# Patient Record
Sex: FEMALE | Race: WHITE | NOT HISPANIC OR LATINO | Employment: OTHER | URBAN - METROPOLITAN AREA
[De-identification: names, ages, dates, MRNs, and addresses within clinical notes are randomized per-mention and may not be internally consistent; named-entity substitution may affect disease eponyms.]

---

## 2017-02-16 ENCOUNTER — HOSPITAL ENCOUNTER (OUTPATIENT)
Dept: SLEEP CENTER | Facility: CLINIC | Age: 77
Discharge: HOME | End: 2017-02-16
Payer: MEDICARE

## 2017-02-16 ENCOUNTER — TRANSCRIBE ORDERS (OUTPATIENT)
Dept: SLEEP CENTER | Facility: CLINIC | Age: 77
End: 2017-02-16

## 2017-02-16 DIAGNOSIS — G47.33 OBSTRUCTIVE SLEEP APNEA (ADULT) (PEDIATRIC): ICD-10-CM

## 2017-02-16 DIAGNOSIS — G47.33 OSA (OBSTRUCTIVE SLEEP APNEA): Primary | ICD-10-CM

## 2017-04-01 DIAGNOSIS — M85.80 OTHER SPECIFIED DISORDERS OF BONE DENSITY AND STRUCTURE, UNSPECIFIED SITE: ICD-10-CM

## 2017-04-01 DIAGNOSIS — Z12.31 ENCOUNTER FOR SCREENING MAMMOGRAM FOR MALIGNANT NEOPLASM OF BREAST: ICD-10-CM

## 2017-04-17 ENCOUNTER — LAB REQUISITION (OUTPATIENT)
Dept: LAB | Facility: HOSPITAL | Age: 77
End: 2017-04-17
Payer: MEDICARE

## 2017-04-17 ENCOUNTER — ALLSCRIPTS OFFICE VISIT (OUTPATIENT)
Dept: OTHER | Facility: OTHER | Age: 77
End: 2017-04-17

## 2017-04-17 DIAGNOSIS — Z01.419 ENCOUNTER FOR GYNECOLOGICAL EXAMINATION WITHOUT ABNORMAL FINDING: ICD-10-CM

## 2017-04-17 DIAGNOSIS — Z12.72 ENCOUNTER FOR SCREENING FOR MALIGNANT NEOPLASM OF VAGINA: ICD-10-CM

## 2017-04-17 PROCEDURE — G0145 SCR C/V CYTO,THINLAYER,RESCR: HCPCS | Performed by: OBSTETRICS & GYNECOLOGY

## 2017-04-20 LAB
LAB AP GYN PRIMARY INTERPRETATION: NORMAL
Lab: NORMAL

## 2017-04-24 ENCOUNTER — HOSPITAL ENCOUNTER (OUTPATIENT)
Dept: BONE DENSITY | Facility: MEDICAL CENTER | Age: 77
Discharge: HOME | End: 2017-04-24
Payer: MEDICARE

## 2017-04-24 DIAGNOSIS — M85.80 OSTEOPENIA, SENILE: ICD-10-CM

## 2017-04-24 DIAGNOSIS — M85.80 OTHER SPECIFIED DISORDERS OF BONE DENSITY AND STRUCTURE, UNSPECIFIED SITE: ICD-10-CM

## 2017-04-24 PROCEDURE — 77080 DXA BONE DENSITY AXIAL: CPT

## 2017-04-27 ENCOUNTER — ALLSCRIPTS OFFICE VISIT (OUTPATIENT)
Dept: OTHER | Facility: OTHER | Age: 77
End: 2017-04-27

## 2017-06-05 ENCOUNTER — HOSPITAL ENCOUNTER (OUTPATIENT)
Dept: RADIOLOGY | Age: 77
Discharge: HOME | End: 2017-06-05
Payer: MEDICARE

## 2017-06-05 DIAGNOSIS — Z12.31 ENCOUNTER FOR SCREENING MAMMOGRAM FOR MALIGNANT NEOPLASM OF BREAST: ICD-10-CM

## 2017-06-05 PROCEDURE — G0202 SCR MAMMO BI INCL CAD: HCPCS

## 2017-08-31 ENCOUNTER — HOSPITAL ENCOUNTER (OUTPATIENT)
Dept: SLEEP CENTER | Facility: CLINIC | Age: 77
Discharge: HOME | End: 2017-08-31
Payer: MEDICARE

## 2017-08-31 DIAGNOSIS — G47.33 OSA (OBSTRUCTIVE SLEEP APNEA): ICD-10-CM

## 2018-01-12 NOTE — PROGRESS NOTES
Discussion/Summary  Social Work-Discussion Summary  Luke: Patient is being seen for a distress screenning assessment  LSW reviewed pt's distress thermometer completed by pt on 7/11/2016 in med onc  Pt rated their distress as a 2/10 and denied psychosocial problems  Based on pt's score, a social work follow up would not be indicated  If pt expresses psychosocial needs, LSW is available to provide cancer care counseling        Signatures   Electronically signed by : CELSO Sams; Jul 13 2016  2:52PM EST                       (Author)

## 2018-01-13 VITALS
DIASTOLIC BLOOD PRESSURE: 64 MMHG | BODY MASS INDEX: 29.26 KG/M2 | WEIGHT: 165.13 LBS | HEIGHT: 63 IN | SYSTOLIC BLOOD PRESSURE: 100 MMHG

## 2018-01-17 NOTE — PROGRESS NOTES
Plan  Hemarthrosis of right knee    · Drink plenty of fluids ; Status:Complete;   Done: [id number]   Ordered;  For:Hemarthrosis of right knee; Ordered By:Belman, Truett Burkitt;   · Follow-up PRN Evaluation and Treatment  Follow-up  Status: Complete  Done:  [id number]   Ordered; For: Hemarthrosis of right knee; Ordered By: Trinity Kent Performed:  Due: 22TZT0603    Discussion/Summary  Discussion Summary:   In summary, this is a 59-year-old female history of hemarthrosis as outlined  Her von Willebrand's workup was negative  This is certainly plausible as she had not had bleeding with prior surgical intervention  3 years ago  Rare episodes of required von Willebrand's have been described thus not ruling this out entirely  Alternatively, One could consider the possibility of arterial source of bleeding  During reexploration and arteriogram this was not noted, however  This could be explained by Tamponade in the joint space, leading to bleeding cessation and absence of an identifiable active bleeding vessel  Considering the above, I would say that coagulopathy as explanation for her hemarthrosis is not particularly plausible  Additionally, Eliquis may have played a role, but given that this occurred more than a week off of Eliquis the likelihood is significantly diminished  Had she had a metabolic difficulty with Eliquis I would think this would have been apparent prior to surgical intervention  I reviewed the above considerations at length with the patient and her daughter  Consideration for anticoagulation with cardiology is anticipated  She will be evaluated tomorrow  Return to anticoagulation could be considered  We reviewed some of the pros and cons of Coumadin versus the novel anticoagulative  Additionally, we reviewed the availability of her reversible agent for Pradaxa but not for Eliquis or Xarelto (at the current time  )    I have not set up a follow-up appointment at this time but remain available if any questions or problems arise in the future  Understands and agrees with treatment plan: The treatment plan was reviewed with the patient/guardian  The patient/guardian understands and agrees with the treatment plan   Counseling Documentation With Imm: The patient was counseled regarding diagnostic results, instructions for management, patient and family education, impressions  total time of encounter was 40 minutes  Chief Complaint  Chief Complaint Free Text Note Form: Follow-up regarding coagulopathy  History of Present Illness  HPI: June 2016-patient had a right total knee replacement in early June  She had been on Eliquis for chronic atrial fibrillation  This was stopped  5 days prior to surgery and restarted the next day  She was transferred to rehabilitation and after about one week one Eliquis  She had no difficulty  She returned home and about a week later developed abrupt onset of swelling and pain in the right knee  She had an arthrocentesis for corinne blood  Eliquis was discontinued  A few days later she had further pain and swelling without provocation  She underwent surgical evacuation of a hematoma  Arteriogram showed no vascular injury  No bleeding source was identified  Review of Systems  Complete-Female:   Constitutional: No fever, no chills, feels well, no tiredness, no recent weight gain or weight loss  Eyes: No complaints of eye pain, no red eyes, no eyesight problems, no discharge, no dry eyes, no itching of eyes  ENT: no complaints of earache, no loss of hearing, no nose bleeds, no nasal discharge, no sore throat, no hoarseness  Cardiovascular: No complaints of slow heart rate, no fast heart rate, no chest pain, no palpitations, no leg claudication, no lower extremity edema  Respiratory: No complaints of shortness of breath, no wheezing, no cough, no SOB on exertion, no orthopnea, no PND     Gastrointestinal: No complaints of abdominal pain, no constipation, no nausea or vomiting, no diarrhea, no bloody stools  Genitourinary: No complaints of dysuria, no incontinence, no pelvic pain, no dysmenorrhea, no vaginal discharge or bleeding  Musculoskeletal: No complaints of arthralgias, no myalgias, no joint swelling or stiffness, no limb pain or swelling  Integumentary: No complaints of skin rash or lesions, no itching, no skin wounds, no breast pain or lump  Neurological: No complaints of headache, no confusion, no convulsions, no numbness, no dizziness or fainting, no tingling, no limb weakness, no difficulty walking  Psychiatric: Not suicidal, no sleep disturbance, no anxiety or depression, no change in personality, no emotional problems  Endocrine: No complaints of proptosis, no hot flashes, no muscle weakness, no deepening of the voice, no feelings of weakness  Hematologic/Lymphatic: No complaints of swollen glands, no swollen glands in the neck, does not bleed easily, does not bruise easily  Active Problems    1  Encounter for routine gynecological examination (V72 31) (Z01 419)   2  Encounter for screening for osteoporosis (V82 81) (Z13 820)   3  Encounter for screening mammogram for malignant neoplasm of breast (V76 12)   (Z12 31)   4  Osteopenia (733 90) (M85 80)   5  Pap smear, as part of routine gynecological examination (V76 2) (Z01 419)   6  Visit for screening mammogram (V76 12) (Z12 31)    Past Medical History    1  History of Arthritis (V13 4)   2  History of Birth History   3  History of Encounter for routine gynecological examination (V72 31) (Z01 419)   4  History of esophagitis (V12 79) (Z87 19)   5  History of gastritis (V12 79) (Z87 19)   6  History of hypertension (V12 59) (Z86 79)   7  History of migraine (V12 49) (Z86 69)   8  History of osteopenia (V13 59) (Z87 39)   9  History of Reported Pap Smear   10  History of Varicose Veins Of Lower Extremities (454 9)    Surgical History    1  History of Complete Colonoscopy For Polyp Removal   2   History of Eye Surgery   3  History of Knee Replacement   4  History of Oral Surgery   5  History of Tonsillectomy With Adenoidectomy   6  History of Vaginal Hysterectomy    Family History  Mother    1  Family history of diabetes mellitus (V18 0) (Z83 3)  Father    2  Family history of diabetes mellitus (V18 0) (Z83 3)  Brother    3  Family history of diabetes mellitus (V18 0) (Z83 3)    Social History    · Denied: History of Alcohol Use (History)   · Denied: History of Home Environment Domestic Violence   · Never A Smoker    Current Meds   1  Calcium TABS; Therapy: (Recorded:28Mar2014) to Recorded   2  CeleXA TABS; Therapy: (Recorded:04Apr2014) to Recorded   3  Citalopram Hydrobromide 10 MG Oral Tablet; TAKE 1 TABLET DAILY; Therapy: (Recorded:11Jul2016) to Recorded   4  Crestor 5 MG Oral Tablet; Therapy: (Recorded:11Jul2016) to Recorded   5  Crestor TABS; Therapy: (Recorded:15Apr2015) to Recorded   6  Dulcolax Stool Softener CAPS; Therapy: (Recorded:11Jul2016) to Recorded   7  Flecainide Acetate TABS; Therapy: (Recorded:15Apr2015) to Recorded   8  Imitrex TABS; Therapy: (Recorded:15Apr2015) to Recorded   9  Metoprolol Tartrate TABS; Therapy: (Recorded:15Apr2015) to Recorded   10  Milk of Magnesia SUSP; Therapy: (Recorded:11Jul2016) to Recorded   11  Multi-Vitamin TABS; Therapy: (Recorded:28Mar2014) to Recorded   12  OxyCODONE HCl - 5 MG Oral Capsule; TAKE 1 CAPSULE EVERY 4  TO 6 HOURS AS    NEEDED FOR BREAKTHROUGH PAIN;    Therapy: (Recorded:[id number]) to Recorded   13  Propranolol HCl ER 60 MG Oral Capsule Extended Release 24 Hour; Therapy: (Recorded:00Jvt2693) to Recorded   14  Tylenol 325 MG Oral Tablet; Therapy: (Recorded:11Jul2016) to Recorded   15  Vitamin C TABS; Therapy: (Recorded:28Mar2014) to Recorded   16  Vitamin D TABS; Therapy: (Recorded:28Mar2014) to Recorded    Allergies    1   No Known Drug Allergies    Vitals  Vital Signs [Data Includes: Current Encounter]    Recorded: 99VKA2373 11:48AM   Temperature 97 8 F   Heart Rate 57   Respiration 16   Systolic 965   Diastolic 70   Height 5 ft 2 5 in   Weight 149 lb 2 08 oz   BMI Calculated 26 84   BSA Calculated 1 7   O2 Saturation 99   Pain Scale 0     Physical Exam    Constitutional   General appearance: No acute distress, well appearing and well nourished  Eyes   Conjunctiva and lids: No swelling, erythema or discharge  Ears, Nose, Mouth, and Throat   External inspection of ears and nose: Normal     Oropharynx: Normal with no erythema, edema, exudate or lesions  Pulmonary   Auscultation of lungs: Clear to auscultation  Cardiovascular   Auscultation of heart: Normal rate and rhythm, normal S1 and S2, without murmurs  Examination of extremities for edema and/or varicosities: Normal     Abdomen   Abdomen: Non-tender, no masses  Liver and spleen: No hepatomegaly or splenomegaly  Lymphatic   Palpation of lymph nodes in neck: No lymphadenopathy  Musculoskeletal   Gait and station: Normal     Skin   Skin and subcutaneous tissue: Normal without rashes or lesions  Neurologic   Cranial nerves: Cranial nerves 2-12 intact      Psychiatric   Orientation to person, place, and time: Normal          Signatures   Electronically signed by : LAURIE Acosta ,DO; Jul 11 2016  5:47PM EST                       (Author)

## 2018-05-08 ENCOUNTER — ANNUAL EXAM (OUTPATIENT)
Dept: OBGYN CLINIC | Facility: CLINIC | Age: 78
End: 2018-05-08
Payer: MEDICARE

## 2018-05-08 VITALS
SYSTOLIC BLOOD PRESSURE: 138 MMHG | HEIGHT: 63 IN | BODY MASS INDEX: 29.41 KG/M2 | WEIGHT: 166 LBS | DIASTOLIC BLOOD PRESSURE: 72 MMHG

## 2018-05-08 DIAGNOSIS — Z13.820 SCREENING FOR OSTEOPOROSIS: ICD-10-CM

## 2018-05-08 DIAGNOSIS — Z12.31 VISIT FOR SCREENING MAMMOGRAM: ICD-10-CM

## 2018-05-08 DIAGNOSIS — Z01.419 ENCOUNTER FOR GYNECOLOGICAL EXAMINATION WITHOUT ABNORMAL FINDING: Primary | ICD-10-CM

## 2018-05-08 PROCEDURE — G0101 CA SCREEN;PELVIC/BREAST EXAM: HCPCS | Performed by: OBSTETRICS & GYNECOLOGY

## 2018-05-08 RX ORDER — ACEBUTOLOL HYDROCHLORIDE 200 MG/1
CAPSULE ORAL
COMMUNITY
End: 2018-12-23 | Stop reason: ALTCHOICE

## 2018-05-08 RX ORDER — TOCOPHERSOLAN (VITAMIN E TPGS) 400/15ML
LIQUID (ML) ORAL
COMMUNITY
End: 2022-07-05 | Stop reason: ALTCHOICE

## 2018-05-08 RX ORDER — ASCORBIC ACID 100 MG
3000 TABLET,CHEWABLE ORAL
COMMUNITY
End: 2018-05-08

## 2018-05-08 NOTE — PATIENT INSTRUCTIONS
Normal gynecological physical examination  Self-breast examination stressed  Mammogram ordered  Discussed regular exercise, healthy diet, importance of vitamin D and calcium supplements  Discussed importance of sun block use during periods of prolonged sun exposure  Patient will be seen in 1 year for routine gynecologic and medical examination  Patient will call office for any problems, concerns, or issues which may arise during the interim

## 2018-05-08 NOTE — PROGRESS NOTES
s/p hysterectomy  Doing well  Colon 2 yrs ago   Need another one soon  Dermatologist 2 months ago- all well

## 2018-05-08 NOTE — PROGRESS NOTES
Assessment/Plan:     Diagnoses and all orders for this visit:    Encounter for gynecological examination without abnormal finding  · Normal gynecological exam today s/p hysterectomy  · No Pap performed today  · Patient encouraged to continue healthy diet, exercise, vitamin-D and calcium supplementation  · Patient to follow up in 1 year for annual gynecological exam or sooner if any new symptoms arise  Screening for osteoporosis  -     DXA bone density spine hip and pelvis; Future    Visit for screening mammogram  -     Mammo screening bilateral w cad; Future  - encourage patient to continue self breast exam monthly and call if any new problems arise  - Patient with multiple skin tags and moles  Has seen Dermatology  And clear from any possible malignancy  Subjective:      Patient ID: Radha Mijares is a 68 y o  female  Chun Rboledo is a 71-year-old female who presents today for her routine gynecological exam   Patient a longer cancer  She had hysterectomy in the past   Patient denies any vaginal bleeding, discharge, itching, burning, dryness  Patient denies any changes in bowel or bladder  Patient is up-to-date on colonoscopy, having 1 done 2 years ago  Patient does perform self-breast exams occasionally and has not noticed any masses, skin changes, breast tenderness or nipple discharge  Patient reports doing well overall  The following portions of the patient's history were reviewed and updated as appropriate: allergies, current medications, past family history, past medical history, past social history, past surgical history and problem list     Review of Systems   All other systems reviewed and are negative  Objective:      /72 (BP Location: Left arm, Patient Position: Sitting, Cuff Size: Standard)   Ht 5' 2 5" (1 588 m)   Wt 75 3 kg (166 lb)   BMI 29 88 kg/m²          Physical Exam   Constitutional: She is oriented to person, place, and time   She appears well-developed and well-nourished  HENT:   Head: Normocephalic and atraumatic  Eyes: Conjunctivae and EOM are normal    Neck: Normal range of motion  Neck supple  Cardiovascular: Normal rate and regular rhythm  Pulmonary/Chest: Effort normal and breath sounds normal    Abdominal: Soft  Normal appearance and bowel sounds are normal    Genitourinary: Vagina normal and uterus normal  Rectal exam shows external hemorrhoid  Rectal exam shows anal tone normal and guaiac negative stool  No breast swelling, tenderness, discharge or bleeding  Pelvic exam was performed with patient supine  Cervix exhibits no motion tenderness  Right adnexum displays no mass, no tenderness and no fullness  Left adnexum displays no mass, no tenderness and no fullness  No vaginal discharge found  Genitourinary Comments: Patient had past medical history of total abdominal hysterectomy   Musculoskeletal: Normal range of motion  Lymphadenopathy:     She has no cervical adenopathy  She has no axillary adenopathy  Right: No inguinal and no supraclavicular adenopathy present  Left: No inguinal and no supraclavicular adenopathy present  Neurological: She is alert and oriented to person, place, and time  Skin: Skin is intact  No rash noted  Psychiatric: She has a normal mood and affect   Her speech is normal and behavior is normal  Judgment and thought content normal  Cognition and memory are normal

## 2018-06-07 ENCOUNTER — HOSPITAL ENCOUNTER (OUTPATIENT)
Dept: RADIOLOGY | Age: 78
Discharge: HOME | End: 2018-06-07
Payer: MEDICARE

## 2018-06-07 DIAGNOSIS — Z12.31 VISIT FOR SCREENING MAMMOGRAM: ICD-10-CM

## 2018-06-07 PROCEDURE — 77067 SCR MAMMO BI INCL CAD: CPT

## 2018-07-08 ENCOUNTER — APPOINTMENT (OUTPATIENT)
Dept: RADIOLOGY | Age: 78
End: 2018-07-08
Payer: MEDICARE

## 2018-07-08 ENCOUNTER — OFFICE VISIT (OUTPATIENT)
Dept: URGENT CARE | Age: 78
End: 2018-07-08
Payer: MEDICARE

## 2018-07-08 VITALS
OXYGEN SATURATION: 96 % | BODY MASS INDEX: 28.92 KG/M2 | HEART RATE: 63 BPM | WEIGHT: 163.2 LBS | SYSTOLIC BLOOD PRESSURE: 185 MMHG | RESPIRATION RATE: 18 BRPM | TEMPERATURE: 99.5 F | DIASTOLIC BLOOD PRESSURE: 79 MMHG | HEIGHT: 63 IN

## 2018-07-08 DIAGNOSIS — R05.9 COUGH: ICD-10-CM

## 2018-07-08 DIAGNOSIS — J06.9 VIRAL UPPER RESPIRATORY TRACT INFECTION: Primary | ICD-10-CM

## 2018-07-08 PROCEDURE — 71046 X-RAY EXAM CHEST 2 VIEWS: CPT

## 2018-07-08 PROCEDURE — G0463 HOSPITAL OUTPT CLINIC VISIT: HCPCS | Performed by: PHYSICIAN ASSISTANT

## 2018-07-08 PROCEDURE — 99213 OFFICE O/P EST LOW 20 MIN: CPT | Performed by: PHYSICIAN ASSISTANT

## 2018-07-08 RX ORDER — FLUTICASONE PROPIONATE 50 MCG
1 SPRAY, SUSPENSION (ML) NASAL DAILY
Qty: 16 G | Refills: 0 | Status: SHIPPED | OUTPATIENT
Start: 2018-07-08 | End: 2022-07-05 | Stop reason: ALTCHOICE

## 2018-07-08 RX ORDER — GUAIFENESIN 600 MG
1200 TABLET, EXTENDED RELEASE 12 HR ORAL EVERY 12 HOURS SCHEDULED
Qty: 8 TABLET | Refills: 0 | Status: ON HOLD | OUTPATIENT
Start: 2018-07-08 | End: 2018-12-26

## 2018-07-08 NOTE — PROGRESS NOTES
[facility]        NAME: Zhen Diaz is a 66 y o  female  : 1940    MRN: [id number]  DATE: 2018  TIME: 5:51 PM    Assessment and Plan   Viral upper respiratory tract infection [J06 9]  1  Viral upper respiratory tract infection  XR chest pa & lateral     X-ray provider read no acute findings  Patient appears clinically well  Will treat conservatively    Patient Instructions       Take medications as directed  Drink plenty of fluids  Follow up with family doctor this week  Go to ER immediately if new or worsening symptoms occur  Chief Complaint     Chief Complaint   Patient presents with    Cough     cough and congestion since Tuesday         History of Present Illness       Cough   This is a new problem  Episode onset: Five days ago  The problem has been gradually worsening  The problem occurs every few minutes  The cough is non-productive  Associated symptoms include nasal congestion and postnasal drip  Pertinent negatives include no chest pain, chills, ear pain, fever, headaches, myalgias, rash, rhinorrhea, sore throat, shortness of breath, sweats, weight loss or wheezing  Nothing aggravates the symptoms  She has tried nothing for the symptoms  The treatment provided no relief  Her past medical history is significant for bronchitis  There is no history of asthma, COPD or environmental allergies  Review of Systems   Review of Systems   Constitutional: Negative for chills, diaphoresis, fatigue, fever and weight loss  HENT: Positive for postnasal drip, sinus pain and sinus pressure  Negative for congestion, ear pain, rhinorrhea, sneezing, sore throat, trouble swallowing and voice change  Eyes: Negative  Respiratory: Positive for cough  Negative for chest tightness, shortness of breath and wheezing  Cardiovascular: Negative for chest pain and palpitations  Gastrointestinal: Negative for constipation, diarrhea, nausea and vomiting  Endocrine: Negative  Genitourinary: Negative for dysuria  Musculoskeletal: Negative for back pain, myalgias and neck pain  Skin: Negative for pallor and rash  Allergic/Immunologic: Negative  Negative for environmental allergies  Neurological: Negative for dizziness, syncope and headaches  Hematological: Negative  Psychiatric/Behavioral: Negative  Current Medications       Current Outpatient Prescriptions:     acebutolol (SECTRAL) 200 mg capsule, acebutolol 200 mg capsule, Disp: , Rfl:     ascorbic acid (VITAMIN C) 500 mg tablet, Take 500 mg by mouth daily  , Disp: , Rfl:     BABY ASPIRIN PO, Take 162 mg by mouth, Disp: , Rfl:     calcium carbonate (TUMS) 500 mg chewable tablet, Chew 1 tablet daily  , Disp: , Rfl:     Calcium-Magnesium-Vitamin D (CALCIUM 500) 500-250-200 MG-MG-UNIT TABS, Take by mouth, Disp: , Rfl:     Cholecalciferol (VITAMIN D3) 1000 UNIT/SPRAY LIQD, Take 5,000 Units by mouth, Disp: , Rfl:     citalopram (CeleXA) 10 mg tablet, Take 10 mg by mouth daily  Take 1/2 of 20mg  Tablet by mouth once daily, Disp: , Rfl:     flecainide (TAMBOCOR) 50 mg tablet, Take 50 mg by mouth 2 (two) times a day , Disp: , Rfl:     Multiple Vitamins-Minerals (MULTIVITAMIN ADULT PO), Daily Multi-Vitamin, Disp: , Rfl:     multivitamin (THERAGRAN) TABS, Take 1 tablet by mouth daily  , Disp: , Rfl:     propranolol (INDERAL LA) 60 mg 24 hr capsule, Take 60 mg by mouth daily  , Disp: , Rfl:     rosuvastatin (CRESTOR) 5 mg tablet, Take 2 5 mg by mouth daily  Take 1/2 of 5mg  Tablet by mouth every day, Disp: , Rfl:     sodium chloride (MARITZA 128) 5 % hypertonic ophthalmic solution, Administer 1 drop to both eyes daily at bedtime as needed  , Disp: , Rfl:     Current Allergies     Allergies as of 07/08/2018 - Reviewed 07/08/2018   Allergen Reaction Noted    Atorvastatin Other (See Comments) 05/10/2011    Statins Other (See Comments) 06/24/2016            The following portions of the patient's history were reviewed and updated as appropriate: allergies, current medications, past family history, past medical history, past social history, past surgical history and problem list      Past Medical History:   Diagnosis Date    Afib ([address] )     Arthritis     Hyperlipidemia     Hypertension     Osteopenia     Sleep apnea     Varicose veins of both lower extremities        Past Surgical History:   Procedure Laterality Date    CATARACT EXTRACTION      COLONOSCOPY      complete, for polyp removal    EYE SURGERY      HYSTERECTOMY      INCISION AND DRAINAGE OF WOUND Right 6/28/2016    Procedure: ARTHROSCOPY,EVACUATION OF HEMATOMA, OPEN EXPLORATION OF WOUND;  Surgeon: Nick Geller MD;  Location: [facility];  Service:     TONSILLECTOMY AND ADENOIDECTOMY      TOTAL KNEE ARTHROPLASTY Right     TOTAL KNEE ARTHROPLASTY Left     WISDOM TOOTH EXTRACTION         Family History   Problem Relation Age of Onset    Diabetes Mother     Diabetes Father     Diabetes Brother          Medications have been verified  Objective   BP (!) 185/79 (BP Location: Left arm, Patient Position: Sitting)   Pulse 63   Temp 99 5 °F (37 5 °C) (Tympanic)   Resp 18   Ht 5' 3" (1 6 m)   Wt 74 kg (163 lb 3 2 oz)   SpO2 96%   BMI 28 91 kg/m²        Physical Exam     Physical Exam   Constitutional: She appears well-developed and well-nourished  No distress  HENT:   Head: Normocephalic and atraumatic  Right Ear: External ear normal    Left Ear: External ear normal    TM intact and pearly bilaterally  Clear nasal discharge bilaterally  Swollen turbinates  Postnasal discharge and erythematous posterior pharynx  Eyes: Conjunctivae are normal  Right eye exhibits no discharge  Left eye exhibits no discharge  Neck: Normal range of motion  Neck supple  Cardiovascular: Normal rate, regular rhythm, normal heart sounds and intact distal pulses  Pulmonary/Chest: Effort normal and breath sounds normal  No respiratory distress  She has no wheezes  She has no rales  Lymphadenopathy:     She has no cervical adenopathy  Skin: Skin is warm  No rash noted  She is not diaphoretic  Nursing note and vitals reviewed

## 2018-12-22 ENCOUNTER — APPOINTMENT (EMERGENCY)
Dept: RADIOLOGY | Facility: HOSPITAL | Age: 78
DRG: 189 | End: 2018-12-22
Payer: MEDICARE

## 2018-12-22 ENCOUNTER — HOSPITAL ENCOUNTER (INPATIENT)
Facility: HOSPITAL | Age: 78
LOS: 3 days | Discharge: HOME | DRG: 189 | End: 2018-12-26
Attending: EMERGENCY MEDICINE | Admitting: HOSPITALIST
Payer: MEDICARE

## 2018-12-22 DIAGNOSIS — R09.02 HYPOXIA: ICD-10-CM

## 2018-12-22 DIAGNOSIS — R50.9 FEVER: Primary | ICD-10-CM

## 2018-12-22 DIAGNOSIS — J06.9 VIRAL UPPER RESPIRATORY TRACT INFECTION: ICD-10-CM

## 2018-12-22 DIAGNOSIS — R05.9 COUGH: ICD-10-CM

## 2018-12-22 DIAGNOSIS — J10.1 INFLUENZA A: ICD-10-CM

## 2018-12-22 LAB
ALBUMIN SERPL BCP-MCNC: 3.8 G/DL (ref 3.5–5)
ALP SERPL-CCNC: 57 U/L (ref 46–116)
ALT SERPL W P-5'-P-CCNC: 35 U/L (ref 12–78)
ANION GAP SERPL CALCULATED.3IONS-SCNC: 11 MMOL/L (ref 4–13)
AST SERPL W P-5'-P-CCNC: 27 U/L (ref 5–45)
BASOPHILS # BLD AUTO: 0.01 THOUSANDS/ΜL (ref 0–0.1)
BASOPHILS NFR BLD AUTO: 0 % (ref 0–1)
BILIRUB SERPL-MCNC: 0.38 MG/DL (ref 0.2–1)
BUN SERPL-MCNC: 20 MG/DL (ref 5–25)
CALCIUM SERPL-MCNC: 9.1 MG/DL (ref 8.3–10.1)
CHLORIDE SERPL-SCNC: 102 MMOL/L (ref 100–108)
CO2 SERPL-SCNC: 26 MMOL/L (ref 21–32)
CREAT SERPL-MCNC: 1.04 MG/DL (ref 0.6–1.3)
EOSINOPHIL # BLD AUTO: 0.02 THOUSAND/ΜL (ref 0–0.61)
EOSINOPHIL NFR BLD AUTO: 0 % (ref 0–6)
ERYTHROCYTE [DISTWIDTH] IN BLOOD BY AUTOMATED COUNT: 13.6 % (ref 11.6–15.1)
GFR SERPL CREATININE-BSD FRML MDRD: 52 ML/MIN/1.73SQ M
GLUCOSE SERPL-MCNC: 113 MG/DL (ref 65–140)
HCT VFR BLD AUTO: 45.7 % (ref 34.8–46.1)
HGB BLD-MCNC: 14.8 G/DL (ref 11.5–15.4)
IMM GRANULOCYTES # BLD AUTO: 0.02 THOUSAND/UL (ref 0–0.2)
IMM GRANULOCYTES NFR BLD AUTO: 0 % (ref 0–2)
LYMPHOCYTES # BLD AUTO: 1.17 THOUSANDS/ΜL (ref 0.6–4.47)
LYMPHOCYTES NFR BLD AUTO: 16 % (ref 14–44)
MAGNESIUM SERPL-MCNC: 2.3 MG/DL (ref 1.6–2.6)
MCH RBC QN AUTO: 30.1 PG (ref 26.8–34.3)
MCHC RBC AUTO-ENTMCNC: 32.4 G/DL (ref 31.4–37.4)
MCV RBC AUTO: 93 FL (ref 82–98)
MONOCYTES # BLD AUTO: 1.18 THOUSAND/ΜL (ref 0.17–1.22)
MONOCYTES NFR BLD AUTO: 17 % (ref 4–12)
NEUTROPHILS # BLD AUTO: 4.76 THOUSANDS/ΜL (ref 1.85–7.62)
NEUTS SEG NFR BLD AUTO: 67 % (ref 43–75)
NRBC BLD AUTO-RTO: 0 /100 WBCS
PLATELET # BLD AUTO: 161 THOUSANDS/UL (ref 149–390)
PMV BLD AUTO: 10.1 FL (ref 8.9–12.7)
POTASSIUM SERPL-SCNC: 3.9 MMOL/L (ref 3.5–5.3)
PROT SERPL-MCNC: 7.3 G/DL (ref 6.4–8.2)
RBC # BLD AUTO: 4.91 MILLION/UL (ref 3.81–5.12)
SODIUM SERPL-SCNC: 139 MMOL/L (ref 136–145)
WBC # BLD AUTO: 7.16 THOUSAND/UL (ref 4.31–10.16)

## 2018-12-22 PROCEDURE — 85025 COMPLETE CBC W/AUTO DIFF WBC: CPT | Performed by: EMERGENCY MEDICINE

## 2018-12-22 PROCEDURE — 71046 X-RAY EXAM CHEST 2 VIEWS: CPT

## 2018-12-22 PROCEDURE — 1123F ACP DISCUSS/DSCN MKR DOCD: CPT | Performed by: HOSPITALIST

## 2018-12-22 PROCEDURE — 36415 COLL VENOUS BLD VENIPUNCTURE: CPT | Performed by: EMERGENCY MEDICINE

## 2018-12-22 PROCEDURE — 99285 EMERGENCY DEPT VISIT HI MDM: CPT

## 2018-12-22 PROCEDURE — 96361 HYDRATE IV INFUSION ADD-ON: CPT

## 2018-12-22 PROCEDURE — 80053 COMPREHEN METABOLIC PANEL: CPT | Performed by: EMERGENCY MEDICINE

## 2018-12-22 PROCEDURE — 83735 ASSAY OF MAGNESIUM: CPT | Performed by: EMERGENCY MEDICINE

## 2018-12-22 RX ADMIN — SODIUM CHLORIDE 1000 ML: 0.9 INJECTION, SOLUTION INTRAVENOUS at 22:36

## 2018-12-22 NOTE — Clinical Note
Case was discussed with BILLY and the patient's admission status was agreed to be Admission Status: observation status to the service of Dr Joey Lewis

## 2018-12-23 ENCOUNTER — APPOINTMENT (EMERGENCY)
Dept: CT IMAGING | Facility: HOSPITAL | Age: 78
DRG: 189 | End: 2018-12-23
Payer: MEDICARE

## 2018-12-23 PROBLEM — Z86.79 HISTORY OF ATRIAL FIBRILLATION: Chronic | Status: ACTIVE | Noted: 2018-12-23

## 2018-12-23 PROBLEM — H83.09 ACUTE VIRAL LABYRINTHITIS: Status: ACTIVE | Noted: 2018-12-23

## 2018-12-23 PROBLEM — R50.9 FEBRILE ILLNESS: Status: ACTIVE | Noted: 2018-12-23

## 2018-12-23 PROBLEM — J96.01 ACUTE RESPIRATORY FAILURE WITH HYPOXIA (HCC): Status: ACTIVE | Noted: 2018-12-23

## 2018-12-23 LAB
ANION GAP SERPL CALCULATED.3IONS-SCNC: 10 MMOL/L (ref 4–13)
BACTERIA UR QL AUTO: ABNORMAL /HPF
BASOPHILS # BLD AUTO: 0 THOUSANDS/ΜL (ref 0–0.1)
BASOPHILS NFR BLD AUTO: 0 % (ref 0–1)
BILIRUB UR QL STRIP: NEGATIVE
BUN SERPL-MCNC: 17 MG/DL (ref 5–25)
CALCIUM SERPL-MCNC: 8.8 MG/DL (ref 8.3–10.1)
CHLORIDE SERPL-SCNC: 103 MMOL/L (ref 100–108)
CLARITY UR: CLEAR
CO2 SERPL-SCNC: 22 MMOL/L (ref 21–32)
COLOR UR: YELLOW
CREAT SERPL-MCNC: 0.83 MG/DL (ref 0.6–1.3)
EOSINOPHIL # BLD AUTO: 0.04 THOUSAND/ΜL (ref 0–0.61)
EOSINOPHIL NFR BLD AUTO: 1 % (ref 0–6)
ERYTHROCYTE [DISTWIDTH] IN BLOOD BY AUTOMATED COUNT: 13.6 % (ref 11.6–15.1)
FLUAV AG SPEC QL: DETECTED
FLUBV AG SPEC QL: ABNORMAL
GFR SERPL CREATININE-BSD FRML MDRD: 68 ML/MIN/1.73SQ M
GLUCOSE SERPL-MCNC: 115 MG/DL (ref 65–140)
GLUCOSE UR STRIP-MCNC: NEGATIVE MG/DL
HCT VFR BLD AUTO: 41.8 % (ref 34.8–46.1)
HGB BLD-MCNC: 13.5 G/DL (ref 11.5–15.4)
HGB UR QL STRIP.AUTO: ABNORMAL
IMM GRANULOCYTES # BLD AUTO: 0.02 THOUSAND/UL (ref 0–0.2)
IMM GRANULOCYTES NFR BLD AUTO: 0 % (ref 0–2)
KETONES UR STRIP-MCNC: NEGATIVE MG/DL
LEUKOCYTE ESTERASE UR QL STRIP: NEGATIVE
LYMPHOCYTES # BLD AUTO: 1.48 THOUSANDS/ΜL (ref 0.6–4.47)
LYMPHOCYTES NFR BLD AUTO: 26 % (ref 14–44)
MCH RBC QN AUTO: 30.1 PG (ref 26.8–34.3)
MCHC RBC AUTO-ENTMCNC: 32.3 G/DL (ref 31.4–37.4)
MCV RBC AUTO: 93 FL (ref 82–98)
MONOCYTES # BLD AUTO: 1.22 THOUSAND/ΜL (ref 0.17–1.22)
MONOCYTES NFR BLD AUTO: 21 % (ref 4–12)
NEUTROPHILS # BLD AUTO: 2.93 THOUSANDS/ΜL (ref 1.85–7.62)
NEUTS SEG NFR BLD AUTO: 52 % (ref 43–75)
NITRITE UR QL STRIP: NEGATIVE
NON-SQ EPI CELLS URNS QL MICRO: ABNORMAL /HPF
NRBC BLD AUTO-RTO: 0 /100 WBCS
PH UR STRIP.AUTO: 6 [PH] (ref 4.5–8)
PLATELET # BLD AUTO: 145 THOUSANDS/UL (ref 149–390)
PMV BLD AUTO: 9.7 FL (ref 8.9–12.7)
POTASSIUM SERPL-SCNC: 4.6 MMOL/L (ref 3.5–5.3)
PROCALCITONIN SERPL-MCNC: <0.05 NG/ML
PROT UR STRIP-MCNC: NEGATIVE MG/DL
RBC # BLD AUTO: 4.49 MILLION/UL (ref 3.81–5.12)
RBC #/AREA URNS AUTO: ABNORMAL /HPF
RSV B RNA SPEC QL NAA+PROBE: ABNORMAL
SODIUM SERPL-SCNC: 135 MMOL/L (ref 136–145)
SP GR UR STRIP.AUTO: 1.01 (ref 1–1.03)
UROBILINOGEN UR QL STRIP.AUTO: 0.2 E.U./DL
WBC # BLD AUTO: 5.69 THOUSAND/UL (ref 4.31–10.16)
WBC #/AREA URNS AUTO: ABNORMAL /HPF

## 2018-12-23 PROCEDURE — 71275 CT ANGIOGRAPHY CHEST: CPT

## 2018-12-23 PROCEDURE — 87040 BLOOD CULTURE FOR BACTERIA: CPT | Performed by: EMERGENCY MEDICINE

## 2018-12-23 PROCEDURE — 36415 COLL VENOUS BLD VENIPUNCTURE: CPT | Performed by: EMERGENCY MEDICINE

## 2018-12-23 PROCEDURE — 96365 THER/PROPH/DIAG IV INF INIT: CPT

## 2018-12-23 PROCEDURE — 84145 PROCALCITONIN (PCT): CPT | Performed by: EMERGENCY MEDICINE

## 2018-12-23 PROCEDURE — 80048 BASIC METABOLIC PNL TOTAL CA: CPT | Performed by: NURSE PRACTITIONER

## 2018-12-23 PROCEDURE — 85025 COMPLETE CBC W/AUTO DIFF WBC: CPT | Performed by: NURSE PRACTITIONER

## 2018-12-23 PROCEDURE — 87631 RESP VIRUS 3-5 TARGETS: CPT | Performed by: EMERGENCY MEDICINE

## 2018-12-23 PROCEDURE — 99220 PR INITIAL OBSERVATION CARE/DAY 70 MINUTES: CPT | Performed by: NURSE PRACTITIONER

## 2018-12-23 PROCEDURE — 81001 URINALYSIS AUTO W/SCOPE: CPT

## 2018-12-23 RX ORDER — BENZONATATE 100 MG/1
100 CAPSULE ORAL 3 TIMES DAILY
Status: DISCONTINUED | OUTPATIENT
Start: 2018-12-23 | End: 2018-12-26 | Stop reason: HOSPADM

## 2018-12-23 RX ORDER — MELATONIN
1000 DAILY
Status: DISCONTINUED | OUTPATIENT
Start: 2018-12-23 | End: 2018-12-26 | Stop reason: HOSPADM

## 2018-12-23 RX ORDER — PROPRANOLOL HCL 60 MG
60 CAPSULE, EXTENDED RELEASE 24HR ORAL DAILY
Status: DISCONTINUED | OUTPATIENT
Start: 2018-12-23 | End: 2018-12-26 | Stop reason: HOSPADM

## 2018-12-23 RX ORDER — ASPIRIN 81 MG/1
162 TABLET ORAL DAILY
Status: ON HOLD | COMMUNITY

## 2018-12-23 RX ORDER — SODIUM CHLORIDE 9 MG/ML
75 INJECTION, SOLUTION INTRAVENOUS CONTINUOUS
Status: DISCONTINUED | OUTPATIENT
Start: 2018-12-23 | End: 2018-12-26 | Stop reason: HOSPADM

## 2018-12-23 RX ORDER — ACETAMINOPHEN 325 MG/1
650 TABLET ORAL EVERY 6 HOURS PRN
Status: DISCONTINUED | OUTPATIENT
Start: 2018-12-23 | End: 2018-12-26 | Stop reason: HOSPADM

## 2018-12-23 RX ORDER — OSELTAMIVIR PHOSPHATE 75 MG/1
75 CAPSULE ORAL EVERY 12 HOURS SCHEDULED
Status: DISCONTINUED | OUTPATIENT
Start: 2018-12-23 | End: 2018-12-26 | Stop reason: HOSPADM

## 2018-12-23 RX ORDER — FLUTICASONE PROPIONATE 50 MCG
1 SPRAY, SUSPENSION (ML) NASAL DAILY
Status: DISCONTINUED | OUTPATIENT
Start: 2018-12-23 | End: 2018-12-26 | Stop reason: HOSPADM

## 2018-12-23 RX ORDER — ROSUVASTATIN CALCIUM 5 MG/1
2.5 TABLET, COATED ORAL
Status: DISCONTINUED | OUTPATIENT
Start: 2018-12-23 | End: 2018-12-25 | Stop reason: RX

## 2018-12-23 RX ORDER — CITALOPRAM 10 MG/1
5 TABLET ORAL DAILY
Status: DISCONTINUED | OUTPATIENT
Start: 2018-12-23 | End: 2018-12-26 | Stop reason: HOSPADM

## 2018-12-23 RX ORDER — ASCORBIC ACID 500 MG
500 TABLET ORAL DAILY
Status: DISCONTINUED | OUTPATIENT
Start: 2018-12-23 | End: 2018-12-26 | Stop reason: HOSPADM

## 2018-12-23 RX ORDER — FLECAINIDE ACETATE 100 MG/1
50 TABLET ORAL 2 TIMES DAILY
Status: DISCONTINUED | OUTPATIENT
Start: 2018-12-23 | End: 2018-12-26 | Stop reason: HOSPADM

## 2018-12-23 RX ORDER — ONDANSETRON 2 MG/ML
4 INJECTION INTRAMUSCULAR; INTRAVENOUS EVERY 6 HOURS PRN
Status: DISCONTINUED | OUTPATIENT
Start: 2018-12-23 | End: 2018-12-26 | Stop reason: HOSPADM

## 2018-12-23 RX ORDER — ASPIRIN 81 MG/1
162 TABLET ORAL DAILY
Status: DISCONTINUED | OUTPATIENT
Start: 2018-12-23 | End: 2018-12-26 | Stop reason: HOSPADM

## 2018-12-23 RX ADMIN — BENZONATATE 100 MG: 100 CAPSULE ORAL at 20:14

## 2018-12-23 RX ADMIN — SODIUM CHLORIDE 75 ML/HR: 0.9 INJECTION, SOLUTION INTRAVENOUS at 04:07

## 2018-12-23 RX ADMIN — OSELTAMIVIR PHOSPHATE 75 MG: 75 CAPSULE ORAL at 11:54

## 2018-12-23 RX ADMIN — FLECAINIDE ACETATE 50 MG: 100 TABLET ORAL at 09:37

## 2018-12-23 RX ADMIN — FLECAINIDE ACETATE 50 MG: 100 TABLET ORAL at 17:28

## 2018-12-23 RX ADMIN — OSELTAMIVIR PHOSPHATE 75 MG: 75 CAPSULE ORAL at 20:14

## 2018-12-23 RX ADMIN — OXYCODONE HYDROCHLORIDE AND ACETAMINOPHEN 500 MG: 500 TABLET ORAL at 08:53

## 2018-12-23 RX ADMIN — CITALOPRAM HYDROBROMIDE 5 MG: 10 TABLET ORAL at 09:36

## 2018-12-23 RX ADMIN — IOHEXOL 85 ML: 350 INJECTION, SOLUTION INTRAVENOUS at 00:55

## 2018-12-23 RX ADMIN — ENOXAPARIN SODIUM 40 MG: 40 INJECTION SUBCUTANEOUS at 08:54

## 2018-12-23 RX ADMIN — VITAMIN D, TAB 1000IU (100/BT) 1000 UNITS: 25 TAB at 08:53

## 2018-12-23 RX ADMIN — Medication 1 TABLET: at 08:52

## 2018-12-23 RX ADMIN — CEFEPIME HYDROCHLORIDE 2000 MG: 2 INJECTION, POWDER, FOR SOLUTION INTRAVENOUS at 01:20

## 2018-12-23 RX ADMIN — BENZONATATE 100 MG: 100 CAPSULE ORAL at 08:53

## 2018-12-23 RX ADMIN — SODIUM CHLORIDE 75 ML/HR: 0.9 INJECTION, SOLUTION INTRAVENOUS at 17:29

## 2018-12-23 RX ADMIN — PROPRANOLOL HYDROCHLORIDE 60 MG: 60 CAPSULE, EXTENDED RELEASE ORAL at 09:39

## 2018-12-23 RX ADMIN — ASPIRIN 162 MG: 81 TABLET, COATED ORAL at 08:53

## 2018-12-23 RX ADMIN — BENZONATATE 100 MG: 100 CAPSULE ORAL at 17:28

## 2018-12-23 NOTE — ASSESSMENT & PLAN NOTE
· As per note from ER tech: O2 sat dropped to 89% when ambulating  · CT chest negative for PE; hypoventilatory changes of lungs without consolidation  · Incentive spirometer  · Recheck ambulating O2 in the morning  · P r n   Nasal cannula to maintain sat >92%

## 2018-12-23 NOTE — ASSESSMENT & PLAN NOTE
· Flu RSV PCR in process  · Received Cefepime in ER, will hold pending result of procalcitonin  · Symptomatic tx with IV hydration and Tessalon Perles  · P r n   Tylenol

## 2018-12-23 NOTE — ED NOTES
Pt walked with ambulatory pulse ox  Oxygen saturation stayed at 89-91 while walking        Genaro Hurley  12/23/18 0000

## 2018-12-23 NOTE — ASSESSMENT & PLAN NOTE
· Reports dizziness and feeling off balance beginning this morning; currently reports resolution of symptoms  · Expect improvement with improvement of viral illness  · PT consult  · Safety precautions

## 2018-12-23 NOTE — ED PROVIDER NOTES
History  Chief Complaint   Patient presents with    Fever - 9 weeks to 74 years     Per family, patient had a fever of 100 2, patient had Tylenol around 7pm  Patient reports she was fatigued and slept most of the day  Family also reports patient seems confused and thought the hospital was the police station when walking into the department  Also c/o dizziness  Patient is a 59-year-old female that presents for generalized weakness that started today  Patient states that she started with a cough last night  Did not feel well this morning  Cameron Human down to take a nap and woke up at around 7:00 p m  When her daughter came home  Patient felt lightheaded when she got up  Daughter noted that she felt hot took her temperature and it was 102  Did give her Tylenol before coming in  This was at around 7:00 p m  Marino Fulton Patient is starting to feel better now after she took Tylenol, ate and drank some at home  History provided by:  Patient and relative   used: No    Fever - 9 weeks to 74 years   Max temp prior to arrival:  102  Severity:  Moderate  Onset quality:  Gradual  Timing:  Constant  Progression:  Improving  Associated symptoms: cough    Associated symptoms: no chest pain, no dysuria, no headaches, no nausea, no rash and no vomiting    Risk factors: no hx of cancer and no immunosuppression        Prior to Admission Medications   Prescriptions Last Dose Informant Patient Reported? Taking?    BABY ASPIRIN PO 12/21/2018 at Unknown time  Yes Yes   Sig: Take 162 mg by mouth   Calcium-Magnesium-Vitamin D (CALCIUM 500) 500-250-200 MG-MG-UNIT TABS 12/22/2018 at Unknown time  Yes Yes   Sig: Take by mouth   Cholecalciferol (VITAMIN D3) 1000 UNIT/SPRAY LIQD 12/22/2018 at Unknown time  Yes Yes   Sig: Take 5,000 Units by mouth   Multiple Vitamins-Minerals (MULTIVITAMIN ADULT PO) 12/22/2018 at Unknown time  Yes Yes   Sig: Daily Multi-Vitamin   acebutolol (SECTRAL) 200 mg capsule 12/22/2018 at Unknown time Yes Yes   Sig: acebutolol 200 mg capsule   ascorbic acid (VITAMIN C) 500 mg tablet Past Week at Unknown time  Yes Yes   Sig: Take 500 mg by mouth daily  calcium carbonate (TUMS) 500 mg chewable tablet Unknown at Unknown time  Yes No   Sig: Chew 1 tablet daily  citalopram (CeleXA) 10 mg tablet 2018 at Unknown time  Yes Yes   Sig: Take 10 mg by mouth daily  Take 1/2 of 20mg  Tablet by mouth once daily   flecainide (TAMBOCOR) 50 mg tablet 2018 at Unknown time  Yes Yes   Sig: Take 50 mg by mouth 2 (two) times a day  fluticasone (FLONASE) 50 mcg/act nasal spray Unknown at Unknown time  No No   Si spray into each nostril daily   guaiFENesin (MUCINEX) 600 mg 12 hr tablet Unknown at Unknown time  No No   Sig: Take 2 tablets (1,200 mg total) by mouth every 12 (twelve) hours   loratadine (CLARITIN REDITABS) 10 MG dissolvable tablet   No No   Sig: Take 1 tablet (10 mg total) by mouth daily for 14 days   multivitamin (THERAGRAN) TABS 2018 at Unknown time  Yes Yes   Sig: Take 1 tablet by mouth daily  propranolol (INDERAL LA) 60 mg 24 hr capsule 2018 at Unknown time  Yes Yes   Sig: Take 60 mg by mouth daily  rosuvastatin (CRESTOR) 5 mg tablet 2018 at Unknown time  Yes Yes   Sig: Take 2 5 mg by mouth daily  Take 1/2 of 5mg  Tablet by mouth every day   sodium chloride (MARITZA 128) 5 % hypertonic ophthalmic solution Unknown at Unknown time  Yes No   Sig: Administer 1 drop to both eyes daily at bedtime as needed        Facility-Administered Medications: None       Past Medical History:   Diagnosis Date    Afib ([facility] [address] [facility]     Arthritis     Hyperlipidemia     Hypertension     Osteopenia     Sleep apnea     Varicose veins of both lower extremities        Past Surgical History:   Procedure Laterality Date    CATARACT EXTRACTION      COLONOSCOPY      complete, for polyp removal    EYE SURGERY      HYSTERECTOMY      INCISION AND DRAINAGE OF WOUND Right 2016    Procedure: ARTHROSCOPY,EVACUATION OF HEMATOMA, OPEN EXPLORATION OF WOUND;  Surgeon: Marco Antonio Orellana MD;  Location: [facility];  Service:     TONSILLECTOMY AND ADENOIDECTOMY      TOTAL KNEE ARTHROPLASTY Right     TOTAL KNEE ARTHROPLASTY Left     WISDOM TOOTH EXTRACTION         Family History   Problem Relation Age of Onset    Diabetes Mother     Diabetes Father     Diabetes Brother      I have reviewed and agree with the history as documented  Social History   Substance Use Topics    Smoking status: Never Smoker    Smokeless tobacco: Never Used    Alcohol use No        Review of Systems   Constitutional: Positive for fatigue and fever  Respiratory: Positive for cough  Negative for chest tightness and shortness of breath  Cardiovascular: Negative for chest pain  Gastrointestinal: Negative for abdominal pain, nausea and vomiting  Genitourinary: Negative for dysuria  Musculoskeletal: Negative for back pain  Skin: Negative for pallor, rash and wound  Allergic/Immunologic: Negative for immunocompromised state  Neurological: Positive for light-headedness  Negative for headaches  All other systems reviewed and are negative  Physical Exam  Physical Exam   Constitutional: She is oriented to person, place, and time  She appears well-developed and well-nourished  HENT:   Head: Normocephalic and atraumatic  Mouth/Throat: Oropharynx is clear and moist  Mucous membranes are dry  Eyes: Pupils are equal, round, and reactive to light  Conjunctivae are normal    Neck: Normal range of motion  Neck supple  Cardiovascular: Normal rate, regular rhythm, normal heart sounds and intact distal pulses  Exam reveals no friction rub  No murmur heard  Pulmonary/Chest: Effort normal and breath sounds normal  No respiratory distress  She has no wheezes  She has no rales  Abdominal: Soft  She exhibits no distension  There is no tenderness  There is no rebound and no guarding     Musculoskeletal: Normal range of motion  She exhibits no edema, tenderness or deformity  Neurological: She is alert and oriented to person, place, and time  Skin: Skin is warm and dry  Psychiatric: She has a normal mood and affect  Nursing note and vitals reviewed        Vital Signs  ED Triage Vitals   Temperature Pulse Respirations Blood Pressure SpO2   12/22/18 2112 12/22/18 2111 12/22/18 2111 12/22/18 2111 12/22/18 2111   99 2 °F (37 3 °C) 70 16 136/65 91 %      Temp Source Heart Rate Source Patient Position - Orthostatic VS BP Location FiO2 (%)   12/22/18 2112 12/22/18 2111 12/22/18 2111 12/22/18 2111 --   Oral Monitor Sitting Right arm       Pain Score       12/22/18 2111       No Pain           Vitals:    12/22/18 2111 12/22/18 2304 12/22/18 2352   BP: 136/65 169/71 130/62   Pulse: 70 71 69   Patient Position - Orthostatic VS: Sitting Lying Lying       Visual Acuity  Visual Acuity      Most Recent Value   L Pupil Size (mm)  3   R Pupil Size (mm)  3          ED Medications  Medications   sodium chloride 0 9 % bolus 1,000 mL (0 mL Intravenous Stopped 12/23/18 0039)   cefepime (MAXIPIME) 2 g/50 mL dextrose IVPB (0 mg Intravenous Stopped 12/23/18 0159)   iohexol (OMNIPAQUE) 350 MG/ML injection (MULTI-DOSE) 85 mL (85 mL Intravenous Given 12/23/18 0055)       Diagnostic Studies  Results Reviewed     Procedure Component Value Units Date/Time    Procalcitonin [[id number]]  (Normal) Collected:  12/23/18 0039    Lab Status:  Final result Specimen:  Blood from Arm, Right Updated:  12/23/18 0226     Procalcitonin <0 05 ng/ml     Influenza A/B and RSV by PCR [[id number]]     Lab Status:  No result Specimen:  Nasopharyngeal from Nasopharyngeal Swab     Urine Microscopic [[id number]]  (Abnormal) Collected:  12/23/18 0022    Lab Status:  Final result Specimen:  Urine from Urine, Clean Catch Updated:  12/23/18 0137     RBC, UA 0-1 (A) /hpf      WBC, UA None Seen /hpf      Epithelial Cells Moderate (A) /hpf      Bacteria, UA None Seen /hpf     Blood culture #1 [[id number]] Collected:  12/23/18 0107    Lab Status: In process Specimen:  Blood from Arm, Left Updated:  12/23/18 0125    Blood culture #2 [[id number]] Collected:  12/23/18 0100    Lab Status: In process Specimen:  Blood from Arm, Left Updated:  12/23/18 0105    POCT urinalysis dipstick [[id number]]  (Abnormal) Resulted:  12/23/18 0040    Lab Status:  Final result Specimen:  Urine Updated:  12/23/18 0040    ED Urine Macroscopic [[id number]]  (Abnormal) Collected:  12/23/18 0022    Lab Status:  Final result Specimen:  Urine Updated:  12/23/18 0006     Color, UA Yellow     Clarity, UA Clear     pH, UA 6 0     Leukocytes, UA Negative     Nitrite, UA Negative     Protein, UA Negative mg/dl      Glucose, UA Negative mg/dl      Ketones, UA Negative mg/dl      Urobilinogen, UA 0 2 E U /dl      Bilirubin, UA Negative     Blood, UA Small (A)     Specific [place], UA 1 015    Narrative:       CLINITEK RESULT    Comprehensive metabolic panel [[id number]] Collected:  12/22/18 2235    Lab Status:  Final result Specimen:  Blood from Arm, Right Updated:  12/22/18 2304     Sodium 139 mmol/L      Potassium 3 9 mmol/L      Chloride 102 mmol/L      CO2 26 mmol/L      ANION GAP 11 mmol/L      BUN 20 mg/dL      Creatinine 1 04 mg/dL      Glucose 113 mg/dL      Calcium 9 1 mg/dL      AST 27 U/L      ALT 35 U/L      Alkaline Phosphatase 57 U/L      Total Protein 7 3 g/dL      Albumin 3 8 g/dL      Total Bilirubin 0 38 mg/dL      eGFR 52 ml/min/1 73sq m     Narrative:         National Kidney Disease Education Program recommendations are as follows:  GFR calculation is accurate only with a steady state creatinine  Chronic Kidney disease less than 60 ml/min/1 73 sq  meters  Kidney failure less than 15 ml/min/1 73 sq  meters      Magnesium [[id number]]  (Normal) Collected:  12/22/18 2235    Lab Status:  Final result Specimen:  Blood from Arm, Right Updated:  12/22/18 2304     Magnesium 2 3 mg/dL     CBC and differential [[id number]] (Abnormal) Collected:  12/22/18 2235    Lab Status:  Final result Specimen:  Blood from Arm, Right Updated:  12/22/18 2246     WBC 7 16 Thousand/uL      RBC 4 91 Million/uL      Hemoglobin 14 8 g/dL      Hematocrit 45 7 %      MCV 93 fL      MCH 30 1 pg      MCHC 32 4 g/dL      RDW 13 6 %      MPV 10 1 fL      Platelets 624 Thousands/uL      nRBC 0 /100 WBCs      Neutrophils Relative 67 %      Immat GRANS % 0 %      Lymphocytes Relative 16 %      Monocytes Relative 17 (H) %      Eosinophils Relative 0 %      Basophils Relative 0 %      Neutrophils Absolute 4 76 Thousands/µL      Immature Grans Absolute 0 02 Thousand/uL      Lymphocytes Absolute 1 17 Thousands/µL      Monocytes Absolute 1 18 Thousand/µL      Eosinophils Absolute 0 02 Thousand/µL      Basophils Absolute 0 01 Thousands/µL                  CTA ED chest PE study   Final Result by Zhao Ray MD (12/23 0113)      1  No evidence of pulmonary embolism  2   Hypoventilatory changes of lungs  No focal consolidation  3   Coronary artery calcifications  Left atrial enlargement  Workstation performed: [id number]         XR chest 2 views   ED Interpretation by Payton Ascencio DO (12/22 2302)   NAD  No infiltrates  Normal cardiac silhouette  Normal mediastinum  Procedures  Procedures       Phone Contacts  ED Phone Contact    ED Course                         Initial Sepsis Screening     [address] Name 12/23/18 6603                Is the patient's history suggestive of a new or worsening infection? (!)  Yes (Proceed)  -GS        Suspected source of infection pneumonia;urinary tract infection  -GS        Are two or more of the following signs & symptoms of infection both present and new to the patient?  No  -GS        Indicate SIRS criteria  --        If the answer is yes to both questions, suspicion of sepsis is present  --        If severe sepsis is present AND tissue hypoperfusion perists in the hour after fluid resuscitation or lactate > 4, the patient meets criteria for SEPTIC SHOCK  --        Are any of the following organ dysfunction criteria present within 6 hours of suspected infection and SIRS criteria that are NOT considered to be chronic conditions? No  -GS        Organ dysfunction  --        Date of presentation of severe sepsis  --        Time of presentation of severe sepsis  --        Tissue hypoperfusion persists in the hour after crystalloid fluid administration, evidenced, by either:  --        Was hypotension present within one hour of the conclusion of crystalloid fluid administration?  --        Date of presentation of septic shock  --        Time of presentation of septic shock  --          User Key  (r) = Recorded By, (t) = Taken By, (c) = Cosigned By    [address] Name Provider Type    GS Cruz Sit , DO Physician                  MDM  Number of Diagnoses or Management Options  Cough: new and requires workup  Fever: new and requires workup  Hypoxia: new and requires workup  Diagnosis management comments: Patient appears well on exam   Lungs are clear  Vital signs are normal except for an oxygen level of 91%  Top two differential diagnosis would be pneumonia versus UT I  Will start with labs, IV fluids, chest x-ray and urinalysis  Will reassess after this  12:01 AM  Workup thus far appears negative  Patient providing a urine sample now  Obtained 2 sets of oxygen saturations  She was 98% at rest   During ambulation she did drop down to 89%  Plan is to start IV antibiotics for potential pneumonia, admit to the hospital     12:17 AM  Will add a PE study for the possibility of underlying pulmonary embolism causing the symptoms  CT is negative for PE or pneumonia  Plan is still to admit for further observation and treatment         Amount and/or Complexity of Data Reviewed  Clinical lab tests: ordered and reviewed  Tests in the radiology section of CPT®: ordered and reviewed  Tests in the medicine section of CPT®: ordered and reviewed  Review and summarize past medical records: yes  Independent visualization of images, tracings, or specimens: yes    Patient Progress  Patient progress: stable    CritCare Time    Disposition  Final diagnoses:   Fever   Cough   Hypoxia     Time reflects when diagnosis was documented in both MDM as applicable and the Disposition within this note     Time User Action Codes Description Comment    12/23/2018  2:22 AM PaoeriBridgette johnson Add [R50 9] Fever     12/23/2018  2:22 AM Bridgette Aguilera Add [R05] Cough     12/23/2018  2:22 AM Bridgette Aguilera Add [R09 02] Hypoxia       ED Disposition     ED Disposition Condition Comment    Admit  Case was discussed with BILLY and the patient's admission status was agreed to be Admission Status: observation status to the service of Dr Tere Cerda  Follow-up Information    None         Patient's Medications   Discharge Prescriptions    No medications on file     No discharge procedures on file      ED Provider  Electronically Signed by           Nadir Luna DO  12/23/18 1893

## 2018-12-23 NOTE — ASSESSMENT & PLAN NOTE
· Normal sinus rhythm on my assessment  · Reports history of AFib, continue propranolol, flecainide and ASA 162mg

## 2018-12-23 NOTE — UTILIZATION REVIEW
Initial Clinical Review    Admission: Date/Time/Statement: MARTHA Yuan@"[employer]    Orders Placed This Encounter   Procedures    Place in Observation (expected length of stay for this patient is less than two midnights)     Standing Status:   Standing     Number of Occurrences:   1     Order Specific Question:   Admitting Physician     Answer:   Ramona Tenorio [[id number]]     Order Specific Question:   Level of Care     Answer:   Med Surg [16]         ED: Date/Time/Mode of Arrival:   ED Arrival Information     Expected Arrival Acuity Means of Arrival Escorted By Service Admission Type    - 12/22/2018 21:06 Urgent Walk-In Self General Medicine Urgent    Arrival Complaint    fever           Chief Complaint:   Chief Complaint   Patient presents with    Fever - 9 weeks to 74 years     Per family, patient had a fever of 100 2, patient had Tylenol around 7pm  Patient reports she was fatigued and slept most of the day  Family also reports patient seems confused and thought the hospital was the police station when walking into the department  Also c/o dizziness  History of Illness: 66 y o  female who presents with c/o cough, fatigue, weakness, dizziness and feeling off balance  Reports feeling ill yesterday night, states she awoke feeling well, she made breakfast then felt fatigued, was unable to eat, laid on the sofa and did not wake up until 7p tonight when her daughter came to visit her  Reports feeling lightheaded, dizzy and off balance, attributes this to not eating all day, her daughter brought her tea and chicken soup, dizziness and lightheadedness resolved  Reports mild body aches, states she thought this may be the flu although she received a flu vaccine this season  Cough unproductive      ED Vital Signs:   ED Triage Vitals   Temperature Pulse Respirations Blood Pressure SpO2   12/22/18 2112 12/22/18 2111 12/22/18 2111 12/22/18 2111 12/22/18 2111   99 2 °F (37 3 °C) 70 16 136/65 91 %      Temp Source Heart Rate Source Patient Position - Orthostatic VS BP Location FiO2 (%)   12/22/18 2112 12/22/18 2111 12/22/18 2111 12/22/18 2111 --   Oral Monitor Sitting Right arm       Pain Score       12/22/18 2111       No Pain        Wt Readings from Last 1 Encounters:   12/23/18 77 4 kg (170 lb 10 2 oz)       Vital Signs (abnormal):   12/23/18 0323  98 3 °F (36 8 °C)  67  18  154/69  97 %  --  --   12/23/18 0227  --  69  16  151/61  95 %  None (Room air)  Lying   12/22/18 2352  --  69  18  130/62  98 %  None (Room air)  Lying   12/22/18 2304  --  71  18  169/71  97 %  None (Room air)  Lying   12/22/18 2200  --  --  --  --  --  None (Room air)  --   12/22/18 2112  99 2 °F (37 3 °C)  --  --  --  --  --  --   12/22/18 2111  --  70  16  136/65  91 %  None (Room air)  Sitting         Abnormal Labs/Diagnostic Test Results:   CTA chest:  1  No evidence of pulmonary embolism  2   Hypoventilatory changes of lungs  No focal consolidation  3   Coronary artery calcifications  Left atrial enlargement          CXR:  NAD  No infiltrates  Normal cardiac silhouette  Normal mediastinum              ED Treatment:   Medication Administration from 12/22/2018 2106 to 12/23/2018 0322       Date/Time Order Dose Route Action Action by Comments     12/23/2018 0039 sodium chloride 0 9 % bolus 1,000 mL 0 mL Intravenous Stopped Basil Rivero RN      12/22/2018 2236 sodium chloride 0 9 % bolus 1,000 mL 1,000 mL Intravenous [address] Heather Garcia [facility]      12/23/2018 0159 cefepime (MAXIPIME) 2 g/50 mL dextrose IVPB 0 mg Intravenous Stopped Basil Rivero RN      12/23/2018 0120 cefepime (MAXIPIME) 2 g/50 mL dextrose IVPB 2,000 mg Intravenous [address] Heather Malik RN      12/23/2018 0055 iohexol (OMNIPAQUE) 350 MG/ML injection (MULTI-DOSE) 85 mL 85 mL Intravenous Given Inna Friday           Past Medical/Surgical History:    Active Ambulatory Problems     Diagnosis Date Noted    Pain, joint, knee, right 06/24/2016     Resolved Ambulatory Problems     Diagnosis Date Noted    Knee hemarthrosis, right 06/24/2016     Past Medical History:   Diagnosis Date    Afib ([address] )     Arthritis     Hyperlipidemia     Hypertension     Osteopenia     Sleep apnea     Varicose veins of both lower extremities        Admitting Diagnosis: Cough [R05]  Hypoxia [R09 02]  Fever [R50 9]    Age/Sex: 66 y o  female    Assessment/Plan: 67 yo female c/o cough,fatigue, weakness, dizziness and feeling off balance with acute resp failure with hypoxia  O2 sat 89% on ra when ambulating, CT chest shows hypoventilatory changes of lungs without consolidation, IS, o2 prn  Anticipated Length of Stay:  Patient will be admitted on an Observation basis with an anticipated length of stay of  < 2 midnights        Admission Orders:  OOB  PT  IS  O2  CARDIAC DIET  SEQ COMP DEVICE    Scheduled Meds:   Current Facility-Administered Medications:  acetaminophen 650 mg Oral Q6H PRN Claudette Heller, CRNP    ascorbic acid 500 mg Oral Daily Claudette Helmorena, CRNP    aspirin 162 mg Oral Daily Claudette Heller, CRNP    benzonatate 100 mg Oral TID Claudette Heller, CRNP    cholecalciferol 1,000 Units Oral Daily Claudette Heller, CRNP    citalopram 5 mg Oral Daily Claudette Heller, CRNP    enoxaparin 40 mg Subcutaneous Daily Claudette Helmorena, CRNP    flecainide 50 mg Oral BID Claudette Heller, CRNP    fluticasone 1 spray Nasal Daily Claudette Heller, CRNP    multivitamin-minerals 1 tablet Oral Daily Claudette Helmorena, CRNP    ondansetron 4 mg Intravenous Q6H PRN Claudette Helmorena, CRNP    propranolol 60 mg Oral Daily Claudette Heller, CRNP    rosuvastatin 2 5 mg Oral Q48H Claudette Heller, CRNP    sodium chloride 75 mL/hr Intravenous Continuous Claudette Heller, CRNP Last Rate: 75 mL/hr (12/23/18 0407)     Continuous Infusions:   sodium chloride 75 mL/hr Last Rate: 75 mL/hr (12/23/18 0407)     PRN Meds:   acetaminophen    ondansetron

## 2018-12-23 NOTE — H&P
H&P- Shyla Olivas 1940, 66 y o  female MRN: [id number]    Unit/Bed#: E5 -01 Encounter: [id number]    Primary Care Provider: Carley Delgadillo MD   Date and time admitted to hospital: 12/22/2018  9:37 PM      * Acute respiratory failure with hypoxia ([address] )   Assessment & Plan    · As per note from ER tech: O2 sat dropped to 89% when ambulating  · CT chest negative for PE; hypoventilatory changes of lungs without consolidation  · Incentive spirometer  · Recheck ambulating O2 in the morning  · P r n  Nasal cannula to maintain sat >92%     History of atrial fibrillation   Assessment & Plan    · Normal sinus rhythm on my assessment  · Reports history of AFib, continue propranolol, flecainide and ASA 162mg     Febrile illness   Assessment & Plan    · Flu RSV PCR in process  · Received Cefepime in ER, will hold pending result of procalcitonin  · Symptomatic tx with IV hydration and Tessalon Perles  · P r n  Tylenol     Acute viral labyrinthitis   Assessment & Plan    · Reports dizziness and feeling off balance beginning this morning; currently reports resolution of symptoms  · Expect improvement with improvement of viral illness  · PT consult  · Safety precautions       VTE Prophylaxis: Enoxaparin (Lovenox)  / sequential compression device   Code Status:   POLST: POLST is not applicable to this patient  Discussion with family:     Anticipated Length of Stay:  Patient will be admitted on an Observation basis with an anticipated length of stay of  < 2 midnights  Justification for Hospital Stay:     Total Time for Visit, including Counseling / Coordination of Care: 45 minutes  Greater than 50% of this total time spent on direct patient counseling and coordination of care  Chief Complaint:   Cough, fever, fatigue and weakness    History of Present Illness:    Shyla Olivas is a 66 y o  female who presents with c/o cough, fatigue, weakness, dizziness and feeling off balance    Reports feeling ill yesterday night, states she awoke feeling well, she made breakfast then felt fatigued, was unable to eat, laid on the sofa and did not wake up until 7p tonight when her daughter came to visit her  Reports feeling lightheaded, dizzy and off balance, attributes this to not eating all day, her daughter brought her tea and chicken soup, dizziness and lightheadedness resolved  Reports mild body aches, states she thought this may be the flu although she received a flu vaccine this season  Cough unproductive  Denies visual changes, earache, hearing loss, tinnitus, nasal congestion, chest pain, palpitations, shortness of breath, TORRES, abdominal pain, NVCD or dysuria  Review of Systems:    Review of Systems   Constitutional: Positive for fatigue and fever  Loss of appetite   HENT: Positive for voice change  Hoarseness   Respiratory: Positive for cough  Negative for shortness of breath  Cardiovascular: Negative  Musculoskeletal: Positive for myalgias  Mild body aches   Neurological: Positive for dizziness and light-headedness  Negative for weakness, numbness and headaches  Psychiatric/Behavioral: Negative          Past Medical and Surgical History:     Past Medical History:   Diagnosis Date    Afib ([address] )     Arthritis     Hyperlipidemia     Hypertension     Osteopenia     Sleep apnea     Varicose veins of both lower extremities        Past Surgical History:   Procedure Laterality Date    CATARACT EXTRACTION      COLONOSCOPY      complete, for polyp removal    EYE SURGERY      HYSTERECTOMY      INCISION AND DRAINAGE OF WOUND Right 6/28/2016    Procedure: ARTHROSCOPY,EVACUATION OF HEMATOMA, OPEN EXPLORATION OF WOUND;  Surgeon: Dmitri Sewell MD;  [facility];  Service:     TONSILLECTOMY AND ADENOIDECTOMY      TOTAL KNEE ARTHROPLASTY Right     TOTAL KNEE ARTHROPLASTY Left     WISDOM TOOTH EXTRACTION         Meds/Allergies:    Prior to Admission medications    Medication Sig Start Date End Date Taking? Authorizing Provider   ascorbic acid (VITAMIN C) 500 mg tablet Take 500 mg by mouth daily  Yes Historical Provider, MD   aspirin (ECOTRIN LOW STRENGTH) 81 mg EC tablet Take 162 mg by mouth daily   Yes Historical Provider, MD   Calcium-Magnesium-Vitamin D (CALCIUM 500) 500-250-200 MG-MG-UNIT TABS Take by mouth   Yes Historical Provider, MD   Cholecalciferol (VITAMIN D3) 1000 UNIT/SPRAY LIQD Take 5,000 Units by mouth   Yes Historical Provider, MD   citalopram (CeleXA) 10 mg tablet Take 5 mg by mouth daily     Yes Historical Provider, MD   flecainide (TAMBOCOR) 50 mg tablet Take 50 mg by mouth 2 (two) times a day  Yes Historical Provider, MD   multivitamin (THERAGRAN) TABS Take 1 tablet by mouth daily  Yes Historical Provider, MD   propranolol (INDERAL LA) 60 mg 24 hr capsule Take 60 mg by mouth daily  Yes Historical Provider, MD   rosuvastatin (CRESTOR) 5 mg tablet Take 2 5 mg by mouth every other day Take 1/2 of 5mg  Tablet by mouth every day     Yes Historical Provider, MD   acebutolol (SECTRAL) 200 mg capsule acebutolol 200 mg capsule  12/23/18 Yes Historical Provider, MD   BABY ASPIRIN PO Take 162 mg by mouth  12/23/18 Yes Historical Provider, MD   Multiple Vitamins-Minerals (MULTIVITAMIN ADULT PO) Daily Multi-Vitamin  12/23/18 Yes Historical Provider, MD   calcium carbonate (TUMS) 500 mg chewable tablet Chew 1 tablet daily  Historical Provider, MD   fluticasone [facility]) 50 mcg/act nasal spray 1 spray into each nostril daily 7/8/18   Harshil Martin PA-C   guaiFENesin (MUCINEX) 600 mg 12 hr tablet Take 2 tablets (1,200 mg total) by mouth every 12 (twelve) hours 7/8/18   Harshil Martin PA-C   loratadine (CLARITIN REDITABS) 10 MG dissolvable tablet Take 1 tablet (10 mg total) by mouth daily for 14 days 7/8/18 7/22/18  Harshil Martin PA-C   sodium chloride (MARITZA 128) 5 % hypertonic ophthalmic solution Administer 1 drop to both eyes daily at bedtime as needed  Historical Provider, MD     I have reviewed home medications with patient personally  Allergies: Allergies   Allergen Reactions    Atorvastatin Other (See Comments)     myalgia    Statins Other (See Comments)     Weakness in legs       Social History:     Marital Status: /Civil Union   Occupation: volunteer work  Patient Pre-hospital Living Situation: lives alone  Patient Pre-hospital Level of Mobility: ambulatory  Patient Pre-hospital Diet Restrictions:   Substance Use History:   History   Alcohol Use No     History   Smoking Status    Never Smoker   Smokeless Tobacco    Never Used     History   Drug Use No       Family History:    Family History   Problem Relation Age of Onset    Diabetes Mother     Diabetes Father     Diabetes Brother        Physical Exam:     Vitals:   Blood Pressure: 154/69 (12/23/18 0323)  Pulse: 67 (12/23/18 0323)  Temperature: 98 3 °F (36 8 °C) (12/23/18 0323)  Temp Source: Oral (12/22/18 2112)  Respirations: 18 (12/23/18 0323)  SpO2: 97 % (12/23/18 0323)    Physical Exam   Constitutional: She is oriented to person, place, and time  She appears well-developed and well-nourished  No distress  Ill appearing   HENT:   Head: Normocephalic and atraumatic  Left hearing aid   Eyes: Conjunctivae are normal  No scleral icterus  Neck: Neck supple  Cardiovascular: Normal rate, regular rhythm and intact distal pulses  Murmur heard  Pulmonary/Chest: Effort normal and breath sounds normal  No respiratory distress  She has no wheezes  She has no rales  She exhibits no tenderness  Abdominal: Soft  Bowel sounds are normal  She exhibits no distension and no mass  There is no tenderness  There is no rebound and no guarding  Musculoskeletal: She exhibits no edema  Neurological: She is alert and oriented to person, place, and time  Skin: Skin is warm and dry  No rash noted  She is not diaphoretic  No erythema  No pallor  Psychiatric: She has a normal mood and affect   Her behavior is normal  Judgment and thought content normal        Additional Data:     Lab Results: I have personally reviewed pertinent reports  Results from last 7 days  Lab Units 12/22/18  2235   WBC Thousand/uL 7 16   HEMOGLOBIN g/dL 14 8   HEMATOCRIT % 45 7   PLATELETS Thousands/uL 161   NEUTROS PCT % 67   LYMPHS PCT % 16   MONOS PCT % 17*   EOS PCT % 0       Results from last 7 days  Lab Units 12/22/18  2235   SODIUM mmol/L 139   POTASSIUM mmol/L 3 9   CHLORIDE mmol/L 102   CO2 mmol/L 26   BUN mg/dL 20   CREATININE mg/dL 1 04   ANION GAP mmol/L 11   CALCIUM mg/dL 9 1   ALBUMIN g/dL 3 8   TOTAL BILIRUBIN mg/dL 0 38   ALK PHOS U/L 57   ALT U/L 35   AST U/L 27   GLUCOSE RANDOM mg/dL 113                   Results from last 7 days  Lab Units 12/23/18  0039   PROCALCITONIN ng/ml <0 05       Imaging: I have personally reviewed pertinent reports  CTA ED chest PE study   Final Result by Vania Malone MD (12/23 0113)      1  No evidence of pulmonary embolism  2   Hypoventilatory changes of lungs  No focal consolidation  3   Coronary artery calcifications  Left atrial enlargement  Workstation performed: [id number]         XR chest 2 views   ED Interpretation by Katelin Villarreal DO (12/22 2302)   NAD  No infiltrates  Normal cardiac silhouette  Normal mediastinum  EKG, Pathology, and Other Studies Reviewed on Admission:   · CT CXR    Allscripts / Epic Records Reviewed: Yes     ** Please Note: This note has been constructed using a voice recognition system   **

## 2018-12-24 PROCEDURE — 99232 SBSQ HOSP IP/OBS MODERATE 35: CPT | Performed by: HOSPITALIST

## 2018-12-24 RX ADMIN — ASPIRIN 162 MG: 81 TABLET, COATED ORAL at 08:28

## 2018-12-24 RX ADMIN — FLUTICASONE PROPIONATE 1 SPRAY: 50 SPRAY, METERED NASAL at 08:28

## 2018-12-24 RX ADMIN — OSELTAMIVIR PHOSPHATE 75 MG: 75 CAPSULE ORAL at 20:20

## 2018-12-24 RX ADMIN — FLECAINIDE ACETATE 50 MG: 100 TABLET ORAL at 17:38

## 2018-12-24 RX ADMIN — CITALOPRAM HYDROBROMIDE 5 MG: 10 TABLET ORAL at 08:32

## 2018-12-24 RX ADMIN — OXYCODONE HYDROCHLORIDE AND ACETAMINOPHEN 500 MG: 500 TABLET ORAL at 08:29

## 2018-12-24 RX ADMIN — ENOXAPARIN SODIUM 40 MG: 40 INJECTION SUBCUTANEOUS at 08:28

## 2018-12-24 RX ADMIN — Medication 1 TABLET: at 08:28

## 2018-12-24 RX ADMIN — BENZONATATE 100 MG: 100 CAPSULE ORAL at 20:20

## 2018-12-24 RX ADMIN — BENZONATATE 100 MG: 100 CAPSULE ORAL at 16:24

## 2018-12-24 RX ADMIN — FLECAINIDE ACETATE 50 MG: 100 TABLET ORAL at 08:33

## 2018-12-24 RX ADMIN — OSELTAMIVIR PHOSPHATE 75 MG: 75 CAPSULE ORAL at 08:30

## 2018-12-24 RX ADMIN — BENZONATATE 100 MG: 100 CAPSULE ORAL at 08:32

## 2018-12-24 RX ADMIN — PROPRANOLOL HYDROCHLORIDE 60 MG: 60 CAPSULE, EXTENDED RELEASE ORAL at 08:31

## 2018-12-24 RX ADMIN — VITAMIN D, TAB 1000IU (100/BT) 1000 UNITS: 25 TAB at 08:29

## 2018-12-24 NOTE — UTILIZATION REVIEW
Continued Stay Review    Date:    IP ORDER  ENTERED  12/23  @  8073  Vital Signs: /68 (BP Location: Right arm)   Pulse (!) 125   Temp 98 1 °F (36 7 °C) (Temporal)   Resp 18   Ht 5' 3" (1 6 m)   Wt 77 4 kg (170 lb 10 2 oz)   SpO2 92%   BMI 30 23 kg/m²     Medications:   Scheduled Meds:   Current Facility-Administered Medications:  acetaminophen 650 mg Oral Q6H PRN Irene Peon, CRNP    ascorbic acid 500 mg Oral Daily Irene Peon, CRNP    aspirin 162 mg Oral Daily Irene Peon, CRNP    benzonatate 100 mg Oral TID Irene Peon, CRNP    cholecalciferol 1,000 Units Oral Daily Irene Peon, CRNP    citalopram 5 mg Oral Daily Irene Peon, CRNP    enoxaparin 40 mg Subcutaneous Daily Irene Peon, CRNP    flecainide 50 mg Oral BID Irene Peon, CRNP    fluticasone 1 spray Nasal Daily Irene Peon, CRNP    multivitamin-minerals 1 tablet Oral Daily Irene Peon, CRNP    ondansetron 4 mg Intravenous Q6H PRN Irene Peon, CRNP    oseltamivir 75 mg Oral Q12H [facility] Navin Barr, DO    propranolol 60 mg Oral Daily Irene Peon, CRNP    rosuvastatin 2 5 mg Oral Q48H Irene Peon, CRNP    sodium chloride 75 mL/hr Intravenous Continuous Irene Peon, CRNP Last Rate: Stopped (12/24/18 0214)     Continuous Infusions:   sodium chloride 75 mL/hr Last Rate: Stopped (12/24/18 0214)     PRN Meds:   acetaminophen    ondansetron    Abnormal Labs/Diagnostic Results:    NA  135    Age/Sex: 66 y o  female     Assessment/Plan:   Acute respiratory failure with hypoxia (HCC)   Assessment & Plan     · As per note from ER tech: O2 sat dropped to 89% when ambulating  · CT chest negative for PE; hypoventilatory changes of lungs without consolidation  · Incentive spirometer  · Recheck ambulating O2 in the morning  · P r n   Nasal cannula to maintain sat >92%      History of atrial fibrillation   Assessment & Plan     · Normal sinus rhythm on my assessment  · Reports history of AFib, continue propranolol, flecainide and ASA 162mg      Febrile illness   Assessment & Plan     · Flu RSV PCR in process  · Received Cefepime in ER, will hold pending result of procalcitonin  · Symptomatic tx with IV hydration and Tessalon Perles  · P r n   Tylenol     Acute viral labyrinthitis   Assessment & Plan     · Reports dizziness and feeling off balance beginning this morning; currently reports resolution of symptoms  · Expect improvement with improvement of viral illness  · PT consult  · Safety precautions           Discharge Plan:    home

## 2018-12-24 NOTE — PHYSICIAN ADVISOR
Current patient class: Observation  The patient is currently on Hospital Day: 2 at [address] [facility]      The patient was admitted to the hospital at N/A on N/A for the following diagnosis:  Cough [R05]  Hypoxia [R09 02]  Fever [R50 9]       There is documentation in the medical record of an expected length of stay of at least 2 midnights  The patient is therefore expected to satisfy the 2 midnight benchmark and given the 2 midnight presumption is appropriate for INPATIENT ADMISSION  Given this expectation of a satisfying stay, CMS instructs us that the patient is most often appropriate for inpatient admission under part A provided medical necessity is documented in the chart  After review of the relevant documentation, labs, vital signs and test results, the patient is appropriate for INPATIENT ADMISSION  Admission to the hospital as an inpatient is a complex decision making process which requires the practitioner to consider the patients presenting complaint, history and physical examination and all relevant testing  With this in mind, in this case, the patient was deemed appropriate for INPATIENT ADMISSION  After review of the documentation and testing available at the time of the admission I concur with this clinical determination of medical necessity  Rationale is as follows: The patient is a 66 yrs old Female who presented to the ED at 12/22/2018  9:37 PM with a chief complaint of Fever - 9 weeks to 74 years (Per family, patient had a fever of 100 2, patient had Tylenol around 7pm  Patient reports she was fatigued and slept most of the day  Family also reports patient seems confused and thought the hospital was the police station when walking into the department  Also c/o dizziness  )     Given the need for further hospitalization, and along with the documentation of medical necessity present in the chart, the patient is appropriate for inpatient admission    The patient is expected to satisfy the 2 midnight benchmark, and will require further acute medical care  The patient does have comorbid conditions which increases the risk for significant adverse outcome  Given this the patient is appropriate for inpatient admission        The patients vitals on arrival were ED Triage Vitals   Temperature Pulse Respirations Blood Pressure SpO2   12/22/18 2112 12/22/18 2111 12/22/18 2111 12/22/18 2111 12/22/18 2111   99 2 °F (37 3 °C) 70 16 136/65 91 %      Temp Source Heart Rate Source Patient Position - Orthostatic VS BP Location FiO2 (%)   12/22/18 2112 12/22/18 2111 12/22/18 2111 12/22/18 2111 --   Oral Monitor Sitting Right arm       Pain Score       12/22/18 2111       No Pain           Past Medical History:   Diagnosis Date    Afib ([address] )     Arthritis     Hyperlipidemia     Hypertension     Osteopenia     Sleep apnea     Varicose veins of both lower extremities      Past Surgical History:   Procedure Laterality Date    CATARACT EXTRACTION      COLONOSCOPY      complete, for polyp removal    EYE SURGERY      HYSTERECTOMY      INCISION AND DRAINAGE OF WOUND Right 6/28/2016    Procedure: ARTHROSCOPY,EVACUATION OF HEMATOMA, OPEN EXPLORATION OF WOUND;  Surgeon: Melissa Parker MD;  Location: [facility];  Service:     TONSILLECTOMY AND ADENOIDECTOMY      TOTAL KNEE ARTHROPLASTY Right     TOTAL KNEE ARTHROPLASTY Left     WISDOM TOOTH EXTRACTION             Consults have been placed to:   IP CONSULT TO CASE MANAGEMENT    Vitals:    12/23/18 0323 12/23/18 0326 12/23/18 0736 12/23/18 1500   BP: 154/69  116/58 102/53   BP Location:   Right arm    Pulse: 67  69 68   Resp: 18 18 18   Temp: 98 3 °F (36 8 °C)  98 6 °F (37 °C) (!) 97 3 °F (36 3 °C)   TempSrc:   Temporal    SpO2: 97%  92% 97%   Weight:  77 4 kg (170 lb 10 2 oz)     Height:  5' 3" (1 6 m)         Most recent labs:    Recent Labs      12/22/18   2235  12/23/18   0545  12/23/18   0621   WBC  7 16   --   5 69   HGB  14 8   -- 13 5   HCT  45 7   --   41 8   PLT  161   --   145*   K  3 9  4 6   --    CALCIUM  9 1  8 8   --    BUN  20  17   --    CREATININE  1 04  0 83   --    AST  27   --    --    ALT  35   --    --    ALKPHOS  57   --    --        Scheduled Meds:  Current Facility-Administered Medications:  acetaminophen 650 mg Oral Q6H PRN Nena Grade, CRNP    ascorbic acid 500 mg Oral Daily Nena Grade, CRNP    aspirin 162 mg Oral Daily Nena Grade, CRNP    benzonatate 100 mg Oral TID Nena Grade, CRNP    cholecalciferol 1,000 Units Oral Daily Nena Grade, CRNP    citalopram 5 mg Oral Daily Nena Grade, CRNP    enoxaparin 40 mg Subcutaneous Daily Nena Grade, CRNP    flecainide 50 mg Oral BID Nena Grade, CRNP    fluticasone 1 spray Nasal Daily Nena Grade, CRNP    multivitamin-minerals 1 tablet Oral Daily Nena Grade, CRNP    ondansetron 4 mg Intravenous Q6H PRN Nena Grade, CRNP    oseltamivir 75 mg Oral Q12H [address] Navin Prechtel, DO    propranolol 60 mg Oral Daily Nena Grade, CRNP    rosuvastatin 2 5 mg Oral Q48H Nena Grade, CRNP    sodium chloride 75 mL/hr Intravenous Continuous Nena Grade, CRNP Last Rate: 75 mL/hr (12/23/18 1729)     Continuous Infusions:  sodium chloride 75 mL/hr Last Rate: 75 mL/hr (12/23/18 1729)     PRN Meds:   acetaminophen    ondansetron    Surgical procedures (if appropriate):

## 2018-12-24 NOTE — SOCIAL WORK
Met with pt who provided information for initial assessment  Pt stated she lives alone in a 1-level home  Pt's  resides at [facility] for the last 10 years  Prior to admission, pt independent with ambulation and ADLs  No hx of VNA  Hx of STR at [facility] and [facility] few years ago after knee replacement sx  PCP- Dr Jeremias Mendez  [facility] in [facility]  Pt denies any barriers to going to medical appointments or obtaining meds  Pt still drives  Will continue to follow for discharge discharge planning needs

## 2018-12-24 NOTE — PLAN OF CARE

## 2018-12-24 NOTE — PROGRESS NOTES
Progress Note - Mounika Segovia 66 y o  female MRN: [id number]    Unit/Bed#: E5 -01 Encounter: [id number]    Principal Problem:    Acute respiratory failure with hypoxia ([address] )  Active Problems:    Acute viral labyrinthitis    Febrile illness    History of atrial fibrillation  Resolved Problems:    * No resolved hospital problems  *      Assessment/Plan:  Acute respiratory failure with hypoxia (HCC)-resolved   Assessment & Plan     · As per note from ER tech: O2 sat dropped to 89% when ambulating  · CT chest negative for PE; hypoventilatory changes of lungs without consolidation  · Incentive spirometer      History of atrial fibrillation   Assessment & Plan     · Normal sinus rhythm  · Reports history of AFib, continue propranolol, flecainide and ASA 162mg      Influenza a   Assessment & Plan     On Tamiflu   Acute viral labyrinthitis   Assessment & Plan     · Reports dizziness and feeling off balance beginning this morning; currently reports resolution of symptoms  · Expect improvement with improvement of viral illness  · PT consult  · Safety precautions     Anticipate discharge home tomorrow      Subjective:  Patient was feeling better this morning however now feeling very down and out  States that she is weak and nauseous  On day 2 Tamiflu  Physical Exam:   Vitals: Blood pressure 123/61, pulse 62, temperature (!) 97 3 °F (36 3 °C), temperature source Temporal, resp  rate 18, height 5' 3" (1 6 m), weight 77 4 kg (170 lb 10 2 oz), SpO2 94 %  ,Body mass index is 30 23 kg/m²  Gen:  Pleasant, non-tachypnic, non-dyspnic  Conversant  Heart: regular rate and rhythm, S1S2 present, no murmur, rub or gallop  Lungs: clear to ausculatation bilaterally  No wheezing, crackless, or rhonchi  No accessory muscle use or respiratory distress  Abd: soft, non-tender, non-distended  NABS, no guarding, rebound or peritoneal signs  Extremities: no clubbing, cyanosis or edema  2+pedal pulses bilaterally   Full range of motion  Neuro: awake, alert and oriented  Cranial nerves 2-12 intact  Strength and sensation grossly intact  Skin: warm and dry: no petechiae, purpura and rash      LABS:     Results from last 7 days  Lab Units 12/23/18  0621 12/22/18  2235   WBC Thousand/uL 5 69 7 16   HEMOGLOBIN g/dL 13 5 14 8   HEMATOCRIT % 41 8 45 7   PLATELETS Thousands/uL 145* 161       Results from last 7 days  Lab Units 12/23/18  0545 12/22/18  2235   POTASSIUM mmol/L 4 6 3 9   CHLORIDE mmol/L 103 102   CO2 mmol/L 22 26   BUN mg/dL 17 20   CREATININE mg/dL 0 83 1 04   CALCIUM mg/dL 8 8 9 1       Intake/Output Summary (Last 24 hours) at 12/24/18 1623  Last data filed at 12/24/18 0214   Gross per 24 hour   Intake          1283 75 ml   Output                0 ml   Net          1283 75 ml           Current Facility-Administered Medications:  acetaminophen 650 mg Oral Q6H PRN LUZ Bullock    ascorbic acid 500 mg Oral Daily LUZ Bullock    aspirin 162 mg Oral Daily Aldo Oquendo, UZMANP    benzonatate 100 mg Oral TID LUZ Bullock    cholecalciferol 1,000 Units Oral Daily Aldo Oquendo, LUZ    citalopram 5 mg Oral Daily LUZ Bullock    enoxaparin 40 mg Subcutaneous Daily Aldo Oquendo, LUZ    flecainide 50 mg Oral BID Aldo Oquendo, LUZ    fluticasone 1 spray Nasal Daily LUZ Bullock    multivitamin-minerals 1 tablet Oral Daily LUZ Blulock    ondansetron 4 mg Intravenous Q6H PRN LUZ Bullock    oseltamivir 75 mg Oral Q12H [facility] Navin Barr,     propranolol 60 mg Oral Daily LUZ Bullock    rosuvastatin 2 5 mg Oral Q48H LUZ Bullock    sodium chloride 75 mL/hr Intravenous Continuous LUZ Bullock Last Rate: Stopped (12/24/18 0214)

## 2018-12-24 NOTE — NURSING NOTE
Patient voiding frequently, having several episodes of urge incontinence, and reports distress at having to go to the bathroom "all night long" and not being able to sleep  Patient escorted to bathroom 9 times over past 3 hours  Urine clear  Patient declines further IV fluid at this time  IVF stopped at patient request, remains closely monitored at this time

## 2018-12-25 PROCEDURE — 99232 SBSQ HOSP IP/OBS MODERATE 35: CPT | Performed by: HOSPITALIST

## 2018-12-25 RX ADMIN — FLECAINIDE ACETATE 50 MG: 100 TABLET ORAL at 20:27

## 2018-12-25 RX ADMIN — BENZONATATE 100 MG: 100 CAPSULE ORAL at 20:27

## 2018-12-25 RX ADMIN — FLECAINIDE ACETATE 50 MG: 100 TABLET ORAL at 08:25

## 2018-12-25 RX ADMIN — Medication 1 TABLET: at 08:23

## 2018-12-25 RX ADMIN — CITALOPRAM HYDROBROMIDE 5 MG: 10 TABLET ORAL at 08:24

## 2018-12-25 RX ADMIN — FLUTICASONE PROPIONATE 1 SPRAY: 50 SPRAY, METERED NASAL at 08:23

## 2018-12-25 RX ADMIN — OSELTAMIVIR PHOSPHATE 75 MG: 75 CAPSULE ORAL at 08:24

## 2018-12-25 RX ADMIN — OXYCODONE HYDROCHLORIDE AND ACETAMINOPHEN 500 MG: 500 TABLET ORAL at 08:23

## 2018-12-25 RX ADMIN — BENZONATATE 100 MG: 100 CAPSULE ORAL at 08:23

## 2018-12-25 RX ADMIN — PROPRANOLOL HYDROCHLORIDE 60 MG: 60 CAPSULE, EXTENDED RELEASE ORAL at 08:24

## 2018-12-25 RX ADMIN — BENZONATATE 100 MG: 100 CAPSULE ORAL at 16:26

## 2018-12-25 RX ADMIN — VITAMIN D, TAB 1000IU (100/BT) 1000 UNITS: 25 TAB at 08:23

## 2018-12-25 RX ADMIN — ENOXAPARIN SODIUM 40 MG: 40 INJECTION SUBCUTANEOUS at 08:23

## 2018-12-25 RX ADMIN — ASPIRIN 162 MG: 81 TABLET, COATED ORAL at 08:23

## 2018-12-25 RX ADMIN — OSELTAMIVIR PHOSPHATE 75 MG: 75 CAPSULE ORAL at 20:30

## 2018-12-25 NOTE — PROGRESS NOTES
Progress Note - Alka Perez 66 y o  female MRN: [id number]    Unit/Bed#: E5 -01 Encounter: [id number]    Principal Problem:    Acute respiratory failure with hypoxia ([address] )  Active Problems:    Acute viral labyrinthitis    Febrile illness    History of atrial fibrillation      Assessment/Plan:    Acute respiratory failure with hypoxia (HCC)-resolved   Assessment & Plan     · As per note from ER tech: O2 sat dropped to 89% when ambulating  · CT chest negative for PE; hypoventilatory changes of lungs without consolidation  · Incentive spirometer      History of atrial fibrillation   Assessment & Plan     · Normal sinus rhythm  · Reports history of AFib, continue propranolol, flecainide and ASA 162mg      Influenza A   Assessment & Plan     On Tamiflu#3   Acute viral labyrinthitis   Assessment & Plan     · Reports dizziness and feeling off balance which is improving   · Expect improvement with improvement of viral illness  · PT consult  · Safety precautions      Anticipate discharge home tomorrow      Subjective:  Patient improving however still very weak tired and unsteady on her feet  On day 3 Tamiflu  Respiratory failure has resolved and she is currently saturating at 92% on room air  Physical Exam:   Vitals: Blood pressure 115/57, pulse 55, temperature (!) 97 °F (36 1 °C), temperature source Temporal, resp  rate 18, height 5' 3" (1 6 m), weight 77 4 kg (170 lb 10 2 oz), SpO2 94 %  ,Body mass index is 30 23 kg/m²  Gen:  Pleasant, non-tachypnic, non-dyspnic  Conversant  Heart: regular rate and rhythm, S1S2 present, no murmur, rub or gallop  Lungs: clear to ausculatation bilaterally  No wheezing, crackless, or rhonchi  No accessory muscle use or respiratory distress  Abd: soft, non-tender, non-distended  NABS, no guarding, rebound or peritoneal signs  Extremities: no clubbing, cyanosis or edema  2+pedal pulses bilaterally  Full range of motion  Neuro: awake, alert and oriented   Cranial nerves 2-12 intact  Strength and sensation grossly intact  Skin: warm and dry: no petechiae, purpura and rash      LABS:     Results from last 7 days  Lab Units 12/23/18  0621 12/22/18  2235   WBC Thousand/uL 5 69 7 16   HEMOGLOBIN g/dL 13 5 14 8   HEMATOCRIT % 41 8 45 7   PLATELETS Thousands/uL 145* 161       Results from last 7 days  Lab Units 12/23/18  0545 12/22/18  2235   POTASSIUM mmol/L 4 6 3 9   CHLORIDE mmol/L 103 102   CO2 mmol/L 22 26   BUN mg/dL 17 20   CREATININE mg/dL 0 83 1 04   CALCIUM mg/dL 8 8 9 1     No intake or output data in the 24 hours ending 12/25/18 1437        Current Facility-Administered Medications:  acetaminophen 650 mg Oral Q6H PRN Relda Qualia, CRNP    ascorbic acid 500 mg Oral Daily Relda Qualia, CRNP    aspirin 162 mg Oral Daily Relda Qualia, CRNP    benzonatate 100 mg Oral TID Relda Qualia, CRNP    cholecalciferol 1,000 Units Oral Daily Relda Qualia, CRNP    citalopram 5 mg Oral Daily Relda Qualia, CRNP    enoxaparin 40 mg Subcutaneous Daily Relda Qualia, CRNP    flecainide 50 mg Oral BID Relda Qualia, CRNP    fluticasone 1 spray Nasal Daily Relda Qualia, CRNP    multivitamin-minerals 1 tablet Oral Daily Relda Qualia, CRNP    ondansetron 4 mg Intravenous Q6H PRN Relda Qualia, CRNP    oseltamivir 75 mg Oral Q12H [address] Navin Prechtel, DO    propranolol 60 mg Oral Daily Relda Qualia, CRNP    rosuvastatin 2 5 mg Oral Q48H Relda Qualia, CRNP    sodium chloride 75 mL/hr Intravenous Continuous Relda Qualia, CRNP Last Rate: Stopped (12/24/18 0214)

## 2018-12-26 VITALS
WEIGHT: 170.64 LBS | TEMPERATURE: 98 F | BODY MASS INDEX: 30.23 KG/M2 | HEIGHT: 63 IN | HEART RATE: 52 BPM | SYSTOLIC BLOOD PRESSURE: 127 MMHG | OXYGEN SATURATION: 95 % | DIASTOLIC BLOOD PRESSURE: 63 MMHG | RESPIRATION RATE: 18 BRPM

## 2018-12-26 PROBLEM — H83.09 ACUTE VIRAL LABYRINTHITIS: Status: RESOLVED | Noted: 2018-12-23 | Resolved: 2018-12-26

## 2018-12-26 PROBLEM — J96.01 ACUTE RESPIRATORY FAILURE WITH HYPOXIA (HCC): Status: RESOLVED | Noted: 2018-12-23 | Resolved: 2018-12-26

## 2018-12-26 PROCEDURE — 97163 PT EVAL HIGH COMPLEX 45 MIN: CPT

## 2018-12-26 PROCEDURE — G8978 MOBILITY CURRENT STATUS: HCPCS

## 2018-12-26 PROCEDURE — G8980 MOBILITY D/C STATUS: HCPCS

## 2018-12-26 PROCEDURE — 99239 HOSP IP/OBS DSCHRG MGMT >30: CPT | Performed by: HOSPITALIST

## 2018-12-26 PROCEDURE — G8979 MOBILITY GOAL STATUS: HCPCS

## 2018-12-26 RX ORDER — ACETAMINOPHEN 325 MG/1
TABLET ORAL
Qty: 30 TABLET | Refills: 0 | Status: SHIPPED | OUTPATIENT
Start: 2018-12-26 | End: 2022-07-05 | Stop reason: ALTCHOICE

## 2018-12-26 RX ORDER — GUAIFENESIN 600 MG
600 TABLET, EXTENDED RELEASE 12 HR ORAL EVERY 12 HOURS SCHEDULED
Qty: 10 TABLET | Refills: 0 | Status: SHIPPED | OUTPATIENT
Start: 2018-12-26 | End: 2018-12-31

## 2018-12-26 RX ORDER — OSELTAMIVIR PHOSPHATE 75 MG/1
75 CAPSULE ORAL EVERY 12 HOURS SCHEDULED
Qty: 4 CAPSULE | Refills: 0 | Status: SHIPPED | OUTPATIENT
Start: 2018-12-26 | End: 2018-12-28

## 2018-12-26 RX ADMIN — ENOXAPARIN SODIUM 40 MG: 40 INJECTION SUBCUTANEOUS at 08:17

## 2018-12-26 RX ADMIN — OXYCODONE HYDROCHLORIDE AND ACETAMINOPHEN 500 MG: 500 TABLET ORAL at 08:17

## 2018-12-26 RX ADMIN — ASPIRIN 162 MG: 81 TABLET, COATED ORAL at 08:16

## 2018-12-26 RX ADMIN — FLUTICASONE PROPIONATE 1 SPRAY: 50 SPRAY, METERED NASAL at 08:17

## 2018-12-26 RX ADMIN — Medication 1 TABLET: at 08:17

## 2018-12-26 RX ADMIN — VITAMIN D, TAB 1000IU (100/BT) 1000 UNITS: 25 TAB at 08:17

## 2018-12-26 RX ADMIN — BENZONATATE 100 MG: 100 CAPSULE ORAL at 08:17

## 2018-12-26 RX ADMIN — FLECAINIDE ACETATE 50 MG: 100 TABLET ORAL at 08:17

## 2018-12-26 RX ADMIN — CITALOPRAM HYDROBROMIDE 5 MG: 10 TABLET ORAL at 08:17

## 2018-12-26 RX ADMIN — OSELTAMIVIR PHOSPHATE 75 MG: 75 CAPSULE ORAL at 08:17

## 2018-12-26 NOTE — PHYSICAL THERAPY NOTE
PT EVALUATION    Pt  Name: Umberto Chapa  Pt  Age: 66 y o  MRN: [id number]  LENGTH OF STAY: 3    Patient Active Problem List   Diagnosis    Pain, joint, knee, right    Febrile illness    History of atrial fibrillation       Admitting Diagnoses:   Cough [R05]  Hypoxia [R09 02]  Fever [R50 9]    Past Medical History:   Diagnosis Date    Afib ([address] )     Arthritis     Hyperlipidemia     Hypertension     Osteopenia     Sleep apnea     Varicose veins of both lower extremities        Past Surgical History:   Procedure Laterality Date    CATARACT EXTRACTION      COLONOSCOPY      complete, for polyp removal    EYE SURGERY      HYSTERECTOMY      INCISION AND DRAINAGE OF WOUND Right 6/28/2016    Procedure: ARTHROSCOPY,EVACUATION OF HEMATOMA, OPEN EXPLORATION OF WOUND;  Surgeon: Billy Barfield MD;  Location: [facility];  Service:     TONSILLECTOMY AND ADENOIDECTOMY      TOTAL KNEE ARTHROPLASTY Right     TOTAL KNEE ARTHROPLASTY Left     WISDOM TOOTH EXTRACTION         Imaging Studies:  XR chest 2 views   ED Interpretation by Deo Gupta DO (12/22 2302)   NAD  No infiltrates  Normal cardiac silhouette  Normal mediastinum  Final Result by Vee Andrade MD (12/23 1002)      No acute abnormality in the chest             Workstation performed: [id number]         CTA ED chest PE study   Final Result by Annie Live MD (12/23 0113)      1  No evidence of pulmonary embolism  2   Hypoventilatory changes of lungs  No focal consolidation  3   Coronary artery calcifications  Left atrial enlargement           Workstation performed: [id number]            12/26/18 1154   Note Type   Note type Eval only   Pain Assessment   Pain Score No Pain   Home Living   Type of [address] One level;Ramped entrance   [address] [place] chair   Home Equipment Walker;Cane   Prior Function   Level of [place] Independent with ADLs and functional mobility   Lives With Alone Receives Help From Family   ADL Assistance Independent   Falls in the last 6 months 1 to 4  (1x)   Comments (+) driving   Restrictions/Precautions   Weight Bearing Precautions Per Order No   Other Precautions Droplet precautions; Fall Risk   General   Family/Caregiver Present No   Cognition   Overall Cognitive Status WFL   Arousal/Participation Alert   Orientation Level Oriented X4   Following Commands Follows all commands and directions without difficulty   RUE Assessment   RUE Assessment WFL   RUE Strength   RUE Overall Strength Within Functional Limits - able to perform ADL tasks with strength   LUE Assessment   LUE Assessment WFL   LUE Strength   LUE Overall Strength Within Functional Limits - able to perform ADL tasks with strength   RLE Assessment   RLE Assessment WFL   Strength RLE   RLE Overall Strength 4+/5   LLE Assessment   LLE Assessment WFL   Strength LLE   LLE Overall Strength 4+/5   Coordination   Movements are Fluid and Coordinated 1   Sensation WFL   Bed Mobility   Supine to Sit Unable to assess   Additional Comments pt sitting at EOB pre & post session   Transfers   Sit to Stand 7  Independent   Stand to Sit 7  Independent   Ambulation/Elevation   Gait pattern Excessively slow;Decreased foot clearance   Gait Assistance (initially w/ S then progress to I)   Additional items Verbal cues   Assistive Device None   Distance 150'x1   Balance   Static Sitting Normal   Static Standing Normal   Ambulatory Good   Endurance Deficit   Endurance Deficit No   Activity Tolerance   Activity Tolerance Patient tolerated treatment well   Nurse Made Aware Lotus   Assessment   Prognosis Excellent   Problem List Impaired balance   Assessment Pt 66 y o  female admitted for Influenza A & acute viral labyrinthitis  PTA, pt reports being I w/o AD & (+) driving  Pt referred to PT for mobility assessment & D/C planning  Pt demonstrates no significant decline in function to warrant skilled PT at this time   Pt  I w/ overall functional mobility including amb w/o AD  Initially pt require S for amb due to mild unsteadiness in gait but progress to I as pt's amb balance improved w/ further amb  Gait slow w/ dec foot clearance but steady gait from mid to end of amb  Balance WFL  BUE & BLE strength & ROm WFL  No dizziness reported t/o session  Pt states being close to her functional baseline and states no concerns about going back home  Offered HHPT or OPPT but pt declined  Will D/C PT  Pt may return home when medically cleared  Pt instructed on home safety & mgt w/ good understanding  Please advise PT when needs change  Pt may ambulate in unit as tolerated to prevent decline in function  Nsg notified      Barriers to Discharge None   Goals   Patient Goals go home today   Treatment Day 0   Plan   Treatment/Interventions Spoke to nursing  (PT eval only)   PT Frequency Other (Comment)  (D/C PT)   Recommendation   Recommendation Home with family support   Equipment Recommended Other (Comment)  (none)   PT - OK to Discharge Yes  (when medically cleared)   Barthel Index   Feeding 10   Bathing 5   Grooming Score 5   Dressing Score 10   Bladder Score 10   Bowels Score 10   Toilet Use Score 10   Transfers (Bed/Chair) Score 15   Mobility (Level Surface) Score 15   Stairs Score 0  (not tested; not needed for D/C)   Barthel Index Score 90   Hx/personal factors: co-morbidities, home alone, advanced age, h/o of falls and fall risk  Examination: assessed body system, balance, endurance, amb, D/C disposition & fall risk  Clinical: unpredictable (low fall risk)  Complexity: high     Mary Walter, PT

## 2018-12-26 NOTE — DISCHARGE SUMMARY
Discharge Summary - Medical Gerardine Mu 66 y o  female MRN: [id number]    [facility]  Room / Bed: [place] [address] [address]/I8 -* Encounter: [id number]    BRIEF OVERVIEW      Admission Date: 12/22/2018       Discharge Date:  12/26/2018    Admitting Diagnosis:  Acute hypoxic respiratory failure    Primary Discharge Diagnosis  Principal Problem (Resolved): Influenza a    Acute respiratory failure with hypoxia (HCC)  Active Problems:    Febrile illness    History of atrial fibrillation  Resolved Problems:    Acute viral labyrinthitis      Service:  [facility]        Assessment and plan on date of discharge    Acute respiratory failure with hypoxia (HCC)-resolved   Assessment & Plan     · O2 sat was 89% when ambulating on ambulation which has now resolved  Currently saturating at 96% on room air and on ambulation  · CT chest negative for PE; hypoventilatory changes of lungs without consolidation  · Incentive spirometer      History of atrial fibrillation   Assessment & Plan     · Normal sinus rhythm  · Reports history of AFib, continue propranolol, flecainide and ASA 162mg      Influenza A   Assessment & Plan        Much improved  No fever  No respiratory complaints currently  On Tamiflu#4/5      Acute viral labyrinthitis   Assessment & Plan     · Resolved         Stable for discharge home     Subjective:  Patient feels much improved today  Stable for discharge home      Discharge Condition: Improved    Discharge Disposition: Home/Self Care    Discharge summary:     Mak Tucker is a 70-year-old female who was admitted to [facility] on 12/22/2018 with cough, fatigue, dizziness, respiratory distress and generalized myalgias  The patient was in acute hypoxic respiratory failure needing 3 L of oxygen to maintain a saturation of more than 90%  The patient stated that she had taken a flu vaccine this year    In the ED a flu PCR was positive for influenza A  She was admitted given her respiratory failure, dizziness and worsening myalgias and started on Tamiflu  She improved slowly with resolution of her fever, myalgias  Her gait which was unsteady initially has improved as well  She has completed 3 out of a 5 day course of Tamiflu  Her respiratory failure has resolved and she is currently saturating at 96% on room air  She will be discharged home with a script for Tamiflu to complete a 5 day course  She remains stable for discharge          Discharge Medications   Please see Medical Reconciliation Discharge Form    Discharge Follow Up Appointments:   PCP    Discharge  Statement   Total Time Spent today including physical exam, discussion with patient and family, and discharge arrangements/care 38 minutes

## 2018-12-26 NOTE — PROGRESS NOTES
Progress Note - Marine Membreno 66 y o  female MRN: [id number]    Unit/Bed#: E5 -01 Encounter: [id number]    Principal Problem:    Acute respiratory failure with hypoxia ([address] )  Active Problems:    Acute viral labyrinthitis    Febrile illness    History of atrial fibrillation  Resolved Problems:    * No resolved hospital problems  *      Assessment/Plan:  Acute respiratory failure with hypoxia (HCC)-resolved   Assessment & Plan     · O2 sat was 89% when ambulating on ambulation which has now resolved  Currently saturating at 96% on room air and on ambulation  · CT chest negative for PE; hypoventilatory changes of lungs without consolidation  · Incentive spirometer      History of atrial fibrillation   Assessment & Plan     · Normal sinus rhythm  · Reports history of AFib, continue propranolol, flecainide and ASA 162mg      Influenza A   Assessment & Plan       Much improved  No fever  No respiratory complaints currently  On Tamiflu#4/5     Acute viral labyrinthitis   Assessment & Plan     · Resolved        Stable for discharge home    Subjective:  Patient feels much improved today  Stable for discharge home  Physical Exam:   Vitals: Blood pressure 127/63, pulse (!) 52, temperature 98 °F (36 7 °C), temperature source Temporal, resp  rate 18, height 5' 3" (1 6 m), weight 77 4 kg (170 lb 10 2 oz), SpO2 95 %  ,Body mass index is 30 23 kg/m²  Gen:  Pleasant, non-tachypnic, non-dyspnic  Conversant  Heart: regular rate and rhythm, S1S2 present, no murmur, rub or gallop  Lungs: clear to ausculatation bilaterally  No wheezing, crackless, or rhonchi  No accessory muscle use or respiratory distress  Abd: soft, non-tender, non-distended  NABS, no guarding, rebound or peritoneal signs  Extremities: no clubbing, cyanosis or edema  2+pedal pulses bilaterally  Full range of motion  Neuro: awake, alert and oriented  Cranial nerves 2-12 intact  Strength and sensation grossly intact       Skin: warm and dry: no petechiae, purpura and rash      LABS:     Results from last 7 days  Lab Units 12/23/18  0621 12/22/18  2235   WBC Thousand/uL 5 69 7 16   HEMOGLOBIN g/dL 13 5 14 8   HEMATOCRIT % 41 8 45 7   PLATELETS Thousands/uL 145* 161       Results from last 7 days  Lab Units 12/23/18  0545 12/22/18  2235   POTASSIUM mmol/L 4 6 3 9   CHLORIDE mmol/L 103 102   CO2 mmol/L 22 26   BUN mg/dL 17 20   CREATININE mg/dL 0 83 1 04   CALCIUM mg/dL 8 8 9 1     No intake or output data in the 24 hours ending 12/26/18 0958        Current Facility-Administered Medications:  acetaminophen 650 mg Oral Q6H PRN Omega Crank, CRNP    ascorbic acid 500 mg Oral Daily Omega Crank, CRNP    aspirin 162 mg Oral Daily Omega Crank, CRNP    benzonatate 100 mg Oral TID Omega Crank, CRNP    cholecalciferol 1,000 Units Oral Daily Omega Crank, CRNP    citalopram 5 mg Oral Daily Omega Crank, CRNP    enoxaparin 40 mg Subcutaneous Daily Omega Crank, CRNP    flecainide 50 mg Oral BID Omega Crank, CRNP    fluticasone 1 spray Nasal Daily Omega Crank, CRNP    multivitamin-minerals 1 tablet Oral Daily Omega Crank, CRNP    ondansetron 4 mg Intravenous Q6H PRN Omega Crank, CRNP    oseltamivir 75 mg Oral Q12H [facility] Navin Barr, DO    propranolol 60 mg Oral Daily Omega Crank, CRNP    sodium chloride 75 mL/hr Intravenous Continuous Omega Crank, CRNP Last Rate: Stopped (12/24/18 0214)

## 2018-12-28 LAB
BACTERIA BLD CULT: NORMAL
BACTERIA BLD CULT: NORMAL

## 2019-05-16 ENCOUNTER — TRANSCRIBE ORDERS (OUTPATIENT)
Dept: ADMINISTRATIVE | Facility: HOSPITAL | Age: 79
End: 2019-05-16

## 2019-05-16 DIAGNOSIS — Z12.39 BREAST SCREENING, UNSPECIFIED: Primary | ICD-10-CM

## 2019-09-03 ENCOUNTER — HOSPITAL ENCOUNTER (OUTPATIENT)
Dept: RADIOLOGY | Age: 79
Discharge: HOME | End: 2019-09-03
Payer: MEDICARE

## 2019-09-03 VITALS — BODY MASS INDEX: 29.06 KG/M2 | WEIGHT: 164 LBS | HEIGHT: 63 IN

## 2019-09-03 DIAGNOSIS — Z12.39 BREAST SCREENING, UNSPECIFIED: ICD-10-CM

## 2019-09-03 PROCEDURE — 77067 SCR MAMMO BI INCL CAD: CPT

## 2020-01-15 ENCOUNTER — TELEPHONE (OUTPATIENT)
Dept: SLEEP CENTER | Facility: CLINIC | Age: 80
End: 2020-01-15

## 2020-04-23 ENCOUNTER — OFFICE VISIT (OUTPATIENT)
Dept: SLEEP CENTER | Facility: CLINIC | Age: 80
End: 2020-04-23
Payer: MEDICARE

## 2020-04-23 VITALS
HEART RATE: 64 BPM | SYSTOLIC BLOOD PRESSURE: 132 MMHG | DIASTOLIC BLOOD PRESSURE: 72 MMHG | BODY MASS INDEX: 30.02 KG/M2 | WEIGHT: 169.4 LBS | HEIGHT: 63 IN

## 2020-04-23 DIAGNOSIS — G47.33 OBSTRUCTIVE SLEEP APNEA SYNDROME: Primary | ICD-10-CM

## 2020-04-23 DIAGNOSIS — Z86.79 HISTORY OF ATRIAL FIBRILLATION: Chronic | ICD-10-CM

## 2020-04-23 PROCEDURE — 99204 OFFICE O/P NEW MOD 45 MIN: CPT | Performed by: PSYCHIATRY & NEUROLOGY

## 2020-05-01 ENCOUNTER — TELEPHONE (OUTPATIENT)
Dept: SLEEP CENTER | Facility: CLINIC | Age: 80
End: 2020-05-01

## 2020-07-16 ENCOUNTER — ANNUAL EXAM (OUTPATIENT)
Dept: OBGYN CLINIC | Facility: CLINIC | Age: 80
End: 2020-07-16
Payer: MEDICARE

## 2020-07-16 VITALS
SYSTOLIC BLOOD PRESSURE: 130 MMHG | BODY MASS INDEX: 29.76 KG/M2 | WEIGHT: 168 LBS | TEMPERATURE: 99 F | DIASTOLIC BLOOD PRESSURE: 70 MMHG

## 2020-07-16 DIAGNOSIS — Z12.39 BREAST CANCER SCREENING: ICD-10-CM

## 2020-07-16 DIAGNOSIS — Z01.419 ENCOUNTER FOR GYNECOLOGICAL EXAMINATION WITHOUT ABNORMAL FINDING: Primary | ICD-10-CM

## 2020-07-16 PROCEDURE — G0101 CA SCREEN;PELVIC/BREAST EXAM: HCPCS | Performed by: OBSTETRICS & GYNECOLOGY

## 2020-07-16 RX ORDER — DICYCLOMINE HYDROCHLORIDE 10 MG/1
CAPSULE ORAL
COMMUNITY
End: 2022-07-05 | Stop reason: ALTCHOICE

## 2020-07-16 RX ORDER — OXAZEPAM 10 MG
CAPSULE ORAL
COMMUNITY
End: 2022-07-05 | Stop reason: ALTCHOICE

## 2020-07-16 RX ORDER — CLARITHROMYCIN 500 MG/1
TABLET, COATED ORAL
COMMUNITY
End: 2021-07-26 | Stop reason: ALTCHOICE

## 2020-07-16 RX ORDER — NADOLOL 20 MG/1
TABLET ORAL
COMMUNITY
End: 2022-07-05 | Stop reason: ALTCHOICE

## 2020-07-16 RX ORDER — WARFARIN SODIUM 2 MG/1
TABLET ORAL
COMMUNITY
End: 2022-07-05 | Stop reason: ALTCHOICE

## 2020-07-16 RX ORDER — METOPROLOL SUCCINATE 50 MG/1
TABLET, EXTENDED RELEASE ORAL
COMMUNITY
End: 2022-07-05 | Stop reason: ALTCHOICE

## 2020-07-16 RX ORDER — VALACYCLOVIR HYDROCHLORIDE 1 G/1
TABLET, FILM COATED ORAL
COMMUNITY
End: 2022-07-05 | Stop reason: ALTCHOICE

## 2020-07-16 RX ORDER — CIPROFLOXACIN 250 MG/1
TABLET, FILM COATED ORAL
COMMUNITY
End: 2021-07-26 | Stop reason: ALTCHOICE

## 2020-07-16 RX ORDER — NITROGLYCERIN 0.6 MG/1
0.6 TABLET SUBLINGUAL
Status: ON HOLD | COMMUNITY

## 2020-07-16 RX ORDER — BENZONATATE 100 MG/1
CAPSULE ORAL
COMMUNITY
End: 2021-07-26 | Stop reason: ALTCHOICE

## 2020-07-16 RX ORDER — AMLODIPINE BESYLATE 10 MG/1
2.5 TABLET ORAL DAILY
COMMUNITY
End: 2022-07-05 | Stop reason: SDUPTHER

## 2020-07-16 RX ORDER — ACEBUTOLOL HYDROCHLORIDE 200 MG/1
200 CAPSULE ORAL 2 TIMES DAILY
Status: ON HOLD | COMMUNITY

## 2020-07-16 RX ORDER — CHLORHEXIDINE GLUCONATE 4 G/100ML
SOLUTION TOPICAL
COMMUNITY
End: 2021-07-26 | Stop reason: ALTCHOICE

## 2020-07-16 RX ORDER — CIPROFLOXACIN 500 MG/1
TABLET, FILM COATED ORAL
COMMUNITY
End: 2021-07-26 | Stop reason: ALTCHOICE

## 2020-07-16 RX ORDER — EZETIMIBE 10 MG/1
TABLET ORAL
COMMUNITY
End: 2022-07-05 | Stop reason: ALTCHOICE

## 2020-07-16 RX ORDER — ALBUTEROL SULFATE 90 UG/1
AEROSOL, METERED RESPIRATORY (INHALATION)
COMMUNITY
End: 2022-07-05 | Stop reason: ALTCHOICE

## 2020-07-16 RX ORDER — HYDROCODONE BITARTRATE AND ACETAMINOPHEN 5; 300 MG/1; MG/1
TABLET ORAL
COMMUNITY
End: 2022-07-05 | Stop reason: ALTCHOICE

## 2020-07-16 RX ORDER — CLOTRIMAZOLE AND BETAMETHASONE DIPROPIONATE 10; .64 MG/G; MG/G
CREAM TOPICAL
COMMUNITY
End: 2021-07-26 | Stop reason: ALTCHOICE

## 2020-07-16 RX ORDER — LORAZEPAM 1 MG/1
TABLET ORAL
COMMUNITY
End: 2022-07-05 | Stop reason: ALTCHOICE

## 2020-07-16 NOTE — PROGRESS NOTES
Assessment/Plan:    No problem-specific Assessment & Plan notes found for this encounter         Diagnoses and all orders for this visit:    Encounter for gynecological examination without abnormal finding    Breast cancer screening  -     Mammo screening bilateral w 3d & cad; Future    Other orders  -     Zoster Vaccine Live (Zostavax) 49240 UNT/0 65ML SUSR; Zostavax (PF) 19,400 unit/0 65 mL subcutaneous suspension  -     warfarin (COUMADIN) 2 mg tablet; warfarin 2 mg tablet   TAKE 2 TABLET (4MG) BY MOUTH AS DIRECTED AT 6PM  -     valACYclovir (VALTREX) 1,000 mg tablet; valacyclovir 1 gram tablet  -     nadolol (CORGARD) 20 mg tablet; nadolol 20 mg tablet  -     nitroglycerin (NITROSTAT) 0 6 mg SL tablet; Place 0 6 mg under the tongue  -     oxazepam (SERAX) 10 mg capsule; oxazepam 10 mg capsule  -     metoprolol succinate (TOPROL-XL) 50 mg 24 hr tablet; metoprolol succinate ER 50 mg tablet,extended release 24 hr  -     LORazepam (ATIVAN) 1 mg tablet; lorazepam 1 mg tablet  -     HYDROcodone-acetaminophen (Vicodin) 5-300 MG per tablet; Vicodin 5 mg-300 mg tablet  -     ciprofloxacin (CIPRO) 250 mg tablet; ciprofloxacin 250 mg tablet  -     ciprofloxacin (CIPRO) 500 mg tablet; ciprofloxacin 500 mg tablet  -     clarithromycin (BIAXIN) 500 mg tablet; clarithromycin 500 mg tablet  -     clotrimazole-betamethasone (LOTRISONE) 1-0 05 % cream; clotrimazole-betamethasone 1 %-0 05 % topical cream  -     dicyclomine (BENTYL) 10 mg capsule; dicyclomine 10 mg capsule   prn  -     ezetimibe (Zetia) 10 mg tablet; Zetia 10 mg tablet  -     albuterol (Ventolin HFA) 90 mcg/act inhaler; Ventolin HFA 90 mcg/actuation aerosol inhaler  -     benzonatate (TESSALON PERLES) 100 mg capsule; benzonatate 100 mg capsule  -     bromfenac sodium (Prolensa) 0 07 % SOLN; Prolensa 0 07 % eye drops  -     chlorhexidine (Hibiclens) 4 % external liquid; Hibiclens 4 % topical liquid   wash surgical site for 5 days prior to surgery  -     acebutolol (SECTRAL) 200 mg capsule; Take 200 mg by mouth 2 (two) times a day  -     amLODIPine (NORVASC) 10 mg tablet; Take 2 5 mg by mouth daily        Normal gynecological physical examination  Self-breast examination stressed  Mammogram ordered  Discussed regular exercise, healthy diet, importance of vitamin D and calcium supplements  Discussed importance of sun block use during periods of prolonged sun exposure  Patient will be seen in 1 year for routine gynecologic and medical examination  Patient will call office for any problems, concerns, or issues which may arise during the interim  Subjective:      Patient ID: Manuel Soares is a [de-identified] y o  female  Patient is an [de-identified] female who presents today for her annual gynecologic and medical examination  Patient reports no gynecologic complaints  Patient notes that she engaged in unprotected sexual intercourse two weeks ago with a longtime friend and is inquiring about any damage to her pelvic area  Patient denies pelvic or perineal pain, vaginal itching, discharge, or new growths or lesions  Patient also denies fevers, chills, unintentional weight loss or gain, fatigue, new rashes or lesions, chest pain, SOB, HA, changes in vision, breast masses or tenderness, abdominal or pelvic pain, N/V/D/C, dysuria, urgency, frequency, incontinence, hot flashes, night sweats, vaginal dryness or vaginal bleeding  Patient has hx of hysterectomy  Patient denies alcohol, tobacco, or illicit drug use  Patient is currently being followed medically for HTN, A  fib, and HLD  Impression is an unremarkable gynecologic examination  Patient to follow up in 1 year  The following portions of the patient's history were reviewed and updated as appropriate: allergies, current medications, past family history, past medical history, past social history, past surgical history and problem list     Review of Systems   Constitutional: Negative    Negative for appetite change, diaphoresis, fatigue, fever and unexpected weight change  HENT: Negative  Eyes: Negative  Respiratory: Negative  Cardiovascular: Negative  Gastrointestinal: Negative  Negative for abdominal pain, blood in stool, constipation, diarrhea, nausea and vomiting  Endocrine: Negative  Negative for cold intolerance and heat intolerance  Genitourinary: Negative  Negative for dysuria, frequency, hematuria, urgency, vaginal bleeding, vaginal discharge and vaginal pain  Musculoskeletal: Negative  Skin: Negative  Allergic/Immunologic: Negative  Neurological: Negative  Hematological: Negative  Negative for adenopathy  Psychiatric/Behavioral: Negative  Objective:      /70   Temp 99 °F (37 2 °C)   Wt 76 2 kg (168 lb)   BMI 29 76 kg/m²          Physical Exam   Constitutional: She is oriented to person, place, and time  She appears well-developed  No distress  HENT:   Head: Normocephalic and atraumatic  Eyes: Pupils are equal, round, and reactive to light  EOM are normal    Neck: Normal range of motion  Neck supple  Cardiovascular: Normal rate, regular rhythm and normal heart sounds  Exam reveals no gallop and no friction rub  No murmur heard  Pulmonary/Chest: Effort normal and breath sounds normal  Right breast exhibits no inverted nipple, no mass, no nipple discharge, no skin change and no tenderness  Left breast exhibits no inverted nipple, no mass, no nipple discharge, no skin change and no tenderness  Breasts are symmetrical    Abdominal: Soft  Normal appearance and bowel sounds are normal    Genitourinary: Rectum normal, vagina normal and uterus normal  Rectal exam shows guaiac negative stool  Pelvic exam was performed with patient supine  There is no rash or lesion on the right labia  There is no rash or lesion on the left labia  Uterus is not enlarged and not tender  Cervix exhibits no discharge and no friability   Right adnexum displays no mass, no tenderness and no fullness  Left adnexum displays no mass, no tenderness and no fullness  No erythema, tenderness or bleeding in the vagina  No vaginal discharge found  Genitourinary Comments: Pelvic exam revealed minimal atrophic vaginitis  Good pelvic support confirmed   Musculoskeletal: Normal range of motion  She exhibits no edema  Lymphadenopathy:     She has no cervical adenopathy  She has no axillary adenopathy  Right: No inguinal and no supraclavicular adenopathy present  Left: No inguinal and no supraclavicular adenopathy present  Neurological: She is alert and oriented to person, place, and time  Skin: Skin is warm, dry and intact  No rash noted  She is not diaphoretic  Psychiatric: She has a normal mood and affect   Her speech is normal and behavior is normal  Judgment and thought content normal  Cognition and memory are normal

## 2020-11-04 ENCOUNTER — TELEPHONE (OUTPATIENT)
Dept: OBGYN CLINIC | Facility: CLINIC | Age: 80
End: 2020-11-04

## 2021-04-29 ENCOUNTER — TELEPHONE (OUTPATIENT)
Dept: SLEEP CENTER | Facility: CLINIC | Age: 81
End: 2021-04-29

## 2021-04-29 NOTE — TELEPHONE ENCOUNTER
Vikki left message, stating patient is having issues with her nose piece, asking if there are options for other masks>    Attempted to contact patient, left message for her to reach out to [address]  and also call office to reschedule appointment with another provider as DR Mary Alice Chang has retired

## 2021-04-30 ENCOUNTER — TRANSCRIBE ORDERS (OUTPATIENT)
Dept: ADMINISTRATIVE | Facility: HOSPITAL | Age: 81
End: 2021-04-30

## 2021-04-30 DIAGNOSIS — G47.33 OBSTRUCTIVE SLEEP APNEA (ADULT) (PEDIATRIC): Primary | ICD-10-CM

## 2021-07-19 ENCOUNTER — ANNUAL EXAM (OUTPATIENT)
Dept: OBGYN CLINIC | Facility: CLINIC | Age: 81
End: 2021-07-19
Payer: MEDICARE

## 2021-07-19 VITALS — WEIGHT: 163 LBS | SYSTOLIC BLOOD PRESSURE: 142 MMHG | DIASTOLIC BLOOD PRESSURE: 68 MMHG | BODY MASS INDEX: 28.87 KG/M2

## 2021-07-19 DIAGNOSIS — Z12.31 ENCOUNTER FOR SCREENING MAMMOGRAM FOR MALIGNANT NEOPLASM OF BREAST: Primary | ICD-10-CM

## 2021-07-19 DIAGNOSIS — Z01.419 ENCOUNTER FOR GYNECOLOGICAL EXAMINATION WITHOUT ABNORMAL FINDING: ICD-10-CM

## 2021-07-19 DIAGNOSIS — R92.8 ABNORMAL FINDING ON MAMMOGRAPHY: ICD-10-CM

## 2021-07-19 PROCEDURE — G0101 CA SCREEN;PELVIC/BREAST EXAM: HCPCS | Performed by: OBSTETRICS & GYNECOLOGY

## 2021-07-19 NOTE — PROGRESS NOTES
Assessment/Plan:    No problem-specific Assessment & Plan notes found for this encounter  Diagnoses and all orders for this visit:    Encounter for screening mammogram for malignant neoplasm of breast  -     Mammo screening bilateral w 3d & cad; Future    Encounter for gynecological examination without abnormal finding          Normal gynecological physical examination  Self-breast examination stressed  Mammogram ordered  Discussed regular exercise, healthy diet, importance of vitamin D and calcium supplements  Discussed importance of sun block use during periods of prolonged sun exposure  Patient will be seen in 1 year for routine gynecologic and medical examination  Patient will call office for any problems, concerns, or issues which may arise during the interim  Subjective:      Patient ID: Sp Jones is a 80 y o  female  Patient is an 59-year-old female who presents today for her annual gynecologic medical examination     Patient denies any vaginal bleeding     She also denies any significant vasomotor symptoms as well     Patient denies any significant pelvic or abdominal pain     Patient reports normal appetite     She reports normal bowel and bladder habits     She denies any headaches, chest pain, shortness of breath fever shakes or chills     She denies any significant COVID-19 symptoms including cough or loss of taste or smell    Patient has received the COVID vaccine     Patient is up-to-date with screening colonoscopy    The importance of self-breast examination with stressed to the patient at today's visit and she is up-to-date with screening mammography as well  Appropriate arrangements for her annual screening mammogram replaced into the EMR system at today's visit              The following portions of the patient's history were reviewed and updated as appropriate: allergies, current medications, past family history, past medical history, past social history, past surgical history and problem list     Review of Systems   Constitutional: Negative  Negative for appetite change, diaphoresis, fatigue, fever and unexpected weight change  HENT: Negative  Eyes: Negative  Respiratory: Negative  Cardiovascular: Negative  Followed for blood pressure and cholesterol   Gastrointestinal: Negative  Negative for abdominal pain, blood in stool, constipation, diarrhea, nausea and vomiting  Endocrine: Negative  Negative for cold intolerance and heat intolerance  Genitourinary: Negative  Negative for dysuria, frequency, hematuria, urgency, vaginal bleeding, vaginal discharge and vaginal pain  Musculoskeletal: Negative  Skin: Negative  Allergic/Immunologic: Negative  Neurological: Negative  Hematological: Negative  Negative for adenopathy  Psychiatric/Behavioral: Negative  Objective:      /68   Wt 73 9 kg (163 lb)   BMI 28 87 kg/m²          Physical Exam  Constitutional:       General: She is not in acute distress  Appearance: Normal appearance  She is well-developed  She is not diaphoretic  HENT:      Head: Normocephalic and atraumatic  Eyes:      Pupils: Pupils are equal, round, and reactive to light  Cardiovascular:      Rate and Rhythm: Normal rate and regular rhythm  Heart sounds: Normal heart sounds  No murmur heard  No friction rub  No gallop  Pulmonary:      Effort: Pulmonary effort is normal       Breath sounds: Normal breath sounds  Chest:      Breasts: Breasts are symmetrical          Right: No inverted nipple, mass, nipple discharge, skin change or tenderness  Left: No inverted nipple, mass, nipple discharge, skin change or tenderness  Abdominal:      General: Bowel sounds are normal       Palpations: Abdomen is soft  Genitourinary:     General: Normal vulva  Exam position: Supine  Labia:         Right: No rash or lesion  Left: No rash or lesion         Urethra: No urethral swelling or urethral lesion  Vagina: Normal  No vaginal discharge, erythema, tenderness or bleeding  Uterus: Absent  Adnexa: Right adnexa normal and left adnexa normal         Right: No mass, tenderness or fullness  Left: No mass, tenderness or fullness  Rectum: Normal  Guaiac result negative  Comments: Patient is status post hysterectomy   Good pelvic support confirmed   Pelvic exam revealed minimal atrophic vaginitis  Musculoskeletal:         General: Normal range of motion  Cervical back: Normal range of motion and neck supple  Lymphadenopathy:      Cervical: No cervical adenopathy  Upper Body:      Right upper body: No supraclavicular adenopathy  Left upper body: No supraclavicular adenopathy  Skin:     General: Skin is warm and dry  Findings: No rash  Neurological:      Mental Status: She is alert and oriented to person, place, and time  Psychiatric:         Mood and Affect: Mood normal          Speech: Speech normal          Behavior: Behavior normal          Thought Content:  Thought content normal          Judgment: Judgment normal

## 2021-07-26 ENCOUNTER — OFFICE VISIT (OUTPATIENT)
Dept: SLEEP CENTER | Facility: CLINIC | Age: 81
End: 2021-07-26
Payer: MEDICARE

## 2021-07-26 VITALS
HEIGHT: 63 IN | HEART RATE: 66 BPM | BODY MASS INDEX: 28.88 KG/M2 | DIASTOLIC BLOOD PRESSURE: 72 MMHG | OXYGEN SATURATION: 97 % | WEIGHT: 163 LBS | SYSTOLIC BLOOD PRESSURE: 130 MMHG

## 2021-07-26 DIAGNOSIS — G47.33 OBSTRUCTIVE SLEEP APNEA (ADULT) (PEDIATRIC): Primary | ICD-10-CM

## 2021-07-26 PROBLEM — F41.1 GENERALIZED ANXIETY DISORDER: Status: ACTIVE | Noted: 2021-07-26

## 2021-07-26 PROCEDURE — 99215 OFFICE O/P EST HI 40 MIN: CPT | Performed by: NURSE PRACTITIONER

## 2021-07-26 NOTE — PATIENT INSTRUCTIONS
1   Continue use of BiPAP equipment nightly  2  Continue to clean your equipment, as discussed  3  Contact the Sleep [facility] with any questions or concerns prior to your next visit, as needed  4  Schedule visit for follow-up in 1 year    Continuous Positive Airway Pressure (CPAP) therapy was prescribed to you as a medical necessity and there are risks of discontinuing  use of the device, some of which may be long term  Symptoms you experienced before using CPAP may return such as snoring, apneas, excessive daytime sleepiness, hypertension, cardiac arrhythmias, risk of stroke, congestive heart-failure, exacerbation of COPD and potential respiratory failure  Ultimately, it is a personal decision for you to make if you continue use of an affected device or discontinue until a replacement is provided  Unfortunately, [employer] has not yet provided us with information about available devices  You can visit their website at [email or web address] to register your device and to learn more about how [employer] plans to replace your device  You may also want to consider the use of an in-line antibacterial filter, which can be purchased online  It is advised that you do not use any type of ozone  for your PAP equipment  You can call our office in November - around Thanksgiving -  to get a prescription for a replacement BiPAP machine, as you will be eligible December 8th  Nursing Support:  When: Monday through Friday 7A-5PM except holidays  Where: Our direct line is [phone]  If you are having a true emergency please call 911  In the event that the line is busy or it is after hours please leave a voice message and we will return your call  Please speak clearly, leaving your full name, birth date, best number to reach you and the reason for your call  Medication refills:  We will need the name of the medication, the dosage, the ordering provider, whether you get a 30 or 90 day refill, and the pharmacy name and address  Medications will be ordered by the provider only  Nurses cannot call in prescriptions  Please allow 7 days for medication refills  Physician requested updates: If your provider requested that you call with an update after starting medication, please be ready to provide us the medication and dosage, what time you take your medication, the time you attempt to fall asleep, time you fall asleep, when you wake up, and what time you get out of bed  Sleep Study Results: We will contact you with sleep study results and/or next steps after the physician has reviewed your testing

## 2021-07-26 NOTE — PROGRESS NOTES
Consultation - Deonna 89, 1940, MRN: [id number]    7/26/2021        Reason for Consult / Principal Problem:  Obstructive Sleep Apnea  Restless Legs Syndrome  Paroxysmal atrial fibrillation  Mild cognitive impairment       Thank you for the opportunity of participating in the evaluation and care of this patient in the Sleep Clinic at [facility]  Subjective:     HPI: Bob Freeman is a 80y o  year old female  She presents for a consultation regarding severe obstructive sleep apnea  She was a prior patient of Dr Christopher Stern and has a long history of obstructive sleep apnea  She has successfully used CPAP for many years  Recently, she has been having a problem using her mask, since she took it apart to clean it and has not been able to use it for at least a month  She lives alone, since her  passed away 2 years ago  Her daughter lives right next door  She reports that she typically sleeps through the night, sometimes waking up one time for urination  Her comorbid conditions include HTN, atrial fibrillation and mild cognitive impairment  Review of Systems      Genitourinary none   Cardiology palpitations/fluttering feeling in the chest and ankle/leg swelling   Gastrointestinal none   Neurology numbness/tingling of an extremity, forgetfulness, poor concentration or confusion, , difficulty with memory and balance problems   Constitutional fatigue   Integumentary itching   Psychiatry none   Musculoskeletal back pain   Pulmonary shortness of breath with activity   ENT throat clearing   Endocrine frequent urination   Hematological blood donor       Employment:  She is currently retired  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  She worked day shift hours when working      Sleep Schedule:       Bedtime:  11:00pm      Latency:  15 minutes, drinks a half glass of milk if she has trouble, which works well for her      Wakeup time:  7:30am    Awakenings:       Frequency:  One time per night on some nights, but usually sleeps straight through the night      Causes: For urination or dreams wake her      Duration:  returns to sleep without difficulty    Daytime Sleepiness / Inappropriate Sleep:       Most severe:  She feels sleepy if she is home       Naps :  Average of 1 time per week      Time:  afternoon      Duration:  30 minutes to an hour, sometimes up to 4 hours       Inappropriate drowsiness / sleep:  She may doze while watching TV, typically in the late evening    Snoring:  She snores    Apnea: No witnessed apnea    Change in Weight:  She has been unintentionally losing weight slowly over the past 2 years    Restless Leg Syndrome:  She has a history of clinical symptoms consistent with this diagnosis that resolved spontaneously 5-6 years ago  Other Complaints:  No reports of sleep walking, sleep talking, sleep paralysis or hallucinations surrounding sleep  She has woken up choking from acid reflux  She does not wake up with headaches  No reports of bruxism  Social History:      Caffeine:  Iced tea - 1-2 glasses per day, sometimes coffee       Tobacco:   reports that she has never smoked  She has never used smokeless tobacco      E-cig/Vaping:    E-Cigarette/Vaping    E-Cigarette Use Never User       E-Cigarette/Vaping Substances    Nicotine No     THC No     CBD No     Flavoring No     Other No     Unknown No          Alcohol:   reports current alcohol use  social      Drugs:   reports no history of drug use         The review of systems and following portions of the patient's history were reviewed and updated as appropriate: allergies, current medications, past family history, past medical history, past social history, past surgical history and problem list         Objective:       Vitals:    07/26/21 1300   BP: 130/72   Pulse: 66   SpO2: 97%   Weight: 73 9 kg (163 lb)   Height: 5' 3" (1 6 m)     Body mass index is 28 87 kg/m²  Arnold Sleepiness Scale: Total score: 12      Current Outpatient Medications:     acebutolol (SECTRAL) 200 mg capsule, Take 200 mg by mouth 2 (two) times a day, Disp: , Rfl:     acetaminophen (TYLENOL) 325 mg tablet, 650 mg q 6 hours p r n  Pain/fever, Disp: 30 tablet, Rfl: 0    albuterol (Ventolin HFA) 90 mcg/act inhaler, Ventolin HFA 90 mcg/actuation aerosol inhaler, Disp: , Rfl:     amLODIPine (NORVASC) 10 mg tablet, Take 2 5 mg by mouth daily, Disp: , Rfl:     ascorbic acid (VITAMIN C) 500 mg tablet, Take 500 mg by mouth daily  , Disp: , Rfl:     aspirin (ECOTRIN LOW STRENGTH) 81 mg EC tablet, Take 162 mg by mouth daily, Disp: , Rfl:     bromfenac sodium (Prolensa) 0 07 % SOLN, Prolensa 0 07 % eye drops, Disp: , Rfl:     calcium carbonate (TUMS) 500 mg chewable tablet, Chew 1 tablet daily  , Disp: , Rfl:     Calcium-Magnesium-Vitamin D (CALCIUM 500) 500-250-200 MG-MG-UNIT TABS, Take by mouth, Disp: , Rfl:     Cholecalciferol (VITAMIN D3) 1000 UNIT/SPRAY LIQD, Take 5,000 Units by mouth, Disp: , Rfl:     citalopram (CeleXA) 10 mg tablet, Take 5 mg by mouth daily  , Disp: , Rfl:     dicyclomine (BENTYL) 10 mg capsule, dicyclomine 10 mg capsule  prn, Disp: , Rfl:     ezetimibe (Zetia) 10 mg tablet, Zetia 10 mg tablet, Disp: , Rfl:     flecainide (TAMBOCOR) 50 mg tablet, Take 50 mg by mouth 2 (two) times a day , Disp: , Rfl:     fluticasone (FLONASE) 50 mcg/act nasal spray, 1 spray into each nostril daily, Disp: 16 g, Rfl: 0    HYDROcodone-acetaminophen (Vicodin) 5-300 MG per tablet, Vicodin 5 mg-300 mg tablet, Disp: , Rfl:     LORazepam (ATIVAN) 1 mg tablet, lorazepam 1 mg tablet, Disp: , Rfl:     metoprolol succinate (TOPROL-XL) 50 mg 24 hr tablet, metoprolol succinate ER 50 mg tablet,extended release 24 hr, Disp: , Rfl:     multivitamin (THERAGRAN) TABS, Take 1 tablet by mouth daily  , Disp: , Rfl:     nadolol (CORGARD) 20 mg tablet, nadolol 20 mg tablet, Disp: , Rfl:     nitroglycerin (NITROSTAT) 0 6 mg SL tablet, Place 0 6 mg under the tongue, Disp: , Rfl:     oxazepam (SERAX) 10 mg capsule, oxazepam 10 mg capsule, Disp: , Rfl:     propranolol (INDERAL LA) 60 mg 24 hr capsule, Take 60 mg by mouth daily  , Disp: , Rfl:     rosuvastatin (CRESTOR) 5 mg tablet, Take 2 5 mg by mouth every other day Take 1/2 of 5mg  Tablet by mouth every day  , Disp: , Rfl:     warfarin (COUMADIN) 2 mg tablet, warfarin 2 mg tablet  TAKE 2 TABLET (4MG) BY MOUTH AS DIRECTED AT 6PM, Disp: , Rfl:     sodium chloride (MARITZA 128) 5 % hypertonic ophthalmic solution, Administer 1 drop to both eyes daily at bedtime as needed  (Patient not taking: Reported on 7/26/2021), Disp: , Rfl:     valACYclovir (VALTREX) 1,000 mg tablet, valacyclovir 1 gram tablet (Patient not taking: Reported on 7/26/2021), Disp: , Rfl:     Zoster Vaccine Live (Zostavax) 69319 UNT/0 65ML SUSR, Zostavax (PF) 19,400 unit/0 65 mL subcutaneous suspension (Patient not taking: Reported on 7/26/2021), Disp: , Rfl:     Physical Exam  General Appearance:   Alert, cooperative, no distress, appears stated age  She has some difficulty with word-finding     Head:   Normocephalic, without obvious abnormality, atraumatic     Eyes:   PERRL, conjunctiva/corneas clear, EOM's intact          Nose:  Nares normal, septum midline, mucosa normal, no drainage or sinus tenderness           Throat:  Lips, teeth and gums normal; tongue normal size and  shape and midline in position; mucosa moist with low-lying soft palatal tissue and small oropharyngeal opening, uvula small, tonsils not visualized, Mallampati class 4       Neck:  Supple, symmetrical, trachea midline, no adenopathy;  Thyroid: No enlargement, tenderness or nodules; no carotid bruit or JVD     Lungs:      Clear to auscultation bilaterally, respirations unlabored     Heart:   Regular rate and rhythm, S1 and S2 normal, no murmur, rub or gallop       Extremities:  Extremities normal, atraumatic, no cyanosis or edema       Skin:  Skin color, texture, turgor normal, no rashes or lesions       Neurologic:  No focal deficits noted  Normal strength, sensation throughout     Sleep Study Results:  Patient reports having recent sleep studies done at [facility]  through her cardiologist's office  Will attempt to retrieve  Most recent study at [facility] was a diagnostic study in 2016, which confirmed mild VIRGILIO with AHI of 13 3, worsening to 21 3 in supine sleep and 42 9 in REM sleep with oxygen lilliana of 80%  She was started on BiPAP and currently uses IPAP 9cm/EPAP 5cm    Current compliance data:   She has been able to use the equipment 93 3% of all days recorded  Average usage was 4 or more hours 80% of all days recorded  The estimated AHI is 4 5 abnormal breathing events per hour  The patient feels she benefits from the use of the equipment and would like to continue PAP treatment  She brought her mask in and head gear was fixed and adjusted  She plans to restart use of equipment  Response to treatment has been good  Supplies have been ordered for the next year  We discussed the recent recall of the patient's PAP equipment  The risks and benefits for continued use vs  Discontinuation of PAP therapy were discussed  It is ultimately the patient's decision whether or not to continue PAP therapy at this time  The patient was advised to register their equipment on the [employer], which will give them further direction in the future replacement of the equipment  Since the ozone cleaning devices have been suspected as a causative agent in the recall, the patient has been advised to avoid using any ozone cleaning device and clean the equipment using mild soap and water  We also discussed the use of an in-line anti-bacterial filter  She will be due for replacement equipment in December   She was instructed to call in late November for a prescription for new equipment  She will then schedule a compliance follow up visit 31-91 days after set up  Instructions were outline in her AVS, due to the mild cognitive impairment  I have asked the patient to contact the Sleep [address] [place] [address] if any difficulties are encountered prior to that time  ASSESSMENT / PLAN     1  Obstructive sleep apnea (adult) (pediatric)  Ambulatory referral to Sleep Medicine    PAP DME Resupply/Reorder         Counseling / Coordination of Care  Total clinic time spent today 55 minutes  Greater than 50% of total time was spent with the patient and / or family counseling and / or coordination of care  A description of the counseling / coordination of care:     diagnostic results, instructions for management, risk factor reductions, prognosis, patient and family education, impressions, risks and benefits of treatment options and importance of compliance with treatment    See above compliance data and plan of treatment, as well as patient instructions  If she does not wish to receive new equipment, she will follow up in one year  The following instructions have been given to the patient today:    Patient Instructions   1  Continue use of BiPAP equipment nightly  2  Continue to clean your equipment, as discussed  3  Contact the Sleep [address] [place] [address] with any questions or concerns prior to your next visit, as needed  4  Schedule visit for follow-up in 1 year    Continuous Positive Airway Pressure (CPAP) therapy was prescribed to you as a medical necessity and there are risks of discontinuing  use of the device, some of which may be long term  Symptoms you experienced before using CPAP may return such as snoring, apneas, excessive daytime sleepiness, hypertension, cardiac arrhythmias, risk of stroke, congestive heart-failure, exacerbation of COPD and potential respiratory failure   Ultimately, it is a personal decision for you to make if you continue use of an affected device or discontinue until a replacement is provided  Unfortunately, [employer] has not yet provided us with information about available devices  You can visit their website at [email or web address] to register your device and to learn more about how [employer] plans to replace your device  You may also want to consider the use of an in-line antibacterial filter, which can be purchased online  It is advised that you do not use any type of ozone  for your PAP equipment  You can call our office in November - around Thanksgiving -  to get a prescription for a replacement BiPAP machine, as you will be eligible December 8th  Nursing Support:  When: Monday through Friday 7A-5PM except holidays  Where: Our direct line is [phone]  If you are having a true emergency please call 911  In the event that the line is busy or it is after hours please leave a voice message and we will return your call  Please speak clearly, leaving your full name, birth date, best number to reach you and the reason for your call  Medication refills: We will need the name of the medication, the dosage, the ordering provider, whether you get a 30 or 90 day refill, and the pharmacy name and address  Medications will be ordered by the provider only  Nurses cannot call in prescriptions  Please allow 7 days for medication refills  Physician requested updates: If your provider requested that you call with an update after starting medication, please be ready to provide us the medication and dosage, what time you take your medication, the time you attempt to fall asleep, time you fall asleep, when you wake up, and what time you get out of bed  Sleep Study Results: We will contact you with sleep study results and/or next steps after the physician has reviewed your testing          Vikas Lowry, [address] [place] [address]

## 2021-07-27 ENCOUNTER — TELEPHONE (OUTPATIENT)
Dept: SLEEP CENTER | Facility: CLINIC | Age: 81
End: 2021-07-27

## 2022-03-09 NOTE — PATIENT INSTRUCTIONS
Received referral for anemia unspecified.   Attempted to contact the pt to [facility].     Take medications as directed  Drink plenty of fluids  Follow up with family doctor this week  Go to ER immediately if new or worsening symptoms occur  Cold Symptoms   AMBULATORY CARE:   Cold symptoms  include sneezing, dry throat, a stuffy nose, headache, watery eyes, and a cough  Your cough may be dry, or you may cough up mucus  You may also have muscle aches, joint pain, and tiredness  Rarely, you may have a fever  Cold symptoms occur from inflammation in your upper respiratory system caused by a virus  Most colds go away without treatment  Seek care immediately if:   · You have increased tiredness and weakness  · You are unable to eat  · Your heart is beating much faster than usual for you  · You see white spots in the back of your throat and your neck is swollen and sore to the touch  · You see pinpoint or larger reddish-purple dots on your skin  Contact your healthcare provider if:   · You have a fever higher than 102°F (38 9°C)  · You have new or worsening shortness of breath  · You have thick nasal drainage for more than 2 days  · Your symptoms do not improve or get worse within 5 days  · You have questions or concerns about your condition or care  Treatment for cold symptoms  may include NSAIDS to decrease muscle aches and fever  Cold medicines may also be given to decrease coughing, nasal stuffiness, sneezing, and a runny nose  Manage your cold symptoms: The following may help relieve cold symptoms, such as a dry throat and congestion:  · Gargle with mouthwash or warm salt water as directed  · Suck on throat lozenges or hard candy  · Use a cold or warm vaporizer or humidifier to ease your breathing  · Rest for at least 2 days and then as needed to decrease tiredness and weakness  · Use petroleum based jelly around your nostrils to decrease irritation from blowing your nose  · Drink plenty of liquids   Liquids will help thin and loosen thick mucus so you can cough it up  Liquids will also keep you hydrated  Ask your healthcare provider which liquids are best for you and how much to drink each day  Prevent the spread of germs  by washing your hands often  You can spread your cold germs to others for at least 3 days after your symptoms start  Do not share items, such as eating utensils  Cover your nose and mouth when you cough or sneeze using the crook of your elbow instead of your hands  Throw used tissues in the garbage  Do not smoke:  Smoking may worsen your symptoms and increase the length of time you feel sick  Talk with your healthcare provider if you need help to stop smoking  Follow up with your healthcare provider as directed:  Write down your questions so you remember to ask them during your visits  © 2017 [employer]  Information is for End User's use only and may not be sold, redistributed or otherwise used for commercial purposes  All illustrations and images included in CareNotes® are the copyrighted property of A D A M , Inc  or Ashok Kennedy  The above information is an  only  It is not intended as medical advice for individual conditions or treatments  Talk to your doctor, nurse or pharmacist before following any medical regimen to see if it is safe and effective for you

## 2022-06-27 NOTE — PROGRESS NOTES
Cardiology Office Note  Melodie Shiver, MD Garth Simmonds, MD Charise Red, DO, [facility] MD Gini Diaz DO, Jennifer Eason DO, [facility] [place]  ----------------------------------------------------------------  [address] PrésLeeanne Looney [age] y o  female MRN: [id number]  Unit/Bed#:  Encounter: [id number]      History of Present Illness: It was a pleasure to see Zoe Garcia in the office today for initial CV evaluation  She has a past medical history of paroxysmal atrial fibrillation, hypertension, dyslipidemia, tachy-alison syndrome and pre diabetes  She established care with us in June 2022  Previously, she had been managed by [facility] for her atrial fibrillation  [facility] had been monitoring the patient on flecainide for rhythm control  She has longstanding history of atrial fibrillation which was managed by rhythm control on flecainide  In the past, she had been on both warfarin and Eliquis  She has been taken off of her anticoagulation for unclear reasoning by her [facility] cardiologist   In 2019, she was admitted for tachy-alison syndrome with a 4 2 second pause and was taken off of her propranolol  Holter monitor was performed in May 2021 demonstrating average heart rate of 59 beats per minute without high-degree heart block  She reported no history of loss of consciousness  She was then recommended for evaluation with [facility] with changing of insurance  Overall, she had been denying any chest pain, pressure, tightness or squeezing  Denies any exertional symptoms of shortness of breath  Denies lower extremity swelling orthopnea  Review of Systems:  Review of Systems   Constitutional: Negative for decreased appetite, fever, weight gain and weight loss  HENT: Negative for congestion and sore throat  Eyes: Negative for visual disturbance     Cardiovascular: Negative for chest pain, dyspnea on exertion, leg swelling, near-syncope and palpitations  Respiratory: Negative for cough and shortness of breath  Hematologic/Lymphatic: Negative for bleeding problem  Skin: Negative for rash  Musculoskeletal: Negative for myalgias and neck pain  Gastrointestinal: Negative for abdominal pain and nausea  Neurological: Negative for light-headedness and weakness  Psychiatric/Behavioral: Negative for depression  Past Medical History:   Diagnosis Date    Afib ([address] )     Arthritis     Hyperlipidemia     Hypertension     Osteopenia     Sleep apnea     Varicose veins of both lower extremities        Past Surgical History:   Procedure Laterality Date    CATARACT EXTRACTION      COLONOSCOPY      complete, for polyp removal    EYE SURGERY      HYSTERECTOMY      age 39   Nereida Fordyce INCISION AND DRAINAGE OF WOUND Right 6/28/2016    Procedure: ARTHROSCOPY,EVACUATION OF HEMATOMA, OPEN EXPLORATION OF WOUND;  Surgeon: Antoinette Salinas MD;  Location: [facility];  Service:     TONSILLECTOMY AND ADENOIDECTOMY      TOTAL KNEE ARTHROPLASTY Right     TOTAL KNEE ARTHROPLASTY Left     WISDOM TOOTH EXTRACTION         Social History     Socioeconomic History    Marital status: /Civil Union     Spouse name: None    Number of children: None    Years of education: None    Highest education level: None   Occupational History    None   Tobacco Use    Smoking status: Never Smoker    Smokeless tobacco: Never Used   Vaping Use    Vaping Use: Never used   Substance and Sexual Activity    Alcohol use:  Yes    Drug use: No    Sexual activity: Not Currently     Comment:    Other Topics Concern    None   Social History Narrative    No domestic violence      Social Determinants of Health     Financial Resource Strain: Not on file   Food Insecurity: Not on file   Transportation Needs: Not on file   Physical Activity: Not on file   Stress: Not on file   Social Connections: Not on file Intimate Partner Violence: Not on file   Housing Stability: Not on file       Family History   Problem Relation Age of Onset    Diabetes Mother     Diabetes Father     Diabetes Brother     No Known Problems Sister     No Known Problems Daughter     No Known Problems Maternal Grandmother     No Known Problems Maternal Grandfather     No Known Problems Paternal Grandmother     No Known Problems Paternal Grandfather     No Known Problems Daughter     Brain cancer Son     No Known Problems Maternal Aunt        Allergies   Allergen Reactions    Atorvastatin Other (See Comments)     myalgia    Statins Other (See Comments)     Weakness in legs         Current Outpatient Medications:     acebutolol (SECTRAL) 200 mg capsule, Take 200 mg by mouth 2 (two) times a day, Disp: , Rfl:     acetaminophen (TYLENOL) 325 mg tablet, 650 mg q 6 hours p r n  Pain/fever, Disp: 30 tablet, Rfl: 0    albuterol (PROVENTIL HFA,VENTOLIN HFA) 90 mcg/act inhaler, Ventolin HFA 90 mcg/actuation aerosol inhaler, Disp: , Rfl:     amLODIPine (NORVASC) 10 mg tablet, Take 2 5 mg by mouth daily, Disp: , Rfl:     ascorbic acid (VITAMIN C) 500 mg tablet, Take 500 mg by mouth daily  , Disp: , Rfl:     aspirin (ECOTRIN LOW STRENGTH) 81 mg EC tablet, Take 162 mg by mouth daily, Disp: , Rfl:     calcium carbonate (TUMS) 500 mg chewable tablet, Chew 1 tablet daily, Disp: , Rfl:     Cholecalciferol (VITAMIN D3) 1000 UNIT/SPRAY LIQD, Take 5,000 Units by mouth, Disp: , Rfl:     citalopram (CeleXA) 10 mg tablet, Take 5 mg by mouth daily  , Disp: , Rfl:     flecainide (TAMBOCOR) 50 mg tablet, Take 50 mg by mouth 2 (two) times a day , Disp: , Rfl:     fluticasone (FLONASE) 50 mcg/act nasal spray, 1 spray into each nostril daily, Disp: 16 g, Rfl: 0    LORazepam (ATIVAN) 1 mg tablet, lorazepam 1 mg tablet, Disp: , Rfl:     multivitamin (THERAGRAN) TABS, Take 1 tablet by mouth daily  , Disp: , Rfl:     nitroglycerin (NITROSTAT) 0 6 mg SL tablet, Place 0 6 mg under the tongue, Disp: , Rfl:     rosuvastatin (CRESTOR) 5 mg tablet, Take 2 5 mg by mouth every other day Take 1/2 of 5mg  Tablet by mouth every day, Disp: , Rfl:     bromfenac sodium 0 07 % SOLN, Prolensa 0 07 % eye drops (Patient not taking: Reported on 6/28/2022), Disp: , Rfl:     Calcium-Magnesium-Vitamin D 420-843-249 MG-MG-UNIT TABS, Take by mouth (Patient not taking: Reported on 6/28/2022), Disp: , Rfl:     dicyclomine (BENTYL) 10 mg capsule, dicyclomine 10 mg capsule  prn (Patient not taking: Reported on 6/28/2022), Disp: , Rfl:     ezetimibe (ZETIA) 10 mg tablet, Zetia 10 mg tablet (Patient not taking: Reported on 6/28/2022), Disp: , Rfl:     HYDROcodone-acetaminophen (XODOL) 5-300 MG per tablet, Vicodin 5 mg-300 mg tablet (Patient not taking: Reported on 6/28/2022), Disp: , Rfl:     metoprolol succinate (TOPROL-XL) 50 mg 24 hr tablet, metoprolol succinate ER 50 mg tablet,extended release 24 hr (Patient not taking: Reported on 6/28/2022), Disp: , Rfl:     nadolol (CORGARD) 20 mg tablet, nadolol 20 mg tablet (Patient not taking: Reported on 6/28/2022), Disp: , Rfl:     oxazepam (SERAX) 10 mg capsule, oxazepam 10 mg capsule (Patient not taking: Reported on 6/28/2022), Disp: , Rfl:     propranolol (INDERAL LA) 60 mg 24 hr capsule, Take 60 mg by mouth daily  (Patient not taking: Reported on 6/28/2022), Disp: , Rfl:     sodium chloride (MARITZA 128) 5 % hypertonic ophthalmic solution, Administer 1 drop to both eyes daily at bedtime as needed   (Patient not taking: No sig reported), Disp: , Rfl:     valACYclovir (VALTREX) 1,000 mg tablet, valacyclovir 1 gram tablet (Patient not taking: No sig reported), Disp: , Rfl:     warfarin (COUMADIN) 2 mg tablet, warfarin 2 mg tablet  TAKE 2 TABLET (4MG) BY MOUTH AS DIRECTED AT 6PM (Patient not taking: Reported on 6/28/2022), Disp: , Rfl:     Zoster Vaccine Live 19400 UNT/0 65ML SUSRVanessastavax (PF) 19,400 unit/0 65 mL subcutaneous suspension (Patient not taking: No sig reported), Disp: , Rfl:     Vitals:    06/28/22 1400   BP: 114/60   BP Location: Left arm   Patient Position: Sitting   Cuff Size: Large   Weight: 74 8 kg (164 lb 12 8 oz)     Body mass index is 29 19 kg/m²  PHYSICAL EXAMINATION:  Gen: Awake, Alert, NAD   Head/eyes: AT/NC, pupils equal and round, Anicteric  ENT: mmm  Neck: Supple, No elevated JVP, trachea midline  Resp: CTA bilaterally no w/r/r  CV: RRR +S1, S2, No m/r/g  Abd: Soft, NT/ND + BS  Ext: no LE edema bilaterally  Neuro: Follows commands, moves all extermities  Psych: Appropriate affect, normal mood, pleasant attitude, non-combative  Skin: warm; no rash, erythema or venous stasis changes on exposed skin    --------------------------------------------------------------------------------  TREADMILL STRESS  No results found for this or any previous visit      --------------------------------------------------------------------------------  NUCLEAR STRESS TEST: No results found for this or any previous visit  No results found for this or any previous visit       --------------------------------------------------------------------------------  CATH:  No results found for this or any previous visit     --------------------------------------------------------------------------------  ECHO:   No results found for this or any previous visit  No results found for this or any previous visit     --------------------------------------------------------------------------------  HOLTER  No results found for this or any previous visit      No results found for this or any previous visit     --------------------------------------------------------------------------------  CAROTIDS  No results found for this or any previous visit      --------------------------------------------------------------------------------  ECGs:  Results for orders placed or performed in visit on 06/28/22   POCT ECG    Impression    Sinus bradycardia 54 bpm, otherwise normal ECG        Lab Results   Component Value Date    WBC 5 69 12/23/2018    HGB 13 5 12/23/2018    HCT 41 8 12/23/2018    MCV 93 12/23/2018     (L) 12/23/2018      Lab Results   Component Value Date    SODIUM 135 (L) 12/23/2018    K 4 6 12/23/2018     12/23/2018    CO2 22 12/23/2018    BUN 17 12/23/2018    CREATININE 0 83 12/23/2018    GLUC 115 12/23/2018    CALCIUM 8 8 12/23/2018      Lab Results   Component Value Date    HGBA1C 6 4 (H) 12/08/2021      No results found for: CHOL  No results found for: HDL  No results found for: LDLCALC  No results found for: TRIG  No results found for: [place], [place]   Lab Results   Component Value Date    INR 1 05 06/29/2016    INR 1 03 06/27/2016    INR 1 03 06/26/2016    PROTIME 13 7 06/29/2016    PROTIME 13 5 06/27/2016    PROTIME 13 5 06/26/2016        1  Paroxysmal atrial fibrillation (HCC)  -     POCT ECG  -     Holter monitor; Future; Expected date: 06/28/2022    2  Tachy-alison syndrome (HCC)  -     Holter monitor; Future; Expected date: 06/28/2022    3  Essential hypertension    4  Dyslipidemia    5  Pre-diabetes    6  Encounter for monitoring flecainide therapy  -     NM myocardial perfusion spect (rx stress and/or rest); Future; Expected date: 06/28/2022        IMPRESSION:   Paroxysmal atrial fibrillation on flecainide   Tachy-alison syndrome  o Holter w/ SR avg HR 59 bpm, rare APCs, nonsustained PAT (x3) longest 13 beats at 112 bpm, May 2021   Hypertension   Dyslipidemia w/ LDL 67 mg/dL, December 2021   Pre diabetes   LVEF 60%, borderline LVH, mild RVH with normal function, mild LA dilatation, AV sclerosis, MV sclerosis, moderate MAC, mild MR, May 2021    PLAN:  It was a pleasure to see Lesa Fraction in the office today for initial CV evaluation  She is here today due to her longstanding history of atrial fibrillation with tachy-alison syndrome  She has had no symptoms concerning for angina and no signs or symptoms of heart failure    She examines to be euvolemic in the office today  Blood pressure is currently stable with mildly decreased heart rate  ECG is nonischemic  She can perform greater than 4 Mets on a daily basis without any exertional symptoms  Echocardiogram in May 2021 demonstrated normal left ventricular function without wall motion abnormality  Based on her clinical presentation, I have the following recommendations:    1  Recommend pharmacologic nuclear stress test to assess for any evidence of underlying myocardial ischemia  Would perform this as a pharmacologic test due to her history of knee replacement surgery, ambulatory dysfunction and atrial fibrillation to avoid sending her into rapid atrial fibrillation  2  Continue flecainide for now  3  Would obtain 48 hour Holter monitor to assess heart rates and observe for any evidence of significant tachy or Alison arrhythmias  4  We will attempt to reach out to her prior cardiologist to discuss anticoagulation  5  She has had echocardiogram within the past year and would not repeat at this time  6  Recommend heart healthy diet low in sodium and carbohydrate  7  Given history of tachy-alison syndrome and longstanding flecainide use, would like to have patient seen by electrophysiology after Holter monitor and stress test   8  Patient does not have full list of her medications  We will attempt to obtain the list through the patient and her daughter after she goes home, she will call back the office  9  We will follow up with her after testing to review the results  As always, please do not hesitate to call with any questions  Portions of the record may have been created with voice recognition software  Occasional wrong word or "sound a like" substitutions may have occurred due to the inherent limitations of voice recognition software  Read the chart carefully and recognize, using context, where substitutions have occurred        Signed: Pamela Brewer DO, Norma Slovak

## 2022-06-28 ENCOUNTER — OFFICE VISIT (OUTPATIENT)
Dept: CARDIOLOGY CLINIC | Facility: CLINIC | Age: 82
End: 2022-06-28
Payer: MEDICARE

## 2022-06-28 VITALS — DIASTOLIC BLOOD PRESSURE: 60 MMHG | WEIGHT: 164.8 LBS | SYSTOLIC BLOOD PRESSURE: 114 MMHG | BODY MASS INDEX: 29.19 KG/M2

## 2022-06-28 DIAGNOSIS — I10 ESSENTIAL HYPERTENSION: ICD-10-CM

## 2022-06-28 DIAGNOSIS — Z79.899 ENCOUNTER FOR MONITORING FLECAINIDE THERAPY: ICD-10-CM

## 2022-06-28 DIAGNOSIS — I48.0 PAROXYSMAL ATRIAL FIBRILLATION (HCC): Primary | ICD-10-CM

## 2022-06-28 DIAGNOSIS — I49.5 TACHY-BRADY SYNDROME (HCC): ICD-10-CM

## 2022-06-28 DIAGNOSIS — E78.5 DYSLIPIDEMIA: ICD-10-CM

## 2022-06-28 DIAGNOSIS — Z51.81 ENCOUNTER FOR MONITORING FLECAINIDE THERAPY: ICD-10-CM

## 2022-06-28 DIAGNOSIS — R73.03 PRE-DIABETES: ICD-10-CM

## 2022-06-28 PROCEDURE — 93000 ELECTROCARDIOGRAM COMPLETE: CPT | Performed by: INTERNAL MEDICINE

## 2022-06-28 PROCEDURE — 99204 OFFICE O/P NEW MOD 45 MIN: CPT | Performed by: INTERNAL MEDICINE

## 2022-06-30 ENCOUNTER — TELEPHONE (OUTPATIENT)
Dept: CARDIOLOGY CLINIC | Facility: CLINIC | Age: 82
End: 2022-06-30

## 2022-06-30 NOTE — TELEPHONE ENCOUNTER
Left message patient voice mail for patient to call office with current medication list     Received list from [facility] and meds don't match what patient gave us at her office appointment

## 2022-07-05 DIAGNOSIS — Z00.00 WELL ADULT EXAM: ICD-10-CM

## 2022-07-05 DIAGNOSIS — E78.5 DYSLIPIDEMIA: ICD-10-CM

## 2022-07-05 DIAGNOSIS — G31.84 MINIMAL COGNITIVE IMPAIRMENT: ICD-10-CM

## 2022-07-05 DIAGNOSIS — I10 ESSENTIAL HYPERTENSION: Primary | ICD-10-CM

## 2022-07-05 DIAGNOSIS — F32.A DEPRESSION, UNSPECIFIED DEPRESSION TYPE: ICD-10-CM

## 2022-07-05 NOTE — TELEPHONE ENCOUNTER
Phone call with [facility], Nurse practitioner Loli Asencio  Medication list we received via fax last week is patients updated list   These are the medications prescribed by [facility] and the only medications the patient is taking  Updated list in our system

## 2022-07-06 ENCOUNTER — HOSPITAL ENCOUNTER (OUTPATIENT)
Dept: NON INVASIVE DIAGNOSTICS | Facility: CLINIC | Age: 82
Discharge: HOME | End: 2022-07-06
Payer: MEDICARE

## 2022-07-06 VITALS
HEIGHT: 63 IN | BODY MASS INDEX: 29.06 KG/M2 | DIASTOLIC BLOOD PRESSURE: 80 MMHG | OXYGEN SATURATION: 96 % | WEIGHT: 164 LBS | HEART RATE: 55 BPM | SYSTOLIC BLOOD PRESSURE: 140 MMHG

## 2022-07-06 DIAGNOSIS — Z51.81 ENCOUNTER FOR MONITORING FLECAINIDE THERAPY: ICD-10-CM

## 2022-07-06 DIAGNOSIS — I49.5 TACHY-BRADY SYNDROME (HCC): ICD-10-CM

## 2022-07-06 DIAGNOSIS — I48.0 PAROXYSMAL ATRIAL FIBRILLATION (HCC): ICD-10-CM

## 2022-07-06 DIAGNOSIS — Z79.899 ENCOUNTER FOR MONITORING FLECAINIDE THERAPY: ICD-10-CM

## 2022-07-06 LAB
BASELINE ST DEPRESSION: 0 MM
MAX HR PERCENT: 56 %
MAX HR: 78 BPM
NUC STRESS EJECTION FRACTION: 77 %
RATE PRESSURE PRODUCT: NORMAL
SL CV REST NUCLEAR ISOTOPE DOSE: 10.66 MCI
SL CV STRESS NUCLEAR ISOTOPE DOSE: 30.8 MCI
SL CV STRESS RECOVERY BP: NORMAL MMHG
SL CV STRESS RECOVERY HR: 65 BPM
SL CV STRESS RECOVERY O2 SAT: 97 %
STRESS ANGINA INDEX: 0
STRESS BASELINE BP: NORMAL MMHG
STRESS BASELINE HR: 55 BPM
STRESS DUKE TREADMILL SCORE: 3
STRESS O2 SAT REST: 96 %
STRESS PEAK HR: 78 BPM
STRESS POST EXERCISE DUR MIN: 3 MIN
STRESS POST EXERCISE DUR SEC: 0 SEC
STRESS POST O2 SAT PEAK: 98 %
STRESS POST PEAK BP: 130 MMHG
STRESS ST DEPRESSION: 0 MM
STRESS/REST PERFUSION RATIO: 1.09

## 2022-07-06 PROCEDURE — 93018 CV STRESS TEST I&R ONLY: CPT | Performed by: INTERNAL MEDICINE

## 2022-07-06 PROCEDURE — 93226 XTRNL ECG REC<48 HR SCAN A/R: CPT

## 2022-07-06 PROCEDURE — A9502 TC99M TETROFOSMIN: HCPCS

## 2022-07-06 PROCEDURE — 78452 HT MUSCLE IMAGE SPECT MULT: CPT | Performed by: INTERNAL MEDICINE

## 2022-07-06 PROCEDURE — 78452 HT MUSCLE IMAGE SPECT MULT: CPT

## 2022-07-06 PROCEDURE — G1004 CDSM NDSC: HCPCS

## 2022-07-06 PROCEDURE — 93016 CV STRESS TEST SUPVJ ONLY: CPT | Performed by: INTERNAL MEDICINE

## 2022-07-06 PROCEDURE — 93017 CV STRESS TEST TRACING ONLY: CPT

## 2022-07-06 PROCEDURE — 93225 XTRNL ECG REC<48 HRS REC: CPT

## 2022-07-06 RX ORDER — AMLODIPINE BESYLATE 10 MG/1
10 TABLET ORAL DAILY
Qty: 30 TABLET | Refills: 0 | Status: ON HOLD
Start: 2022-07-06 | End: 2022-08-18 | Stop reason: SDUPTHER

## 2022-07-06 RX ORDER — AMOXICILLIN 500 MG/1
CAPSULE ORAL
Qty: 4 CAPSULE | Refills: 0
Start: 2022-07-06 | End: 2022-08-19

## 2022-07-06 RX ORDER — ERGOCALCIFEROL 1.25 MG/1
50000 CAPSULE ORAL
Qty: 30 CAPSULE | Refills: 0
Start: 2022-07-06 | End: 2022-08-19

## 2022-07-06 RX ORDER — SODIUM CHLORIDE 5 %
OINTMENT (GRAM) OPHTHALMIC (EYE)
Qty: 1 G | Refills: 0
Start: 2022-07-06 | End: 2022-08-19

## 2022-07-06 RX ORDER — VIT A/VIT C/VIT E/ZINC/COPPER 4296-226
CAPSULE ORAL
Qty: 30 CAPSULE | Refills: 0
Start: 2022-07-06 | End: 2022-08-19

## 2022-07-06 RX ORDER — SILICONE ADHESIVE 1.5" X 3"
1 SHEET (EA) TOPICAL AS NEEDED
Qty: 1 ML | Refills: 6
Start: 2022-07-06 | End: 2022-08-19

## 2022-07-06 RX ORDER — DOCUSATE SODIUM 100 MG/1
100 CAPSULE, LIQUID FILLED ORAL 2 TIMES DAILY
Qty: 30 CAPSULE | Refills: 0
Start: 2022-07-06 | End: 2022-08-19

## 2022-07-06 RX ADMIN — REGADENOSON 0.4 MG: 0.08 INJECTION, SOLUTION INTRAVENOUS at 12:20

## 2022-07-07 LAB
CHEST PAIN STATEMENT: NORMAL
MAX DIASTOLIC BP: 80 MMHG
MAX HEART RATE: 78 BPM
MAX PREDICTED HEART RATE: 138 BPM
MAX. SYSTOLIC BP: 140 MMHG
PROTOCOL NAME: NORMAL
REASON FOR TERMINATION: NORMAL
TARGET HR FORMULA: NORMAL
TEST INDICATION: NORMAL
TIME IN EXERCISE PHASE: NORMAL

## 2022-07-14 PROCEDURE — 93227 XTRNL ECG REC<48 HR R&I: CPT | Performed by: INTERNAL MEDICINE

## 2022-07-19 ENCOUNTER — TELEPHONE (OUTPATIENT)
Dept: DERMATOLOGY | Age: 82
End: 2022-07-19

## 2022-07-19 NOTE — TELEPHONE ENCOUNTER
Terri Jiang, [address] [facility], [address] calling to schedule pt for apt (VM)  Returned call, N/A, lm with contact info to cb to schedule

## 2022-07-28 ENCOUNTER — OFFICE VISIT (OUTPATIENT)
Dept: SLEEP CENTER | Facility: CLINIC | Age: 82
End: 2022-07-28
Payer: MEDICARE

## 2022-07-28 VITALS
HEART RATE: 59 BPM | HEIGHT: 63 IN | SYSTOLIC BLOOD PRESSURE: 150 MMHG | DIASTOLIC BLOOD PRESSURE: 67 MMHG | BODY MASS INDEX: 28.88 KG/M2 | WEIGHT: 163 LBS

## 2022-07-28 DIAGNOSIS — G47.33 OBSTRUCTIVE SLEEP APNEA (ADULT) (PEDIATRIC): Primary | ICD-10-CM

## 2022-07-28 PROCEDURE — 99214 OFFICE O/P EST MOD 30 MIN: CPT | Performed by: NURSE PRACTITIONER

## 2022-07-28 NOTE — PATIENT INSTRUCTIONS
1   Continue use of BiPAP equipment nightly, at your discretion  If you get your replacement from [facility], do not send your old one back to them  Bring it in to your appointment with me instead and we will look at everything together first   You may get a call from [place] to get set up with a whole new BiPAP machine  IF you get a call, schedule set up and start using that new machine  2   Continue to clean your equipment, as discussed  3  Contact the [address] with any questions or concerns prior to your next visit, as needed  4  Schedule visit for follow-up in 3-4 months       Nursing Support:  When: Monday through Friday 7A-5PM except holidays  Where: Our direct line is [phone]  If you are having a true emergency please call 911  In the event that the line is busy or it is after hours please leave a voice message and we will return your call  Please speak clearly, leaving your full name, birth date, best number to reach you and the reason for your call  Medication refills: We will need the name of the medication, the dosage, the ordering provider, whether you get a 30 or 90 day refill, and the pharmacy name and address  Medications will be ordered by the provider only  Nurses cannot call in prescriptions  Please allow 7 days for medication refills  Physician requested updates: If your provider requested that you call with an update after starting medication, please be ready to provide us the medication and dosage, what time you take your medication, the time you attempt to fall asleep, time you fall asleep, when you wake up, and what time you get out of bed  Sleep Study Results: We will contact you with sleep study results and/or next steps after the physician has reviewed your testing

## 2022-07-28 NOTE — PROGRESS NOTES
Progress Note - [facility] [address] 80 y o  female   QYS:[birth year], MRN: [id number]  7/28/2022      Follow Up Evaluation / Problem:     Mild to Moderate Obstructive Sleep Apnea      Thank you for the opportunity of participating in the evaluation and care of this patient in the Sleep Clinic at [facility]  HPI: Linette Gibson is a 80y o  year old female  The patient presents for follow up of mild to moderate obstructive sleep apnea  She completed a diagnostic study in 2016, which confirmed mild VIRGILIO with AHI of 13 3, worsening to 21 3 in supine sleep and 42 9 in REM sleep with oxygen lilliana of 80%  She completed a titration study in November 2016 and was titrated using BiPAP  She presents to review annual compliance and effectiveness of treatment      Review of Systems      Genitourinary post menopausal (no peroids)   Cardiology palpitations/fluttering feeling in the chest   Gastrointestinal none   Neurology forgetfulness, poor concentration or confusion, , difficulty with memory and balance problems   Constitutional none   Integumentary itching   Psychiatry none   Musculoskeletal back pain   Pulmonary snoring   ENT none   Endocrine none   Hematological none       Current Outpatient Medications:     acebutolol (SECTRAL) 200 mg capsule, Take 200 mg by mouth 2 (two) times a day, Disp: , Rfl:     amLODIPine (NORVASC) 10 mg tablet, Take 1 tablet (10 mg total) by mouth daily, Disp: 30 tablet, Rfl: 0    amoxicillin (AMOXIL) 500 mg capsule, 4 capsules by mouth for 1 dose to dental extractions, Disp: 4 capsule, Rfl: 0    aspirin (ECOTRIN LOW STRENGTH) 81 mg EC tablet, Take 162 mg by mouth daily, Disp: , Rfl:     calcium carbonate (TUMS) 500 mg chewable tablet, Chew 1 tablet daily, Disp: , Rfl:     citalopram (CeleXA) 10 mg tablet, Take 5 mg by mouth daily  , Disp: , Rfl:     docusate sodium (COLACE) 100 mg capsule, Take 1 capsule (100 mg total) by mouth 2 (two) times a day, Disp: 30 capsule, Rfl: 0    ergocalciferol (VITAMIN D2) 50,000 units, Take 1 capsule (50,000 Units total) by mouth every 30 (thirty) days, Disp: 30 capsule, Rfl: 0    flecainide (TAMBOCOR) 50 mg tablet, Take 50 mg by mouth 2 (two) times a day , Disp: , Rfl:     Multiple Vitamins-Minerals (PreserVision AREDS) CAPS, One capsule twice daily, Disp: 30 capsule, Rfl: 0    multivitamin (THERAGRAN) TABS, Take 1 tablet by mouth daily  , Disp: , Rfl:     nitroglycerin (NITROSTAT) 0 6 mg SL tablet, Place 0 6 mg under the tongue, Disp: , Rfl:     rosuvastatin (CRESTOR) 5 mg tablet, Take 2 5 mg by mouth every other day Take 1/2 of 5mg  Tablet by mouth every day, Disp: , Rfl:     sodium chloride (MARITZA 128) 5 % hypertonic ophthalmic ointment, Instill small amount to both eyes once daily at bedtime, Disp: 1 g, Rfl: 0    sodium chloride (MARITZA 128) 5 % hypertonic ophthalmic solution, Administer 1 drop to both eyes as needed (as prescribed), Disp: 1 mL, Rfl: 6    [place] Sleepiness Scale  Sitting and reading: Moderate chance of dozing  Watching TV: Slight chance of dozing  Sitting, inactive in a public place (e g  a theatre or a meeting): Slight chance of dozing  As a passenger in a car for an hour without a break: Slight chance of dozing  Lying down to rest in the afternoon when circumstances permit: Moderate chance of dozing  Sitting and talking to someone: Slight chance of dozing  Sitting quietly after a lunch without alcohol: Moderate chance of dozing  In a car, while stopped for a few minutes in traffic: Slight chance of dozing  Total score: 11              Vitals:    07/28/22 1109   BP: 150/67   BP Location: Left arm   Patient Position: Sitting   Cuff Size: Large   Pulse: 59   Weight: 73 9 kg (163 lb)   Height: 5' 3" (1 6 m)       Body mass index is 28 87 kg/m²         EPWORTH SLEEPINESS SCORE  Total score: 11      Past History Since Last Sleep Center Visit:   She has not been using the BiPAP equipment since the recall  She has noticed worsening of her memory since unable to use the BiPAP  She does not feel that she sleeps well since not begin able to use the BiPAP  She has never used an ozone  to clean her equipment  She denies any side effects consistent with the recall symptoms  The review of systems and following portions of the patient's history were reviewed and updated as appropriate: allergies, current medications, past family history, past medical history, past social history, past surgical history, and problem list         OBJECTIVE  Equipment set up date:  12 8 16  PAP Pressure: Nasal BiPAP set to deliver 9 cm of IPAP and 5 cm of EPAP  Type of mask used: nasal  DME Provider: [employer]    Physical Exam:     General Appearance:   Alert, cooperative, no distress, appears stated age, overweight     Head:   Normocephalic, without obvious abnormality, atraumatic     Eyes:   PERRL, conjunctiva/corneas clear          Nose:  Nares normal, septum midline, no drainage or sinus tenderness           Throat:  Lips, teeth and gums normal; tongue normal size and midline mucosa moist and mildly redundant bilaterally, uvula barely visualized, tonsils not visualized, Mallampati class 3-4       Neck:  Supple, symmetrical, trachea midline, no adenopathy; Thyroid: No enlargement, tenderness or nodules; no carotid bruit or JVD     Lungs:      Clear to auscultation bilaterally, respirations unlabored     Heart:   Regular rate and rhythm, S1 and S2 normal, no murmur, rub or gallop       Extremities:  Extremities normal, atraumatic, no cyanosis or edema       Skin:  Skin color, texture, turgor normal, no rashes or lesions       Neurologic:  No focal deficits noted       ASSESSMENT / PLAN    1  Obstructive sleep apnea (adult) (pediatric)  [facility]med BIPAP DME           Counseling / Coordination of Care  Total clinic time spent today 30 minutes   Greater than 50% of total time was spent with the patient and / or family counseling and / or coordination of care  A description of the counseling / coordination of care:     Impressions, Diagnostic results, Prognosis, Instructions for management, Risks and benefits of treatment, Patient and family education, Risk factor reductions and Importance of compliance with treatment    Today I reviewed the patient's compliance data  She has not been using the BiPAP equipment since November 2021  The patient feels they benefit from the use of PAP equipment and would like to continue PAP therapy  She is requesting to receive new BiPAP equipment, since hers is more than 11years old and does not transmit a compliance report  Response to treatment had been very good in the past       She is not sure if she will restart using her current BiPAP equipment, due to the recall  She is hoping to be able to receive new equipment and restart PAP therapy  She will schedule a follow up visit in 3-4 months to review compliance and effectiveness of treatment  If she does not receive new equipment and is not using her current equipment, she will call to reschedule the visit once she has been using PAP therapy for at least 2 months  I have asked the patient to contact the [address] if she encounters any difficulties prior to that time  The following instructions have been given to the patient today:    Patient Instructions   1  Continue use of BiPAP equipment nightly, at your discretion  If you get your replacement from [facility], do not send your old one back to them  Bring it in to your appointment with me instead and we will look at everything together first   You may get a call from [facility] to get set up with a whole new BiPAP machine  IF you get a call, schedule set up and start using that new machine  2   Continue to clean your equipment, as discussed  3    Contact the [address] with any questions or concerns prior to your next visit, as needed  4  Schedule visit for follow-up in 3-4 months       Nursing Support:  When: Monday through Friday 7A-5PM except holidays  Where: Our direct line is [phone]  If you are having a true emergency please call 911  In the event that the line is busy or it is after hours please leave a voice message and we will return your call  Please speak clearly, leaving your full name, birth date, best number to reach you and the reason for your call  Medication refills: We will need the name of the medication, the dosage, the ordering provider, whether you get a 30 or 90 day refill, and the pharmacy name and address  Medications will be ordered by the provider only  Nurses cannot call in prescriptions  Please allow 7 days for medication refills  Physician requested updates: If your provider requested that you call with an update after starting medication, please be ready to provide us the medication and dosage, what time you take your medication, the time you attempt to fall asleep, time you fall asleep, when you wake up, and what time you get out of bed  Sleep Study Results: We will contact you with sleep study results and/or next steps after the physician has reviewed your testing  Laura Soni, [address] [place] [address]      Portions of the record may have been created with voice recognition software  Occasional wrong word or "sound a like" substitutions may have occurred due to the inherent limitations of voice recognition software  Read the chart carefully and recognize, using context, where substitutions have occurred

## 2022-08-01 ENCOUNTER — TELEPHONE (OUTPATIENT)
Dept: SLEEP CENTER | Facility: CLINIC | Age: 82
End: 2022-08-01

## 2022-08-03 ENCOUNTER — HOSPITAL ENCOUNTER (INPATIENT)
Facility: HOSPITAL | Age: 82
LOS: 14 days | Discharge: HOME | DRG: 885 | End: 2022-08-19
Attending: EMERGENCY MEDICINE | Admitting: PSYCHIATRY & NEUROLOGY
Payer: MEDICARE

## 2022-08-03 DIAGNOSIS — F39 MOOD DISORDER (HCC): Primary | ICD-10-CM

## 2022-08-03 DIAGNOSIS — Z86.79 HISTORY OF ATRIAL FIBRILLATION: ICD-10-CM

## 2022-08-03 DIAGNOSIS — G47.33 OBSTRUCTIVE SLEEP APNEA SYNDROME: ICD-10-CM

## 2022-08-03 DIAGNOSIS — I10 ESSENTIAL HYPERTENSION: ICD-10-CM

## 2022-08-03 DIAGNOSIS — I48.0 PAROXYSMAL ATRIAL FIBRILLATION (HCC): ICD-10-CM

## 2022-08-03 DIAGNOSIS — F32.A DEPRESSION: ICD-10-CM

## 2022-08-03 LAB
ALBUMIN SERPL BCP-MCNC: 4.7 G/DL (ref 3.5–5)
ALP SERPL-CCNC: 66 U/L (ref 43–122)
ALT SERPL W P-5'-P-CCNC: 22 U/L
AMPHETAMINES SERPL QL SCN: NEGATIVE
ANION GAP SERPL CALCULATED.3IONS-SCNC: 12 MMOL/L (ref 5–14)
AST SERPL W P-5'-P-CCNC: 24 U/L (ref 14–36)
ATRIAL RATE: 58 BPM
BARBITURATES UR QL: NEGATIVE
BASOPHILS # BLD AUTO: 0.03 THOUSANDS/ΜL (ref 0–0.1)
BASOPHILS NFR BLD AUTO: 0 % (ref 0–1)
BENZODIAZ UR QL: NEGATIVE
BILIRUB SERPL-MCNC: 0.34 MG/DL (ref 0.2–1)
BUN SERPL-MCNC: 21 MG/DL (ref 5–25)
CALCIUM SERPL-MCNC: 9.4 MG/DL (ref 8.4–10.2)
CHLORIDE SERPL-SCNC: 104 MMOL/L (ref 96–108)
CO2 SERPL-SCNC: 25 MMOL/L (ref 21–32)
COCAINE UR QL: NEGATIVE
CREAT SERPL-MCNC: 0.78 MG/DL (ref 0.6–1.2)
EOSINOPHIL # BLD AUTO: 0.15 THOUSAND/ΜL (ref 0–0.61)
EOSINOPHIL NFR BLD AUTO: 2 % (ref 0–6)
ERYTHROCYTE [DISTWIDTH] IN BLOOD BY AUTOMATED COUNT: 13.1 % (ref 11.6–15.1)
ETHANOL EXG-MCNC: 0 MG/DL
FLUAV RNA RESP QL NAA+PROBE: NEGATIVE
FLUBV RNA RESP QL NAA+PROBE: NEGATIVE
GFR SERPL CREATININE-BSD FRML MDRD: 71 ML/MIN/1.73SQ M
GLUCOSE SERPL-MCNC: 92 MG/DL (ref 70–99)
HCT VFR BLD AUTO: 46.9 % (ref 34.8–46.1)
HGB BLD-MCNC: 15.1 G/DL (ref 11.5–15.4)
IMM GRANULOCYTES # BLD AUTO: 0.02 THOUSAND/UL (ref 0–0.2)
IMM GRANULOCYTES NFR BLD AUTO: 0 % (ref 0–2)
LYMPHOCYTES # BLD AUTO: 2.48 THOUSANDS/ΜL (ref 0.6–4.47)
LYMPHOCYTES NFR BLD AUTO: 37 % (ref 14–44)
MCH RBC QN AUTO: 28.9 PG (ref 26.8–34.3)
MCHC RBC AUTO-ENTMCNC: 32.2 G/DL (ref 31.4–37.4)
MCV RBC AUTO: 90 FL (ref 82–98)
METHADONE UR QL: NEGATIVE
MONOCYTES # BLD AUTO: 0.92 THOUSAND/ΜL (ref 0.17–1.22)
MONOCYTES NFR BLD AUTO: 14 % (ref 4–12)
NEUTROPHILS # BLD AUTO: 3.19 THOUSANDS/ΜL (ref 1.85–7.62)
NEUTS SEG NFR BLD AUTO: 47 % (ref 43–75)
NRBC BLD AUTO-RTO: 0 /100 WBCS
OPIATES UR QL SCN: NEGATIVE
OXYCODONE+OXYMORPHONE UR QL SCN: NEGATIVE
P AXIS: 8 DEGREES
PCP UR QL: NEGATIVE
PLATELET # BLD AUTO: 211 THOUSANDS/UL (ref 149–390)
PMV BLD AUTO: 9.9 FL (ref 8.9–12.7)
POTASSIUM SERPL-SCNC: 4 MMOL/L (ref 3.5–5.3)
PR INTERVAL: 182 MS
PROT SERPL-MCNC: 7.9 G/DL (ref 6.4–8.4)
QRS AXIS: 2 DEGREES
QRSD INTERVAL: 72 MS
QT INTERVAL: 458 MS
QTC INTERVAL: 449 MS
RBC # BLD AUTO: 5.22 MILLION/UL (ref 3.81–5.12)
RSV RNA RESP QL NAA+PROBE: NEGATIVE
SARS-COV-2 RNA RESP QL NAA+PROBE: NEGATIVE
SODIUM SERPL-SCNC: 141 MMOL/L (ref 135–147)
T WAVE AXIS: 14 DEGREES
THC UR QL: NEGATIVE
TSH SERPL DL<=0.05 MIU/L-ACNC: 1.96 UIU/ML (ref 0.45–4.5)
VENTRICULAR RATE: 58 BPM
WBC # BLD AUTO: 6.79 THOUSAND/UL (ref 4.31–10.16)

## 2022-08-03 PROCEDURE — 93010 ELECTROCARDIOGRAM REPORT: CPT | Performed by: INTERNAL MEDICINE

## 2022-08-03 PROCEDURE — 85025 COMPLETE CBC W/AUTO DIFF WBC: CPT | Performed by: EMERGENCY MEDICINE

## 2022-08-03 PROCEDURE — 80307 DRUG TEST PRSMV CHEM ANLYZR: CPT | Performed by: EMERGENCY MEDICINE

## 2022-08-03 PROCEDURE — 80053 COMPREHEN METABOLIC PANEL: CPT | Performed by: EMERGENCY MEDICINE

## 2022-08-03 PROCEDURE — 93005 ELECTROCARDIOGRAM TRACING: CPT

## 2022-08-03 PROCEDURE — 36415 COLL VENOUS BLD VENIPUNCTURE: CPT | Performed by: EMERGENCY MEDICINE

## 2022-08-03 PROCEDURE — 82075 ASSAY OF BREATH ETHANOL: CPT | Performed by: EMERGENCY MEDICINE

## 2022-08-03 PROCEDURE — 0241U HB NFCT DS VIR RESP RNA 4 TRGT: CPT | Performed by: EMERGENCY MEDICINE

## 2022-08-03 PROCEDURE — 99285 EMERGENCY DEPT VISIT HI MDM: CPT | Performed by: EMERGENCY MEDICINE

## 2022-08-03 PROCEDURE — 99285 EMERGENCY DEPT VISIT HI MDM: CPT

## 2022-08-03 PROCEDURE — 84443 ASSAY THYROID STIM HORMONE: CPT | Performed by: EMERGENCY MEDICINE

## 2022-08-03 RX ORDER — FLECAINIDE ACETATE 50 MG/1
50 TABLET ORAL EVERY 12 HOURS SCHEDULED
Status: DISCONTINUED | OUTPATIENT
Start: 2022-08-04 | End: 2022-08-19 | Stop reason: HOSPADM

## 2022-08-03 RX ORDER — CITALOPRAM 10 MG/1
5 TABLET ORAL DAILY
Status: DISCONTINUED | OUTPATIENT
Start: 2022-08-04 | End: 2022-08-05

## 2022-08-03 RX ORDER — IBUPROFEN 600 MG/1
600 TABLET ORAL EVERY 6 HOURS PRN
Status: DISCONTINUED | OUTPATIENT
Start: 2022-08-03 | End: 2022-08-05

## 2022-08-03 RX ORDER — AMLODIPINE BESYLATE 10 MG/1
10 TABLET ORAL DAILY
Status: DISCONTINUED | OUTPATIENT
Start: 2022-08-04 | End: 2022-08-19 | Stop reason: HOSPADM

## 2022-08-03 RX ORDER — ASPIRIN 81 MG/1
81 TABLET, CHEWABLE ORAL DAILY
Status: DISCONTINUED | OUTPATIENT
Start: 2022-08-04 | End: 2022-08-19 | Stop reason: HOSPADM

## 2022-08-03 RX ORDER — LORAZEPAM 1 MG/1
1 TABLET ORAL EVERY 8 HOURS PRN
Status: DISCONTINUED | OUTPATIENT
Start: 2022-08-03 | End: 2022-08-05

## 2022-08-03 RX ORDER — DOCUSATE SODIUM 100 MG/1
100 CAPSULE, LIQUID FILLED ORAL ONCE
Status: COMPLETED | OUTPATIENT
Start: 2022-08-03 | End: 2022-08-03

## 2022-08-03 RX ORDER — ACETAMINOPHEN 325 MG/1
975 TABLET ORAL EVERY 6 HOURS PRN
Status: DISCONTINUED | OUTPATIENT
Start: 2022-08-03 | End: 2022-08-05

## 2022-08-03 RX ORDER — CALCIUM CARBONATE 200(500)MG
500 TABLET,CHEWABLE ORAL DAILY PRN
Status: DISCONTINUED | OUTPATIENT
Start: 2022-08-03 | End: 2022-08-05

## 2022-08-03 RX ADMIN — DOCUSATE SODIUM 100 MG: 100 CAPSULE, LIQUID FILLED ORAL at 23:36

## 2022-08-03 NOTE — ED NOTES
This RN went to introduce herself to the patient to let her know I would be helping taking care of her today  Patient said it was okay to talk while her daughter was in the room  Patient was oriented x4 and was cooperative with this RN questions  RN asked patient if she was having SI/HI and patient denied at this time  Patient then stated to this RN that she does " ask god to take her home " Patient again stated she is not having SI at this time  Patient's daughter did say that her mom dumped all her medication out yesterday and was going to take them all at once          Morenita Mathew RN  08/03/22 2213

## 2022-08-03 NOTE — ED NOTES
The patient is an 81 y/o   female brought to the ED by one of her daughters who has been visiting from [place]  Daughter stated the patient has been exhibiting severe mood swings during which she behaves as if she is having a tantrum -stomping feet, throwing things, hanging up on people and yelling at them  She has become obsessed with L[employer] and with watching [employer] news  When something she is passionate about arises in conversation, the patient states she is unable to control herself and feels she must argue with the other person, as if they are wrong  Daughter stated this has greatly impacted relationships  Patient stated she can feel the walls going up with friends and family members but that she cannot control herself  She stated that yesterday, she was angry and also feeling guilty over her reaction  She poured her pills out onto the table and contemplated taking them  Her daughter was present and stopped her  The patient stated there were 4 times in her life when she contemplated ending her life, but was saved by her Judaism convictions  Daughter stated the patient has also been experiencing cognitive decline  Her doctor has screening her periodically and she does well on the screen; however, her daughter stated her mother has been having trouble with figuring out tasks that were previously routine, such as starting the grill  She is no longer able to do so  She is no longer driving as she was getting lost  Daughter reported the patient has displayed poor judgment at time as when she was still driving, she picked up a stranger because the person did not have on a coat in cold weather  She then took the person to an ED as the person was very confused  At the time, the patient was unconcerned about her own safety  "She wants to fix everything  And she never slows down  She's going all the time " Patient stated she is embarrassed by her behavior after it occurs   In addition to mood swings and intermittent feelings of ending her life, she feels like her children are putting up walls to avoid her anger, so she wonders if they no longer love her  Patient is willing to sign a voluntary treatment agreement

## 2022-08-04 PROCEDURE — 99203 OFFICE O/P NEW LOW 30 MIN: CPT | Performed by: STUDENT IN AN ORGANIZED HEALTH CARE EDUCATION/TRAINING PROGRAM

## 2022-08-04 RX ORDER — LANOLIN ALCOHOL/MO/W.PET/CERES
6 CREAM (GRAM) TOPICAL ONCE
Status: COMPLETED | OUTPATIENT
Start: 2022-08-04 | End: 2022-08-04

## 2022-08-04 RX ORDER — LORAZEPAM 1 MG/1
1 TABLET ORAL ONCE
Status: COMPLETED | OUTPATIENT
Start: 2022-08-04 | End: 2022-08-04

## 2022-08-04 RX ADMIN — ASPIRIN 81 MG CHEWABLE TABLET 81 MG: 81 TABLET CHEWABLE at 10:12

## 2022-08-04 RX ADMIN — FLECAINIDE ACETATE 50 MG: 50 TABLET ORAL at 21:31

## 2022-08-04 RX ADMIN — MELATONIN TAB 3 MG 6 MG: 3 TAB at 03:23

## 2022-08-04 RX ADMIN — CITALOPRAM HYDROBROMIDE 5 MG: 10 TABLET ORAL at 10:13

## 2022-08-04 RX ADMIN — FLECAINIDE ACETATE 50 MG: 50 TABLET ORAL at 10:12

## 2022-08-04 RX ADMIN — LORAZEPAM 1 MG: 1 TABLET ORAL at 03:23

## 2022-08-04 RX ADMIN — AMLODIPINE BESYLATE 10 MG: 10 TABLET ORAL at 10:12

## 2022-08-04 RX ADMIN — ANTACID TABLETS 500 MG: 500 TABLET, CHEWABLE ORAL at 10:12

## 2022-08-04 NOTE — ED NOTES
Patient is eating safe breakfast tray, Q 15 minutes checks remain  Patient reports she slept well and feels good at this time        Thomas Solis RN  08/04/22 013

## 2022-08-04 NOTE — ED NOTES
Patient presently denies suicidal ideation  She states that she has thoughts of suicide from time to time when I am really down  She feels safe here and is not hearing or seeing things  At this time she is oriented x4  Patient is pleasant and cooperative and appropriate at this time  Patient was able to return to sleep when care measures completed       Zackery Salinas, CARYN  08/04/22 5063

## 2022-08-04 NOTE — ED PROVIDER NOTES
History  Chief Complaint   Patient presents with    Psychiatric Evaluation     Dementia as per daughters  The patient is an 27-year-old female presenting with family for psychiatric evaluation  Patient admits to having a lifelong issue of which she describes as an explosive disorder she feels like she is peeing corner  There is a family history of bipolar disorder  Patient states that she recently had a daughter move home, there was a confrontation over a jar opening device which caused her to get into a verbal argument with her family  She states she then felt like her daughter's no longer lobe to her, that she did not want to be in this world without them a caring for her  She admits to pouring all of her regular medications out onto the table, planning on taking all them  She states he then decided not to but crawled into bed and prayed that God would take her out of this world  She is seeking to sign herself in for a voluntary psychiatric admission  Denies any current suicidal homicidal ideations denies any auditory or visual hallucinations denies any alcohol recreational drug use  Prior to Admission Medications   Prescriptions Last Dose Informant Patient Reported? Taking?    Multiple Vitamins-Minerals (PreserVision AREDS) CAPS   No No   Sig: One capsule twice daily   acebutolol (SECTRAL) 200 mg capsule   Yes No   Sig: Take 200 mg by mouth 2 (two) times a day   amLODIPine (NORVASC) 10 mg tablet   No No   Sig: Take 1 tablet (10 mg total) by mouth daily   amoxicillin (AMOXIL) 500 mg capsule   No No   Si capsules by mouth for 1 dose to dental extractions   aspirin (ECOTRIN LOW STRENGTH) 81 mg EC tablet   Yes No   Sig: Take 162 mg by mouth daily   calcium carbonate (TUMS) 500 mg chewable tablet   Yes No   Sig: Chew 1 tablet daily   citalopram (CeleXA) 10 mg tablet   Yes No   Sig: Take 5 mg by mouth daily     docusate sodium (COLACE) 100 mg capsule   No No   Sig: Take 1 capsule (100 mg total) by mouth 2 (two) times a day   ergocalciferol (VITAMIN D2) 50,000 units   No No   Sig: Take 1 capsule (50,000 Units total) by mouth every 30 (thirty) days   flecainide (TAMBOCOR) 50 mg tablet   Yes No   Sig: Take 50 mg by mouth 2 (two) times a day  multivitamin (THERAGRAN) TABS   Yes No   Sig: Take 1 tablet by mouth daily  nitroglycerin (NITROSTAT) 0 6 mg SL tablet   Yes No   Sig: Place 0 6 mg under the tongue   rosuvastatin (CRESTOR) 5 mg tablet   Yes No   Sig: Take 2 5 mg by mouth every other day Take 1/2 of 5mg   Tablet by mouth every day   sodium chloride (MARITZA 128) 5 % hypertonic ophthalmic solution   No No   Sig: Administer 1 drop to both eyes as needed (as prescribed)   sodium chloride (MARITZA 128) 5 % hypertonic ophthalmic ointment   No No   Sig: Instill small amount to both eyes once daily at bedtime      Facility-Administered Medications: None       Past Medical History:   Diagnosis Date    Afib ([facility] [address] )     Arthritis     Hyperlipidemia     Hypertension     Osteopenia     Sleep apnea     Varicose veins of both lower extremities        Past Surgical History:   Procedure Laterality Date    CATARACT EXTRACTION      COLONOSCOPY      complete, for polyp removal    EYE SURGERY      HYSTERECTOMY      age 39   Mayuri Hinojosa INCISION AND DRAINAGE OF WOUND Right 6/28/2016    Procedure: ARTHROSCOPY,EVACUATION OF HEMATOMA, OPEN EXPLORATION OF WOUND;  Surgeon: Sophia Au MD;  Location: [facility] OR;  Service:     TONSILLECTOMY AND ADENOIDECTOMY      TOTAL KNEE ARTHROPLASTY Right     TOTAL KNEE ARTHROPLASTY Left     WISDOM TOOTH EXTRACTION         Family History   Problem Relation Age of Onset    Diabetes Mother     Diabetes Father     Diabetes Brother     No Known Problems Sister     No Known Problems Daughter     No Known Problems Maternal Grandmother     No Known Problems Maternal Grandfather     No Known Problems Paternal Grandmother     No Known Problems Paternal Grandfather     No Known Problems Daughter     Brain cancer Son     No Known Problems Maternal Aunt      I have reviewed and agree with the history as documented  E-Cigarette/Vaping    E-Cigarette Use Never User      E-Cigarette/Vaping Substances    Nicotine No     THC No     CBD No     Flavoring No     Other No     Unknown No      Social History     Tobacco Use    Smoking status: Never Smoker    Smokeless tobacco: Never Used   Vaping Use    Vaping Use: Never used   Substance Use Topics    Alcohol use: Yes    Drug use: No       Review of Systems   Constitutional: Negative  Negative for chills and fever  HENT: Negative  Negative for rhinorrhea, sore throat, trouble swallowing and voice change  Eyes: Negative  Negative for pain and visual disturbance  Respiratory: Negative  Negative for cough, shortness of breath and wheezing  Cardiovascular: Negative  Negative for chest pain and palpitations  Gastrointestinal: Negative for abdominal pain, diarrhea, nausea and vomiting  Genitourinary: Negative  Negative for dysuria and frequency  Musculoskeletal: Negative  Negative for neck pain and neck stiffness  Skin: Negative  Negative for rash  Neurological: Negative  Negative for dizziness, speech difficulty, weakness, light-headedness and numbness  Psychiatric/Behavioral: Positive for suicidal ideas  The patient is nervous/anxious  Physical Exam  Physical Exam  Vitals and nursing note reviewed  Constitutional:       General: She is not in acute distress  Appearance: She is well-developed  HENT:      Head: Normocephalic and atraumatic  Eyes:      Conjunctiva/sclera: Conjunctivae normal       Pupils: Pupils are equal, round, and reactive to light  Neck:      Trachea: No tracheal deviation  Cardiovascular:      Rate and Rhythm: Normal rate and regular rhythm  Pulmonary:      Effort: Pulmonary effort is normal  No respiratory distress  Breath sounds: Normal breath sounds   No wheezing or rales    Abdominal:      General: Bowel sounds are normal  There is no distension  Palpations: Abdomen is soft  Tenderness: There is no abdominal tenderness  There is no guarding or rebound  Musculoskeletal:         General: No tenderness or deformity  Normal range of motion  Cervical back: Normal range of motion and neck supple  Skin:     General: Skin is warm and dry  Capillary Refill: Capillary refill takes less than 2 seconds  Findings: No rash  Neurological:      Mental Status: She is alert and oriented to person, place, and time  Psychiatric:         Behavior: Behavior normal          Vital Signs  ED Triage Vitals   Temperature Pulse Respirations Blood Pressure SpO2   08/03/22 1502 08/03/22 1502 08/03/22 1502 08/03/22 1502 08/03/22 1502   98 1 °F (36 7 °C) 58 18 126/67 92 %      Temp Source Heart Rate Source Patient Position - Orthostatic VS BP Location FiO2 (%)   08/03/22 1502 08/03/22 1502 08/03/22 1502 08/03/22 1502 --   Oral Monitor Sitting Left arm       Pain Score       08/03/22 1924       No Pain           Vitals:    08/03/22 1502 08/03/22 1924   BP: 126/67 139/81   Pulse: 58 56   Patient Position - Orthostatic VS: Sitting Lying         Visual Acuity      ED Medications  Medications - No data to display    Diagnostic Studies  Results Reviewed     Procedure Component Value Units Date/Time    FLU/RSV/COVID - if FLU/RSV clinically relevant [[id number]]  (Normal) Collected: 08/03/22 1623    Lab Status: Final result Specimen: Nares from Nose Updated: 08/03/22 1800     SARS-CoV-2 Negative     INFLUENZA A PCR Negative     INFLUENZA B PCR Negative     RSV PCR Negative    Narrative:      FOR PEDIATRIC PATIENTS - copy/paste COVID Guidelines URL to browser: [email or web address]    SARS-CoV-2 assay is a Nucleic Acid Amplification assay intended for the  qualitative detection of nucleic acid from SARS-CoV-2 in nasopharyngeal  swabs  Results are for the presumptive identification of SARS-CoV-2 RNA  Positive results are indicative of infection with SARS-CoV-2, the virus  causing COVID-19, but do not rule out bacterial infection or co-infection  with other viruses  Laboratories within the [place] and its  territories are required to report all positive results to the appropriate  public health authorities  Negative results do not preclude SARS-CoV-2  infection and should not be used as the sole basis for treatment or other  patient management decisions  Negative results must be combined with  clinical observations, patient history, and epidemiological information  This test has not been FDA cleared or approved  This test has been authorized by FDA under an Emergency Use Authorization  (EUA)  This test is only authorized for the duration of time the  declaration that circumstances exist justifying the authorization of the  emergency use of an in vitro diagnostic tests for detection of SARS-CoV-2  virus and/or diagnosis of COVID-19 infection under section 564(b)(1) of  the Act, 21 U  S C  136LTT-3(A)(5), unless the authorization is terminated  or revoked sooner  The test has been validated but independent review by FDA  and CLIA is pending  Test performed using [employer] Gene[employer]pert: This RT-PCR assay targets N2,  a region unique to SARS-CoV-2  A conserved region in the E-gene was chosen  for pan-Sarbecovirus detection which includes SARS-CoV-2  TSH [[id number]]  (Normal) Collected: 08/03/22 1640    Lab Status: Final result Specimen: Blood from Arm, Left Updated: 08/03/22 1743     TSH 3RD GENERATON 1 960 uIU/mL     Narrative:      Patients undergoing fluorescein dye angiography may retain small amounts of fluorescein in the body for 48-72 hours post procedure  Samples containing fluorescein can produce falsely depressed TSH values   If the patient had this procedure,a specimen should be resubmitted post fluorescein clearance  Rapid drug screen, urine [[id number]]  (Normal) Collected: 08/03/22 1640    Lab Status: Final result Specimen: Urine, Clean Catch Updated: 08/03/22 1723     Amph/Meth UR Negative     Barbiturate Ur Negative     Benzodiazepine Urine Negative     Cocaine Urine Negative     Methadone Urine Negative     Opiate Urine Negative     PCP Ur Negative     THC Urine Negative     Oxycodone Urine Negative    Narrative:      FOR MEDICAL PURPOSES ONLY  IF CONFIRMATION NEEDED PLEASE CONTACT THE LAB WITHIN 5 DAYS      Drug Screen Cutoff Levels:  AMPHETAMINE/METHAMPHETAMINES  1000 ng/mL  BARBITURATES     200 ng/mL  BENZODIAZEPINES     200 ng/mL  COCAINE      300 ng/mL  METHADONE      300 ng/mL  OPIATES      300 ng/mL  PHENCYCLIDINE     25 ng/mL  THC       50 ng/mL  OXYCODONE      100 ng/mL    POCT alcohol breath test [[id number]]  (Normal) Resulted: 08/03/22 1715    Lab Status: Final result Updated: 08/03/22 1715     EXTBreath Alcohol 0 000    Comprehensive metabolic panel [[id number]] Collected: 08/03/22 1640    Lab Status: Final result Specimen: Blood from Arm, Left Updated: 08/03/22 1712     Sodium 141 mmol/L      Potassium 4 0 mmol/L      Chloride 104 mmol/L      CO2 25 mmol/L      ANION GAP 12 mmol/L      BUN 21 mg/dL      Creatinine 0 78 mg/dL      Glucose 92 mg/dL      Calcium 9 4 mg/dL      AST 24 U/L      ALT 22 U/L      Alkaline Phosphatase 66 U/L      Total Protein 7 9 g/dL      Albumin 4 7 g/dL      Total Bilirubin 0 34 mg/dL      eGFR 71 ml/min/1 73sq m     Narrative:      [facility] guidelines for Chronic Kidney Disease (CKD):     Stage 1 with normal or high GFR (GFR > 90 mL/min/1 73 square meters)    Stage 2 Mild CKD (GFR = 60-89 mL/min/1 73 square meters)    Stage 3A Moderate CKD (GFR = 45-59 mL/min/1 73 square meters)    Stage 3B Moderate CKD (GFR = 30-44 mL/min/1 73 square meters)    Stage 4 Severe CKD (GFR = 15-29 mL/min/1 73 square meters)    Stage 5 End Stage CKD (GFR <15 mL/min/1 73 square meters)  Note: GFR calculation is accurate only with a steady state creatinine    CBC and differential [[id number]]  (Abnormal) Collected: 08/03/22 1640    Lab Status: Final result Specimen: Blood from Arm, Left Updated: 08/03/22 1701     WBC 6 79 Thousand/uL      RBC 5 22 Million/uL      Hemoglobin 15 1 g/dL      Hematocrit 46 9 %      MCV 90 fL      MCH 28 9 pg      MCHC 32 2 g/dL      RDW 13 1 %      MPV 9 9 fL      Platelets 142 Thousands/uL      nRBC 0 /100 WBCs      Neutrophils Relative 47 %      Immat GRANS % 0 %      Lymphocytes Relative 37 %      Monocytes Relative 14 %      Eosinophils Relative 2 %      Basophils Relative 0 %      Neutrophils Absolute 3 19 Thousands/µL      Immature Grans Absolute 0 02 Thousand/uL      Lymphocytes Absolute 2 48 Thousands/µL      Monocytes Absolute 0 92 Thousand/µL      Eosinophils Absolute 0 15 Thousand/µL      Basophils Absolute 0 03 Thousands/µL                  No orders to display              Procedures  Procedures         ED Course  ED Course as of 08/03/22 2231   Wed Aug 03, 2022   1654 Procedure Note: EKG  Date/Time: 08/03/22 4:54 PM   Performed by: Karl Finley  Authorized by: Karl Finley  ECG interpreted by me, the ED Provider: yes   The EKG demonstrates:  Rate 58  Rhythm sinus bradycardia  QTc 449  No ST elevations/depressions                                                 MDM  Number of Diagnoses or Management Options  Diagnosis management comments: Patient signed 411, hemodynamically stable, medically cleared for psychiatric evaluation  Disposition per psychiatry         Amount and/or Complexity of Data Reviewed  Clinical lab tests: ordered and reviewed  Tests in the medicine section of CPT®: ordered and reviewed  Independent visualization of images, tracings, or specimens: yes        Disposition  Final diagnoses:   None     ED Disposition     None      Follow-up Information    None         Patient's Medications   Discharge Prescriptions    No medications on file       No discharge procedures on file      PDMP Review     None          ED Provider  Electronically Signed by           Haley Esquivel DO  08/03/22 [id number]

## 2022-08-04 NOTE — ED NOTES
Patient out in the hallway - looking for someone  Patient states "I am wide awake now and am ready to go and take on the day" Patient was asking what time it was - when informed that it was around 330 in the morning, she stated "wow, I'm wide awake " Patient did state "this is a weird place - I'm in that room,-  there's no trash can and there is no way to call people  Explained to patient that here in the ED certain precautions are followed for our psychiatric patients  She understood  [employer] michael gonzales was found for patient and placed in room  Patient stated "I guess I'll try to fall back to sleep but I am so awake " Dr Praveena Miller was made aware and patient was offered medication, which she took to help her relax and get some rest  Patient denies suicidal ideations at this time       Jeb Monday, CARYN  08/04/22 [id number]

## 2022-08-04 NOTE — ED NOTES
Patient requested length of stay in the ED  Patient made aware that the stay could take a few days depending on when a bed is available  Patient verbalized understanding and asked for blankets    This tech provided warm blankets and turned off room lights upon request       Sylvester Murphy  08/03/22 2874

## 2022-08-04 NOTE — ED NOTES
This RN assumed care of this patient at this time, patient is sleeping comfortably on stretcher, no distress noted  Q15 minutes continues, patient remains in view of nurses station        Nba Samayoa RN  08/04/22 9927

## 2022-08-04 NOTE — ED NOTES
Assumed care of pt at this time  Pt resting with no signs of distress noted  Q15 checks in progress  Will continue to monitor        Elenore Hashimoto, RN  08/04/22 1808

## 2022-08-05 LAB — SARS-COV-2 RNA RESP QL NAA+PROBE: NEGATIVE

## 2022-08-05 PROCEDURE — 99232 SBSQ HOSP IP/OBS MODERATE 35: CPT | Performed by: STUDENT IN AN ORGANIZED HEALTH CARE EDUCATION/TRAINING PROGRAM

## 2022-08-05 PROCEDURE — U0003 INFECTIOUS AGENT DETECTION BY NUCLEIC ACID (DNA OR RNA); SEVERE ACUTE RESPIRATORY SYNDROME CORONAVIRUS 2 (SARS-COV-2) (CORONAVIRUS DISEASE [COVID-19]), AMPLIFIED PROBE TECHNIQUE, MAKING USE OF HIGH THROUGHPUT TECHNOLOGIES AS DESCRIBED BY CMS-2020-01-R: HCPCS | Performed by: EMERGENCY MEDICINE

## 2022-08-05 PROCEDURE — U0005 INFEC AGEN DETEC AMPLI PROBE: HCPCS | Performed by: EMERGENCY MEDICINE

## 2022-08-05 RX ORDER — OLANZAPINE 5 MG/1
5 TABLET ORAL
Status: DISCONTINUED | OUTPATIENT
Start: 2022-08-05 | End: 2022-08-19 | Stop reason: HOSPADM

## 2022-08-05 RX ORDER — LORAZEPAM 2 MG/ML
2 INJECTION INTRAMUSCULAR ONCE
Status: DISCONTINUED | OUTPATIENT
Start: 2022-08-05 | End: 2022-08-05

## 2022-08-05 RX ORDER — ACETAMINOPHEN 325 MG/1
650 TABLET ORAL EVERY 4 HOURS PRN
Status: DISCONTINUED | OUTPATIENT
Start: 2022-08-05 | End: 2022-08-19 | Stop reason: HOSPADM

## 2022-08-05 RX ORDER — LANOLIN ALCOHOL/MO/W.PET/CERES
3 CREAM (GRAM) TOPICAL
Status: DISCONTINUED | OUTPATIENT
Start: 2022-08-05 | End: 2022-08-14

## 2022-08-05 RX ORDER — OLANZAPINE 2.5 MG/1
2.5 TABLET ORAL
Status: DISCONTINUED | OUTPATIENT
Start: 2022-08-05 | End: 2022-08-19 | Stop reason: HOSPADM

## 2022-08-05 RX ORDER — ACETAMINOPHEN 325 MG/1
975 TABLET ORAL EVERY 6 HOURS PRN
Status: DISCONTINUED | OUTPATIENT
Start: 2022-08-05 | End: 2022-08-19 | Stop reason: HOSPADM

## 2022-08-05 RX ORDER — OLANZAPINE 10 MG/1
5 INJECTION, POWDER, LYOPHILIZED, FOR SOLUTION INTRAMUSCULAR
Status: DISCONTINUED | OUTPATIENT
Start: 2022-08-05 | End: 2022-08-19 | Stop reason: HOSPADM

## 2022-08-05 RX ORDER — LORAZEPAM 2 MG/ML
1 INJECTION INTRAMUSCULAR
Status: DISCONTINUED | OUTPATIENT
Start: 2022-08-05 | End: 2022-08-19 | Stop reason: HOSPADM

## 2022-08-05 RX ORDER — LORAZEPAM 1 MG/1
1 TABLET ORAL
Status: DISCONTINUED | OUTPATIENT
Start: 2022-08-05 | End: 2022-08-19 | Stop reason: HOSPADM

## 2022-08-05 RX ORDER — TRAZODONE HYDROCHLORIDE 50 MG/1
50 TABLET ORAL
Status: DISCONTINUED | OUTPATIENT
Start: 2022-08-05 | End: 2022-08-19 | Stop reason: HOSPADM

## 2022-08-05 RX ORDER — LORAZEPAM 0.5 MG/1
0.5 TABLET ORAL
Status: DISCONTINUED | OUTPATIENT
Start: 2022-08-05 | End: 2022-08-19 | Stop reason: HOSPADM

## 2022-08-05 RX ORDER — HALOPERIDOL 5 MG/ML
5 INJECTION INTRAMUSCULAR ONCE
Status: DISCONTINUED | OUTPATIENT
Start: 2022-08-05 | End: 2022-08-05

## 2022-08-05 RX ADMIN — MELATONIN TAB 3 MG 3 MG: 3 TAB at 21:06

## 2022-08-05 RX ADMIN — ASPIRIN 81 MG CHEWABLE TABLET 81 MG: 81 TABLET CHEWABLE at 09:02

## 2022-08-05 RX ADMIN — FLECAINIDE ACETATE 50 MG: 50 TABLET ORAL at 09:02

## 2022-08-05 RX ADMIN — CITALOPRAM HYDROBROMIDE 5 MG: 10 TABLET ORAL at 09:02

## 2022-08-05 RX ADMIN — FLECAINIDE ACETATE 50 MG: 50 TABLET ORAL at 21:06

## 2022-08-05 RX ADMIN — AMLODIPINE BESYLATE 10 MG: 10 TABLET ORAL at 09:02

## 2022-08-05 NOTE — ED NOTES
This RN assumed care of this patient, she is sleeping comfortably on stretcher, no distress noted  Q15 min checks remain        Shashi Alvarez RN  08/05/22 [id number]

## 2022-08-05 NOTE — PLAN OF CARE
Problem: Ineffective Coping  Goal: Cooperates with admission process  Description: Interventions:   - Complete admission process  Outcome: Not Progressing  Goal: Identifies ineffective coping skills  Outcome: Not Progressing  Goal: Identifies healthy coping skills  Outcome: Not Progressing  Goal: Demonstrates healthy coping skills  Outcome: Not Progressing  Goal: Patient/Family participate in treatment and DC plans  Description: Interventions:  - Provide therapeutic environment  Outcome: Not Progressing  Goal: Patient/Family verbalizes awareness of resources  Outcome: Not Progressing  Goal: Understands least restrictive measures  Description: Interventions:  - Utilize least restrictive behavior  Outcome: Not Progressing  Goal: Free from restraint events  Description: - Utilize least restrictive measures   - Provide behavioral interventions   - Redirect inappropriate behaviors   Outcome: Not Progressing     Problem: Risk for Self Injury/Neglect  Goal: Treatment Goal: Remain safe during length of stay, learn and adopt new coping skills, and be free of self-injurious ideation, impulses and acts at the time of discharge  Outcome: Not Progressing  Goal: Verbalize thoughts and feelings  Description: Interventions:  - Assess and re-assess patient's lethality and potential for self-injury  - Engage patient in 1:1 interactions, daily, for a minimum of 15 minutes  - Encourage patient to express feelings, fears, frustrations, hopes  - Establish rapport/trust with patient   Outcome: Not Progressing  Goal: Refrain from harming self  Description: Interventions:  - Monitor patient closely, per order  - Develop a trusting relationship  - Supervise medication ingestion, monitor effects and side effects   Outcome: Not Progressing  Goal: Recognize maladaptive responses and adopt new coping mechanisms  Outcome: Not Progressing  Goal: Complete daily ADLs, including personal hygiene independently, as able  Description: Interventions:  - Observe, teach, and assist patient with ADLS  - Monitor and promote a balance of rest/activity, with adequate nutrition and elimination  Outcome: Not Progressing     Problem: Depression  Goal: Treatment Goal: Demonstrate behavioral control of depressive symptoms, verbalize feelings of improved mood/affect, and adopt new coping skills prior to discharge  Outcome: Not Progressing  Goal: Verbalize thoughts and feelings  Description: Interventions:  - Assess and re-assess patient's level of risk   - Engage patient in 1:1 interactions, daily, for a minimum of 15 minutes   - Encourage patient to express feelings, fears, frustrations, hopes   Outcome: Not Progressing  Goal: Refrain from harming self  Description: Interventions:  - Monitor patient closely, per order   - Supervise medication ingestion, monitor effects and side effects   Outcome: Not Progressing  Goal: Refrain from isolation  Description: Interventions:  - Develop a trusting relationship   - Encourage socialization   Outcome: Not Progressing  Goal: Refrain from self-neglect  Outcome: Not Progressing  Goal: Complete daily ADLs, including personal hygiene independently, as able  Description: Interventions:  - Observe, teach, and assist patient with ADLS  -  Monitor and promote a balance of rest/activity, with adequate nutrition and elimination   Outcome: Not Progressing     Problem: Anxiety  Goal: Anxiety is at manageable level  Description: Interventions:  - Assess and monitor patient's anxiety level  - Monitor for signs and symptoms (heart palpitations, chest pain, shortness of breath, headaches, nausea, feeling jumpy, restlessness, irritable, apprehensive)  - Collaborate with interdisciplinary team and initiate plan and interventions as ordered    - [place] patient to unit/surroundings  - Explain treatment plan  - Encourage participation in care  - Encourage verbalization of concerns/fears  - Identify coping mechanisms  - Assist in developing anxiety-reducing skills  - Administer/offer alternative therapies  - Limit or eliminate stimulants  Outcome: Not Progressing
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
## 2022-08-05 NOTE — ED NOTES
Digna Chance, DO  Psychiatry, related that the patient revealed she had a rifle in the home and alluded to how she would kill herself with it  Call placed to Padma Ardon, patient's daughter, [phone]  She stated she will remove the rifle prior to the patient's discharge  She was also informed that the patient will sometime today be transferred to [facility] 6B  She was provided with the phone number for the nurses desk

## 2022-08-05 NOTE — NURSING NOTE
Pt is a 20, BMAT 4 from [facility] ED  She was brought in by her daughter after pt had been experiencing increased agitation and depression  Pt also had thoughts to OD on her medications  Pt has had two other suicidal ideations throughout her lifetime; once to "blow her brains out" in [facility] [place] and another time to OD on pills  Pt said she never followed through with these thoughts because she believed god would be disappointed in her  Pt has access to to rifle at home which is to be removed by her daughters  Pt has hx of Afib which she takes Tambocor for  Pt feels safe on unit and has no current thoughts of suicide  She says she just gets "triggered" at times and gets uncontrollably angry and she's happy to be here to get help  Last BM 8/4  Oriented to unit and is socializing with peers
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
## 2022-08-05 NOTE — ED NOTES
Precert:    [facility];  Requested precert but person that does precert is not there until Monday  Ginny Esteban - [phone]  [phone]

## 2022-08-05 NOTE — ED NOTES
Patient is accepted at [address] [place] [address] 6B  Patient is accepted by Dr Ngozi Herrera    Patient may go to the floor at after report          Nurse report is to be called to [phone]  prior to patient transfer

## 2022-08-06 PROBLEM — F03.90 MAJOR NEUROCOGNITIVE DISORDER (HCC): Status: ACTIVE | Noted: 2022-08-06

## 2022-08-06 PROBLEM — E78.5 HYPERLIPEMIA: Status: ACTIVE | Noted: 2022-08-06

## 2022-08-06 PROBLEM — M51.36 DEGENERATION OF LUMBAR INTERVERTEBRAL DISC: Status: ACTIVE | Noted: 2017-07-20

## 2022-08-06 PROBLEM — M81.0 OSTEOPOROSIS: Status: ACTIVE | Noted: 2017-05-10

## 2022-08-06 PROBLEM — Z96.653 HISTORY OF TOTAL BILATERAL KNEE REPLACEMENT: Status: ACTIVE | Noted: 2017-06-01

## 2022-08-06 PROBLEM — I10 HYPERTENSION: Status: ACTIVE | Noted: 2022-08-06

## 2022-08-06 PROBLEM — Z00.8 MEDICAL CLEARANCE FOR PSYCHIATRIC ADMISSION: Status: ACTIVE | Noted: 2022-08-06

## 2022-08-06 PROCEDURE — 99222 1ST HOSP IP/OBS MODERATE 55: CPT | Performed by: STUDENT IN AN ORGANIZED HEALTH CARE EDUCATION/TRAINING PROGRAM

## 2022-08-06 PROCEDURE — 99253 IP/OBS CNSLTJ NEW/EST LOW 45: CPT | Performed by: NURSE PRACTITIONER

## 2022-08-06 RX ORDER — ESCITALOPRAM OXALATE 5 MG/1
5 TABLET ORAL DAILY
Status: DISCONTINUED | OUTPATIENT
Start: 2022-08-06 | End: 2022-08-07

## 2022-08-06 RX ADMIN — FLECAINIDE ACETATE 50 MG: 50 TABLET ORAL at 08:46

## 2022-08-06 RX ADMIN — ESCITALOPRAM 5 MG: 5 TABLET, FILM COATED ORAL at 11:37

## 2022-08-06 RX ADMIN — AMLODIPINE BESYLATE 10 MG: 10 TABLET ORAL at 08:26

## 2022-08-06 RX ADMIN — FLECAINIDE ACETATE 50 MG: 50 TABLET ORAL at 21:04

## 2022-08-06 RX ADMIN — MELATONIN TAB 3 MG 3 MG: 3 TAB at 22:04

## 2022-08-06 RX ADMIN — ASPIRIN 81 MG CHEWABLE TABLET 81 MG: 81 TABLET CHEWABLE at 08:46
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
## 2022-08-06 NOTE — CONSULTS
[address] 1940, 80 y o  female MRN: [id number]  Unit/Bed#: [facility] 160-71 Encounter: [id number]  Primary Care Provider: Juliet Juarez MD   Date and time admitted to hospital: 8/3/2022  3:10 PM    Inpatient consult for Medical Clearance for [facility] patient  Consult performed by: LUZ Kenyon  Consult ordered by: Karma Nixon MD          * Moderate episode of recurrent major depressive disorder [facility])  Assessment & Plan  Presented to the ED with family for psychiatric evaluation  · Pt reports life long hx described as "explosive disorder  · Pt had a confrontation of a jar opening device which involved an argument with family   She feels like her family no longer love her  · She reported to Ed physician that she poured all her meds on the table with intent to take them  She did not then got into bed and prayed to God to take her  · Continue medication and therapy     Hypertension  Assessment & Plan  · Continue norvasc   · Monitor hR in the 50's    Hyperlipemia  Assessment & Plan  · Continue crestor as ordered     Medical clearance for psychiatric admission  Assessment & Plan  · Patient cleared for admission to psychiatric unit and treatment of underlying psychiatric illness  · Please call with any questions or concerns  · Urine drug screen on admission - 8/3/22 negative    · COVID negative   · TSH normal   · ETOH negative on admission   · 201 voluntary admission       History of atrial fibrillation  Assessment & Plan  Pt with hx   · On asa/ and flecainide  ·  Currently very well controlled HR in the 50's  · Ekg noting sinus bradycardia on 8/3/22      VTE Prophylaxis:   Low Risk (Score 0-2) - Encourage Ambulation      Recommendations for Discharge:  · Follow up with pcp in one week after discharge from the hospital     Counseling / Coordination of Care Time: 45 minutes Greater than 50% of total time spent on patient counseling and coordination of care     Collaboration of Care: Were Recommendations Directly Discussed with Primary Treatment Team? No see consult notation     History of Present Illness:  Akin King is a 80 y o  female who is originally admitted to the psychiatric service due to increased agitation and depression  We are consulted for medical clearance for admission  Pt is  brought in by one of her daughter visiting from [place]  Reports are that the pt has been exhibiting severe mood swings which are demonstrated by throwing things   Stomping her feet and yelling at people  It is reported that she is obsessed with aceves news and [employer]  Pt states she is unable to control her conversations when she feels passionate about a subject  On 8/2/22 pt poured her pills out on the table and contemplated taking them  She did not and went to bed praying that God would take her   Daughter does also report cognitive decline  She reportedly does well on screening tests  She is not driving now as she was getting lost/ picked up a stranger / she wants to fix everything/ she is going all the time  Pt feels like her family no longer love her  Admitted voluntarily  We are consulted for medical clearance  Review of Systems:  Review of Systems   Constitutional: Negative for activity change, appetite change, chills, diaphoresis, fatigue, fever and unexpected weight change  HENT: Negative for congestion  Eyes: Negative  Gastrointestinal: Negative for abdominal distention, abdominal pain, constipation, diarrhea, nausea and rectal pain  Genitourinary: Negative for difficulty urinating, dysuria and frequency  Neurological: Negative for tremors, seizures, light-headedness, numbness and headaches  Psychiatric/Behavioral: Positive for behavioral problems         Past Medical and Surgical History:   Past Medical History:   Diagnosis Date    Afib ([facility] [address] )     Arthritis     Hyperlipidemia     Hypertension     Osteopenia     Sleep apnea     Varicose veins of both lower extremities        Past Surgical History:   Procedure Laterality Date    CATARACT EXTRACTION      COLONOSCOPY      complete, for polyp removal    EYE SURGERY      HYSTERECTOMY      age 39   [facility] [place] INCISION AND DRAINAGE OF WOUND Right 2016    Procedure: ARTHROSCOPY,EVACUATION OF HEMATOMA, OPEN EXPLORATION OF WOUND;  Surgeon: Erlinda Bobby MD;  Location: [facility];  Service:     TONSILLECTOMY AND ADENOIDECTOMY      TOTAL KNEE ARTHROPLASTY Right     TOTAL KNEE ARTHROPLASTY Left     WISDOM TOOTH EXTRACTION         Meds/Allergies:  PTA meds:   Prior to Admission Medications   Prescriptions Last Dose Informant Patient Reported? Taking? Multiple Vitamins-Minerals (PreserVision AREDS) CAPS Unknown at Unknown time  No No   Sig: One capsule twice daily   acebutolol (SECTRAL) 200 mg capsule Past Week at Unknown time  Yes Yes   Sig: Take 200 mg by mouth 2 (two) times a day   amLODIPine (NORVASC) 10 mg tablet Past Week at Unknown time  No Yes   Sig: Take 1 tablet (10 mg total) by mouth daily   amoxicillin (AMOXIL) 500 mg capsule Unknown at Unknown time  No No   Si capsules by mouth for 1 dose to dental extractions   aspirin (ECOTRIN LOW STRENGTH) 81 mg EC tablet Unknown at Unknown time  Yes No   Sig: Take 162 mg by mouth daily   calcium carbonate (TUMS) 500 mg chewable tablet Unknown at Unknown time  Yes No   Sig: Chew 1 tablet daily   citalopram (CeleXA) 10 mg tablet Past Week at Unknown time  Yes Yes   Sig: Take 5 mg by mouth daily     docusate sodium (COLACE) 100 mg capsule Unknown at Unknown time  No No   Sig: Take 1 capsule (100 mg total) by mouth 2 (two) times a day   ergocalciferol (VITAMIN D2) 50,000 units Past Week at Unknown time  No Yes   Sig: Take 1 capsule (50,000 Units total) by mouth every 30 (thirty) days   flecainide (TAMBOCOR) 50 mg tablet Past Week at Unknown time  Yes Yes   Sig: Take 50 mg by mouth 2 (two) times a day     multivitamin (THERAGRAN) TABS Unknown at Unknown time  Yes No   Sig: Take 1 tablet by mouth daily  nitroglycerin (NITROSTAT) 0 6 mg SL tablet Unknown at Unknown time  Yes No   Sig: Place 0 6 mg under the tongue   rosuvastatin (CRESTOR) 5 mg tablet Past Week at Unknown time  Yes Yes   Sig: Take 2 5 mg by mouth every other day Take 1/2 of 5mg  Tablet by mouth every day   sodium chloride (MARITZA 128) 5 % hypertonic ophthalmic solution Unknown at Unknown time  No No   Sig: Administer 1 drop to both eyes as needed (as prescribed)   sodium chloride (MARITZA 128) 5 % hypertonic ophthalmic ointment Unknown at Unknown time  No No   Sig: Instill small amount to both eyes once daily at bedtime      Facility-Administered Medications: None       Allergies:    Allergies   Allergen Reactions    Atorvastatin Other (See Comments)     myalgia    Statins Other (See Comments)     Weakness in legs       Social History:  Marital Status: /Civil Union  Substance Use History:   Social History     Substance and Sexual Activity   Alcohol Use Yes     Social History     Tobacco Use   Smoking Status Never Smoker   Smokeless Tobacco Never Used     Social History     Substance and Sexual Activity   Drug Use No       Family History:  Family History   Problem Relation Age of Onset    Diabetes Mother     Diabetes Father     Diabetes Brother     No Known Problems Sister     No Known Problems Daughter     No Known Problems Maternal Grandmother     No Known Problems Maternal Grandfather     No Known Problems Paternal Grandmother     No Known Problems Paternal Grandfather     No Known Problems Daughter     Brain cancer Son     No Known Problems Maternal Aunt        Physical Exam:   Vitals:   Blood Pressure: 136/61 (08/06/22 1432)  Pulse: 62 (08/06/22 1432)  Temperature: 97 6 °F (36 4 °C) (08/06/22 1432)  Temp Source: Temporal (08/06/22 1432)  Respirations: 17 (08/06/22 1432)  Height: 5' 3" (160 cm) (08/05/22 1545)  Weight - Scale: 73 kg (160 lb 14 4 oz) (08/05/22 1545)  SpO2: 96 % (08/06/22 1432)    Physical Exam  Constitutional:       General: She is not in acute distress  Appearance: She is obese  She is not ill-appearing, toxic-appearing or diaphoretic  HENT:      Head: Normocephalic and atraumatic  Nose: No congestion  Mouth/Throat:      Pharynx: Oropharynx is clear  No oropharyngeal exudate or posterior oropharyngeal erythema  Cardiovascular:      Rate and Rhythm: Normal rate  Heart sounds:     No gallop  Pulmonary:      Effort: No respiratory distress  Breath sounds: No stridor  No wheezing, rhonchi or rales  Chest:      Chest wall: No tenderness  Abdominal:      General: There is no distension  Palpations: Abdomen is soft  There is no mass  Tenderness: There is no abdominal tenderness  There is no guarding or rebound  Hernia: No hernia is present  Skin:     Coloration: Skin is not jaundiced or pale  Findings: No bruising, erythema, lesion or rash  Neurological:      Mental Status: She is alert and oriented to person, place, and time  Psychiatric:         Behavior: Behavior normal           Additional Data:   Lab Results:    Results from last 7 days   Lab Units 08/03/22  1640   WBC Thousand/uL 6 79   HEMOGLOBIN g/dL 15 1   HEMATOCRIT % 46 9*   PLATELETS Thousands/uL 211   NEUTROS PCT % 47   LYMPHS PCT % 37   MONOS PCT % 14*   EOS PCT % 2     Results from last 7 days   Lab Units 08/03/22  1640   SODIUM mmol/L 141   POTASSIUM mmol/L 4 0   CHLORIDE mmol/L 104   CO2 mmol/L 25   BUN mg/dL 21   CREATININE mg/dL 0 78   ANION GAP mmol/L 12   CALCIUM mg/dL 9 4   ALBUMIN g/dL 4 7   TOTAL BILIRUBIN mg/dL 0 34   ALK PHOS U/L 66   ALT U/L 22   AST U/L 24   GLUCOSE RANDOM mg/dL 92             Lab Results   Component Value Date/Time    HGBA1C 6 4 (H) 12/08/2021 10:28 AM    HGBA1C 6 4 (H) 05/14/2021 09:42 AM    HGBA1C 6 1 (H) 02/23/2018 01:04 PM               Imaging: No pertinent imaging reviewed    No orders to display       EKG, Pathology, and Other Studies Reviewed on Admission:   · EKG: Sinus Bradycardia  HR 50's  ** Please Note: This note may have been constructed using a voice recognition system   **
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
## 2022-08-06 NOTE — ASSESSMENT & PLAN NOTE
Pt with hx   · On asa/ and flecainide  ·  Currently very well controlled HR in the 50's  · Ekg noting sinus bradycardia on 8/3/22

## 2022-08-06 NOTE — TREATMENT PLAN
TREATMENT PLAN REVIEW - [address] 80 y o  1940 female MRN: [id number]    [address] [address] [address] OAB[facility] Encounter: [id number]          Admit Date/Time:  8/3/2022  3:10 PM    Treatment Team: Attending Provider: Melissa Sol MD; Patient Care Assistant: Odell Ruth; Nurse Practitioner: LUZ Gardiner; Patient Care Assistant: Sharonda Flores; Licensed Practical Nurse: Davion England LPN; Nursing Student: Lanette Malone; Patient Care Assistant: Alida Parada    Diagnosis: Principal Problem:     Moderate episode of recurrent major depressive disorder (HCC)  Active Problems:    History of atrial fibrillation    Medical clearance for psychiatric admission    Hyperlipemia    Hypertension    Major neurocognitive disorder [facility])      Patient Strengths/Assets: cooperative, family ties, Hoahaoism affiliation, strong christie    Patient Barriers/Limitations: impaired cognition, limited support system    Short Term Goals: decrease in depressive symptoms, decrease in suicidal thoughts, improvement in ability to express basic needs, improvement in reality testing, improvement in reasoning ability, improvement in self care    Long Term Goals: improvement in depression, free of suicidal thoughts, free of homicidal thoughts    Progress Towards Goals: starting psychiatric medications as prescribed    Recommended Treatment: medication management, patient medication education, group therapy, milieu therapy, continued Behavioral Health psychiatric evaluation/assessment process    Treatment Frequency: daily medication monitoring, group and milieu therapy daily, monitoring through interdisciplinary rounds, monitoring through weekly patient care conferences    Expected Discharge Date:  14 days    Discharge Plan: referral for outpatient medication management with a psychiatrist, return to previous living arrangement    Treatment Plan Created/Updated By: Deondre Gates MD

## 2022-08-06 NOTE — PLAN OF CARE
Problem: Ineffective Coping  Goal: Cooperates with admission process  Description: Interventions:   - Complete admission process  Outcome: Progressing  Goal: Identifies ineffective coping skills  Outcome: Progressing  Goal: Identifies healthy coping skills  Outcome: Progressing  Goal: Demonstrates healthy coping skills  Outcome: Progressing  Goal: Patient/Family participate in treatment and DC plans  Description: Interventions:  - Provide therapeutic environment  Outcome: Progressing  Goal: Patient/Family verbalizes awareness of resources  Outcome: Progressing  Goal: Understands least restrictive measures  Description: Interventions:  - Utilize least restrictive behavior  Outcome: Progressing  Goal: Free from restraint events  Description: - Utilize least restrictive measures   - Provide behavioral interventions   - Redirect inappropriate behaviors   Outcome: Progressing     Problem: Risk for Self Injury/Neglect  Goal: Treatment Goal: Remain safe during length of stay, learn and adopt new coping skills, and be free of self-injurious ideation, impulses and acts at the time of discharge  Outcome: Progressing  Goal: Verbalize thoughts and feelings  Description: Interventions:  - Assess and re-assess patient's lethality and potential for self-injury  - Engage patient in 1:1 interactions, daily, for a minimum of 15 minutes  - Encourage patient to express feelings, fears, frustrations, hopes  - Establish rapport/trust with patient   Outcome: Progressing  Goal: Refrain from harming self  Description: Interventions:  - Monitor patient closely, per order  - Develop a trusting relationship  - Supervise medication ingestion, monitor effects and side effects   Outcome: Progressing  Goal: Recognize maladaptive responses and adopt new coping mechanisms  Outcome: Progressing  Goal: Complete daily ADLs, including personal hygiene independently, as able  Description: Interventions:  - Observe, teach, and assist patient with ADLS  - Monitor and promote a balance of rest/activity, with adequate nutrition and elimination  Outcome: Progressing     Problem: Depression  Goal: Treatment Goal: Demonstrate behavioral control of depressive symptoms, verbalize feelings of improved mood/affect, and adopt new coping skills prior to discharge  Outcome: Progressing  Goal: Verbalize thoughts and feelings  Description: Interventions:  - Assess and re-assess patient's level of risk   - Engage patient in 1:1 interactions, daily, for a minimum of 15 minutes   - Encourage patient to express feelings, fears, frustrations, hopes   Outcome: Progressing  Goal: Refrain from harming self  Description: Interventions:  - Monitor patient closely, per order   - Supervise medication ingestion, monitor effects and side effects   Outcome: Progressing  Goal: Refrain from isolation  Description: Interventions:  - Develop a trusting relationship   - Encourage socialization   Outcome: Progressing  Goal: Refrain from self-neglect  Outcome: Progressing  Goal: Complete daily ADLs, including personal hygiene independently, as able  Description: Interventions:  - Observe, teach, and assist patient with ADLS  -  Monitor and promote a balance of rest/activity, with adequate nutrition and elimination   Outcome: Progressing     Problem: Anxiety  Goal: Anxiety is at manageable level  Description: Interventions:  - Assess and monitor patient's anxiety level  - Monitor for signs and symptoms (heart palpitations, chest pain, shortness of breath, headaches, nausea, feeling jumpy, restlessness, irritable, apprehensive)  - Collaborate with interdisciplinary team and initiate plan and interventions as ordered    - [place] patient to unit/surroundings  - Explain treatment plan  - Encourage participation in care  - Encourage verbalization of concerns/fears  - Identify coping mechanisms  - Assist in developing anxiety-reducing skills  - Administer/offer alternative therapies  - Limit or eliminate stimulants  Outcome: Progressing

## 2022-08-06 NOTE — CMS CERTIFICATION NOTE
Certification: Based upon physical, mental and social evaluations, I certify that inpatient psychiatric services are medically necessary for this patient for a duration of 14 midnights for the treatment of Moderate episode of recurrent major depressive disorder ([facility] [address] )    Available alternative community resources do not meet the patient's mental health care needs  I further attest that an established written individualized plan of care has been implemented and is outlined in the patient's medical records

## 2022-08-06 NOTE — NURSING NOTE
Pt is calm and cooperative  Pt is med and meal compliant  Pt is visible in the milieu at times conversing with peers  Pt denies anxiety, SI, HI, AH and VH  Pt reports 2/10 depression  Pt is very pleasant  Pt looks depressed but has moments of being bright  Pt shows no signs of distress  Will continue to monitor pt frequently

## 2022-08-07 PROCEDURE — 99232 SBSQ HOSP IP/OBS MODERATE 35: CPT | Performed by: PSYCHIATRY & NEUROLOGY

## 2022-08-07 RX ADMIN — MELATONIN TAB 3 MG 3 MG: 3 TAB at 21:23

## 2022-08-07 RX ADMIN — AMLODIPINE BESYLATE 10 MG: 10 TABLET ORAL at 08:53

## 2022-08-07 RX ADMIN — ASPIRIN 81 MG CHEWABLE TABLET 81 MG: 81 TABLET CHEWABLE at 08:53

## 2022-08-07 RX ADMIN — FLECAINIDE ACETATE 50 MG: 50 TABLET ORAL at 08:53

## 2022-08-07 RX ADMIN — ESCITALOPRAM 5 MG: 5 TABLET, FILM COATED ORAL at 08:54

## 2022-08-07 RX ADMIN — FLECAINIDE ACETATE 50 MG: 50 TABLET ORAL at 21:23

## 2022-08-07 NOTE — PLAN OF CARE
Problem: Ineffective Coping  Goal: Identifies ineffective coping skills  Outcome: Progressing  Goal: Identifies healthy coping skills  Outcome: Progressing  Goal: Demonstrates healthy coping skills  Outcome: Progressing  Goal: Patient/Family participate in treatment and DC plans  Description: Interventions:  - Provide therapeutic environment  Outcome: Progressing

## 2022-08-07 NOTE — PROGRESS NOTES
Progress Note - Behavioral Health   Alka Perez 80 y o  female MRN: [id number]  Unit/Bed#: Keri Fofana 029-53 Encounter: [id number]    The patient was seen for continuing care and reviewed with treatment team   She reports that she slept well last night  I reviewed her history with her and her main concern at this time is her relationship with her children and she feels she is not being appreciated and also her concerns about her poor memory  After she was lost going to her grandson's home twice, the family prevented her from driving  She has also been misplacing things  She appeared to have a grandiose under tone and also pressure of speech during the interview    Current Mental Status Evaluation:  Appearance:  Adequate hygiene and grooming and Good eye contact   Behavior:  calm, cooperative and friendly   Mood:  Elevated   Affect: appropriate and broad   Speech: Normal volume and Pressured   Thought Process:  Circumstantial and Tangential   Thought Content:  Does not verbalize delusional material   Perceptual Disturbances: Denies hallucinations and does not appear to be responding to internal stimuli   Risk Potential: No suicidal or homicidal ideation   Orientation:        Progress Toward Goals: No significant events in the past 24 hours  Principal Problem: Moderate episode of recurrent major depressive disorder ([facility] [address] )  Active Problems:    History of atrial fibrillation    Medical clearance for psychiatric admission    Hyperlipemia    Hypertension    Major neurocognitive disorder [facility])    Discharge planning update: The patient will return to previous living arrangement    Recommended Treatment: Continue with pharmacotherapy, group therapy, milieu therapy and occupational therapy    The patient will be maintained on the following medications:  Current Facility-Administered Medications   Medication Dose Route Frequency Provider Last Rate    acetaminophen  650 mg Oral Q4H PRN MD Mayuri Sam acetaminophen  650 mg Oral Q4H PRN Melissa Sol MD      acetaminophen  975 mg Oral Q6H PRN Melissa Sol MD      amLODIPine  10 mg Oral Daily Melissa Sol MD      aspirin  81 mg Oral Daily Melissa Sol MD      escitalopram  5 mg Oral Daily José Garcia MD      flecainide  50 mg Oral Q12H [address] Melissa Sol MD      LORazepam  1 mg Intramuscular Q6H PRN Max 3/day Melissa Sol MD      LORazepam  0 5 mg Oral Q6H PRN Max 4/day Melissa Sol MD      LORazepam  1 mg Oral Q6H PRN Max 3/day Melissa Sol MD      melatonin  3 mg Oral HS Melissa Sol MD      nicotine polacrilex  4 mg Oral Q2H PRN Melissa Sol MD      OLANZapine  5 mg Intramuscular Q3H PRN Max 3/day Melissa Sol MD      OLANZapine  2 5 mg Oral Q4H PRN Max 6/day Melissa Sol MD      OLANZapine  5 mg Oral Q4H PRN Max 3/day Melissa Sol MD      OLANZapine  5 mg Oral Q3H PRN Max 3/day Melissa Sol MD      traZODone  50 mg Oral HS PRN Melissa Sol MD

## 2022-08-07 NOTE — NURSING NOTE
Pt  Is alert and oriented X4  Pleasant and cooperative  Social in milieu with peers  Denies feeling depressed today, no anxiety  Reports she is feeling much better overall as she found some friends here to talk to  Denies SI/HI  No AH/VH

## 2022-08-07 NOTE — NURSING NOTE
Pt is calm and cooperative brightens on approach  Pt is cooperative with care and med compliant  Pt had a call from her daughter and seemed very pleased to have had the opportunity to speak with her  Pt asked if she could make phone and was informed of Unit telephone rules  Pt is visible in milieu and interacts with staff and peers  Pt denies SI, HI and A/VH  Will continue to monitor

## 2022-08-08 PROCEDURE — 99232 SBSQ HOSP IP/OBS MODERATE 35: CPT | Performed by: PSYCHIATRY & NEUROLOGY

## 2022-08-08 RX ORDER — DIVALPROEX SODIUM 250 MG/1
250 TABLET, DELAYED RELEASE ORAL EVERY 12 HOURS SCHEDULED
Status: DISCONTINUED | OUTPATIENT
Start: 2022-08-08 | End: 2022-08-08

## 2022-08-08 RX ORDER — DIVALPROEX SODIUM 125 MG/1
125 CAPSULE, COATED PELLETS ORAL EVERY 12 HOURS SCHEDULED
Status: DISCONTINUED | OUTPATIENT
Start: 2022-08-08 | End: 2022-08-10

## 2022-08-08 RX ADMIN — FLECAINIDE ACETATE 50 MG: 50 TABLET ORAL at 21:27

## 2022-08-08 RX ADMIN — ASPIRIN 81 MG CHEWABLE TABLET 81 MG: 81 TABLET CHEWABLE at 08:33

## 2022-08-08 RX ADMIN — DIVALPROEX SODIUM 125 MG: 125 CAPSULE, COATED PELLETS ORAL at 21:27

## 2022-08-08 RX ADMIN — FLECAINIDE ACETATE 50 MG: 50 TABLET ORAL at 08:33

## 2022-08-08 RX ADMIN — DIVALPROEX SODIUM 125 MG: 125 CAPSULE, COATED PELLETS ORAL at 12:05

## 2022-08-08 RX ADMIN — AMLODIPINE BESYLATE 10 MG: 10 TABLET ORAL at 08:33

## 2022-08-08 RX ADMIN — MELATONIN TAB 3 MG 3 MG: 3 TAB at 21:27

## 2022-08-08 NOTE — PLAN OF CARE
Problem: Ineffective Coping  Goal: Demonstrates healthy coping skills  Outcome: Progressing  Goal: Free from restraint events  Description: - Utilize least restrictive measures   - Provide behavioral interventions   - Redirect inappropriate behaviors   Outcome: Progressing     Problem: Risk for Self Injury/Neglect  Goal: Refrain from harming self  Description: Interventions:  - Monitor patient closely, per order  - Develop a trusting relationship  - Supervise medication ingestion, monitor effects and side effects   Outcome: Progressing     Problem: Depression  Goal: Verbalize thoughts and feelings  Description: Interventions:  - Assess and re-assess patient's level of risk   - Engage patient in 1:1 interactions, daily, for a minimum of 15 minutes   - Encourage patient to express feelings, fears, frustrations, hopes   Outcome: Progressing  Goal: Refrain from harming self  Description: Interventions:  - Monitor patient closely, per order   - Supervise medication ingestion, monitor effects and side effects   Outcome: Progressing  Goal: Refrain from isolation  Description: Interventions:  - Develop a trusting relationship   - Encourage socialization   Outcome: Progressing  Goal: Refrain from self-neglect  Outcome: Progressing     Problem: Anxiety  Goal: Anxiety is at manageable level  Description: Interventions:  - Assess and monitor patient's anxiety level  - Monitor for signs and symptoms (heart palpitations, chest pain, shortness of breath, headaches, nausea, feeling jumpy, restlessness, irritable, apprehensive)  - Collaborate with interdisciplinary team and initiate plan and interventions as ordered    - [employer] patient to unit/surroundings  - Explain treatment plan  - Encourage participation in care  - Encourage verbalization of concerns/fears  - Identify coping mechanisms  - Assist in developing anxiety-reducing skills  - Administer/offer alternative therapies  - Limit or eliminate stimulants  Outcome: Progressing

## 2022-08-08 NOTE — PROGRESS NOTES
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

## 2022-08-08 NOTE — NURSING NOTE
Patient is alert and oriented X4  Behavior is pleasant and cooperative  Visible in the mileu  Social with peers  Denies SI/HI/AH/VH  No evidence of perceptual disturbances  Denies anxiety and depressive symptoms  Compliant with medications

## 2022-08-08 NOTE — DISCHARGE INSTR - OTHER ORDERS
At time of discharge you will go to [facility] @ [address] [facility]  [phone]        After discharge, if you find your coping skills are not as effective and you continue to feel distressed please call Lisa Duvall services are available 24 hours a day by calling [address] : [phone]    [facility] : [id number]     If you feel you are a danger to yourself or others please contact 911 or go to nearest Emergency room to seek immediate help  Sarath Sutlana or Mehnaz, our Aretha and Roro, will be calling you after your discharge, on the phone number that you provided  They will be available as an additional support, if needed  If you wish to speak with one of them, you may contact [facility] at [phone] or Clement Chan at [phone]

## 2022-08-08 NOTE — ED NOTES
Insurance Authorization for admission:   Phone call placed to [employer]   Phone number: [phone]  [id number]  Spoke to [facility]     ** days approved  Not given   Level of care:  Inpatient mental health  Authorization # [id number]

## 2022-08-08 NOTE — PROGRESS NOTES
08/08/22 0953   Team Meeting   Meeting Type Daily Rounds   Initial Conference Date 08/08/22   Next Conference Date 08/09/22   Team Members Present   Team Members Present Physician;Nurse;;Occupational Therapist;   Patient/Family Present   Patient Present No   Patient's Family Present No   MARIBEL Fischer Glorious [place], T   [place] - agitated, +SI to OD, , brought t o ER by daughter, Corneal erosion-family to bring eye drops, get lost driving-no longer permitted, cog  decline

## 2022-08-08 NOTE — PROGRESS NOTES
Progress Note - Behavioral Health   Kavya Sadler 80 y o  female MRN: [id number]  Unit/Bed#: Patsy Drew 918-44 Encounter: [id number]    The patient was seen for continuing care and reviewed with treatment team   Does not have any specific complaints  Talked about her political views and Zoroastrianism affiliation  She acknowledges that she becomes too passionate about issues and loses her temper and indicates that she does not want to be that way  Agreed to sign voluntary form  Slept through the night and her appetite is adequate    Current Mental Status Evaluation:  Appearance:  Adequate hygiene and grooming and Good eye contact   Behavior:  calm and cooperative   Mood:  Euthymic   Affect: appropriate and broad   Speech: Pressured   Thought Process:  Goal directed and coherent   Thought Content:  Does not verbalize delusional material   Perceptual Disturbances: Denies hallucinations and does not appear to be responding to internal stimuli   Risk Potential: No suicidal or homicidal ideation   Orientation:        Progress Toward Goals: No significant events in the past 24 hours  Principal Problem: Moderate episode of recurrent major depressive disorder ([address] )  Active Problems:    History of atrial fibrillation    Medical clearance for psychiatric admission    Hyperlipemia    Hypertension    Major neurocognitive disorder [facility])    Discharge planning update: The patient will return to previous living arrangement    Recommended Treatment: We will start small dose of Depakote for mood stabilization Continue with pharmacotherapy, group therapy, milieu therapy and occupational therapy    The patient will be maintained on the following medications:  Current Facility-Administered Medications   Medication Dose Route Frequency Provider Last Rate    acetaminophen  650 mg Oral Q4H PRN Dipti Byrd MD      acetaminophen  650 mg Oral Q4H PRN Dipti Byrd MD      acetaminophen  975 mg Oral Q6H PRN Dipti Byrd MD     [facility] amLODIPine  10 mg Oral Daily Obinna River MD      aspirin  81 mg Oral Daily Obinna River MD      divalproex sodium  125 mg Oral Q12H [facility], MD      flecainide  50 mg Oral Q12H [facility] Obinna River MD      LORazepam  1 mg Intramuscular Q6H PRN Max 3/day Obinna Rvier MD      LORazepam  0 5 mg Oral Q6H PRN Max 4/day Obinna River MD      LORazepam  1 mg Oral Q6H PRN Max 3/day Obinna River MD      melatonin  3 mg Oral HS Obinna River MD      nicotine polacrilex  4 mg Oral Q2H PRN Obinna River MD      OLANZapine  5 mg Intramuscular Q3H PRN Max 3/day Obinna River MD      OLANZapine  2 5 mg Oral Q4H PRN Max 6/day bOinna River MD      OLANZapine  5 mg Oral Q4H PRN Max 3/day Obinna River MD      OLANZapine  5 mg Oral Q3H PRN Max 3/day Obinna River MD      traZODone  50 mg Oral HS PRN Obinna River MD

## 2022-08-08 NOTE — PROGRESS NOTES
08/08/22 1347   Patient Intake   Living Arrangement House   Can patient return home?  Yes   Address to be Discharge to: [address] [place] [address] [place]   Patient's Telephone Number [phone] / [phone]   Type of Work Retired LPN   Work History Retired   School Grade/Year   ([address])   Admission Status   Status of Admission 201   Patient History   Currently in Treatment No   Substance Abuse No   Crisis Info   Release of Information Signed Yes  Alen [place] [phone],  [facility] - [phone])

## 2022-08-08 NOTE — PROGRESS NOTES
Pt attended al groups  Pt did mention she is involved with [facility] and pleased with their services  Pt enjoys being social   Pt noted she is struggling with cognition  08/08/22 1000   Activity/Group Checklist   Group Other (Comment)  ( open discussion)   Attendance Attended   Attendance Duration (min) 31-45   Interactions Interacted appropriately   Affect/Mood Appropriate   Goals Achieved Able to reflect/comment on own behavior;Able to engage in interactions; Able to listen to others; Able to self-disclose; Able to recieve feedback

## 2022-08-08 NOTE — PLAN OF CARE
Pt attends groups and visible      Problem: Alteration in Thoughts and Perception  Goal: Attend and participate in unit activities, including therapeutic, recreational, and educational groups  Description: Interventions:  -Encourage Visitation and family involvement in care  Outcome: Progressing

## 2022-08-08 NOTE — NURSING NOTE
Pt constricted but pleasant and med-compliant with good appetite and steady gait  Attended group  VSS  Denied SI  No agitation noted  Monitored for safety and support

## 2022-08-08 NOTE — CASE MANAGEMENT
Case Management Assessment    Patient name Dawit Talley  Location [facility] 650/-53 MRN [id number]  : 1940 Date 2022       Current Admission Date: 8/3/2022  Current Admission Diagnosis:Moderate episode of recurrent major depressive disorder [facility])   Patient Active Problem List    Diagnosis Date Noted    Medical clearance for psychiatric admission 2022    Hyperlipemia 2022    Hypertension 2022    Major neurocognitive disorder ([facility] [address] ) 2022    Generalized anxiety disorder 2021    Obstructive sleep apnea syndrome 2020    Febrile illness 2018    History of atrial fibrillation 2018    Degeneration of lumbar intervertebral disc 2017    History of total bilateral knee replacement 2017    Osteoporosis 05/10/2017    Minimal cognitive impairment 2016    Pain, joint, knee, right 2016    [facility] of [facility] 2016    [facility]oxysmal [facility] fibrillation ([email or web address]     Metabolic syndrome X     Anxiety state 2007    Migraine 2007    Peripheral venous insufficiency 2007      LOS (days): 3  Geometric Mean LOS (GMLOS) (days): 3 90  Days to GMLOS:0 9     OBJECTIVE:    Risk of Unplanned Readmission Score: 10 23         Current admission status: Inpatient Psych  Referral Reason: Psych    Preferred Pharmacy:   [address], [address] [facility] [address]  [address]  [address] [facility] [address]  Phone: [phone] Fax: [phone]    Primary Care Provider: Shabbir Blackwood MD    Primary Insurance: [employer] [address]:     ASSESSMENT:  Yoly Mendes Proxies    There are no active Health Care Proxies on file         Advance Directives  Advance Directives: Power of  for finance, Power of  for health care (Reports he daughter Gilbert Kay is POA)              Patient Information  Mental Status: Alert (Periods opf forgetfulness )              Patient Information Continued  Income Source: SSI/SSD (Retired LPN)  Current Status[de-identified] 201         Means of [employer] of Transport to [facility][de-identified] Family transport (Pt has a friend o provides some tranport, her daughter helps out and [facility] is also a provider  Pt no longer drives)      Pt was socializing in dayroom when approached to meet with CM privately  She was groomed and dressed appropriately and pleasantly agreed to meet privately  Pt was very talkative, but easily redirected as she became tangential with each answer she gave  Pt is  x 3 5 yrs  She has two living daughters  Eder Gillis lives very nearby to her own home  Lizbet Valero is looking into relocating from [place] back to this area  Pt's only son  at the age of 35 years old of a brain tumor  His name was Prydeinig Carmelo  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  She worked at [employer] and a [employer]  She is now retired  She lives alone in her own home  She no longer drives as she had episodes of getting lost while driving  Pt is very involved with her Mandaeism  She has many friends that she spends time with and they offer her transport to varied events  Pt is also involved with [employer] and attends a weekly social event there every Thursday  Pt enjoys writing and has Authored her own book titled, Do You Really Think I want To Annoy You  It is available for purchase online  Pt also enjoys writing poetry  Pt reports she is able to perform her own ADL's and get assist with her money management, otherwise she does her own cleaning, cooking, and laundry  Her daughter Everette manages her finances for her  Pt denies any substance abuse-but clarifies she does enjoy a glass of wine socially  She denies any legal issues  She reports her daughter Everette is her POA   She does possess a rifle that belonged to her  but believes her son-in-law is planning to remove it prior to her return home      IMM Rights signed  LISA: [facility] and Khadijah Baron

## 2022-08-08 NOTE — NURSING NOTE
Reji Herediap is pleasant, calm and cooperative  Pt is meal, med and group compliant  Pt voices no complaints or concerns

## 2022-08-08 NOTE — PROGRESS NOTES
08/08/22 1545   Team Meeting   Meeting Type Tx Team Meeting   Initial Conference Date 08/08/22   Next Conference Date 09/08/22   Team Members Present   Team Members Present Physician;Nurse;   Physician Team Member Dr Emeka Ibarra Team Member LAURIE Malagon Management Team Member MARIBEL   [place]   Patient/Family Present   Patient Present Yes   Patient's Family Present No

## 2022-08-09 PROCEDURE — 99232 SBSQ HOSP IP/OBS MODERATE 35: CPT | Performed by: PSYCHIATRY & NEUROLOGY

## 2022-08-09 RX ORDER — SODIUM CHLORIDE 5 %
OINTMENT (GRAM) OPHTHALMIC (EYE)
Status: DISCONTINUED | OUTPATIENT
Start: 2022-08-09 | End: 2022-08-19 | Stop reason: HOSPADM

## 2022-08-09 RX ADMIN — MELATONIN TAB 3 MG 3 MG: 3 TAB at 21:35

## 2022-08-09 RX ADMIN — DIVALPROEX SODIUM 125 MG: 125 CAPSULE, COATED PELLETS ORAL at 08:27

## 2022-08-09 RX ADMIN — DIVALPROEX SODIUM 125 MG: 125 CAPSULE, COATED PELLETS ORAL at 21:35

## 2022-08-09 RX ADMIN — SODIUM CHLORIDE: 50 OINTMENT OPHTHALMIC at 21:36

## 2022-08-09 RX ADMIN — FLECAINIDE ACETATE 50 MG: 50 TABLET ORAL at 08:27

## 2022-08-09 RX ADMIN — FLECAINIDE ACETATE 50 MG: 50 TABLET ORAL at 21:35

## 2022-08-09 RX ADMIN — ASPIRIN 81 MG CHEWABLE TABLET 81 MG: 81 TABLET CHEWABLE at 08:27

## 2022-08-09 RX ADMIN — AMLODIPINE BESYLATE 10 MG: 10 TABLET ORAL at 08:28

## 2022-08-09 NOTE — PROGRESS NOTES
08/09/22 1000   Activity/Group Checklist   Group   (Group Art Therapy/ Promoted spontaneous expression/ "Open Choice")   Attendance Attended   Attendance Duration (min) 46-60   Interactions Interacted appropriately   Affect/Mood Appropriate   Goals Achieved Able to listen to others; Able to engage in interactions  (Patient engaged with art materials; full participation)

## 2022-08-09 NOTE — PLAN OF CARE
Pt attends groups       Problem: Alteration in Thoughts and Perception  Goal: Attend and participate in unit activities, including therapeutic, recreational, and educational groups  Description: Interventions:  -Encourage Visitation and family involvement in care  Outcome: Progressing

## 2022-08-09 NOTE — PLAN OF CARE
Problem: Ineffective Coping  Goal: Cooperates with admission process  Description: Interventions:   - Complete admission process  Outcome: Progressing  Goal: Identifies ineffective coping skills  Outcome: Progressing  Goal: Identifies healthy coping skills  Outcome: Progressing  Goal: Demonstrates healthy coping skills  Outcome: Progressing  Goal: Patient/Family participate in treatment and DC plans  Description: Interventions:  - Provide therapeutic environment  Outcome: Progressing  Goal: Patient/Family verbalizes awareness of resources  Outcome: Progressing  Goal: Understands least restrictive measures  Description: Interventions:  - Utilize least restrictive behavior  Outcome: Progressing  Goal: Free from restraint events  Description: - Utilize least restrictive measures   - Provide behavioral interventions   - Redirect inappropriate behaviors   Outcome: Progressing     Problem: Risk for Self Injury/Neglect  Goal: Treatment Goal: Remain safe during length of stay, learn and adopt new coping skills, and be free of self-injurious ideation, impulses and acts at the time of discharge  Outcome: Progressing  Goal: Verbalize thoughts and feelings  Description: Interventions:  - Assess and re-assess patient's lethality and potential for self-injury  - Engage patient in 1:1 interactions, daily, for a minimum of 15 minutes  - Encourage patient to express feelings, fears, frustrations, hopes  - Establish rapport/trust with patient   Outcome: Progressing  Goal: Refrain from harming self  Description: Interventions:  - Monitor patient closely, per order  - Develop a trusting relationship  - Supervise medication ingestion, monitor effects and side effects   Outcome: Progressing  Goal: Recognize maladaptive responses and adopt new coping mechanisms  Outcome: Progressing  Goal: Complete daily ADLs, including personal hygiene independently, as able  Description: Interventions:  - Observe, teach, and assist patient with ADLS  - Monitor and promote a balance of rest/activity, with adequate nutrition and elimination  Outcome: Progressing     Problem: Depression  Goal: Treatment Goal: Demonstrate behavioral control of depressive symptoms, verbalize feelings of improved mood/affect, and adopt new coping skills prior to discharge  Outcome: Progressing  Goal: Verbalize thoughts and feelings  Description: Interventions:  - Assess and re-assess patient's level of risk   - Engage patient in 1:1 interactions, daily, for a minimum of 15 minutes   - Encourage patient to express feelings, fears, frustrations, hopes   Outcome: Progressing  Goal: Refrain from harming self  Description: Interventions:  - Monitor patient closely, per order   - Supervise medication ingestion, monitor effects and side effects   Outcome: Progressing  Goal: Refrain from isolation  Description: Interventions:  - Develop a trusting relationship   - Encourage socialization   Outcome: Progressing  Goal: Refrain from self-neglect  Outcome: Progressing  Goal: Complete daily ADLs, including personal hygiene independently, as able  Description: Interventions:  - Observe, teach, and assist patient with ADLS  -  Monitor and promote a balance of rest/activity, with adequate nutrition and elimination   Outcome: Progressing     Problem: Anxiety  Goal: Anxiety is at manageable level  Description: Interventions:  - Assess and monitor patient's anxiety level  - Monitor for signs and symptoms (heart palpitations, chest pain, shortness of breath, headaches, nausea, feeling jumpy, restlessness, irritable, apprehensive)  - Collaborate with interdisciplinary team and initiate plan and interventions as ordered    - [place] patient to unit/surroundings  - Explain treatment plan  - Encourage participation in care  - Encourage verbalization of concerns/fears  - Identify coping mechanisms  - Assist in developing anxiety-reducing skills  - Administer/offer alternative therapies  - Limit or eliminate stimulants  Outcome: Progressing

## 2022-08-09 NOTE — NURSING NOTE
Pt pleasant and jovial at times  Good appetite and steady gait  VSS  Attended group  Denied SI  No agitation noted  Monitored for safety and support

## 2022-08-09 NOTE — PROGRESS NOTES
08/09/22 0948   Team Meeting   Meeting Type Daily Rounds   Initial Conference Date 08/09/22   Next Conference Date 08/10/22   Team Members Present   Team Members Present Physician;Nurse;;Occupational Therapist;   Patient/Family Present   Patient Present No   Patient's Family Present No   Dr Nico Saldana  T  Renfer, Redmond Pillion, T Matthias Felty, C   [place] - calm, cooperative, visible, social, eats well, struggles with word finding, med compliant, slepty

## 2022-08-09 NOTE — CASE MANAGEMENT
Lisa Jones of [employer] called to alert us that [facility] has been contacted to come and assess this pt  She would like a call to be notified when they do come   [phone]

## 2022-08-09 NOTE — PROGRESS NOTES
Pt attended all groups  Pt was able to reminisce (about event that occurred in her 42's)  and spoke about her past relationship with her  and being "assertive" and holding onto anger  Pt able to stay for duration and focus on group topic       08/09/22 1330   Activity/Group Checklist   Group Other (Comment)  (conflict resolution)   Attendance Attended   Attendance Duration (min) 31-45   Interactions Interacted appropriately   Affect/Mood Appropriate   Goals Achieved Able to self-disclose; Able to listen to others; Able to engage in interactions; Able to recieve feedback; Able to manage/cope with feelings; Able to reflect/comment on own behavior;Discussed coping strategies

## 2022-08-09 NOTE — PROGRESS NOTES
Progress Note - Behavioral Health   Alka Perez 80 y o  female MRN: [id number]  Unit/Bed#: [facility] 474-42 Encounter: [id number]    The patient was seen for continuing care and reviewed with treatment team   She reports that she slept well through the night and has a normal appetite  She is aware of her short-term memory loss  She has been compliant with treatment plan    Current Mental Status Evaluation:  Appearance:  Adequate hygiene and grooming and Good eye contact   Behavior:  calm, cooperative and friendly   Mood:  Euthymic   Affect: appropriate   Speech: Pressured   Thought Process:  Goal directed and coherent   Thought Content:  Does not verbalize delusional material   Perceptual Disturbances: Denies hallucinations and does not appear to be responding to internal stimuli   Risk Potential: No suicidal or homicidal ideation   Orientation:        Progress Toward Goals: Tolerating medications  Principal Problem: Moderate episode of recurrent major depressive disorder ([address] )  Active Problems:    History of atrial fibrillation    Medical clearance for psychiatric admission    Hyperlipemia    Hypertension    Major neurocognitive disorder [facility])    Discharge planning update: The patient will return to previous living arrangement    Recommended Treatment: Continue with pharmacotherapy, group therapy, milieu therapy and occupational therapy    The patient will be maintained on the following medications:  Current Facility-Administered Medications   Medication Dose Route Frequency Provider Last Rate    acetaminophen  650 mg Oral Q4H PRN Timothy Wheeler MD      acetaminophen  650 mg Oral Q4H PRN Timothy Wheeler MD      acetaminophen  975 mg Oral Q6H PRN Timothy Wheeler MD      amLODIPine  10 mg Oral Daily Timothy Wheeler MD      aspirin  81 mg Oral Daily Timothy Wheeler MD      divalproex sodium  125 mg Oral Q12H [facility] MD Samina      flecainide  50 mg Oral Q12H [address] Timothy Wheeler MD      LORazepam  1 mg Intramuscular Q6H PRN Max 3/day Alis Wallace MD      LORazepam  0 5 mg Oral Q6H PRN Max 4/day Alis Wallace MD      LORazepam  1 mg Oral Q6H PRN Max 3/day Alis Wallace MD      melatonin  3 mg Oral HS Alis Wallace MD      nicotine polacrilex  4 mg Oral Q2H PRN Alis Wallace MD      OLANZapine  5 mg Intramuscular Q3H PRN Max 3/day Alis Wallace MD      OLANZapine  2 5 mg Oral Q4H PRN Max 6/day Alis Wallace MD      OLANZapine  5 mg Oral Q4H PRN Max 3/day Alis Wallace MD      OLANZapine  5 mg Oral Q3H PRN Max 3/day Alis Wallace MD      sodium chloride   Both Eyes Q3H PRN LUZ Contreras      traZODone  50 mg Oral HS PRN Alis Wallace MD

## 2022-08-10 PROCEDURE — 99232 SBSQ HOSP IP/OBS MODERATE 35: CPT | Performed by: PSYCHIATRY & NEUROLOGY

## 2022-08-10 RX ORDER — DONEPEZIL HYDROCHLORIDE 5 MG/1
5 TABLET, FILM COATED ORAL
Status: DISCONTINUED | OUTPATIENT
Start: 2022-08-10 | End: 2022-08-19 | Stop reason: HOSPADM

## 2022-08-10 RX ORDER — DIVALPROEX SODIUM 125 MG/1
250 CAPSULE, COATED PELLETS ORAL EVERY 12 HOURS SCHEDULED
Status: DISCONTINUED | OUTPATIENT
Start: 2022-08-10 | End: 2022-08-15

## 2022-08-10 RX ADMIN — DIVALPROEX SODIUM 125 MG: 125 CAPSULE, COATED PELLETS ORAL at 08:25

## 2022-08-10 RX ADMIN — MELATONIN TAB 3 MG 3 MG: 3 TAB at 21:35

## 2022-08-10 RX ADMIN — FLECAINIDE ACETATE 50 MG: 50 TABLET ORAL at 08:25

## 2022-08-10 RX ADMIN — DONEPEZIL HYDROCHLORIDE 5 MG: 5 TABLET, FILM COATED ORAL at 21:35

## 2022-08-10 RX ADMIN — ASPIRIN 81 MG CHEWABLE TABLET 81 MG: 81 TABLET CHEWABLE at 08:25

## 2022-08-10 RX ADMIN — AMLODIPINE BESYLATE 10 MG: 10 TABLET ORAL at 08:25

## 2022-08-10 RX ADMIN — FLECAINIDE ACETATE 50 MG: 50 TABLET ORAL at 21:35

## 2022-08-10 RX ADMIN — DIVALPROEX SODIUM 250 MG: 125 CAPSULE, COATED PELLETS ORAL at 21:35

## 2022-08-10 NOTE — NURSING NOTE
Patient visible on the unit throughout the shift sitting and socializing with peers in the dining room  Patient bright and pleasant on approach, currently denies depression, anxiety, SI/HI/AVH  Patient compliant with medications and meals, appetite good  PRN ophthalmic ointment given at 2136  No further signs of distress noted  VSS

## 2022-08-10 NOTE — PROGRESS NOTES
Progress Note - Behavioral Health   Autumn Kumar 80 y o  female MRN: [id number]  Unit/Bed#: [facility] 243-56 Encounter: [id number]    The patient was seen for continuing care and reviewed with treatment team   She does not have any specific complaints  Sleep and appetite have been adequate and she is visible on the unit, socializing with peers  SLUMS performed and she scored in the range of minimal cognitive impairment with a score of 24/30  Current Mental Status Evaluation:  Appearance:  Adequate hygiene and grooming and Good eye contact   Behavior:  calm and cooperative   Mood:  Euthymic   Affect: appropriate   Speech: Normal volume and Normal rate   Thought Process:  Goal directed and coherent   Thought Content:  Does not verbalize delusional material   Perceptual Disturbances: Denies hallucinations and does not appear to be responding to internal stimuli   Risk Potential: No suicidal or homicidal ideation   Orientation:        Progress Toward Goals: No significant events in the past 24 hours  Principal Problem:     Mood disorder with hypomanic features  Active Problems:    Minimal cognitive impairment    History of atrial fibrillation    Medical clearance for psychiatric admission    Hyperlipemia    Hypertension    Discharge planning update:Disposition to be determined    Recommended Treatment:  A trial of Aricept was discussed with the patient and she is agreeable  She appears to be aware of her limitations  Slowly increase Depakote Continue with pharmacotherapy, group therapy, milieu therapy and occupational therapy    The patient will be maintained on the following medications:  Current Facility-Administered Medications   Medication Dose Route Frequency Provider Last Rate    acetaminophen  650 mg Oral Q4H PRN Romeo Abdalla MD      acetaminophen  650 mg Oral Q4H PRN Romeo Abdalla MD      acetaminophen  975 mg Oral Q6H PRN Romeo Abdalla MD      amLODIPine  10 mg Oral Daily Romeo Abdalla MD  aspirin  81 mg Oral Daily Manuelito Bell MD      divalproex sodium  125 mg Oral Q12H [facility] MD      donepezil  5 mg Oral HS Manuelito Bell MD      flecainide  50 mg Oral Q12H [facility] Manuelito eBll MD      LORazepam  1 mg Intramuscular Q6H PRN Max 3/day Manuelito Bell MD      LORazepam  0 5 mg Oral Q6H PRN Max 4/day Manuelito Bell MD      LORazepam  1 mg Oral Q6H PRN Max 3/day Manuelito Bell MD      melatonin  3 mg Oral HS Manuelito Bell MD      nicotine polacrilex  4 mg Oral Q2H PRN Manuelito Bell MD      OLANZapine  5 mg Intramuscular Q3H PRN Max 3/day Manuelito Bell MD      OLANZapine  2 5 mg Oral Q4H PRN Max 6/day Manuelito Bell MD      OLANZapine  5 mg Oral Q4H PRN Max 3/day Manuelito Bell MD      OLANZapine  5 mg Oral Q3H PRN Max 3/day Manuelito Bell MD      sodium chloride   Both Eyes Q3H PRN LUZ Prak      traZODone  50 mg Oral HS PRN Manuelito Bell MD

## 2022-08-10 NOTE — PLAN OF CARE
Problem: Ineffective Coping  Goal: Cooperates with admission process  Description: Interventions:   - Complete admission process  Outcome: Progressing  Goal: Identifies ineffective coping skills  Outcome: Progressing  Goal: Identifies healthy coping skills  Outcome: Progressing  Goal: Demonstrates healthy coping skills  Outcome: Progressing  Goal: Patient/Family participate in treatment and DC plans  Description: Interventions:  - Provide therapeutic environment  Outcome: Progressing  Goal: Patient/Family verbalizes awareness of resources  Outcome: Progressing  Goal: Understands least restrictive measures  Description: Interventions:  - Utilize least restrictive behavior  Outcome: Progressing  Goal: Free from restraint events  Description: - Utilize least restrictive measures   - Provide behavioral interventions   - Redirect inappropriate behaviors   Outcome: Progressing     Problem: Risk for Self Injury/Neglect  Goal: Treatment Goal: Remain safe during length of stay, learn and adopt new coping skills, and be free of self-injurious ideation, impulses and acts at the time of discharge  Outcome: Progressing  Goal: Verbalize thoughts and feelings  Description: Interventions:  - Assess and re-assess patient's lethality and potential for self-injury  - Engage patient in 1:1 interactions, daily, for a minimum of 15 minutes  - Encourage patient to express feelings, fears, frustrations, hopes  - Establish rapport/trust with patient   Outcome: Progressing  Goal: Refrain from harming self  Description: Interventions:  - Monitor patient closely, per order  - Develop a trusting relationship  - Supervise medication ingestion, monitor effects and side effects   Outcome: Progressing  Goal: Recognize maladaptive responses and adopt new coping mechanisms  Outcome: Progressing  Goal: Complete daily ADLs, including personal hygiene independently, as able  Description: Interventions:  - Observe, teach, and assist patient with ADLS  - Monitor and promote a balance of rest/activity, with adequate nutrition and elimination  Outcome: Progressing     Problem: Depression  Goal: Treatment Goal: Demonstrate behavioral control of depressive symptoms, verbalize feelings of improved mood/affect, and adopt new coping skills prior to discharge  Outcome: Progressing  Goal: Verbalize thoughts and feelings  Description: Interventions:  - Assess and re-assess patient's level of risk   - Engage patient in 1:1 interactions, daily, for a minimum of 15 minutes   - Encourage patient to express feelings, fears, frustrations, hopes   Outcome: Progressing  Goal: Refrain from harming self  Description: Interventions:  - Monitor patient closely, per order   - Supervise medication ingestion, monitor effects and side effects   Outcome: Progressing  Goal: Refrain from isolation  Description: Interventions:  - Develop a trusting relationship   - Encourage socialization   Outcome: Progressing  Goal: Refrain from self-neglect  Outcome: Progressing  Goal: Complete daily ADLs, including personal hygiene independently, as able  Description: Interventions:  - Observe, teach, and assist patient with ADLS  -  Monitor and promote a balance of rest/activity, with adequate nutrition and elimination   Outcome: Progressing     Problem: Anxiety  Goal: Anxiety is at manageable level  Description: Interventions:  - Assess and monitor patient's anxiety level  - Monitor for signs and symptoms (heart palpitations, chest pain, shortness of breath, headaches, nausea, feeling jumpy, restlessness, irritable, apprehensive)  - Collaborate with interdisciplinary team and initiate plan and interventions as ordered    - [place] patient to unit/surroundings  - Explain treatment plan  - Encourage participation in care  - Encourage verbalization of concerns/fears  - Identify coping mechanisms  - Assist in developing anxiety-reducing skills  - Administer/offer alternative therapies  - Limit or eliminate stimulants  Outcome: Progressing

## 2022-08-10 NOTE — NURSING NOTE
Pt pleasant and med-compliant with good appetite and steady gait  Denied SI   VSS  Did not attend group  Monitored for safety and support

## 2022-08-11 PROCEDURE — 99232 SBSQ HOSP IP/OBS MODERATE 35: CPT | Performed by: PSYCHIATRY & NEUROLOGY

## 2022-08-11 RX ADMIN — SODIUM CHLORIDE 1 APPLICATION: 50 OINTMENT OPHTHALMIC at 02:12

## 2022-08-11 RX ADMIN — SODIUM CHLORIDE: 50 OINTMENT OPHTHALMIC at 22:40

## 2022-08-11 RX ADMIN — MELATONIN TAB 3 MG 3 MG: 3 TAB at 21:24

## 2022-08-11 RX ADMIN — ASPIRIN 81 MG CHEWABLE TABLET 81 MG: 81 TABLET CHEWABLE at 08:04

## 2022-08-11 RX ADMIN — FLECAINIDE ACETATE 50 MG: 50 TABLET ORAL at 21:24

## 2022-08-11 RX ADMIN — FLECAINIDE ACETATE 50 MG: 50 TABLET ORAL at 08:03

## 2022-08-11 RX ADMIN — AMLODIPINE BESYLATE 10 MG: 10 TABLET ORAL at 08:04

## 2022-08-11 RX ADMIN — DIVALPROEX SODIUM 250 MG: 125 CAPSULE, COATED PELLETS ORAL at 08:04

## 2022-08-11 RX ADMIN — DONEPEZIL HYDROCHLORIDE 5 MG: 5 TABLET, FILM COATED ORAL at 21:24

## 2022-08-11 RX ADMIN — DIVALPROEX SODIUM 250 MG: 125 CAPSULE, COATED PELLETS ORAL at 21:24

## 2022-08-11 NOTE — PROGRESS NOTES
Pt attended Relapse prevention group  Pt noted signs and symptoms upon admission were "temper (when someone disagrees with me) and flash temper"  Pt noted coping skill trying to get myself in a positive surrounding"  Crisis and warmline phone numbers provided  Pt signed and copy in chart  Pt attends groups and visible     08/11/22 1000   Activity/Group Checklist   Group Other (Comment)  ( Relapse prevention)   Attendance Attended   Attendance Duration (min) 31-45   Interactions Interacted appropriately   Affect/Mood Appropriate   Goals Achieved Able to engage in interactions; Able to listen to others; Able to manage/cope with feelings; Identified relapse prevention strategies; Discussed coping strategies
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
## 2022-08-11 NOTE — PLAN OF CARE
Pt attends groups and participates       Problem: Alteration in Thoughts and Perception  Goal: Attend and participate in unit activities, including therapeutic, recreational, and educational groups  Description: Interventions:  -Encourage Visitation and family involvement in care  Outcome: Progressing

## 2022-08-11 NOTE — NURSING NOTE
Pt pleasant and med-compliant with good appetite and steady gait  Participated well in group  VSS  Jovial and social with peers with no agitation noted  Denied SI  Pt given a small amount of prune juice for constipation, will monitor for effect  Monitored for safety and support

## 2022-08-11 NOTE — PLAN OF CARE
Problem: Ineffective Coping  Goal: Identifies healthy coping skills  Outcome: Progressing  Goal: Demonstrates healthy coping skills  Outcome: Progressing  Goal: Patient/Family verbalizes awareness of resources  Outcome: Progressing

## 2022-08-11 NOTE — NURSING NOTE
Patient visible on the unit throughout the shift sitting with peers in the dining room, social with staff on approach  Patient cooperative with assessment questions, initially reporting sadness related to hospital stay, however denies depression, anxiety, SI/HI/AVH  Patient brighter in the evening, reports better mood due to speaking to family on the phone  Compliant with medications and meals, appetite good  No further signs of distress noted  VSS

## 2022-08-11 NOTE — PROGRESS NOTES
Team Meeting   Meeting Type Daily Rounds   Initial Conference Date 08/09/22   Next Conference Date 08/10/22   Team Members Present   Team Members Present Physician;Nurse;;; Occupational Therapist;Other (Discipline and Name)   Patient/Family Present   Patient Present No   Patient's Family Present No   Dr 's  Butch Fraga,  MARIBEL Hobson   [place] -   doing better, awake x1 during night due to bad dream, SLUMS 24/30, eats, med compliant
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
## 2022-08-12 PROCEDURE — 99232 SBSQ HOSP IP/OBS MODERATE 35: CPT | Performed by: PSYCHIATRY & NEUROLOGY

## 2022-08-12 RX ORDER — POLYETHYLENE GLYCOL 3350 17 G/17G
17 POWDER, FOR SOLUTION ORAL DAILY PRN
Status: DISCONTINUED | OUTPATIENT
Start: 2022-08-12 | End: 2022-08-15

## 2022-08-12 RX ADMIN — FLECAINIDE ACETATE 50 MG: 50 TABLET ORAL at 08:52

## 2022-08-12 RX ADMIN — ASPIRIN 81 MG CHEWABLE TABLET 81 MG: 81 TABLET CHEWABLE at 08:51

## 2022-08-12 RX ADMIN — POLYETHYLENE GLYCOL 3350 17 G: 17 POWDER, FOR SOLUTION ORAL at 15:54

## 2022-08-12 RX ADMIN — SODIUM CHLORIDE: 50 OINTMENT OPHTHALMIC at 22:39

## 2022-08-12 RX ADMIN — DIVALPROEX SODIUM 250 MG: 125 CAPSULE, COATED PELLETS ORAL at 21:22

## 2022-08-12 RX ADMIN — DIVALPROEX SODIUM 250 MG: 125 CAPSULE, COATED PELLETS ORAL at 08:51

## 2022-08-12 RX ADMIN — AMLODIPINE BESYLATE 10 MG: 10 TABLET ORAL at 08:51

## 2022-08-12 RX ADMIN — DONEPEZIL HYDROCHLORIDE 5 MG: 5 TABLET, FILM COATED ORAL at 21:22

## 2022-08-12 RX ADMIN — MELATONIN TAB 3 MG 3 MG: 3 TAB at 21:22

## 2022-08-12 RX ADMIN — FLECAINIDE ACETATE 50 MG: 50 TABLET ORAL at 21:22

## 2022-08-12 NOTE — PROGRESS NOTES
Progress Note - Behavioral Health   Cyndie Santos 80 y o  female MRN: [id number]  Unit/Bed#: [facility] 738-75 Encounter: [id number]    The patient was seen for continuing care and reviewed with treatment team   She had a good night sleep  Appetite is good  She is visible on the unit and attends all the scheduled activities  Current Mental Status Evaluation:  Appearance:  Adequate hygiene and grooming and Good eye contact   Behavior:  calm and cooperative   Mood:  Euthymic   Affect: appropriate   Speech: Normal volume and Normal rate   Thought Process:  Goal directed and coherent   Thought Content:  Does not verbalize delusional material   Perceptual Disturbances: Denies hallucinations and does not appear to be responding to internal stimuli   Risk Potential: No suicidal or homicidal ideation   Orientation:        Progress Toward Goals: No significant events in the past 24 hours  Principal Problem:     Mood disorder with hypomanic features  Active Problems:    Minimal cognitive impairment    History of atrial fibrillation    Medical clearance for psychiatric admission    Hyperlipemia    Hypertension    Discharge planning update:Disposition to be determined    Recommended Treatment: Continue with pharmacotherapy, group therapy, milieu therapy and occupational therapy    The patient will be maintained on the following medications:  Current Facility-Administered Medications   Medication Dose Route Frequency Provider Last Rate    acetaminophen  650 mg Oral Q4H PRN Jesse Ham MD      acetaminophen  650 mg Oral Q4H PRN Jesse Ham MD      acetaminophen  975 mg Oral Q6H PRN Jesse Ham MD      amLODIPine  10 mg Oral Daily Jesse Ham MD      aspirin  81 mg Oral Daily Jesse Ham MD      divalproex sodium  250 mg Oral Q12H [address] Jesse Ham MD      donepezil  5 mg Oral HS Jesse Ham MD      flecainide  50 mg Oral Q12H [address] Jesse Ham MD      LORazepam  1 mg Intramuscular Q6H PRN Max 3/day Gm Domínguez MD      LORazepam  0 5 mg Oral Q6H PRN Max 4/day Gm Domínguez MD      LORazepam  1 mg Oral Q6H PRN Max 3/day Gm Domínguez MD      melatonin  3 mg Oral HS Gm Domínguez MD      nicotine polacrilex  4 mg Oral Q2H PRN Gm Domínguez MD      OLANZapine  5 mg Intramuscular Q3H PRN Max 3/day Gm Domínguez MD      OLANZapine  2 5 mg Oral Q4H PRN Max 6/day Gm Domínguez MD      OLANZapine  5 mg Oral Q4H PRN Max 3/day Gm Domínguez MD      OLANZapine  5 mg Oral Q3H PRN Max 3/day Gm Domínguez MD      sodium chloride   Both Eyes Q3H PRN LUZ Benjamin      traZODone  50 mg Oral HS PRN Gm Domínguez MD

## 2022-08-12 NOTE — NURSING NOTE
Patient visible on the unit sitting with peers in the dining room or walking the halls, social with staff and peers on approach  Patient bright and cooperative with assessment questions, currently denies depression, anxiety, SI/HI/AVH  Patient reports feeling briefly sad in the morning, reports this passed after writing some poetry  Reassured  Compliant with medications and meals, appetite good  PRN miralax given at 24-20-52-61  PRN eye ointment also given at 2239  No further signs of distress noted  VSS

## 2022-08-12 NOTE — PROGRESS NOTES
08/12/22 1000    Team Meeting   Meeting Type Daily Rounds   Initial Conference Date 08/12/22   Next Conference Date 08/13/22   Team Members Present   Team Members Present Physician;;Nurse;;Occupational Therapist;Other (Discipline and Name)   Patient/Family Present   Patient Present No   Patient's Family Present No      Dr 's  Yelitza & Jr Yeh,  , MARIBEL Parker Glorious Llano, T Savacool, C Axlerod -  attends grps, denies s/s, pleasant, slept, no nightmares, issues with STM, likes to be in control

## 2022-08-12 NOTE — CASE MANAGEMENT
Never received any call from [facility])after having received messages from St mercedes at [employer] that we would be getting a call from them to have pt assessed  CM called Nisa of [employer] and after she then contacted [facility]-they have never received any calls to contact us  CM will contact   [facility] today to investigate

## 2022-08-12 NOTE — NURSING NOTE
Pt pleasant and med-complaint with good appetite and steady gait  VSS  Attended group and social with peers  Denied SI  Monitored for safety and support

## 2022-08-12 NOTE — CASE MANAGEMENT
Since [facility] indicated this week that [facility] would be contacting us to arrange for assessment and they have not  I have called and left a VM for Deneen at [facility] and then spoke to Joe's daughter  Cruz Stanley is unsure of what [facility] is working on  She will call Lisa Jones and try to get some answers and direction

## 2022-08-12 NOTE — NURSING NOTE
Pt is calm an cooperative brightens on approach  Pt is visibile in milieu and interacts with peers and staff   Pt doing much better emptying neph drain bags and is progressing toward becoming more independent with ileostomy

## 2022-08-12 NOTE — PROGRESS NOTES
Pt attended ALL groups  Pt was clam and pleasant  Pt did mention that previously her relationship with daughter was strained but has improved communication  Pt did struggle with STM and recall  Pt shared her poetry and expressed gratitude  08/12/22 1330   Activity/Group Checklist   Group Other (Comment)  (positive reflection: poetry and music)   Attendance Attended   Attendance Duration (min) 31-45   Interactions Interacted appropriately   Affect/Mood Appropriate   Goals Achieved Able to listen to others; Able to engage in interactions; Discussed coping strategies; Able to self-disclose; Able to recieve feedback;Verbalized increased hopefulness; Able to manage/cope with feelings; Able to reflect/comment on own behavior

## 2022-08-12 NOTE — PLAN OF CARE
Problem: Ineffective Coping  Goal: Cooperates with admission process  Description: Interventions:   - Complete admission process  Outcome: Progressing  Goal: Identifies ineffective coping skills  Outcome: Progressing  Goal: Identifies healthy coping skills  Outcome: Progressing  Goal: Demonstrates healthy coping skills  Outcome: Progressing  Goal: Patient/Family participate in treatment and DC plans  Description: Interventions:  - Provide therapeutic environment  Outcome: Progressing  Goal: Patient/Family verbalizes awareness of resources  Outcome: Progressing  Goal: Understands least restrictive measures  Description: Interventions:  - Utilize least restrictive behavior  Outcome: Progressing  Goal: Free from restraint events  Description: - Utilize least restrictive measures   - Provide behavioral interventions   - Redirect inappropriate behaviors   Outcome: Progressing     Problem: Risk for Self Injury/Neglect  Goal: Treatment Goal: Remain safe during length of stay, learn and adopt new coping skills, and be free of self-injurious ideation, impulses and acts at the time of discharge  Outcome: Progressing  Goal: Verbalize thoughts and feelings  Description: Interventions:  - Assess and re-assess patient's lethality and potential for self-injury  - Engage patient in 1:1 interactions, daily, for a minimum of 15 minutes  - Encourage patient to express feelings, fears, frustrations, hopes  - Establish rapport/trust with patient   Outcome: Progressing  Goal: Refrain from harming self  Description: Interventions:  - Monitor patient closely, per order  - Develop a trusting relationship  - Supervise medication ingestion, monitor effects and side effects   Outcome: Progressing  Goal: Recognize maladaptive responses and adopt new coping mechanisms  Outcome: Progressing  Goal: Complete daily ADLs, including personal hygiene independently, as able  Description: Interventions:  - Observe, teach, and assist patient with ADLS  - Monitor and promote a balance of rest/activity, with adequate nutrition and elimination  Outcome: Progressing     Problem: Depression  Goal: Treatment Goal: Demonstrate behavioral control of depressive symptoms, verbalize feelings of improved mood/affect, and adopt new coping skills prior to discharge  Outcome: Progressing  Goal: Verbalize thoughts and feelings  Description: Interventions:  - Assess and re-assess patient's level of risk   - Engage patient in 1:1 interactions, daily, for a minimum of 15 minutes   - Encourage patient to express feelings, fears, frustrations, hopes   Outcome: Progressing  Goal: Refrain from harming self  Description: Interventions:  - Monitor patient closely, per order   - Supervise medication ingestion, monitor effects and side effects   Outcome: Progressing  Goal: Refrain from isolation  Description: Interventions:  - Develop a trusting relationship   - Encourage socialization   Outcome: Progressing  Goal: Refrain from self-neglect  Outcome: Progressing  Goal: Complete daily ADLs, including personal hygiene independently, as able  Description: Interventions:  - Observe, teach, and assist patient with ADLS  -  Monitor and promote a balance of rest/activity, with adequate nutrition and elimination   Outcome: Progressing     Problem: Anxiety  Goal: Anxiety is at manageable level  Description: Interventions:  - Assess and monitor patient's anxiety level  - Monitor for signs and symptoms (heart palpitations, chest pain, shortness of breath, headaches, nausea, feeling jumpy, restlessness, irritable, apprehensive)  - Collaborate with interdisciplinary team and initiate plan and interventions as ordered    - [place] patient to unit/surroundings  - Explain treatment plan  - Encourage participation in care  - Encourage verbalization of concerns/fears  - Identify coping mechanisms  - Assist in developing anxiety-reducing skills  - Administer/offer alternative therapies  - Limit or eliminate stimulants  Outcome: Progressing

## 2022-08-13 PROCEDURE — 99232 SBSQ HOSP IP/OBS MODERATE 35: CPT | Performed by: PHYSICIAN ASSISTANT

## 2022-08-13 RX ADMIN — DONEPEZIL HYDROCHLORIDE 5 MG: 5 TABLET, FILM COATED ORAL at 21:09

## 2022-08-13 RX ADMIN — MELATONIN TAB 3 MG 3 MG: 3 TAB at 21:09

## 2022-08-13 RX ADMIN — DIVALPROEX SODIUM 250 MG: 125 CAPSULE, COATED PELLETS ORAL at 10:24

## 2022-08-13 RX ADMIN — DIVALPROEX SODIUM 250 MG: 125 CAPSULE, COATED PELLETS ORAL at 21:09

## 2022-08-13 RX ADMIN — AMLODIPINE BESYLATE 10 MG: 10 TABLET ORAL at 10:23

## 2022-08-13 RX ADMIN — ASPIRIN 81 MG CHEWABLE TABLET 81 MG: 81 TABLET CHEWABLE at 10:23

## 2022-08-13 RX ADMIN — FLECAINIDE ACETATE 50 MG: 50 TABLET ORAL at 21:10

## 2022-08-13 RX ADMIN — FLECAINIDE ACETATE 50 MG: 50 TABLET ORAL at 10:23

## 2022-08-13 NOTE — PLAN OF CARE
Problem: Ineffective Coping  Goal: Cooperates with admission process  Description: Interventions:   - Complete admission process  Outcome: Progressing  Goal: Identifies ineffective coping skills  Outcome: Progressing  Goal: Identifies healthy coping skills  Outcome: Progressing  Goal: Demonstrates healthy coping skills  Outcome: Progressing  Goal: Patient/Family participate in treatment and DC plans  Description: Interventions:  - Provide therapeutic environment  Outcome: Progressing  Goal: Patient/Family verbalizes awareness of resources  Outcome: Progressing  Goal: Understands least restrictive measures  Description: Interventions:  - Utilize least restrictive behavior  Outcome: Progressing  Goal: Free from restraint events  Description: - Utilize least restrictive measures   - Provide behavioral interventions   - Redirect inappropriate behaviors   Outcome: Progressing     Problem: Risk for Self Injury/Neglect  Goal: Treatment Goal: Remain safe during length of stay, learn and adopt new coping skills, and be free of self-injurious ideation, impulses and acts at the time of discharge  Outcome: Progressing  Goal: Verbalize thoughts and feelings  Description: Interventions:  - Assess and re-assess patient's lethality and potential for self-injury  - Engage patient in 1:1 interactions, daily, for a minimum of 15 minutes  - Encourage patient to express feelings, fears, frustrations, hopes  - Establish rapport/trust with patient   Outcome: Progressing  Goal: Refrain from harming self  Description: Interventions:  - Monitor patient closely, per order  - Develop a trusting relationship  - Supervise medication ingestion, monitor effects and side effects   Outcome: Progressing  Goal: Recognize maladaptive responses and adopt new coping mechanisms  Outcome: Progressing  Goal: Complete daily ADLs, including personal hygiene independently, as able  Description: Interventions:  - Observe, teach, and assist patient with ADLS  - Monitor and promote a balance of rest/activity, with adequate nutrition and elimination  Outcome: Progressing     Problem: Depression  Goal: Treatment Goal: Demonstrate behavioral control of depressive symptoms, verbalize feelings of improved mood/affect, and adopt new coping skills prior to discharge  Outcome: Progressing  Goal: Verbalize thoughts and feelings  Description: Interventions:  - Assess and re-assess patient's level of risk   - Engage patient in 1:1 interactions, daily, for a minimum of 15 minutes   - Encourage patient to express feelings, fears, frustrations, hopes   Outcome: Progressing  Goal: Refrain from harming self  Description: Interventions:  - Monitor patient closely, per order   - Supervise medication ingestion, monitor effects and side effects   Outcome: Progressing  Goal: Refrain from isolation  Description: Interventions:  - Develop a trusting relationship   - Encourage socialization   Outcome: Progressing  Goal: Refrain from self-neglect  Outcome: Progressing  Goal: Complete daily ADLs, including personal hygiene independently, as able  Description: Interventions:  - Observe, teach, and assist patient with ADLS  -  Monitor and promote a balance of rest/activity, with adequate nutrition and elimination   Outcome: Progressing     Problem: Anxiety  Goal: Anxiety is at manageable level  Description: Interventions:  - Assess and monitor patient's anxiety level  - Monitor for signs and symptoms (heart palpitations, chest pain, shortness of breath, headaches, nausea, feeling jumpy, restlessness, irritable, apprehensive)  - Collaborate with interdisciplinary team and initiate plan and interventions as ordered    - [place] patient to unit/surroundings  - Explain treatment plan  - Encourage participation in care  - Encourage verbalization of concerns/fears  - Identify coping mechanisms  - Assist in developing anxiety-reducing skills  - Administer/offer alternative therapies  - Limit or eliminate stimulants  Outcome: Progressing

## 2022-08-13 NOTE — NURSING NOTE
Patient calm and compliant this shift  Reports 5/10 depression; denies anxiety/SI/HI/AH/VH at this time  Compliant with meals and medications  Denies any needs at this time  Social with staff and peers  Will continue to monitor

## 2022-08-13 NOTE — PROGRESS NOTES
Progress Note - Behavioral Health     Mounika Segovia 80 y o  female MRN: [id number]   Unit/Bed#: [facility] 650-01 Encounter: [id number]    Behavior over the last 24 hours: unchanged  Saji Beth is an 44-year-old female with history of mood disorder who presents for psychiatric follow-up  Staff reports no behavioral issues overnight  Currently awaiting placement  Reports feeling a little crabby upon approach and attributes this to poor sleep secondary to racing thoughts about issues with her daughter  Thoughts are circumstantial and with somewhat of a somatic preoccupation  She offers good insight into the events leading to her hospitalization stating, I was upset and I wanted to end things  I was going to take pills, but I could not bring myself to do it  So I got into bed and pulled the covers over my head thinking I would suffocate  What a foolish thing to do    Currently feeling more hopeful and optimistic about the future, adamantly denies any SI or HI  No AH or VH      Sleep: slept off and on, frequent awakenings  Appetite: normal  Medication side effects: Yes - constipation x6 days, was able to have a small BM this morning   ROS: all other systems are negative    Mental Status Evaluation:    Appearance:  age appropriate, adequate grooming, dressed in hospital attire   Behavior:  pleasant, cooperative, calm   Speech:  normal rate and volume   Mood:  improved, "a little crabby today"   Affect:  brighter, mood-congruent   Thought Process:  circumstantial   Associations: circumstantial associations   Thought Content:  no overt delusions, somatic preoccupation   Perceptual Disturbances: no auditory hallucinations, no visual hallucinations, does not appear responding to internal stimuli   Risk Potential: Suicidal ideation - None at present, status post suicidal gesture, remorseful now, contracts for safety on the unit, would talk to staff if not feeling safe on the unit  Homicidal ideation - None at present  Potential for aggression - No   Sensorium:  oriented to person, place and time/date   Memory:  recent and remote memory grossly intact   Consciousness:  alert and awake   Attention/Concentration: attention span and concentration are age appropriate   Insight:  fair   Judgment: fair   Gait/Station: normal gait/station   Motor Activity: no abnormal movements     Vital signs in last 24 hours:    Temp:  [96 6 °F (35 9 °C)] 96 6 °F (35 9 °C)  HR:  [66-74] 66  Resp:  [16] 16  BP: (146-179)/(63-75) 150/70    Laboratory results: I have personally reviewed all pertinent laboratory/tests results    Results from the past 24 hours: No results found for this or any previous visit (from the past 24 hour(s))  Most Recent Labs:   Lab Results   Component Value Date    WBC 6 79 08/03/2022    RBC 5 22 (H) 08/03/2022    HGB 15 1 08/03/2022    HCT 46 9 (H) 08/03/2022     08/03/2022    RDW 13 1 08/03/2022    NEUTROABS 3 19 08/03/2022    SODIUM 141 08/03/2022    K 4 0 08/03/2022     08/03/2022    CO2 25 08/03/2022    BUN 21 08/03/2022    CREATININE 0 78 08/03/2022    GLUC 92 08/03/2022    CALCIUM 9 4 08/03/2022    AST 24 08/03/2022    ALT 22 08/03/2022    ALKPHOS 66 08/03/2022    TP 7 9 08/03/2022    ALB 4 7 08/03/2022    TBILI 0 34 08/03/2022    PBA6HMQJSARH 1 960 08/03/2022    HGBA1C 6 4 (H) 12/08/2021     12/08/2021       Progress Toward Goals: progressing    Assessment/Plan   Principal Problem:     Mood disorder with hypomanic features  Active Problems:    History of atrial fibrillation    Minimal cognitive impairment    Medical clearance for psychiatric admission    Hyperlipemia    Hypertension      Recommended Treatment:     Planned medication and treatment changes:     All current active medications have been reviewed  Encourage group therapy, milieu therapy and occupational therapy  Behavioral Health checks every 7 minutes  Continue current medications:    Current Facility-Administered Medications Medication Dose Route Frequency Provider Last Rate    acetaminophen  650 mg Oral Q4H PRN Megan Mosley MD      acetaminophen  650 mg Oral Q4H PRN Megan Mosley MD      acetaminophen  975 mg Oral Q6H PRN Megan Mosley MD      amLODIPine  10 mg Oral Daily Megan Mosley MD      aspirin  81 mg Oral Daily Megan Mosley MD      divalproex sodium  250 mg Oral Q12H [address] Megan Mosley MD      donepezil  5 mg Oral HS Megan Mosley MD      flecainide  50 mg Oral Q12H [address] Megan Mosley MD      LORazepam  1 mg Intramuscular Q6H PRN Max 3/day Megan Mosley MD      LORazepam  0 5 mg Oral Q6H PRN Max 4/day Megan Mosley MD      LORazepam  1 mg Oral Q6H PRN Max 3/day Megan Mosley MD      melatonin  3 mg Oral HS Megan Mosley MD      nicotine polacrilex  4 mg Oral Q2H PRN Megan Mosley MD      OLANZapine  5 mg Intramuscular Q3H PRN Max 3/day Megan Mosley MD      OLANZapine  2 5 mg Oral Q4H PRN Max 6/day Megan Mosley MD      OLANZapine  5 mg Oral Q4H PRN Max 3/day Megan Mosley MD      OLANZapine  5 mg Oral Q3H PRN Max 3/day Megan Mosley MD      polyethylene glycol  17 g Oral Daily PRN Preeti Jara PA-C      sodium chloride   Both Eyes Q3H PRN LUZ Zeng      traZODone  50 mg Oral HS PRN Megan Mosley MD       Risks / Benefits of Treatment:    Risks, benefits, and possible side effects of medications explained to patient and patient verbalizes understanding and agreement for treatment  Counseling / Coordination of Care:    Patient's progress discussed with staff in treatment team meeting  Medications, treatment progress and treatment plan reviewed with patient      Shimon Willis PA-C 08/13/22

## 2022-08-14 PROCEDURE — 99232 SBSQ HOSP IP/OBS MODERATE 35: CPT | Performed by: PHYSICIAN ASSISTANT

## 2022-08-14 RX ORDER — LANOLIN ALCOHOL/MO/W.PET/CERES
6 CREAM (GRAM) TOPICAL
Status: DISCONTINUED | OUTPATIENT
Start: 2022-08-14 | End: 2022-08-19 | Stop reason: HOSPADM

## 2022-08-14 RX ADMIN — FLECAINIDE ACETATE 50 MG: 50 TABLET ORAL at 08:00

## 2022-08-14 RX ADMIN — DIVALPROEX SODIUM 250 MG: 125 CAPSULE, COATED PELLETS ORAL at 08:00

## 2022-08-14 RX ADMIN — FLECAINIDE ACETATE 50 MG: 50 TABLET ORAL at 21:14

## 2022-08-14 RX ADMIN — MELATONIN TAB 3 MG 6 MG: 3 TAB at 21:14

## 2022-08-14 RX ADMIN — AMLODIPINE BESYLATE 10 MG: 10 TABLET ORAL at 08:00

## 2022-08-14 RX ADMIN — DIVALPROEX SODIUM 250 MG: 125 CAPSULE, COATED PELLETS ORAL at 21:14

## 2022-08-14 RX ADMIN — ASPIRIN 81 MG CHEWABLE TABLET 81 MG: 81 TABLET CHEWABLE at 08:00

## 2022-08-14 RX ADMIN — DONEPEZIL HYDROCHLORIDE 5 MG: 5 TABLET, FILM COATED ORAL at 21:14

## 2022-08-14 NOTE — NURSING NOTE
Patient calm and cooperative throughout the day  Reports 10/10 depression and was tearful in the AM  Patient did brighten in the afternoon and reported writing poetry  Denies anxiety at this time

## 2022-08-14 NOTE — PLAN OF CARE
Problem: Ineffective Coping  Goal: Cooperates with admission process  Description: Interventions:   - Complete admission process  Outcome: Progressing  Goal: Identifies ineffective coping skills  Outcome: Progressing  Goal: Identifies healthy coping skills  Outcome: Progressing  Goal: Demonstrates healthy coping skills  Outcome: Progressing  Goal: Patient/Family participate in treatment and DC plans  Description: Interventions:  - Provide therapeutic environment  Outcome: Progressing  Goal: Patient/Family verbalizes awareness of resources  Outcome: Progressing  Goal: Understands least restrictive measures  Description: Interventions:  - Utilize least restrictive behavior  Outcome: Progressing  Goal: Free from restraint events  Description: - Utilize least restrictive measures   - Provide behavioral interventions   - Redirect inappropriate behaviors   Outcome: Progressing     Problem: Risk for Self Injury/Neglect  Goal: Treatment Goal: Remain safe during length of stay, learn and adopt new coping skills, and be free of self-injurious ideation, impulses and acts at the time of discharge  Outcome: Progressing  Goal: Verbalize thoughts and feelings  Description: Interventions:  - Assess and re-assess patient's lethality and potential for self-injury  - Engage patient in 1:1 interactions, daily, for a minimum of 15 minutes  - Encourage patient to express feelings, fears, frustrations, hopes  - Establish rapport/trust with patient   Outcome: Progressing  Goal: Refrain from harming self  Description: Interventions:  - Monitor patient closely, per order  - Develop a trusting relationship  - Supervise medication ingestion, monitor effects and side effects   Outcome: Progressing  Goal: Recognize maladaptive responses and adopt new coping mechanisms  Outcome: Progressing  Goal: Complete daily ADLs, including personal hygiene independently, as able  Description: Interventions:  - Observe, teach, and assist patient with ADLS  - Monitor and promote a balance of rest/activity, with adequate nutrition and elimination  Outcome: Progressing     Problem: Depression  Goal: Treatment Goal: Demonstrate behavioral control of depressive symptoms, verbalize feelings of improved mood/affect, and adopt new coping skills prior to discharge  Outcome: Progressing  Goal: Verbalize thoughts and feelings  Description: Interventions:  - Assess and re-assess patient's level of risk   - Engage patient in 1:1 interactions, daily, for a minimum of 15 minutes   - Encourage patient to express feelings, fears, frustrations, hopes   Outcome: Progressing  Goal: Refrain from harming self  Description: Interventions:  - Monitor patient closely, per order   - Supervise medication ingestion, monitor effects and side effects   Outcome: Progressing  Goal: Refrain from isolation  Description: Interventions:  - Develop a trusting relationship   - Encourage socialization   Outcome: Progressing  Goal: Refrain from self-neglect  Outcome: Progressing  Goal: Complete daily ADLs, including personal hygiene independently, as able  Description: Interventions:  - Observe, teach, and assist patient with ADLS  -  Monitor and promote a balance of rest/activity, with adequate nutrition and elimination   Outcome: Progressing     Problem: Anxiety  Goal: Anxiety is at manageable level  Description: Interventions:  - Assess and monitor patient's anxiety level  - Monitor for signs and symptoms (heart palpitations, chest pain, shortness of breath, headaches, nausea, feeling jumpy, restlessness, irritable, apprehensive)  - Collaborate with interdisciplinary team and initiate plan and interventions as ordered    - [place] patient to unit/surroundings  - Explain treatment plan  - Encourage participation in care  - Encourage verbalization of concerns/fears  - Identify coping mechanisms  - Assist in developing anxiety-reducing skills  - Administer/offer alternative therapies  - Limit or eliminate stimulants  Outcome: Progressing

## 2022-08-14 NOTE — PROGRESS NOTES
Progress Note - Behavioral Health     Reinier Lopez 80 y o  female MRN: [id number]   Unit/Bed#: [facility] 650-01 Encounter: [id number]    Behavior over the last 24 hours: unchanged  Amarilis Ware is an 26-year-old female with history of mood disorder with hypomanic features who presents for psychiatric follow-up  Staff reports no issues overnight  She is initially pleasant, calm and bright upon approach, however, becomes tearful and upset during our conversation  She reports feeling down, depressed and hopeless in the context of issues with her daughter  She states, it's not okay to take way when I love  But I want people to like me and I want people to be happy so I say yes to everything    Her speech does grow more tangential, however, she is easily redirectable  Endorses ruminations and racing thoughts, worse at bedtime interfering with sleep  States she would benefit from meeting with a therapist 1 on 1  No longer feeling suicidal and has remorse for her suicidal gesture prior to admission  No HI no psychotic symptoms either      Sleep: decreased, restless sleep  Appetite: normal  Medication side effects: No   ROS: all other systems are negative    Mental Status Evaluation:    Appearance:  age appropriate, adequate grooming, wearing hospital clothes   Behavior:  cooperative, calm   Speech:  hypertalkative   Mood:  depressed, dysphoric   Affect:  blunted, tearful   Thought Process:  tangential, racing of thoughts   Associations: tangential associations   Thought Content:  no overt delusions, negative thinking, negative thoughts, ruminating thoughts   Perceptual Disturbances: no auditory hallucinations, no visual hallucinations, does not appear responding to internal stimuli   Risk Potential: Suicidal ideation - None at present, status post suicidal gesture, remorseful now, contracts for safety on the unit, would talk to staff if not feeling safe on the unit  Homicidal ideation - None at present  Potential for aggression - No   Sensorium:  oriented to person, place and time/date   Memory:  recent and remote memory grossly intact   Consciousness:  alert and awake   Attention/Concentration: attention span and concentration are age appropriate   Insight:  fair   Judgment: fair   Gait/Station: normal gait/station   Motor Activity: no abnormal movements     Vital signs in last 24 hours:    Temp:  [97 °F (36 1 °C)-97 6 °F (36 4 °C)] 97 °F (36 1 °C)  HR:  [66-71] 71  Resp:  [16] 16  BP: (129-137)/(61-73) 137/73    Laboratory results: I have personally reviewed all pertinent laboratory/tests results    Results from the past 24 hours: No results found for this or any previous visit (from the past 24 hour(s))  Most Recent Labs:   Lab Results   Component Value Date    WBC 6 79 08/03/2022    RBC 5 22 (H) 08/03/2022    HGB 15 1 08/03/2022    HCT 46 9 (H) 08/03/2022     08/03/2022    RDW 13 1 08/03/2022    NEUTROABS 3 19 08/03/2022    SODIUM 141 08/03/2022    K 4 0 08/03/2022     08/03/2022    CO2 25 08/03/2022    BUN 21 08/03/2022    CREATININE 0 78 08/03/2022    GLUC 92 08/03/2022    CALCIUM 9 4 08/03/2022    AST 24 08/03/2022    ALT 22 08/03/2022    ALKPHOS 66 08/03/2022    TP 7 9 08/03/2022    ALB 4 7 08/03/2022    TBILI 0 34 08/03/2022    VJU0MQPPUEQI 1 960 08/03/2022    HGBA1C 6 4 (H) 12/08/2021     12/08/2021       Progress Toward Goals: progressing    Assessment/Plan   Principal Problem:     Mood disorder with hypomanic features  Active Problems:    History of atrial fibrillation    Minimal cognitive impairment    Medical clearance for psychiatric admission    Hyperlipemia    Hypertension      Recommended Treatment:     Planned medication and treatment changes:     All current active medications have been reviewed  Encourage group therapy, milieu therapy and occupational therapy  Behavioral Health checks every 7 minutes    Increase melatonin to 6mg qhs for sleep    Depakote was increased to 250mg BID on 8/11/22; will obtain VPA level, CBC and CMP  Current Facility-Administered Medications   Medication Dose Route Frequency Provider Last Rate    acetaminophen  650 mg Oral Q4H PRN Timothy Wheeler MD      acetaminophen  650 mg Oral Q4H PRN Timothy Weheler MD      acetaminophen  975 mg Oral Q6H PRN Timothy Wheeler MD      amLODIPine  10 mg Oral Daily Timothy Wheeler MD      aspirin  81 mg Oral Daily Timothy Wheeler MD      divalproex sodium  250 mg Oral Q12H [facility] Timothy Wheeler MD      donepezil  5 mg Oral HS Timothy Wheeler MD      flecainide  50 mg Oral Q12H [facility] Timothy Wheeler MD      LORazepam  1 mg Intramuscular Q6H PRN Max 3/day Timothy Wheeler MD      LORazepam  0 5 mg Oral Q6H PRN Max 4/day Timothy Wheeler MD      LORazepam  1 mg Oral Q6H PRN Max 3/day Timothy Wheeler MD      melatonin  3 mg Oral HS Timothy Wheeler MD      nicotine polacrilex  4 mg Oral Q2H PRN Timothy Wheeler MD      OLANZapine  5 mg Intramuscular Q3H PRN Max 3/day Timothy Wheeler MD      OLANZapine  2 5 mg Oral Q4H PRN Max 6/day Timothy Wheeler MD      OLANZapine  5 mg Oral Q4H PRN Max 3/day Timothy Wheeler MD      OLANZapine  5 mg Oral Q3H PRN Max 3/day Timothy Wheeler MD      polyethylene glycol  17 g Oral Daily PRN Jeanine Rowland PA-C      sodium chloride   Both Eyes Q3H PRN LUZ [place]      traZODone  50 mg Oral HS PRN Timothy Wheeler MD       Risks / Benefits of Treatment:    Risks, benefits, and possible side effects of medications explained to patient and patient verbalizes understanding and agreement for treatment  Counseling / Coordination of Care:    Patient's progress discussed with staff in treatment team meeting  Medications, treatment progress and treatment plan reviewed with patient      Sherif Parada PA-C 08/14/22

## 2022-08-15 LAB
ALBUMIN SERPL BCP-MCNC: 4.2 G/DL (ref 3.5–5)
ALP SERPL-CCNC: 66 U/L (ref 43–122)
ALT SERPL W P-5'-P-CCNC: 24 U/L
ANION GAP SERPL CALCULATED.3IONS-SCNC: 11 MMOL/L (ref 5–14)
AST SERPL W P-5'-P-CCNC: 25 U/L (ref 14–36)
BASOPHILS # BLD AUTO: 0.03 THOUSANDS/ΜL (ref 0–0.1)
BASOPHILS NFR BLD AUTO: 1 % (ref 0–1)
BILIRUB SERPL-MCNC: 0.44 MG/DL (ref 0.2–1)
BUN SERPL-MCNC: 25 MG/DL (ref 5–25)
CALCIUM SERPL-MCNC: 9.1 MG/DL (ref 8.4–10.2)
CHLORIDE SERPL-SCNC: 103 MMOL/L (ref 96–108)
CO2 SERPL-SCNC: 26 MMOL/L (ref 21–32)
CREAT SERPL-MCNC: 0.74 MG/DL (ref 0.6–1.2)
EOSINOPHIL # BLD AUTO: 0.13 THOUSAND/ΜL (ref 0–0.61)
EOSINOPHIL NFR BLD AUTO: 2 % (ref 0–6)
ERYTHROCYTE [DISTWIDTH] IN BLOOD BY AUTOMATED COUNT: 13.2 % (ref 11.6–15.1)
GFR SERPL CREATININE-BSD FRML MDRD: 75 ML/MIN/1.73SQ M
GLUCOSE P FAST SERPL-MCNC: 108 MG/DL (ref 70–99)
GLUCOSE SERPL-MCNC: 108 MG/DL (ref 70–99)
HCT VFR BLD AUTO: 47.2 % (ref 34.8–46.1)
HGB BLD-MCNC: 15.1 G/DL (ref 11.5–15.4)
IMM GRANULOCYTES # BLD AUTO: 0.01 THOUSAND/UL (ref 0–0.2)
IMM GRANULOCYTES NFR BLD AUTO: 0 % (ref 0–2)
LYMPHOCYTES # BLD AUTO: 2.52 THOUSANDS/ΜL (ref 0.6–4.47)
LYMPHOCYTES NFR BLD AUTO: 41 % (ref 14–44)
MCH RBC QN AUTO: 28.7 PG (ref 26.8–34.3)
MCHC RBC AUTO-ENTMCNC: 32 G/DL (ref 31.4–37.4)
MCV RBC AUTO: 90 FL (ref 82–98)
MONOCYTES # BLD AUTO: 0.85 THOUSAND/ΜL (ref 0.17–1.22)
MONOCYTES NFR BLD AUTO: 14 % (ref 4–12)
NEUTROPHILS # BLD AUTO: 2.63 THOUSANDS/ΜL (ref 1.85–7.62)
NEUTS SEG NFR BLD AUTO: 42 % (ref 43–75)
NRBC BLD AUTO-RTO: 0 /100 WBCS
PLATELET # BLD AUTO: 187 THOUSANDS/UL (ref 149–390)
PMV BLD AUTO: 9.7 FL (ref 8.9–12.7)
POTASSIUM SERPL-SCNC: 3.8 MMOL/L (ref 3.5–5.3)
PROT SERPL-MCNC: 7.2 G/DL (ref 6.4–8.4)
RBC # BLD AUTO: 5.26 MILLION/UL (ref 3.81–5.12)
SODIUM SERPL-SCNC: 140 MMOL/L (ref 135–147)
VALPROATE SERPL-MCNC: 58.2 UG/ML (ref 50–120)
WBC # BLD AUTO: 6.17 THOUSAND/UL (ref 4.31–10.16)

## 2022-08-15 PROCEDURE — 85025 COMPLETE CBC W/AUTO DIFF WBC: CPT | Performed by: PHYSICIAN ASSISTANT

## 2022-08-15 PROCEDURE — 99232 SBSQ HOSP IP/OBS MODERATE 35: CPT | Performed by: PSYCHIATRY & NEUROLOGY

## 2022-08-15 PROCEDURE — 80053 COMPREHEN METABOLIC PANEL: CPT | Performed by: PHYSICIAN ASSISTANT

## 2022-08-15 PROCEDURE — 80164 ASSAY DIPROPYLACETIC ACD TOT: CPT | Performed by: PHYSICIAN ASSISTANT

## 2022-08-15 RX ORDER — DIVALPROEX SODIUM 125 MG/1
250 CAPSULE, COATED PELLETS ORAL EVERY 8 HOURS SCHEDULED
Status: DISCONTINUED | OUTPATIENT
Start: 2022-08-15 | End: 2022-08-19 | Stop reason: HOSPADM

## 2022-08-15 RX ORDER — POLYETHYLENE GLYCOL 3350 17 G/17G
17 POWDER, FOR SOLUTION ORAL DAILY
Status: DISCONTINUED | OUTPATIENT
Start: 2022-08-16 | End: 2022-08-19 | Stop reason: HOSPADM

## 2022-08-15 RX ADMIN — DIVALPROEX SODIUM 250 MG: 125 CAPSULE, COATED PELLETS ORAL at 21:35

## 2022-08-15 RX ADMIN — MELATONIN TAB 3 MG 6 MG: 3 TAB at 21:35

## 2022-08-15 RX ADMIN — DONEPEZIL HYDROCHLORIDE 5 MG: 5 TABLET, FILM COATED ORAL at 21:35

## 2022-08-15 RX ADMIN — AMLODIPINE BESYLATE 10 MG: 10 TABLET ORAL at 08:19

## 2022-08-15 RX ADMIN — ASPIRIN 81 MG CHEWABLE TABLET 81 MG: 81 TABLET CHEWABLE at 08:19

## 2022-08-15 RX ADMIN — DIVALPROEX SODIUM 250 MG: 125 CAPSULE, COATED PELLETS ORAL at 08:18

## 2022-08-15 RX ADMIN — FLECAINIDE ACETATE 50 MG: 50 TABLET ORAL at 08:19

## 2022-08-15 RX ADMIN — FLECAINIDE ACETATE 50 MG: 50 TABLET ORAL at 21:35

## 2022-08-15 RX ADMIN — DIVALPROEX SODIUM 250 MG: 125 CAPSULE, COATED PELLETS ORAL at 14:26

## 2022-08-15 NOTE — PROGRESS NOTES
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

## 2022-08-15 NOTE — NURSING NOTE
Pt pleasant and med-compliant with good appetite and steady gait  Jovial and social with peers  Good appetite at breakfast, poor at lunch  VSS  Attended group  Denied SI  No agitation noted  Monitored for safety and support

## 2022-08-15 NOTE — NURSING NOTE
Pt reports feeling depressed and says she had felt better when she first came in  She is upset that a lot of the friends she made here have been discharged  Pt also stated that she "lives on pills" and doesn't think they're helping  She is looking forward to going back home  Denies anxiety  Denies any unmet needs

## 2022-08-15 NOTE — PLAN OF CARE
Problem: Ineffective Coping  Goal: Cooperates with admission process  Description: Interventions:   - Complete admission process  Outcome: Progressing  Goal: Identifies ineffective coping skills  Outcome: Progressing  Goal: Identifies healthy coping skills  Outcome: Progressing  Goal: Demonstrates healthy coping skills  Outcome: Progressing  Goal: Patient/Family participate in treatment and DC plans  Description: Interventions:  - Provide therapeutic environment  Outcome: Progressing  Goal: Patient/Family verbalizes awareness of resources  Outcome: Progressing  Goal: Understands least restrictive measures  Description: Interventions:  - Utilize least restrictive behavior  Outcome: Progressing  Goal: Free from restraint events  Description: - Utilize least restrictive measures   - Provide behavioral interventions   - Redirect inappropriate behaviors   Outcome: Progressing     Problem: Risk for Self Injury/Neglect  Goal: Treatment Goal: Remain safe during length of stay, learn and adopt new coping skills, and be free of self-injurious ideation, impulses and acts at the time of discharge  Outcome: Progressing  Goal: Verbalize thoughts and feelings  Description: Interventions:  - Assess and re-assess patient's lethality and potential for self-injury  - Engage patient in 1:1 interactions, daily, for a minimum of 15 minutes  - Encourage patient to express feelings, fears, frustrations, hopes  - Establish rapport/trust with patient   Outcome: Progressing  Goal: Refrain from harming self  Description: Interventions:  - Monitor patient closely, per order  - Develop a trusting relationship  - Supervise medication ingestion, monitor effects and side effects   Outcome: Progressing  Goal: Recognize maladaptive responses and adopt new coping mechanisms  Outcome: Progressing  Goal: Complete daily ADLs, including personal hygiene independently, as able  Description: Interventions:  - Observe, teach, and assist patient with ADLS  - Monitor and promote a balance of rest/activity, with adequate nutrition and elimination  Outcome: Progressing     Problem: Depression  Goal: Treatment Goal: Demonstrate behavioral control of depressive symptoms, verbalize feelings of improved mood/affect, and adopt new coping skills prior to discharge  Outcome: Progressing  Goal: Verbalize thoughts and feelings  Description: Interventions:  - Assess and re-assess patient's level of risk   - Engage patient in 1:1 interactions, daily, for a minimum of 15 minutes   - Encourage patient to express feelings, fears, frustrations, hopes   Outcome: Progressing  Goal: Refrain from harming self  Description: Interventions:  - Monitor patient closely, per order   - Supervise medication ingestion, monitor effects and side effects   Outcome: Progressing  Goal: Refrain from isolation  Description: Interventions:  - Develop a trusting relationship   - Encourage socialization   Outcome: Progressing  Goal: Refrain from self-neglect  Outcome: Progressing  Goal: Complete daily ADLs, including personal hygiene independently, as able  Description: Interventions:  - Observe, teach, and assist patient with ADLS  -  Monitor and promote a balance of rest/activity, with adequate nutrition and elimination   Outcome: Progressing     Problem: Anxiety  Goal: Anxiety is at manageable level  Description: Interventions:  - Assess and monitor patient's anxiety level  - Monitor for signs and symptoms (heart palpitations, chest pain, shortness of breath, headaches, nausea, feeling jumpy, restlessness, irritable, apprehensive)  - Collaborate with interdisciplinary team and initiate plan and interventions as ordered    - [place] patient to unit/surroundings  - Explain treatment plan  - Encourage participation in care  - Encourage verbalization of concerns/fears  - Identify coping mechanisms  - Assist in developing anxiety-reducing skills  - Administer/offer alternative therapies  - Limit or eliminate stimulants  Outcome: Progressing     Problem: Alteration in Thoughts and Perception  Goal: Attend and participate in unit activities, including therapeutic, recreational, and educational groups  Description: Interventions:  -Encourage Visitation and family involvement in care  Outcome: Progressing     Problem: DISCHARGE PLANNING  Goal: Discharge to home or other facility with appropriate resources  Description: INTERVENTIONS:  - Identify barriers to discharge w/patient and caregiver  - Arrange for needed discharge resources and transportation as appropriate  - Identify discharge learning needs (meds, wound care, etc )  - Arrange for interpretive services to assist at discharge as needed  - Refer to Case Management Department for coordinating discharge planning if the patient needs post-hospital services based on physician/advanced practitioner order or complex needs related to functional status, cognitive ability, or social support system  Outcome: Progressing

## 2022-08-15 NOTE — CASE MANAGEMENT
Received a call from [facility] and she reports that Lalitajose Wallace of [employer] will be calling  CM explained that [facility] [employer] [facility] knows nothing of this referral and [facility] was insistant that [facility] was just an abbreviation of [employer] [facility]  Also received a VM from [facility],  of [address] [place] [address] Drive of [place] - [phone] / [phone]  VANIA returned the call to [facility] and she is planning to come to visit pt on 8/16 and will call to give a specific time  She requested we fax her clinical, which has been done

## 2022-08-15 NOTE — PROGRESS NOTES
Progress Note - Behavioral Health   Rocco Salgado 80 y o  female MRN: [id number]  Unit/Bed#: [facility] 250-01 Encounter: [id number]    The patient was seen for continuing care and reviewed with treatment team   No significant events in the past 24 hours  She has a number of grievances about the setup of the patient's rooms and how impractical it is and etc   She also reports that she has been constipated for 3 weeks  No abdominal distention or pain  Oral intake has been adequate and she sleeps through the night  Current Mental Status Evaluation:  Appearance:  Adequate hygiene and grooming and Good eye contact   Behavior:  calm and cooperative   Mood:  Dysphoric   Affect: appropriate   Speech: Normal volume and Normal rate   Thought Process:  Goal directed and coherent   Thought Content:  Does not verbalize delusional material   Perceptual Disturbances: Denies hallucinations and does not appear to be responding to internal stimuli   Risk Potential: No suicidal or homicidal ideation   Orientation:        Progress Toward Goals: No significant events in the past 24 hours, No change in clinical status  Principal Problem:     Mood disorder with hypomanic features  Active Problems:    Minimal cognitive impairment    History of atrial fibrillation    Medical clearance for psychiatric admission    Hyperlipemia    Hypertension    Discharge planning update:Disposition to be determined    Recommended Treatment: Continue with pharmacotherapy, group therapy, milieu therapy and occupational therapy    The patient will be maintained on the following medications:  Current Facility-Administered Medications   Medication Dose Route Frequency Provider Last Rate    acetaminophen  650 mg Oral Q4H PRCYNTHIA Aiken MD      acetaminophen  650 mg Oral Q4H PRN Mustapha Aiken MD      acetaminophen  975 mg Oral Q6H PRN Mustapha Aiken MD      amLODIPine  10 mg Oral Daily Mustapha Aiken MD      aspirin  81 mg Oral Daily Lincoln Dimas MD Yelitza      divalproex sodium  250 mg Oral Q8H [facility] Samina, MD      donepezil  5 mg Oral HS Jeffrey Funez MD      flecainide  50 mg Oral Q12H [address] Jeffrey Funez MD      LORazepam  1 mg Intramuscular Q6H PRN Max 3/day Jeffrey Funez MD      LORazepam  0 5 mg Oral Q6H PRN Max 4/day Jeffrey Funez MD      LORazepam  1 mg Oral Q6H PRN Max 3/day Jeffrey Funez MD      melatonin  6 mg Oral HS Aditi Salcedo PA-C      nicotine polacrilex  4 mg Oral Q2H PRN Jeffrey Funez MD      OLANZapine  5 mg Intramuscular Q3H PRN Max 3/day Jeffrey Funez MD      OLANZapine  2 5 mg Oral Q4H PRN Max 6/day Jeffrey Funez MD      OLANZapine  5 mg Oral Q4H PRN Max 3/day Jeffrey Funez MD      OLANZapine  5 mg Oral Q3H PRN Max 3/day Jeffrey Funez MD      [START ON 8/16/2022] polyethylene glycol  17 g Oral Daily Jeffrey Funez MD      sodium chloride   Both Eyes Q3H PRN LUZ [place]      traZODone  50 mg Oral HS PRN Jeffrey Funez MD

## 2022-08-15 NOTE — PROGRESS NOTES
08/15/22 0755   Team Meeting   Meeting Type Daily Rounds   Initial Conference Date 08/15/22   Next Conference Date 08/16/22   Team Members Present   Team Members Present Physician;Nurse;;Occupational Therapist;   Patient/Family Present   Patient Present No   Patient's Family Present No   Dr Tana Hadley,  MARIBEL Palma  [place]  -  confused, pleasant, at 100/100/0, folds laundry, 10/10, 0/4, labs are pending

## 2022-08-16 PROCEDURE — 99232 SBSQ HOSP IP/OBS MODERATE 35: CPT | Performed by: PSYCHIATRY & NEUROLOGY

## 2022-08-16 RX ADMIN — MELATONIN TAB 3 MG 6 MG: 3 TAB at 21:07

## 2022-08-16 RX ADMIN — FLECAINIDE ACETATE 50 MG: 50 TABLET ORAL at 08:08

## 2022-08-16 RX ADMIN — AMLODIPINE BESYLATE 10 MG: 10 TABLET ORAL at 08:08

## 2022-08-16 RX ADMIN — DONEPEZIL HYDROCHLORIDE 5 MG: 5 TABLET, FILM COATED ORAL at 21:07

## 2022-08-16 RX ADMIN — DIVALPROEX SODIUM 250 MG: 125 CAPSULE, COATED PELLETS ORAL at 21:07

## 2022-08-16 RX ADMIN — FLECAINIDE ACETATE 50 MG: 50 TABLET ORAL at 21:07

## 2022-08-16 RX ADMIN — DIVALPROEX SODIUM 250 MG: 125 CAPSULE, COATED PELLETS ORAL at 14:40

## 2022-08-16 RX ADMIN — DIVALPROEX SODIUM 250 MG: 125 CAPSULE, COATED PELLETS ORAL at 06:13

## 2022-08-16 RX ADMIN — POLYETHYLENE GLYCOL 3350 17 G: 17 POWDER, FOR SOLUTION ORAL at 08:09

## 2022-08-16 RX ADMIN — ASPIRIN 81 MG CHEWABLE TABLET 81 MG: 81 TABLET CHEWABLE at 08:08

## 2022-08-16 RX ADMIN — SODIUM CHLORIDE: 50 OINTMENT OPHTHALMIC at 22:42

## 2022-08-16 NOTE — PROGRESS NOTES
Progress Note - Behavioral Health   Linette Gear 80 y o  female MRN: [id number]  Unit/Bed#: [facility] 854-50 Encounter: [id number]    The patient was seen for continuing care and reviewed with treatment team   No significant events in the past 24 hours  The patient is agreeable to placement  She has been compliant with medications  Has adequate oral intake and sleep pattern  Current Mental Status Evaluation:  Appearance:  Adequate hygiene and grooming and Good eye contact   Behavior:  calm and cooperative   Mood:  Anxious   Affect: appropriate   Speech: Normal volume and Normal rate   Thought Process:  Goal directed and coherent   Thought Content:  Does not verbalize delusional material   Perceptual Disturbances: Denies hallucinations and does not appear to be responding to internal stimuli   Risk Potential: No suicidal or homicidal ideation   Orientation:        Progress Toward Goals: Slowly improving  Principal Problem:     Mood disorder with hypomanic features  Active Problems:    Minimal cognitive impairment    History of atrial fibrillation    Medical clearance for psychiatric admission    Hyperlipemia    Hypertension    Discharge planning update: The patient needs placement    Recommended Treatment: Continue with pharmacotherapy, group therapy, milieu therapy and occupational therapy    The patient will be maintained on the following medications:  Current Facility-Administered Medications   Medication Dose Route Frequency Provider Last Rate    acetaminophen  650 mg Oral Q4H PRN Jeffrey Funez MD      acetaminophen  650 mg Oral Q4H PRN Jeffrey Funez MD      acetaminophen  975 mg Oral Q6H PRN Jeffrey Funez MD      amLODIPine  10 mg Oral Daily Jeffrey Funez MD      aspirin  81 mg Oral Daily Jeffrey Funez MD      divalproex sodium  250 mg Oral [facility] Jeffrey Funez MD      donepezil  5 mg Oral HS Jeffrey Funez MD      flecainide  50 mg Oral Q12H [address] Jeffrey Funez MD      LORazepam  1 mg Intramuscular Q6H PRN Max 3/day Georgeana Carrel, MD      LORazepam  0 5 mg Oral Q6H PRN Max 4/day Georgeana Carrel, MD      LORazepam  1 mg Oral Q6H PRN Max 3/day Georgeana Carrel, MD      melatonin  6 mg Oral HS Melody Collet, PA-C      nicotine polacrilex  4 mg Oral Q2H PRN Georgeana Carrel, MD      OLANZapine  5 mg Intramuscular Q3H PRN Max 3/day Georgeana Carrel, MD      OLANZapine  2 5 mg Oral Q4H PRN Max 6/day Georgeana Carrel, MD      OLANZapine  5 mg Oral Q4H PRN Max 3/day Georgeana Carrel, MD      OLANZapine  5 mg Oral Q3H PRN Max 3/day Georgeana Carrel, MD      polyethylene glycol  17 g Oral Daily Georgeana Carrel, MD      sodium chloride   Both Eyes Q3H PRN LUZ [place]      traZODone  50 mg Oral HS PRN Georgeana Carrel, MD

## 2022-08-16 NOTE — PROGRESS NOTES
Pt attended all groups and visible  Pt was able to stay for duration  Pt pleasantly confused and reminiscing when she was 16years old  08/16/22 1330   Activity/Group Checklist   Group Other (Comment)  (positive reflection: strengths exploration)   Attendance Attended   Attendance Duration (min) 16-30   Interactions Disorganized interaction   Affect/Mood Bright   Goals Achieved Able to listen to others; Able to engage in interactions; Discussed coping strategies

## 2022-08-16 NOTE — CASE MANAGEMENT
Uzair Houser called back and has been to [facility] and is very pleased  They will be coming today to assess this pt  We will work toward DC as soon as they have an opening

## 2022-08-16 NOTE — CASE MANAGEMENT
Spoke to [employer],  of [address] of [place] - [phone] / [phone]  She is planning to come to assess pt today and reports pt's daughter toured her facility on 8/15  CM then called to verify that Slade Goldberg is still planning to have pt go to [address] and left a VM requesting a CB    Spoke to [place] Y[place]uf and though she is not thrilled with [facility], she is considering same  She is also now planning to go to [facility] and will call back with her decision later today

## 2022-08-16 NOTE — PROGRESS NOTES
08/16/22 0814   Team Meeting   Meeting Type Daily Rounds   Initial Conference Date 08/16/22   Next Conference Date 08/17/22   Team Members Present   Team Members Present Physician;;Nurse;;Occupational Therapist   Patient/Family Present   Patient Present No   Patient's Family Present No   Dr Stoney Alamo,  MARIBEL Bill Brochure  Collins Asters -  depressed that her friends are being discharged, med compliant, eats well, slept

## 2022-08-16 NOTE — NURSING NOTE
Patient is present in the dayroom, out for meals  Patient is medication compliant and cooperative with care  Patient is pleasant on approach, social with peers and staff  Currently denying all s/s at this time  Reported a dream of cleaning up dog hair last night, laughing when talking about dream  Patient is able to make needs known  Daughter, Beth Ann, calling and asked for an update on behaviors and discharge planning  Informed that no update on discharge yet but can call later on in morning, given update on patient's behavior and status otherwise  Beth Ann stating "honestly, I don't care if my mom is sweet " and requested more information regarding discharge  Informed no information at this time as treatment team is in a meeting at this time  Encouraged to call back after meeting to see if there is an update  Verbalized understanding

## 2022-08-16 NOTE — PROGRESS NOTES
08/16/22 5444   Activity/Group Checklist   Group Other (Comment)  (Group Art Therapy/Psychodynamic, Open Choice with Discussion of Decision-Making/Reflection)   Attendance Attended   Attendance Duration (min) 46-60  (Patient arrived late)   Interactions Interacted appropriately   Affect/Mood Appropriate   Goals Achieved Able to listen to others; Able to engage in interactions; Able to recieve feedback; Able to give feedback to another  (Able to engage materials; full participation with discussion   Able to gain insight regarding her ability to deal with discomfort successfully )

## 2022-08-17 LAB
FLUAV RNA RESP QL NAA+PROBE: NEGATIVE
FLUBV RNA RESP QL NAA+PROBE: NEGATIVE
RSV RNA RESP QL NAA+PROBE: NEGATIVE
SARS-COV-2 RNA RESP QL NAA+PROBE: NEGATIVE

## 2022-08-17 PROCEDURE — 0241U HB NFCT DS VIR RESP RNA 4 TRGT: CPT | Performed by: PSYCHIATRY & NEUROLOGY

## 2022-08-17 PROCEDURE — 99232 SBSQ HOSP IP/OBS MODERATE 35: CPT | Performed by: PSYCHIATRY & NEUROLOGY

## 2022-08-17 RX ADMIN — AMLODIPINE BESYLATE 10 MG: 10 TABLET ORAL at 08:23

## 2022-08-17 RX ADMIN — DIVALPROEX SODIUM 250 MG: 125 CAPSULE, COATED PELLETS ORAL at 21:35

## 2022-08-17 RX ADMIN — DIVALPROEX SODIUM 250 MG: 125 CAPSULE, COATED PELLETS ORAL at 06:03

## 2022-08-17 RX ADMIN — SODIUM CHLORIDE: 50 OINTMENT OPHTHALMIC at 22:22

## 2022-08-17 RX ADMIN — MELATONIN TAB 3 MG 6 MG: 3 TAB at 21:36

## 2022-08-17 RX ADMIN — FLECAINIDE ACETATE 50 MG: 50 TABLET ORAL at 21:35

## 2022-08-17 RX ADMIN — FLECAINIDE ACETATE 50 MG: 50 TABLET ORAL at 08:24

## 2022-08-17 RX ADMIN — DIVALPROEX SODIUM 250 MG: 125 CAPSULE, COATED PELLETS ORAL at 14:26

## 2022-08-17 RX ADMIN — ASPIRIN 81 MG CHEWABLE TABLET 81 MG: 81 TABLET CHEWABLE at 08:24

## 2022-08-17 RX ADMIN — DONEPEZIL HYDROCHLORIDE 5 MG: 5 TABLET, FILM COATED ORAL at 21:35

## 2022-08-17 NOTE — PROGRESS NOTES
08/17/22 1100   Activity/Group Checklist   Group Exercise   Attendance Attended   Attendance Duration (min) 31-45   Interactions Interacted appropriately  (Pt  joked about how doing the exercises made her feel old yet felt they were needed )   Affect/Mood Appropriate   Goals Achieved Able to engage in interactions; Discussed coping strategies; Able to listen to others; Able to reflect/comment on own behavior

## 2022-08-17 NOTE — PLAN OF CARE
Problem: Ineffective Coping  Goal: Cooperates with admission process  Description: Interventions:   - Complete admission process  Outcome: Progressing  Goal: Identifies ineffective coping skills  Outcome: Progressing  Goal: Identifies healthy coping skills  Outcome: Progressing  Goal: Demonstrates healthy coping skills  Outcome: Progressing  Goal: Patient/Family participate in treatment and DC plans  Description: Interventions:  - Provide therapeutic environment  Outcome: Progressing  Goal: Patient/Family verbalizes awareness of resources  Outcome: Progressing  Goal: Understands least restrictive measures  Description: Interventions:  - Utilize least restrictive behavior  Outcome: Progressing  Goal: Free from restraint events  Description: - Utilize least restrictive measures   - Provide behavioral interventions   - Redirect inappropriate behaviors   Outcome: Progressing     Problem: Risk for Self Injury/Neglect  Goal: Treatment Goal: Remain safe during length of stay, learn and adopt new coping skills, and be free of self-injurious ideation, impulses and acts at the time of discharge  Outcome: Progressing  Goal: Verbalize thoughts and feelings  Description: Interventions:  - Assess and re-assess patient's lethality and potential for self-injury  - Engage patient in 1:1 interactions, daily, for a minimum of 15 minutes  - Encourage patient to express feelings, fears, frustrations, hopes  - Establish rapport/trust with patient   Outcome: Progressing  Goal: Refrain from harming self  Description: Interventions:  - Monitor patient closely, per order  - Develop a trusting relationship  - Supervise medication ingestion, monitor effects and side effects   Outcome: Progressing  Goal: Recognize maladaptive responses and adopt new coping mechanisms  Outcome: Progressing  Goal: Complete daily ADLs, including personal hygiene independently, as able  Description: Interventions:  - Observe, teach, and assist patient with ADLS  - Monitor and promote a balance of rest/activity, with adequate nutrition and elimination  Outcome: Progressing     Problem: Depression  Goal: Treatment Goal: Demonstrate behavioral control of depressive symptoms, verbalize feelings of improved mood/affect, and adopt new coping skills prior to discharge  Outcome: Progressing  Goal: Verbalize thoughts and feelings  Description: Interventions:  - Assess and re-assess patient's level of risk   - Engage patient in 1:1 interactions, daily, for a minimum of 15 minutes   - Encourage patient to express feelings, fears, frustrations, hopes   Outcome: Progressing  Goal: Refrain from harming self  Description: Interventions:  - Monitor patient closely, per order   - Supervise medication ingestion, monitor effects and side effects   Outcome: Progressing  Goal: Refrain from isolation  Description: Interventions:  - Develop a trusting relationship   - Encourage socialization   Outcome: Progressing  Goal: Refrain from self-neglect  Outcome: Progressing  Goal: Complete daily ADLs, including personal hygiene independently, as able  Description: Interventions:  - Observe, teach, and assist patient with ADLS  -  Monitor and promote a balance of rest/activity, with adequate nutrition and elimination   Outcome: Progressing     Problem: Anxiety  Goal: Anxiety is at manageable level  Description: Interventions:  - Assess and monitor patient's anxiety level  - Monitor for signs and symptoms (heart palpitations, chest pain, shortness of breath, headaches, nausea, feeling jumpy, restlessness, irritable, apprehensive)  - Collaborate with interdisciplinary team and initiate plan and interventions as ordered    - [place] patient to unit/surroundings  - Explain treatment plan  - Encourage participation in care  - Encourage verbalization of concerns/fears  - Identify coping mechanisms  - Assist in developing anxiety-reducing skills  - Administer/offer alternative therapies  - Limit or eliminate stimulants  Outcome: Progressing     Problem: Alteration in Thoughts and Perception  Goal: Attend and participate in unit activities, including therapeutic, recreational, and educational groups  Description: Interventions:  -Encourage Visitation and family involvement in care  Outcome: Progressing

## 2022-08-17 NOTE — CASE MANAGEMENT
Called Erin batista  And they no longer have a contract with [employer]  They suggested I call [facility] [phone]  Imani Rodriguez @ [facility] is agreeable to transport on 8/19 between 12-2 via [address] [facility]  They first must receive a call from [employer] authorizing same  CM then called and mindi dupree VM for Jim Tai at [employer] informing her of above and requested a confirmation CB that this has been done

## 2022-08-17 NOTE — CASE MANAGEMENT
Spoke to Ravin and he reports that [facility] needs to tell us where to send scripts, and they are also responsible for any aftercare appointments  I was directed to call Tara Augustin at [facility] to get the aftercare appointment and to set up aftercare  I left a message requesting a CB from the nurse, Jasper Fuentes at [facility] and await her return call

## 2022-08-17 NOTE — PROGRESS NOTES
Progress Note - Behavioral Health     Quintin Jay 80 y o  female MRN: [id number]   Unit/Bed#: [facility] 650-01 Encounter: [id number]    Behavior over the last 24 hours: improving  Joanne Thomas was seen in follow-up for continuation of care    Per staff, no significant events reported overnight  Has been pleasant and cooperative  Has been accepting medications, meals and slept throughout the night  On approach is sitting in community room finishing up lunch  States that she is feeling much improved in her mood without any significant mood swings overt depression or mood elevation  She is looking forward and is optimistic in terms of her discharge to [facility]  Reports some anxiety and ruminations about how she will be able to participate in her Jain activities and is hoping to, figure it out    However, objectively and subjectively appears to have improved  She adamantly denies any suicidal thoughts, plan or intent  No HI/AVH  Agrees to getting COVID test completed today for screening      Sleep: normal  Appetite: normal  Medication side effects: No   ROS: no complaints, all other systems are negative    Mental Status Evaluation:    Appearance:  Dressed in regular attire, fair self, care   Behavior:  Cooperative, calm   Speech:  talkative   Mood:  "better"   Affect:  Improved, no overt mood depression or elevation   Thought Process:  Mostly linear   Associations: Circumstantial and tangential at times   Thought Content:  ruminating thoughts   Perceptual Disturbances: none   Risk Potential: Suicidal ideation - None at present  Homicidal ideation - None at present  Potential for aggression - No   Sensorium:  oriented to person, place and time/date   Memory:  recent and remote memory grossly intact   Consciousness:  alert and awake   Attention/Concentration: attention span and concentration are age appropriate   Insight:  improved   Judgment: improved   Gait/Station: normal gait/station   Motor Activity: no abnormal movements     Vital signs in last 24 hours:    Temp:  [96 9 °F (36 1 °C)-97 8 °F (36 6 °C)] 97 8 °F (36 6 °C)  HR:  [62-79] 62  Resp:  [16] 16  BP: (136-177)/(64-74) 136/64    Laboratory results: I have personally reviewed all pertinent laboratory/tests results    Results from the past 24 hours: No results found for this or any previous visit (from the past 24 hour(s))  Most Recent Labs:   Lab Results   Component Value Date    WBC 6 17 08/15/2022    RBC 5 26 (H) 08/15/2022    HGB 15 1 08/15/2022    HCT 47 2 (H) 08/15/2022     08/15/2022    RDW 13 2 08/15/2022    NEUTROABS 2 63 08/15/2022    SODIUM 140 08/15/2022    K 3 8 08/15/2022     08/15/2022    CO2 26 08/15/2022    BUN 25 08/15/2022    CREATININE 0 74 08/15/2022    GLUC 108 (H) 08/15/2022    CALCIUM 9 1 08/15/2022    AST 25 08/15/2022    ALT 24 08/15/2022    ALKPHOS 66 08/15/2022    TP 7 2 08/15/2022    ALB 4 2 08/15/2022    TBILI 0 44 08/15/2022    VALPROICTOT 58 2 08/15/2022    [id number] 1 960 08/03/2022    HGBA1C 6 4 (H) 12/08/2021     12/08/2021       Progress Toward Goals: progressing    Assessment/Plan   Principal Problem:     Mood disorder with hypomanic features  Active Problems:    History of atrial fibrillation    Minimal cognitive impairment    Medical clearance for psychiatric admission    Hyperlipemia    Hypertension      Recommended Treatment:     Planned medication and treatment changes: All current active medications have been reviewed  Encourage group therapy, milieu therapy and occupational therapy  Behavioral Health checks every 7 minutes    Continue current medications and therapy  COVID test pending  Discharge disposition - patient has been accepted to [facility] and will be discharged once evidence of negative COVID test has been resulted  Anticipated discharge for tomorrow      Current Facility-Administered Medications   Medication Dose Route Frequency Provider Last Rate    acetaminophen  650 mg Oral Q4H PRN Brenna Armando MD      acetaminophen  650 mg Oral Q4H PRN Brenna Armando MD      acetaminophen  975 mg Oral Q6H PRN Brenna Armando MD      amLODIPine  10 mg Oral Daily Brenna Armando MD      aspirin  81 mg Oral Daily Brenna Armando MD      divalproex sodium  250 mg Oral [facility] Brenna Armando MD      donepezil  5 mg Oral HS Brenna Armando MD      flecainide  50 mg Oral Q12H [facility] Brenna Armando MD      LORazepam  1 mg Intramuscular Q6H PRN Max 3/day Brenna Armando MD      LORazepam  0 5 mg Oral Q6H PRN Max 4/day Brenna Armando MD      LORazepam  1 mg Oral Q6H PRN Max 3/day Brenna Armando MD      melatonin  6 mg Oral HS Eloy Michel PA-C      nicotine polacrilex  4 mg Oral Q2H PRN Brenna Armando MD      OLANZapine  5 mg Intramuscular Q3H PRN Max 3/day Brenna Armando MD      OLANZapine  2 5 mg Oral Q4H PRN Max 6/day Brenna Armando MD      OLANZapine  5 mg Oral Q4H PRN Max 3/day Brenna Armando MD      OLANZapine  5 mg Oral Q3H PRN Max 3/day Brenna Armando MD      polyethylene glycol  17 g Oral Daily Brenna Armando MD      sodium chloride   Both Eyes Q3H PRN Mikki Severs, CRNP      traZODone  50 mg Oral HS PRN Brenna Armando MD       Risks / Benefits of Treatment:    Risks, benefits, and possible side effects of medications explained to patient and patient verbalizes understanding and agreement for treatment  Counseling / Coordination of Care: Total floor / unit time spent today 20 minutes  Greater than 50% of total time was spent with the patient and / or family counseling and / or coordination of care  A description of counseling / coordination of care:  Patient's progress discussed with staff in treatment team meeting  Medications, treatment progress and treatment plan reviewed with patient      Kacey Bueno PA-C 08/17/22

## 2022-08-17 NOTE — NURSING NOTE
Pt visible In dayroom throughout the evening, pleasant, cooperative, calm on approach  Pt denied all psych s/s, states she's feeling "great", and is excited to discharge soon

## 2022-08-17 NOTE — CASE MANAGEMENT
Pt's sister Prakash Randall is asking that we arrange transportation to [facility] via [employer]  CM sent a request to [place] as they have a contract with [facility]  Spoke to CARYN LION at [facility] and Scripts are to be sent to [facility]  Pt will be seen by PCP,  Dr Weeks Rather, through [facility] until they are able to secure a psychiatrist to prescribe meds

## 2022-08-17 NOTE — PROGRESS NOTES
08/17/22 3854   Team Meeting   Meeting Type Daily Rounds   Initial Conference Date 08/17/22   Next Conference Date 08/18/22   Team Members Present   Team Members Present Physician;;Nurse;;Occupational Therapist   Patient/Family Present   Patient Present No   Patient's Family Present No   Ida Carmen T Carren Shaper [place] -  [facility] compliant, pleasant, slept, interviewed by [facility] and they will accept her if family chooses, slept

## 2022-08-17 NOTE — TREATMENT TEAM
08/17/22 1213   Service   Service SLIM   Provider Name [address]   Patient requesting ointment for a mole on face  Provider made aware and picture sent

## 2022-08-17 NOTE — CASE MANAGEMENT
Kinza Israel - [phone], was here on 8/16 at 3:00 to meet and assess pt for possible acceptance to [facility]  Pt was pleasant and cooperative and is being accepted to their [address] site on 8/19  Pt's daughter Heather Newman is aware and supportive of same  Ramon Klinefelter,  with [employer] is also aware and supportive of this move  CM has completed the DME per request of Georgie Gracia  We will fax it and the results of COVID testing to Georgie Gracia at [phone]  CM has a call out to Georgie Gracia to clarify where to send pt's scripts on day of DC  CM has also left message for Heather Newman asking if she is planning to do the transport of her mother or if we shall secure a  van transport  Await her return call

## 2022-08-18 PROBLEM — Z00.8 MEDICAL CLEARANCE FOR PSYCHIATRIC ADMISSION: Status: RESOLVED | Noted: 2022-08-06 | Resolved: 2022-08-18

## 2022-08-18 PROCEDURE — 99232 SBSQ HOSP IP/OBS MODERATE 35: CPT | Performed by: PSYCHIATRY & NEUROLOGY

## 2022-08-18 RX ADMIN — ASPIRIN 81 MG CHEWABLE TABLET 81 MG: 81 TABLET CHEWABLE at 08:33

## 2022-08-18 RX ADMIN — DIVALPROEX SODIUM 250 MG: 125 CAPSULE, COATED PELLETS ORAL at 21:23

## 2022-08-18 RX ADMIN — AMLODIPINE BESYLATE 10 MG: 10 TABLET ORAL at 08:33

## 2022-08-18 RX ADMIN — FLECAINIDE ACETATE 50 MG: 50 TABLET ORAL at 21:23

## 2022-08-18 RX ADMIN — MELATONIN TAB 3 MG 6 MG: 3 TAB at 21:23

## 2022-08-18 RX ADMIN — DIVALPROEX SODIUM 250 MG: 125 CAPSULE, COATED PELLETS ORAL at 14:28

## 2022-08-18 RX ADMIN — DIVALPROEX SODIUM 250 MG: 125 CAPSULE, COATED PELLETS ORAL at 06:05

## 2022-08-18 RX ADMIN — DONEPEZIL HYDROCHLORIDE 5 MG: 5 TABLET, FILM COATED ORAL at 21:23

## 2022-08-18 RX ADMIN — FLECAINIDE ACETATE 50 MG: 50 TABLET ORAL at 09:55

## 2022-08-18 NOTE — CASE MANAGEMENT
Spoke to Reginald Suazo, the  at [address]  She is agreeable to accept the negative COVID test results done within 48 hrs vs 24 hours  These results have been faxed to her  She has also been informed that [facility] will be picking up pt here between noon and 2:00PM on 8/19 to transport to that site

## 2022-08-18 NOTE — PROGRESS NOTES
08/18/22 1330   Activity/Group Checklist   Group Wellness  (Progressive muscle relaxation )   Attendance Attended   Attendance Duration (min) 31-45   Interactions Interacted appropriately  (Pt  able to engage in gudied relaxation and reported an understanding that her poetry is her relaxation )   Affect/Mood Appropriate;Normal range   Goals Achieved Able to engage in interactions; Discussed coping strategies

## 2022-08-18 NOTE — PROGRESS NOTES
Pt attended ALL groups  Pt bright and cooperative  08/18/22 1000   Activity/Group Checklist   Group Other (Comment)  ( relapse)   Attendance Attended   Attendance Duration (min) 31-45   Interactions Interacted appropriately   Affect/Mood Appropriate;Bright   Goals Achieved Identified relapse prevention strategies; Discussed coping strategies; Increased hopefulness; Able to self-disclose; Able to listen to others; Able to engage in interactions

## 2022-08-18 NOTE — PROGRESS NOTES
Progress Note - Behavioral Health     Tylor Osorio 80 y o  female MRN: [id number]   Unit/Bed#: [place] [facility] 768-72 Encounter: [id number]    Behavior over the last 24 hours:  Improved    Sandra Gilberts seen in follow-up for continuation of care  On presentation, was seen in presence of nursing staff  Appears bright on approach, recently showered with fair self-care  Remains goal oriented in optimistic about discharge planning after speaking with her   She is excited that she will be able to continue on with her previous day-to-day activities which she enjoys  Is able to identify various family members, friends who are a big contributor in terms of her ongoing support and stability  She is tolerating medications without any significant side effects or complications  Otherwise remains in agreement for discharge to [facility] tomorrow afternoon  She denies any overt depressive or manic symptoms  No SI/HI/AVH  Per staff, no significant events reported overnight  Has been medication, meal compliant slept throughout the night  Often seen out in the community interacting appropriately with others      Sleep: normal  Appetite: normal  Medication side effects: No   ROS: no complaints, all other systems are negative    Mental Status Evaluation:    Appearance:  Dressed in regular attire, fair self-care, recently showered, wearing glasses   Behavior:  Cooperative and pleasant   Speech:  Normal rate, volume and tone, forthcoming   Mood:  "Very good"   Affect:  Improved, no overt mood depression or elevation   Thought Process:  Goal oriented and   Associations: Intact   Thought Content:  No overt delusions   Perceptual Disturbances: none   Risk Potential: Suicidal ideation - None at present  Homicidal ideation - None at present  Potential for aggression - No   Sensorium:  oriented to person, place and time/date   Memory:  recent and remote memory grossly intact   Consciousness:  alert and awake Attention/Concentration: attention span and concentration are age appropriate   Insight:  improved   Judgment: improved   Gait/Station: normal gait/station   Motor Activity: no abnormal movements       Vital signs in last 24 hours:    Temp:  [96 9 °F (36 1 °C)-97 4 °F (36 3 °C)] 97 2 °F (36 2 °C)  HR:  [65-70] 70  Resp:  [16-18] 17  BP: (132-153)/(61-71) 137/61    Laboratory results: I have personally reviewed all pertinent laboratory/tests results    Results from the past 24 hours:   Recent Results (from the past 24 hour(s))   COVID/FLU/RSV    Collection Time: 08/17/22  1:12 PM    Specimen: Nose; Nares   Result Value Ref Range    SARS-CoV-2 Negative Negative    INFLUENZA A PCR Negative Negative    INFLUENZA B PCR Negative Negative    RSV PCR Negative Negative     Most Recent Labs:   Lab Results   Component Value Date    WBC 6 17 08/15/2022    RBC 5 26 (H) 08/15/2022    HGB 15 1 08/15/2022    HCT 47 2 (H) 08/15/2022     08/15/2022    RDW 13 2 08/15/2022    NEUTROABS 2 63 08/15/2022    SODIUM 140 08/15/2022    K 3 8 08/15/2022     08/15/2022    CO2 26 08/15/2022    BUN 25 08/15/2022    CREATININE 0 74 08/15/2022    GLUC 108 (H) 08/15/2022    CALCIUM 9 1 08/15/2022    AST 25 08/15/2022    ALT 24 08/15/2022    ALKPHOS 66 08/15/2022    TP 7 2 08/15/2022    ALB 4 2 08/15/2022    TBILI 0 44 08/15/2022    VALPROICTOT 58 2 08/15/2022    [id number] 1 960 08/03/2022    HGBA1C 6 4 (H) 12/08/2021     12/08/2021       Progress Toward Goals: improving    Assessment/Plan   Principal Problem:     Mood disorder with hypomanic features  Active Problems:    History of atrial fibrillation    Minimal cognitive impairment    Hyperlipemia    Hypertension      Recommended Treatment:     Planned medication and treatment changes:     All current active medications have been reviewed  Encourage group therapy, milieu therapy and occupational therapy  Behavioral Health checks every 7 minutes    Continue current medications and therapy  COVID test negative  Discharge disposition - patient has been accepted to [facility]  Anticipated discharge for tomorrow  Current Facility-Administered Medications   Medication Dose Route Frequency Provider Last Rate    acetaminophen  650 mg Oral Q4H PRN Andria Maya MD      acetaminophen  650 mg Oral Q4H PRN Andria Maya MD      acetaminophen  975 mg Oral Q6H PRN Andria Maya MD      amLODIPine  10 mg Oral Daily Andria Maay MD      aspirin  81 mg Oral Daily Andria Maya MD      divalproex sodium  250 mg Oral [facility] Andria Maya MD      donepezil  5 mg Oral HS Andria Maya MD      flecainide  50 mg Oral Q12H [facility] Andria Maya MD      LORazepam  1 mg Intramuscular Q6H PRN Max 3/day Andria Maya MD      LORazepam  0 5 mg Oral Q6H PRN Max 4/day Andria Maya MD      LORazepam  1 mg Oral Q6H PRN Max 3/day Andria Maya MD      melatonin  6 mg Oral HS Janina Granger PA-C      nicotine polacrilex  4 mg Oral Q2H PRN Andria Maya MD      OLANZapine  5 mg Intramuscular Q3H PRN Max 3/day Andria Maya MD      OLANZapine  2 5 mg Oral Q4H PRN Max 6/day Andria Maya MD      OLANZapine  5 mg Oral Q4H PRN Max 3/day Andria Maya MD      OLANZapine  5 mg Oral Q3H PRN Max 3/day Andria Maya MD      polyethylene glycol  17 g Oral Daily Andria Maya MD      sodium chloride   Both Eyes Q3H PRN LUZ Ruiz      traZODone  50 mg Oral HS PRN Andria Maya MD       Risks / Benefits of Treatment:    Risks, benefits, and possible side effects of medications explained to patient and patient verbalizes understanding and agreement for treatment  Counseling / Coordination of Care: Total floor / unit time spent today 20 minutes  Greater than 50% of total time was spent with the patient and / or family counseling and / or coordination of care   A description of counseling / coordination of care:  Patient's progress discussed with staff in treatment team meeting  Medications, treatment progress and treatment plan reviewed with patient      Yogesh Rangel PA-C 08/18/22

## 2022-08-18 NOTE — NURSING NOTE
Patient is present in the dayroom, out for meals  Patient is medication compliant and cooperative with care  Patient is pleasant on approach, social with peers and staff  Patient is currently denying all s/s at this time  States she 'feels ready' to go to [facility]  Patient bright  Patient is able to make needs known

## 2022-08-18 NOTE — TREATMENT TEAM
08/18/22 1600   Service   Service SLIM   Provider Name [address]   Patient asking about mole on face, discussed with provider

## 2022-08-18 NOTE — BH TRANSITION RECORD
Contact Information: If you have any questions, concerns, pended studies, tests and/or procedures, or emergencies regarding your inpatient behavioral health visit  Please contact [address] [place] [address] older adult behavioral health unit [phone] and ask to speak to a , nurse or physician  A contact is available 24 hours/ 7 days a week at this number  Summary of Procedures Performed During your Stay:  Below is a list of major procedures performed during your hospital stay and a summary of results:  - Cardiac Procedures/Studies: ECG  Pending Studies (From admission, onward)    None        Please follow up on the above pending studies with your PCP and/or referring provider

## 2022-08-18 NOTE — PROGRESS NOTES
Team Meeting   Meeting Type Daily Rounds   Initial Conference Date 08/18/22   Next Conference Date 08/19/22   Team Members Present   Team Members Present Physician;;Nurse;;Occupational Therapist   Patient/Family Present   Patient Present No   Patient's Family Present No   Mae Vega T Scherrie Louder Charmian Miu Devra Brod - ate 25/100/100, slept, med compliant, plan for discharge on 8/19
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
## 2022-08-18 NOTE — CASE MANAGEMENT
Pt's COVID testing has been done, prior to the requested time per [facility]   I have a call out to them asking if they will accept this test result or if we must do another

## 2022-08-18 NOTE — NURSING NOTE
Pt is visible in dayroom, social with peers  Pt is pleasant and cooperative with care  Denies anxiety/depression, SI,HI,AVH  Medication compliant

## 2022-08-18 NOTE — PLAN OF CARE
Problem: Ineffective Coping  Goal: Cooperates with admission process  Description: Interventions:   - Complete admission process  Outcome: Progressing  Goal: Identifies ineffective coping skills  Outcome: Progressing  Goal: Identifies healthy coping skills  Outcome: Progressing  Goal: Demonstrates healthy coping skills  Outcome: Progressing  Goal: Patient/Family participate in treatment and DC plans  Description: Interventions:  - Provide therapeutic environment  Outcome: Progressing  Goal: Patient/Family verbalizes awareness of resources  Outcome: Progressing  Goal: Understands least restrictive measures  Description: Interventions:  - Utilize least restrictive behavior  Outcome: Progressing  Goal: Free from restraint events  Description: - Utilize least restrictive measures   - Provide behavioral interventions   - Redirect inappropriate behaviors   Outcome: Progressing     Problem: Risk for Self Injury/Neglect  Goal: Treatment Goal: Remain safe during length of stay, learn and adopt new coping skills, and be free of self-injurious ideation, impulses and acts at the time of discharge  Outcome: Progressing  Goal: Verbalize thoughts and feelings  Description: Interventions:  - Assess and re-assess patient's lethality and potential for self-injury  - Engage patient in 1:1 interactions, daily, for a minimum of 15 minutes  - Encourage patient to express feelings, fears, frustrations, hopes  - Establish rapport/trust with patient   Outcome: Progressing  Goal: Refrain from harming self  Description: Interventions:  - Monitor patient closely, per order  - Develop a trusting relationship  - Supervise medication ingestion, monitor effects and side effects   Outcome: Progressing  Goal: Recognize maladaptive responses and adopt new coping mechanisms  Outcome: Progressing  Goal: Complete daily ADLs, including personal hygiene independently, as able  Description: Interventions:  - Observe, teach, and assist patient with ADLS  - Monitor and promote a balance of rest/activity, with adequate nutrition and elimination  Outcome: Progressing     Problem: Depression  Goal: Treatment Goal: Demonstrate behavioral control of depressive symptoms, verbalize feelings of improved mood/affect, and adopt new coping skills prior to discharge  Outcome: Progressing  Goal: Verbalize thoughts and feelings  Description: Interventions:  - Assess and re-assess patient's level of risk   - Engage patient in 1:1 interactions, daily, for a minimum of 15 minutes   - Encourage patient to express feelings, fears, frustrations, hopes   Outcome: Progressing  Goal: Refrain from harming self  Description: Interventions:  - Monitor patient closely, per order   - Supervise medication ingestion, monitor effects and side effects   Outcome: Progressing  Goal: Refrain from isolation  Description: Interventions:  - Develop a trusting relationship   - Encourage socialization   Outcome: Progressing  Goal: Refrain from self-neglect  Outcome: Progressing  Goal: Complete daily ADLs, including personal hygiene independently, as able  Description: Interventions:  - Observe, teach, and assist patient with ADLS  -  Monitor and promote a balance of rest/activity, with adequate nutrition and elimination   Outcome: Progressing     Problem: Anxiety  Goal: Anxiety is at manageable level  Description: Interventions:  - Assess and monitor patient's anxiety level  - Monitor for signs and symptoms (heart palpitations, chest pain, shortness of breath, headaches, nausea, feeling jumpy, restlessness, irritable, apprehensive)  - Collaborate with interdisciplinary team and initiate plan and interventions as ordered    - [place] patient to unit/surroundings  - Explain treatment plan  - Encourage participation in care  - Encourage verbalization of concerns/fears  - Identify coping mechanisms  - Assist in developing anxiety-reducing skills  - Administer/offer alternative therapies  - Limit or eliminate stimulants  Outcome: Progressing

## 2022-08-18 NOTE — PLAN OF CARE
Pt attends groups and actively participates       Problem: Alteration in Thoughts and Perception  Goal: Attend and participate in unit activities, including therapeutic, recreational, and educational groups  Description: Interventions:  -Encourage Visitation and family involvement in care  Outcome: Progressing

## 2022-08-19 VITALS
TEMPERATURE: 97.1 F | HEART RATE: 70 BPM | DIASTOLIC BLOOD PRESSURE: 64 MMHG | SYSTOLIC BLOOD PRESSURE: 141 MMHG | RESPIRATION RATE: 16 BRPM | HEIGHT: 63 IN | OXYGEN SATURATION: 98 % | WEIGHT: 160.9 LBS | BODY MASS INDEX: 28.51 KG/M2

## 2022-08-19 PROCEDURE — 99239 HOSP IP/OBS DSCHRG MGMT >30: CPT | Performed by: PSYCHIATRY & NEUROLOGY

## 2022-08-19 RX ORDER — DIVALPROEX SODIUM 125 MG/1
250 CAPSULE, COATED PELLETS ORAL EVERY 8 HOURS SCHEDULED
Qty: 180 CAPSULE | Refills: 1 | Status: SHIPPED | OUTPATIENT
Start: 2022-08-19 | End: 2022-10-18

## 2022-08-19 RX ORDER — FLECAINIDE ACETATE 50 MG/1
50 TABLET ORAL EVERY 12 HOURS SCHEDULED
Qty: 60 TABLET | Refills: 0 | Status: SHIPPED | OUTPATIENT
Start: 2022-08-19 | End: 2022-09-18

## 2022-08-19 RX ORDER — SODIUM CHLORIDE 5 %
OINTMENT (GRAM) OPHTHALMIC (EYE)
Qty: 3.5 G | Refills: 0 | Status: SHIPPED | OUTPATIENT
Start: 2022-08-19 | End: 2022-09-18

## 2022-08-19 RX ORDER — LANOLIN ALCOHOL/MO/W.PET/CERES
6 CREAM (GRAM) TOPICAL
Qty: 60 TABLET | Refills: 1 | Status: SHIPPED | OUTPATIENT
Start: 2022-08-19 | End: 2022-10-18

## 2022-08-19 RX ORDER — DONEPEZIL HYDROCHLORIDE 5 MG/1
5 TABLET, FILM COATED ORAL
Qty: 30 TABLET | Refills: 1 | Status: SHIPPED | OUTPATIENT
Start: 2022-08-19 | End: 2022-10-18

## 2022-08-19 RX ORDER — ASPIRIN 81 MG/1
81 TABLET, CHEWABLE ORAL DAILY
Qty: 30 TABLET | Refills: 0 | Status: SHIPPED | OUTPATIENT
Start: 2022-08-19 | End: 2022-09-18

## 2022-08-19 RX ORDER — AMLODIPINE BESYLATE 10 MG/1
10 TABLET ORAL DAILY
Qty: 30 TABLET | Refills: 0 | Status: SHIPPED | OUTPATIENT
Start: 2022-08-19 | End: 2022-09-18

## 2022-08-19 RX ADMIN — AMLODIPINE BESYLATE 10 MG: 10 TABLET ORAL at 08:04

## 2022-08-19 RX ADMIN — DIVALPROEX SODIUM 250 MG: 125 CAPSULE, COATED PELLETS ORAL at 05:58

## 2022-08-19 RX ADMIN — FLECAINIDE ACETATE 50 MG: 50 TABLET ORAL at 08:04

## 2022-08-19 RX ADMIN — ASPIRIN 81 MG CHEWABLE TABLET 81 MG: 81 TABLET CHEWABLE at 08:04

## 2022-08-19 RX ADMIN — SODIUM CHLORIDE 1 APPLICATION: 50 OINTMENT OPHTHALMIC at 00:49

## 2022-08-19 NOTE — NURSING NOTE
Attempted to call report to [facility] prior to discharge, no answer   Left message and call back number

## 2022-08-19 NOTE — NURSING NOTE
Patient is present in the dayroom, out for meals  Patient is medication compliant and cooperative with care  Patient is pleasant on approach, social with peers and staff  Patient is currently denying all s/s at this time  States she is ready for discharge  Able to make needs known

## 2022-08-19 NOTE — PLAN OF CARE
Problem: Ineffective Coping  Goal: Cooperates with admission process  Description: Interventions:   - Complete admission process  8/19/2022 1009 by Salbador Espinoza RN  Outcome: Adequate for Discharge  8/19/2022 1009 by Salbador Espinoza RN  Outcome: Adequate for Discharge  Goal: Identifies ineffective coping skills  8/19/2022 1009 by Salbador Espinoza RN  Outcome: Adequate for Discharge  8/19/2022 1009 by Salbador Espinoza RN  Outcome: Adequate for Discharge  Goal: Identifies healthy coping skills  8/19/2022 1009 by Salbador Espinoza RN  Outcome: Adequate for Discharge  8/19/2022 1009 by Salbador Espinoza RN  Outcome: Adequate for Discharge  Goal: Demonstrates healthy coping skills  8/19/2022 1009 by Salbador Espinoza RN  Outcome: Adequate for Discharge  8/19/2022 1009 by Salbador Espinoza RN  Outcome: Adequate for Discharge  Goal: Patient/Family participate in treatment and DC plans  Description: Interventions:  - Provide therapeutic environment  8/19/2022 1009 by Salbador Espinoza RN  Outcome: Adequate for Discharge  8/19/2022 1009 by Salbador Espinoza RN  Outcome: Adequate for Discharge  Goal: Patient/Family verbalizes awareness of resources  8/19/2022 1009 by Salbador Espinoza RN  Outcome: Adequate for Discharge  8/19/2022 1009 by Salbador Espinoza RN  Outcome: Adequate for Discharge  Goal: Understands least restrictive measures  Description: Interventions:  - Utilize least restrictive behavior  8/19/2022 1009 by Salbador Espinoza RN  Outcome: Adequate for Discharge  8/19/2022 1009 by Salbador Espinoza RN  Outcome: Adequate for Discharge  Goal: Free from restraint events  Description: - Utilize least restrictive measures   - Provide behavioral interventions   - Redirect inappropriate behaviors   8/19/2022 1009 by Salbador Espinoza RN  Outcome: Adequate for Discharge  8/19/2022 1009 by Salbador Espinoza RN  Outcome: Adequate for Discharge     Problem: Risk for Self Injury/Neglect  Goal: Treatment Goal: Remain safe during length of stay, learn and adopt new coping skills, and be free of self-injurious ideation, impulses and acts at the time of discharge  8/19/2022 1009 by Evelin Santoyo RN  Outcome: Adequate for Discharge  8/19/2022 1009 by Evelin Santoyo RN  Outcome: Adequate for Discharge  Goal: Verbalize thoughts and feelings  Description: Interventions:  - Assess and re-assess patient's lethality and potential for self-injury  - Engage patient in 1:1 interactions, daily, for a minimum of 15 minutes  - Encourage patient to express feelings, fears, frustrations, hopes  - Establish rapport/trust with patient   8/19/2022 1009 by Evelin Santoyo RN  Outcome: Adequate for Discharge  8/19/2022 1009 by Evelin Santoyo RN  Outcome: Adequate for Discharge  Goal: Refrain from harming self  Description: Interventions:  - Monitor patient closely, per order  - Develop a trusting relationship  - Supervise medication ingestion, monitor effects and side effects   8/19/2022 1009 by Evelin Santoyo RN  Outcome: Adequate for Discharge  8/19/2022 1009 by Evelin Santoyo RN  Outcome: Adequate for Discharge  Goal: Recognize maladaptive responses and adopt new coping mechanisms  8/19/2022 1009 by Evelin Santoyo RN  Outcome: Adequate for Discharge  8/19/2022 1009 by Evelin Santoyo RN  Outcome: Adequate for Discharge  Goal: Complete daily ADLs, including personal hygiene independently, as able  Description: Interventions:  - Observe, teach, and assist patient with ADLS  - Monitor and promote a balance of rest/activity, with adequate nutrition and elimination  8/19/2022 1009 by Evelin Santoyo RN  Outcome: Adequate for Discharge  8/19/2022 1009 by Evelin Santoyo RN  Outcome: Adequate for Discharge     Problem: Depression  Goal: Treatment Goal: Demonstrate behavioral control of depressive symptoms, verbalize feelings of improved mood/affect, and adopt new coping skills prior to discharge  8/19/2022 1009 by Abigail Rosales RN  Outcome: Adequate for Discharge  8/19/2022 1009 by Abigail Rosales RN  Outcome: Adequate for Discharge  Goal: Verbalize thoughts and feelings  Description: Interventions:  - Assess and re-assess patient's level of risk   - Engage patient in 1:1 interactions, daily, for a minimum of 15 minutes   - Encourage patient to express feelings, fears, frustrations, hopes   8/19/2022 1009 by Abigail Rosales RN  Outcome: Adequate for Discharge  8/19/2022 1009 by Abigail Rosales RN  Outcome: Adequate for Discharge  Goal: Refrain from harming self  Description: Interventions:  - Monitor patient closely, per order   - Supervise medication ingestion, monitor effects and side effects   8/19/2022 1009 by Abigail Rosales RN  Outcome: Adequate for Discharge  8/19/2022 1009 by Abigail Rosales RN  Outcome: Adequate for Discharge  Goal: Refrain from isolation  Description: Interventions:  - Develop a trusting relationship   - Encourage socialization   8/19/2022 1009 by Abigail Rosales RN  Outcome: Adequate for Discharge  8/19/2022 1009 by Abigail Rosales RN  Outcome: Adequate for Discharge  Goal: Refrain from self-neglect  Outcome: Adequate for Discharge  Goal: Complete daily ADLs, including personal hygiene independently, as able  Description: Interventions:  - Observe, teach, and assist patient with ADLS  -  Monitor and promote a balance of rest/activity, with adequate nutrition and elimination   Outcome: Adequate for Discharge     Problem: Anxiety  Goal: Anxiety is at manageable level  Description: Interventions:  - Assess and monitor patient's anxiety level  - Monitor for signs and symptoms (heart palpitations, chest pain, shortness of breath, headaches, nausea, feeling jumpy, restlessness, irritable, apprehensive)  - Collaborate with interdisciplinary team and initiate plan and interventions as ordered    - [place] patient to unit/surroundings  - Explain treatment plan  - Encourage participation in care  - Encourage verbalization of concerns/fears  - Identify coping mechanisms  - Assist in developing anxiety-reducing skills  - Administer/offer alternative therapies  - Limit or eliminate stimulants  Outcome: Adequate for Discharge     Problem: Alteration in Thoughts and Perception  Goal: Attend and participate in unit activities, including therapeutic, recreational, and educational groups  Description: Interventions:  -Encourage Visitation and family involvement in care  Outcome: Adequate for Discharge     Problem: DISCHARGE PLANNING  Goal: Discharge to home or other facility with appropriate resources  Description: INTERVENTIONS:  - Identify barriers to discharge w/patient and caregiver  - Arrange for needed discharge resources and transportation as appropriate  - Identify discharge learning needs (meds, wound care, etc )  - Arrange for interpretive services to assist at discharge as needed  - Refer to Case Management Department for coordinating discharge planning if the patient needs post-hospital services based on physician/advanced practitioner order or complex needs related to functional status, cognitive ability, or social support system  Outcome: Adequate for Discharge

## 2022-08-19 NOTE — NURSING NOTE
Patient walking off unit with MHT  Patient states she is excited and ready for discharge  Patient sent with discharge paperwork and belongings  No signs of distress at time of discharge

## 2022-08-19 NOTE — NURSING NOTE
Pt visible in dayroom socializing with peers watching television, pleasant and cooperative on approach  Pt approached this writer at nurses station c/o male peer continuously asking for her phone number  Pt counseled, encouraged to politely set limits with patient and/or stay away from peer if behavior continued  Pt denies all psych s/s

## 2022-08-19 NOTE — PROGRESS NOTES
08/19/22 0811   Team Meeting   Meeting Type Daily Rounds   Initial Conference Date 08/19/22   Next Conference Date 08/20/22   Team Members Present   Team Members Present Physician;Nurse;; ;Occupational Therapist   Patient/Family Present   Patient Present No   Patient's Family Present No   Dr Sharon Salcedo,  MARIBEL Alberto -  Discharge today between noon and 2, visible, social, cooperative, med compliant, slept

## 2022-08-19 NOTE — PROGRESS NOTES
08/19/22 1012   Discharge Planning   Living Arrangements Other (Comment)  (Will be discharging to live at [facility])   Adan Regi Self;Family members   Type of Current Residence Assisted living   [address] Domin[address]  No   Discharge Communications   Discharge planning discussed with: Jody Lewis and Jessenia Mckeon of [facility] and her daughter Andrew Mcclellan, as well as pt herself   IMM Given (Date): 08/17/22   IMM Given to: Patient   Family notified: Andrew Mcclellan - daughter   Contacts   Patient Contacts Isacc Bustillos   Relationship to Patient: Family   Contact Method Phone   Phone Number [phone]   Reason/Outcome Continuity of Care;Emergency Contact; Discharge Planning   [facility]   Would you like to participate in our [address] service program?   No - Declined  ([address], [address] 86)

## 2022-09-01 ENCOUNTER — CONSULT (OUTPATIENT)
Dept: CARDIOLOGY CLINIC | Facility: CLINIC | Age: 82
End: 2022-09-01
Payer: MEDICARE

## 2022-09-01 VITALS
WEIGHT: 160 LBS | BODY MASS INDEX: 28.34 KG/M2 | SYSTOLIC BLOOD PRESSURE: 120 MMHG | DIASTOLIC BLOOD PRESSURE: 60 MMHG | HEART RATE: 54 BPM

## 2022-09-01 DIAGNOSIS — I87.2 PERIPHERAL VENOUS INSUFFICIENCY: ICD-10-CM

## 2022-09-01 DIAGNOSIS — I10 PRIMARY HYPERTENSION: ICD-10-CM

## 2022-09-01 DIAGNOSIS — E78.2 MIXED HYPERLIPIDEMIA: ICD-10-CM

## 2022-09-01 DIAGNOSIS — G31.84 MINIMAL COGNITIVE IMPAIRMENT: ICD-10-CM

## 2022-09-01 DIAGNOSIS — I49.5 TACHY-BRADY SYNDROME (HCC): ICD-10-CM

## 2022-09-01 DIAGNOSIS — F39 MOOD DISORDER (HCC): ICD-10-CM

## 2022-09-01 DIAGNOSIS — G47.33 OBSTRUCTIVE SLEEP APNEA SYNDROME: ICD-10-CM

## 2022-09-01 DIAGNOSIS — I48.91 ATRIAL FIBRILLATION, UNSPECIFIED TYPE (HCC): ICD-10-CM

## 2022-09-01 DIAGNOSIS — I48.0 PAROXYSMAL ATRIAL FIBRILLATION (HCC): ICD-10-CM

## 2022-09-01 PROBLEM — Z86.79 HISTORY OF ATRIAL FIBRILLATION: Chronic | Status: RESOLVED | Noted: 2018-12-23 | Resolved: 2022-09-01

## 2022-09-01 PROBLEM — R50.9 FEBRILE ILLNESS: Status: RESOLVED | Noted: 2018-12-23 | Resolved: 2022-09-01

## 2022-09-01 PROCEDURE — 99204 OFFICE O/P NEW MOD 45 MIN: CPT | Performed by: INTERNAL MEDICINE

## 2022-09-01 PROCEDURE — 93000 ELECTROCARDIOGRAM COMPLETE: CPT | Performed by: INTERNAL MEDICINE

## 2022-09-01 NOTE — PROGRESS NOTES
Consultation - Electrophysiology-Cardiology (EP)   Autumn Au 80 y o  female MRN: [id number]  Unit/Bed#:  Encounter: [id number]      3  Tachy-alison syndrome ([facility] )     2  Atrial fibrillation, unspecified type ([facility] )  POCT ECG   3  Obstructive sleep apnea syndrome     4  Primary hypertension     5  Paroxysmal atrial fibrillation (HCC)     6  Peripheral venous insufficiency     7  Mood disorder with hypomanic features     8  Mixed hyperlipidemia     9   Minimal cognitive impairment           Consults  Physician Requesting Consult: Mehnaz Espino DO   Reason for Consult / Principal Problem:  Tachy-alison syndrome      Assessment/Plan    Tachy-alison syndrome  Paroxysmal atrial fibrillation  Long-term anticoagulation  Tachy-alison syndrome  Hypertension  Mixed hyperlipidemia  Pre diabetes  Overweight  VIRGILIO, was on CPAP, recalled and patient has not received a new one        Tachy-alison syndrome  Patient does complain of fatigue  Heart rate is in the 50s while in the office    Patient is on flecainide for PAF, but without a beta-blocker because of underlying bradycardia  Patient is on donepezil for dementia, which can worsen  Bradycardia    The patient is going to need long-term monitoring  Recommended look monitor        History of PAF  Rate control-none because of underlying bradycardia  Rhythm control-on flecainide 50 mg 2 times daily  Anticoagulation-none  I do not have any strip of documented AFib  Patient is on aspirin which is not really of use in the setting of AFib and may be discontinued  I would recommend discussion with her primary cardiologist about the same        Overweight  BMI- 28  This increases the risk of-CAD, CVA, vascular disease, diabetes, kidney dysfunction, hypertension, hyperlipidemia  Diet is responsible for 80% of weight gain   Advice nutritional counseling and healthy diet  Also advised to increase activity  He is going to follow up with his primary care        Obstructive sleep apnea Patient has a history of same  Previously was using CPAP  It has been recalled and she is without it and very symptomatic with fatigue        Hypertension   Blood pressure-120/60  Medication-amlodipine  My recommendation-continue with same      Dementia  Patient is on donepezil  This can cause bradycardia        Summary of my recommendation for the patient  Please arrange to start CPAP  Proceed with loop implantation- monitoring of bradycardia and deciding on device                  History of Present Illness   HPI: Gaston Wang is a 80y o  year old female has been referred to me by Dr Nani Acosta for the management of bradycardia, palpitation and tachy-alison syndrome    The patient has significant medical illnesses which include  Tachy-alison syndrome  Paroxysmal atrial fibrillation  Long-term anticoagulation  Tachy-alison syndrome  Hypertension  Mixed hyperlipidemia  Pre diabetes  Overweight  VIRGILIO, was on CPAP, recalled and patient has not received a new one    She has moved to a new place  She does feel very fatigued as her CPAP has not been replaced  She does not have the ability to exercise on a daily basis  See recalls that she cannot remember the name of her new friends and recognizes her forgetfulness      As far as cardiac symptoms are concerned  Angina - negative  Orthopnea -negative  Paroxysmal nocturnal dyspnea -negative  Leg swelling-negative  Palpitations -negative  Presyncope-negative  Syncope -negative  Orthostatic lightheadedness -negative  Exertional intolerance-negative    Snoring-present  morning fatigue-present  Daytime sleepiness-present      Social history  Tobacco use-negative  Alcohol use-negative  Energy drink use-negative  Recreational drug use-negative      Family history  Diabetes      Historical Information   Past Medical History:   Diagnosis Date    Afib ([facility] )     Arthritis     Hyperlipidemia     Hypertension     Osteopenia     Sleep apnea     Varicose veins of both lower extremities      Past Surgical History:   Procedure Laterality Date    CATARACT EXTRACTION      COLONOSCOPY      complete, for polyp removal    EYE SURGERY      HYSTERECTOMY      age 39    INCISION AND DRAINAGE OF WOUND Right 6/28/2016    Procedure: ARTHROSCOPY,EVACUATION OF HEMATOMA, OPEN EXPLORATION OF WOUND;  Surgeon: Lora Mitchell MD;  Location: [facility];  Service:     TONSILLECTOMY AND ADENOIDECTOMY      TOTAL KNEE ARTHROPLASTY Right     TOTAL KNEE ARTHROPLASTY Left     WISDOM TOOTH EXTRACTION       Social History     Substance and Sexual Activity   Alcohol Use Yes     Social History     Substance and Sexual Activity   Drug Use No     Social History     Tobacco Use   Smoking Status Never Smoker   Smokeless Tobacco Never Used     Social History     Socioeconomic History    Marital status: /Civil Union     Spouse name: Not on file    Number of children: Not on file    Years of education: Not on file    Highest education level: Not on file   Occupational History    Not on file   Tobacco Use    Smoking status: Never Smoker    Smokeless tobacco: Never Used   Vaping Use    Vaping Use: Never used   Substance and Sexual Activity    Alcohol use: Yes    Drug use: No    Sexual activity: Not Currently     Comment:    Other Topics Concern    Not on file   Social History Narrative    No domestic violence      Social Determinants of Health     Financial Resource Strain: Not on file   Food Insecurity: Not on file   Transportation Needs: Not on file   Physical Activity: Not on file   Stress: Not on file   Social Connections: Not on file   Intimate Partner Violence: Not on file   Housing Stability: Not on file         Family History:  Family History   Problem Relation Age of Onset    Diabetes Mother     Diabetes Father     Diabetes Brother     No Known Problems Sister     No Known Problems Daughter     No Known Problems Maternal Grandmother     No Known Problems Maternal Grandfather     No Known Problems Paternal Grandmother     No Known Problems Paternal Grandfather     No Known Problems Daughter     Brain cancer Son     No Known Problems Maternal Aunt          Meds/Allergies      No current facility-administered medications for this visit  (Not in a hospital admission)      Allergies   Allergen Reactions    Atorvastatin Other (See Comments)     myalgia    Statins Other (See Comments)     Weakness in legs           Objective   Vitals:   Visit Vitals  /60 (BP Location: Right arm, Patient Position: Sitting, Cuff Size: Adult)   Pulse (!) 54   Wt 72 6 kg (160 lb)   BMI 28 34 kg/m²   OB Status Hysterectomy   Smoking Status Never Smoker   BSA 1 76 m²      Vitals:    09/01/22 1111   Weight: 72 6 kg (160 lb)   [unfilled]    Invasive Devices  Report    None                   ROS  Review of Systems   All other systems reviewed and are negative  As described in my history of present illness        PHYSICAL EXAM  Physical Exam  Vitals reviewed  Constitutional:       General: She is not in acute distress  Appearance: Normal appearance  She is not ill-appearing  HENT:      Head: Normocephalic and atraumatic  Right Ear: External ear normal       Left Ear: External ear normal       Nose: Nose normal       Mouth/Throat:      Comments: Posterior pharynx is crowded  Eyes:      General: No scleral icterus  Extraocular Movements: Extraocular movements intact  Conjunctiva/sclera: Conjunctivae normal       Pupils: Pupils are equal, round, and reactive to light  Neck:      Comments: Thick neck  Cardiovascular:      Rate and Rhythm: Normal rate and regular rhythm  Pulses: Normal pulses  Heart sounds: Normal heart sounds  No murmur heard  Pulmonary:      Effort: Pulmonary effort is normal  No respiratory distress  Breath sounds: Normal breath sounds  No wheezing  Abdominal:      General: Bowel sounds are normal  There is no distension  Tenderness:  There is no abdominal tenderness  Comments: Central obesity present   Musculoskeletal:         General: No swelling, tenderness or deformity  Cervical back: Neck supple  No rigidity  Skin:     Coloration: Skin is not jaundiced  Findings: No bruising  Neurological:      Mental Status: She is alert  Mental status is at baseline  Motor: No weakness  Psychiatric:         Mood and Affect: Mood normal          Thought Content: Thought content normal                LAB RESULTS:      CBC:  Results from Last 12 Months   Lab Units 08/15/22  0506 08/03/22  1640   WBC Thousand/uL 6 17 6 79   HEMOGLOBIN g/dL 15 1 15 1   HEMATOCRIT % 47 2* 46 9*   MCV fL 90 90   PLATELETS Thousands/uL 187 211   MCH pg 28 7 28 9   MCHC g/dL 32 0 32 2   RDW % 13 2 13 1   MPV fL 9 7 9 9   NRBC AUTO /100 WBCs 0 0        CMP:  Results from Last 12 Months   Lab Units 08/15/22  0506 08/03/22  1640   POTASSIUM mmol/L 3 8 4 0   CHLORIDE mmol/L 103 104   CO2 mmol/L 26 25   BUN mg/dL 25 21   CREATININE mg/dL 0 74 0 78   CALCIUM mg/dL 9 1 9 4   AST U/L 25 24   ALT U/L 24 22   ALK PHOS U/L 66 66   EGFR ml/min/1 73sq m 75 71        Magnesium:   No results for input(s): MG in the last 8784 hours  A1C:  Results from Last 12 Months   Lab Units 12/08/21  1028   HEMOGLOBIN A1C % 6 4*        TSH:  No results for input(s): TSH in the last 8784 hours  TSH 3rd Gen:  Results from Last 12 Months   Lab Units 08/03/22  1640   TSH 3RD GENERATON uIU/mL 1 960                  Stress Test:   Results for orders placed during the hospital encounter of 07/06/22    NM myocardial perfusion spect (rx stress and/or rest)    Interpretation Summary    Stress ECG: No ST deviation is noted  There were no arrhythmias during recovery    The ECG was negative for ischemia  The stress ECG is negative for ischemia after vasodilation and low level exercise, without reproduction of symptoms    Perfusion: There are no perfusion defects      Stress Function: Left ventricular function post-stress is normal  Post-stress ejection fraction is 77 %    Stress Combined Conclusion: The ECG and SPECT imaging portions of the stress study are concordant with no evidence of stress induced myocardial ischemia  Left ventricular perfusion is normal     No results found for this or any previous visit  Cardiac catheterization :  No results found for this or any previous visit  HOLTER MONITOR: 25 HOUR/48 HOUR MONITORS  Results for orders placed during the hospital encounter of 07/06/22                    Interpretation Summary  Indication: paroxysmal atrial fibrillation  Monitoring period: 47 hr 59 min    Predominant rhythm: Sinus rhythm    Minimum HR: 50 bpm  Average HR: 58 bpm  Maximum HR: 109 bpm    Ventricular ectopies: 0 (0 0%)  Ventricular runs: 0    Supraventricular ectopies: 112 (0 1%)  Supraventricular runs:  1 (10 beats at ventricular rate of 126 bpm)    Longest RR: 1 9 sec    Arrhythmias: No sustained arrhythmias recorded    Symptom diary submitted: yes      Impression  Patient was predominantly in sinus rhythm with an average heart rate of 58 bpm   No significant supraventricular or ventricular ectopies were noted  No sustained arrhythmias  No atrial fibrillation was noted  No significant pauses  No symptoms reported during monitoring period  Signed by:  Cardiology fellow, Jimmy Sorensen DO  Cardiology attending, Kasi Weathers MD          AMB extended holter monitor  No results found for this or any previous visit  DEVICE CHECK:       No results found for this or any previous visit          Code Status: [unfilled]  Advance Directive and Living Will:      Power of :    POLST:      Counseling / Coordination of Care  Detailed discussion regards reinitiating BiPAP +  proceeding with loop implantation    Uriah Church MD

## 2022-09-01 NOTE — LETTER
September 1, 2022     Dylan Clock, [address]    Patient: Shyla Olivas   YOB: 1940   Date of Visit: 9/1/2022       Dear Dr Arnie Clements: Thank you for referring Sofie Wesley to me for evaluation  Below are my notes for this consultation  If you have questions, please do not hesitate to call me  I look forward to following your patient along with you  Sincerely,        Eric Sapp MD        CC: Jessy Alvarez MD  [facility]  KRISSY Hogan MA Ciro Hane, MD  9/1/2022 12:28 PM  Sign when Signing Visit   Consultation - Electrophysiology-Cardiology (EP)   Shyla Olivas 80 y o  female MRN: [id number]  Unit/Bed#:  Encounter: [id number]      1  Tachy-alison syndrome ([address] )     2  Atrial fibrillation, unspecified type ([address] )  POCT ECG   3  Obstructive sleep apnea syndrome     4  Primary hypertension     5  Paroxysmal atrial fibrillation (HCC)     6  Peripheral venous insufficiency     7  Mood disorder with hypomanic features     8  Mixed hyperlipidemia     9   Minimal cognitive impairment           Consults  Physician Requesting Consult: Liliana Ashr, DO   Reason for Consult / Principal Problem:  Tachy-alison syndrome      Assessment/Plan    Tachy-alison syndrome  Paroxysmal atrial fibrillation  Long-term anticoagulation  Tachy-alison syndrome  Hypertension  Mixed hyperlipidemia  Pre diabetes  Overweight  VIRGILIO, was on CPAP, recalled and patient has not received a new one        Tachy-alison syndrome  Patient does complain of fatigue  Heart rate is in the 50s while in the office    Patient is on flecainide for PAF, but without a beta-blocker because of underlying bradycardia  Patient is on donepezil for dementia, which can worsen  Bradycardia    The patient is going to need long-term monitoring  Recommended look monitor        History of PAF  Rate control-none because of underlying bradycardia  Rhythm control-on flecainide 50 mg 2 times daily  Anticoagulation-none  I do not have any strip of documented AFib  Patient is on aspirin which is not really of use in the setting of AFib and may be discontinued  I would recommend discussion with her primary cardiologist about the same        Overweight  BMI- 28  This increases the risk of-CAD, CVA, vascular disease, diabetes, kidney dysfunction, hypertension, hyperlipidemia  Diet is responsible for 80% of weight gain   Advice nutritional counseling and healthy diet  Also advised to increase activity  He is going to follow up with his primary care        Obstructive sleep apnea   Patient has a history of same  Previously was using CPAP  It has been recalled and she is without it and very symptomatic with fatigue        Hypertension   Blood pressure-120/60  Medication-amlodipine  My recommendation-continue with same      Dementia  Patient is on donepezil  This can cause bradycardia        Summary of my recommendation for the patient  Please arrange to start CPAP  Proceed with loop implantation- monitoring of bradycardia and deciding on device                  History of Present Illness   HPI: Dustin Arizmendi is a 80y o  year old female has been referred to me by Dr Arianna Simmons for the management of bradycardia, palpitation and tachy-alison syndrome    The patient has significant medical illnesses which include  Tachy-alison syndrome  Paroxysmal atrial fibrillation  Long-term anticoagulation  Tachy-alison syndrome  Hypertension  Mixed hyperlipidemia  Pre diabetes  Overweight  VIRGILIO, was on CPAP, recalled and patient has not received a new one    She has moved to a new place  She does feel very fatigued as her CPAP has not been replaced  She does not have the ability to exercise on a daily basis  See recalls that she cannot remember the name of her new friends and recognizes her forgetfulness      As far as cardiac symptoms are concerned  Angina - negative  Orthopnea -negative  Paroxysmal nocturnal dyspnea -negative  Leg swelling-negative  Palpitations -negative  Presyncope-negative  Syncope -negative  Orthostatic lightheadedness -negative  Exertional intolerance-negative    Snoring-present  morning fatigue-present  Daytime sleepiness-present      Social history  Tobacco use-negative  Alcohol use-negative  Energy drink use-negative  Recreational drug use-negative      Family history  Diabetes      Historical Information   Past Medical History:   Diagnosis Date    Afib ([address] )     Arthritis     Hyperlipidemia     Hypertension     Osteopenia     Sleep apnea     Varicose veins of both lower extremities      Past Surgical History:   Procedure Laterality Date    CATARACT EXTRACTION      COLONOSCOPY      complete, for polyp removal    EYE SURGERY      HYSTERECTOMY      age 39   [facility] INCISION AND DRAINAGE OF WOUND Right 6/28/2016    Procedure: ARTHROSCOPY,EVACUATION OF HEMATOMA, OPEN EXPLORATION OF WOUND;  Surgeon: Laura Trevino MD;  Location: [facility];  Service:     TONSILLECTOMY AND ADENOIDECTOMY      TOTAL KNEE ARTHROPLASTY Right     TOTAL KNEE ARTHROPLASTY Left     WISDOM TOOTH EXTRACTION       Social History     Substance and Sexual Activity   Alcohol Use Yes     Social History     Substance and Sexual Activity   Drug Use No     Social History     Tobacco Use   Smoking Status Never Smoker   Smokeless Tobacco Never Used     Social History     Socioeconomic History    Marital status: /Civil Union     Spouse name: Not on file    Number of children: Not on file    Years of education: Not on file    Highest education level: Not on file   Occupational History    Not on file   Tobacco Use    Smoking status: Never Smoker    Smokeless tobacco: Never Used   Vaping Use    Vaping Use: Never used   Substance and Sexual Activity    Alcohol use:  Yes    Drug use: No    Sexual activity: Not Currently     Comment:    Other Topics Concern    Not on file   Social History Narrative    No domestic violence Social Determinants of Health     Financial Resource Strain: Not on file   Food Insecurity: Not on file   Transportation Needs: Not on file   Physical Activity: Not on file   Stress: Not on file   Social Connections: Not on file   Intimate Partner Violence: Not on file   Housing Stability: Not on file       Family History:  Family History   Problem Relation Age of Onset    Diabetes Mother     Diabetes Father     Diabetes Brother     No Known Problems Sister     No Known Problems Daughter     No Known Problems Maternal Grandmother     No Known Problems Maternal Grandfather     No Known Problems Paternal Grandmother     No Known Problems Paternal Grandfather     No Known Problems Daughter     Brain cancer Son     No Known Problems Maternal Aunt          Meds/Allergies      No current facility-administered medications for this visit  (Not in a hospital admission)      Allergies   Allergen Reactions    Atorvastatin Other (See Comments)     myalgia    Statins Other (See Comments)     Weakness in legs           Objective   Vitals:   Visit Vitals  /60 (BP Location: Right arm, Patient Position: Sitting, Cuff Size: Adult)   Pulse (!) 54   Wt 72 6 kg (160 lb)   BMI 28 34 kg/m²   OB Status Hysterectomy   Smoking Status Never Smoker   BSA 1 76 m²      Vitals:    09/01/22 1111   Weight: 72 6 kg (160 lb)   [unfilled]    Invasive Devices  Report    None                   ROS  Review of Systems   All other systems reviewed and are negative  As described in my history of present illness        PHYSICAL EXAM  Physical Exam  Vitals reviewed  Constitutional:       General: She is not in acute distress  Appearance: Normal appearance  She is not ill-appearing  HENT:      Head: Normocephalic and atraumatic        Right Ear: External ear normal       Left Ear: External ear normal       Nose: Nose normal       Mouth/Throat:      Comments: Posterior pharynx is crowded  Eyes:      General: No scleral icterus  Extraocular Movements: Extraocular movements intact  Conjunctiva/sclera: Conjunctivae normal       Pupils: Pupils are equal, round, and reactive to light  Neck:      Comments: Thick neck  Cardiovascular:      Rate and Rhythm: Normal rate and regular rhythm  Pulses: Normal pulses  Heart sounds: Normal heart sounds  No murmur heard  Pulmonary:      Effort: Pulmonary effort is normal  No respiratory distress  Breath sounds: Normal breath sounds  No wheezing  Abdominal:      General: Bowel sounds are normal  There is no distension  Tenderness: There is no abdominal tenderness  Comments: Central obesity present   Musculoskeletal:         General: No swelling, tenderness or deformity  Cervical back: Neck supple  No rigidity  Skin:     Coloration: Skin is not jaundiced  Findings: No bruising  Neurological:      Mental Status: She is alert  Mental status is at baseline  Motor: No weakness  Psychiatric:         Mood and Affect: Mood normal          Thought Content: Thought content normal                LAB RESULTS:      CBC:  Results from Last 12 Months   Lab Units 08/15/22  0506 08/03/22  1640   WBC Thousand/uL 6 17 6 79   HEMOGLOBIN g/dL 15 1 15 1   HEMATOCRIT % 47 2* 46 9*   MCV fL 90 90   PLATELETS Thousands/uL 187 211   MCH pg 28 7 28 9   MCHC g/dL 32 0 32 2   RDW % 13 2 13 1   MPV fL 9 7 9 9   NRBC AUTO /100 WBCs 0 0        CMP:  Results from Last 12 Months   Lab Units 08/15/22  0506 08/03/22  1640   POTASSIUM mmol/L 3 8 4 0   CHLORIDE mmol/L 103 104   CO2 mmol/L 26 25   BUN mg/dL 25 21   CREATININE mg/dL 0 74 0 78   CALCIUM mg/dL 9 1 9 4   AST U/L 25 24   ALT U/L 24 22   ALK PHOS U/L 66 66   EGFR ml/min/1 73sq m 75 71        Magnesium:   No results for input(s): MG in the last 8784 hours  A1C:  Results from Last 12 Months   Lab Units 12/08/21  1028   HEMOGLOBIN A1C % 6 4*        TSH:  No results for input(s): TSH in the last 8784 hours      TSH 3rd Gen:  Results from Last 12 Months   Lab Units 08/03/22  1640   TSH 3RD GENERATON uIU/mL 1 960                  Stress Test:   Results for orders placed during the hospital encounter of 07/06/22    NM myocardial perfusion spect (rx stress and/or rest)    Interpretation Summary    Stress ECG: No ST deviation is noted  There were no arrhythmias during recovery    The ECG was negative for ischemia  The stress ECG is negative for ischemia after vasodilation and low level exercise, without reproduction of symptoms    Perfusion: There are no perfusion defects    Stress Function: Left ventricular function post-stress is normal  Post-stress ejection fraction is 77 %    Stress Combined Conclusion: The ECG and SPECT imaging portions of the stress study are concordant with no evidence of stress induced myocardial ischemia  Left ventricular perfusion is normal     No results found for this or any previous visit  Cardiac catheterization :  No results found for this or any previous visit  HOLTER MONITOR: 25 HOUR/48 HOUR MONITORS  Results for orders placed during the hospital encounter of 07/06/22                    Interpretation Summary  Indication: paroxysmal atrial fibrillation  Monitoring period: 47 hr 59 min    Predominant rhythm: Sinus rhythm    Minimum HR: 50 bpm  Average HR: 58 bpm  Maximum HR: 109 bpm    Ventricular ectopies: 0 (0 0%)  Ventricular runs: 0    Supraventricular ectopies: 112 (0 1%)  Supraventricular runs:  1 (10 beats at ventricular rate of 126 bpm)    Longest RR: 1 9 sec    Arrhythmias: No sustained arrhythmias recorded    Symptom diary submitted: yes      Impression  Patient was predominantly in sinus rhythm with an average heart rate of 58 bpm   No significant supraventricular or ventricular ectopies were noted  No sustained arrhythmias  No atrial fibrillation was noted  No significant pauses  No symptoms reported during monitoring period      Signed by:  Cardiology fellow, Ranjit Coronado DO Juan  Cardiology attending, Lianet Giang MD          AMB extended holter monitor  No results found for this or any previous visit  DEVICE CHECK:       No results found for this or any previous visit          Code Status: [unfilled]  Advance Directive and Living Will:      Power of :    POLST:      Counseling / Coordination of Care  Detailed discussion regards reinitiating BiPAP +  proceeding with loop implantation    Rolfe Angelucci, MD

## 2022-09-21 NOTE — ASSESSMENT & PLAN NOTE
· Patient cleared for admission to psychiatric unit and treatment of underlying psychiatric illness    · Please call with any questions or concerns  · Urine drug screen on admission - 8/3/22 negative    · COVID negative   · TSH normal   · ETOH negative on admission   · 201 voluntary admission [Normal] : mucosa is normal [Midline] : trachea located in midline position

## 2022-09-28 ENCOUNTER — TELEPHONE (OUTPATIENT)
Dept: CARDIOLOGY CLINIC | Facility: CLINIC | Age: 82
End: 2022-09-28

## 2022-09-28 ENCOUNTER — PREP FOR PROCEDURE (OUTPATIENT)
Dept: CARDIOLOGY CLINIC | Facility: CLINIC | Age: 82
End: 2022-09-28

## 2022-09-28 DIAGNOSIS — I48.0 PAROXYSMAL ATRIAL FIBRILLATION (HCC): Primary | ICD-10-CM

## 2022-09-28 NOTE — TELEPHONE ENCOUNTER
Patient scheduled for Loop implant on 10/12/22 at [facility] with Jennie Patch  Harden Lady from [employer] aware of all general instructions  Can I have auth?

## 2022-10-07 ENCOUNTER — TELEPHONE (OUTPATIENT)
Dept: CARDIOLOGY CLINIC | Facility: CLINIC | Age: 82
End: 2022-10-07

## 2022-10-07 NOTE — TELEPHONE ENCOUNTER
Phone call to [employer]  Patient sched for follow up appointment 10/10/22 with Dr Abby Zambrano  Left message voice mail  for [facility] to cancel appointment  Patient is scheduled for loop implant 10/12/22  Dr Abby Zambrano requests follow up appointment 3-4 weeks after loop implant

## 2022-10-12 ENCOUNTER — HOSPITAL ENCOUNTER (OUTPATIENT)
Facility: HOSPITAL | Age: 82
Setting detail: OUTPATIENT SURGERY
Discharge: HOME | End: 2022-10-12
Attending: INTERNAL MEDICINE | Admitting: INTERNAL MEDICINE
Payer: MEDICARE

## 2022-10-12 VITALS
HEART RATE: 63 BPM | RESPIRATION RATE: 18 BRPM | SYSTOLIC BLOOD PRESSURE: 153 MMHG | OXYGEN SATURATION: 94 % | TEMPERATURE: 97 F | DIASTOLIC BLOOD PRESSURE: 69 MMHG

## 2022-10-12 DIAGNOSIS — I48.0 PAROXYSMAL ATRIAL FIBRILLATION (HCC): ICD-10-CM

## 2022-10-12 PROCEDURE — 33285 INSJ SUBQ CAR RHYTHM MNTR: CPT | Performed by: PHYSICIAN ASSISTANT

## 2022-10-12 PROCEDURE — NC001 PR NO CHARGE: Performed by: PHYSICIAN ASSISTANT

## 2022-10-12 PROCEDURE — 33285 INSJ SUBQ CAR RHYTHM MNTR: CPT | Performed by: INTERNAL MEDICINE

## 2022-10-12 PROCEDURE — C1764 EVENT RECORDER, CARDIAC: HCPCS | Performed by: INTERNAL MEDICINE

## 2022-10-12 DEVICE — LOOP RECORDER REVEAL LINQ II SYS DEVICE ONLY: Type: IMPLANTABLE DEVICE | Site: CHEST | Status: FUNCTIONAL

## 2022-10-12 RX ORDER — LIDOCAINE HYDROCHLORIDE AND EPINEPHRINE 10; 10 MG/ML; UG/ML
INJECTION, SOLUTION INFILTRATION; PERINEURAL AS NEEDED
Status: DISCONTINUED | OUTPATIENT
Start: 2022-10-12 | End: 2022-10-12 | Stop reason: HOSPADM

## 2022-10-12 NOTE — DISCHARGE INSTRUCTIONS
OK to shower with with the glue, glue will fall off in 1 week on its own, do not scrub the area or swim during the next 14 days  not use lotions/powders/creams on incision  Remove outer bandage on for 24 hours after procedure  Please call the office [phone] if you notice redness, swelling, bleeding, or drainage from incision or if you develop fevers  Cardiac Loop Recorder Insertion      WHAT YOU SHOULD KNOW:    A cardiac loop recorder is a device used to diagnose heart rhythm problems, such as a fast or irregular heartbeat  It is implanted in your left chest, just under the skin  The device records a pattern of your heart's rhythm, called an EKG  Your device records automatic EKGs, depending on how your caregiver programs it  You may also receive a handheld controller  You press a button on the controller when you have symptoms, such as dizziness, lightheadedness, or palpitations  The device will record an EKG at that moment  The recording can help your caregiver see if your symptoms may be caused by heart rhythm problems  Your caregiver will remove the device after it has collected enough data  You may need the device for up to 5 years  The procedure to remove the device is similar to the procedure used to implant it  AFTER YOU LEAVE:    Follow up with our loop recorder clinic: You will need to return in 1 to 2 weeks to meet the staff in our loop recorder clinic  At this appointment they will check your incision and remove your stitches  We will discuss how we retrieve data from your loop recorder at this appointment  You will be able to transmit data from your device from home as well, this will also be explained by our loop recorder clinic staff  Ask for information about this process  Write down your questions so you remember to ask them during your visits        Wound care: the glue over your loop recorder is water proof, you can shower as your normally wound, please do not pick the glue off the wound  Do not use lotions/powders/creams on incision  Remove outer bandage 24 hours after procedure, you will notice a few stitches which will be removed at your two week follow up appointment  Please call the office if you notice redness, swelling, bleeding, or drainage from incision or if you develop fevers  Keep the loop recorder area clean until it heals  Return to activity: If you received anesthesia, you will not be able to drive for 24 hours  Otherwise, most people can return to normal activities soon after the procedure  Your cardiologist may want to know if your work involves electrical current or high-voltage equipment  Ask about other electrical items that could interfere with your cardiac loop recorder  Contact your cardiologist if:   You have a fever or chills  Your wound is red, swollen, or draining pus  You have questions or concerns about your condition or care  Seek care immediately or call 911 if: You feel weak, dizzy, or faint  You lose consciousness  © 2014 [employer] is for End User's use only and may not be sold, redistributed or otherwise used for commercial purposes  All illustrations and images included in CareNotes® are the copyrighted property of [employer]  or Ashok Kennedy  The above information is an  only  It is not intended as medical advice for individual conditions or treatments  Talk to your doctor, nurse or pharmacist before following any medical regimen to see if it is safe and effective for you

## 2022-10-12 NOTE — H&P
H&P Exam - Cardiology   Autumn Kumar 80 y o  female MRN: [id number]  Unit/Bed#: AN CATH LAB ROOM Encounter: [id number]    Assessment/Plan   1  Plan for ILR for PAF monitoring         Imaging: I have personally reviewed pertinent reports  No results found for this or any previous visit  EKG: none     History of Present Illness   HPI:  Autumn Kumar is a 80y o  year old female with a history as above who presents to [facility] plan for ILR for PAF monitoring     Review of Systems  ROS as noted above, otherwise 12 point review of systems was performed and is negative         Historical Information   Past Medical History:   Diagnosis Date   • Afib ([facility] [address] )    • Arthritis    • Hyperlipidemia    • Hypertension    • Osteopenia    • Sleep apnea    • Varicose veins of both lower extremities      Past Surgical History:   Procedure Laterality Date   • CATARACT EXTRACTION     • COLONOSCOPY      complete, for polyp removal   • EYE SURGERY     • HYSTERECTOMY      age 39   • INCISION AND DRAINAGE OF WOUND Right 6/28/2016    Procedure: ARTHROSCOPY,EVACUATION OF HEMATOMA, OPEN EXPLORATION OF WOUND;  Surgeon: Marco Antonio Orellana MD;  Location: [facility];  Service:    • TONSILLECTOMY AND ADENOIDECTOMY     • TOTAL KNEE ARTHROPLASTY Right    • TOTAL KNEE ARTHROPLASTY Left    • WISDOM TOOTH EXTRACTION       Family History:   Family History   Problem Relation Age of Onset   • Diabetes Mother    • Diabetes Father    • Diabetes Brother    • No Known Problems Sister    • No Known Problems Daughter    • No Known Problems Maternal Grandmother    • No Known Problems Maternal Grandfather    • No Known Problems Paternal Grandmother    • No Known Problems Paternal Grandfather    • No Known Problems Daughter    • Brain cancer Son    • No Known Problems Maternal Aunt        Social History   Social History     Substance and Sexual Activity   Alcohol Use Yes     Social History     Substance and Sexual Activity   Drug Use No     Social History     Tobacco Use   Smoking Status Never Smoker   Smokeless Tobacco Never Used         Meds/Allergies   all medications and allergies reviewed  Home Medications:   Medications Prior to Admission   Medication   • amLODIPine (NORVASC) 10 mg tablet   • aspirin 81 mg chewable tablet   • divalproex sodium (DEPAKOTE SPRINKLE) 125 MG capsule   • donepezil (ARICEPT) 5 mg tablet   • flecainide (TAMBOCOR) 50 mg tablet   • melatonin 3 mg   • sodium chloride (MARITZA 128) 5 % hypertonic ophthalmic ointment       Allergies   Allergen Reactions   • Atorvastatin Other (See Comments)     myalgia   • Statins Other (See Comments)     Weakness in legs       Objective   Vitals: Blood pressure 153/69, pulse 63, temperature (!) 97 °F (36 1 °C), resp  rate 18, SpO2 94 %  Orthostatic Blood Pressures    Flowsheet Row Most Recent Value   Blood Pressure 153/69 filed at 10/12/2022 1245          No intake or output data in the 24 hours ending 10/12/22 1404    Invasive Devices  Report    None                 Physical Exam  Constitutional:       Appearance: She is well-developed  HENT:      Head: Normocephalic and atraumatic  Eyes:      Pupils: Pupils are equal, round, and reactive to light  Cardiovascular:      Rate and Rhythm: Normal rate and regular rhythm  Pulmonary:      Effort: Pulmonary effort is normal       Breath sounds: Normal breath sounds  Abdominal:      General: Bowel sounds are normal       Palpations: Abdomen is soft  Musculoskeletal:         General: Normal range of motion  Cervical back: Normal range of motion and neck supple  Skin:     General: Skin is warm and dry  Neurological:      Mental Status: She is alert and oriented to person, place, and time  Lab Results: I have personally reviewed pertinent lab results                Invalid input(s): LABGLOM                Code Status: Prior

## 2022-10-20 ENCOUNTER — OFFICE VISIT (OUTPATIENT)
Dept: DERMATOLOGY | Facility: CLINIC | Age: 82
End: 2022-10-20

## 2022-10-20 VITALS — WEIGHT: 160 LBS | HEIGHT: 63 IN | TEMPERATURE: 98.2 F | BODY MASS INDEX: 28.35 KG/M2

## 2022-10-20 DIAGNOSIS — L57.0 KERATOSIS, ACTINIC: ICD-10-CM

## 2022-10-20 DIAGNOSIS — L82.1 SEBORRHEIC KERATOSIS: ICD-10-CM

## 2022-10-20 DIAGNOSIS — D48.5 NEOPLASM OF UNCERTAIN BEHAVIOR OF SKIN: Primary | ICD-10-CM

## 2022-10-20 DIAGNOSIS — D22.9 MULTIPLE MELANOCYTIC NEVI: ICD-10-CM

## 2022-10-20 NOTE — PROGRESS NOTES
[facility] Note     Patient Name: Kavya Sadler  Encounter Date: 10/20/2022    • Have you been cared for by a [facility] in the last 3 years and, if so, which one? No    · Have you traveled outside of the [address] [place] [address] in the past 3 months or outside of the [facility] area in the last 2 weeks? No    • May we call your Preferred Phone number to discuss your specific medical information? Yes    • May we leave a detailed message that includes your specific medical information? Yes      Today's Chief Concerns:  • Concern #1:  Full Body Exam   • Concern #2:  Skin lesion left temple    Past Medical History:  Have you personally ever had or currently have any of the following? · Skin cancer (such as Melanoma, Basal Cell Carcinoma, Squamous Cell Carcinoma? (If Yes, please provide more detail)- No  · Eczema: No  · Psoriasis: No  · HIV/AIDS: No  · Hepatitis B or C: No  · Tuberculosis: No  · Systemic Immunosuppression such as Diabetes, Biologic or Immunotherapy, Chemotherapy, Organ Transplantation, Bone Marrow Transplantation (If YES, please provide more detail): No  · Radiation Treatment (If YES, please provide more detail): No  · Any other major medical conditions/concerns? (If Yes, which types)- No    Social History:    • What is/was your primary occupation? Retired     • What are your hobbies/past-times? Witting     Family History:  Have any of your "first degree relatives" (parent, brother, sister, or child) had any of the following       · Skin cancer such as Melanoma or Merkel Cell Carcinoma or Pancreatic Cancer? No  · Eczema, Asthma, Hay Fever or Seasonal Allergies: No  · Psoriasis or Psoriatic Arthritis: No  · Do any other medical conditions seem to run in your family? If Yes, what condition and which relatives?   No    Current Medications:       Current Outpatient Medications:   •  amLODIPine (NORVASC) 10 mg tablet, Take 1 tablet (10 mg total) by mouth daily, Disp: 30 tablet, Rfl: 0  •  aspirin 81 mg chewable tablet, Chew 1 tablet (81 mg total) daily, Disp: 30 tablet, Rfl: 0  •  divalproex sodium (DEPAKOTE SPRINKLE) 125 MG capsule, Take 2 capsules (250 mg total) by mouth every 8 (eight) hours, Disp: 180 capsule, Rfl: 1  •  donepezil (ARICEPT) 5 mg tablet, Take 1 tablet (5 mg total) by mouth daily at bedtime, Disp: 30 tablet, Rfl: 1  •  flecainide (TAMBOCOR) 50 mg tablet, Take 1 tablet (50 mg total) by mouth every 12 (twelve) hours, Disp: 60 tablet, Rfl: 0  •  sodium chloride (MARITZA 128) 5 % hypertonic ophthalmic ointment, Administer to both eyes every 3 (three) hours as needed (irritation), Disp: 3 5 g, Rfl: 0      Review of Systems:  Have you recently had or currently have any of the following? If YES, what are you doing for the problem? · Fever, chills or unintended weight loss: No  · Sudden loss or change in your vision: No  · Nausea, vomiting or blood in your stool: No  · Painful or swollen joints: No  · Wheezing or cough: No  · Changing mole or non-healing wound: No  · Nosebleeds: No  · Excessive sweating: No  · Easy or prolonged bleeding? No  · Over the last 2 weeks, how often have you been bothered by the following problems? · Taking little interest or pleasure in doing things: 1 - Not at All  · Feeling down, depressed, or hopeless: 1 - Not at All  · Rapid heartbeat with epinephrine:  No    · FEMALES ONLY:    · Are you pregnant or planning to become pregnant? No  · Are you currently or planning to be nursing or breast feeding? No    · Any known allergies?       Allergies   Allergen Reactions   • Atorvastatin Other (See Comments)     myalgia   • Statins Other (See Comments)     Weakness in legs   ·       Physical Exam:    • Was a chaperone (Derm Clinical Assistant) present throughout the entire Physical Exam? Yes    • Did the Dermatology Team specifically  the patient on the importance of a Full Skin Exam to be sure that nothing is missed clinically? Yes}  o Did the patient ultimately request or accept a Full Skin Exam?  Yes  o Did the patient specifically refuse to have the areas "under-the-bra" examined by the Dermatologist? No  o Did the patient specifically refuse to have the areas "under-the-underwear" examined by the Dermatologist? No    CONSTITUTIONAL:   Vitals:    10/20/22 1002   Temp: 98 2 °F (36 8 °C)   TempSrc: Temporal   Weight: 72 6 kg (160 lb)   Height: 5' 3" (1 6 m)       PSYCH: Normal mood and affect  EYES: Normal conjunctiva  ENT: Normal lips and oral mucosa  CARDIOVASCULAR: No edema  RESPIRATORY: Normal respirations  HEME/LYMPH/IMMUNO:  No regional lymphadenopathy except as noted below in "ASSESSMENT AND PLAN BY DIAGNOSIS"    SKIN:  FULL ORGAN SYSTEM EXAM   Hair, Scalp, Ears, Face Normal except as noted below in Assessment   Neck, Cervical Chain Nodes Normal except as noted below in Assessment   Right Arm/Hand/Fingers Normal except as noted below in Assessment   Left Arm/Hand/Fingers Normal except as noted below in Assessment   Chest/Breasts/Axillae Viewed areas Normal except as noted below in Assessment   Abdomen, Umbilicus Normal except as noted below in Assessment   Back/Spine Normal except as noted below in Assessment   Groin/Genitalia/Buttocks Normal except as noted below in Assessment   Right Leg, Foot, Toes Normal except as noted below in Assessment   Left Leg, Foot, Toes Normal except as noted below in Assessment        Assessment and Plan by Diagnosis:    History of Present Condition:    • Duration:  How long has this been an issue for you?    o  Years   • Location Affected:  Where on the body is this affecting you? o  Left temple   • Quality:  Is there any bleeding, pain, itch, burning/irritation, or redness associated with the skin lesion?    o  Itching painful   • Severity:  Describe any bleeding, pain, itch, burning/irritation, or redness on a scale of 1 to 10 (with 10 being the worst)    o  6  • Timing:  Does this condition seem to be there pretty constantly or do you notice it more at specific times throughout the day?    o  Constantly   • Context:  Have you ever noticed that this condition seems to be associated with specific activities you do?    o  Pete   • Modifying Factors:    o Anything that seems to make the condition worse?    -  Denies   o What have you tried to do to make the condition better? -  Denies   • Associated Signs and Symptoms:  Does this skin lesion seem to be associated with any of the following:  o  SL AMB DERM SIGNS AND SYMPTOMS: Itching and Scratching     1  MELANOCYTIC NEVI ("Moles")    Physical Exam:  • Anatomic Location Affected:   Mostly on sun-exposed areas of the Trunk and extremities   • Morphological Description:  Scattered, 1-4mm round to ovoid, symmetrical-appearing, even bordered, skin colored to dark brown macules/papules, mostly in sun-exposed areas  • Pertinent Positives:  • Pertinent Negatives: n/a    Assessment and Plan:  Based on a thorough discussion of this condition and the management approach to it (including a comprehensive discussion of the known risks, side effects and potential benefits of treatment), the patient (family) agrees to implement the following specific plan:  • Continue yearly exam   • Monitor for changes     Melanocytic Nevi  Melanocytic nevi ("moles") are tan or brown, raised or flat areas of the skin which have an increased number of melanocytes  Melanocytes are the cells in our body which make pigment and account for skin color  Some moles are present at birth (I e , "congenital nevi"), while others come up later in life (i e , "acquired nevi")  The sun can stimulate the body to make more moles  Sunburns are not the only thing that triggers more moles  Chronic sun exposure can do it too  Clinically distinguishing a healthy mole from melanoma may be difficult, even for experienced dermatologists   The "ABCDE's" of moles have been suggested as a means of helping to alert a person to a suspicious mole and the possible increased risk of melanoma  The suggestions for raising alert are as follows:    Asymmetry: Healthy moles tend to be symmetric, while melanomas are often asymmetric  Asymmetry means if you draw a line through the mole, the two halves do not match in color, size, shape, or surface texture  Asymmetry can be a result of rapid enlargement of a mole, the development of a raised area on a previously flat lesion, scaling, ulceration, bleeding or scabbing within the mole  Any mole that starts to demonstrate "asymmetry" should be examined promptly by a board certified dermatologist      Border: Healthy moles tend to have discrete, even borders  The border of a melanoma often blends into the normal skin and does not sharply delineate the mole from normal skin  Any mole that starts to demonstrate "uneven borders" should be examined promptly by a board certified dermatologist      Color: Healthy moles tend to be one color throughout  Melanomas tend to be made up of different colors ranging from dark black, blue, white, or red  Any mole that demonstrates a color change should be examined promptly by a board certified dermatologist      Diameter: Healthy moles tend to be smaller than 0 6 cm in size; an exception are "congenital nevi" that can be larger  Melanomas tend to grow and can often be greater than 0 6 cm (1/4 of an inch, or the size of a pencil eraser)  This is only a guideline, and many normal moles may be larger than 0 6 cm without being unhealthy  Any mole that starts to change in size (small to bigger or bigger to smaller) should be examined promptly by a board certified dermatologist      Evolving: Healthy moles tend to "stay the same "  Melanomas may often show signs of change or evolution such as a change in size, shape, color, or elevation    Any mole that starts to itch, bleed, crust, burn, hurt, or ulcerate or demonstrate a change or evolution should be examined promptly by a board certified dermatologist       Dysplastic Nevi  Dysplastic moles are moles that fit the ABCDE rules of melanoma but are not identified as melanomas when examined under the microscope  They may indicate an increased risk of melanoma in that person  If there is a family history of melanoma, most experts agree that the person may be at an increased risk for developing a melanoma  Experts still do not agree on what dysplastic moles mean in patients without a personal or family history of melanoma  Dysplastic moles are usually larger than common moles and have different colors within it with irregular borders  The appearance can be very similar to a melanoma  Biopsies of dysplastic moles may show abnormalities which are different from a regular mole  Melanoma  Malignant melanoma is a type of skin cancer that can be deadly if it spreads throughout the body  The incidence of melanoma in the [place] is growing faster than any other cancer  Melanoma usually grows near the surface of the skin for a period of time, and then begins to grow deeper into the skin  Once it grows deeper into the skin, the risk of spread to other organs greatly increases  Therefore, early detection and removal of a malignant melanoma may result in a better chance at a complete cure; removal after the tumor has spread may not be as effective, leading to worse clinical outcomes such as death  The true rate of nevus transformation into a melanoma is unknown  It has been estimated that the lifetime risk for any acquired melanocytic nevus on any 21year-old individual transforming into melanoma by age [de-identified] is 0 03% (1 in 3,164) for men and 0 009% (1 in 10,800) for women  The appearance of a "new mole" remains one of the most reliable methods for identifying a malignant melanoma    Occasionally, melanomas appear as rapidly growing, blue-black, dome-shaped bumps within a previous mole or previous area of normal skin  Other times, melanomas are suspected when a mole suddenly appears or changes  Itching, burning, or pain in a pigmented lesion should increase suspicion, but most patients with early melanoma have no skin discomfort whatsoever  Melanoma can occur anywhere on the skin, including areas that are difficult for self-examination  Many melanomas are first noticed by other family members  Suspicious-looking moles may be removed for microscopic examination  You may be able to prevent death from melanoma by doing two simple things:    1  Try to avoid unnecessary sun exposure and protect your skin when it is exposed to the sun  People who live near the equator, people who have intermittent exposures to large amounts of sun, and people who have had sunburns in childhood or adolescence have an increased risk for melanoma  Sun sense and vigilant sun protection may be keys to helping to prevent melanoma  We recommend wearing UPF-rated sun protective clothing and sunglasses whenever possible and applying a moisturizer-sunscreen combination product (SPF 50+) such as Neutrogena Daily Defense to sun exposed areas of skin at least three times a day  2  Have your moles regularly examined by a board certified dermatologist AND by yourself or a family member/friend at home  We recommend that you have your moles examined at least once a year by a board certified dermatologist   Use your birthday as an annual reminder to have your "Birthday Suit" (I e , your skin) examined; it is a nice birthday gift to yourself to know that your skin is healthy appearing! Additionally, at-home self examinations may be helpful for detecting a possible melanoma  Use the ABCDEs we discussed and check your moles once a month at home          2  SEBORRHEIC KERATOSIS; NON-INFLAMED    Physical Exam:  • Anatomic Location Affected:  Trunk   • Morphological Description:  Flat and raised, waxy, smooth to warty textured, yellow to brownish-grey to dark brown to blackish, discrete, "stuck-on" appearing papules  • Pertinent Positives:  • Pertinent Negatives: Additional History of Present Condition:  Patient reports new bumps on the skin  Denies itch, burn, pain, bleeding or ulceration  Present constantly; nothing seems to make it worse or better  No prior treatment  Assessment and Plan:  Based on a thorough discussion of this condition and the management approach to it (including a comprehensive discussion of the known risks, side effects and potential benefits of treatment), the patient (family) agrees to implement the following specific plan:  • Benign, no treatment necessary  Can treat if bothersome     Seborrheic Keratosis  A seborrheic keratosis is a harmless warty spot that appears during adult life as a common sign of skin aging  Seborrheic keratoses can arise on any area of skin, covered or uncovered, with the usual exception of the palms and soles  They do not arise from mucous membranes  Seborrheic keratoses can have highly variable appearance  Seborrheic keratoses are extremely common  It has been estimated that over 90% of adults over the age of 61 years have one or more of them  They occur in males and females of all races, typically beginning to erupt in the 35s or 45s  They are uncommon under the age of 21 years  The precise cause of seborrhoeic keratoses is not known  Seborrhoeic keratoses are considered degenerative in nature  As time goes by, seborrheic keratoses tend to become more numerous  Some people inherit a tendency to develop a very large number of them; some people may have hundreds of them  The name "seborrheic keratosis" is misleading, because these lesions are not limited to a seborrhoeic distribution (scalp, mid-face, chest, upper back), nor are they formed from sebaceous glands, nor are they associated with sebum -- which is greasy    Seborrheic keratosis may also be called "SK," "Seb K," "basal cell papilloma," "senile wart," or "barnacle "      Researchers have noted:  • Eruptive seborrhoeic keratoses can follow sunburn or dermatitis  • Skin friction may be the reason they appear in body folds  • Viral cause (e g , human papillomavirus) seems unlikely  • Stable and clonal mutations or activation of FRFR3, PIK3CA, CARLOS, AKT1 and EGFR genes are found in seborrhoeic keratoses  • Seborrhoeic keratosis can arise from solar lentigo  • FRFR3 mutations also arise in solar lentigines  These mutations are associated with increased age and location on the head and neck, suggesting a role of ultraviolet radiation in these lesions  • Seborrheic keratoses do not harbour tumour suppressor gene mutations  • Epidermal growth factor receptor inhibitors, which are used to treat some cancers, often result in an increase in verrucal (warty) keratoses  There is no easy way to remove multiple lesions on a single occasion  Unless a specific lesion is "inflamed" and is causing pain or stinging/burning or is bleeding, most insurance companies do not authorize treatment  3  NEOPLASM OF UNCERTAIN BEHAVIOR OF SKIN    Physical Exam:  • (Anatomic Location); (Size and Morphological Description); (Differential Diagnosis):  o Left temple with a 0 7 cm x 0 7cm verruca's plaques  DIFF: Squamous Cell Carcinoma versus  Irritated Seborrheic Keratosis  • Pertinent Positives:  • Pertinent Negatives: Additional History of Present Condition:  Located on the left temple  Present for few years  Reports irritation and painful  No treatment attempted     Assessment and Plan:  • I have discussed with the patient that a sample of skin via a "skin biopsy” would be potentially helpful to further make a specific diagnosis under the microscope    • Based on a thorough discussion of this condition and the management approach to it (including a comprehensive discussion of the known risks, side effects and potential benefits of treatment), the patient (family) agrees to implement the following specific plan:    o Procedure:  Skin Biopsy  After a thorough discussion of treatment options and risk/benefits/alternatives (including but not limited to local pain, scarring, dyspigmentation, blistering, possible superinfection, and inability to confirm a diagnosis via histopathology), verbal and written consent were obtained and portion of the rash was biopsied for tissue sample  See below for consent that was obtained from patient and subsequent Procedure Note  PROCEDURE TANGENTIAL (SHAVE) BIOPSY NOTE:    • Performing Physician: Tessa Hinson  • Anatomic Location; Clinical Description with size (cm); Pre-Op Diagnosis:   o Left temple with a 0 7 cm x 0 7cm verruca's plaques  DIFF: Squamous Cell Carcinoma versus  Irritated Seborrheic Keratosis  • Post-op diagnosis: Same     • Local anesthesia: 1% Lidocaine HCL     • Topical anesthesia: None    • Hemostasis: Aluminum chloride       After obtaining informed consent  at which time there was a discussion about the purpose of biopsy  and low risks of infection and bleeding  The area was prepped and draped in the usual fashion  Anesthesia was obtained with 1% lidocaine with epinephrine  A shave biopsy to an appropriate sampling depth was obtained by Shave (Dermablade or 15 blade) The resulting wound was covered with surgical ointment and bandaged appropriately  The patient tolerated the procedure well without complications and was without signs of functional compromise  Specimen has been sent for review by Dermatopathology  Standard post-procedure care has been explained and has been included in written form within the patient's copy of Informed Consent  INFORMED CONSENT DISCUSSION AND POST-OPERATIVE INSTRUCTIONS FOR PATIENT    I   RATIONALE FOR PROCEDURE  I understand that a skin biopsy allows the Dermatologist to test a lesion or rash under the microscope to obtain a diagnosis    It usually involves numbing the area with numbing medication and removing a small piece of skin; sometimes the area will be closed with sutures  In this specific procedure, sutures are not usually needed  If any sutures are placed, then they are usually need to be removed in 2 weeks or less  I understand that my Dermatologist recommends that a skin "shave" biopsy be performed today  A local anesthetic, similar to the kind that a dentist uses when filling a cavity, will be injected with a very small needle into the skin area to be sampled  The injected skin and tissue underneath "will go to sleep” and become numb so no pain should be felt afterwards  An instrument shaped like a tiny "razor blade" (shave biopsy instrument) will be used to cut a small piece of tissue and skin from the area so that a sample of tissue can be taken and examined more closely under the microscope  A slight amount of bleeding will occur, but it will be stopped with direct pressure and a pressure bandage and any other appropriate methods  I understands that a scar will form where the wound was created  Surgical ointment will be applied to help protect the wound  Sutures are not usually needed  II   RISKS AND POTENTIAL COMPLICATIONS   I understand the risks and potential complications of a skin biopsy include but are not limited to the following:  • Bleeding  • Infection  • Pain  • Scar/keloid  • Skin discoloration  • Incomplete Removal  • Recurrence  • Nerve Damage/Numbness/Loss of Function  • Allergic Reaction to Anesthesia  • Biopsies are diagnostic procedures and based on findings additional treatment or evaluation may be required  • Loss or destruction of specimen resulting in no additional findings    My Dermatologist has explained to me the nature of the condition, the nature of the procedure, and the benefits to be reasonably expected compared with alternative approaches    My Dermatologist has discussed the likelihood of major risks or complications of this procedure including the specific risks listed above, such as bleeding, infection, and scarring/keloid  I understand that a scar is expected after this procedure  I understand that my physician cannot predict if the scar will form a "keloid," which extends beyond the borders of the wound that is created  A keloid is a thick, painful, and bumpy scar  A keloid can be difficult to treat, as it does not always respond well to therapy, which includes injecting cortisone directly into the keloid every few weeks  While this usually reduces the pain and size of the scar, it does not eliminate it  I understand that photographs may be taken before and after the procedure  These will be maintained as part of the medical providers confidential records and may not be made available to me  I further authorize the medical provider to use the photographs for teaching purposes or to illustrate scientific papers, books, or lectures if in his/her judgment, medical research, education, or science may benefit from its use  I have had an opportunity to fully inquire about the risks and benefits of this procedure and its alternatives  I have been given ample time and opportunity to ask questions and to seek a second opinion if I wished to do so  I acknowledge that there have specifically been no guarantees as to the cosmetic results from the procedure  I am aware that with any procedure there is always the possibility of an unexpected complication  III  POST-PROCEDURAL CARE (WHAT YOU WILL NEED TO DO "AFTER THE BIOPSY" TO OPTIMIZE HEALING)    • Keep the area clean and dry  Try NOT to remove the bandage or get it wet for the first 24 hours  • Gently clean the area and apply surgical ointment (such as Vaseline petrolatum ointment, which is available "over the counter" and not a prescription) to the biopsy site for up to 2 weeks straight    This acts to protect the wound from the outside world  • Sutures are not usually placed in this procedure  If any sutures were placed, return for suture removal as instructed (generally 1 week for the face, 2 weeks for the body)  • Take Acetaminophen (Tylenol) for discomfort, if no contraindications  Ibuprofen or aspirin could make bleeding worse  • Call our office immediately for signs of infection: fever, chills, increased redness, warmth, tenderness, discomfort/pain, or pus or foul smell coming from the wound  WHAT TO DO IF THERE IS ANY BLEEDING? If a small amount of bleeding is noticed, place a clean cloth over the area and apply firm pressure for ten minutes  Check the wound after 10 minutes of direct pressure  If bleeding persists, try one more time for an additional 10 minutes of direct pressure on the area  If the bleeding becomes heavier or does not stop after the second attempt, or if you have any other questions about this procedure, then please call your [facility]ologist by calling [phone] (SKIN)  I hereby acknowledge that I have reviewed and verified the site with my Dermatologist and have requested and authorized my Dermatologist to proceed with the procedure  4  ACTINIC KERATOSIS    Physical Exam:  • Anatomic Location Affected: Face   • Morphological Description:  Scaly pink papules     Assessment and Plan:  Based on a thorough discussion of this condition and the management approach to it (including a comprehensive discussion of the known risks, side effects and potential benefits of treatment), the patient (family) agrees to implement the following specific plan:    • Liquid nitrogen was applied for 10-12 seconds to the skin lesion and the expected blistering or scabbing reaction explained  Do not pick at the area  Patient reminded to expect hypopigmented scars from the procedure  Return if lesion fails to fully resolve        Actinic keratoses are very common on sites repeatedly exposed to the sun, especially the backs of the hands and the face, most often affecting the ears, nose, cheeks, upper lip, vermilion of the lower lip, temples, forehead and balding scalp  In severely chronically sun-damaged individuals, they may also be found on the upper trunk, upper and lower limbs, and dorsum of feet  We discussed the theoretical premalignant (“pre-cancerous”) nature and etiology of these growths  We discussed the prevailing notion that actinic keratoses are a reflection of abnormal skin cell development due to DNA damage by short wavelength UVB  They are more likely to appear if the immune function is poor, due to aging, recent sun exposure, predisposing disease or certain drugs  We discussed that the main concern is that actinic keratoses may predispose to squamous cell carcinoma  It is rare for a solitary actinic keratosis to evolve to squamous cell carcinoma (SCC), but the risk of SCC occurring at some stage in a patient with more than 10 actinic keratoses is thought to be about 10 to 15%  A tender, thickened, ulcerated or enlarging actinic keratosis is suspicious of SCC  Actinic keratoses may be prevented by strict sun protection  If already present, keratoses may improve with a very high sun protection factor (50+) broad-spectrum sunscreen applied at least daily to affected areas, year-round  We recommend that UPF-rated clothing and hats and sunglasses be worn whenever possible and that a sunscreen-moisturizer combination product such as Neutrogena Daily Defense be applied at least three times a day  We performed a thorough discussion of treatment options and specific risk/benefits/alternatives including but not limited to medical “field” treatment with medications such as the following:    • Cryotherapy (specifically, local pain, scarring, dyspigmentation, blistering, possible superinfection, and treats “only what we see” versus directed treatment today)      PROCEDURE:  DESTRUCTION OF PRE-MALIGNANT LESIONS  After a thorough discussion of treatment options and risk/benefits/alternatives (including but not limited to local pain, scarring, dyspigmentation, blistering, and possible superinfection), verbal and written consent were obtained and the aforementioned lesions were treated on with cryotherapy using liquid nitrogen x 1 cycle for 5-10 seconds  • TOTAL NUMBER of 2 pre-malignant lesions were treated today on the ANATOMIC LOCATION: Left forehead  The patient tolerated the procedure well, and after-care instructions were provided        Scribe Attestation    I,:  Graciela Reyes MA am acting as a scribe while in the presence of the attending physician :       I,:  London Holman MD personally performed the services described in this documentation    as scribed in my presence :

## 2022-10-20 NOTE — PATIENT INSTRUCTIONS
Assessment and Plan:  Based on a thorough discussion of this condition and the management approach to it (including a comprehensive discussion of the known risks, side effects and potential benefits of treatment), the patient (family) agrees to implement the following specific plan:  Benign, no treatment necessary  Can treat if bothersome     Seborrheic Keratosis  A seborrheic keratosis is a harmless warty spot that appears during adult life as a common sign of skin aging  Seborrheic keratoses can arise on any area of skin, covered or uncovered, with the usual exception of the palms and soles  They do not arise from mucous membranes  Seborrheic keratoses can have highly variable appearance  Seborrheic keratoses are extremely common  It has been estimated that over 90% of adults over the age of 61 years have one or more of them  They occur in males and females of all races, typically beginning to erupt in the 35s or 45s  They are uncommon under the age of 21 years  The precise cause of seborrhoeic keratoses is not known  Seborrhoeic keratoses are considered degenerative in nature  As time goes by, seborrheic keratoses tend to become more numerous  Some people inherit a tendency to develop a very large number of them; some people may have hundreds of them  The name "seborrheic keratosis" is misleading, because these lesions are not limited to a seborrhoeic distribution (scalp, mid-face, chest, upper back), nor are they formed from sebaceous glands, nor are they associated with sebum -- which is greasy    Seborrheic keratosis may also be called "SK," "Seb K," "basal cell papilloma," "senile wart," or "barnacle "      Researchers have noted:  Eruptive seborrhoeic keratoses can follow sunburn or dermatitis  Skin friction may be the reason they appear in body folds  Viral cause (e g , human papillomavirus) seems unlikely  Stable and clonal mutations or activation of FRFR3, PIK3CA, CARLOS, AKT1 and EGFR genes are found in seborrhoeic keratoses  Seborrhoeic keratosis can arise from solar lentigo  FRFR3 mutations also arise in solar lentigines  These mutations are associated with increased age and location on the head and neck, suggesting a role of ultraviolet radiation in these lesions  Seborrheic keratoses do not harbour tumour suppressor gene mutations  Epidermal growth factor receptor inhibitors, which are used to treat some cancers, often result in an increase in verrucal (warty) keratoses  There is no easy way to remove multiple lesions on a single occasion  Unless a specific lesion is "inflamed" and is causing pain or stinging/burning or is bleeding, most insurance companies do not authorize treatment  INFORMED CONSENT DISCUSSION AND POST-OPERATIVE INSTRUCTIONS FOR PATIENT    I   RATIONALE FOR PROCEDURE  I understand that a skin biopsy allows the Dermatologist to test a lesion or rash under the microscope to obtain a diagnosis  It usually involves numbing the area with numbing medication and removing a small piece of skin; sometimes the area will be closed with sutures  In this specific procedure, sutures are not usually needed  If any sutures are placed, then they are usually need to be removed in 2 weeks or less  I understand that my Dermatologist recommends that a skin "shave" biopsy be performed today  A local anesthetic, similar to the kind that a dentist uses when filling a cavity, will be injected with a very small needle into the skin area to be sampled  The injected skin and tissue underneath "will go to sleep” and become numb so no pain should be felt afterwards  An instrument shaped like a tiny "razor blade" (shave biopsy instrument) will be used to cut a small piece of tissue and skin from the area so that a sample of tissue can be taken and examined more closely under the microscope    A slight amount of bleeding will occur, but it will be stopped with direct pressure and a pressure bandage and any other appropriate methods  I understands that a scar will form where the wound was created  Surgical ointment will be applied to help protect the wound  Sutures are not usually needed  II   RISKS AND POTENTIAL COMPLICATIONS   I understand the risks and potential complications of a skin biopsy include but are not limited to the following:  Bleeding  Infection  Pain  Scar/keloid  Skin discoloration  Incomplete Removal  Recurrence  Nerve Damage/Numbness/Loss of Function  Allergic Reaction to Anesthesia  Biopsies are diagnostic procedures and based on findings additional treatment or evaluation may be required  Loss or destruction of specimen resulting in no additional findings    My Dermatologist has explained to me the nature of the condition, the nature of the procedure, and the benefits to be reasonably expected compared with alternative approaches  My Dermatologist has discussed the likelihood of major risks or complications of this procedure including the specific risks listed above, such as bleeding, infection, and scarring/keloid  I understand that a scar is expected after this procedure  I understand that my physician cannot predict if the scar will form a "keloid," which extends beyond the borders of the wound that is created  A keloid is a thick, painful, and bumpy scar  A keloid can be difficult to treat, as it does not always respond well to therapy, which includes injecting cortisone directly into the keloid every few weeks  While this usually reduces the pain and size of the scar, it does not eliminate it  I understand that photographs may be taken before and after the procedure  These will be maintained as part of the medical providers confidential records and may not be made available to me    I further authorize the medical provider to use the photographs for teaching purposes or to illustrate scientific papers, books, or lectures if in his/her judgment, medical research, education, or science may benefit from its use  I have had an opportunity to fully inquire about the risks and benefits of this procedure and its alternatives  I have been given ample time and opportunity to ask questions and to seek a second opinion if I wished to do so  I acknowledge that there have specifically been no guarantees as to the cosmetic results from the procedure  I am aware that with any procedure there is always the possibility of an unexpected complication  III  POST-PROCEDURAL CARE (WHAT YOU WILL NEED TO DO "AFTER THE BIOPSY" TO OPTIMIZE HEALING)    Keep the area clean and dry  Try NOT to remove the bandage or get it wet for the first 24 hours  Gently clean the area and apply surgical ointment (such as Vaseline petrolatum ointment, which is available "over the counter" and not a prescription) to the biopsy site for up to 2 weeks straight  This acts to protect the wound from the outside world  Sutures are not usually placed in this procedure  If any sutures were placed, return for suture removal as instructed (generally 1 week for the face, 2 weeks for the body)  Take Acetaminophen (Tylenol) for discomfort, if no contraindications  Ibuprofen or aspirin could make bleeding worse  Call our office immediately for signs of infection: fever, chills, increased redness, warmth, tenderness, discomfort/pain, or pus or foul smell coming from the wound  WHAT TO DO IF THERE IS ANY BLEEDING? If a small amount of bleeding is noticed, place a clean cloth over the area and apply firm pressure for ten minutes  Check the wound after 10 minutes of direct pressure  If bleeding persists, try one more time for an additional 10 minutes of direct pressure on the area  If the bleeding becomes heavier or does not stop after the second attempt, or if you have any other questions about this procedure, then please call your [facility] Dermatologist by calling [phone] (SKIN)       I hereby acknowledge that I have reviewed and verified the site with my Dermatologist and have requested and authorized my Dermatologist to proceed with the procedure

## 2022-10-28 ENCOUNTER — IN-CLINIC DEVICE VISIT (OUTPATIENT)
Dept: CARDIOLOGY CLINIC | Facility: CLINIC | Age: 82
End: 2022-10-28

## 2022-10-28 DIAGNOSIS — Z95.818 PRESENCE OF OTHER CARDIAC IMPLANTS AND GRAFTS: Primary | ICD-10-CM

## 2022-10-28 NOTE — PROGRESS NOTES
Results for orders placed or performed in visit on 10/28/22   Cardiac EP device report    Narrative    MDT [address] [place] [address] INTERROGATED IN THE [facility] OFFICE  WOUND CHECK: INCISION CLEAN AND DRY WITH EDGES APPROXIMATED; SUTURES REMOVED; WOUND CARE AND RESTRICTIONS REVIEWED WITH PATIENT  BATTERY VOLTAGE "OK"  R WAVES MEASURED 0 52 MV; PRESENTING ECG SHOWS NSR, 61 BPM  NO PATIENT OR DEVICE ACTIVATED EPISODES  SYMPTOM EPISODE LISTED WAS FROM IMPLANT TEACHING  NO PROGRAMMING CHANGES MADE TO DEVICE PARAMETERS  NORMAL DEVICE FUNCTION    ES

## 2022-11-01 ENCOUNTER — APPOINTMENT (EMERGENCY)
Dept: RADIOLOGY | Facility: HOSPITAL | Age: 82
End: 2022-11-01

## 2022-11-01 ENCOUNTER — HOSPITAL ENCOUNTER (EMERGENCY)
Facility: HOSPITAL | Age: 82
Discharge: HOME | End: 2022-11-02
Attending: EMERGENCY MEDICINE

## 2022-11-01 DIAGNOSIS — U07.1 COVID-19 VIRUS INFECTION: Primary | ICD-10-CM

## 2022-11-01 LAB
ALBUMIN SERPL BCP-MCNC: 3.2 G/DL (ref 3.5–5)
ALP SERPL-CCNC: 64 U/L (ref 46–116)
ALT SERPL W P-5'-P-CCNC: 60 U/L (ref 12–78)
ANION GAP SERPL CALCULATED.3IONS-SCNC: 8 MMOL/L (ref 4–13)
APTT PPP: 29 SECONDS (ref 23–37)
AST SERPL W P-5'-P-CCNC: 83 U/L (ref 5–45)
BASOPHILS # BLD AUTO: 0.03 THOUSANDS/ÂΜL (ref 0–0.1)
BASOPHILS NFR BLD AUTO: 0 % (ref 0–1)
BILIRUB SERPL-MCNC: 0.27 MG/DL (ref 0.2–1)
BILIRUB UR QL STRIP: NEGATIVE
BUN SERPL-MCNC: 17 MG/DL (ref 5–25)
CALCIUM ALBUM COR SERPL-MCNC: 9.4 MG/DL (ref 8.3–10.1)
CALCIUM SERPL-MCNC: 8.8 MG/DL (ref 8.3–10.1)
CARDIAC TROPONIN I PNL SERPL HS: 5 NG/L
CHLORIDE SERPL-SCNC: 105 MMOL/L (ref 96–108)
CLARITY UR: CLEAR
CO2 SERPL-SCNC: 24 MMOL/L (ref 21–32)
COLOR UR: COLORLESS
CREAT SERPL-MCNC: 1.1 MG/DL (ref 0.6–1.3)
EOSINOPHIL # BLD AUTO: 0.07 THOUSAND/ÂΜL (ref 0–0.61)
EOSINOPHIL NFR BLD AUTO: 1 % (ref 0–6)
ERYTHROCYTE [DISTWIDTH] IN BLOOD BY AUTOMATED COUNT: 14.1 % (ref 11.6–15.1)
FLUAV RNA RESP QL NAA+PROBE: NEGATIVE
FLUBV RNA RESP QL NAA+PROBE: NEGATIVE
GFR SERPL CREATININE-BSD FRML MDRD: 46 ML/MIN/1.73SQ M
GLUCOSE SERPL-MCNC: 117 MG/DL (ref 65–140)
GLUCOSE UR STRIP-MCNC: NEGATIVE MG/DL
HCT VFR BLD AUTO: 43.9 % (ref 34.8–46.1)
HGB BLD-MCNC: 14.4 G/DL (ref 11.5–15.4)
HGB UR QL STRIP.AUTO: NEGATIVE
IMM GRANULOCYTES # BLD AUTO: 0.04 THOUSAND/UL (ref 0–0.2)
IMM GRANULOCYTES NFR BLD AUTO: 1 % (ref 0–2)
INR PPP: 0.95 (ref 0.84–1.19)
KETONES UR STRIP-MCNC: ABNORMAL MG/DL
LACTATE SERPL-SCNC: 2.6 MMOL/L (ref 0.5–2)
LEUKOCYTE ESTERASE UR QL STRIP: NEGATIVE
LYMPHOCYTES # BLD AUTO: 1.43 THOUSANDS/ÂΜL (ref 0.6–4.47)
LYMPHOCYTES NFR BLD AUTO: 21 % (ref 14–44)
MCH RBC QN AUTO: 29.3 PG (ref 26.8–34.3)
MCHC RBC AUTO-ENTMCNC: 32.8 G/DL (ref 31.4–37.4)
MCV RBC AUTO: 89 FL (ref 82–98)
MONOCYTES # BLD AUTO: 1.73 THOUSAND/ÂΜL (ref 0.17–1.22)
MONOCYTES NFR BLD AUTO: 25 % (ref 4–12)
NEUTROPHILS # BLD AUTO: 3.54 THOUSANDS/ÂΜL (ref 1.85–7.62)
NEUTS SEG NFR BLD AUTO: 52 % (ref 43–75)
NITRITE UR QL STRIP: NEGATIVE
NRBC BLD AUTO-RTO: 0 /100 WBCS
PH UR STRIP.AUTO: 8 [PH]
PLATELET # BLD AUTO: 155 THOUSANDS/UL (ref 149–390)
PMV BLD AUTO: 10 FL (ref 8.9–12.7)
POTASSIUM SERPL-SCNC: 4.2 MMOL/L (ref 3.5–5.3)
PROCALCITONIN SERPL-MCNC: 0.07 NG/ML
PROT SERPL-MCNC: 7.4 G/DL (ref 6.4–8.4)
PROT UR STRIP-MCNC: NEGATIVE MG/DL
PROTHROMBIN TIME: 12.9 SECONDS (ref 11.6–14.5)
RBC # BLD AUTO: 4.92 MILLION/UL (ref 3.81–5.12)
RSV RNA RESP QL NAA+PROBE: NEGATIVE
SARS-COV-2 RNA RESP QL NAA+PROBE: POSITIVE
SODIUM SERPL-SCNC: 137 MMOL/L (ref 135–147)
SP GR UR STRIP.AUTO: 1.01 (ref 1–1.03)
UROBILINOGEN UR STRIP-ACNC: <2 MG/DL
WBC # BLD AUTO: 6.84 THOUSAND/UL (ref 4.31–10.16)

## 2022-11-01 RX ORDER — ACETAMINOPHEN 325 MG/1
975 TABLET ORAL ONCE
Status: COMPLETED | OUTPATIENT
Start: 2022-11-01 | End: 2022-11-01

## 2022-11-01 RX ORDER — IBUPROFEN 600 MG/1
600 TABLET ORAL ONCE
Status: COMPLETED | OUTPATIENT
Start: 2022-11-01 | End: 2022-11-01

## 2022-11-01 RX ADMIN — IBUPROFEN 600 MG: 600 TABLET ORAL at 22:02

## 2022-11-01 RX ADMIN — SODIUM CHLORIDE, SODIUM LACTATE, POTASSIUM CHLORIDE, AND CALCIUM CHLORIDE 1000 ML: .6; .31; .03; .02 INJECTION, SOLUTION INTRAVENOUS at 23:31

## 2022-11-01 RX ADMIN — ACETAMINOPHEN 975 MG: 325 TABLET ORAL at 22:02

## 2022-11-02 VITALS
HEART RATE: 70 BPM | RESPIRATION RATE: 22 BRPM | TEMPERATURE: 100.1 F | DIASTOLIC BLOOD PRESSURE: 55 MMHG | SYSTOLIC BLOOD PRESSURE: 116 MMHG | OXYGEN SATURATION: 93 %

## 2022-11-02 NOTE — ED PROVIDER NOTES
History  Chief Complaint   Patient presents with   • Fever - 75 years or older     Fever and dry cough since this evening  Sent to ER from assisted living  HPI  Patient is an 80-year-old female presenting from a nursing facility due to fever, fatigue, somnolence  Per staff member patient is very active and does not require any assistance  However today patient appeared more tired and remained in her bedroom laying in bed  They noted that patient had a fever of 102 and that she urinated the bed  Patient on exam is alert oriented x3  Patient states that she has been having increasing cough and also that she has been urinating more frequently lately  Has felt feverish but no chills and has had no episodes of vomiting, abdominal pain, chest pain, shortness of breath, diarrhea  Patient does feel more fatigued than usual   Per staff several residence within the nursing facility has been tested positive for COVID and has been going into quarantine  Also several staff members have tested positive for COVID as well  Patient did get a taken test and staff noted there is a faint line indicating possible positive test       Prior to Admission Medications   Prescriptions Last Dose Informant Patient Reported? Taking?    amLODIPine (NORVASC) 10 mg tablet   No No   Sig: Take 1 tablet (10 mg total) by mouth daily   aspirin 81 mg chewable tablet   No No   Sig: Chew 1 tablet (81 mg total) daily   divalproex sodium (DEPAKOTE SPRINKLE) 125 MG capsule   No No   Sig: Take 2 capsules (250 mg total) by mouth every 8 (eight) hours   donepezil (ARICEPT) 5 mg tablet   No No   Sig: Take 1 tablet (5 mg total) by mouth daily at bedtime   flecainide (TAMBOCOR) 50 mg tablet   No No   Sig: Take 1 tablet (50 mg total) by mouth every 12 (twelve) hours   sodium chloride (MARITZA 128) 5 % hypertonic ophthalmic ointment   No No   Sig: Administer to both eyes every 3 (three) hours as needed (irritation)      Facility-Administered Medications: None       Past Medical History:   Diagnosis Date   • Afib ([facility] [address] )    • Arthritis    • Hyperlipidemia    • Hypertension    • Osteopenia    • Sleep apnea    • Varicose veins of both lower extremities        Past Surgical History:   Procedure Laterality Date   • CARDIAC ELECTROPHYSIOLOGY PROCEDURE N/A 10/12/2022    Procedure: Cardiac loop recorder implant;  Surgeon: David Maldonado MD;  Location: [facility]; Service: Cardiology   • CATARACT EXTRACTION     • COLONOSCOPY      complete, for polyp removal   • EYE SURGERY     • HYSTERECTOMY      age 39   • INCISION AND DRAINAGE OF WOUND Right 6/28/2016    Procedure: ARTHROSCOPY,EVACUATION OF HEMATOMA, OPEN EXPLORATION OF WOUND;  Surgeon: Marc Damian MD;  Location: [facility];  Service:    • TONSILLECTOMY AND ADENOIDECTOMY     • TOTAL KNEE ARTHROPLASTY Right    • TOTAL KNEE ARTHROPLASTY Left    • WISDOM TOOTH EXTRACTION         Family History   Problem Relation Age of Onset   • Diabetes Mother    • Diabetes Father    • Diabetes Brother    • No Known Problems Sister    • No Known Problems Daughter    • No Known Problems Maternal Grandmother    • No Known Problems Maternal Grandfather    • No Known Problems Paternal Grandmother    • No Known Problems Paternal Grandfather    • No Known Problems Daughter    • Brain cancer Son    • No Known Problems Maternal Aunt      I have reviewed and agree with the history as documented  E-Cigarette/Vaping   • E-Cigarette Use Never User      E-Cigarette/Vaping Substances   • Nicotine No    • THC No    • CBD No    • Flavoring No    • Other No    • Unknown No      Social History     Tobacco Use   • Smoking status: Never Smoker   • Smokeless tobacco: Never Used   Vaping Use   • Vaping Use: Never used   Substance Use Topics   • Alcohol use: Not Currently   • Drug use: No        Review of Systems   Constitutional: Positive for fatigue and fever  Negative for chills, diaphoresis and unexpected weight change     HENT: Negative for ear pain and sore throat  Eyes: Negative for visual disturbance  Respiratory: Negative for cough, chest tightness and shortness of breath  Cardiovascular: Negative for chest pain and leg swelling  Gastrointestinal: Negative for abdominal distention, abdominal pain, constipation, diarrhea, nausea and vomiting  Endocrine: Negative  Genitourinary: Positive for frequency  Negative for difficulty urinating and dysuria  Musculoskeletal: Negative  Skin: Negative  Allergic/Immunologic: Negative  Neurological: Negative  Hematological: Negative  Psychiatric/Behavioral: Negative  All other systems reviewed and are negative  Physical Exam  ED Triage Vitals   Temperature Pulse Respirations Blood Pressure SpO2   11/01/22 2135 11/01/22 2135 11/01/22 2135 11/01/22 2135 11/01/22 2135   100 1 °F (37 8 °C) 75 19 150/68 93 %      Temp Source Heart Rate Source Patient Position - Orthostatic VS BP Location FiO2 (%)   11/01/22 2135 -- 11/02/22 0013 11/02/22 0013 --   Tympanic  Lying Left arm       Pain Score       11/01/22 2202       Med Not Given for Pain - for MAR use only             Orthostatic Vital Signs  Vitals:    11/01/22 2135 11/02/22 0013   BP: 150/68 116/55   Pulse: 75 70   Patient Position - Orthostatic VS:  Lying       Physical Exam  Vitals and nursing note reviewed  Constitutional:       General: She is not in acute distress  Appearance: Normal appearance  She is not ill-appearing  HENT:      Head: Normocephalic and atraumatic  Right Ear: External ear normal       Left Ear: External ear normal       Nose: Nose normal       Mouth/Throat:      Mouth: Mucous membranes are moist       Pharynx: Oropharynx is clear  Eyes:      General: No scleral icterus  Right eye: No discharge  Left eye: No discharge  Extraocular Movements: Extraocular movements intact  Conjunctiva/sclera: Conjunctivae normal       Pupils: Pupils are equal, round, and reactive to light  Cardiovascular:      Rate and Rhythm: Normal rate and regular rhythm  Pulses: Normal pulses  Heart sounds: Normal heart sounds  Pulmonary:      Effort: Pulmonary effort is normal       Breath sounds: Normal breath sounds  Abdominal:      General: Abdomen is flat  Bowel sounds are normal  There is no distension  Palpations: Abdomen is soft  Tenderness: There is no abdominal tenderness  There is no guarding or rebound  Musculoskeletal:         General: Normal range of motion  Cervical back: Normal range of motion and neck supple  Skin:     General: Skin is warm and dry  Capillary Refill: Capillary refill takes less than 2 seconds  Neurological:      General: No focal deficit present  Mental Status: She is alert and oriented to person, place, and time  Mental status is at baseline  Psychiatric:         Mood and Affect: Mood normal          Behavior: Behavior normal          Thought Content: Thought content normal          Judgment: Judgment normal          ED Medications  Medications   acetaminophen (TYLENOL) tablet 975 mg (975 mg Oral Given 11/1/22 2202)   ibuprofen (MOTRIN) tablet 600 mg (600 mg Oral Given 11/1/22 2202)   lactated ringers bolus 1,000 mL (0 mL Intravenous Stopped 11/2/22 0057)       Diagnostic Studies  Results Reviewed     Procedure Component Value Units Date/Time    Blood culture #1 [[id number]] Collected: 11/01/22 2216    Lab Status: Preliminary result Specimen: Blood from Arm, Right Updated: 11/02/22 0803     Blood Culture Received in Microbiology Lab  Culture in Progress  Blood culture #2 [[id number]] Collected: 11/01/22 2216    Lab Status: Preliminary result Specimen: Blood from Arm, Left Updated: 11/02/22 0803     Blood Culture Received in Microbiology Lab  Culture in Progress      Lactic acid [[id number]]  (Abnormal) Collected: 11/01/22 2216    Lab Status: Final result Specimen: Blood from Arm, Left Updated: 11/01/22 2324     LACTIC ACID 2 6 mmol/L     Narrative:      Result may be elevated if tourniquet was used during collection  FLU/RSV/COVID - if FLU/RSV clinically relevant [[id number]]  (Abnormal) Collected: 11/01/22 2206    Lab Status: Final result Specimen: Nares from Nose Updated: 11/01/22 2304     SARS-CoV-2 Positive     INFLUENZA A PCR Negative     INFLUENZA B PCR Negative     RSV PCR Negative    Narrative:      FOR PEDIATRIC PATIENTS - copy/paste COVID Guidelines URL to browser: [email or web address]  [employer]    SARS-CoV-2 assay is a Nucleic Acid Amplification assay intended for the  qualitative detection of nucleic acid from SARS-CoV-2 in nasopharyngeal  swabs  Results are for the presumptive identification of SARS-CoV-2 RNA  Positive results are indicative of infection with SARS-CoV-2, the virus  causing COVID-19, but do not rule out bacterial infection or co-infection  with other viruses  Laboratories within the [place] and its  territories are required to report all positive results to the appropriate  public health authorities  Negative results do not preclude SARS-CoV-2  infection and should not be used as the sole basis for treatment or other  patient management decisions  Negative results must be combined with  clinical observations, patient history, and epidemiological information  This test has not been FDA cleared or approved  This test has been authorized by FDA under an Emergency Use Authorization  (EUA)  This test is only authorized for the duration of time the  declaration that circumstances exist justifying the authorization of the  emergency use of an in vitro diagnostic tests for detection of SARS-CoV-2  virus and/or diagnosis of COVID-19 infection under section 564(b)(1) of  the Act, 21 U  S C  570OJI-2(G)(6), unless the authorization is terminated  or revoked sooner  The test has been validated but independent review by FDA  and CLIA is pending      Test performed using [employer] GeneXpert: This RT-PCR assay targets N2,  a region unique to SARS-CoV-2  A conserved region in the E-gene was chosen  for pan-Sarbecovirus detection which includes SARS-CoV-2  According to CMS-2020-01-R, this platform meets the definition of high-throughput technology      Procalcitonin [[id number]]  (Normal) Collected: 11/01/22 2216    Lab Status: Final result Specimen: Blood from Arm, Left Updated: 11/01/22 2256     Procalcitonin 0 07 ng/ml     HS Troponin 0hr (reflex protocol) [[id number]]  (Normal) Collected: 11/01/22 2216    Lab Status: Final result Specimen: Blood from Arm, Left Updated: 11/01/22 2253     hs TnI 0hr 5 ng/L     Comprehensive metabolic panel [[id number]]  (Abnormal) Collected: 11/01/22 2216    Lab Status: Final result Specimen: Blood from Arm, Left Updated: 11/01/22 2247     Sodium 137 mmol/L      Potassium 4 2 mmol/L      Chloride 105 mmol/L      CO2 24 mmol/L      ANION GAP 8 mmol/L      BUN 17 mg/dL      Creatinine 1 10 mg/dL      Glucose 117 mg/dL      Calcium 8 8 mg/dL      Corrected Calcium 9 4 mg/dL      AST 83 U/L      ALT 60 U/L      Alkaline Phosphatase 64 U/L      Total Protein 7 4 g/dL      Albumin 3 2 g/dL      Total Bilirubin 0 27 mg/dL      eGFR 46 ml/min/1 73sq m     Narrative:      [place] guidelines for Chronic Kidney Disease (CKD):   •  Stage 1 with normal or high GFR (GFR > 90 mL/min/1 73 square meters)  •  Stage 2 Mild CKD (GFR = 60-89 mL/min/1 73 square meters)  •  Stage 3A Moderate CKD (GFR = 45-59 mL/min/1 73 square meters)  •  Stage 3B Moderate CKD (GFR = 30-44 mL/min/1 73 square meters)  •  Stage 4 Severe CKD (GFR = 15-29 mL/min/1 73 square meters)  •  Stage 5 End Stage CKD (GFR <15 mL/min/1 73 square meters)  Note: GFR calculation is accurate only with a steady state creatinine    Protime-INR [[id number]]  (Normal) Collected: 11/01/22 2216    Lab Status: Final result Specimen: Blood from Arm, Left Updated: 11/01/22 7485 Protime 12 9 seconds      INR 0 95    APTT [[id number]]  (Normal) Collected: 11/01/22 2216    Lab Status: Final result Specimen: Blood from Arm, Left Updated: 11/01/22 2246     PTT 29 seconds     CBC and differential [[id number]]  (Abnormal) Collected: 11/01/22 2216    Lab Status: Final result Specimen: Blood from Arm, Left Updated: 11/01/22 2228     WBC 6 84 Thousand/uL      RBC 4 92 Million/uL      Hemoglobin 14 4 g/dL      Hematocrit 43 9 %      MCV 89 fL      MCH 29 3 pg      MCHC 32 8 g/dL      RDW 14 1 %      MPV 10 0 fL      Platelets 266 Thousands/uL      nRBC 0 /100 WBCs      Neutrophils Relative 52 %      Immat GRANS % 1 %      Lymphocytes Relative 21 %      Monocytes Relative 25 %      Eosinophils Relative 1 %      Basophils Relative 0 %      Neutrophils Absolute 3 54 Thousands/µL      Immature Grans Absolute 0 04 Thousand/uL      Lymphocytes Absolute 1 43 Thousands/µL      Monocytes Absolute 1 73 Thousand/µL      Eosinophils Absolute 0 07 Thousand/µL      Basophils Absolute 0 03 Thousands/µL     UA w Reflex to Microscopic w Reflex to Culture [[id number]]  (Abnormal) Collected: 11/01/22 2205    Lab Status: Final result Specimen: Urine, Clean Catch Updated: 11/01/22 2217     Color, UA Colorless     Clarity, UA Clear     Specific Gravity, UA 1 011     pH, UA 8 0     Leukocytes, UA Negative     Nitrite, UA Negative     Protein, UA Negative mg/dl      Glucose, UA Negative mg/dl      Ketones, UA 10 (1+) mg/dl      Urobilinogen, UA <2 0 mg/dl      Bilirubin, UA Negative     Occult Blood, UA Negative                 XR chest portable   ED Interpretation by Olga Washington MD (11/01 2256)   No acute cardiopulmonary disease      Final Result by Charlene Sahu MD (11/02 1620)      No acute cardiopulmonary disease                    Workstation performed: [id number]               Procedures  Procedures      ED Course                             SBIRT 20yo+    Flowsheet Row Most Recent Value   SBIRT (23 yo +)    In order to provide better care to our patients, we are screening all of our patients for alcohol and drug use  Would it be okay to ask you these screening questions? Unable to answer at this time Filed at: 11/01/2022 9132                [facility]  Number of Diagnoses or Management Options  COVID-19 virus infection  Diagnosis management comments:    Patient is an 30-year-old female presenting with fever, urinary frequency, fatigue  Patient with low-grade temp and O2 sat of 93%  Will obtain infectious workup  Urinalysis normal as well as blood work all showing values within normal limits except for lactate which was elevated 2 6  IV fluids administered as well as Tylenol Motrin  COVID tests resulted positive result  Patient maintaining adequate O2 sat despite no supplemental oxygen  He denies any respiratory complaints  Chest x-ray with no acute cardiopulmonary disorder  Patient be discharged with strict return precautions    Disposition  Final diagnoses:   COVID-19 virus infection     Time reflects when diagnosis was documented in both MDM as applicable and the Disposition within this note     Time User Action Codes Description Comment    11/1/2022 11:36 PM Aury Saravia [U07 1] COVID-19 virus infection       ED Disposition     ED Disposition   Discharge    Condition   Stable    Date/Time   Tue Nov 1, 2022 11:36 PM    [facility] discharge to home/self care                 Follow-up Information     Follow up With Specialties Details Why Contact Info Additional [address] Emergency Department Emergency Medicine Go to  If symptoms worsen [address] [id number]  0 [address] Emergency Department, [address], [place], [place], [address] Sy Bolden MD Palliative Care Schedule an appointment as soon as possible for a visit   [address]  [place] [address]  [phone]             Discharge Medication List as of 11/1/2022 11:38 PM      CONTINUE these medications which have NOT CHANGED    Details   amLODIPine (NORVASC) 10 mg tablet Take 1 tablet (10 mg total) by mouth daily, Starting Fri 8/19/2022, Until Sun 9/18/2022, Normal      aspirin 81 mg chewable tablet Chew 1 tablet (81 mg total) daily, Starting Fri 8/19/2022, Until Sun 9/18/2022, Normal      divalproex sodium (DEPAKOTE SPRINKLE) 125 MG capsule Take 2 capsules (250 mg total) by mouth every 8 (eight) hours, Starting Fri 8/19/2022, Until Tue 10/18/2022, Normal      donepezil (ARICEPT) 5 mg tablet Take 1 tablet (5 mg total) by mouth daily at bedtime, Starting Fri 8/19/2022, Until Tue 10/18/2022, Normal      flecainide (TAMBOCOR) 50 mg tablet Take 1 tablet (50 mg total) by mouth every 12 (twelve) hours, Starting Fri 8/19/2022, Until Sun 9/18/2022, Normal      sodium chloride (MARITZA 128) 5 % hypertonic ophthalmic ointment Administer to both eyes every 3 (three) hours as needed (irritation), Starting Fri 8/19/2022, Until Sun 9/18/2022 at 2359, Normal           No discharge procedures on file  PDMP Review       Value Time User    PDMP Reviewed  Yes 8/18/2022  8:16 AM Paulo Schwarz PA-C           ED Provider  Attending physically available and evaluated James Swanson I managed the patient along with the ED Attending      Electronically Signed by         Johnie Ayala MD  11/03/22 4205

## 2022-11-02 NOTE — ED NOTES
[facility] transport called for transportation   Will update with p/u time      [facility], RN  11/01/22 6537

## 2022-11-02 NOTE — DISCHARGE INSTRUCTIONS
Please take tylenol and motrin as needed for fever and muscle aches   If you develop worsening shortness of breath please return to the ED

## 2022-11-04 NOTE — ED ATTENDING ATTESTATION
11/1/2022  IGerri DO, saw and evaluated the patient  I have discussed the patient with the resident/non-physician practitioner and agree with the resident's/non-physician practitioner's findings, Plan of Care, and MDM as documented in the resident's/non-physician practitioner's note, except where noted  All available labs and Radiology studies were reviewed  I was present for key portions of any procedure(s) performed by the resident/non-physician practitioner and I was immediately available to provide assistance  At this point I agree with the current assessment done in the Emergency Department  I have conducted an independent evaluation of this patient a history and physical is as follows:    80 yof with fever, cough  From NH  C/o weakness  Otherwise feels ok  Denies cp, dyspnea  Maybe some dysuria  Normal exam other than fever   Check labs for infection, metabolic derangement, ua, covid,   ED Course         Critical Care Time  Procedures

## 2022-11-06 LAB
BACTERIA BLD CULT: NORMAL
BACTERIA BLD CULT: NORMAL

## 2022-11-07 LAB
BACTERIA BLD CULT: NORMAL
BACTERIA BLD CULT: NORMAL

## 2022-11-11 ENCOUNTER — TELEPHONE (OUTPATIENT)
Dept: DERMATOLOGY | Facility: CLINIC | Age: 82
End: 2022-11-11

## 2022-11-11 NOTE — TELEPHONE ENCOUNTER
Left a message on the patients nurse line to go ahead and schedule her mohs procedure  Please when you schedule her ask the following:    Does she need transportation to and frolm surgery? IS she mobile or do we need a bed? Is she wheelchair bound? Do we need orders after surgery for wound care?

## 2022-11-11 NOTE — TELEPHONE ENCOUNTER
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

## 2022-11-14 NOTE — TELEPHONE ENCOUNTER
Leanora Goltz from [facility] LM attempting to coordinate mohs procedure  Returned call, LM will need to coordinate mohs procedure and go over pre-operative questions

## 2022-11-14 NOTE — PROGRESS NOTES
Cardiology Office Note  Rachele Shi, MD Nelta Brewster, MD Mylene Nissen, DO, [address] [facility] MD Irving Diaz DO, Deric Michel DO, [facility]  ----------------------------------------------------------------  [address] Présnhan Hendrickson, [address] 80 y o  female MRN: [id number]  Unit/Bed#:  Encounter: [id number]      History of Present Illness: It was a pleasure to see Jessa Garibay in the office today for follow-up CV evaluation  She has a past medical history of paroxysmal atrial fibrillation, hypertension, dyslipidemia, tachy-alison syndrome and pre diabetes  She established care with us in June 2022  Previously, she had been managed by [facility] for her atrial fibrillation  [facility] had been monitoring the patient on flecainide for rhythm control  She has longstanding history of atrial fibrillation which was managed by rhythm control on flecainide  In the past, she had been on both warfarin and Eliquis  She has been taken off of her anticoagulation for unclear reasoning by her [facility] cardiologist   In 2019, she was admitted for tachy-alison syndrome with a 4 2 second pause and was taken off of her propranolol  Holter monitor was performed in May 2021 demonstrating average heart rate of 59 beats per minute without high-degree heart block  She reported no history of loss of consciousness  She was then recommended for evaluation with [facility] with changing of insurance  Due to the patient's episodes tachy-alison syndrome with prior atrial fibrillation on flecainide, we had placed implantable recorder in October 2022  Should the patient have any further significant pauses or evidence of additional atrial fibrillation requiring AV claribel blockers, the plan would be for pacemaker placement with anticoagulation  Denies chest pain, pressure, tightness or squeezing    Denies lightheadedness, dizziness or palpitations  Denies lower extremity swelling, orthopnea paroxysmal nocturnal dyspnea  Review of Systems:  Review of Systems   Constitutional: Negative for decreased appetite, fever, weight gain and weight loss  HENT: Negative for congestion and sore throat  Eyes: Negative for visual disturbance  Cardiovascular: Negative for chest pain, dyspnea on exertion, leg swelling, near-syncope and palpitations  Respiratory: Negative for cough and shortness of breath  Hematologic/Lymphatic: Negative for bleeding problem  Skin: Negative for rash  Musculoskeletal: Negative for myalgias and neck pain  Gastrointestinal: Negative for abdominal pain and nausea  Neurological: Negative for light-headedness and weakness  Psychiatric/Behavioral: Negative for depression  Past Medical History:   Diagnosis Date   • Afib ([facility] [address] )    • Arthritis    • Hyperlipidemia    • Hypertension    • Osteopenia    • Sleep apnea    • Varicose veins of both lower extremities        Past Surgical History:   Procedure Laterality Date   • CARDIAC ELECTROPHYSIOLOGY PROCEDURE N/A 10/12/2022    Procedure: Cardiac loop recorder implant;  Surgeon: Suzie Mijares MD;  [facility]; Service: Cardiology   • CATARACT EXTRACTION     • COLONOSCOPY      complete, for polyp removal   • EYE SURGERY     • HYSTERECTOMY      age 39   • INCISION AND DRAINAGE OF WOUND Right 6/28/2016    Procedure: ARTHROSCOPY,EVACUATION OF HEMATOMA, OPEN EXPLORATION OF WOUND;  Surgeon: Berta Townsend MD;  Location: [facility];  Service:    • TONSILLECTOMY AND ADENOIDECTOMY     • TOTAL KNEE ARTHROPLASTY Right    • TOTAL KNEE ARTHROPLASTY Left    • WISDOM TOOTH EXTRACTION         Social History     Socioeconomic History   • Marital status:       Spouse name: None   • Number of children: None   • Years of education: None   • Highest education level: None   Occupational History   • None   Tobacco Use   • Smoking status: Never   • Smokeless tobacco: Never   Vaping Use   • Vaping Use: Never used   Substance and Sexual Activity   • Alcohol use: Not Currently   • Drug use: No   • Sexual activity: Not Currently     Comment:    Other Topics Concern   • None   Social History Narrative    No domestic violence      Social Determinants of Health     Financial Resource Strain: Not on file   Food Insecurity: Not on file   Transportation Needs: Not on file   Physical Activity: Not on file   Stress: Not on file   Social Connections: Not on file   Intimate Partner Violence: Not on file   Housing Stability: Not on file       Family History   Problem Relation Age of Onset   • Diabetes Mother    • Diabetes Father    • Diabetes Brother    • No Known Problems Sister    • No Known Problems Daughter    • No Known Problems Maternal Grandmother    • No Known Problems Maternal Grandfather    • No Known Problems Paternal Grandmother    • No Known Problems Paternal Grandfather    • No Known Problems Daughter    • Brain cancer Son    • No Known Problems Maternal Aunt        Allergies   Allergen Reactions   • Atorvastatin Other (See Comments)     myalgia   • Statins Other (See Comments)     Weakness in legs         Current Outpatient Medications:   •  acebutolol (SECTRAL) 200 mg capsule, Take 200 mg by mouth daily, Disp: , Rfl:   •  acetaminophen-codeine (TYLENOL with CODEINE #2) 300-15 MG, Take 1 tablet by mouth every 6 (six) hours as needed, Disp: , Rfl:   •  amLODIPine (NORVASC) 10 mg tablet, Take 1 tablet (10 mg total) by mouth daily, Disp: 30 tablet, Rfl: 0  •  amoxicillin (AMOXIL) 500 MG tablet, Take 500 mg by mouth 3 (three) times a day, Disp: , Rfl:   •  aspirin 81 mg chewable tablet, Chew 1 tablet (81 mg total) daily, Disp: 30 tablet, Rfl: 0  •  Aspirin Low Dose 81 MG EC tablet, Take 81 mg by mouth daily, Disp: , Rfl:   •  Calcium Carbonate 1500 (600 Ca) MG TABS, Take 1 tablet by mouth daily, Disp: , Rfl:   •  [employer] Senna Plus 8 6-50 MG per tablet, TAKE 1 TABLET BY MOUTH EVERY OTHER DAY AT BEDTIME, Disp: , Rfl:   •  divalproex sodium (DEPAKOTE SPRINKLE) 125 MG capsule, Take 2 capsules (250 mg total) by mouth every 8 (eight) hours, Disp: 180 capsule, Rfl: 1  •  divalproex sodium (DEPAKOTE) 250 mg EC tablet, Take 250 mg by mouth 3 (three) times a day, Disp: , Rfl:   •  donepezil (ARICEPT) 5 mg tablet, Take 1 tablet (5 mg total) by mouth daily at bedtime, Disp: 30 tablet, Rfl: 1  •  ergocalciferol (VITAMIN D2) 50,000 units, Take 50,000 Units by mouth every 30 (thirty) days, Disp: , Rfl:   •  ibuprofen (MOTRIN) 600 mg tablet, Take 600 mg by mouth every 6 (six) hours as needed, Disp: , Rfl:   •  rosuvastatin (CRESTOR) 5 mg tablet, TAKE 1/2 TABLET BY MOUTH EVERY OTHER DAY IN THE EVENING, Disp: , Rfl:   •  sodium chloride (MARITZA 128) 5 % hypertonic ophthalmic ointment, Administer to both eyes every 3 (three) hours as needed (irritation), Disp: 3 5 g, Rfl: 0  •  flecainide (TAMBOCOR) 50 mg tablet, Take 1 tablet (50 mg total) by mouth every 12 (twelve) hours, Disp: 60 tablet, Rfl: 0    Vitals:    11/16/22 0928   BP: 120/60   Pulse: 61   SpO2: 96%   Weight: 71 5 kg (157 lb 9 6 oz)   Height: 5' 3" (1 6 m)     Body mass index is 27 92 kg/m²  PHYSICAL EXAMINATION:  Gen: Awake, Alert, NAD   Head/eyes: AT/NC, pupils equal and round, Anicteric  ENT: mmm  Neck: Supple, No elevated JVP, trachea midline  Resp: CTA bilaterally no w/r/r  CV: RRR +S1, S2, No m/r/g  Abd: Soft, NT/ND + BS  Ext: no LE edema bilaterally  Neuro:  Follows commands, moves all extermities  Psych: Appropriate affect, happy mood, pleasant attitude, non-combative  Skin: warm; no rash, erythema or venous stasis changes on exposed skin    --------------------------------------------------------------------------------  TREADMILL STRESS  No results found for this or any previous visit      --------------------------------------------------------------------------------  NUCLEAR STRESS TEST: No results found for this or any previous visit  No results found for this or any previous visit       --------------------------------------------------------------------------------  CATH:  No results found for this or any previous visit     --------------------------------------------------------------------------------  ECHO:   No results found for this or any previous visit  No results found for this or any previous visit     --------------------------------------------------------------------------------  HOLTER  · Holter w/ SR avg HR 59 bpm, rare APCs, nonsustained PAT (x3) longest 13 beats at 112 bpm, May 2021    --------------------------------------------------------------------------------  CAROTIDS  No results found for this or any previous visit      --------------------------------------------------------------------------------  ECGs:  No results found for this visit on 11/16/22  Lab Results   Component Value Date    WBC 6 84 11/01/2022    HGB 14 4 11/01/2022    HCT 43 9 11/01/2022    MCV 89 11/01/2022     11/01/2022      Lab Results   Component Value Date    SODIUM 137 11/01/2022    K 4 2 11/01/2022     11/01/2022    CO2 24 11/01/2022    BUN 17 11/01/2022    CREATININE 1 10 11/01/2022    GLUC 117 11/01/2022    CALCIUM 8 8 11/01/2022      Lab Results   Component Value Date    HGBA1C 6 4 (H) 12/08/2021      No results found for: CHOL  No results found for: HDL  No results found for: LDLCALC  No results found for: TRIG  No results found for: [place], [place]   Lab Results   Component Value Date    INR 0 95 11/01/2022    INR 1 05 06/29/2016    INR 1 03 06/27/2016    PROTIME 12 9 11/01/2022    PROTIME 13 7 06/29/2016    PROTIME 13 5 06/27/2016        1  Paroxysmal atrial fibrillation (HCC)  -     Echo complete w/ contrast if indicated; Future; Expected date: 05/16/2023    2  Tachy-alison syndrome ([address] )    3  Essential hypertension    4  Dyslipidemia    5  Pre-diabetes    6   Encounter for monitoring flecainide therapy  -     Echo complete w/ contrast if indicated; Future; Expected date: 05/16/2023    7  Stage 3 chronic kidney disease, unspecified whether stage 3a or 3b CKD ([facility] [address] )        IMPRESSION:  • Paroxysmal atrial fibrillation on flecainide  o Pharmacologic nuclear stress test appears negative for myocardial ischemia, gated EF 77%, July 2022  • Tachy-alison syndrome s/p [employer] implantable loop recorder, October 2022  o Holter w/ SR avg HR 58 bpm, rare PACs, July 2022  • Hypertension  o LVEF 60%, borderline LVH, mild RVH with normal function, mild LA dilatation, AV sclerosis, MV sclerosis, moderate MAC, mild MR, May 2021  • Dyslipidemia w/ LDL 67 mg/dL, December 2021  • Pre diabetes  • VIRGILIO not on CPAP due to recall  • Dementia    PLAN:  It was a pleasure to see Angel Mera in the office today for follow up CV evaluation  She is here today for routine CV follow-up  Since implantable loop recorder placement, she has had no episodes of atrial fibrillation or tachy-alison syndrome  She has no symptoms concerning for angina and no signs or symptoms of heart failure  She examines to be euvolemic in the office today  Blood pressure and heart rate are currently stable  The patient is tolerating her current medications without any reported adverse effects  The patient can perform greater than 4 Mets on a daily basis without significant exertional symptoms  Stress test in July 2022 was negative for myocardial ischemia  Based on her clinical presentation, the following recommendations:    1  Recommend echocardiogram to reassess cardiac structure and function on flecainide medication  This should be performed 2 years following her prior study in May 2023   2  Continue flecainide medication  Patient following with EP and be monitored by implantable loop recorder  3  Recommend heart healthy diet low in sodium and carbohydrate  4   Would encourage 30 minutes a day, 5 days a week of moderate intensity activity to build cardiovascular endurance  5  Continue amlodipine for antihypertensive control  6  Continue aspirin therapy  7  Continue moderate intensity statin  Goal LDL is less than 70 mg/dL  Repeat lipid panel in 3-6 months  LDL is currently to goal   8  We will follow up with her in 6 months after echocardiogram to review the results  As always, please do not hesitate to call with any questions  Portions of the record may have been created with voice recognition software  Occasional wrong word or "sound a like" substitutions may have occurred due to the inherent limitations of voice recognition software  Read the chart carefully and recognize, using context, where substitutions have occurred        Signed: Brayden Krueger DO, [facility], CIRO, FACP

## 2022-11-16 ENCOUNTER — OFFICE VISIT (OUTPATIENT)
Dept: CARDIOLOGY CLINIC | Facility: CLINIC | Age: 82
End: 2022-11-16

## 2022-11-16 VITALS
DIASTOLIC BLOOD PRESSURE: 60 MMHG | OXYGEN SATURATION: 96 % | BODY MASS INDEX: 27.93 KG/M2 | WEIGHT: 157.6 LBS | SYSTOLIC BLOOD PRESSURE: 120 MMHG | HEART RATE: 61 BPM | HEIGHT: 63 IN

## 2022-11-16 DIAGNOSIS — N18.30 STAGE 3 CHRONIC KIDNEY DISEASE, UNSPECIFIED WHETHER STAGE 3A OR 3B CKD (HCC): ICD-10-CM

## 2022-11-16 DIAGNOSIS — I48.0 PAROXYSMAL ATRIAL FIBRILLATION (HCC): Primary | ICD-10-CM

## 2022-11-16 DIAGNOSIS — Z51.81 ENCOUNTER FOR MONITORING FLECAINIDE THERAPY: ICD-10-CM

## 2022-11-16 DIAGNOSIS — I10 ESSENTIAL HYPERTENSION: ICD-10-CM

## 2022-11-16 DIAGNOSIS — R73.03 PRE-DIABETES: ICD-10-CM

## 2022-11-16 DIAGNOSIS — I49.5 TACHY-BRADY SYNDROME (HCC): ICD-10-CM

## 2022-11-16 DIAGNOSIS — Z79.899 ENCOUNTER FOR MONITORING FLECAINIDE THERAPY: ICD-10-CM

## 2022-11-16 DIAGNOSIS — E78.5 DYSLIPIDEMIA: ICD-10-CM

## 2022-11-16 RX ORDER — ACEBUTOLOL HYDROCHLORIDE 200 MG/1
200 CAPSULE ORAL DAILY
COMMUNITY
Start: 2022-09-09

## 2022-11-16 RX ORDER — ROSUVASTATIN CALCIUM 5 MG/1
TABLET, COATED ORAL
COMMUNITY
Start: 2022-09-09

## 2022-11-16 RX ORDER — AMOXICILLIN 500 MG/1
500 TABLET, FILM COATED ORAL 3 TIMES DAILY
COMMUNITY
Start: 2022-09-15

## 2022-11-16 RX ORDER — ACETAMINOPHEN AND CODEINE PHOSPHATE 300; 15 MG/1; MG/1
1 TABLET ORAL EVERY 6 HOURS PRN
COMMUNITY
Start: 2022-09-15

## 2022-11-16 RX ORDER — ERGOCALCIFEROL 1.25 MG/1
50000 CAPSULE ORAL
COMMUNITY
Start: 2022-09-09

## 2022-11-16 RX ORDER — ASPIRIN 81 MG/1
81 TABLET, COATED ORAL DAILY
COMMUNITY
Start: 2022-09-09

## 2022-11-16 RX ORDER — INCONTINENCE PAD,LINER,DISP
EACH MISCELLANEOUS
COMMUNITY
Start: 2022-09-09

## 2022-11-16 RX ORDER — IBUPROFEN 600 MG/1
600 TABLET ORAL EVERY 6 HOURS PRN
COMMUNITY
Start: 2022-09-15

## 2022-11-16 RX ORDER — DIVALPROEX SODIUM 250 MG/1
250 TABLET, DELAYED RELEASE ORAL 3 TIMES DAILY
COMMUNITY
Start: 2022-09-09

## 2022-11-17 NOTE — TELEPHONE ENCOUNTER
Pre- operative Mohs Telephone Scheduling Note    Do you have a pacemaker or defibrillator? no, patient has a loop recorder     Do you take antibiotics before skin or dental procedures? no  If yes, will likely require pre-operative antibiotics  Ask  the patient why they take the antibiotics (usually because of joint replacement)  Do you have a history of a joint replacements within the past 2 years? no   If yes, will likely require pre-operative antibiotics  Ask if orthopaedic surgeon has prescribed pre-operative antibiotics to take before procedures/dental work? Do you take any OTC medications that thin your blood (Aspirin, Aleve, Ibuprofen) or supplements that thin your blood (fish oil, garlic, vitamin E, Ginko Biloba)? yes: ASA 81 mg     Do you take any prescribed medications that thin your blood (Coumadin, Plavix, Xarelto, Eliquis or another prescribed blood thinner)? no    Do you have an allergy to lidocaine or epinephrine? no    Do you have an allergy to shellfish? no    Do you smoke? no      If yes,  patient to try and stop 2 days before surgery and 7 days after the surgery  Minimizing smoking as much as possible during this time will improve healing and the cosmetic result after surgery  Date scheduled: 12/27/2022 @ 9:00 am with Dr Stacy Joseph, patient will come from nursing home, nursing home will setup transportation  Coordination of Care with other provider (Oculoplastics, Plastics, ENT) required? no   IF YES, PLEASE FORWARD TO APPROPRIATE PERSONNEL TO HELP COORDINATE  Are there remaining tumors to be scheduled? no    Was Prior Authorization obtained?  No (please use  [email or web address] to document prior auth)

## 2022-12-27 ENCOUNTER — PROCEDURE VISIT (OUTPATIENT)
Dept: DERMATOLOGY | Facility: CLINIC | Age: 82
End: 2022-12-27

## 2022-12-27 VITALS
WEIGHT: 159.8 LBS | HEART RATE: 58 BPM | SYSTOLIC BLOOD PRESSURE: 120 MMHG | DIASTOLIC BLOOD PRESSURE: 80 MMHG | TEMPERATURE: 97.8 F | OXYGEN SATURATION: 98 % | HEIGHT: 63 IN | BODY MASS INDEX: 28.31 KG/M2

## 2022-12-27 DIAGNOSIS — D04.39 SQUAMOUS CELL CARCINOMA IN SITU (SCCIS) OF SKIN OF LEFT TEMPLE REGION: Primary | ICD-10-CM

## 2022-12-27 NOTE — PROGRESS NOTES
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     Past Medical History:   Diagnosis Date   • Afib ([facility] [address] )    • Arthritis    • Hyperlipidemia    • Hypertension    • Osteopenia    • Sleep apnea    • Squamous cell skin cancer     LEF TEMPLE   • Varicose veins of both lower extremities        Past Surgical History:   Procedure Laterality Date   • CARDIAC ELECTROPHYSIOLOGY PROCEDURE N/A 10/12/2022    Procedure: Cardiac loop recorder implant;  Surgeon: Brett Das MD;  [facility];   Service: Cardiology   • CATARACT EXTRACTION     • COLONOSCOPY      complete, for polyp removal   • EYE SURGERY     • HYSTERECTOMY      age 39   • INCISION AND DRAINAGE OF WOUND Right 2016    Procedure: ARTHROSCOPY,EVACUATION OF HEMATOMA, OPEN EXPLORATION OF WOUND;  Surgeon: Nina Javed MD;  [facility];  Service:    • MOHS SURGERY Left 2022    left temple   • TONSILLECTOMY AND ADENOIDECTOMY     • TOTAL KNEE ARTHROPLASTY Right    • TOTAL KNEE ARTHROPLASTY Left    • WISDOM TOOTH EXTRACTION           Current Outpatient Medications:   •  acebutolol (SECTRAL) 200 mg capsule, Take 200 mg by mouth daily, Disp: , Rfl:   •  acetaminophen-codeine (TYLENOL with CODEINE #2) 300-15 MG, Take 1 tablet by mouth every 6 (six) hours as needed, Disp: , Rfl:   •  amoxicillin (AMOXIL) 500 MG tablet, Take 500 mg by mouth 3 (three) times a day, Disp: , Rfl:   •  Aspirin Low Dose 81 MG EC tablet, Take 81 mg by mouth daily, Disp: , Rfl:   •  Calcium Carbonate 1500 (600 Ca) MG TABS, Take 1 tablet by mouth daily, Disp: , Rfl:   •  CVS Senna Plus 8 6-50 MG per tablet, TAKE 1 TABLET BY MOUTH EVERY OTHER DAY AT BEDTIME, Disp: , Rfl:   •  divalproex sodium (DEPAKOTE) 250 mg EC tablet, Take 250 mg by mouth 3 (three) times a day, Disp: , Rfl: •  ergocalciferol (VITAMIN D2) 50,000 units, Take 50,000 Units by mouth every 30 (thirty) days, Disp: , Rfl:   •  ibuprofen (MOTRIN) 600 mg tablet, Take 600 mg by mouth every 6 (six) hours as needed, Disp: , Rfl:   •  rosuvastatin (CRESTOR) 5 mg tablet, TAKE 1/2 TABLET BY MOUTH EVERY OTHER DAY IN THE EVENING, Disp: , Rfl:   •  amLODIPine (NORVASC) 10 mg tablet, Take 1 tablet (10 mg total) by mouth daily, Disp: 30 tablet, Rfl: 0  •  aspirin 81 mg chewable tablet, Chew 1 tablet (81 mg total) daily, Disp: 30 tablet, Rfl: 0  •  divalproex sodium (DEPAKOTE SPRINKLE) 125 MG capsule, Take 2 capsules (250 mg total) by mouth every 8 (eight) hours, Disp: 180 capsule, Rfl: 1  •  donepezil (ARICEPT) 5 mg tablet, Take 1 tablet (5 mg total) by mouth daily at bedtime, Disp: 30 tablet, Rfl: 1  •  flecainide (TAMBOCOR) 50 mg tablet, Take 1 tablet (50 mg total) by mouth every 12 (twelve) hours, Disp: 60 tablet, Rfl: 0  •  sodium chloride (MARITZA 128) 5 % hypertonic ophthalmic ointment, Administer to both eyes every 3 (three) hours as needed (irritation), Disp: 3 5 g, Rfl: 0    Allergies   Allergen Reactions   • Atorvastatin Other (See Comments)     myalgia   • Statins Other (See Comments)     Weakness in legs       Physical Exam:   Vitals:    12/27/22 0849   BP: 120/80   Pulse: 58   Temp: 97 8 °F (36 6 °C)   SpO2: 98%     General: Awake, Alert, Oriented x 3, Mood and affect appropriate  Respiratory: Respirations even and unlabored  Cardiovascular: Peripheral pulses intact; no edema  Musculoskeletal Exam: N/A  Skin: 1 2 x 1 2 cm pink atrophic scar on the left temple    Assessment:  Biopsy proven to be Squamous Cell Carcinoma in situ on left temple        Plan: MOHS    MOHS Procedure Timeout    Flowsheet Row Most Recent Value   Timeout: 0346   Patient Identity Verified: Yes   Correct Site Verified: Yes   Correct Procedure Verified: Yes          MOHS Diagnosis/Indication/Location/ID    Flowsheet Row Most Recent Value   Pathology Type Squamous cell carcinoma   Anatomic Site left temple   Indications for MOHS histologic pattern   MOHS ID [id number]          MOHS Site/Accession/Pre-Post    Flowsheet Row Most Recent Value   Biopsy Accession/Specimen # (as submitted by referring clincian) [id number]   Pre-MOHS Size Length (cm) 1 2   Pre-MOHS Size Width (cm) 1 2   Post-MOHS Size-Length (cm) 1 4   Post MOHS Size-Width (cm) 1 3   Repair Type Intermediate layered closure   Suture Type Vicryl, Fast absorbing gut   Fast Absorbing Suture Size 5   Vicryl Suture Size 5   Final repair length (cm): 4 3   Anesthetic Used 1% Lidocaine with epinephrine          MOHS Tumor Stage 1 Information    Flowsheet Row Most Recent Value   Tissue Sections (blocks) 2   Microscopic Exam Section 1: No tumor identified in section  Small focus of epidermal acanthosis and papillomatosis with visible milia like cysts w/o atypia or dysplasia c/w a seborrheic keratosis  Microscopic Exam Section 2: No tumor identified in section  Small, well circumscribed focal proliferation of multiple ductular structures lined by 2 layers of epithelial cells in the superficial dermis with a tadpole-like appearance  Overlying epidermis is unremarkable  This is c/w a syringoma  Tumor Clear After Stage I? Yes                  Patient identified, procedure verified, site identified and verified  Time out completed  Surgical removal of the lesion discussed with the patient (risks and benefits, including possibility of scarring, infection, recurrence or potential for further treatment)  I have specifically identified the site with the patient  I have discussed the fact that the patient will have a scar after the procedure regardless of granulation or repair with sutures  I have discussed that the repair options can range from granulation in some cases to linear or curvilinear closures to larger flaps or grafts    There are sometimes flaps or grafts used that require multiples stages of surgery and will not be completed today, rather be completed over a series of appointments  I have discussed that occasionally due to location, size or depth of the lesion I may recommend consultation with and transfer of care for further removal or the reconstruction to another provider such as ophthalmology surgery, plastic surgery, ENT surgery, or surgical oncology  There are cases in which other testing such as imaging with MRI or CT scan or testing of lymph nodes is recommended because of the nature/depth/location of tumor seen during the removal  There is a risk of injury to nerves causing temporary or permanent numbness or the inability to move muscles full such as the inability to lift eyebrows  Questions answered and verbal and written consent was obtained  The tumor qualifies for Mohs based on AUC criteria  With the patient in the supine position and under adequate local anesthesia with 1% lidocaine with epinephrine 1:00,000, the defect was scrubbed with Hibiclens  Sterile drapes were placed from the sterile tray  Because of the location of the surgical defect, a complex closure was judged to give the best possible cosmetic and functional result  The edges of the defect were carefully debrided removing any dead or coagulated tissue  This was a complex closure because of the following: There was involvement of the free margin of the eyelid     Hemostasis was obtained by pinpoint electrocoagulation  Careful planning of removal of redundant tissue at either end of the defect was drawn out so that the suture lines would fall in the optimal orientation with regard to the relaxed skin tension lines  These were then removed with a #15 blade scalpel  The wound was then approximated by a deep layer of buried vertical mattress sutures and the cutaneous margins were approximated and closed by superficial sutures as noted above  Estimated blood loss was less than 5 mL        The patient tolerated the procedure well   The wound was dressed with petrolatum, a non-stick pad, and a compression dressing  Boone Escalante MD served as the surgeon and pathologist during the procedure  Postoperative care: Wound care discussed at length  I urged the patient to call us if any problems or question should arise  Complications: none  Post-op medications: none  Patient condition after procedure: stable  Discharge plans: Plan for suture removal 5-7 days at next scheduled appointment         Scribe Attestation    I,:  Maral Nava am acting as a scribe while in the presence of the attending physician :       I,:  Boone Escalante MD personally performed the services described in this documentation    as scribed in my presence :

## 2022-12-27 NOTE — PATIENT INSTRUCTIONS
Mohs Microscopic Surgery After Care    WOUND CARE AFTER SURGERY:    Do NOT to remove the pressure bandage for 48 hours  Keep the area clean and dry while this bandage is on  After removing the bandage for the first time, gently clean the area with soap and water  If the bandage is difficult to remove, getting the bandage wet in the shower will sometimes help soften the adhesive and allow it to be removed more easily  You will now need to cleanse this area daily in the shower with gentle soap  There is no need to scrub the area  You will need to apply plain Vaseline ointment (this is over the counter and not a prescription) to the site for up to 2 weeks followed by a clean appropriately sized bandage to area  Non stick dressing and paper tape (or Hypafix) are recommended for sensitive skin but a bandaid is fine if it covers the area well  All your stitches will dissolve over the next two weeks  You will need to keep these moist with Vaseline and covered with a bandage over the next 2 weeks for them to dissolve appropriately  RESTRICTIONS:     For two DAYS:   - You will need to take it very easy as this time is highest risk for bleeding  Being a "couch potato" during these two days is generally recommended  - For surgeries on the face/neck/scalp: Avoid leaning down to pick things up off the floor as this brings blood up to your head  Instead, squat down to pick things up  For two WEEKS:   - No heavy lifting (anything greater than 10 pounds)   - You can start to do slow, gentle activities such as slow walking but nothing to increase your heart rate and blood pressure too much (such as cardiovascular exercise)  It is important to take it easy as there is still a risk for bleeding and a high risk popping of stitches open during this time  If we did surgery near the eyes (including the nose, forehead, front part of your scalp, cheeks):  It is VERY common to get a large amount of swelling around your eyes (puffy eyes)  Although less frequent, this can be enough to swell your eyes shut and can also come along with bruising  This should not hurt and is very expected and normal  It is typically worst at ~ 3 days out from your surgery and dramatically better 1 week post-operatively  MANAGING YOUR PAIN AFTER SURGERY     You can expect to have some pain after surgery  This is normal  The pain is typically worse the first two days after surgery, and quickly begins to get better  The best strategy for controlling your pain after surgery is around the clock pain control  You can take over the counter Acetaminophen (Tylenol) for discomfort, if no contraindications  If you are taking this at the maximum dose, you can alternate this with Motrin (ibuprofen or Advil) as well  Alternating these medications with each other allows you to maximize your pain control  In addition to Tylenol and Motrin, you can use heating pads or ice packs on your incisions to help reduce your pain  How will I alternate your regular strength over-the-counter pain medication? You will take a dose of pain medication every three hours  Start by taking 650 mg of Tylenol (2 pills of 325 mg)   3 hours later take 600 mg of Motrin (3 pills of 200 mg)   3 hours after taking the Motrin take 650 mg of Tylenol   3 hours after that take 600 mg of Motrin  See example - if your first dose of Tylenol is at 12:00 PM     12:00 PM  Tylenol 650 mg (2 pills of 325 mg)    3:00 PM  Motrin 600 mg (3 pills of 200 mg)    6:00 PM  Tylenol 650 mg (2 pills of 325 mg)    9:00 PM  Motrin 600 mg (3 pills of 200 mg)    Continue alternating every 3 hours      Important:   Do not take more than 4000mg of Tylenol or 3200mg of Motrin in a 24-hour period  What if I still have pain? If you have pain that is not controlled with the over-the-counter pain medications (Tylenol and Motrin or Advil), don't hesitate to call our staff using the number provided  We will help make sure you are managing your pain in the best way possible, and if necessary, we can provide a prescription for additional pain medication  CALL OUR OFFICE IMMEDIATELY FOR ANY SIGNS OF INFECTION:    This includes fever, chills, increased redness, warmth, tenderness, severe discomfort/pain, or pus or foul smell coming from the wound  If you are experiencing any of the above, please call [facility] directly at [phone] (SKIN)    IF BLEEDING IS NOTICED:    Place a clean cloth over the area and apply firm pressure for thirty minutes  Check the wound ONLY after 30 minutes of direct pressure; do not cheat and sneak a peak, as that does not count  If bleeding persists after 30 minutes of legitimate direct pressure, then try one more round of direct pressure to the area  Should the bleeding become heavier or not stop after the second attempt, call [facility] directly at [phone] (SKIN)  Your call will get routed to the dermatology surgeon on call even after hours

## 2022-12-30 ENCOUNTER — OFFICE VISIT (OUTPATIENT)
Dept: SLEEP CENTER | Facility: CLINIC | Age: 82
End: 2022-12-30

## 2022-12-30 VITALS
BODY MASS INDEX: 28.6 KG/M2 | WEIGHT: 161.4 LBS | HEIGHT: 63 IN | SYSTOLIC BLOOD PRESSURE: 120 MMHG | DIASTOLIC BLOOD PRESSURE: 56 MMHG

## 2022-12-30 DIAGNOSIS — G47.33 OBSTRUCTIVE SLEEP APNEA SYNDROME: Primary | ICD-10-CM

## 2022-12-30 DIAGNOSIS — Z78.9 DIFFICULTY USING BIPHASIC POSITIVE AIRWAY PRESSURE (BIPAP) MACHINE: ICD-10-CM

## 2022-12-30 NOTE — PATIENT INSTRUCTIONS
Schedule BiPAP titration study   Schedule set up of new BiPAP equipment  Schedule compliance follow up visit 31-91 days after start of use      Nursing Support:  When: Monday through Friday 7A-5PM except holidays  Where: Our direct line is [phone]  If you are having a true emergency please call 911  In the event that the line is busy or it is after hours please leave a voice message and we will return your call  Please speak clearly, leaving your full name, birth date, best number to reach you and the reason for your call  Medication refills: We will need the name of the medication, the dosage, the ordering provider, whether you get a 30 or 90 day refill, and the pharmacy name and address  Medications will be ordered by the provider only  Nurses cannot call in prescriptions  Please allow 7 days for medication refills  Physician requested updates: If your provider requested that you call with an update after starting medication, please be ready to provide us the medication and dosage, what time you take your medication, the time you attempt to fall asleep, time you fall asleep, when you wake up, and what time you get out of bed  Sleep Study Results: We will contact you with sleep study results and/or next steps after the physician has reviewed your testing

## 2022-12-30 NOTE — PROGRESS NOTES
Progress Note - [facility] [address] 80 y o  female   PJG:[birth year], MRN: [id number]  12/30/2022      Follow Up Evaluation / Problem:     Mild to Moderate Obstructive Sleep Apnea      Thank you for the opportunity of participating in the evaluation and care of this patient in the Sleep Clinic at [facility]  HPI: Almita Moore is a 80y o  year old female  The patient presents for follow up of mild to moderate obstructive sleep apnea  She completed a diagnostic study in 2016, which confirmed mild VIRGILIO with AHI of 13 3, worsening to 21 3 in supine sleep and 42 9 in REM sleep with oxygen lilliana of 80%  She completed a titration study in November 2016 and was titrated using BiPAP  She presents to review annual compliance and effectiveness of treatment      Review of Systems      Genitourinary need to urinate more than twice a night   Cardiology none   Gastrointestinal none   Neurology forgetfulness, poor concentration or confusion, , difficulty with memory and balance problems   Constitutional none   Integumentary none   Psychiatry none   Musculoskeletal none   Pulmonary none   ENT none   Endocrine frequent urination   Hematological none         Current Outpatient Medications:   •  acebutolol (SECTRAL) 200 mg capsule, Take 200 mg by mouth daily, Disp: , Rfl:   •  amLODIPine (NORVASC) 10 mg tablet, Take 1 tablet (10 mg total) by mouth daily, Disp: 30 tablet, Rfl: 0  •  Aspirin Low Dose 81 MG EC tablet, Take 81 mg by mouth daily, Disp: , Rfl:   •  Calcium Carbonate 1500 (600 Ca) MG TABS, Take 1 tablet by mouth daily, Disp: , Rfl:   •  [employer] Senna Plus 8 6-50 MG per tablet, TAKE 1 TABLET BY MOUTH EVERY OTHER DAY AT BEDTIME, Disp: , Rfl:   •  divalproex sodium (DEPAKOTE) 250 mg EC tablet, Take 250 mg by mouth 3 (three) times a day, Disp: , Rfl:   •  Donepezil HCl 5 MG/DAY PTWK, Place on the skin, Disp: , Rfl:   •  ergocalciferol (VITAMIN D2) 50,000 units, Take 50,000 Units by mouth every 30 (thirty) days, Disp: , Rfl:   •  rosuvastatin (CRESTOR) 5 mg tablet, TAKE 1/2 TABLET BY MOUTH EVERY OTHER DAY IN THE EVENING, Disp: , Rfl:   •  acetaminophen-codeine (TYLENOL with CODEINE #2) 300-15 MG, Take 1 tablet by mouth every 6 (six) hours as needed (Patient not taking: Reported on 12/30/2022), Disp: , Rfl:   •  amoxicillin (AMOXIL) 500 MG tablet, Take 500 mg by mouth 3 (three) times a day (Patient not taking: Reported on 12/30/2022), Disp: , Rfl:   •  aspirin 81 mg chewable tablet, Chew 1 tablet (81 mg total) daily (Patient not taking: Reported on 12/30/2022), Disp: 30 tablet, Rfl: 0  •  divalproex sodium (DEPAKOTE SPRINKLE) 125 MG capsule, Take 2 capsules (250 mg total) by mouth every 8 (eight) hours, Disp: 180 capsule, Rfl: 1  •  donepezil (ARICEPT) 5 mg tablet, Take 1 tablet (5 mg total) by mouth daily at bedtime, Disp: 30 tablet, Rfl: 1  •  flecainide (TAMBOCOR) 50 mg tablet, Take 1 tablet (50 mg total) by mouth every 12 (twelve) hours, Disp: 60 tablet, Rfl: 0  •  ibuprofen (MOTRIN) 600 mg tablet, Take 600 mg by mouth every 6 (six) hours as needed, Disp: , Rfl:   •  sodium chloride (MARITZA 128) 5 % hypertonic ophthalmic ointment, Administer to both eyes every 3 (three) hours as needed (irritation), Disp: 3 5 g, Rfl: 0    [place]o Sleepiness Scale  Sitting and reading: High chance of dozing  Watching TV: High chance of dozing  Sitting, inactive in a public place (e g  a theatre or a meeting): Moderate chance of dozing  As a passenger in a car for an hour without a break:  Moderate chance of dozing  Lying down to rest in the afternoon when circumstances permit: High chance of dozing  Sitting and talking to someone: Slight chance of dozing  Sitting quietly after a lunch without alcohol: High chance of dozing  In a car, while stopped for a few minutes in traffic: Would never doze  Total score: 17              Vitals:    12/30/22 1313   BP: 120/56 Weight: 73 2 kg (161 lb 6 4 oz)   Height: 5' 3" (1 6 m)       Body mass index is 28 59 kg/m²  EPWORTH SLEEPINESS SCORE  Total score: 17      Past History Since Last Sleep Center Visit:   She has not been using the BiPAP equipment and no longer has it  She had worsening of memory issues and her family decided it was best for her to move to an assisted living facility  She reports that she does not sleep well and feels she slept better when able to use the BiPAP equipment  She would like to restart PAP therapy  The review of systems and following portions of the patient's history were reviewed and updated as appropriate: allergies, current medications, past family history, past medical history, past social history, past surgical history, and problem list         OBJECTIVE  Equipment set up date:  12 8 16  PAP Pressure: BiPAP set to deliver 9 cm of IPAP and 5 cm of EPAP  Type of mask used: nasal  DME Provider: [facility]    Physical Exam:     General Appearance:   Appears sleepy, cooperative, no distress, appears stated age, overweight     Head:   Normocephalic, without obvious abnormality, atraumatic     Eyes:   Conjunctiva/corneas clear          Nose:  Nares normal, septum midline, no drainage or sinus tenderness           Throat:  Lips, teeth and gums normal; tongue normal size and midline mucosa moist and mildly redundant bilaterally, uvula barely visualized, tonsils not visualized, Mallampati class 3-4       Neck:  Supple, short, symmetrical, trachea midline, no adenopathy; Thyroid: No enlargement, tenderness or nodules; no carotid bruit or JVD     Lungs:      Clear to auscultation bilaterally, respirations unlabored     Heart:   Regular rate and rhythm, S1 and S2 normal, no murmur, rub or gallop       Extremities:  Extremities normal, atraumatic, no cyanosis or edema       Skin:  Skin color, texture, turgor normal, no rashes or lesions       Neurologic:  Oriented x 3    Able to recount recent events as well as long term memories  ASSESSMENT / PLAN    1  Obstructive sleep apnea syndrome  BIPAP Study      2  Difficulty using biphasic positive airway pressure (BiPAP) machine  BIPAP Study              Counseling / Coordination of Care  Total clinic time spent today 30 minutes  Greater than 50% of total time was spent with the patient and / or family counseling and / or coordination of care  A description of the counseling / coordination of care:     Impressions, Diagnostic results, Prognosis, Instructions for management, Risks and benefits of treatment, Patient and family education, Risk factor reductions and Importance of compliance with treatment    Today I reviewed the patient's compliance data  She has not been using the BiPAP equipment since November 2021  The patient feels they benefit from the use of PAP equipment and would like to restart PAP therapy  She reports that she no longer has the BiPAP equipment  Response to treatment had been very good in the past     A BiPAP titration study has been ordered  New equipment will then be ordered  She will schedule follow up 31-91 days after set up  I have asked the patient to contact the Sleep [address] [place] [address] if she encounters any difficulties prior to that time  The following instructions have been given to the patient today:    Patient Instructions   1  Schedule BiPAP titration study   2  Schedule set up of new BiPAP equipment  3  Schedule compliance follow up visit 31-91 days after start of use      Nursing Support:  When: Monday through Friday 7A-5PM except holidays  Where: Our direct line is [phone]  If you are having a true emergency please call 911  In the event that the line is busy or it is after hours please leave a voice message and we will return your call  Please speak clearly, leaving your full name, birth date, best number to reach you and the reason for your call  Medication refills:  We will need the name of the medication, the dosage, the ordering provider, whether you get a 30 or 90 day refill, and the pharmacy name and address  Medications will be ordered by the provider only  Nurses cannot call in prescriptions  Please allow 7 days for medication refills  Physician requested updates: If your provider requested that you call with an update after starting medication, please be ready to provide us the medication and dosage, what time you take your medication, the time you attempt to fall asleep, time you fall asleep, when you wake up, and what time you get out of bed  Sleep Study Results: We will contact you with sleep study results and/or next steps after the physician has reviewed your testing  Jerel Morrison, [address] [place] [address]      Portions of the record may have been created with voice recognition software  Occasional wrong word or "sound a like" substitutions may have occurred due to the inherent limitations of voice recognition software  Read the chart carefully and recognize, using context, where substitutions have occurred

## 2023-01-04 ENCOUNTER — OFFICE VISIT (OUTPATIENT)
Dept: DERMATOLOGY | Facility: CLINIC | Age: 83
End: 2023-01-04

## 2023-01-04 DIAGNOSIS — Z48.89 ENCOUNTER FOR POST SURGICAL WOUND CHECK: Primary | ICD-10-CM

## 2023-01-04 NOTE — PROGRESS NOTES
WOUND CHECK    Physical Exam:  • Anatomic Location Affected:  Left temple  • Description of wound: well healing, free from signs or symptoms of infection  • Closure Type: intermediate closure with fast gut    Additional History of Present Condition:  S/p Mohs sx on 12/27/22    Assessment and Plan:  Based on a thorough discussion of this condition and the management approach to it (including a comprehensive discussion of the known risks, side effects and potential benefits of treatment), the patient (family) agrees to implement the following specific plan:  • Patient will continue to cleanse the area daily and apply vaseline and a bandage  • Patient will call with questions or concerns           Scribe Attestation    I,:  Mary Salgado RN am acting as a scribe while in the presence of the attending physician :       I,:  Margarita Nixon MD personally performed the services described in this documentation    as scribed in my presence :

## 2023-01-23 ENCOUNTER — TELEPHONE (OUTPATIENT)
Dept: SLEEP CENTER | Facility: CLINIC | Age: 83
End: 2023-01-23

## 2023-01-23 NOTE — TELEPHONE ENCOUNTER
Message left on the nurse line from Maribel Mendoza RN case manager [employer] [address] [place] [address] stating that a BiPAP has arrived on the premises and inquiring if the order was placed by sleep medicine  Returned call to Maribel Mendoza  Advised that patient was previously on BiPAP, but stopped using  Advised she is scheduled for BiPAP titration study 7/25/2023 and then re-start BiPAP after that study  Advised that Dr Marcos Henderson did place an order for APAP 9/1/2022

## 2023-01-25 ENCOUNTER — TELEPHONE (OUTPATIENT)
Dept: SLEEP CENTER | Facility: CLINIC | Age: 83
End: 2023-01-25

## 2023-01-25 NOTE — TELEPHONE ENCOUNTER
Spoke to 0282 Yazmin Thornton at [facility]  Patient received replacement BIPAP and they are asking what to do  Scheduled appointment with Jesica from [employer] to get settings checked  Provided [facility] sleep supply phone number

## 2023-01-30 ENCOUNTER — TELEPHONE (OUTPATIENT)
Dept: CARDIOLOGY CLINIC | Facility: CLINIC | Age: 83
End: 2023-01-30

## 2023-01-30 NOTE — TELEPHONE ENCOUNTER
Pt calling stating has loop recorder placed and has questioning regarding her CPAP that she has been prescribed please call patient

## 2023-02-08 ENCOUNTER — REMOTE DEVICE CLINIC VISIT (OUTPATIENT)
Dept: CARDIOLOGY CLINIC | Facility: CLINIC | Age: 83
End: 2023-02-08

## 2023-02-08 DIAGNOSIS — Z95.818 PRESENCE OF OTHER CARDIAC IMPLANTS AND GRAFTS: Primary | ICD-10-CM

## 2023-02-08 NOTE — PROGRESS NOTES
MDT UIW96 CARDIAC LOOP MONITOR - ACTIVE SYSTEM IS MRI CONDITIONAL   CARELINK TRANSMISSION: LOOP RECORDER  PRESENTING RHYTHM SB @ 54 BPM  BATTERY STATUS "OK " 1 PT ACTIVATED EPISODE PREVIOUSLY ADDRESSED IN ALERT  NO NEW PATIENT OR DEVICE ACTIVATED EPISODES  NORMAL DEVICE FUNCTION   DL

## 2023-02-18 ENCOUNTER — APPOINTMENT (EMERGENCY)
Dept: RADIOLOGY | Facility: HOSPITAL | Age: 83
End: 2023-02-18

## 2023-02-18 ENCOUNTER — HOSPITAL ENCOUNTER (INPATIENT)
Facility: HOSPITAL | Age: 83
LOS: 1 days | Discharge: HOME | End: 2023-02-20
Attending: EMERGENCY MEDICINE | Admitting: INTERNAL MEDICINE

## 2023-02-18 DIAGNOSIS — U07.1 COVID: Primary | ICD-10-CM

## 2023-02-18 DIAGNOSIS — R53.1 WEAKNESS: ICD-10-CM

## 2023-02-18 DIAGNOSIS — U07.1 COVID-19: ICD-10-CM

## 2023-02-18 DIAGNOSIS — E86.0 DEHYDRATION: ICD-10-CM

## 2023-02-18 PROBLEM — G93.41 METABOLIC ENCEPHALOPATHY: Status: ACTIVE | Noted: 2023-02-18

## 2023-02-18 PROBLEM — R19.7 DIARRHEA: Status: ACTIVE | Noted: 2023-02-18

## 2023-02-18 LAB
ABO GROUP BLD: NORMAL
ALBUMIN SERPL BCP-MCNC: 4.2 G/DL (ref 3.5–5)
ALP SERPL-CCNC: 55 U/L (ref 34–104)
ALT SERPL W P-5'-P-CCNC: 19 U/L (ref 7–52)
ANION GAP SERPL CALCULATED.3IONS-SCNC: 10 MMOL/L (ref 4–13)
APTT PPP: 29 SECONDS (ref 23–37)
AST SERPL W P-5'-P-CCNC: 28 U/L (ref 13–39)
ATRIAL RATE: 71 BPM
BASOPHILS # BLD AUTO: 0.04 THOUSANDS/ÂΜL (ref 0–0.1)
BASOPHILS NFR BLD AUTO: 1 % (ref 0–1)
BILIRUB SERPL-MCNC: 0.41 MG/DL (ref 0.2–1)
BILIRUB UR QL STRIP: NEGATIVE
BNP SERPL-MCNC: 137 PG/ML (ref 0–100)
BUN SERPL-MCNC: 16 MG/DL (ref 5–25)
CALCIUM SERPL-MCNC: 9.4 MG/DL (ref 8.4–10.2)
CARDIAC TROPONIN I PNL SERPL HS: 6 NG/L (ref 8–18)
CHLORIDE SERPL-SCNC: 101 MMOL/L (ref 96–108)
CK SERPL-CCNC: 64 U/L (ref 26–192)
CLARITY UR: CLEAR
CO2 SERPL-SCNC: 27 MMOL/L (ref 21–32)
COLOR UR: ABNORMAL
CREAT SERPL-MCNC: 1.01 MG/DL (ref 0.6–1.3)
CRP SERPL QL: 17.1 MG/L
D DIMER PPP FEU-MCNC: 0.45 UG/ML FEU
EOSINOPHIL # BLD AUTO: 0.06 THOUSAND/ÂΜL (ref 0–0.61)
EOSINOPHIL NFR BLD AUTO: 1 % (ref 0–6)
ERYTHROCYTE [DISTWIDTH] IN BLOOD BY AUTOMATED COUNT: 13.8 % (ref 11.6–15.1)
FLUAV RNA RESP QL NAA+PROBE: NEGATIVE
FLUBV RNA RESP QL NAA+PROBE: NEGATIVE
GFR SERPL CREATININE-BSD FRML MDRD: 51 ML/MIN/1.73SQ M
GLUCOSE SERPL-MCNC: 115 MG/DL (ref 65–140)
GLUCOSE UR STRIP-MCNC: NEGATIVE MG/DL
HCT VFR BLD AUTO: 47.6 % (ref 34.8–46.1)
HGB BLD-MCNC: 15.6 G/DL (ref 11.5–15.4)
HGB UR QL STRIP.AUTO: NEGATIVE
IMM GRANULOCYTES # BLD AUTO: 0.05 THOUSAND/UL (ref 0–0.2)
IMM GRANULOCYTES NFR BLD AUTO: 1 % (ref 0–2)
INR PPP: 0.95 (ref 0.84–1.19)
KETONES UR STRIP-MCNC: ABNORMAL MG/DL
LACTATE SERPL-SCNC: 1.8 MMOL/L (ref 0.5–2)
LEUKOCYTE ESTERASE UR QL STRIP: NEGATIVE
LYMPHOCYTES # BLD AUTO: 1.47 THOUSANDS/ÂΜL (ref 0.6–4.47)
LYMPHOCYTES NFR BLD AUTO: 19 % (ref 14–44)
MCH RBC QN AUTO: 29.9 PG (ref 26.8–34.3)
MCHC RBC AUTO-ENTMCNC: 32.8 G/DL (ref 31.4–37.4)
MCV RBC AUTO: 91 FL (ref 82–98)
MONOCYTES # BLD AUTO: 1.78 THOUSAND/ÂΜL (ref 0.17–1.22)
MONOCYTES NFR BLD AUTO: 22 % (ref 4–12)
NEUTROPHILS # BLD AUTO: 4.56 THOUSANDS/ÂΜL (ref 1.85–7.62)
NEUTS SEG NFR BLD AUTO: 56 % (ref 43–75)
NITRITE UR QL STRIP: NEGATIVE
NRBC BLD AUTO-RTO: 0 /100 WBCS
P AXIS: 48 DEGREES
PH UR STRIP.AUTO: 7 [PH]
PLATELET # BLD AUTO: 156 THOUSANDS/UL (ref 149–390)
PMV BLD AUTO: 10.3 FL (ref 8.9–12.7)
POTASSIUM SERPL-SCNC: 4.1 MMOL/L (ref 3.5–5.3)
PR INTERVAL: 168 MS
PROCALCITONIN SERPL-MCNC: 0.05 NG/ML
PROT SERPL-MCNC: 7.3 G/DL (ref 6.4–8.4)
PROT UR STRIP-MCNC: NEGATIVE MG/DL
PROTHROMBIN TIME: 12.9 SECONDS (ref 11.6–14.5)
QRS AXIS: 56 DEGREES
QRSD INTERVAL: 74 MS
QT INTERVAL: 394 MS
QTC INTERVAL: 428 MS
RBC # BLD AUTO: 5.21 MILLION/UL (ref 3.81–5.12)
RH BLD: POSITIVE
RSV RNA RESP QL NAA+PROBE: NEGATIVE
SARS-COV-2 RNA RESP QL NAA+PROBE: POSITIVE
SODIUM SERPL-SCNC: 138 MMOL/L (ref 135–147)
SP GR UR STRIP.AUTO: 1.02 (ref 1–1.03)
T WAVE AXIS: 44 DEGREES
UROBILINOGEN UR STRIP-ACNC: <2 MG/DL
VENTRICULAR RATE: 71 BPM
WBC # BLD AUTO: 7.96 THOUSAND/UL (ref 4.31–10.16)

## 2023-02-18 PROCEDURE — XW033E5 INTRODUCTION OF REMDESIVIR ANTI-INFECTIVE INTO PERIPHERAL VEIN, PERCUTANEOUS APPROACH, NEW TECHNOLOGY GROUP 5: ICD-10-PCS | Performed by: INTERNAL MEDICINE

## 2023-02-18 RX ORDER — LANOLIN ALCOHOL/MO/W.PET/CERES
3 CREAM (GRAM) TOPICAL
Status: DISCONTINUED | OUTPATIENT
Start: 2023-02-18 | End: 2023-02-18

## 2023-02-18 RX ORDER — PRAVASTATIN SODIUM 20 MG
20 TABLET ORAL
Status: DISCONTINUED | OUTPATIENT
Start: 2023-02-19 | End: 2023-02-20 | Stop reason: HOSPADM

## 2023-02-18 RX ORDER — DONEPEZIL HYDROCHLORIDE 5 MG/1
5 TABLET, FILM COATED ORAL
Status: DISCONTINUED | OUTPATIENT
Start: 2023-02-18 | End: 2023-02-20 | Stop reason: HOSPADM

## 2023-02-18 RX ORDER — CALCIUM CARBONATE 500(1250)
1 TABLET ORAL
Status: DISCONTINUED | OUTPATIENT
Start: 2023-02-19 | End: 2023-02-20 | Stop reason: HOSPADM

## 2023-02-18 RX ORDER — ACETAMINOPHEN 325 MG/1
650 TABLET ORAL EVERY 6 HOURS PRN
Status: DISCONTINUED | OUTPATIENT
Start: 2023-02-18 | End: 2023-02-20 | Stop reason: HOSPADM

## 2023-02-18 RX ORDER — ACETAMINOPHEN 325 MG/1
650 TABLET ORAL ONCE
Status: COMPLETED | OUTPATIENT
Start: 2023-02-18 | End: 2023-02-18

## 2023-02-18 RX ORDER — ASPIRIN 81 MG/1
81 TABLET ORAL DAILY
Status: DISCONTINUED | OUTPATIENT
Start: 2023-02-19 | End: 2023-02-20 | Stop reason: HOSPADM

## 2023-02-18 RX ORDER — BENZONATATE 100 MG/1
100 CAPSULE ORAL 3 TIMES DAILY PRN
Status: DISCONTINUED | OUTPATIENT
Start: 2023-02-18 | End: 2023-02-20 | Stop reason: HOSPADM

## 2023-02-18 RX ORDER — FLECAINIDE ACETATE 50 MG/1
50 TABLET ORAL 2 TIMES DAILY
Status: DISCONTINUED | OUTPATIENT
Start: 2023-02-18 | End: 2023-02-20 | Stop reason: HOSPADM

## 2023-02-18 RX ORDER — ACEBUTOLOL HYDROCHLORIDE 200 MG/1
200 CAPSULE ORAL DAILY
Status: DISCONTINUED | OUTPATIENT
Start: 2023-02-19 | End: 2023-02-20 | Stop reason: RX

## 2023-02-18 RX ORDER — AMOXICILLIN 250 MG
2 CAPSULE ORAL
Status: DISCONTINUED | OUTPATIENT
Start: 2023-02-18 | End: 2023-02-18

## 2023-02-18 RX ORDER — AMLODIPINE BESYLATE 5 MG/1
10 TABLET ORAL DAILY
Status: DISCONTINUED | OUTPATIENT
Start: 2023-02-19 | End: 2023-02-20 | Stop reason: HOSPADM

## 2023-02-18 RX ORDER — LANOLIN ALCOHOL/MO/W.PET/CERES
6 CREAM (GRAM) TOPICAL
Status: DISCONTINUED | OUTPATIENT
Start: 2023-02-18 | End: 2023-02-20 | Stop reason: HOSPADM

## 2023-02-18 RX ORDER — GUAIFENESIN 600 MG/1
1200 TABLET, EXTENDED RELEASE ORAL EVERY 12 HOURS SCHEDULED
Status: DISCONTINUED | OUTPATIENT
Start: 2023-02-18 | End: 2023-02-20 | Stop reason: HOSPADM

## 2023-02-18 RX ORDER — SODIUM CHLORIDE, SODIUM LACTATE, POTASSIUM CHLORIDE, CALCIUM CHLORIDE 600; 310; 30; 20 MG/100ML; MG/100ML; MG/100ML; MG/100ML
50 INJECTION, SOLUTION INTRAVENOUS CONTINUOUS
Status: DISCONTINUED | OUTPATIENT
Start: 2023-02-18 | End: 2023-02-19

## 2023-02-18 RX ORDER — FLUTICASONE PROPIONATE 50 MCG
1 SPRAY, SUSPENSION (ML) NASAL 2 TIMES DAILY
Status: DISCONTINUED | OUTPATIENT
Start: 2023-02-18 | End: 2023-02-20 | Stop reason: HOSPADM

## 2023-02-18 RX ORDER — DIVALPROEX SODIUM 250 MG/1
250 TABLET, DELAYED RELEASE ORAL 3 TIMES DAILY
Status: DISCONTINUED | OUTPATIENT
Start: 2023-02-18 | End: 2023-02-20 | Stop reason: HOSPADM

## 2023-02-18 RX ORDER — HEPARIN SODIUM 5000 [USP'U]/ML
5000 INJECTION, SOLUTION INTRAVENOUS; SUBCUTANEOUS EVERY 8 HOURS SCHEDULED
Status: DISCONTINUED | OUTPATIENT
Start: 2023-02-18 | End: 2023-02-20 | Stop reason: HOSPADM

## 2023-02-18 RX ADMIN — REMDESIVIR 200 MG: 100 INJECTION, POWDER, LYOPHILIZED, FOR SOLUTION INTRAVENOUS at 22:45

## 2023-02-18 RX ADMIN — FLECAINIDE ACETATE 50 MG: 50 TABLET ORAL at 23:32

## 2023-02-18 RX ADMIN — Medication 6 MG: at 22:03

## 2023-02-18 RX ADMIN — SODIUM CHLORIDE 1000 ML: 0.9 INJECTION, SOLUTION INTRAVENOUS at 17:58

## 2023-02-18 RX ADMIN — SODIUM CHLORIDE, SODIUM LACTATE, POTASSIUM CHLORIDE, AND CALCIUM CHLORIDE 50 ML/HR: .6; .31; .03; .02 INJECTION, SOLUTION INTRAVENOUS at 22:01

## 2023-02-18 RX ADMIN — DONEPEZIL HYDROCHLORIDE 5 MG: 5 TABLET ORAL at 23:32

## 2023-02-18 RX ADMIN — GUAIFENESIN 1200 MG: 600 TABLET ORAL at 22:00

## 2023-02-18 RX ADMIN — ACETAMINOPHEN 650 MG: 325 TABLET ORAL at 18:02

## 2023-02-18 RX ADMIN — FLUTICASONE PROPIONATE 1 SPRAY: 50 SPRAY, METERED NASAL at 22:01

## 2023-02-18 RX ADMIN — HEPARIN SODIUM 5000 UNITS: 5000 INJECTION INTRAVENOUS; SUBCUTANEOUS at 21:59

## 2023-02-18 RX ADMIN — DIVALPROEX SODIUM 250 MG: 250 TABLET, DELAYED RELEASE ORAL at 23:32

## 2023-02-18 RX ADMIN — SODIUM CHLORIDE 1000 ML: 0.9 INJECTION, SOLUTION INTRAVENOUS at 19:48

## 2023-02-18 NOTE — ED PROVIDER NOTES
History  Chief Complaint   Patient presents with   • Weakness - Generalized     With flu-like symptoms     HPI  Marietta Barrios is a 80year old female with a pertinent PMHX of Paroxysmal AFib no on anticoagulation, Tachy alison syndrome, HTN, Dyslipidemia VIRGILIO presenting from an [address] [facility] [address] due to Fever and weakness  Patient was not a good historian due to AMS thus history was very limited  Patient denied any abdominal pain  Chest pain, SOB, cough, headache or dysuria  She does endorse weakness and fatigue with diarrhea  Prior to Admission Medications   Prescriptions Last Dose Informant Patient Reported? Taking?    Aspirin Low Dose 81 MG EC tablet 2/18/2023  Yes Yes   Sig: Take 81 mg by mouth daily   [facility] Senna Plus 8 6-50 MG per tablet Past Week  Yes Yes   Sig: every other day   Calcium Carbonate 1500 (600 Ca) MG TABS 2/18/2023  Yes Yes   Sig: Take 1 tablet by mouth daily   Donepezil HCl 5 MG/DAY PTWK 2/17/2023  Yes Yes   Sig: Place on the skin   Melatonin 3 MG CAPS 2/17/2023  Yes Yes   Sig: Take 6 mg by mouth   acebutolol (SECTRAL) 200 mg capsule 2/18/2023  Yes Yes   Sig: Take 200 mg by mouth daily   acetaminophen-codeine (TYLENOL with CODEINE #2) 300-15 MG Not Taking  Yes No   Sig: Take 1 tablet by mouth every 6 (six) hours as needed   Patient not taking: Reported on 12/30/2022   amLODIPine (NORVASC) 10 mg tablet   No Yes   Sig: Take 1 tablet (10 mg total) by mouth daily   amoxicillin (AMOXIL) 500 MG tablet Not Taking  Yes No   Sig: Take 500 mg by mouth 3 (three) times a day   Patient not taking: Reported on 12/30/2022   aspirin 81 mg chewable tablet   No No   Sig: Chew 1 tablet (81 mg total) daily   Patient not taking: Reported on 12/30/2022   divalproex sodium (DEPAKOTE SPRINKLE) 125 MG capsule   No No   Sig: Take 2 capsules (250 mg total) by mouth every 8 (eight) hours   divalproex sodium (DEPAKOTE) 250 mg EC tablet 2/18/2023  Yes Yes   Sig: Take 250 mg by mouth 3 (three) times a day   donepezil (ARICEPT) 5 mg tablet   No No   Sig: Take 1 tablet (5 mg total) by mouth daily at bedtime   ergocalciferol (VITAMIN D2) 50,000 units Unknown  Yes No   Sig: Take 50,000 Units by mouth every 30 (thirty) days   flecainide (TAMBOCOR) 50 mg tablet   No Yes   Sig: Take 1 tablet (50 mg total) by mouth every 12 (twelve) hours   ibuprofen (MOTRIN) 600 mg tablet Unknown  Yes No   Sig: Take 600 mg by mouth every 6 (six) hours as needed   rosuvastatin (CRESTOR) 5 mg tablet Past Week  Yes Yes   Sig: TAKE 1/2 TABLET BY MOUTH EVERY OTHER DAY IN THE EVENING   sodium chloride (MARITZA 128) 5 % hypertonic ophthalmic ointment   No No   Sig: Administer to both eyes every 3 (three) hours as needed (irritation)      Facility-Administered Medications: None       Past Medical History:   Diagnosis Date   • Afib (HCC)    • Arthritis    • Hyperlipidemia    • Hypertension    • Osteopenia    • Sleep apnea    • Squamous cell skin cancer     LEF TEMPLE   • Varicose veins of both lower extremities        Past Surgical History:   Procedure Laterality Date   • CARDIAC ELECTROPHYSIOLOGY PROCEDURE N/A 10/12/2022    Procedure: Cardiac loop recorder implant;  Surgeon: Jose Paiz MD;  [facility];   Service: Cardiology   • CATARACT EXTRACTION     • COLONOSCOPY      complete, for polyp removal   • EYE SURGERY     • HYSTERECTOMY      age 39   • INCISION AND DRAINAGE OF WOUND Right 06/28/2016    Procedure: ARTHROSCOPY,EVACUATION OF HEMATOMA, OPEN EXPLORATION OF WOUND;  Surgeon: Billy Barfield MD;  Location: [facility] OR;  Service:    • MOHS SURGERY Left 12/27/2022    left temple   • TONSILLECTOMY AND ADENOIDECTOMY     • TOTAL KNEE ARTHROPLASTY Right    • TOTAL KNEE ARTHROPLASTY Left    • WISDOM TOOTH EXTRACTION         Family History   Problem Relation Age of Onset   • Diabetes Mother    • Diabetes Father    • Diabetes Brother    • No Known Problems Sister    • No Known Problems Daughter    • No Known Problems Maternal Grandmother    • No Known Problems Maternal Grandfather    • No Known Problems Paternal Grandmother    • No Known Problems Paternal Grandfather    • No Known Problems Daughter    • Brain cancer Son    • No Known Problems Maternal Aunt      I have reviewed and agree with the history as documented  E-Cigarette/Vaping   • E-Cigarette Use Never User      E-Cigarette/Vaping Substances   • Nicotine No    • THC No    • CBD No    • Flavoring No    • Other No    • Unknown No      Social History     Tobacco Use   • Smoking status: Never   • Smokeless tobacco: Never   Vaping Use   • Vaping Use: Never used   Substance Use Topics   • Alcohol use: Not Currently   • Drug use: No        Review of Systems   Constitutional: Negative for chills and fever  HENT: Negative for ear pain and sore throat  Eyes: Negative for pain and visual disturbance  Respiratory: Negative for cough and shortness of breath  Cardiovascular: Negative for chest pain and palpitations  Gastrointestinal: Negative for abdominal pain and vomiting  Genitourinary: Negative for dysuria and hematuria  Musculoskeletal: Negative for arthralgias and back pain  Skin: Negative for color change and rash  Neurological: Positive for weakness  Negative for seizures and syncope  All other systems reviewed and are negative        Physical Exam  ED Triage Vitals   Temperature Pulse Respirations Blood Pressure SpO2   02/18/23 1751 02/18/23 1751 02/18/23 1751 02/18/23 1752 02/18/23 1751   (!) 101 5 °F (38 6 °C) 73 20 151/72 95 %      Temp Source Heart Rate Source Patient Position - Orthostatic VS BP Location FiO2 (%)   02/18/23 1751 02/18/23 1751 02/18/23 1751 02/18/23 1751 --   Oral Monitor Lying Left arm       Pain Score       02/18/23 1802       Med Not Given for Pain - for MAR use only             Orthostatic Vital Signs  Vitals:    02/18/23 1830 02/18/23 1915 02/18/23 1930 02/18/23 2030   BP: 137/61 122/57 119/59 144/62   Pulse: 73 70 69 68   Patient Position - Orthostatic VS: Lying Lying Lying Lying       Physical Exam  Vitals and nursing note reviewed  Constitutional:       General: She is not in acute distress  Appearance: She is well-developed  HENT:      Head: Normocephalic and atraumatic  Eyes:      Conjunctiva/sclera: Conjunctivae normal    Cardiovascular:      Rate and Rhythm: Normal rate and regular rhythm  Heart sounds: No murmur heard  Pulmonary:      Effort: Pulmonary effort is normal  No respiratory distress  Breath sounds: Normal breath sounds  Abdominal:      Palpations: Abdomen is soft  Tenderness: There is no abdominal tenderness  Musculoskeletal:         General: No swelling  Cervical back: Neck supple  Skin:     General: Skin is warm and dry  Capillary Refill: Capillary refill takes less than 2 seconds  Neurological:      Mental Status: She is alert  She is disoriented     Psychiatric:         Mood and Affect: Mood normal          ED Medications  Medications   acetaminophen (TYLENOL) tablet 650 mg (has no administration in time range)   lactated ringers infusion (has no administration in time range)   heparin (porcine) subcutaneous injection 5,000 Units (5,000 Units Subcutaneous Given 2/18/23 2159)   remdesivir Aletta Kida) 200 mg in sodium chloride 0 9 % 290 mL IVPB (has no administration in time range)     Followed by   remdesivir Aletta Kida) 100 mg in sodium chloride 0 9 % 270 mL IVPB (has no administration in time range)   fluticasone (FLONASE) 50 mcg/act nasal spray 1 spray (has no administration in time range)   benzonatate (TESSALON PERLES) capsule 100 mg (has no administration in time range)   guaiFENesin (MUCINEX) 12 hr tablet 1,200 mg (1,200 mg Oral Given 2/18/23 2200)   melatonin tablet 6 mg (has no administration in time range)   sodium chloride 0 9 % bolus 1,000 mL (0 mL Intravenous Stopped 2/18/23 1948)   acetaminophen (TYLENOL) tablet 650 mg (650 mg Oral Given 2/18/23 1802)   sodium chloride 0 9 % bolus 1,000 mL (0 mL Intravenous Stopped 2/18/23 2150)       Diagnostic Studies  Results Reviewed     Procedure Component Value Units Date/Time    High Sensitivity Troponin I Random [[id number]] Collected: 02/18/23 2153    Lab Status: In process Specimen: Blood from Arm, Right Updated: 02/18/23 2156    CK (with reflex to MB) [[id number]]  (Normal) Collected: 02/18/23 1759    Lab Status: Final result Specimen: Blood from Arm, Right Updated: 02/18/23 2155     Total CK 64 U/L     C-reactive protein [[id number]]  (Abnormal) Collected: 02/18/23 1759    Lab Status: Final result Specimen: Blood from Arm, Right Updated: 02/18/23 2155     CRP 17 1 mg/L     B-Type Natriuretic Peptide(BNP) [[id number]] Collected: 02/18/23 1759    Lab Status: In process Specimen: Blood from Arm, Right Updated: 02/18/23 2138    D-dimer, quantitative [[id number]] Collected: 02/18/23 1759    Lab Status: In process Specimen: Blood from Arm, Right Updated: 02/18/23 2136    Clostridium difficile toxin by PCR with EIA [[id number]]     Lab Status: No result Specimen: Stool     Ferritin [[id number]]     Lab Status: No result Specimen: Blood     FLU/RSV/COVID - if FLU/RSV clinically relevant [[id number]]  (Abnormal) Collected: 02/18/23 1830    Lab Status: Final result Specimen: Nares from Nose Updated: 02/18/23 1923     SARS-CoV-2 Positive     INFLUENZA A PCR Negative     INFLUENZA B PCR Negative     RSV PCR Negative    Narrative:      FOR PEDIATRIC PATIENTS - copy/paste COVID Guidelines URL to browser: [email or web address]    SARS-CoV-2 assay is a Nucleic Acid Amplification assay intended for the  qualitative detection of nucleic acid from SARS-CoV-2 in nasopharyngeal  swabs  Results are for the presumptive identification of SARS-CoV-2 RNA  Positive results are indicative of infection with SARS-CoV-2, the virus  causing COVID-19, but do not rule out bacterial infection or co-infection  with other viruses   Laboratories within the [place] and its  territories are required to report all positive results to the appropriate  public health authorities  Negative results do not preclude SARS-CoV-2  infection and should not be used as the sole basis for treatment or other  patient management decisions  Negative results must be combined with  clinical observations, patient history, and epidemiological information  This test has not been FDA cleared or approved  This test has been authorized by FDA under an Emergency Use Authorization  (EUA)  This test is only authorized for the duration of time the  declaration that circumstances exist justifying the authorization of the  emergency use of an in vitro diagnostic tests for detection of SARS-CoV-2  virus and/or diagnosis of COVID-19 infection under section 564(b)(1) of  the Act, 21 U  S C  582WEM-7(A)(0), unless the authorization is terminated  or revoked sooner  The test has been validated but independent review by FDA  and CLIA is pending  Test performed using [employer] GeneXpert: This RT-PCR assay targets N2,  a region unique to SARS-CoV-2  A conserved region in the E-gene was chosen  for pan-Sarbecovirus detection which includes SARS-CoV-2  According to CMS-2020-01-R, this platform meets the definition of high-throughput technology      Procalcitonin [[id number]]  (Normal) Collected: 02/18/23 1759    Lab Status: Final result Specimen: Blood from Arm, Right Updated: 02/18/23 1845     Procalcitonin 0 05 ng/ml     Comprehensive metabolic panel [[id number]] Collected: 02/18/23 1759    Lab Status: Final result Specimen: Blood from Arm, Right Updated: 02/18/23 1839     Sodium 138 mmol/L      Potassium 4 1 mmol/L      Chloride 101 mmol/L      CO2 27 mmol/L      ANION GAP 10 mmol/L      BUN 16 mg/dL      Creatinine 1 01 mg/dL      Glucose 115 mg/dL      Calcium 9 4 mg/dL      AST 28 U/L      ALT 19 U/L      Alkaline Phosphatase 55 U/L      Total Protein 7 3 g/dL      Albumin 4 2 g/dL Total Bilirubin 0 41 mg/dL      eGFR 51 ml/min/1 73sq m     Narrative:      [place] guidelines for Chronic Kidney Disease (CKD):   •  Stage 1 with normal or high GFR (GFR > 90 mL/min/1 73 square meters)  •  Stage 2 Mild CKD (GFR = 60-89 mL/min/1 73 square meters)  •  Stage 3A Moderate CKD (GFR = 45-59 mL/min/1 73 square meters)  •  Stage 3B Moderate CKD (GFR = 30-44 mL/min/1 73 square meters)  •  Stage 4 Severe CKD (GFR = 15-29 mL/min/1 73 square meters)  •  Stage 5 End Stage CKD (GFR <15 mL/min/1 73 square meters)  Note: GFR calculation is accurate only with a steady state creatinine    Lactic acid [[id number]]  (Normal) Collected: 02/18/23 1759    Lab Status: Final result Specimen: Blood from Arm, Right Updated: 02/18/23 1835     LACTIC ACID 1 8 mmol/L     Narrative:      Result may be elevated if tourniquet was used during collection      UA w Reflex to Microscopic w Reflex to Culture [[id number]]  (Abnormal) Collected: 02/18/23 1806    Lab Status: Final result Specimen: Urine, Clean Catch Updated: 02/18/23 1832     Color, UA Light Yellow     Clarity, UA Clear     Specific Gravity, UA 1 016     pH, UA 7 0     Leukocytes, UA Negative     Nitrite, UA Negative     Protein, UA Negative mg/dl      Glucose, UA Negative mg/dl      Ketones, UA Trace mg/dl      Urobilinogen, UA <2 0 mg/dl      Bilirubin, UA Negative     Occult Blood, UA Negative    APTT [[id number]]  (Normal) Collected: 02/18/23 1759    Lab Status: Final result Specimen: Blood from Arm, Right Updated: 02/18/23 1831     PTT 29 seconds     Protime-INR [[id number]]  (Normal) Collected: 02/18/23 1759    Lab Status: Final result Specimen: Blood from Arm, Right Updated: 02/18/23 1831     Protime 12 9 seconds      INR 0 95    CBC and differential [[id number]]  (Abnormal) Collected: 02/18/23 1759    Lab Status: Final result Specimen: Blood from Arm, Right Updated: 02/18/23 1823     WBC 7 96 Thousand/uL      RBC 5 21 Million/uL Hemoglobin 15 6 g/dL      Hematocrit 47 6 %      MCV 91 fL      MCH 29 9 pg      MCHC 32 8 g/dL      RDW 13 8 %      MPV 10 3 fL      Platelets 449 Thousands/uL      nRBC 0 /100 WBCs      Neutrophils Relative 56 %      Immat GRANS % 1 %      Lymphocytes Relative 19 %      Monocytes Relative 22 %      Eosinophils Relative 1 %      Basophils Relative 1 %      Neutrophils Absolute 4 56 Thousands/µL      Immature Grans Absolute 0 05 Thousand/uL      Lymphocytes Absolute 1 47 Thousands/µL      Monocytes Absolute 1 78 Thousand/µL      Eosinophils Absolute 0 06 Thousand/µL      Basophils Absolute 0 04 Thousands/µL     Blood culture #1 [[id number]] Collected: 02/18/23 1801    Lab Status: In process Specimen: Blood from Arm, Right Updated: 02/18/23 1807    Blood culture #2 [[id number]] Collected: 02/18/23 1759    Lab Status: In process Specimen: Blood from Arm, Right Updated: 02/18/23 1807                 XR chest 2 views    (Results Pending)         Procedures  ECG 12 Lead Documentation Only    Date/Time: 2/18/2023 8:51 PM  Performed by: Jigar Uribe MD  Authorized by: Jigar Uribe MD     ECG reviewed by me, the ED Provider: yes    Patient location:  ED  Previous ECG:     Previous ECG:  Compared to current    Comparison ECG info:  Fusion complexes    Similarity:  Changes noted  Interpretation:     Interpretation: normal    Rate:     ECG rate:  71  Rhythm:     Rhythm: sinus rhythm    QRS:     QRS axis:  Normal  T waves:     T waves: non-specific            ED Course                             SBIRT 20yo+    Flowsheet Row Most Recent Value   SBIRT (23 yo +)    In order to provide better care to our patients, we are screening all of our patients for alcohol and drug use  Would it be okay to ask you these screening questions?  No Filed at: 02/18/2023 1822                Medical Decision Making  80year old female with a history of Afib not on anticoagulation, HTN and HLD presenting due to fever and AMS  Fever of 101 5 on admission  WBC WNL, Lactate WNL, Procal negative, UA was unremarkable, Chest xray (pending final reading) no clear evidence of consolidation or infiltration on my reading  tested positive for COVID   Patient appears dehydrated on examination  Fortunately she is on RA   Will admitted for observation and IV fluid hydration    Amount and/or Complexity of Data Reviewed  Labs: ordered  Radiology: ordered  Risk  Decision regarding hospitalization  Disposition  Final diagnoses:   COVID   Dehydration     Time reflects when diagnosis was documented in both MDM as applicable and the Disposition within this note     Time User Action Codes Description Comment    2/18/2023  9:46 PM Christina Pang Add [U07 1] COVID     2/18/2023  9:46 PM Christina Pang Add [E86 0] Dehydration       ED Disposition     ED Disposition   Admit    Condition   Stable    Date/Time   Sat Feb 18, 2023  8:31 PM    Comment   Case was [facility] with [facility] and the patient's admission status was agreed to be Admission Status: observation status to the service of Dr Micah Keane   Follow-up Information    None         Patient's Medications   Discharge Prescriptions    No medications on file     No discharge procedures on file  PDMP Review       Value Time User    PDMP Reviewed  Yes 8/18/2022  8:16 AM Kacey Bueno PA-C           ED Provider  Attending physically available and evaluated Laura Lynn  I managed the patient along with the ED Attending      Electronically Signed by         Denice Cordova MD  02/18/23 9758       Denice Cordova MD  02/18/23 5923

## 2023-02-19 PROBLEM — G93.41 METABOLIC ENCEPHALOPATHY: Status: RESOLVED | Noted: 2023-02-18 | Resolved: 2023-02-19

## 2023-02-19 PROBLEM — D69.6 THROMBOCYTOPENIA (HCC): Status: ACTIVE | Noted: 2023-02-19

## 2023-02-19 LAB
ALBUMIN SERPL BCP-MCNC: 3.5 G/DL (ref 3.5–5)
ALP SERPL-CCNC: 43 U/L (ref 34–104)
ALT SERPL W P-5'-P-CCNC: 16 U/L (ref 7–52)
ANION GAP SERPL CALCULATED.3IONS-SCNC: 9 MMOL/L (ref 4–13)
AST SERPL W P-5'-P-CCNC: 29 U/L (ref 13–39)
BASOPHILS # BLD AUTO: 0.01 THOUSANDS/ÂΜL (ref 0–0.1)
BASOPHILS NFR BLD AUTO: 0 % (ref 0–1)
BILIRUB SERPL-MCNC: 0.37 MG/DL (ref 0.2–1)
BUN SERPL-MCNC: 13 MG/DL (ref 5–25)
CALCIUM SERPL-MCNC: 8.1 MG/DL (ref 8.4–10.2)
CHLORIDE SERPL-SCNC: 107 MMOL/L (ref 96–108)
CO2 SERPL-SCNC: 22 MMOL/L (ref 21–32)
CREAT SERPL-MCNC: 0.73 MG/DL (ref 0.6–1.3)
EOSINOPHIL # BLD AUTO: 0.03 THOUSAND/ÂΜL (ref 0–0.61)
EOSINOPHIL NFR BLD AUTO: 1 % (ref 0–6)
ERYTHROCYTE [DISTWIDTH] IN BLOOD BY AUTOMATED COUNT: 14.1 % (ref 11.6–15.1)
FERRITIN SERPL-MCNC: 213 NG/ML (ref 8–388)
GFR SERPL CREATININE-BSD FRML MDRD: 76 ML/MIN/1.73SQ M
GLUCOSE SERPL-MCNC: 112 MG/DL (ref 65–140)
HCT VFR BLD AUTO: 43.6 % (ref 34.8–46.1)
HGB BLD-MCNC: 13.9 G/DL (ref 11.5–15.4)
IMM GRANULOCYTES # BLD AUTO: 0.03 THOUSAND/UL (ref 0–0.2)
IMM GRANULOCYTES NFR BLD AUTO: 1 % (ref 0–2)
LYMPHOCYTES # BLD AUTO: 1.83 THOUSANDS/ÂΜL (ref 0.6–4.47)
LYMPHOCYTES NFR BLD AUTO: 29 % (ref 14–44)
MCH RBC QN AUTO: 30 PG (ref 26.8–34.3)
MCHC RBC AUTO-ENTMCNC: 31.9 G/DL (ref 31.4–37.4)
MCV RBC AUTO: 94 FL (ref 82–98)
MONOCYTES # BLD AUTO: 1.62 THOUSAND/ÂΜL (ref 0.17–1.22)
MONOCYTES NFR BLD AUTO: 26 % (ref 4–12)
NEUTROPHILS # BLD AUTO: 2.8 THOUSANDS/ÂΜL (ref 1.85–7.62)
NEUTS SEG NFR BLD AUTO: 43 % (ref 43–75)
NRBC BLD AUTO-RTO: 0 /100 WBCS
PLATELET # BLD AUTO: 114 THOUSANDS/UL (ref 149–390)
PMV BLD AUTO: 10.5 FL (ref 8.9–12.7)
POTASSIUM SERPL-SCNC: 4.2 MMOL/L (ref 3.5–5.3)
PROCALCITONIN SERPL-MCNC: 0.05 NG/ML
PROT SERPL-MCNC: 6.1 G/DL (ref 6.4–8.4)
RBC # BLD AUTO: 4.64 MILLION/UL (ref 3.81–5.12)
SODIUM SERPL-SCNC: 138 MMOL/L (ref 135–147)
WBC # BLD AUTO: 6.32 THOUSAND/UL (ref 4.31–10.16)

## 2023-02-19 RX ADMIN — GUAIFENESIN 1200 MG: 600 TABLET ORAL at 21:25

## 2023-02-19 RX ADMIN — REMDESIVIR 100 MG: 100 INJECTION, POWDER, LYOPHILIZED, FOR SOLUTION INTRAVENOUS at 21:26

## 2023-02-19 RX ADMIN — AMLODIPINE BESYLATE 10 MG: 5 TABLET ORAL at 09:41

## 2023-02-19 RX ADMIN — ASPIRIN 81 MG: 81 TABLET, COATED ORAL at 09:41

## 2023-02-19 RX ADMIN — DIVALPROEX SODIUM 250 MG: 250 TABLET, DELAYED RELEASE ORAL at 14:42

## 2023-02-19 RX ADMIN — FLECAINIDE ACETATE 50 MG: 50 TABLET ORAL at 09:41

## 2023-02-19 RX ADMIN — HEPARIN SODIUM 5000 UNITS: 5000 INJECTION INTRAVENOUS; SUBCUTANEOUS at 14:42

## 2023-02-19 RX ADMIN — DONEPEZIL HYDROCHLORIDE 5 MG: 5 TABLET ORAL at 21:25

## 2023-02-19 RX ADMIN — CALCIUM 1 TABLET: 500 TABLET ORAL at 17:39

## 2023-02-19 RX ADMIN — DIVALPROEX SODIUM 250 MG: 250 TABLET, DELAYED RELEASE ORAL at 21:25

## 2023-02-19 RX ADMIN — FLUTICASONE PROPIONATE 1 SPRAY: 50 SPRAY, METERED NASAL at 17:39

## 2023-02-19 RX ADMIN — CALCIUM 1 TABLET: 500 TABLET ORAL at 11:59

## 2023-02-19 RX ADMIN — Medication 6 MG: at 21:25

## 2023-02-19 RX ADMIN — HEPARIN SODIUM 5000 UNITS: 5000 INJECTION INTRAVENOUS; SUBCUTANEOUS at 05:14

## 2023-02-19 RX ADMIN — GUAIFENESIN 1200 MG: 600 TABLET ORAL at 09:41

## 2023-02-19 RX ADMIN — FLECAINIDE ACETATE 50 MG: 50 TABLET ORAL at 17:39

## 2023-02-19 RX ADMIN — HEPARIN SODIUM 5000 UNITS: 5000 INJECTION INTRAVENOUS; SUBCUTANEOUS at 21:25

## 2023-02-19 RX ADMIN — PRAVASTATIN SODIUM 20 MG: 20 TABLET ORAL at 17:39

## 2023-02-19 RX ADMIN — DIVALPROEX SODIUM 250 MG: 250 TABLET, DELAYED RELEASE ORAL at 09:41

## 2023-02-19 RX ADMIN — CALCIUM 1 TABLET: 500 TABLET ORAL at 09:41

## 2023-02-19 NOTE — H&P
[facility] 1940, 80 y o  female MRN: [id number]  Unit/Bed#: ED-41 Encounter: [id number]  Primary Care Provider: Linda Wiggins MD   Date and time admitted to hospital: 2/18/2023  8:53 PM    * Metabolic encephalopathy  Assessment & Plan  · Reported by ED staff  Resolved on my exam      COVID-19  Assessment & Plan  · From assisted living with fever, weakness, sneeze, dry cough  · Oxygen requirements: none  · Start remdesivir  · Obtain baseline labs  · I/S    Diarrhea  Assessment & Plan  · Likely due to COVID  Was on senokot every other day as outpatient  · Diarrhea POA  · Send c diff    Stage 3 chronic kidney disease, unspecified whether stage 3a or 3b CKD (HCC)  Assessment & Plan  · Creatinine stable and at baseline  · Gentle fluids overnight  Encourage PO intake  · BMP in AM    Paroxysmal atrial fibrillation (HCC)  Assessment & Plan  · Maintaining SR  · Maintained on flecainide 50 mg BID, acebutolol  · While inpatient  · Loop recorder in place, normal 2/8/2023    Hypertension  Assessment & Plan  · BP stable on amlodipine, acebutolol as outpatient    Minimal cognitive impairment  Assessment & Plan  · Alert, oriented currently  · Re orient, fall precaution   · Continue aricept     Mood disorder with hypomanic features  Assessment & Plan  · Last required inpatient psych 8/2022  Discharged on depakene, aricept  · Mood and mental status is stable    Obstructive sleep apnea syndrome  Assessment & Plan  · In process of starting CPAP as outpatient    VTE Pharmacologic Prophylaxis: VTE Score: 4 Moderate Risk (Score 3-4) - Pharmacological DVT Prophylaxis Ordered: heparin  Code Status: Level 1 - Full Code full  Discussion with family: Patient declined call to   Anticipated Length of Stay: Patient will be admitted on an observation basis with an anticipated length of stay of less than 2 midnights secondary to COVID, fever       Total Time Spent on Date of Encounter in care of patient: 75 minutes This time was spent on one or more of the following: performing physical exam; counseling and coordination of care; obtaining or reviewing history; documenting in the medical record; reviewing/ordering tests, medications or procedures; communicating with other healthcare professionals and discussing with patient's family/caregivers  Chief Complaint: fever, weakness, altered mental status    History of Present Illness:  Umberto Chapa is a 80 y o  female with a PMH of afib on flecainide, HTN, HLD, VIRGILIO who presents with fever, weakness, and reported AMS by ED  On exam, patient is non toxic, alert, oriented  She reports fever URI symptoms, diarrhea started earlier today  She reports no known sick contacts  She reports she is UTD with COVID vaccines  Fever resolved  No SIRS  Procalcitonin normal   Given symptoms, risk factors, will start Remedesivir  Medications managed by [facility] per patient  Unfortunately no medication list sent in with patient  Voicemail left for on call nurse for medication list       Review of Systems:  Review of Systems   Constitutional: Positive for fever  HENT: Positive for congestion and sneezing  Respiratory: Positive for cough  Negative for chest tightness, shortness of breath and wheezing  Gastrointestinal: Positive for diarrhea  All other systems reviewed and are negative  Past Medical and Surgical History:   Past Medical History:   Diagnosis Date   • Afib ([facility] [address] )    • Arthritis    • Hyperlipidemia    • Hypertension    • Osteopenia    • Sleep apnea    • Squamous cell skin cancer     LEF TEMPLE   • Varicose veins of both lower extremities        Past Surgical History:   Procedure Laterality Date   • CARDIAC ELECTROPHYSIOLOGY PROCEDURE N/A 10/12/2022    Procedure: Cardiac loop recorder implant;  Surgeon: Jose Paiz MD;  [facility]: [facility];   Service: Cardiology   • CATARACT EXTRACTION     • COLONOSCOPY      complete, for polyp removal   • EYE SURGERY     • HYSTERECTOMY      age 39   • INCISION AND DRAINAGE OF WOUND Right 06/28/2016    Procedure: ARTHROSCOPY,EVACUATION OF HEMATOMA, OPEN EXPLORATION OF WOUND;  Surgeon: Angel Howard MD;  Location: [facility];  Service:    • MOHS SURGERY Left 12/27/2022    left temple   • TONSILLECTOMY AND ADENOIDECTOMY     • TOTAL KNEE ARTHROPLASTY Right    • TOTAL KNEE ARTHROPLASTY Left    • WISDOM TOOTH EXTRACTION         Meds/Allergies:  Prior to Admission medications    Medication Sig Start Date End Date Taking?  Authorizing Provider   acebutolol (SECTRAL) 200 mg capsule Take 200 mg by mouth daily 9/9/22   Historical Provider, MD   acetaminophen-codeine (TYLENOL with CODEINE #2) 300-15 MG Take 1 tablet by mouth every 6 (six) hours as needed  Patient not taking: Reported on 12/30/2022 9/15/22   Historical Provider, MD   amLODIPine (NORVASC) 10 mg tablet Take 1 tablet (10 mg total) by mouth daily 8/19/22 12/30/22  Solis Regalado PA-C   amoxicillin (AMOXIL) 500 MG tablet Take 500 mg by mouth 3 (three) times a day  Patient not taking: Reported on 12/30/2022 9/15/22   Historical Provider, MD   aspirin 81 mg chewable tablet Chew 1 tablet (81 mg total) daily  Patient not taking: Reported on 12/30/2022 8/19/22 12/30/22  Solis Regalado PA-C   Aspirin Low Dose 81 MG EC tablet Take 81 mg by mouth daily 9/9/22   Historical Provider, MD   Calcium Carbonate 1500 (600 Ca) MG TABS Take 1 tablet by mouth daily 9/9/22   Historical Provider, MD   [facility] Senna Plus 8 6-50 MG per tablet TAKE 1 TABLET BY MOUTH EVERY OTHER DAY AT BEDTIME 9/9/22   Historical Provider, MD   divalproex sodium (DEPAKOTE SPRINKLE) 125 MG capsule Take 2 capsules (250 mg total) by mouth every 8 (eight) hours 8/19/22 11/16/22  Solis Regalado PA-C   divalproex sodium (DEPAKOTE) 250 mg EC tablet Take 250 mg by mouth 3 (three) times a day 9/9/22   Historical Provider, MD   donepezil (ARICEPT) 5 mg tablet Take 1 tablet (5 mg total) by mouth daily at bedtime 8/19/22 11/16/22  Maritza Regalado PA-C   Donepezil HCl 5 MG/DAY PTWK Place on the skin    Historical Provider, MD   ergocalciferol (VITAMIN D2) 50,000 units Take 50,000 Units by mouth every 30 (thirty) days 9/9/22   Historical Provider, MD   flecainide (TAMBOCOR) 50 mg tablet Take 1 tablet (50 mg total) by mouth every 12 (twelve) hours 8/19/22 9/18/22  Maritza Regalado PA-C   ibuprofen (MOTRIN) 600 mg tablet Take 600 mg by mouth every 6 (six) hours as needed 9/15/22   Historical Provider, MD   rosuvastatin (CRESTOR) 5 mg tablet TAKE 1/2 TABLET BY MOUTH EVERY OTHER DAY IN THE EVENING 9/9/22   Historical Provider, MD   sodium chloride (MARITZA 128) 5 % hypertonic ophthalmic ointment Administer to both eyes every 3 (three) hours as needed (irritation) 8/19/22 11/16/22  Marialuisa Mccall PA-C     I have been unable to obtain / verify an up to date medication list despite all reasonable attempts  Allergies: Allergies   Allergen Reactions   • Atorvastatin Other (See Comments)     myalgia   • Statins Other (See Comments)     Weakness in legs       Social History:  Marital Status:     Occupation:   Patient Pre-hospital Living Situation: Assisted Living  Patient Pre-hospital Level of Mobility: walks  Patient Pre-hospital Diet Restrictions:   Substance Use History:   Social History     Substance and Sexual Activity   Alcohol Use Not Currently     Social History     Tobacco Use   Smoking Status Never   Smokeless Tobacco Never     Social History     Substance and Sexual Activity   Drug Use No       Family History:  Family History   Problem Relation Age of Onset   • Diabetes Mother    • Diabetes Father    • Diabetes Brother    • No Known Problems Sister    • No Known Problems Daughter    • No Known Problems Maternal Grandmother    • No Known Problems Maternal Grandfather    • No Known Problems Paternal Grandmother    • No Known Problems Paternal Grandfather • No Known Problems Daughter    • Brain cancer Son    • No Known Problems Maternal Aunt        Physical Exam:     Vitals:   Blood Pressure: 144/62 (02/18/23 2030)  Pulse: 68 (02/18/23 2030)  Temperature: 99 7 °F (37 6 °C) (02/18/23 1915)  Temp Source: Oral (02/18/23 1915)  Respirations: 20 (02/18/23 2030)  Weight - Scale: 80 2 kg (176 lb 12 9 oz) (02/18/23 1751)  SpO2: 94 % (02/18/23 2030)    Physical Exam  Constitutional:       General: She is not in acute distress  Appearance: Normal appearance  She is not ill-appearing  HENT:      Head: Normocephalic and atraumatic  Right Ear: External ear normal       Left Ear: External ear normal       Nose: Congestion and rhinorrhea present  Mouth/Throat:      Pharynx: Oropharynx is clear  Eyes:      General: No scleral icterus  Extraocular Movements: Extraocular movements intact  Conjunctiva/sclera: Conjunctivae normal    Cardiovascular:      Rate and Rhythm: Normal rate and regular rhythm  Pulses: Normal pulses  Heart sounds: Normal heart sounds  Pulmonary:      Effort: Pulmonary effort is normal       Breath sounds: Normal breath sounds  Abdominal:      General: Bowel sounds are normal  There is no distension  Palpations: Abdomen is soft  Tenderness: There is no abdominal tenderness  There is no guarding or rebound  Musculoskeletal:         General: No swelling or tenderness  Normal range of motion  Cervical back: Normal range of motion  Right lower leg: No edema  Left lower leg: No edema  Skin:     General: Skin is warm and dry  Capillary Refill: Capillary refill takes less than 2 seconds  Neurological:      General: No focal deficit present  Mental Status: She is alert and oriented to person, place, and time     Psychiatric:         Mood and Affect: Mood normal          Behavior: Behavior normal           Additional Data:     Lab Results:  Results from last 7 days   Lab Units 02/18/23  1759   WBC Thousand/uL 7 96   HEMOGLOBIN g/dL 15 6*   HEMATOCRIT % 47 6*   PLATELETS Thousands/uL 156   NEUTROS PCT % 56   LYMPHS PCT % 19   MONOS PCT % 22*   EOS PCT % 1     Results from last 7 days   Lab Units 02/18/23  1759   SODIUM mmol/L 138   POTASSIUM mmol/L 4 1   CHLORIDE mmol/L 101   CO2 mmol/L 27   BUN mg/dL 16   CREATININE mg/dL 1 01   ANION GAP mmol/L 10   CALCIUM mg/dL 9 4   ALBUMIN g/dL 4 2   TOTAL BILIRUBIN mg/dL 0 41   ALK PHOS U/L 55   ALT U/L 19   AST U/L 28   GLUCOSE RANDOM mg/dL 115     Results from last 7 days   Lab Units 02/18/23  1759   INR  0 95             Results from last 7 days   Lab Units 02/18/23  1759   LACTIC ACID mmol/L 1 8   PROCALCITONIN ng/ml 0 05       Lines/Drains:  Invasive Devices     Peripheral Intravenous Line  Duration           Peripheral IV 02/18/23 Right Antecubital <1 day                    Imaging: Reviewed radiology reports from this admission including: chest xray  XR chest 2 views    (Results Pending)       EKG and Other Studies Reviewed on Admission:   · EKG: NSR  HR 71     ** Please Note: This note has been constructed using a voice recognition system   **

## 2023-02-19 NOTE — ASSESSMENT & PLAN NOTE
· Likely due to Abbie  Was on senokot every other day as outpatient  · Diarrhea POA  No episodes last night    · F/U c diff

## 2023-02-19 NOTE — ASSESSMENT & PLAN NOTE
· From assisted living with fever, weakness, sneeze, dry cough  · Oxygen requirements: none  · Start remdesivir  · Obtain baseline labs  · I/S

## 2023-02-19 NOTE — ASSESSMENT & PLAN NOTE
· Maintaining NSR  · Maintained on flecainide 50 mg BID, acebutolol  · While inpatient  · Loop recorder in place, normal 2/8/2023  Plan  Continue home medications

## 2023-02-19 NOTE — ASSESSMENT & PLAN NOTE
· Likely due to Abbie  Was on senokot every other day as outpatient    · Diarrhea POA  · Send c diff

## 2023-02-19 NOTE — ASSESSMENT & PLAN NOTE
· Last required inpatient psych 8/2022    Discharged on depakene, aricept  · Mood and mental status is stable

## 2023-02-19 NOTE — PROGRESS NOTES
[facility]  Progress Note - Akin Rash 1940, 80 y o  female MRN: [id number]  Unit/Bed#: W -13 Encounter: [id number]  Primary Care Provider: Linda Wiggins MD   Date and time admitted to hospital: 2/18/2023  5:47 PM    * COVID-19  Assessment & Plan  · From assisted living with fever, weakness, sneeze, dry cough  · Satting well on room air  · D-dimer is within normal range  · Not requiring oxygen  Plan  · Mild COVID pathway  · Continue remdesivir  · Monitor vitals, CBC  Metabolic encephalopathy-resolved as of 2/19/2023  Assessment & Plan  · Reported by ED staff  Resolved on my exam      Diarrhea  Assessment & Plan  · Likely due to COVID  Was on senokot every other day as outpatient  · Diarrhea POA  No episodes last night  · F/U c diff    Hypertension  Assessment & Plan  · BP stable on amlodipine, acebutolol as outpatient  · Continue home medications  · Monitor blood pressure    Paroxysmal atrial fibrillation (HCC)  Assessment & Plan  · Maintaining NSR  · Maintained on flecainide 50 mg BID, acebutolol  · While inpatient  · Loop recorder in place, normal 2/8/2023  Plan  Continue home medications    Stage 3 chronic kidney disease, unspecified whether stage 3a or 3b CKD ([facility] [address] )  Assessment & Plan  · Creatinine stable and at baseline  Plan  Avoid nephrotoxins  Maintain MAP above 65    Minimal cognitive impairment  Assessment & Plan  · Alert, oriented currently  · Re orient, fall precaution   · Continue aricept     Mood disorder with hypomanic features  Assessment & Plan  · Last required inpatient psych 8/2022  Discharged on depakene, aricept  · Mood and mental status is stable    Obstructive sleep apnea syndrome  Assessment & Plan  · In process of starting CPAP as outpatient        VTE Pharmacologic Prophylaxis: VTE Score: 4 Moderate Risk (Score 3-4) - Pharmacological DVT Prophylaxis Ordered: heparin      Patient Centered Rounds: I performed bedside rounds with nursing staff today  Discussions with Specialists or Other Care Team Provider:     Education and Discussions with Family / Patient: Updated  (daughter) via phone  Current Length of Stay: 0 day(s)  Current Patient Status: Observation   Discharge Plan: Anticipate discharge in 24-48 hrs to TBD    Code Status: Level 1 - Full Code    Subjective:   Patient was seen and examined at bedside  Patient looks ill  Satting well on room air  Denies shortness of breath, chest pain or palpitations  Having dry cough, runny nose and congestion  No diarrhea since yesterday  Appetite is improving  Objective:     Vitals:   Temp (24hrs), Av 1 °F (37 8 °C), Min:98 8 °F (37 1 °C), Max:101 5 °F (38 6 °C)    Temp:  [98 8 °F (37 1 °C)-101 5 °F (38 6 °C)] 100 3 °F (37 9 °C)  HR:  [66-73] 67  Resp:  [16-20] 16  BP: (119-151)/(54-72) 121/54  SpO2:  [90 %-96 %] 90 %  Body mass index is 31 24 kg/m²  Input and Output Summary (last 24 hours): Intake/Output Summary (Last 24 hours) at 2023 0834  Last data filed at 2023 2150  Gross per 24 hour   Intake 2000 ml   Output --   Net 2000 ml       Physical Exam:   Physical Exam  Constitutional:       General: She is not in acute distress  Appearance: Normal appearance  She is not ill-appearing  HENT:      Head: Normocephalic  Nose: No congestion  Mouth/Throat:      Mouth: Mucous membranes are moist       Pharynx: Oropharynx is clear  Cardiovascular:      Rate and Rhythm: Normal rate  Pulses: Normal pulses  Heart sounds: Normal heart sounds  Pulmonary:      Effort: Pulmonary effort is normal       Breath sounds: Normal breath sounds  Abdominal:      General: Bowel sounds are normal       Palpations: Abdomen is soft  Musculoskeletal:      Right lower leg: No edema  Left lower leg: No edema  Skin:     General: Skin is warm and dry  Capillary Refill: Capillary refill takes less than 2 seconds     Neurological:      General: No focal deficit present  Mental Status: She is alert and oriented to person, place, and time  Psychiatric:         Mood and Affect: Mood normal          Behavior: Behavior normal           Additional Data:     Labs:  Results from last 7 days   Lab Units 02/19/23  0514   WBC Thousand/uL 6 32   HEMOGLOBIN g/dL 13 9   HEMATOCRIT % 43 6   PLATELETS Thousands/uL 114*   NEUTROS PCT % 43   LYMPHS PCT % 29   MONOS PCT % 26*   EOS PCT % 1     Results from last 7 days   Lab Units 02/19/23  0514   SODIUM mmol/L 138   POTASSIUM mmol/L 4 2   CHLORIDE mmol/L 107   CO2 mmol/L 22   BUN mg/dL 13   CREATININE mg/dL 0 73   ANION GAP mmol/L 9   CALCIUM mg/dL 8 1*   ALBUMIN g/dL 3 5   TOTAL BILIRUBIN mg/dL 0 37   ALK PHOS U/L 43   ALT U/L 16   AST U/L 29   GLUCOSE RANDOM mg/dL 112     Results from last 7 days   Lab Units 02/18/23  1759   INR  0 95             Results from last 7 days   Lab Units 02/19/23  0514 02/18/23  1759   LACTIC ACID mmol/L  --  1 8   PROCALCITONIN ng/ml 0 05 0 05       Lines/Drains:  Invasive Devices     Peripheral Intravenous Line  Duration           Peripheral IV 02/18/23 Right Antecubital <1 day                      Imaging: Reviewed radiology reports from this admission including: chest xray    Recent Cultures (last 7 days):   Results from last 7 days   Lab Units 02/18/23  1801 02/18/23  1759   BLOOD CULTURE  Received in Microbiology Lab  Culture in Progress  Received in Microbiology Lab  Culture in Progress         Last 24 Hours Medication List:   Current Facility-Administered Medications   Medication Dose Route Frequency Provider Last Rate   • acebutolol  200 mg Oral Daily LUZ Weber     • acetaminophen  650 mg Oral Q6H PRN LUZ Weber     • amLODIPine  10 mg Oral Daily LUZ Weber     • aspirin  81 mg Oral Daily LUZ Weber     • benzonatate  100 mg Oral TID PRN LUZ Weber     • calcium carbonate  1 tablet Oral TID With Meals LUZ Weber     • divalproex sodium  250 mg Oral TID LUZ Amaro     • donepezil  5 mg Oral HS LUZ Amaro     • flecainide  50 mg Oral BID LUZ Amaro     • fluticasone  1 spray Each Nare BID LUZ Amaro     • guaiFENesin  1,200 mg Oral Q12H [address] LUZ Amaro     • heparin (porcine)  5,000 Units Subcutaneous [facility] LUZ Amaro     • lactated ringers  50 mL/hr Intravenous Continuous LUZ Amaro 50 mL/hr (02/18/23 2201)   • melatonin  6 mg Oral HS LUZ Amaro     • pravastatin  20 mg Oral Daily With LUZ Quiles     • remdesivir  100 mg Intravenous Q24H LUZ Amaro          Today, Patient Was Seen By: Kentrell Pizarro MD    **Please Note: This note may have been constructed using a voice recognition system  **

## 2023-02-19 NOTE — ASSESSMENT & PLAN NOTE
· BP stable on amlodipine, acebutolol as outpatient  · Continue home medications  · Monitor blood pressure

## 2023-02-19 NOTE — ASSESSMENT & PLAN NOTE
· From assisted living with fever, weakness, sneeze, dry cough  · Satting well on room air  · D-dimer is within normal range  · Not requiring oxygen  Plan  · Mild COVID pathway  · Continue remdesivir  · Monitor vitals, CBC

## 2023-02-19 NOTE — ASSESSMENT & PLAN NOTE
· Maintaining SR  · Maintained on flecainide 50 mg BID, acebutolol  · While inpatient  · Loop recorder in place, normal 2/8/2023

## 2023-02-19 NOTE — UTILIZATION REVIEW
Initial Clinical Review    Observation 2/18/23 @ 2032 converted to inpatient admission 2/19/23 @ 1510 for continued care & tx for COVID 19  Admission: Date/Time/Statement:   Admission Orders (From admission, onward)     Ordered        02/19/23 1510  Inpatient Admission  Once            02/18/23 2032  Place in Observation  Once                      Orders Placed This Encounter   Procedures   • Inpatient Admission     Standing Status:   Standing     Number of Occurrences:   1     Order Specific Question:   Level of Care     Answer:   Med Surg [16]     Order Specific Question:   Estimated length of stay     Answer:   More than 2 Midnights     Order Specific Question:   Certification     Answer:   I certify that inpatient services are medically necessary for this patient for a duration of greater than two midnights  See H&P and MD Progress Notes for additional information about the patient's course of treatment  ED Arrival Information     Expected   -    Arrival   2/18/2023 17:46    Acuity   Emergent            Means of arrival   Ambulance    Escorted by   [facility]    Admission type   Emergency            Arrival complaint   FEVER           Chief Complaint   Patient presents with   • Weakness - Generalized     With flu-like symptoms       Initial Presentation:   80 yof to ER from nursing facility via EMS for fever & weakness, fatugue & diarrhea  Hx Paroxysmal AFib no on anticoagulation, Tachy alison syndrome, HTN, Dyslipidemia VIRGILIO  Presents disoriented, febrile  Admission work-up showing COVID+  Placed in observation status for metabolic encephalopathy 2nd COVID  Started on IV Remdesivir  Observation to IP admission 2/19/23:  Tmax 101 5 with persistent temp today to 100 3  Persistent dry cough, runny nose & congestion  Lungs clear, currently on RA  Remains on mild COVID 19 pathway on IV Remdesivir, monitor labs      ED Triage Vitals   Temperature Pulse Respirations Blood Pressure SpO2   02/18/23 1751 02/18/23 1751 02/18/23 1751 02/18/23 1752 02/18/23 1751   (!) 101 5 °F (38 6 °C) 73 20 151/72 95 %      Temp Source Heart Rate Source Patient Position - Orthostatic VS BP Location FiO2 (%)   02/18/23 1751 02/18/23 1751 02/18/23 1751 02/18/23 1751 --   Oral Monitor Lying Left arm       Pain Score       02/18/23 1802       Med Not Given for Pain - for MAR use only          Wt Readings from Last 1 Encounters:   02/18/23 80 kg (176 lb 5 9 oz)     Additional Vital Signs:   02/19/23 11:54:55 99 1 °F (37 3 °C) 59 -- -- -- 90 % -- --   02/19/23 08:10:03 100 3 °F (37 9 °C) 67 16 121/54 76 90 % -- --   02/18/23 22:34:15 98 8 °F (37 1 °C) 66 -- 134/59 84 96 % -- --   02/18/23 2030 -- 68 20 144/62 89 94 % None (Room air) Lying   02/18/23 1930 -- 69 20 119/59 85 92 % None (Room air) Lying   02/18/23 1915 99 7 °F (37 6 °C) 70 20 122/57 82 92 % None (Room air) Lying   02/18/23 1830 -- 73 20 137/61 88 96 % None (Room air) Lying   02/18/23 1820 -- -- -- -- -- -- None (Room air) --   02/18/23 1752 -- -- -- 151/72 -- -- -- --   02/18/23 1751 101 5 °F (38 6 °C) Abnormal  73 20 -- -- 95 % None (Room air) Lying     Pertinent Labs/Diagnostic Test Results:   XR chest 2 views   Final Result  (02/19 1019)      No acute cardiopulmonary disease          Results from last 7 days   Lab Units 02/18/23  1830   SARS-COV-2  Positive*     Results from last 7 days   Lab Units 02/20/23  0619 02/19/23  0514 02/18/23  1759   WBC Thousand/uL 5 95 6 32 7 96   HEMOGLOBIN g/dL 14 0 13 9 15 6*   HEMATOCRIT % 42 7 43 6 47 6*   PLATELETS Thousands/uL 130* 114* 156   NEUTROS ABS Thousands/µL  --  2 80 4 56     Results from last 7 days   Lab Units 02/20/23  0619 02/19/23  0514 02/18/23  1759   SODIUM mmol/L 139 138 138   POTASSIUM mmol/L 3 9 4 2 4 1   CHLORIDE mmol/L 105 107 101   CO2 mmol/L 24 22 27   ANION GAP mmol/L 10 9 10   BUN mg/dL 11 13 16   CREATININE mg/dL 0 65 0 73 1 01   EGFR ml/min/1 73sq m 82 76 51   CALCIUM mg/dL 8 5 8 1* 9 4     Results from last 7 days   Lab Units 02/20/23  0619 02/19/23  0514 02/18/23  1759   AST U/L 21 29 28   ALT U/L 15 16 19   ALK PHOS U/L 43 43 55   TOTAL PROTEIN g/dL 6 1* 6 1* 7 3   ALBUMIN g/dL 3 4* 3 5 4 2   TOTAL BILIRUBIN mg/dL 0 26 0 37 0 41     Results from last 7 days   Lab Units 02/20/23  0619 02/19/23  0514 02/18/23  1759   GLUCOSE RANDOM mg/dL 102 112 115     Results from last 7 days   Lab Units 02/18/23  1759   CK TOTAL U/L 64     Results from last 7 days   Lab Units 02/18/23  1759   D-DIMER QUANTITATIVE ug/ml FEU 0 45     Results from last 7 days   Lab Units 02/18/23  1759   PROTIME seconds 12 9   INR  0 95   PTT seconds 29     Results from last 7 days   Lab Units 02/19/23  0514 02/18/23  1759   PROCALCITONIN ng/ml 0 05 0 05     Results from last 7 days   Lab Units 02/18/23  1759   LACTIC ACID mmol/L 1 8     Results from last 7 days   Lab Units 02/18/23  1759   BNP pg/mL 137*     Results from last 7 days   Lab Units 02/18/23  1759   FERRITIN ng/mL 213     Results from last 7 days   Lab Units 02/18/23  1759   CRP mg/L 17 1*     Results from last 7 days   Lab Units 02/18/23  1806   CLARITY UA  Clear   COLOR UA  Light Yellow   SPEC GRAV UA  1 016   PH UA  7 0   GLUCOSE UA mg/dl Negative   KETONES UA mg/dl Trace*   BLOOD UA  Negative   PROTEIN UA mg/dl Negative   NITRITE UA  Negative   BILIRUBIN UA  Negative   UROBILINOGEN UA (BE) mg/dl <2 0   LEUKOCYTES UA  Negative     Results from last 7 days   Lab Units 02/18/23  1830   INFLUENZA A PCR  Negative   INFLUENZA B PCR  Negative   RSV PCR  Negative     Results from last 7 days   Lab Units 02/18/23  1801 02/18/23  1759   BLOOD CULTURE  No Growth at 24 hrs  No Growth at 24 hrs       ED Treatment:   Medication Administration from 02/18/2023 1746 to 02/18/2023 2229       Date/Time Order Dose Route Action     02/18/2023 1758 EST sodium chloride 0 9 % bolus 1,000 mL 1,000 mL Intravenous New Bag     02/18/2023 1802 EST acetaminophen (TYLENOL) tablet 650 mg 650 mg Oral Given     02/18/2023 1948 EST sodium chloride 0 9 % bolus 1,000 mL 1,000 mL Intravenous New Bag     02/18/2023 2201 EST lactated ringers infusion 50 mL/hr Intravenous New Bag     02/18/2023 2159 EST heparin (porcine) subcutaneous injection 5,000 Units 5,000 Units Subcutaneous Given     02/18/2023 2201 EST fluticasone (FLONASE) 50 mcg/act nasal spray 1 spray 1 spray Each Nare Given     02/18/2023 2200 EST guaiFENesin (MUCINEX) 12 hr tablet 1,200 mg 1,200 mg Oral Given     02/18/2023 2203 EST melatonin tablet 6 mg 6 mg Oral Given        Past Medical History:   Diagnosis Date   • Afib (HCC)    • Arthritis    • Hyperlipidemia    • Hypertension    • Osteopenia    • Sleep apnea    • Squamous cell skin cancer     LEF TEMPLE   • Varicose veins of both lower extremities      Present on Admission:  •  Mood disorder with hypomanic features  • Paroxysmal atrial fibrillation (HCC)  • Stage 3 chronic kidney disease, unspecified whether stage 3a or 3b CKD (HCC)  • Hypertension  • Minimal cognitive impairment  • Obstructive sleep apnea syndrome  • Thrombocytopenia (HCC)      Admitting Diagnosis: Dehydration [E86 0]  Weakness [R53 1]  COVID [U07 1]  Age/Sex: 80 y o  female  Admission Orders:  O2 to keep sats>90%  Contact & hand hygiene isolation    Scheduled Medications:  acebutolol, 200 mg, Oral, Daily  amLODIPine, 10 mg, Oral, Daily  aspirin, 81 mg, Oral, Daily  calcium carbonate, 1 tablet, Oral, TID With Meals  divalproex sodium, 250 mg, Oral, TID  donepezil, 5 mg, Oral, HS  flecainide, 50 mg, Oral, BID  fluticasone, 1 spray, Each Nare, BID  guaiFENesin, 1,200 mg, Oral, Q12H LILI  heparin (porcine), 5,000 Units, Subcutaneous, Q8H [address]  melatonin, 6 mg, Oral, HS  pravastatin, 20 mg, Oral, Daily With Dinner  remdesivir, 100 mg, Intravenous, Q24H    Continuous IV Infusions:  lactated ringers, 50 mL/hr, Intravenous, Continuous    PRN Meds:  acetaminophen, 650 mg, Oral, Q6H PRN  benzonatate, 100 mg, Oral, TID PRN    Network Utilization Review Department  ATTENTION: Please call with any questions or concerns to [phone] and carefully listen to the prompts so that you are directed to the right person  All voicemails are confidential   Airam Jarquin all requests for admission clinical reviews, approved or denied determinations and any other requests to dedicated fax number below belonging to the campus where the patient is receiving treatment   List of dedicated fax numbers for the Facilities:  [address] DENIALS (Administrative/Medical Necessity) [phone]   [address] (Maternity/NICU/Pediatrics) [phone]   9 Sushila Coleman [phone]   [facility] [address] [phone]   [address] [address] Alessio [address] 28 [phone]   1557 [facility] [place] [facility] 134 815 [facility] [phone]

## 2023-02-20 VITALS
SYSTOLIC BLOOD PRESSURE: 143 MMHG | HEIGHT: 63 IN | TEMPERATURE: 98.2 F | RESPIRATION RATE: 18 BRPM | HEART RATE: 59 BPM | BODY MASS INDEX: 31.25 KG/M2 | OXYGEN SATURATION: 93 % | DIASTOLIC BLOOD PRESSURE: 68 MMHG | WEIGHT: 176.37 LBS

## 2023-02-20 PROBLEM — R19.7 DIARRHEA: Status: RESOLVED | Noted: 2023-02-18 | Resolved: 2023-02-20

## 2023-02-20 LAB
ALBUMIN SERPL BCP-MCNC: 3.4 G/DL (ref 3.5–5)
ALP SERPL-CCNC: 43 U/L (ref 34–104)
ALT SERPL W P-5'-P-CCNC: 15 U/L (ref 7–52)
ANION GAP SERPL CALCULATED.3IONS-SCNC: 10 MMOL/L (ref 4–13)
AST SERPL W P-5'-P-CCNC: 21 U/L (ref 13–39)
BILIRUB SERPL-MCNC: 0.26 MG/DL (ref 0.2–1)
BUN SERPL-MCNC: 11 MG/DL (ref 5–25)
CALCIUM ALBUM COR SERPL-MCNC: 9 MG/DL (ref 8.3–10.1)
CALCIUM SERPL-MCNC: 8.5 MG/DL (ref 8.4–10.2)
CHLORIDE SERPL-SCNC: 105 MMOL/L (ref 96–108)
CO2 SERPL-SCNC: 24 MMOL/L (ref 21–32)
CREAT SERPL-MCNC: 0.65 MG/DL (ref 0.6–1.3)
ERYTHROCYTE [DISTWIDTH] IN BLOOD BY AUTOMATED COUNT: 13.6 % (ref 11.6–15.1)
GFR SERPL CREATININE-BSD FRML MDRD: 82 ML/MIN/1.73SQ M
GLUCOSE SERPL-MCNC: 102 MG/DL (ref 65–140)
HCT VFR BLD AUTO: 42.7 % (ref 34.8–46.1)
HGB BLD-MCNC: 14 G/DL (ref 11.5–15.4)
MCH RBC QN AUTO: 30.2 PG (ref 26.8–34.3)
MCHC RBC AUTO-ENTMCNC: 32.8 G/DL (ref 31.4–37.4)
MCV RBC AUTO: 92 FL (ref 82–98)
PLATELET # BLD AUTO: 130 THOUSANDS/UL (ref 149–390)
PMV BLD AUTO: 10.1 FL (ref 8.9–12.7)
POTASSIUM SERPL-SCNC: 3.9 MMOL/L (ref 3.5–5.3)
PROT SERPL-MCNC: 6.1 G/DL (ref 6.4–8.4)
RBC # BLD AUTO: 4.64 MILLION/UL (ref 3.81–5.12)
SODIUM SERPL-SCNC: 139 MMOL/L (ref 135–147)
WBC # BLD AUTO: 5.95 THOUSAND/UL (ref 4.31–10.16)

## 2023-02-20 RX ORDER — GUAIFENESIN 1200 MG/1
1200 TABLET, EXTENDED RELEASE ORAL EVERY 12 HOURS SCHEDULED
Qty: 14 TABLET | Refills: 0 | Status: SHIPPED | OUTPATIENT
Start: 2023-02-20 | End: 2023-02-27

## 2023-02-20 RX ORDER — FLUTICASONE PROPIONATE 50 MCG
1 SPRAY, SUSPENSION (ML) NASAL DAILY
Qty: 1 ML | Refills: 0
Start: 2023-02-20 | End: 2023-02-20 | Stop reason: SDUPTHER

## 2023-02-20 RX ORDER — FLUTICASONE PROPIONATE 50 MCG
1 SPRAY, SUSPENSION (ML) NASAL DAILY
Qty: 9.9 ML | Refills: 0
Start: 2023-02-20 | End: 2023-02-20 | Stop reason: SDUPTHER

## 2023-02-20 RX ORDER — BENZONATATE 100 MG/1
100 CAPSULE ORAL 3 TIMES DAILY PRN
Qty: 20 CAPSULE | Refills: 0 | Status: SHIPPED | OUTPATIENT
Start: 2023-02-20 | End: 2023-02-27

## 2023-02-20 RX ORDER — CROMOLYN SODIUM 5.2 MG
1 AEROSOL, SPRAY WITH PUMP (ML) NASAL 3 TIMES DAILY
Qty: 42 ACT | Refills: 0 | Status: SHIPPED | OUTPATIENT
Start: 2023-02-20 | End: 2023-02-20

## 2023-02-20 RX ORDER — FLUTICASONE PROPIONATE 50 MCG
1 SPRAY, SUSPENSION (ML) NASAL DAILY
Qty: 9.9 ML | Refills: 0
Start: 2023-02-20 | End: 2023-02-27

## 2023-02-20 RX ADMIN — FLUTICASONE PROPIONATE 1 SPRAY: 50 SPRAY, METERED NASAL at 09:52

## 2023-02-20 RX ADMIN — ASPIRIN 81 MG: 81 TABLET, COATED ORAL at 09:50

## 2023-02-20 RX ADMIN — FLECAINIDE ACETATE 50 MG: 50 TABLET ORAL at 09:50

## 2023-02-20 RX ADMIN — HEPARIN SODIUM 5000 UNITS: 5000 INJECTION INTRAVENOUS; SUBCUTANEOUS at 05:10

## 2023-02-20 RX ADMIN — PRAVASTATIN SODIUM 20 MG: 20 TABLET ORAL at 16:36

## 2023-02-20 RX ADMIN — CALCIUM 1 TABLET: 500 TABLET ORAL at 09:50

## 2023-02-20 RX ADMIN — DIVALPROEX SODIUM 250 MG: 250 TABLET, DELAYED RELEASE ORAL at 09:52

## 2023-02-20 RX ADMIN — FLECAINIDE ACETATE 50 MG: 50 TABLET ORAL at 16:36

## 2023-02-20 RX ADMIN — AMLODIPINE BESYLATE 10 MG: 5 TABLET ORAL at 09:50

## 2023-02-20 RX ADMIN — GUAIFENESIN 1200 MG: 600 TABLET ORAL at 09:50

## 2023-02-20 RX ADMIN — CALCIUM 1 TABLET: 500 TABLET ORAL at 14:33

## 2023-02-20 RX ADMIN — DIVALPROEX SODIUM 250 MG: 250 TABLET, DELAYED RELEASE ORAL at 16:04

## 2023-02-20 RX ADMIN — CALCIUM 1 TABLET: 500 TABLET ORAL at 16:36

## 2023-02-20 RX ADMIN — HEPARIN SODIUM 5000 UNITS: 5000 INJECTION INTRAVENOUS; SUBCUTANEOUS at 14:33

## 2023-02-20 NOTE — CASE MANAGEMENT
Case Management Progress Note    Patient name Thad Nixon  Location W /W -77 MRN [id number]  : 1940 Date 2023       LOS (days): 1  Geometric Mean LOS (GMLOS) (days):   Days to GMLOS:        OBJECTIVE:        Current admission status: Inpatient  Preferred Pharmacy:   [address] [facility], [address]  [address]  [address] [id number]  Phone: [phone] Fax: [phone]    Primary Care Provider: Kem Bumpers, MD    Primary Insurance: [address] [place] [address]  Secondary Insurance:     PROGRESS NOTE:  CM confirmed with Elwyn Heimlich from [facility] in [place] that patient can return  VM left for daughter Priyanka Calhoun with request for call back to discuss

## 2023-02-20 NOTE — DISCHARGE INSTR - AVS FIRST PAGE
Dear Chio Gross,     It was our pleasure to care for you here at [facility]  It is our hope that we were always able to exceed the expected standards for your care during your stay  You were hospitalized due to COVID-19  You were cared for on the [place] 4th floor by Lorena Rahman MD under the service of [address] [place] [address] with the [facility] who covers for your primary care physician (PCP), Melvin Lake MD, while you were hospitalized  If you have any questions or concerns related to this hospitalization, you may contact us at [phone]  For follow up as well as any medication refills, we recommend that you follow up with your primary care physician  A registered nurse will reach out to you by phone within a few days after your discharge to answer any additional questions that you may have after going home  However, at this time we provide for you here, the most important instructions / recommendations at discharge:     Notable Medication Adjustments -   Start taking benzonatate 100 mg capsules 3 times a day as needed for cough  Start taking guaifenesin 200 mg every 12 hours as needed for cough  Start taking Flonase nasal spray daily for 7 days for nasal congestion  No other changes were made to your medications  Please take them as ordered  Testing Required after Discharge -   None  Important follow up information -   Follow-up with your PCP within 1 week  Other Instructions -   Call provider for worsening of symptoms  Please review this entire after visit summary as additional general instructions including medication list, appointments, activity, diet, any pertinent wound care, and other additional recommendations from your care team that may be provided for you        Sincerely,     Lorena Rahman MD

## 2023-02-20 NOTE — ASSESSMENT & PLAN NOTE
· Likely due to [place]  Was on senokot every other day as outpatient  · Diarrhea POA  No episodes last night

## 2023-02-20 NOTE — CASE MANAGEMENT
Case Management Progress Note    Patient name Fay Gutierrez  Location W /W -78 MRN [id number]  : 1940 Date 2023       LOS (days): 1  Geometric Mean LOS (GMLOS) (days):   Days to GMLOS:        OBJECTIVE:        Current admission status: Inpatient  Preferred Pharmacy:   [address], [address] [facility] [address]  [address]  [address] [address]  Phone: [phone] Fax: [phone]    Primary Care Provider: Mason Jaramillo MD    Primary Insurance: [address] [place] [address]  Secondary Insurance:     PROGRESS NOTE:   left for [employer] notifying of patients d/c plan to return to [facility]  Call back number provided if needed  Ambulatory referral for PT/OT faxed to [facility]

## 2023-02-20 NOTE — DISCHARGE SUMMARY
[facility]  Discharge- Reinier Callejasilor 1940, 80 y o  female MRN: [id number]  Unit/Bed#: W -67 Encounter: [id number]  Primary Care Provider: Lokesh Bowden MD   Date and time admitted to hospital: 2/18/2023  5:47 PM    * COVID-19  Assessment & Plan  · From assisted living with fever, weakness, sneeze, dry cough  · Satting well on room air  · D-dimer is within normal range  · Not requiring oxygen  Plan  · Mild COVID pathway  · Follow-up with PCP    Metabolic encephalopathy-resolved as of 2/19/2023  Assessment & Plan  · Reported by ED staff  Resolved on my exam      Diarrhea-resolved as of 2/20/2023  Assessment & Plan  · Likely due to [place]  Was on senokot every other day as outpatient  · Diarrhea POA  No episodes last night  Hypertension  Assessment & Plan  · BP stable on amlodipine, acebutolol as outpatient  · Continue home medications  · Monitor blood pressure  · Follow-up with PCP    Paroxysmal atrial fibrillation (HCC)  Assessment & Plan  · Maintaining NSR  · Maintained on flecainide 50 mg BID, acebutolol  · While inpatient  · Loop recorder in place, normal 2/8/2023  Plan  Continue home medications  Follow-up with PCP    Stage 3 chronic kidney disease, unspecified whether stage 3a or 3b CKD ([facility] [address] )  Assessment & Plan  · Creatinine stable and at baseline  Plan  Avoid nephrotoxins  Follow-up with PCP    Thrombocytopenia ([facility])  Assessment & Plan  Platelets 830  No evidence of bleeding  Follow-up with PCP  Minimal cognitive impairment  Assessment & Plan  · Alert, oriented currently  · Continue aricept  · Follow-up with PCP     Mood disorder with hypomanic features  Assessment & Plan  · Last required inpatient psych 8/2022    Discharged on depakene, aricept  · Mood and mental status is stable  · Follow-up with psychiatry outpatient    Obstructive sleep apnea syndrome  Assessment & Plan  · In process of starting CPAP as outpatient  · Follow-up with sleep specialist      Medical Problems     Resolved Problems  Date Reviewed: 2/20/2023          Resolved    Metabolic encephalopathy 5/79/4992     Resolved by  Alexandre Post MD    Diarrhea 2/20/2023     Resolved by  Alexandre Post MD        Discharging Resident: Alexandre Post MD  Discharging Attending: Amy Malagon*  PCP: Liang Diaz MD  Admission Date:   Admission Orders (From admission, onward)     Ordered        02/19/23 1510  Inpatient Admission  Once            02/18/23 2032  Place in Observation  Once                      Discharge Date: 02/20/23    Consultations During Hospital Stay:  · None    Procedures Performed:   · None    Significant Findings / Test Results:   · None    Incidental Findings:   · None    Test Results Pending at Discharge (will require follow up): · None     Outpatient Tests Requested:  · None    Complications: None    Reason for Admission: Fever, weakness and altered mental status  Hospital Course:   Rocco Salgado is a 80 y o  female patient who originally presented to the hospital on 2/18/2023 due to fever, weakness and altered mental status  Patient was tested positive for COVID-19  Patient also had diarrhea on admission  Her encephalopathy improved while in the ED  also diarrhea improved within 1 day  Patient did not require any oxygen  Respiratory effort was normal   Did not have any shortness of breath  She was managed under mild COVID pathway  Patient was started on remdesivir  Received 2 doses while inpatient  Her symptoms improved with antiviral treatments  Please see above list of diagnoses and related plan for additional information  Condition at Discharge: good    Discharge Day Visit / Exam:   Subjective: Patient was seen and examined at bedside  Not in pain or distress  Respiratory effort looks normal   Has some cough  But no SOB  No fever or chills  No diarrhea  Alert and oriented  Vitals: Blood Pressure: 131/63 (02/20/23 0836)  Pulse: 56 (02/20/23 0836)  Temperature: 98 5 °F (36 9 °C) (02/20/23 0836)  Temp Source: Oral (02/19/23 1154)  Respirations: 18 (02/20/23 0836)  Height: 5' 3" (160 cm) (02/18/23 2252)  Weight - Scale: 80 kg (176 lb 5 9 oz) (02/18/23 2252)  SpO2: 94 % (02/20/23 0953)  Exam:   Physical Exam  Constitutional:       General: She is not in acute distress  Appearance: Normal appearance  She is not ill-appearing, toxic-appearing or diaphoretic  HENT:      Head: Normocephalic  Nose: No congestion  Mouth/Throat:      Mouth: Mucous membranes are moist       Pharynx: Oropharynx is clear  Cardiovascular:      Rate and Rhythm: Normal rate  Pulses: Normal pulses  Heart sounds: Normal heart sounds  Pulmonary:      Effort: Pulmonary effort is normal       Breath sounds: Normal breath sounds  Abdominal:      General: Bowel sounds are normal       Palpations: Abdomen is soft  Musculoskeletal:      Right lower leg: No edema  Left lower leg: No edema  Skin:     General: Skin is warm and dry  Capillary Refill: Capillary refill takes less than 2 seconds  Neurological:      General: No focal deficit present  Mental Status: She is alert and oriented to person, place, and time  Psychiatric:         Mood and Affect: Mood normal          Behavior: Behavior normal           Discussion with Family: Updated  (daughter) via phone  Discharge instructions/Information to patient and family:   See after visit summary for information provided to patient and family  Provisions for Follow-Up Care:  See after visit summary for information related to follow-up care and any pertinent home health orders         Disposition:   [address] at [facility] Readmission: no    Discharge Medications:  See after visit summary for reconciled discharge medications provided to patient and/or family        **Please Note: This note may have been constructed using a voice recognition system**

## 2023-02-20 NOTE — CASE MANAGEMENT
Case Management Assessment & Discharge Planning Note    Patient name Linette Gear  Location W /W -38 MRN [id number]  : 1940 Date 2023       Current Admission Date: 2023  Current Admission Diagnosis:COVID-19   Patient Active Problem List    Diagnosis Date Noted   • Thrombocytopenia ([facility] ) 2023   • COVID-19 2023   • Diarrhea 2023   • Stage 3 chronic kidney disease, unspecified whether stage 3a or 3b CKD ([facility] ) 2022   • Tachy-alison syndrome ([facility] ) 2022   • Hyperlipemia 2022   • Hypertension 2022   • Generalized anxiety disorder 2021   • Obstructive sleep apnea syndrome 2020   • Degeneration of lumbar intervertebral disc 2017   • History of total bilateral knee replacement 2017   • Osteoporosis 05/10/2017   • Minimal cognitive impairment 2016   • Pain, joint, knee, right 2016   •  Mood disorder with hypomanic features 2016   • Paroxysmal atrial fibrillation ([facility] )    • Metabolic syndrome X 1827   • Anxiety state 2007   • Migraine 2007   • Peripheral venous insufficiency 2007      LOS (days): 1  Geometric Mean LOS (GMLOS) (days):   Days to GMLOS:     OBJECTIVE:    Risk of Unplanned Readmission Score: 10 05         Current admission status: Inpatient       Preferred Pharmacy:   [address], [address] [facility] [address]  [address]  [address] [place] [address]  Phone: [phone] Fax: [phone]    Primary Care Provider: Justice Youssef MD    Primary Insurance: [employer] [address] [facility]  Secondary Insurance:     ASSESSMENT:  Yoly Mendes Proxies    There are no active Health Care Proxies on file         Readmission Root Cause  30 Day Readmission: No    Patient Information  Admitted from[de-identified] Home  Mental Status: Alert  During Assessment patient was accompanied by: Not accompanied during assessment  Assessment information provided by[de-identified] Patient  Primary Caregiver: Self  Support Systems: Self, Family members  [place] of Residence: [address] [facility] do you live in?: [facility] entry access options   Select all that apply : No steps to enter home  Type of Current Residence: Facility  Upon entering residence, is there a bedroom on the main floor (no further steps)?: Yes  Upon entering residence, is there a bathroom on the main floor (no further steps)?: Yes  In the last 12 months, was there a time when you were not able to pay the mortgage or rent on time?: No  In the last 12 months, how many places have you lived?: 1  In the last 12 months, was there a time when you did not have a steady place to sleep or slept in a shelter (including now)?: No  Homeless/housing insecurity resource given?: N/A  Living Arrangements: Lives Alone  Is patient a ?: No    Activities of Daily Living Prior to Admission  Functional Status: Independent  Completes ADLs independently?: Yes  Ambulates independently?: Yes  Does patient use assisted devices?: Yes  Assisted Devices (DME) used: Tori Snow  Does patient currently own DME?: Yes  What DME does the patient currently own?: Tori Snow  Does patient have a history of Outpatient Therapy (PT/OT)?: No  Does the patient have a history of Short-Term Rehab?: No  Does patient have a history of HHC?: No    Patient Information Continued  Income Source: Pension/detention  Does patient have prescription coverage?: Yes  Within the past 12 months, you worried that your food would run out before you got the money to buy more : Never true  Within the past 12 months, the food you bought just didn't last and you didn't have money to get more : Never true  Food insecurity resource given?: N/A    Means of Transportation  Means of Transport to Appts[de-identified] Family transport  In the past 12 months, has lack of transportation kept you from medical appointments or from getting medications?: No  In the past 12 months, has lack of transportation kept you from meetings, work, or from getting things needed for daily living?: No  Was application for public transport provided?: N/A        DISCHARGE DETAILS:    Discharge planning discussed with[de-identified] patient and daughter  Freedom of Choice: Yes  Comments - Freedom of Choice: re: return to [address] contacted family/caregiver?: Yes  Were Treatment Team discharge recommendations reviewed with patient/caregiver?: Yes  Did patient/caregiver verbalize understanding of patient care needs?: N/A- going to facility  Were patient/caregiver advised of the risks associated with not following Treatment Team discharge recommendations?: Yes    Contacts  Patient Contacts: Gilbert Kay  Relationship to Patient[de-identified] Family  Contact Method: Phone  Phone Number: [phone]  Reason/Outcome: Continuity of Care, Emergency Contact, Discharge [address]         Is the patient interested in [facility] at discharge?: No    DME Referral Provided  Referral made for DME?: No    Other Referral/Resources/Interventions Provided:  Interventions: Assisted Living  Referral Comments: CM spoke with patient to complete assessment  Patient lives [place] [address] in [facility]- confirmed with patient plan is to return  Patient is independent with ADLs and ambulation- uses and owns a walker  CM confirmed with facility that is good to return to facility and are aware of COVID + status  CM spoke with daughter who is also agreeable to patient returning, only concern is patients strength due to COVID  Per nursing patient is at her baseline, however CM notified resident of this via TT who spoke with daughter and she is agreeable to d/c  5:45 PM WCV confirmed- all appropriate parties aware  No further CM needs anticipated      Would you like to participate in our [address] service program?  : No - Declined    Treatment Team Recommendation: Facility Return  Discharge Destination Plan[de-identified] Facility Return  Transport at Discharge : [facility] [address] [employer] and Unit #): Lucy Whitlock      IMM Given (Date):: 02/20/23  IMM Given to[de-identified] Patient  IMM reviewed with daughter, daughter agrees with discharge determination      Accepting Facility Name, [facility] : [place] , [address] [facility]  Receiving Facility/Agency Phone Number: [phone]  Facility/Agency Fax Number: [phone]

## 2023-02-20 NOTE — ASSESSMENT & PLAN NOTE
· BP stable on amlodipine, acebutolol as outpatient  · Continue home medications  · Monitor blood pressure  · Follow-up with PCP

## 2023-02-20 NOTE — ASSESSMENT & PLAN NOTE
· Maintaining NSR  · Maintained on flecainide 50 mg BID, acebutolol  · While inpatient  · Loop recorder in place, normal 2/8/2023  Plan  Continue home medications  Follow-up with PCP

## 2023-02-20 NOTE — UTILIZATION REVIEW
NOTIFICATION OF INPATIENT ADMISSION   AUTHORIZATION REQUEST   SERVICING FACILITY:   [address] [facility], [address] [place] [address]  Tax ID: [id number]  NPI: [id number]   ATTENDING PROVIDER:  Attending Name and NPI#: Michelle Monte [[id number]]  Address: [address] [place] [address] [place]  [place], [address] [place] [address]  Phone: [phone]     ADMISSION INFORMATION:  Place of Service: Inpatient [address] [facility]  [address]  Place of Service Code: 21  Inpatient Admission Date/Time: 2/19/23  3:10 PM  Discharge Date/Time: No discharge date for patient encounter  Admitting Diagnosis Code/Description:  Dehydration [E86 0]  Weakness [R53 1]  COVID [U07 1]     UTILIZATION REVIEW CONTACT:  Aylin Johnson Utilization   Network Utilization Review Department  Phone: [phone]  Fax: [phone]  Email: [email or web address]  Contact for approvals/pending authorizations, clinical reviews, and discharge  PHYSICIAN ADVISORY SERVICES:  Medical Necessity Denial & Anzx-yh-Gqex Review  Phone: [phone]  Fax: [phone]  Email: [email or web address]

## 2023-02-20 NOTE — ASSESSMENT & PLAN NOTE
· From assisted living with fever, weakness, sneeze, dry cough  · Satting well on room air  · D-dimer is within normal range  · Not requiring oxygen    Plan  · Mild COVID pathway  · Follow-up with PCP

## 2023-02-20 NOTE — ASSESSMENT & PLAN NOTE
· Last required inpatient psych 8/2022    Discharged on depakene, aricept  · Mood and mental status is stable  · Follow-up with psychiatry outpatient

## 2023-02-21 LAB
ATRIAL RATE: 71 BPM
P AXIS: 48 DEGREES
PR INTERVAL: 168 MS
QRS AXIS: 56 DEGREES
QRSD INTERVAL: 74 MS
QT INTERVAL: 394 MS
QTC INTERVAL: 428 MS
T WAVE AXIS: 44 DEGREES
VENTRICULAR RATE: 71 BPM

## 2023-02-21 NOTE — UTILIZATION REVIEW
NOTIFICATION OF ADMISSION DISCHARGE   This is a Notification of Discharge from [facility]  Please be advised that this patient has been discharge from our facility  Below you will find the admission and discharge date and time including the patient’s disposition  UTILIZATION REVIEW CONTACT:  Desiree Rossi MA  Utilization   Network Utilization Review Department  Phone: [phone] x carefully listen to the prompts  All voicemails are confidential   Email: [email or web address]     ADMISSION INFORMATION  PRESENTATION DATE: 2/18/2023  5:47 PM  OBERVATION ADMISSION DATE:   INPATIENT ADMISSION DATE: 2/19/23  3:10 PM   DISCHARGE DATE: 2/20/2023  6:00 PM   DISPOSITION:Home/Self Care    IMPORTANT INFORMATION:  Send all requests for admission clinical reviews, approved or denied determinations and any other requests to dedicated fax number below belonging to the campus where the patient is receiving treatment   List of dedicated fax numbers:  [address] DENIALS (Administrative/Medical Necessity) [phone]   [address] (Maternity/NICU/Pediatrics) [phone]   [facility] [phone]   [place] [address] [phone]   [address] [phone]   [address] [facility] [phone]   [facility] [phone]   [address] [facility] [address] [phone]   [facility]  [phone]   [address] [facility] [phone]   [address] [facility] [address] [facility] [phone]

## 2023-02-24 LAB
BACTERIA BLD CULT: NORMAL
BACTERIA BLD CULT: NORMAL

## 2023-02-28 NOTE — ED ATTENDING ATTESTATION
2/18/2023  Adolfo Mayen DO, saw and evaluated the patient  I have discussed the patient with the resident/non-physician practitioner and agree with the resident's/non-physician practitioner's findings, Plan of Care, and MDM as documented in the resident's/non-physician practitioner's note, except where noted  All available labs and Radiology studies were reviewed  I was present for key portions of any procedure(s) performed by the resident/non-physician practitioner and I was immediately available to provide assistance  At this point I agree with the current assessment done in the Emergency Department    I have conducted an independent evaluation of this patient a history and physical is as follows:    ED Course         Critical Care Time  Procedures

## 2023-03-01 NOTE — PROGRESS NOTES
Consultation - Electrophysiology-Cardiology (EP)   Alec Bryson 80 y o  female MRN: [id number]  Unit/Bed#:  Encounter: [id number]      9  Paroxysmal atrial fibrillation (HCC)  POCT ECG      2  Obstructive sleep apnea syndrome        3  Tachy-alison syndrome ([facility] )        4  Primary hypertension        5  Stage 3 chronic kidney disease, unspecified whether stage 3a or 3b CKD ([facility] )        6   Mixed hyperlipidemia              Consults  Physician Requesting Consult: Jesús Jacobson MD   Reason for Consult / Principal Problem:  Tachy-alison syndrome      Summary of my recommendation for the patient  Patient doing very well -no significant fatigue -recovering from [address] check does not reveal any atrial fibrillation  Plan to proceed with current therapy and follow-up in a year        Assessment/Plan    Tachy-alison syndrome  Paroxysmal atrial fibrillation  Long-term anticoagulation  Tachy-alison syndrome  Hypertension  Mixed hyperlipidemia  Pre diabetes  Overweight  VIRGILIO, was on CPAP, recalled and patient has not received a new one        Tachy-alison syndrome  Patient does complain of fatigue  Heart rate is in the 50s while in the office    Patient is on flecainide for PAF, but without a beta-blocker because of underlying bradycardia  Patient is on donepezil for dementia, which can worsen  Bradycardia    The patient is going to need long-term monitoring  Recommended look monitor        History of PAF  Rate control-none because of underlying bradycardia  Rhythm control-on flecainide 50 mg 2 times daily  Anticoagulation-none  I do not have any strip of documented AFib  Patient is on aspirin which is not really of use in the setting of AFib and may be discontinued  I would recommend discussion with her primary cardiologist about the same        Overweight  BMI- 28  This increases the risk of-CAD, CVA, vascular disease, diabetes, kidney dysfunction, hypertension, hyperlipidemia  Diet is responsible for 80% of weight gain   Advice nutritional counseling and healthy diet  Also advised to increase activity  He is going to follow up with his primary care        Obstructive sleep apnea   Patient has a history of same  Previously was using CPAP  It has been recalled and she is without it and very symptomatic with fatigue        Hypertension   Blood pressure-120/60  Medication-amlodipine  My recommendation-continue with same      Dementia  Patient is on donepezil  This can cause bradycardia            History of Present Illness   HPI: Belia Neal is a 80y o  year old female has been referred to me by Dr Gris Claire for the management of bradycardia, palpitation and tachy-alison syndrome    Patient has a loop in place  She is recovering from [facility]  She is not complaining of any worsening of fatigue  Device has not revealed any significant A-fib    The patient has significant medical illnesses which include  Tachy-alison syndrome  Paroxysmal atrial fibrillation  Long-term anticoagulation  Tachy-alison syndrome  Hypertension  Mixed hyperlipidemia  Pre diabetes  Overweight  VIRGILIO, was on CPAP, recalled and patient has not received a new one    She has moved to a new place  She does feel very fatigued as her CPAP has not been replaced  She does not have the ability to exercise on a daily basis  See recalls that she cannot remember the name of her new friends and recognizes her forgetfulness      As far as cardiac symptoms are concerned  Angina - negative  Orthopnea -negative  Paroxysmal nocturnal dyspnea -negative  Leg swelling-negative  Palpitations -negative  Presyncope-negative  Syncope -negative  Orthostatic lightheadedness -negative  Exertional intolerance-negative    Snoring-present  morning fatigue-present  Daytime sleepiness-present      Social history  Tobacco use-negative  Alcohol use-negative  Energy drink use-negative  Recreational drug use-negative      Family history  Diabetes      Historical Information   Past Medical History: Diagnosis Date   • Afib ([facility] )    • Arthritis    • Hyperlipidemia    • Hypertension    • Osteopenia    • Sleep apnea    • Squamous cell skin cancer     LEF TEMPLE   • Varicose veins of both lower extremities      Past Surgical History:   Procedure Laterality Date   • CARDIAC ELECTROPHYSIOLOGY PROCEDURE N/A 10/12/2022    Procedure: Cardiac loop recorder implant;  Surgeon: Sandy Zamarripa MD;  [facility]: [facility]; Service: Cardiology   • CATARACT EXTRACTION     • COLONOSCOPY      complete, for polyp removal   • EYE SURGERY     • HYSTERECTOMY      age 39   • INCISION AND DRAINAGE OF WOUND Right 06/28/2016    Procedure: ARTHROSCOPY,EVACUATION OF HEMATOMA, OPEN EXPLORATION OF WOUND;  Surgeon: Little Mariee MD;  Location: [facility];  Service:    • MOHS SURGERY Left 12/27/2022    left temple   • TONSILLECTOMY AND ADENOIDECTOMY     • TOTAL KNEE ARTHROPLASTY Right    • TOTAL KNEE ARTHROPLASTY Left    • WISDOM TOOTH EXTRACTION       Social History     Substance and Sexual Activity   Alcohol Use Not Currently     Social History     Substance and Sexual Activity   Drug Use No     Social History     Tobacco Use   Smoking Status Never   Smokeless Tobacco Never     Social History     Socioeconomic History   • Marital status:       Spouse name: Not on file   • Number of children: Not on file   • Years of education: Not on file   • Highest education level: Not on file   Occupational History   • Not on file   Tobacco Use   • Smoking status: Never   • Smokeless tobacco: Never   Vaping Use   • Vaping Use: Never used   Substance and Sexual Activity   • Alcohol use: Not Currently   • Drug use: No   • Sexual activity: Not Currently     Comment:    Other Topics Concern   • Not on file   Social History Narrative    No domestic violence      Social Determinants of Health     Financial Resource Strain: Not on file   Food Insecurity: No Food Insecurity   • Worried About Running Out of Food in the Last Year: Never true • Ran Out of Food in the Last Year: Never true   Transportation Needs: No Transportation Needs   • Lack of Transportation (Medical): No   • Lack of Transportation (Non-Medical): No   Physical Activity: Not on file   Stress: Not on file   Social Connections: Not on file   Intimate Partner Violence: Not on file   Housing Stability: Low Risk    • Unable to Pay for Housing in the Last Year: No   • Number of Places Lived in the Last Year: 1   • Unstable Housing in the Last Year: No        Family History:  Family History   Problem Relation Age of Onset   • Diabetes Mother    • Diabetes Father    • Diabetes Brother    • No Known Problems Sister    • No Known Problems Daughter    • No Known Problems Maternal Grandmother    • No Known Problems Maternal Grandfather    • No Known Problems Paternal Grandmother    • No Known Problems Paternal Grandfather    • No Known Problems Daughter    • Brain cancer Son    • No Known Problems Maternal Aunt          Meds/Allergies      No current facility-administered medications for this visit  (Not in a hospital admission)      Allergies   Allergen Reactions   • Atorvastatin Other (See Comments)     myalgia   • Statins Other (See Comments)     Weakness in legs           Objective   Vitals:   Visit Vitals  /62 (BP Location: Right arm, Patient Position: Sitting, Cuff Size: Large)   Pulse 61   Ht 5' 3" (1 6 m)   Wt 73 9 kg (163 lb)   BMI 28 87 kg/m²   OB Status Hysterectomy   Smoking Status Never   BSA 1 77 m²      Vitals:    03/02/23 0856   Weight: 73 9 kg (163 lb)   [unfilled]    Invasive Devices     None                   ROS  Review of Systems   All other systems reviewed and are negative  As described in my history of present illness        PHYSICAL EXAM  Physical Exam  Vitals reviewed  Constitutional:       General: She is not in acute distress  Appearance: Normal appearance  She is not ill-appearing  HENT:      Head: Normocephalic and atraumatic        Right Ear: External ear normal       Left Ear: External ear normal       Nose: Nose normal       Mouth/Throat:      Comments: Posterior pharynx is crowded  Eyes:      General: No scleral icterus  Extraocular Movements: Extraocular movements intact  Conjunctiva/sclera: Conjunctivae normal       Pupils: Pupils are equal, round, and reactive to light  Neck:      Comments: Thick neck  Cardiovascular:      Rate and Rhythm: Normal rate and regular rhythm  Pulses: Normal pulses  Heart sounds: Normal heart sounds  No murmur heard  Pulmonary:      Effort: Pulmonary effort is normal  No respiratory distress  Breath sounds: Normal breath sounds  No wheezing  Abdominal:      General: Bowel sounds are normal  There is no distension  Tenderness: There is no abdominal tenderness  Comments: Central obesity present   Musculoskeletal:         General: No swelling, tenderness or deformity  Cervical back: Neck supple  No rigidity  Skin:     Coloration: Skin is not jaundiced  Findings: No bruising  Neurological:      Mental Status: She is alert  Mental status is at baseline  Motor: No weakness  Psychiatric:         Mood and Affect: Mood normal          Thought Content:  Thought content normal                LAB RESULTS:      CBC:  Results from Last 12 Months   Lab Units 02/20/23  0619 02/19/23  0514 02/18/23  1759 11/01/22  2216 08/15/22  0506 08/03/22  1640   WBC Thousand/uL 5 95 6 32 7 96 6 84 6 17 6 79   HEMOGLOBIN g/dL 14 0 13 9 15 6* 14 4 15 1 15 1   HEMATOCRIT % 42 7 43 6 47 6* 43 9 47 2* 46 9*   MCV fL 92 94 91 89 90 90   PLATELETS Thousands/uL 130* 114* 156 155 187 211   MCH pg 30 2 30 0 29 9 29 3 28 7 28 9   MCHC g/dL 32 8 31 9 32 8 32 8 32 0 32 2   RDW % 13 6 14 1 13 8 14 1 13 2 13 1   MPV fL 10 1 10 5 10 3 10 0 9 7 9 9   NRBC AUTO /100 WBCs  --  0 0 0 0 0        CMP:  Results from Last 12 Months   Lab Units 02/20/23  0619 02/19/23  0514 02/18/23  1759 11/01/22  2216 08/15/22  0506   POTASSIUM mmol/L 3 9 4 2 4 1 4 2 3 8   CHLORIDE mmol/L 105 107 101 105 103   CO2 mmol/L 24 22 27 24 26   BUN mg/dL 11 13 16 17 25   CREATININE mg/dL 0 65 0 73 1 01 1 10 0 74   CALCIUM mg/dL 8 5 8 1* 9 4 8 8 9 1   AST U/L 21 29 28 83* 25   ALT U/L 15 16 19 60 24   ALK PHOS U/L 43 43 55 64 66   EGFR ml/min/1 73sq m 82 76 51 46 75        Magnesium:   No results for input(s): MG in the last 8784 hours  A1C:  No results for input(s): HGBA1C in the last 8784 hours  TSH:  No results for input(s): TSH in the last 8784 hours  TSH 3rd Gen:  Results from Last 12 Months   Lab Units 08/03/22  1640   TSH 3RD GENERATON uIU/mL 1 960        B-Type Natriuretic Peptide  2/18/2023    Component Ref Range & Units 2/18/23  5:59 PM    BNP 0 - 100 pg/mL 137 High                       Stress Test:   Results for orders placed during the hospital encounter of 07/06/22    NM myocardial perfusion spect (rx stress and/or rest)    Interpretation Summary  •  Stress ECG: No ST deviation is noted  There were no arrhythmias during recovery    The ECG was negative for ischemia  The stress ECG is negative for ischemia after vasodilation and low level exercise, without reproduction of symptoms  •  Perfusion: There are no perfusion defects  •  Stress Function: Left ventricular function post-stress is normal  Post-stress ejection fraction is 77 %  •  Stress Combined Conclusion: The ECG and SPECT imaging portions of the stress study are concordant with no evidence of stress induced myocardial ischemia  Left ventricular perfusion is normal     No results found for this or any previous visit  Cardiac catheterization :  No results found for this or any previous visit        HOLTER MONITOR: 25 HOUR/48 HOUR MONITORS  Results for orders placed during the hospital encounter of 07/06/22                    Interpretation Summary  Indication: paroxysmal atrial fibrillation  Monitoring period: 47 hr 59 min    Predominant rhythm: Sinus rhythm    Minimum HR: 50 bpm  Average HR: 58 bpm  Maximum HR: 109 bpm    Ventricular ectopies: 0 (0 0%)  Ventricular runs: 0    Supraventricular ectopies: 112 (0 1%)  Supraventricular runs:  1 (10 beats at ventricular rate of 126 bpm)    Longest RR: 1 9 sec    Arrhythmias: No sustained arrhythmias recorded    Symptom diary submitted: yes      Impression  Patient was predominantly in sinus rhythm with an average heart rate of 58 bpm   No significant supraventricular or ventricular ectopies were noted  No sustained arrhythmias  No atrial fibrillation was noted  No significant pauses  No symptoms reported during monitoring period  Signed by:  Cardiology fellow, Shannon Medina DO  Cardiology attending, Wilmar Kirkpatrick MD          AMB extended holter monitor  No results found for this or any previous visit  DEVICE CHECK:       Results for orders placed or performed in visit on 02/08/23   Cardiac EP device report    Narrative    TRES [place] IS MRI CONDITIONAL  CARELINK TRANSMISSION: LOOP RECORDER  PRESENTING RHYTHM SB @ 54 BPM  BATTERY STATUS "OK " 1 PT ACTIVATED EPISODE PREVIOUSLY ADDRESSED IN ALERT  NO NEW PATIENT OR DEVICE ACTIVATED EPISODES  NORMAL DEVICE FUNCTION   DL             Code Status: [unfilled]  Advance Directive and Living Will:      Power of :    POLST:      Counseling / Coordination of Care  Detailed discussion regards reinitiating BiPAP +  Follow    2 months  2 months  Talked about stroke        Sudip Saint Barthelemy

## 2023-03-02 ENCOUNTER — OFFICE VISIT (OUTPATIENT)
Dept: CARDIOLOGY CLINIC | Facility: CLINIC | Age: 83
End: 2023-03-02

## 2023-03-02 VITALS
SYSTOLIC BLOOD PRESSURE: 104 MMHG | WEIGHT: 163 LBS | HEIGHT: 63 IN | DIASTOLIC BLOOD PRESSURE: 62 MMHG | BODY MASS INDEX: 28.88 KG/M2 | HEART RATE: 61 BPM

## 2023-03-02 DIAGNOSIS — G47.33 OBSTRUCTIVE SLEEP APNEA SYNDROME: ICD-10-CM

## 2023-03-02 DIAGNOSIS — I49.5 TACHY-BRADY SYNDROME (HCC): ICD-10-CM

## 2023-03-02 DIAGNOSIS — I48.0 PAROXYSMAL ATRIAL FIBRILLATION (HCC): Primary | ICD-10-CM

## 2023-03-02 DIAGNOSIS — E78.2 MIXED HYPERLIPIDEMIA: ICD-10-CM

## 2023-03-02 DIAGNOSIS — N18.30 STAGE 3 CHRONIC KIDNEY DISEASE, UNSPECIFIED WHETHER STAGE 3A OR 3B CKD (HCC): ICD-10-CM

## 2023-03-02 DIAGNOSIS — I10 PRIMARY HYPERTENSION: ICD-10-CM

## 2023-03-02 NOTE — LETTER
March 2, 2023     [facility] [address], [address] [facility] [address]    Patient: Gissel Thrasher   YOB: 1940   Date of Visit: 3/2/2023       Dear Dr Gerry Woods: Thank you for referring Tripp Slade to me for evaluation  Below are my notes for this consultation  If you have questions, please do not hesitate to call me  I look forward to following your patient along with you  Sincerely,        Silvia Norris MD        CC: Sonia Gate Florentino Spurling, MD  3/2/2023  9:37 AM  Sign when Signing Visit   Consultation - Electrophysiology-Cardiology (EP)   Gissel Thrasher 80 y o  female MRN: [id number]  Unit/Bed#:  Encounter: [id number]      2  Paroxysmal atrial fibrillation (HCC)  POCT ECG      2  Obstructive sleep apnea syndrome        3  Tachy-alison syndrome ([facility] [address] )        4  Primary hypertension        5  Stage 3 chronic kidney disease, unspecified whether stage 3a or 3b CKD ([facility] [address] )        6   Mixed hyperlipidemia              Consults  Physician Requesting Consult: Ted Archer MD   Reason for Consult / Principal Problem:  Tachy-alison syndrome      Summary of my recommendation for the patient  Patient doing very well -no significant fatigue -recovering from [address] check does not reveal any atrial fibrillation  Plan to proceed with current therapy and follow-up in a year        Assessment/Plan    Tachy-alison syndrome  Paroxysmal atrial fibrillation  Long-term anticoagulation  Tachy-alison syndrome  Hypertension  Mixed hyperlipidemia  Pre diabetes  Overweight  VIRGILIO, was on CPAP, recalled and patient has not received a new one        Tachy-alison syndrome  Patient does complain of fatigue  Heart rate is in the 50s while in the office    Patient is on flecainide for PAF, but without a beta-blocker because of underlying bradycardia  Patient is on donepezil for dementia, which can worsen  Bradycardia    The patient is going to need long-term monitoring  Recommended look monitor        History of PAF  Rate control-none because of underlying bradycardia  Rhythm control-on flecainide 50 mg 2 times daily  Anticoagulation-none  I do not have any strip of documented AFib  Patient is on aspirin which is not really of use in the setting of AFib and may be discontinued  I would recommend discussion with her primary cardiologist about the same        Overweight  BMI- 28  This increases the risk of-CAD, CVA, vascular disease, diabetes, kidney dysfunction, hypertension, hyperlipidemia  Diet is responsible for 80% of weight gain   Advice nutritional counseling and healthy diet  Also advised to increase activity  He is going to follow up with his primary care        Obstructive sleep apnea   Patient has a history of same  Previously was using CPAP  It has been recalled and she is without it and very symptomatic with fatigue        Hypertension   Blood pressure-120/60  Medication-amlodipine  My recommendation-continue with same      Dementia  Patient is on donepezil  This can cause bradycardia            History of Present Illness   HPI: Gissel Thrasher is a 80y o  year old female has been referred to me by Dr Latonya Francois for the management of bradycardia, palpitation and tachy-alison syndrome    Patient has a loop in place  She is recovering from [facility]  She is not complaining of any worsening of fatigue  Device has not revealed any significant A-fib    The patient has significant medical illnesses which include  Tachy-alison syndrome  Paroxysmal atrial fibrillation  Long-term anticoagulation  Tachy-alison syndrome  Hypertension  Mixed hyperlipidemia  Pre diabetes  Overweight  VIRGILIO, was on CPAP, recalled and patient has not received a new one    She has moved to a new place  She does feel very fatigued as her CPAP has not been replaced  She does not have the ability to exercise on a daily basis  See recalls that she cannot remember the name of her new friends and recognizes her forgetfulness      As far as cardiac symptoms are concerned  Angina - negative  Orthopnea -negative  Paroxysmal nocturnal dyspnea -negative  Leg swelling-negative  Palpitations -negative  Presyncope-negative  Syncope -negative  Orthostatic lightheadedness -negative  Exertional intolerance-negative    Snoring-present  morning fatigue-present  Daytime sleepiness-present      Social history  Tobacco use-negative  Alcohol use-negative  Energy drink use-negative  Recreational drug use-negative      Family history  Diabetes      Historical Information   Past Medical History:   Diagnosis Date   • Afib (HCC)    • Arthritis    • Hyperlipidemia    • Hypertension    • Osteopenia    • Sleep apnea    • Squamous cell skin cancer     LEF TEMPLE   • Varicose veins of both lower extremities      Past Surgical History:   Procedure Laterality Date   • CARDIAC ELECTROPHYSIOLOGY PROCEDURE N/A 10/12/2022    Procedure: Cardiac loop recorder implant;  Surgeon: Zoraida Perez MD;  [facility]; Service: Cardiology   • CATARACT EXTRACTION     • COLONOSCOPY      complete, for polyp removal   • EYE SURGERY     • HYSTERECTOMY      age 39   • INCISION AND DRAINAGE OF WOUND Right 06/28/2016    Procedure: ARTHROSCOPY,EVACUATION OF HEMATOMA, OPEN EXPLORATION OF WOUND;  Surgeon: Brodie Miner MD;  Location: [facility];  Service:    • MOHS SURGERY Left 12/27/2022    left temple   • TONSILLECTOMY AND ADENOIDECTOMY     • TOTAL KNEE ARTHROPLASTY Right    • TOTAL KNEE ARTHROPLASTY Left    • WISDOM TOOTH EXTRACTION       Social History     Substance and Sexual Activity   Alcohol Use Not Currently     Social History     Substance and Sexual Activity   Drug Use No     Social History     Tobacco Use   Smoking Status Never   Smokeless Tobacco Never     Social History     Socioeconomic History   • Marital status:       Spouse name: Not on file   • Number of children: Not on file   • Years of education: Not on file   • Highest education level: Not on file Occupational History   • Not on file   Tobacco Use   • Smoking status: Never   • Smokeless tobacco: Never   Vaping Use   • Vaping Use: Never used   Substance and Sexual Activity   • Alcohol use: Not Currently   • Drug use: No   • Sexual activity: Not Currently     Comment:    Other Topics Concern   • Not on file   Social History Narrative    No domestic violence      Social Determinants of Health     Financial Resource Strain: Not on file   Food Insecurity: No Food Insecurity   • Worried About Running Out of Food in the Last Year: Never true   • Ran Out of Food in the Last Year: Never true   Transportation Needs: No Transportation Needs   • Lack of Transportation (Medical): No   • Lack of Transportation (Non-Medical): No   Physical Activity: Not on file   Stress: Not on file   Social Connections: Not on file   Intimate Partner Violence: Not on file   Housing Stability: Low Risk    • Unable to Pay for Housing in the Last Year: No   • Number of Places Lived in the Last Year: 1   • Unstable Housing in the Last Year: No        Family History:  Family History   Problem Relation Age of Onset   • Diabetes Mother    • Diabetes Father    • Diabetes Brother    • No Known Problems Sister    • No Known Problems Daughter    • No Known Problems Maternal Grandmother    • No Known Problems Maternal Grandfather    • No Known Problems Paternal Grandmother    • No Known Problems Paternal Grandfather    • No Known Problems Daughter    • Brain cancer Son    • No Known Problems Maternal Aunt          Meds/Allergies       No current facility-administered medications for this visit          (Not in a hospital admission)      Allergies   Allergen Reactions   • Atorvastatin Other (See Comments)     myalgia   • Statins Other (See Comments)     Weakness in legs           Objective    Vitals:   Visit Vitals  /62 (BP Location: Right arm, Patient Position: Sitting, Cuff Size: Large)   Pulse 61   Ht 5' 3" (1 6 m)   Wt 73 9 kg (163 lb) BMI 28 87 kg/m²   OB Status Hysterectomy   Smoking Status Never   BSA 1 77 m²      Vitals:    03/02/23 0856   Weight: 73 9 kg (163 lb)   [unfilled]    Invasive Devices     None                   ROS  Review of Systems   All other systems reviewed and are negative  As described in my history of present illness        PHYSICAL EXAM  Physical Exam  Vitals reviewed  Constitutional:       General: She is not in acute distress  Appearance: Normal appearance  She is not ill-appearing  HENT:      Head: Normocephalic and atraumatic  Right Ear: External ear normal       Left Ear: External ear normal       Nose: Nose normal       Mouth/Throat:      Comments: Posterior pharynx is crowded  Eyes:      General: No scleral icterus  Extraocular Movements: Extraocular movements intact  Conjunctiva/sclera: Conjunctivae normal       Pupils: Pupils are equal, round, and reactive to light  Neck:      Comments: Thick neck  Cardiovascular:      Rate and Rhythm: Normal rate and regular rhythm  Pulses: Normal pulses  Heart sounds: Normal heart sounds  No murmur heard  Pulmonary:      Effort: Pulmonary effort is normal  No respiratory distress  Breath sounds: Normal breath sounds  No wheezing  Abdominal:      General: Bowel sounds are normal  There is no distension  Tenderness: There is no abdominal tenderness  Comments: Central obesity present   Musculoskeletal:         General: No swelling, tenderness or deformity  Cervical back: Neck supple  No rigidity  Skin:     Coloration: Skin is not jaundiced  Findings: No bruising  Neurological:      Mental Status: She is alert  Mental status is at baseline  Motor: No weakness  Psychiatric:         Mood and Affect: Mood normal          Thought Content:  Thought content normal                LAB RESULTS:      CBC:  Results from Last 12 Months   Lab Units 02/20/23  1284 02/19/23  0514 02/18/23  1759 11/01/22  2216 08/15/22  0088 08/03/22  1640   WBC Thousand/uL 5 95 6 32 7 96 6 84 6 17 6 79   HEMOGLOBIN g/dL 14 0 13 9 15 6* 14 4 15 1 15 1   HEMATOCRIT % 42 7 43 6 47 6* 43 9 47 2* 46 9*   MCV fL 92 94 91 89 90 90   PLATELETS Thousands/uL 130* 114* 156 155 187 211   MCH pg 30 2 30 0 29 9 29 3 28 7 28 9   MCHC g/dL 32 8 31 9 32 8 32 8 32 0 32 2   RDW % 13 6 14 1 13 8 14 1 13 2 13 1   MPV fL 10 1 10 5 10 3 10 0 9 7 9 9   NRBC AUTO /100 WBCs  --  0 0 0 0 0        CMP:  Results from Last 12 Months   Lab Units 02/20/23  0619 02/19/23  0514 02/18/23  1759 11/01/22  2216 08/15/22  0506   POTASSIUM mmol/L 3 9 4 2 4 1 4 2 3 8   CHLORIDE mmol/L 105 107 101 105 103   CO2 mmol/L 24 22 27 24 26   BUN mg/dL 11 13 16 17 25   CREATININE mg/dL 0 65 0 73 1 01 1 10 0 74   CALCIUM mg/dL 8 5 8 1* 9 4 8 8 9 1   AST U/L 21 29 28 83* 25   ALT U/L 15 16 19 60 24   ALK PHOS U/L 43 43 55 64 66   EGFR ml/min/1 73sq m 82 76 51 46 75        Magnesium:   No results for input(s): MG in the last 8784 hours  A1C:  No results for input(s): HGBA1C in the last 8784 hours  TSH:  No results for input(s): TSH in the last 8784 hours  TSH 3rd Gen:  Results from Last 12 Months   Lab Units 08/03/22  1640   TSH 3RD GENERATON uIU/mL 1 960        B-Type Natriuretic Peptide  2/18/2023    Component Ref Range & Units 2/18/23  5:59 PM    BNP 0 - 100 pg/mL 137 High                       Stress Test:   Results for orders placed during the hospital encounter of 07/06/22    NM myocardial perfusion spect (rx stress and/or rest)    Interpretation Summary  •  Stress ECG: No ST deviation is noted  There were no arrhythmias during recovery    The ECG was negative for ischemia  The stress ECG is negative for ischemia after vasodilation and low level exercise, without reproduction of symptoms  •  Perfusion: There are no perfusion defects  •  Stress Function: Left ventricular function post-stress is normal  Post-stress ejection fraction is 77 %    •  Stress Combined Conclusion: The ECG and SPECT imaging portions of the stress study are concordant with no evidence of stress induced myocardial ischemia  Left ventricular perfusion is normal     No results found for this or any previous visit  Cardiac catheterization :  No results found for this or any previous visit  HOLTER MONITOR: 25 HOUR/48 HOUR MONITORS  Results for orders placed during the hospital encounter of 07/06/22                    Interpretation Summary  Indication: paroxysmal atrial fibrillation  Monitoring period: 47 hr 59 min    Predominant rhythm: Sinus rhythm    Minimum HR: 50 bpm  Average HR: 58 bpm  Maximum HR: 109 bpm    Ventricular ectopies: 0 (0 0%)  Ventricular runs: 0    Supraventricular ectopies: 112 (0 1%)  Supraventricular runs:  1 (10 beats at ventricular rate of 126 bpm)    Longest RR: 1 9 sec    Arrhythmias: No sustained arrhythmias recorded    Symptom diary submitted: yes      Impression  Patient was predominantly in sinus rhythm with an average heart rate of 58 bpm   No significant supraventricular or ventricular ectopies were noted  No sustained arrhythmias  No atrial fibrillation was noted  No significant pauses  No symptoms reported during monitoring period  Signed by:  Cardiology fellow, Damien Ozuna DO  Cardiology attending, Eric Marie MD          AMB extended holter monitor  No results found for this or any previous visit  DEVICE CHECK:       Results for orders placed or performed in visit on 02/08/23   Cardiac EP device report    Narrative    TRES [place] IS MRI CONDITIONAL  CARELINK TRANSMISSION: LOOP RECORDER  PRESENTING RHYTHM SB @ 54 BPM  BATTERY STATUS "OK " 1 PT ACTIVATED EPISODE PREVIOUSLY ADDRESSED IN ALERT  NO NEW PATIENT OR DEVICE ACTIVATED EPISODES  NORMAL DEVICE FUNCTION   DL             Code Status: [unfilled]  Advance Directive and Living Will:      Power of :    POLST:      Counseling / Coordination of Care  Detailed discussion regards reinitiating BiPAP +  Follow    2 months  2 months  Talked about stroke        Sudip Saint Barthelemy

## 2023-03-28 ENCOUNTER — TELEPHONE (OUTPATIENT)
Dept: CARDIOLOGY CLINIC | Facility: CLINIC | Age: 83
End: 2023-03-28

## 2023-03-28 NOTE — TELEPHONE ENCOUNTER
----- Message from Kavya Horn MD sent at 3/20/2023 12:41 PM EDT -----  Dual chamber PPM     Ask patient about antibiotic and contrast allergies           ----- Message -----  From: Crow Caldwell MA  Sent: 3/20/2023   9:42 AM EDT  To: Kavya Horn MD    Good morning,  Can you please advise if single, dual or BIV pacer to use for this patient for insurance purpose  Thank you     ----- Message -----  From: Kavya Horn MD  Sent: 3/19/2023   9:45 PM EDT  To: Crow Caldwell MA, Geoffrey Brunner, MA, #    Call and set up for PPM  ----- Message -----  From: Mahlon Cooks, DO  Sent: 3/19/2023   5:43 PM EDT  To: Kavya Horn MD, Jerry Garcia, DO    This patient should probably get a pacemaker  Tyrese tran  ----- Message -----  From: Jabari Galvan  Sent: 3/15/2023   9:48 AM EDT  To: Mahlon Cooks,     That is the pdf showing pause; report in same day w/diff pdf

## 2023-03-29 NOTE — TELEPHONE ENCOUNTER
Received call from patients daughter, Jeanine Singleton  She would like a call from someone to explain in further detail as to why pacer is needed  Can someone give her a call  She can be reached at [phone]  Thank you!

## 2023-03-31 ENCOUNTER — TELEPHONE (OUTPATIENT)
Dept: CARDIOLOGY CLINIC | Facility: CLINIC | Age: 83
End: 2023-03-31

## 2023-03-31 NOTE — TELEPHONE ENCOUNTER
Hi, this is Efrain Ríos  I'm returning Doctor Amy's call on  He did leave a voicemail message for me on about my mom's paws on her loop  However, to the best of my knowledge, there was only one pause and there's a question as to why that even occurred if there was an issue with her recorder  So I don't want to jump to putting a pacemaker in if this if the information isn't correct  All right  I'll still need another call back, [phone]  This is in regards to Lillie Cochran, date of birth July 1st, 1940   Thank you

## 2023-04-03 ENCOUNTER — TELEPHONE (OUTPATIENT)
Dept: CARDIOLOGY CLINIC | Facility: CLINIC | Age: 83
End: 2023-04-03

## 2023-04-03 DIAGNOSIS — I49.5 TACHY-BRADY SYNDROME (HCC): Primary | ICD-10-CM

## 2023-04-03 NOTE — TELEPHONE ENCOUNTER
I returned pt's daughters phone call  She prefers to speak with a provider to further assess if pt needs PM  Daughter is leery to proceed w pacer due to there being only 1 pause episode (10sec per device report) and no symptoms per pt and Psychiatric  Dr Alisha Sherman left her a message but she prefers to speak with someone  Would someone mind giving daughter a call? Thank you!

## 2023-04-03 NOTE — TELEPHONE ENCOUNTER
Hi, this is [facility]  I'm calling in regards to my mother Alen Cross's date of birth July 1st, 1940  I would like to speak with somebody in regards to her situation with a pacemaker  Dr Chuy Uribe did call and left a message  However, it was just a voice message  I think my mom and I need to have an appointment to come in and speak with somebody [phone]  Thanks

## 2023-04-04 ENCOUNTER — TELEPHONE (OUTPATIENT)
Dept: CARDIOLOGY CLINIC | Facility: CLINIC | Age: 83
End: 2023-04-04

## 2023-04-04 NOTE — TELEPHONE ENCOUNTER
Called and left a voicemail at the nursing home to call back to make an ASAP appointment to see Bethel Dai per [address] please give this patient fist availability with Bethel Dai

## 2023-04-05 ENCOUNTER — PREP FOR PROCEDURE (OUTPATIENT)
Dept: CARDIOLOGY CLINIC | Facility: CLINIC | Age: 83
End: 2023-04-05

## 2023-04-05 ENCOUNTER — TELEPHONE (OUTPATIENT)
Dept: CARDIOLOGY CLINIC | Facility: CLINIC | Age: 83
End: 2023-04-05

## 2023-04-05 DIAGNOSIS — I49.5 TACHY-BRADY SYNDROME (HCC): Primary | ICD-10-CM

## 2023-04-05 NOTE — TELEPHONE ENCOUNTER
Patient scheduled for Pacemaker implant at [facility] on 4/21/2023 with Dr Sudhir Mitchell  Spoke with patient daughter Lina Trimble) went over all the instructions, placed them on My Chart since she have acces, also fax instructions and labs to [facility] where patient resides  Patient daughter prefer she stay overnight at the hospital post procedure, she will for the transportation both ways for the patient  Can we please check insurance for approval      Dr Sudhir Mitchell can you please advise type of pacer to be use for this patient for insurance purpose  Thank you

## 2023-04-05 NOTE — TELEPHONE ENCOUNTER
my name is Stephon Feliciano  I'm calling in regards to my mother Tatianna Cross's date of birth [birth year]  I am getting very conflicting messages from your office and need to straighten this out  Like right away  I've gotten a call from Doctor Lex Spar  All I'm saying that my mom should be scheduled for a pacemaker with very little information  Then a call from your office on saying that as far as an appointment is there's very mixed conversations going on  I did speak with the PA then a different message from your office  Please call me at [phone]

## 2023-04-07 NOTE — TELEPHONE ENCOUNTER
Left message on [facility] voicemail [phone] for auth for [facility] Pacemaker/[id number] on 4/21/23 @ [facility]  Dr Dimitrios Crum

## 2023-04-10 NOTE — TELEPHONE ENCOUNTER
[facility] approved Norbert Hendrickson # [id number] for [place] Pacemaker/[id number] on 4/21/23 @ [facility]  Dr Calderon Snare

## 2023-04-12 NOTE — TELEPHONE ENCOUNTER
Received a phone call from [address] [place] [address] in regard this patient procedure and appointment  Patient is scheduled to see Dr Elliot Boothe this coming Friday 4/14/2023  They are confused since in the pass weeks between Flako Montiel, Dr Elliot Boothe and the patient daughter they agree in to schedule the procedure without need any previous consult  Spoke with patient daughter when I set the procedure for 4/21/2023 and she was agree to go for it after Flako Montiel explain well all the procedure  [facility] are confused about this appointment that was just scheduled on 04/10/23 by the EP clerical     Can someone please straight this out with them, I believed was clear that patient doesn't need to come for appointment unless anything change after 04/05/2023 that was when I scheduled this patient procedure  Thank you

## 2023-04-21 ENCOUNTER — HOSPITAL ENCOUNTER (OUTPATIENT)
Facility: HOSPITAL | Age: 83
Setting detail: OUTPATIENT SURGERY
Discharge: NURSING, REHAB OR LONG-TERM CARE | End: 2023-04-22
Attending: INTERNAL MEDICINE | Admitting: INTERNAL MEDICINE

## 2023-04-21 ENCOUNTER — APPOINTMENT (OUTPATIENT)
Dept: RADIOLOGY | Facility: HOSPITAL | Age: 83
End: 2023-04-21

## 2023-04-21 DIAGNOSIS — I49.5 TACHY-BRADY SYNDROME (HCC): ICD-10-CM

## 2023-04-21 DIAGNOSIS — I45.5 SINUS PAUSE: Primary | ICD-10-CM

## 2023-04-21 LAB
ANION GAP SERPL CALCULATED.3IONS-SCNC: 7 MMOL/L (ref 4–13)
ATRIAL RATE: 64 BPM
ATRIAL RATE: 67 BPM
BUN SERPL-MCNC: 16 MG/DL (ref 5–25)
CALCIUM SERPL-MCNC: 9 MG/DL (ref 8.3–10.1)
CHLORIDE SERPL-SCNC: 109 MMOL/L (ref 96–108)
CO2 SERPL-SCNC: 24 MMOL/L (ref 21–32)
CREAT SERPL-MCNC: 0.92 MG/DL (ref 0.6–1.3)
ERYTHROCYTE [DISTWIDTH] IN BLOOD BY AUTOMATED COUNT: 14 % (ref 11.6–15.1)
GFR SERPL CREATININE-BSD FRML MDRD: 58 ML/MIN/1.73SQ M
GLUCOSE P FAST SERPL-MCNC: 127 MG/DL (ref 65–99)
GLUCOSE SERPL-MCNC: 127 MG/DL (ref 65–140)
HCT VFR BLD AUTO: 47.8 % (ref 34.8–46.1)
HGB BLD-MCNC: 15.6 G/DL (ref 11.5–15.4)
INR PPP: 0.98 (ref 0.84–1.19)
MCH RBC QN AUTO: 29 PG (ref 26.8–34.3)
MCHC RBC AUTO-ENTMCNC: 32.6 G/DL (ref 31.4–37.4)
MCV RBC AUTO: 89 FL (ref 82–98)
P AXIS: 12 DEGREES
P AXIS: 39 DEGREES
PLATELET # BLD AUTO: 192 THOUSANDS/UL (ref 149–390)
PMV BLD AUTO: 10.1 FL (ref 8.9–12.7)
POTASSIUM SERPL-SCNC: 4.1 MMOL/L (ref 3.5–5.3)
PR INTERVAL: 182 MS
PR INTERVAL: 184 MS
PROTHROMBIN TIME: 13.1 SECONDS (ref 11.6–14.5)
QRS AXIS: 25 DEGREES
QRS AXIS: 40 DEGREES
QRSD INTERVAL: 70 MS
QRSD INTERVAL: 76 MS
QT INTERVAL: 432 MS
QT INTERVAL: 438 MS
QTC INTERVAL: 445 MS
QTC INTERVAL: 462 MS
RBC # BLD AUTO: 5.38 MILLION/UL (ref 3.81–5.12)
SODIUM SERPL-SCNC: 140 MMOL/L (ref 135–147)
T WAVE AXIS: 41 DEGREES
T WAVE AXIS: 48 DEGREES
VENTRICULAR RATE: 64 BPM
VENTRICULAR RATE: 67 BPM
WBC # BLD AUTO: 6.62 THOUSAND/UL (ref 4.31–10.16)

## 2023-04-21 DEVICE — ENVELOPE CMRM6122 ABSORB MED MR
Type: IMPLANTABLE DEVICE | Site: CHEST  WALL | Status: FUNCTIONAL
Brand: TYRX™

## 2023-04-21 DEVICE — LEAD 457453 MRI US BI RCMCRD MVC
Type: IMPLANTABLE DEVICE | Site: HEART | Status: FUNCTIONAL
Brand: CAPSURE SENSE MRI™ SURESCAN™

## 2023-04-21 DEVICE — LEAD 3830 US MKT/ 69CM MRI LBBAP
Type: IMPLANTABLE DEVICE | Site: HEART | Status: FUNCTIONAL
Brand: SELECTSECURE™ MRI SURESCAN™

## 2023-04-21 DEVICE — IPG W1DR01 AZURE XT DR MRI USA
Type: IMPLANTABLE DEVICE | Site: CHEST  WALL | Status: FUNCTIONAL
Brand: AZURE™ XT DR MRI SURESCAN™

## 2023-04-21 RX ORDER — CEFAZOLIN SODIUM 2 G/50ML
2000 SOLUTION INTRAVENOUS ONCE
Status: DISCONTINUED | OUTPATIENT
Start: 2023-04-21 | End: 2023-04-21 | Stop reason: HOSPADM

## 2023-04-21 RX ORDER — ACETAMINOPHEN 325 MG/1
650 TABLET ORAL EVERY 4 HOURS PRN
Status: DISCONTINUED | OUTPATIENT
Start: 2023-04-21 | End: 2023-04-22 | Stop reason: HOSPADM

## 2023-04-21 RX ORDER — LIDOCAINE HYDROCHLORIDE 10 MG/ML
INJECTION, SOLUTION EPIDURAL; INFILTRATION; INTRACAUDAL; PERINEURAL CODE/TRAUMA/SEDATION MEDICATION
Status: DISCONTINUED | OUTPATIENT
Start: 2023-04-21 | End: 2023-04-21 | Stop reason: HOSPADM

## 2023-04-21 RX ORDER — GENTAMICIN SULFATE 40 MG/ML
INJECTION, SOLUTION INTRAMUSCULAR; INTRAVENOUS CODE/TRAUMA/SEDATION MEDICATION
Status: DISCONTINUED | OUTPATIENT
Start: 2023-04-21 | End: 2023-04-21 | Stop reason: HOSPADM

## 2023-04-21 NOTE — CASE MANAGEMENT
Case Management Assessment & Discharge Planning Note    Patient name Sherif Lackey  Location /-01 MRN [id number]  : 1940 Date 2023       Current Admission Date: 2023  Current Admission Diagnosis:Tachy-alison syndrome [facility])   Patient Active Problem List    Diagnosis Date Noted   • Thrombocytopenia ([facility] [address] ) 2023   • COVID-19 2023   • Stage 3 chronic kidney disease, unspecified whether stage 3a or 3b CKD ([facility] [address] ) 2022   • Tachy-alison syndrome ([facility] [address] ) 2022   • Hyperlipemia 2022   • Hypertension 2022   • Generalized anxiety disorder 2021   • Obstructive sleep apnea syndrome 2020   • Degeneration of lumbar intervertebral disc 2017   • History of total bilateral knee replacement 2017   • Osteoporosis 05/10/2017   • Minimal cognitive impairment 2016   • Pain, joint, knee, right 2016   •  Mood disorder with hypomanic features 2016   • Paroxysmal atrial fibrillation ([facility] [address] ) 2287   • Metabolic syndrome X    • Anxiety state 2007   • Migraine 2007   • Peripheral venous insufficiency 2007      LOS (days): 0  Geometric Mean LOS (GMLOS) (days):   Days to GMLOS:     OBJECTIVE:              Current admission status: Outpatient Surgery  Referral Reason: Other    [facility] [address] [address]  Phone: [phone] Fax: [phone]    Primary Care Provider: Mitali Berger MD    Primary Insurance: [address] [place] [address]  Secondary Insurance:     ASSESSMENT:  [place] [address] [place] [address] Representative - Daughter   Primary Phone: [phone] (Mobile)  Home Phone: [phone]                         Readmission Root Cause  30 Day Readmission: No    Patient Information  Admitted from[de-identified] Facility [facility]  Mental Status: Alert  During Assessment patient was accompanied by: Not accompanied during assessment  Assessment information provided by[de-identified] Patient, Daughter  Primary Caregiver: Other (Comment)  Caregiver's Name[de-identified] [facility] Staff  Caregiver's Relationship to Patient[de-identified] Other (Specify)  Caregiver's Telephone Number[de-identified] [phone]  Support Systems: Family members, Daughter  [place] of Residence: [address] [place] [address] do you live in?: [facility] entry access options   Select all that apply : No steps to enter home  Type of Current Residence: Facility  Upon entering residence, is there a bedroom on the main floor (no further steps)?: Yes  Upon entering residence, is there a bathroom on the main floor (no further steps)?: Yes  In the last 12 months, was there a time when you were not able to pay the mortgage or rent on time?: No  In the last 12 months, how many places have you lived?: 1  Homeless/housing insecurity resource given?: N/A  Living Arrangements: Other (Comment) (Lives at [facility], [employer])  Is patient a ?: No    Activities of Daily Living Prior to Admission  Functional Status: Independent  Completes ADLs independently?: Yes  Ambulates independently?: Yes  Does patient use assisted devices?: Yes  Assisted Devices (DME) used: [employer]  Does patient currently own DME?: Yes  What DME does the patient currently own?: Gay Pearson  Does patient have a history of Outpatient Therapy (PT/OT)?: No  Does the patient have a history of Short-Term Rehab?: No  Does patient have a history of HHC?: No  [facility] No         Patient Information Continued  Income Source: SSI/SSD  Does patient have prescription coverage?: Yes  Within the past 12 months, you worried that your food would run out before you got the money to buy more : Never true  Within the past 12 months, the food you bought just didn't last and you didn't have money to get more : Never true  Food insecurity resource given?: N/A  Does patient receive dialysis treatments?: No  Does patient have a history of substance abuse?: No  Does patient have a history of Mental Health Diagnosis?: No         Means of Transportation  Means of Transport to Appts[de-identified] Family transport  In the past 12 months, has lack of transportation kept you from medical appointments or from getting medications?: No  In the past 12 months, has lack of transportation kept you from meetings, work, or from getting things needed for daily living?: No  Was application for public transport provided?: N/A        DISCHARGE DETAILS:    Discharge planning discussed with[de-identified] Patient and daughter Tanisha Nesbitt of Choice: Yes     CM contacted family/caregiver?: Yes (daughter Kelsey Linda)  Were Treatment Team discharge recommendations reviewed with patient/caregiver?: Yes (return to facility)  Did patient/caregiver verbalize understanding of patient care needs?: N/A- going to facility  Were patient/caregiver advised of the risks associated with not following Treatment Team discharge recommendations?: Yes    Contacts  Patient Contacts: Kelsey Linda  Relationship to Patient[de-identified] Family  Contact Method: Phone  Phone Number: [phone]  Reason/Outcome: Continuity of Care, Emergency Contact, Discharge [address]         Is the patient interested in [place] [address] at discharge?: No    DME Referral Provided  Referral made for DME?: No    Other Referral/Resources/Interventions Provided:  Interventions: Other (Specify)  Referral Comments: 1500 Called and left VMM for Social work department at [employer]  Called and spoke with Fredo Antunez at [address]  Updated her regarding pt possibly dc back tomorrow and will need assistance with ADL's and ambulation possibly due to limitations with LUE  Per Charisse Ayala they can provide this and also per Fredo Antunez pt does not need a Covid test to return   Spoke with Daughter Kelsey Linda [phone] and she is agreeable with dc plan but would "like pt to \" not be pushed out\" and if can stay longer she is in agreement with this  Minus Francia already spoke with NP through [address] and Chantelle Nurse regarding her mother  Dtr Minus Francia requesting we set up transport for dc tommorrow back to [facility]  Treatment Team Recommendation: Facility Return ([facility])  Discharge Destination Plan[de-identified] Facility Return ([address] [place] [address])                                         Additional Comments: Pt lives at [facility], [facility] in [facility]  DTr Billie Generous brought pt in for PPM Per Dtr Minus Francia pt will require transport set up to return to [facility] over the weekend  pt has [employer]  Pt uses cane at baseline for ambulation and dtr Minus Francia states pt is extremely forgetful  Reassured CM will set up transport once dc is confirmed and notify her likley tomorrow  Minus Francia Verbalized understanding      Accepting Facility Name, [address] : [facility]  Receiving Facility/Agency Phone Number: [phone]  Facility/Agency Fax Number: [phone]           "

## 2023-04-21 NOTE — CASE MANAGEMENT
Case Management Discharge Planning Note    Patient name Marlen Lynn  Location /-76 MRN [id number]  : 1940 Date 2023       Current Admission Date: 2023  Current Admission Diagnosis:Tachy-alison syndrome [facility])   Patient Active Problem List    Diagnosis Date Noted   • Thrombocytopenia ([facility] [address] ) 2023   • COVID-19 2023   • Stage 3 chronic kidney disease, unspecified whether stage 3a or 3b CKD ([facility] [address] ) 2022   • Tachy-alison syndrome ([facility] [address] ) 2022   • Hyperlipemia 2022   • Hypertension 2022   • Generalized anxiety disorder 2021   • Obstructive sleep apnea syndrome 2020   • Degeneration of lumbar intervertebral disc 2017   • History of total bilateral knee replacement 2017   • Osteoporosis 05/10/2017   • Minimal cognitive impairment 2016   • Pain, joint, knee, right 2016   •  Mood disorder with hypomanic features 2016   • Paroxysmal atrial fibrillation ([facility] [address] )    • Metabolic syndrome X    • Anxiety state 2007   • Migraine 2007   • Peripheral venous insufficiency 2007      LOS (days): 0  Geometric Mean LOS (GMLOS) (days):   Days to GMLOS:     OBJECTIVE:            Current admission status: Outpatient Surgery   [facility] [place] [address]  Phone: [phone] Fax: [phone]    Primary Care Provider: Riaz Parker MD    Primary Insurance: [employer] [facility]  Secondary Insurance:     DISCHARGE DETAILS:    Discharge planning discussed with[de-identified] Patient and daughter Yeison Mohamuds of Choice: Yes     CM contacted family/caregiver?: Yes (daughter Glenna Handwill)  Were Treatment Team discharge recommendations reviewed with patient/caregiver?: Yes (return to facility)  Did patient/caregiver verbalize understanding of patient care needs?: N/A- going to facility  Were patient/caregiver advised "of the risks associated with not following Treatment Team discharge recommendations?: Yes    Contacts  Patient Contacts: Patricia Palmer  Relationship to Patient[de-identified] Family  Contact Method: Phone  Phone Number: [phone]  Reason/Outcome: Continuity of Care, Emergency Contact, Discharge [address]         Is the patient interested in [facility] at discharge?: No    DME Referral Provided  Referral made for DME?: No    Other Referral/Resources/Interventions Provided:  Interventions: Other (Specify)  Referral Comments: 1500 Called and left VMM for Social work department at [employer]  Called and spoke with Jose Javed at [address] [facility]  Updated her regarding pt possibly dc back tomorrow and will need assistance with ADL's and ambulation possibly due to limitations with LUE  Per Naye Comment they can provide this and also per Jose Javed pt does not need a Covid test to return  Spoke with Daughter Patricia Palmer [phone] and she is agreeable with dc plan but would like pt to \" not be pushed out\" and if can stay longer she is in agreement with this  Jeanine Singleton already spoke with NP through [facility] and [facility] Nurse regarding her mother  Dtr Benniebrian Singleton requesting we set up transport for dc tommorrow back to [facility]           Treatment Team Recommendation: Facility Return ([facility])  Discharge Destination Plan[de-identified] Facility Return ([facility])              [address] Name, [address] : [facility]  Receiving Facility/Agency Phone Number: [phone]  Facility/Agency Fax Number: [phone]             "

## 2023-04-21 NOTE — Clinical Note
The PACER GENERATOR LAWRENCE XT DR MRI SURESCAN device was inserted  The leads were placed into the connector and visually verified to be in correct position  Injury current obtained

## 2023-04-21 NOTE — H&P
H&P Exam - Cardiology   Elvi Grajeda 80 y o  female MRN: [id number]  Unit/Bed#: BE CATH LAB ROOM Encounter: [id number]    Assessment/Plan :  1  Paroxysmal atrial fibrillation  a  Maintained on flecainide 50 mg BID  b  1% AF burden per loop interrogation  c  Not on anticoagulation, on Aspirin 81 mg QD  2  Tachy-alison syndrome  a  Pause seen on loop interrogation - 10 seconds  3  VIRGILIO  4  CKD 3  5  HLD  6  HTN      Patient presents to B EP lab today to undergo elective pacemaker implant procedure with Dr Sondra Titus  History of Present Illness   HPI:  Elvi Grajeda is a 80y o  year old female with a PMH as stated above, who presents to B EP lab today to undergo elective pacemaker implant for tachy-alison syndrome  Patient sees Dr Sondra Titus as an outpatient for management of PAF and TBS  Patient had loop recorder implanted 10/2022  Patient was maintained on Flecainide 50 mg BID for PAF  Unable to tolerate beta blocker due to baseline bradycardia, HR 50s  A device report from 3/13/23 shows a 1% AF burden and 10 second pause  Therefore, pacemaker implant was recommended  Upon evaluation, patient is resting comfortably in her bed  Patient denies chest pain/heaviness/tightness/pressure, palpitations, shortness of breath, orthopnea, lightheadedness, presyncope, syncope or N/V  Presenting EKG shows NSR with HR 64 bpm             Review of Systems   All other systems reviewed and are negative  Historical Information   Past Medical History:   Diagnosis Date   • Afib ([address] )    • Arthritis    • Hyperlipidemia    • Hypertension    • Osteopenia    • Sleep apnea    • Squamous cell skin cancer     LEF TEMPLE   • Varicose veins of both lower extremities        Past Surgical History:   Procedure Laterality Date   • CARDIAC ELECTROPHYSIOLOGY PROCEDURE N/A 10/12/2022    Procedure: Cardiac loop recorder implant;  Surgeon: Riddhi Haynes MD;  Location: [facility] CARDIAC C[facility] LAB;   Service: Cardiology   • CATARACT EXTRACTION • COLONOSCOPY      complete, for polyp removal   • EYE SURGERY     • HYSTERECTOMY      age 39   • INCISION AND DRAINAGE OF WOUND Right 06/28/2016    Procedure: ARTHROSCOPY,EVACUATION OF HEMATOMA, OPEN EXPLORATION OF WOUND;  Surgeon: Sandhya Batres MD;  Location: [facility] OR;  Service:    • MOHS SURGERY Left 12/27/2022    left temple   • SKIN BIOPSY     • TONSILLECTOMY AND ADENOIDECTOMY     • TOTAL KNEE ARTHROPLASTY Right    • TOTAL KNEE ARTHROPLASTY Left    • WISDOM TOOTH EXTRACTION         Family History   Problem Relation Age of Onset   • Diabetes Mother    • Diabetes Father    • Diabetes Brother    • No Known Problems Sister    • No Known Problems Daughter    • No Known Problems Maternal Grandmother    • No Known Problems Maternal Grandfather    • No Known Problems Paternal Grandmother    • No Known Problems Paternal Grandfather    • No Known Problems Daughter    • Brain cancer Son    • No Known Problems Maternal Aunt        Social History   Social History     Substance and Sexual Activity   Alcohol Use Not Currently     Social History     Substance and Sexual Activity   Drug Use No     Social History     Tobacco Use   Smoking Status Never   Smokeless Tobacco Never         Meds/Allergies   all medications and allergies reviewed  Home Medications:   Medications Prior to Admission   Medication   • amLODIPine (NORVASC) 10 mg tablet   • Calcium Carbonate 1500 (600 Ca) MG TABS   • divalproex sodium (DEPAKOTE) 250 mg EC tablet   • donepezil (ARICEPT) 5 mg tablet   • flecainide (TAMBOCOR) 50 mg tablet   • fluticasone (FLONASE) 50 mcg/act nasal spray   • ibuprofen (MOTRIN) 600 mg tablet   • Melatonin 3 MG CAPS   • rosuvastatin (CRESTOR) 5 mg tablet   • acebutolol (SECTRAL) 200 mg capsule   • Aspirin Low Dose 81 MG EC tablet   • [facility] Senna Plus 8 6-50 MG per tablet   • divalproex sodium (DEPAKOTE SPRINKLE) 125 MG capsule   • Donepezil HCl 5 MG/DAY PTWK   • ergocalciferol (VITAMIN D2) 50,000 units   • sodium chloride (MARITZA "128) 5 % hypertonic ophthalmic ointment       Allergies   Allergen Reactions   • Atorvastatin Other (See Comments)     myalgia   • Statins Other (See Comments)     Weakness in legs       Objective   Vitals: Blood pressure 134/61, pulse 63, temperature (!) 97 4 °F (36 3 °C), temperature source Temporal, resp  rate 17, height 5' 4\" (1 626 m), weight 72 6 kg (160 lb), SpO2 95 %  Orthostatic Blood Pressures    Flowsheet Row Most Recent Value   Blood Pressure 134/61 filed at 04/21/2023 3872          No intake or output data in the 24 hours ending 04/21/23 0915    Invasive Devices     Peripheral Intravenous Line  Duration           Peripheral IV 04/21/23 Left;Proximal;Ventral (anterior) Forearm <1 day                Physical Exam  Vitals reviewed  Constitutional:       General: She is not in acute distress  Appearance: She is not ill-appearing or diaphoretic  HENT:      Head: Normocephalic and atraumatic  Right Ear: External ear normal       Left Ear: External ear normal       Nose: Nose normal    Eyes:      General:         Right eye: No discharge  Left eye: No discharge  Cardiovascular:      Rate and Rhythm: Normal rate and regular rhythm  Heart sounds: No murmur heard  No friction rub  Pulmonary:      Effort: Pulmonary effort is normal       Breath sounds: Normal breath sounds  No wheezing, rhonchi or rales  Abdominal:      General: There is no distension  Palpations: Abdomen is soft  Tenderness: There is no abdominal tenderness  Musculoskeletal:         General: No deformity or signs of injury  Cervical back: No rigidity  No muscular tenderness  Right lower leg: No edema  Left lower leg: No edema  Skin:     General: Skin is warm and dry  Capillary Refill: Capillary refill takes less than 2 seconds  Coloration: Skin is not jaundiced or pale  Neurological:      General: No focal deficit present        Mental Status: She is alert and oriented to " person, place, and time  Mental status is at baseline  Psychiatric:         Mood and Affect: Mood normal          Behavior: Behavior normal          Thought Content: Thought content normal              Lab Results: I have personally reviewed pertinent lab results  Results from last 7 days   Lab Units 04/21/23  0822   WBC Thousand/uL 6 62   HEMOGLOBIN g/dL 15 6*   HEMATOCRIT % 47 8*   PLATELETS Thousands/uL 192     Results from last 7 days   Lab Units 04/21/23  0822   POTASSIUM mmol/L 4 1   CHLORIDE mmol/L 109*   CO2 mmol/L 24   BUN mg/dL 16   CREATININE mg/dL 0 92   CALCIUM mg/dL 9 0     Results from last 7 days   Lab Units 04/21/23  0822   INR  0 98             Imaging: I have personally reviewed pertinent films in PACS  ECHO: 2021  1   Borderline left ventricular hypertrophy with normal systolic function,   EF ~07%, and moderate diastolic dysfunction   2   Mild right ventricular hypertrophy with normal right ventricular   systolic function   3   Mild left atrial enlargement   4   Mild aortic sclerosis   5   Mild mitral sclerosis with moderate annular calcification and mild   mitral regurgitation   6   Compared to prior study of August 27, 2019, left ventricular diastolic   function is slightly worse   Otherwise, there have been no major changes        Code Status: Level 1 - Full Code

## 2023-04-22 ENCOUNTER — APPOINTMENT (OUTPATIENT)
Dept: RADIOLOGY | Facility: HOSPITAL | Age: 83
End: 2023-04-22

## 2023-04-22 VITALS
RESPIRATION RATE: 17 BRPM | WEIGHT: 160 LBS | HEIGHT: 64 IN | BODY MASS INDEX: 27.31 KG/M2 | SYSTOLIC BLOOD PRESSURE: 141 MMHG | HEART RATE: 64 BPM | DIASTOLIC BLOOD PRESSURE: 58 MMHG | TEMPERATURE: 98 F | OXYGEN SATURATION: 93 %

## 2023-04-22 PROBLEM — I45.5 SINUS PAUSE: Status: ACTIVE | Noted: 2023-04-22

## 2023-04-22 LAB
ANION GAP SERPL CALCULATED.3IONS-SCNC: 6 MMOL/L (ref 4–13)
BUN SERPL-MCNC: 16 MG/DL (ref 5–25)
CALCIUM SERPL-MCNC: 8.8 MG/DL (ref 8.3–10.1)
CHLORIDE SERPL-SCNC: 107 MMOL/L (ref 96–108)
CO2 SERPL-SCNC: 25 MMOL/L (ref 21–32)
CREAT SERPL-MCNC: 0.89 MG/DL (ref 0.6–1.3)
ERYTHROCYTE [DISTWIDTH] IN BLOOD BY AUTOMATED COUNT: 14.2 % (ref 11.6–15.1)
GFR SERPL CREATININE-BSD FRML MDRD: 60 ML/MIN/1.73SQ M
GLUCOSE SERPL-MCNC: 89 MG/DL (ref 65–140)
HCT VFR BLD AUTO: 43.1 % (ref 34.8–46.1)
HGB BLD-MCNC: 14 G/DL (ref 11.5–15.4)
MCH RBC QN AUTO: 29.1 PG (ref 26.8–34.3)
MCHC RBC AUTO-ENTMCNC: 32.5 G/DL (ref 31.4–37.4)
MCV RBC AUTO: 90 FL (ref 82–98)
PLATELET # BLD AUTO: 152 THOUSANDS/UL (ref 149–390)
PMV BLD AUTO: 10.5 FL (ref 8.9–12.7)
POTASSIUM SERPL-SCNC: 3.9 MMOL/L (ref 3.5–5.3)
RBC # BLD AUTO: 4.81 MILLION/UL (ref 3.81–5.12)
SODIUM SERPL-SCNC: 138 MMOL/L (ref 135–147)
WBC # BLD AUTO: 6.97 THOUSAND/UL (ref 4.31–10.16)

## 2023-04-22 RX ORDER — DIVALPROEX SODIUM 250 MG/1
250 TABLET, DELAYED RELEASE ORAL 3 TIMES DAILY
Status: DISCONTINUED | OUTPATIENT
Start: 2023-04-22 | End: 2023-04-22 | Stop reason: HOSPADM

## 2023-04-22 RX ORDER — DONEPEZIL HYDROCHLORIDE 5 MG/1
5 TABLET, FILM COATED ORAL
Status: DISCONTINUED | OUTPATIENT
Start: 2023-04-22 | End: 2023-04-22 | Stop reason: HOSPADM

## 2023-04-22 RX ORDER — ASPIRIN 81 MG/1
81 TABLET ORAL DAILY
Status: DISCONTINUED | OUTPATIENT
Start: 2023-04-22 | End: 2023-04-22 | Stop reason: HOSPADM

## 2023-04-22 RX ORDER — AMLODIPINE BESYLATE 10 MG/1
10 TABLET ORAL DAILY
Status: DISCONTINUED | OUTPATIENT
Start: 2023-04-22 | End: 2023-04-22 | Stop reason: HOSPADM

## 2023-04-22 RX ORDER — FLECAINIDE ACETATE 50 MG/1
50 TABLET ORAL EVERY 12 HOURS SCHEDULED
Status: DISCONTINUED | OUTPATIENT
Start: 2023-04-22 | End: 2023-04-22 | Stop reason: HOSPADM

## 2023-04-22 RX ORDER — ACEBUTOLOL HYDROCHLORIDE 200 MG/1
200 CAPSULE ORAL DAILY
Status: DISCONTINUED | OUTPATIENT
Start: 2023-04-22 | End: 2023-04-22 | Stop reason: HOSPADM

## 2023-04-22 RX ADMIN — ASPIRIN 81 MG: 81 TABLET, COATED ORAL at 09:43

## 2023-04-22 RX ADMIN — DIVALPROEX SODIUM 250 MG: 250 TABLET, DELAYED RELEASE ORAL at 09:43

## 2023-04-22 RX ADMIN — AMLODIPINE BESYLATE 10 MG: 10 TABLET ORAL at 09:43

## 2023-04-22 RX ADMIN — FLECAINIDE ACETATE 50 MG: 50 TABLET ORAL at 09:43

## 2023-04-22 NOTE — DISCHARGE SUMMARY
Discharge Summary - Jhon Perez 80 y o  female MRN: [id number]    Unit/Bed#: -14 Encounter: [id number]      Admission Date: 4/21/2023   Discharge Date: 4/22/2023    Discharge Diagnosis:   1  Paroxysmal atrial fibrillation  a  Maintained on flecainide 50 mg BID  b  1% AF burden per loop interrogation  c  Not on anticoagulation, on Aspirin 81 mg QD  d  S/p pacemaker implant 4/21/2023  2  Tachy-alison syndrome  a  Pause seen on loop interrogation - 10 seconds  3  VIRGILIO  4  CKD 3  5  HLD  6  HTN    Procedures Performed:   Dual chamber PPM  RA lead   RV lead - His lead - non selective capture  Orders Placed This Encounter   Procedures   • Cardiac ep lab eps/ablations       Consultants: Case Management    HPI: Please refer to the initial history and physical as well as procedure notes for full details  Briefly, Jhon Perez is a 80y o  year old female with PMH as stated above, who was seen by Dr Yumiko Suazo as an outpatient, and pacemaker implant was recommended  She presented this hospital admission to undergo the procedure  Hospital Course: Jhon Perez presented at her baseline state of health  After the procedure was explained in detail and consent was obtained, she underwent the above procedure without complications  Please see operative note for full details  She tolerated the procedure well  Chest x-ray immediately following the procedure showed appropriate lead placement without pneumothorax  She was then monitored overnight for further observation  There were no acute issues or events overnight  The following morning she denied all cardiac complaints, including chest pain/heaviness/tightness/pressure, palpitations, shortness of breath, orthopnea, lightheadedness, presyncope, syncope or N/V  Her vital signs were reviewed and labs were stable  Telemetry showed NSR  Chest x-ray this morning showed appropriate device placement without pneumothorax   Device interrogation showed appropriate device function, including lead sensing, threshold, and impedance  Her incision was clean, dry, and intact without swelling, hematoma, redness, bleeding, drainage, or signs of infection  Physical exam on the day of discharge was as follows:  Physical Exam  Vitals reviewed  Constitutional:       General: She is not in acute distress  Appearance: She is not ill-appearing or diaphoretic  HENT:      Head: Normocephalic and atraumatic  Right Ear: External ear normal       Left Ear: External ear normal       Nose: Nose normal    Eyes:      General:         Right eye: No discharge  Left eye: No discharge  Cardiovascular:      Rate and Rhythm: Normal rate and regular rhythm  Heart sounds: No murmur heard  No friction rub  Pulmonary:      Effort: Pulmonary effort is normal       Breath sounds: Normal breath sounds  No wheezing, rhonchi or rales  Chest:       Abdominal:      General: There is no distension  Palpations: Abdomen is soft  Tenderness: There is no abdominal tenderness  Musculoskeletal:         General: No deformity or signs of injury  Cervical back: No rigidity  No muscular tenderness  Right lower leg: No edema  Left lower leg: No edema  Skin:     General: Skin is warm and dry  Capillary Refill: Capillary refill takes less than 2 seconds  Coloration: Skin is not jaundiced or pale  Neurological:      General: No focal deficit present  Mental Status: She is alert and oriented to person, place, and time  Mental status is at baseline  Psychiatric:         Mood and Affect: Mood normal          Behavior: Behavior normal          Thought Content: Thought content normal           She was given routine post implantation discharge instructions and restrictions, including wound care, and these were explained in detail   She was instructed to leave the Aquacell bandage over top of the incision for the full week - keeping the incision dry during that time  Ronny Tyler She was given a two week follow up appointment with our device clinic for device interrogation and site check  she was instructed to follow up with her primary cardiologist as previously instructed  In terms of medications, we have made no changes  She is stable for discharge at this time with all questions answered  She was discussed in detail with Dr Poncho Kaplan, who is in agreement with this discharge summary  Discharge Medications:  See after visit summary for reconciled discharge medications provided to patient and family      Medications Prior to Admission   Medication   • amLODIPine (NORVASC) 10 mg tablet   • Calcium Carbonate 1500 (600 Ca) MG TABS   • divalproex sodium (DEPAKOTE) 250 mg EC tablet   • donepezil (ARICEPT) 5 mg tablet   • flecainide (TAMBOCOR) 50 mg tablet   • fluticasone (FLONASE) 50 mcg/act nasal spray   • ibuprofen (MOTRIN) 600 mg tablet   • Melatonin 3 MG CAPS   • rosuvastatin (CRESTOR) 5 mg tablet   • acebutolol (SECTRAL) 200 mg capsule   • Aspirin Low Dose 81 MG EC tablet   • [employer] Senna Plus 8 6-50 MG per tablet   • divalproex sodium (DEPAKOTE SPRINKLE) 125 MG capsule   • Donepezil HCl 5 MG/DAY PTWK   • ergocalciferol (VITAMIN D2) 50,000 units   • sodium chloride (MARITZA 128) 5 % hypertonic ophthalmic ointment         [facility] Labs/diagnostics:  CBC with diff:   Results from last 7 days   Lab Units 04/22/23  0508 04/21/23  0822   WBC Thousand/uL 6 97 6 62   HEMOGLOBIN g/dL 14 0 15 6*   HEMATOCRIT % 43 1 47 8*   MCV fL 90 89   PLATELETS Thousands/uL 152 192   MCH pg 29 1 29 0   MCHC g/dL 32 5 32 6   RDW % 14 2 14 0   MPV fL 10 5 10 1       BMP:   Results from last 7 days   Lab Units 04/22/23  0508 04/21/23  0822   POTASSIUM mmol/L 3 9 4 1   CHLORIDE mmol/L 107 109*   CO2 mmol/L 25 24   BUN mg/dL 16 16   CREATININE mg/dL 0 89 0 92   CALCIUM mg/dL 8 8 9 0       Magnesium:       Coags:   Results from last 7 days   Lab Units 04/21/23  0822   INR  3 72         Complications: none    Condition at Discharge: good     Discharge instructions/Information to patient and family:   See after visit summary for information provided to patient and family  Provisions for Follow-Up Care:  See after visit summary for information related to follow-up care and any pertinent home health orders  Disposition: Home    Planned Readmission: No    Discharge Statement   I spent 45 minutes minutes discharging the patient  This time was spent on the day of discharge  I had direct contact with the patient on the day of discharge  Additional documentation is required if more than 30 minutes were spent on discharge   Evaluating the incision, discussing discharge instructions and restrictions, arranging follow up appointments, discussing medications

## 2023-04-22 NOTE — DISCHARGE INSTR - AVS FIRST PAGE
Please refer to post device implantation discharge instructions and restrictions below and your device booklet/temporary card  Keep incision dry for one week  Do not use lotions, powders, ointments, or creams on the incision  Leave outer bandage in place for one week  The bandage is water proof  You may shower with it as long as it is fully adhered to your skin  If the bandage appears to be coming off or if there is an open gap in the bandage to the area of your incision, then please keep the whole area dry for the remaining week  After one week, please remove the bandage by gently pulling all edges away from the center and slowly remove it from your skin  Do not quickly rip off the bandage  No overhead reaching, pushing, or pulling with your left arm for 6 weeks  No lifting greater than 10 lbs with your left arm for 6 weeks  No driving for 48 hours  Please call the office if you notice redness, swelling, bleeding, or drainage from incision or if you develop fevers  AFTER PACEMAKER CARE:    If you have any questions, please call [phone] to speak with a nurse (8:30am-4pm, or [phone] after hours)  For appointments, please call [phone]  WHAT YOU SHOULD KNOW:   A pacemaker is a small, battery-powered device that is placed under your skin in your upper chest area with wires placed through a vein that lead directly into the heart  It helps regulate your heart rate and prevent your heart from beating too slowly  AFTER YOU LEAVE:     Medicines:     Pain medicine: You may need medicine to take away or decrease pain  Learn how to take your medicine  Ask what medicine and how much you should take  Be sure you know how, when, and how often to take it  Usually Over the counter pain medicine is sufficient to control pain (Acetominophen or Ibuprofen) Ask your doctor if you may take these   If this does not control your pain, narcotic pain killers may be prescribed, please call if you need prescription  Do not wait until the pain is severe before you take your medicine  Tell caregivers if your pain does not decrease  Pain medicine can make you dizzy or sleepy  Prevent falls by calling someone when you get out of bed or if you need help  Take your medicine as directed  Call your healthcare provider if you think your medicine is not helping or if you have side effects  Tell him if you are allergic to any medicine  Follow up with your cardiologist after your procedure: You will need a follow-up visit approximately 2 weeks after you leave the hospital  Your cardiologist will check your wound and make sure that your pacemaker is working correctly  Follow the instructions to check your pacemaker: Your cardiologist or primary healthcare provider will check your pacemaker and the battery regularly  He will use a computer to check your pacemaker over the telephone or wireless device which will be given to you  Pacemaker batteries usually last 8 to 10 years  The pacemaker unit will be replaced when the battery gets low  This is a simpler procedure than the original one to implant your pacemaker  Wound care:  Keep your incision dry for one week  Do not use lotions/powders/creams on incision  Leave outer bandage in place for 1 week - it is water proof, and as long as it is fully adhered to your skin you may shower with it  If it appears as though the bandage is coming off and/or there is any communication to the area of device incision, please then keep the whole area dry for the remaining week  After 1 week, please remove by pulling all edges away from the center of the bandage  Please call the office if you notice redness, swelling, bleeding, or drainage from incision or if you develop fevers  Activity:   Arm movement and lifting:  Be careful using the arm on the side of your pacemaker   Do not move your arm for the first 24 hours after your procedure  Do not  lift your arm above your shoulder or lift more than 10 pounds for one month after your procedure  Avoid pushing, pulling, or repetitive arm movements for one month  This helps the leads stay in place and helps your wound heal  Ask your caregiver when you can drive after your procedure  You may move your arm side to side without lifting above your shoulder, and do not need to wear a sling at home  Driving: you are ok to drive 48 hours after pacemaker is implanted   Sports:  Ask your caregiver when it is okay to play tennis, golf, basketball, or any sport that requires you to lift your arms  Do not play full contact sports, such as football, that could damage your pacemaker  Ask your cardiologist or primary healthcare provider how much and what kinds of physical activity are safe for you  Living with a pacemaker:   Tell all caregivers you have a pacemaker: This includes surgeons, radiologists, and medical technicians  You may want to wear a medical alert ID bracelet or necklace that states that you have a pacemaker  Carry your pacemaker ID card: Make sure you receive a pacemaker ID card  Carry it with you at all times  It lists important information about your pacemaker  Show it to airport security if you travel  Avoid electrical interference:  Avoid welding equipment and other equipment with large magnets or electric fields  These things could interfere with how your pacemaker works  Use your cell phone on the ear opposite from your pacemaker  Do not carry your cell phone in your shirt pocket over your chest      Some Pacemakers are MRI safe  Ask you doctor if it is safe to proceed with MRI and let the radiologist and staff know you have a pacemaker  Do not touch the skin around your pacemaker: This can cause damage to the lead wires or move the pacemaker unit from where it should be      Contact your cardiologist or primary healthcare provider if:   The area around your pacemaker has increasing amount of pain after surgery  The pain should improve over first few days after implantation  The skin around your stitches has increasing redness, swelling, or has drainage  This may mean that you have an infection  You have a fever  You have chills, a cough, and feel weak or achy  These are also signs of infection  Your feet or ankles are more swollen than your baseline  Your Heart rate is less than 50 beats per minute     Seek care immediately if:   Your bandage becomes soaked with blood  Your pacemaker is swelling rapidly    Your stitches open up  You feel your heart suddenly beating very slowly or quickly  You become too weak or dizzy to stand, or you pass out  Your arm or leg feels warm, tender, and painful  It may look swollen and red  You have chest pain that does not go away with rest or medicine  You feel lightheaded, short of breath, and have chest pain  You cough up blood  © 2014 [address] is for End User's use only and may not be sold, redistributed or otherwise used for commercial purposes  All illustrations and images included in CareNotes® are the copyrighted property of [employer]  or Ashok Kennedy  The above information is an  only  It is not intended as medical advice for individual conditions or treatments  Talk to your doctor, nurse or pharmacist before following any medical regimen to see if it is safe and effective for you

## 2023-04-22 NOTE — NURSING NOTE
Report called to Raiza Pendleton at [facility] and transfer AVS faxed to [facility] at [phone]  Paper copy of AVS sent with patient and patient's son in law  Discharge instructions reviewed with patient  Patient is aware and agreeable

## 2023-05-05 ENCOUNTER — IN-CLINIC DEVICE VISIT (OUTPATIENT)
Dept: CARDIOLOGY CLINIC | Facility: CLINIC | Age: 83
End: 2023-05-05

## 2023-05-05 DIAGNOSIS — Z95.0 PRESENCE OF PERMANENT CARDIAC PACEMAKER: Primary | ICD-10-CM

## 2023-05-05 NOTE — PROGRESS NOTES
Results for orders placed or performed in visit on 05/05/23   Cardiac EP device report    Narrative    MDT [address] [facility] INTERROGATED IN THE [employer] OFFICE  BATTERY VOLTAGE ADEQUATE  (13 3 YRS) AP 59%  <1%  ALL LEAD PARAMETERS WITHIN NORMAL LIMITS  NO SIGNIFICANT HIGH RATE EPISODES  NO PROGRAMMING CHANGES MADE TO DEVICE PARAMETERS  NORMAL DEVICE FUNCTION   WOUND CHECK: INCISION CLEAN AND DRY WITH EDGES APPROXIMATED; WOUND CARE AND RESTRICTIONS REVIEWED WITH PATIENT --COURTNEY

## 2023-05-16 ENCOUNTER — HOSPITAL ENCOUNTER (OUTPATIENT)
Dept: NON INVASIVE DIAGNOSTICS | Facility: HOSPITAL | Age: 83
Discharge: HOME | End: 2023-05-16
Attending: INTERNAL MEDICINE

## 2023-05-16 VITALS
BODY MASS INDEX: 27.31 KG/M2 | SYSTOLIC BLOOD PRESSURE: 141 MMHG | HEART RATE: 64 BPM | HEIGHT: 64 IN | WEIGHT: 160 LBS | DIASTOLIC BLOOD PRESSURE: 58 MMHG

## 2023-05-16 DIAGNOSIS — Z79.899 ENCOUNTER FOR MONITORING FLECAINIDE THERAPY: ICD-10-CM

## 2023-05-16 DIAGNOSIS — I48.0 PAROXYSMAL ATRIAL FIBRILLATION (HCC): ICD-10-CM

## 2023-05-16 DIAGNOSIS — Z51.81 ENCOUNTER FOR MONITORING FLECAINIDE THERAPY: ICD-10-CM

## 2023-05-16 LAB
AORTIC ROOT: 3 CM
AORTIC VALVE MEAN VELOCITY: 9.5 M/S
APICAL FOUR CHAMBER EJECTION FRACTION: 75 %
ASCENDING AORTA: 3.1 CM
AV LVOT MEAN GRADIENT: 3 MMHG
AV LVOT PEAK GRADIENT: 5 MMHG
AV MEAN GRADIENT: 4 MMHG
AV PEAK GRADIENT: 8 MMHG
AV VALVE AREA: 1.79 CM2
AV VELOCITY RATIO: 0.79
DOP CALC AO PEAK VEL: 1.44 M/S
DOP CALC AO VTI: 33.44 CM
DOP CALC LVOT AREA: 2.27 CM2
DOP CALC LVOT DIAMETER: 1.7 CM
DOP CALC LVOT PEAK VEL VTI: 26.4 CM
DOP CALC LVOT PEAK VEL: 1.14 M/S
DOP CALC LVOT STROKE VOLUME: 59.89 CM3
E WAVE DECELERATION TIME: 386 MS
FRACTIONAL SHORTENING: 35 % (ref 28–44)
INTERVENTRICULAR SEPTUM IN DIASTOLE (PARASTERNAL SHORT AXIS VIEW): 1.1 CM
INTERVENTRICULAR SEPTUM: 1.1 CM (ref 0.6–1.1)
LAAS-AP2: 21.9 CM2
LAAS-AP4: 20.2 CM2
LEFT ATRIUM SIZE: 4 CM
LEFT INTERNAL DIMENSION IN SYSTOLE: 2 CM (ref 2.1–4)
LEFT VENTRICULAR INTERNAL DIMENSION IN DIASTOLE: 3.1 CM (ref 3.5–6)
LEFT VENTRICULAR POSTERIOR WALL IN END DIASTOLE: 0.8 CM
LEFT VENTRICULAR STROKE VOLUME: 24 ML
LVSV (TEICH): 24 ML
MV E'TISSUE VEL-SEP: 5 CM/S
MV MEAN GRADIENT: 3 MMHG
MV PEAK A VEL: 1.3 M/S
MV PEAK E VEL: 111 CM/S
MV STENOSIS PRESSURE HALF TIME: 100 MS
MV VALVE AREA P 1/2 METHOD: 2.2 CM2
RA PRESSURE ESTIMATED: 3 MMHG
RIGHT VENTRICLE ID DIMENSION: 2.8 CM
RV PSP: 28 MMHG
SL CV LEFT ATRIUM LENGTH A2C: 5.5 CM
SL CV LV EF: 65
SL CV PED ECHO LEFT VENTRICLE DIASTOLIC VOLUME (MOD BIPLANE) 2D: 38 ML
SL CV PED ECHO LEFT VENTRICLE SYSTOLIC VOLUME (MOD BIPLANE) 2D: 13 ML
TR MAX PG: 25 MMHG
TR PEAK VELOCITY: 2.5 M/S
TRICUSPID ANNULAR PLANE SYSTOLIC EXCURSION: 2.4 CM
TRICUSPID VALVE PEAK REGURGITATION VELOCITY: 2.52 M/S

## 2023-05-18 ENCOUNTER — OFFICE VISIT (OUTPATIENT)
Dept: CARDIOLOGY CLINIC | Facility: CLINIC | Age: 83
End: 2023-05-18

## 2023-05-18 VITALS
SYSTOLIC BLOOD PRESSURE: 130 MMHG | BODY MASS INDEX: 27.46 KG/M2 | DIASTOLIC BLOOD PRESSURE: 80 MMHG | HEART RATE: 69 BPM | WEIGHT: 160 LBS

## 2023-05-18 DIAGNOSIS — I48.0 PAROXYSMAL ATRIAL FIBRILLATION (HCC): Primary | ICD-10-CM

## 2023-05-18 DIAGNOSIS — I49.5 TACHY-BRADY SYNDROME (HCC): ICD-10-CM

## 2023-05-18 DIAGNOSIS — I10 PRIMARY HYPERTENSION: ICD-10-CM

## 2023-05-18 DIAGNOSIS — E78.2 MIXED HYPERLIPIDEMIA: ICD-10-CM

## 2023-05-18 DIAGNOSIS — R73.03 PRE-DIABETES: ICD-10-CM

## 2023-05-18 NOTE — PROGRESS NOTES
Cardiology Office Note  Alec President, MD Gretta Harrell DO, [address] [facility] MD Des Diaz DO, Tarri Severance, DO, [facility] - [place]  ----------------------------------------------------------------  [address] [place], [facility] [age] [facility]  female MRN: [id number]  Unit/Bed#:  Encounter: [id number]      History of Present Illness: It was a pleasure to see Raisa Snow in the office today for follow-up CV evaluation  She has a past medical history of paroxysmal atrial fibrillation, hypertension, dyslipidemia, tachy-alison syndrome and pre diabetes  She established care with us in June 2022  Previously, she had been managed by [facility] for her atrial fibrillation  [facility] had been monitoring the patient on flecainide for rhythm control  She has longstanding history of atrial fibrillation which was managed by rhythm control on flecainide  In the past, she had been on both warfarin and Eliquis  She has been taken off of her anticoagulation for unclear reasoning by her [facility] cardiologist   In 2019, she was admitted for tachy-alison syndrome with a 4 2 second pause and was taken off of her propranolol  Holter monitor was performed in May 2021 demonstrating average heart rate of 59 beats per minute without high-degree heart block  She reported no history of loss of consciousness  She was then recommended for evaluation with [facility] with changing of insurance  Due to the patient's episodes tachy-alison syndrome with prior atrial fibrillation on flecainide, we had placed implantable recorder in October 2022  Conduction system disease progression was noted with significant pauses over 10 secs and patient went for placement of permanent pacemaker in April 2023  She also underwent echocardiogram in May 2023  She is here today to discuss the results    She denies any chest pain, pressure, tightness or squeezing  Denies lightheadedness, dizziness or palpitations  Denies lower extremity swelling, orthopnea paroxysmal nocturnal dyspnea  Review of Systems:  Review of Systems   Constitutional: Negative for decreased appetite, fever, weight gain and weight loss  HENT: Negative for congestion and sore throat  Eyes: Negative for visual disturbance  Cardiovascular: Negative for chest pain, dyspnea on exertion, leg swelling, near-syncope and palpitations  Respiratory: Negative for cough and shortness of breath  Hematologic/Lymphatic: Negative for bleeding problem  Skin: Negative for rash  Musculoskeletal: Negative for myalgias and neck pain  Gastrointestinal: Negative for abdominal pain and nausea  Neurological: Negative for light-headedness and weakness  Psychiatric/Behavioral: Negative for depression  Past Medical History:   Diagnosis Date   • Afib ([facility] [address] )    • Arthritis    • Hyperlipidemia    • Hypertension    • Osteopenia    • Sleep apnea    • Squamous cell skin cancer     LEF TEMPLE   • Varicose veins of both lower extremities        Past Surgical History:   Procedure Laterality Date   • CARDIAC ELECTROPHYSIOLOGY PROCEDURE N/A 10/12/2022    Procedure: Cardiac loop recorder implant;  Surgeon: Valeria Mo MD;  [facility]: [facility]; Service: Cardiology   • CARDIAC ELECTROPHYSIOLOGY PROCEDURE N/A 4/21/2023    Procedure: Cardiac pacer implant;  Surgeon: Maddy Fuentes MD;  Location: [facility];   Service: Cardiology   • CATARACT EXTRACTION     • COLONOSCOPY      complete, for polyp removal   • EYE SURGERY     • HYSTERECTOMY      age 39   • INCISION AND DRAINAGE OF WOUND Right 06/28/2016    Procedure: ARTHROSCOPY,EVACUATION OF HEMATOMA, OPEN EXPLORATION OF WOUND;  Surgeon: Sherice Cote MD;  Location: [facility];  Service:    • MOHS SURGERY Left 12/27/2022    left temple   • SKIN BIOPSY     • TONSILLECTOMY AND ADENOIDECTOMY     • TOTAL KNEE ARTHROPLASTY Right    • TOTAL KNEE ARTHROPLASTY Left    • WISDOM TOOTH EXTRACTION         Social History     Socioeconomic History   • Marital status:      Spouse name: None   • Number of children: None   • Years of education: None   • Highest education level: None   Occupational History   • None   Tobacco Use   • Smoking status: Never   • Smokeless tobacco: Never   Vaping Use   • Vaping Use: Never used   Substance and Sexual Activity   • Alcohol use: Not Currently   • Drug use: No   • Sexual activity: Not Currently     Comment:    Other Topics Concern   • None   Social History Narrative    No domestic violence      Social Determinants of Health     Financial Resource Strain: Not on file   Food Insecurity: No Food Insecurity   • Worried About Running Out of Food in the Last Year: Never true   • Ran Out of Food in the Last Year: Never true   Transportation Needs: No Transportation Needs   • Lack of Transportation (Medical): No   • Lack of Transportation (Non-Medical):  No   Physical Activity: Not on file   Stress: Not on file   Social Connections: Not on file   Intimate Partner Violence: Not on file   Housing Stability: Low Risk    • Unable to Pay for Housing in the Last Year: No   • Number of Places Lived in the Last Year: 1   • Unstable Housing in the Last Year: No       Family History   Problem Relation Age of Onset   • Diabetes Mother    • Diabetes Father    • Diabetes Brother    • No Known Problems Sister    • No Known Problems Daughter    • No Known Problems Maternal Grandmother    • No Known Problems Maternal Grandfather    • No Known Problems Paternal Grandmother    • No Known Problems Paternal Grandfather    • No Known Problems Daughter    • Brain cancer Son    • No Known Problems Maternal Aunt        Allergies   Allergen Reactions   • Atorvastatin Other (See Comments)     myalgia   • Statins Other (See Comments)     Weakness in legs         Current Outpatient Medications:   •  amLODIPine (NORVASC) 10 mg tablet, Take 1 tablet (10 mg total) by mouth daily, Disp: 30 tablet, Rfl: 0  •  acebutolol (SECTRAL) 200 mg capsule, Take 200 mg by mouth daily, Disp: , Rfl:   •  Aspirin Low Dose 81 MG EC tablet, Take 81 mg by mouth daily, Disp: , Rfl:   •  Calcium Carbonate 1500 (600 Ca) MG TABS, Take 1 tablet by mouth daily, Disp: , Rfl:   •  [employer] Senna Plus 8 6-50 MG per tablet, every other day, Disp: , Rfl:   •  divalproex sodium (DEPAKOTE) 250 mg EC tablet, Take 250 mg by mouth 3 (three) times a day, Disp: , Rfl:   •  donepezil (ARICEPT) 5 mg tablet, Take 1 tablet (5 mg total) by mouth daily at bedtime, Disp: 30 tablet, Rfl: 1  •  Donepezil HCl 5 MG/DAY PTWK, Place on the skin, Disp: , Rfl:   •  ergocalciferol (VITAMIN D2) 50,000 units, Take 50,000 Units by mouth every 30 (thirty) days, Disp: , Rfl:   •  flecainide (TAMBOCOR) 50 mg tablet, Take 1 tablet (50 mg total) by mouth every 12 (twelve) hours, Disp: 60 tablet, Rfl: 0  •  fluticasone (FLONASE) 50 mcg/act nasal spray, 1 spray into each nostril daily for 7 days, Disp: 9 9 mL, Rfl: 0  •  ibuprofen (MOTRIN) 600 mg tablet, Take 600 mg by mouth every 6 (six) hours as needed, Disp: , Rfl:   •  Melatonin 3 MG CAPS, Take 6 mg by mouth, Disp: , Rfl:   •  rosuvastatin (CRESTOR) 5 mg tablet, TAKE 1/2 TABLET BY MOUTH EVERY OTHER DAY IN THE EVENING, Disp: , Rfl:   •  sodium chloride (MARITZA 128) 5 % hypertonic ophthalmic ointment, Administer to both eyes every 3 (three) hours as needed (irritation), Disp: 3 5 g, Rfl: 0    Vitals:    05/18/23 1115   BP: 130/80   BP Location: Right arm   Patient Position: Sitting   Cuff Size: Standard   Pulse: 69   Weight: 72 6 kg (160 lb)     Body mass index is 27 46 kg/m²      PHYSICAL EXAMINATION:  Gen: Awake, Alert, NAD   Head/eyes: AT/NC, pupils equal and round, Anicteric  ENT: mmm  Neck: Supple, No elevated JVP, trachea midline  Resp: CTA bilaterally no w/r/r  CV: RRR +S1, S2, No m/r/g  Abd: Soft, NT/ND + BS  Ext: no LE edema bilaterally  Neuro: Follows commands, moves all extermities  Psych: Appropriate affect, pleasant mood, pleasant attitude, non-combative  Skin: warm; no rash, erythema or venous stasis changes on exposed skin    --------------------------------------------------------------------------------  TREADMILL STRESS  No results found for this or any previous visit      --------------------------------------------------------------------------------  NUCLEAR STRESS TEST: No results found for this or any previous visit      No results found for this or any previous visit       --------------------------------------------------------------------------------  CATH:  No results found for this or any previous visit     --------------------------------------------------------------------------------  ECHO:   · LVEF 60%, borderline LVH, mild RVH with normal function, mild LA dilatation, AV sclerosis, MV sclerosis, moderate MAC, mild MR, May 2021  --------------------------------------------------------------------------------  HOLTER  · Holter w/ SR avg HR 59 bpm, rare APCs, nonsustained PAT (x3) longest 13 beats at 112 bpm, May 2021  · Holter w/ SR avg HR 58 bpm, rare PACs, July 2022  --------------------------------------------------------------------------------  CAROTIDS  No results found for this or any previous visit      --------------------------------------------------------------------------------  ECGs:  Results for orders placed or performed in visit on 05/18/23   POCT ECG    Impression    Atrial paced rhythm with prolonged AV conduction 69 bpm, low voltage        Lab Results   Component Value Date    WBC 6 97 04/22/2023    HGB 14 0 04/22/2023    HCT 43 1 04/22/2023    MCV 90 04/22/2023     04/22/2023      Lab Results   Component Value Date    SODIUM 138 04/22/2023    K 3 9 04/22/2023     04/22/2023    CO2 25 04/22/2023    BUN 16 04/22/2023    CREATININE 0 89 04/22/2023    GLUC 89 04/22/2023    CALCIUM 8 8 04/22/2023      Lab Results   Component Value Date    HGBA1C 6 4 (H) 12/08/2021      No results found for: CHOL  No results found for: HDL  No results found for: LDLCALC  No results found for: TRIG  No results found for: [place], [place]   Lab Results   Component Value Date    INR 0 98 04/21/2023    INR 0 95 02/18/2023    INR 0 95 11/01/2022    PROTIME 13 1 04/21/2023    PROTIME 12 9 02/18/2023    PROTIME 12 9 11/01/2022        1  Paroxysmal atrial fibrillation (HCC)  -     POCT ECG    2  Tachy-alison syndrome ([address] )    3  Primary hypertension    4  Mixed hyperlipidemia    5  Pre-diabetes        IMPRESSION:  • Paroxysmal atrial fibrillation on flecainide - refusing anticoagulation  o Pharmacologic nuclear stress test appears negative for myocardial ischemia, gated EF 77%, July 2022  • Tachy-alison syndrome s/p [employer]tronic dual-chamber permanent pacemaker w/ HIS bundle lead, April 2023  • Hypertension  o LVEF 65%, mild LVH, probable diastolic dysfunction with elevated LAP, mild LA dilatation, mild calcific MS with mean PG 3 mmHg at 61 bpm, trace TR with PASP 28 mmHg, May 2023  • Dyslipidemia w/ LDL 67 mg/dL, December 2021  • Prediabetes  • VIRGILIO not on CPAP due to recall  • Dementia    PLAN:  It was a pleasure to see Renu Daley in the office today for follow up CV evaluation  She is here today for follow-up regarding her echocardiogram and pacemaker placement  Her echocardiogram demonstrated normal left ventricular systolic function with mild mitral stenosis and no severe valvular disease  She has no signs or symptoms of heart failure and examines to be euvolemic with chronic trivial lower extremity edema  She can ambulate without significant exertional symptoms  She is tolerating her current medications without any reported adverse effects  ECG is nonischemic  Based on her clinical presentation, I have the following recommendations:    1    Recommend 30 minutes a day, 5 days a week of moderate intensity activity to build "cardiovascular endurance  2  Would encourage heart healthy diet low in sodium and carbohydrate  3  Continue current antihypertensive regimen including amlodipine  4   Continue aspirin therapy  5  Continue flecainide for rhythm control  Currently off of anticoagulation due to personal choice  Risks and benefits discussed at length  6   Continue statin therapy  Goal LDL is less than 70 mg/dL  Repeat lipid panel in 3 to 6 months  7   We will repeat stress test after July 2024   8   We will follow-up with her in 6 months to reassess her progress  As always, please not hesitate to call with any questions  Portions of the record may have been created with voice recognition software  Occasional wrong word or \"sound a like\" substitutions may have occurred due to the inherent limitations of voice recognition software  Read the chart carefully and recognize, using context, where substitutions have occurred        Signed: Wilma Dietz DO, FACC, CIRO, FACP  "

## 2023-06-12 PROCEDURE — 87186 SC STD MICRODIL/AGAR DIL: CPT | Performed by: FAMILY MEDICINE

## 2023-06-12 PROCEDURE — 87077 CULTURE AEROBIC IDENTIFY: CPT | Performed by: FAMILY MEDICINE

## 2023-06-12 PROCEDURE — 87086 URINE CULTURE/COLONY COUNT: CPT | Performed by: FAMILY MEDICINE

## 2023-06-13 ENCOUNTER — LAB REQUISITION (OUTPATIENT)
Dept: LAB | Facility: HOSPITAL | Age: 83
End: 2023-06-13
Payer: MEDICARE

## 2023-06-13 DIAGNOSIS — I10 ESSENTIAL (PRIMARY) HYPERTENSION: ICD-10-CM

## 2023-06-16 LAB
BACTERIA UR CULT: ABNORMAL
BACTERIA UR CULT: ABNORMAL

## 2023-06-19 ENCOUNTER — LAB REQUISITION (OUTPATIENT)
Dept: LAB | Facility: HOSPITAL | Age: 83
End: 2023-06-19
Payer: MEDICARE

## 2023-06-19 DIAGNOSIS — Z00.00 ENCOUNTER FOR GENERAL ADULT MEDICAL EXAMINATION WITHOUT ABNORMAL FINDINGS: ICD-10-CM

## 2023-06-19 LAB
BACTERIA UR QL AUTO: ABNORMAL /HPF
BILIRUB UR QL STRIP: NEGATIVE
CLARITY UR: CLEAR
COLOR UR: YELLOW
GLUCOSE UR STRIP-MCNC: NEGATIVE MG/DL
HGB UR QL STRIP.AUTO: NEGATIVE
KETONES UR STRIP-MCNC: ABNORMAL MG/DL
LEUKOCYTE ESTERASE UR QL STRIP: NEGATIVE
MUCOUS THREADS UR QL AUTO: ABNORMAL
NITRITE UR QL STRIP: NEGATIVE
NON-SQ EPI CELLS URNS QL MICRO: ABNORMAL /HPF
PH UR STRIP.AUTO: 6.5 [PH]
PROT UR STRIP-MCNC: ABNORMAL MG/DL
RBC #/AREA URNS AUTO: ABNORMAL /HPF
SP GR UR STRIP.AUTO: 1.02 (ref 1–1.03)
WBC #/AREA URNS AUTO: ABNORMAL /HPF

## 2023-06-19 PROCEDURE — 81001 URINALYSIS AUTO W/SCOPE: CPT | Performed by: FAMILY MEDICINE

## 2023-07-14 ENCOUNTER — LAB REQUISITION (OUTPATIENT)
Dept: LAB | Facility: HOSPITAL | Age: 83
End: 2023-07-14
Payer: MEDICARE

## 2023-07-14 DIAGNOSIS — Z00.00 ENCOUNTER FOR GENERAL ADULT MEDICAL EXAMINATION WITHOUT ABNORMAL FINDINGS: ICD-10-CM

## 2023-07-14 LAB
25(OH)D3 SERPL-MCNC: 25.4 NG/ML (ref 30–100)
ALBUMIN SERPL BCP-MCNC: 3.9 G/DL (ref 3.5–5)
ALP SERPL-CCNC: 52 U/L (ref 46–116)
ALT SERPL W P-5'-P-CCNC: 24 U/L (ref 12–78)
ANION GAP SERPL CALCULATED.3IONS-SCNC: 5 MMOL/L
AST SERPL W P-5'-P-CCNC: 25 U/L (ref 5–45)
BASOPHILS # BLD AUTO: 0.02 THOUSANDS/ÂΜL (ref 0–0.1)
BASOPHILS NFR BLD AUTO: 0 % (ref 0–1)
BILIRUB SERPL-MCNC: 0.37 MG/DL (ref 0.2–1)
BUN SERPL-MCNC: 18 MG/DL (ref 5–25)
CALCIUM SERPL-MCNC: 9.4 MG/DL (ref 8.3–10.1)
CHLORIDE SERPL-SCNC: 106 MMOL/L (ref 96–108)
CHOLEST SERPL-MCNC: 151 MG/DL
CO2 SERPL-SCNC: 29 MMOL/L (ref 21–32)
CREAT SERPL-MCNC: 1.02 MG/DL (ref 0.6–1.3)
EOSINOPHIL # BLD AUTO: 0.1 THOUSAND/ÂΜL (ref 0–0.61)
EOSINOPHIL NFR BLD AUTO: 2 % (ref 0–6)
ERYTHROCYTE [DISTWIDTH] IN BLOOD BY AUTOMATED COUNT: 14.6 % (ref 11.6–15.1)
EST. AVERAGE GLUCOSE BLD GHB EST-MCNC: 120 MG/DL
GFR SERPL CREATININE-BSD FRML MDRD: 50 ML/MIN/1.73SQ M
GLUCOSE SERPL-MCNC: 111 MG/DL (ref 65–140)
HBA1C MFR BLD: 5.8 %
HCT VFR BLD AUTO: 52.4 % (ref 34.8–46.1)
HDLC SERPL-MCNC: 42 MG/DL
HGB BLD-MCNC: 15.9 G/DL (ref 11.5–15.4)
IMM GRANULOCYTES # BLD AUTO: 0.02 THOUSAND/UL (ref 0–0.2)
IMM GRANULOCYTES NFR BLD AUTO: 0 % (ref 0–2)
LDLC SERPL CALC-MCNC: 56 MG/DL (ref 0–100)
LYMPHOCYTES # BLD AUTO: 2.52 THOUSANDS/ÂΜL (ref 0.6–4.47)
LYMPHOCYTES NFR BLD AUTO: 37 % (ref 14–44)
MCH RBC QN AUTO: 29.8 PG (ref 26.8–34.3)
MCHC RBC AUTO-ENTMCNC: 30.3 G/DL (ref 31.4–37.4)
MCV RBC AUTO: 98 FL (ref 82–98)
MONOCYTES # BLD AUTO: 0.88 THOUSAND/ÂΜL (ref 0.17–1.22)
MONOCYTES NFR BLD AUTO: 13 % (ref 4–12)
NEUTROPHILS # BLD AUTO: 3.21 THOUSANDS/ÂΜL (ref 1.85–7.62)
NEUTS SEG NFR BLD AUTO: 48 % (ref 43–75)
NONHDLC SERPL-MCNC: 109 MG/DL
NRBC BLD AUTO-RTO: 0 /100 WBCS
PLATELET # BLD AUTO: 187 THOUSANDS/UL (ref 149–390)
PMV BLD AUTO: 10.5 FL (ref 8.9–12.7)
POTASSIUM SERPL-SCNC: 4 MMOL/L (ref 3.5–5.3)
PROT SERPL-MCNC: 7.3 G/DL (ref 6.4–8.4)
RBC # BLD AUTO: 5.33 MILLION/UL (ref 3.81–5.12)
SODIUM SERPL-SCNC: 140 MMOL/L (ref 135–147)
TRIGL SERPL-MCNC: 266 MG/DL
WBC # BLD AUTO: 6.75 THOUSAND/UL (ref 4.31–10.16)

## 2023-07-14 PROCEDURE — 80061 LIPID PANEL: CPT | Performed by: FAMILY MEDICINE

## 2023-07-14 PROCEDURE — 83036 HEMOGLOBIN GLYCOSYLATED A1C: CPT | Performed by: FAMILY MEDICINE

## 2023-07-14 PROCEDURE — 80053 COMPREHEN METABOLIC PANEL: CPT | Performed by: FAMILY MEDICINE

## 2023-07-14 PROCEDURE — 82306 VITAMIN D 25 HYDROXY: CPT | Performed by: FAMILY MEDICINE

## 2023-07-14 PROCEDURE — 85025 COMPLETE CBC W/AUTO DIFF WBC: CPT | Performed by: FAMILY MEDICINE

## 2023-07-25 ENCOUNTER — HOSPITAL ENCOUNTER (OUTPATIENT)
Dept: SLEEP CENTER | Facility: CLINIC | Age: 83
Discharge: HOME | End: 2023-07-25
Payer: MEDICARE

## 2023-07-25 DIAGNOSIS — Z78.9 DIFFICULTY USING BIPHASIC POSITIVE AIRWAY PRESSURE (BIPAP) MACHINE: ICD-10-CM

## 2023-07-25 DIAGNOSIS — G47.33 OBSTRUCTIVE SLEEP APNEA SYNDROME: ICD-10-CM

## 2023-07-25 PROCEDURE — 95811 POLYSOM 6/>YRS CPAP 4/> PARM: CPT

## 2023-07-25 PROCEDURE — 95811 POLYSOM 6/>YRS CPAP 4/> PARM: CPT | Performed by: INTERNAL MEDICINE

## 2023-07-26 DIAGNOSIS — G47.33 OBSTRUCTIVE SLEEP APNEA SYNDROME: Primary | ICD-10-CM

## 2023-07-26 NOTE — PROGRESS NOTES
Sleep Study Documentation    Pre-Sleep Study       Sleep testing procedure explained to patient:YES    Patient napped prior to study:NO    Caffeine:Dayshift worker after 12PM.  Caffeine use:NO    Alcohol:Dayshift workers after 5PM: Alcohol use:NO    Typical day for patient:YES       Study Documentation    Sleep Study Indications: Excessive daytime sleepiness, Obstructive sleep apnea syndrome , Difficulty using biphasic positive airway pressure (BiPAP) machine     Sleep Study: Treatment   Optimal PAP pressure: 13/9 cmH2O  Leak:Medium  Snore:Eliminated  REM Obtained:yes  Supplemental O2: no    Minimum SaO2 87%  Baseline SaO2 94%  PAP mask tried (list all)  medium [employer] Vitera full face mask interface  PAP mask choice (final)  medium [employer] Vitera full face mask interface  PAP mask type:full face  PAP pressure at which snoring was eliminated 13/9 cmH2O  Minimum SaO2 at final PAP pressure 92%  Mode of Therapy:BiPAP  ETCO2:No    Mode of Therapy:BiPAP    EKG abnormalities: no     EEG abnormalities: no    Sleep Study Recorded < 2 hours: N/A    Sleep Study Recorded > 2 hours but incomplete study: N/A    Sleep Study Recorded 6 hours but no sleep obtained: NO    Patient classification: retired       Post-Sleep Study    Medication used at bedtime or during sleep study:YES prescription sleep aid    Patient reports time it took to fall asleep:less than 20 minutes    Patient reports waking up during study:1 to 2 times. Patient reports returning to sleep in greater than 30 minutes. Patient reports sleeping 4 to 6 hours without dreaming. Patient reports sleep during study:typical    Patient rated sleepiness: Not sleepy or tired    PAP treatment:yes: Post PAP treatment patient reports feeling better and  would wear PAP mask at home.

## 2023-07-27 ENCOUNTER — TELEPHONE (OUTPATIENT)
Dept: SLEEP CENTER | Facility: CLINIC | Age: 83
End: 2023-07-27

## 2023-07-27 DIAGNOSIS — G47.33 OBSTRUCTIVE SLEEP APNEA SYNDROME: Primary | ICD-10-CM

## 2023-07-27 NOTE — TELEPHONE ENCOUNTER
----- Message from Nils Zelaya, [address] [facility] sent at 7/26/2023  7:09 PM EDT -----  Titration study completed. New BiPAP equipment ordered at updated settings. Patient to be scheduled for set up of equipment, followed by compliance follow up.

## 2023-07-28 LAB
DME PARACHUTE DELIVERY DATE REQUESTED: NORMAL
DME PARACHUTE ITEM DESCRIPTION: NORMAL
DME PARACHUTE ORDER STATUS: NORMAL
DME PARACHUTE SUPPLIER NAME: NORMAL
DME PARACHUTE SUPPLIER PHONE: NORMAL

## 2023-07-28 NOTE — TELEPHONE ENCOUNTER
[facility] [place]. Patient has follow up appointment scheduled 3/12/24.

## 2023-07-28 NOTE — TELEPHONE ENCOUNTER
[facility]ed [facility] and spoke to patient's nurse [employer] confirmed that patient received a new BiPAP in April from [employer] due to the recall. After ending call, discovered that patient does not have a follow up appointment scheduled. Called back but call went to voicemail. Left message for Felicia to call back to schedule follow up. Last office visit was 12/30/22. Ruma Jay, patient will just require pressure change. Please write order.

## 2023-08-08 ENCOUNTER — IN-CLINIC DEVICE VISIT (OUTPATIENT)
Dept: CARDIOLOGY CLINIC | Facility: CLINIC | Age: 83
End: 2023-08-08
Payer: MEDICARE

## 2023-08-08 DIAGNOSIS — Z95.0 PRESENCE OF PERMANENT CARDIAC PACEMAKER: Primary | ICD-10-CM

## 2023-08-08 PROCEDURE — 93280 PM DEVICE PROGR EVAL DUAL: CPT | Performed by: INTERNAL MEDICINE

## 2023-08-08 NOTE — PROGRESS NOTES
Results for orders placed or performed in visit on 08/08/23   Cardiac EP device report    Narrative    MDT DUAL CHAMBER PM - [place] INTERROGATED IN THE [facility] OFFICE. BATTERY VOLTAGE ADEQUATE (13.9 YRS). AP 77.4%  <0.1% ALL LEAD PARAMETERS WITHIN NORMAL LIMITS. NO SIGNIFICANT HIGH RATE EPISODES. NO PROGRAMMING CHANGES MADE TO DEVICE PARAMETERS. NORMAL DEVICE FUNCTION.  AM/NC

## 2023-08-10 ENCOUNTER — LAB REQUISITION (OUTPATIENT)
Dept: LAB | Facility: HOSPITAL | Age: 83
End: 2023-08-10
Payer: MEDICARE

## 2023-08-10 DIAGNOSIS — Z00.00 ENCOUNTER FOR GENERAL ADULT MEDICAL EXAMINATION WITHOUT ABNORMAL FINDINGS: ICD-10-CM

## 2023-08-10 LAB
T4 FREE SERPL-MCNC: 0.7 NG/DL (ref 0.61–1.12)
TSH SERPL DL<=0.05 MIU/L-ACNC: 1.72 UIU/ML (ref 0.45–4.5)
VALPROATE SERPL-MCNC: 95 UG/ML (ref 50–100)

## 2023-08-10 PROCEDURE — 84439 ASSAY OF FREE THYROXINE: CPT | Performed by: FAMILY MEDICINE

## 2023-08-10 PROCEDURE — 80164 ASSAY DIPROPYLACETIC ACD TOT: CPT | Performed by: FAMILY MEDICINE

## 2023-08-10 PROCEDURE — 84443 ASSAY THYROID STIM HORMONE: CPT | Performed by: FAMILY MEDICINE

## 2023-11-08 ENCOUNTER — REMOTE DEVICE CLINIC VISIT (OUTPATIENT)
Dept: CARDIOLOGY CLINIC | Facility: CLINIC | Age: 83
End: 2023-11-08
Payer: MEDICARE

## 2023-11-08 DIAGNOSIS — Z95.0 PRESENCE OF PERMANENT CARDIAC PACEMAKER: Primary | ICD-10-CM

## 2023-11-08 PROCEDURE — 93296 REM INTERROG EVL PM/IDS: CPT | Performed by: INTERNAL MEDICINE

## 2023-11-08 PROCEDURE — 93294 REM INTERROG EVL PM/LDLS PM: CPT | Performed by: INTERNAL MEDICINE

## 2023-11-08 NOTE — PROGRESS NOTES
MDT DUAL CHAMBER PM - ACTIVE SYSTEM IS MRI CONDITIONAL   CARELINK TRANSMISSION:  BATTERY VOLTAGE ADEQUATE (13.5 YR.). AP 80.3%  <0.1%. ALL LEAD PARAMETERS WITHIN NORMAL LIMITS. NO SIGNIFICANT HIGH RATE EPISODES. NORMAL DEVICE FUNCTION.   RG
10

## 2023-11-10 ENCOUNTER — APPOINTMENT (OUTPATIENT)
Dept: LAB | Facility: HOSPITAL | Age: 83
End: 2023-11-10
Payer: MEDICARE

## 2023-12-22 ENCOUNTER — APPOINTMENT (EMERGENCY)
Dept: RADIOLOGY | Facility: HOSPITAL | Age: 83
DRG: 084 | End: 2023-12-22
Payer: MEDICARE

## 2023-12-22 ENCOUNTER — HOSPITAL ENCOUNTER (INPATIENT)
Facility: HOSPITAL | Age: 83
LOS: 2 days | Discharge: NURSING, REHAB OR LONG-TERM CARE | DRG: 084 | End: 2023-12-24
Attending: EMERGENCY MEDICINE | Admitting: SURGERY
Payer: MEDICARE

## 2023-12-22 DIAGNOSIS — I62.00 SUBDURAL HEMORRHAGE (HCC): Primary | ICD-10-CM

## 2023-12-22 PROBLEM — G89.11 ACUTE PAIN DUE TO TRAUMA: Status: ACTIVE | Noted: 2023-12-22
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
## 2023-12-22 PROCEDURE — 70450 CT HEAD/BRAIN W/O DYE: CPT

## 2023-12-22 PROCEDURE — 71250 CT THORAX DX C-: CPT

## 2023-12-22 PROCEDURE — 72128 CT CHEST SPINE W/O DYE: CPT

## 2023-12-22 PROCEDURE — G1004 CDSM NDSC: HCPCS

## 2023-12-22 PROCEDURE — 99285 EMERGENCY DEPT VISIT HI MDM: CPT | Performed by: EMERGENCY MEDICINE

## 2023-12-22 PROCEDURE — 72125 CT NECK SPINE W/O DYE: CPT

## 2023-12-22 PROCEDURE — 99284 EMERGENCY DEPT VISIT MOD MDM: CPT

## 2023-12-22 RX ORDER — LANOLIN ALCOHOL/MO/W.PET/CERES
3 CREAM (GRAM) TOPICAL
Status: DISCONTINUED | OUTPATIENT
Start: 2023-12-22 | End: 2023-12-24 | Stop reason: HOSPADM

## 2023-12-22 RX ORDER — ACETAMINOPHEN 325 MG/1
650 TABLET ORAL ONCE
Status: COMPLETED | OUTPATIENT
Start: 2023-12-22 | End: 2023-12-23

## 2023-12-22 RX ORDER — HYDROMORPHONE HCL IN WATER/PF 6 MG/30 ML
0.2 PATIENT CONTROLLED ANALGESIA SYRINGE INTRAVENOUS EVERY 2 HOUR PRN
Status: DISCONTINUED | OUTPATIENT
Start: 2023-12-22 | End: 2023-12-24 | Stop reason: HOSPADM

## 2023-12-22 RX ORDER — OXYCODONE HYDROCHLORIDE 5 MG/1
5 TABLET ORAL EVERY 4 HOURS PRN
Status: DISCONTINUED | OUTPATIENT
Start: 2023-12-22 | End: 2023-12-24 | Stop reason: HOSPADM

## 2023-12-22 RX ORDER — ONDANSETRON 2 MG/ML
4 INJECTION INTRAMUSCULAR; INTRAVENOUS EVERY 6 HOURS PRN
Status: DISCONTINUED | OUTPATIENT
Start: 2023-12-22 | End: 2023-12-24 | Stop reason: HOSPADM

## 2023-12-22 RX ORDER — DONEPEZIL HYDROCHLORIDE 5 MG/1
5 TABLET, FILM COATED ORAL
Status: DISCONTINUED | OUTPATIENT
Start: 2023-12-22 | End: 2023-12-24 | Stop reason: HOSPADM

## 2023-12-22 RX ORDER — DIVALPROEX SODIUM 250 MG/1
250 TABLET, DELAYED RELEASE ORAL 3 TIMES DAILY
Status: DISCONTINUED | OUTPATIENT
Start: 2023-12-22 | End: 2023-12-24 | Stop reason: HOSPADM

## 2023-12-22 RX ORDER — ACEBUTOLOL HYDROCHLORIDE 200 MG/1
200 CAPSULE ORAL DAILY
Status: DISCONTINUED | OUTPATIENT
Start: 2023-12-23 | End: 2023-12-24 | Stop reason: HOSPADM

## 2023-12-22 RX ORDER — POLYETHYLENE GLYCOL 3350 17 G/17G
17 POWDER, FOR SOLUTION ORAL DAILY
Status: DISCONTINUED | OUTPATIENT
Start: 2023-12-23 | End: 2023-12-24 | Stop reason: HOSPADM

## 2023-12-22 RX ORDER — LEVETIRACETAM 500 MG/1
500 TABLET ORAL 2 TIMES DAILY
Status: DISCONTINUED | OUTPATIENT
Start: 2023-12-23 | End: 2023-12-24 | Stop reason: HOSPADM

## 2023-12-22 RX ORDER — ERGOCALCIFEROL 1.25 MG/1
50000 CAPSULE ORAL
Status: DISCONTINUED | OUTPATIENT
Start: 2023-12-22 | End: 2023-12-24 | Stop reason: HOSPADM

## 2023-12-22 RX ORDER — ACETAMINOPHEN 325 MG/1
650 TABLET ORAL EVERY 4 HOURS PRN
Status: DISCONTINUED | OUTPATIENT
Start: 2023-12-22 | End: 2023-12-24 | Stop reason: HOSPADM

## 2023-12-22 RX ADMIN — DESMOPRESSIN ACETATE 26.67 MCG: 4 SOLUTION INTRAVENOUS at 23:39

## 2023-12-23 ENCOUNTER — APPOINTMENT (INPATIENT)
Dept: RADIOLOGY | Facility: HOSPITAL | Age: 83
DRG: 084 | End: 2023-12-23
Payer: MEDICARE

## 2023-12-23 LAB
ANION GAP SERPL CALCULATED.3IONS-SCNC: 12 MMOL/L
BASOPHILS # BLD AUTO: 0.02 THOUSANDS/ÂΜL (ref 0–0.1)
BASOPHILS NFR BLD AUTO: 0 % (ref 0–1)
BUN SERPL-MCNC: 15 MG/DL (ref 5–25)
CALCIUM SERPL-MCNC: 8.9 MG/DL (ref 8.4–10.2)
CHLORIDE SERPL-SCNC: 101 MMOL/L (ref 96–108)
CO2 SERPL-SCNC: 25 MMOL/L (ref 21–32)
CREAT SERPL-MCNC: 0.82 MG/DL (ref 0.6–1.3)
EOSINOPHIL # BLD AUTO: 0.03 THOUSAND/ÂΜL (ref 0–0.61)
EOSINOPHIL NFR BLD AUTO: 0 % (ref 0–6)
ERYTHROCYTE [DISTWIDTH] IN BLOOD BY AUTOMATED COUNT: 13.4 % (ref 11.6–15.1)
GFR SERPL CREATININE-BSD FRML MDRD: 66 ML/MIN/1.73SQ M
GLUCOSE SERPL-MCNC: 135 MG/DL (ref 65–140)
HCT VFR BLD AUTO: 44.6 % (ref 34.8–46.1)
HGB BLD-MCNC: 14.8 G/DL (ref 11.5–15.4)
IMM GRANULOCYTES # BLD AUTO: 0.06 THOUSAND/UL (ref 0–0.2)
IMM GRANULOCYTES NFR BLD AUTO: 1 % (ref 0–2)
LYMPHOCYTES # BLD AUTO: 1.55 THOUSANDS/ÂΜL (ref 0.6–4.47)
LYMPHOCYTES NFR BLD AUTO: 20 % (ref 14–44)
MCH RBC QN AUTO: 30.1 PG (ref 26.8–34.3)
MCHC RBC AUTO-ENTMCNC: 33.2 G/DL (ref 31.4–37.4)
MCV RBC AUTO: 91 FL (ref 82–98)
MONOCYTES # BLD AUTO: 0.93 THOUSAND/ÂΜL (ref 0.17–1.22)
MONOCYTES NFR BLD AUTO: 12 % (ref 4–12)
NEUTROPHILS # BLD AUTO: 5.24 THOUSANDS/ÂΜL (ref 1.85–7.62)
NEUTS SEG NFR BLD AUTO: 67 % (ref 43–75)
NRBC BLD AUTO-RTO: 0 /100 WBCS
PLATELET # BLD AUTO: 153 THOUSANDS/UL (ref 149–390)
PMV BLD AUTO: 10.4 FL (ref 8.9–12.7)
POTASSIUM SERPL-SCNC: 4.5 MMOL/L (ref 3.5–5.3)
RBC # BLD AUTO: 4.91 MILLION/UL (ref 3.81–5.12)
SODIUM SERPL-SCNC: 138 MMOL/L (ref 135–147)
WBC # BLD AUTO: 7.83 THOUSAND/UL (ref 4.31–10.16)

## 2023-12-23 PROCEDURE — 80048 BASIC METABOLIC PNL TOTAL CA: CPT

## 2023-12-23 PROCEDURE — 99223 1ST HOSP IP/OBS HIGH 75: CPT | Performed by: NURSE PRACTITIONER

## 2023-12-23 PROCEDURE — G1004 CDSM NDSC: HCPCS

## 2023-12-23 PROCEDURE — 97163 PT EVAL HIGH COMPLEX 45 MIN: CPT

## 2023-12-23 PROCEDURE — 99232 SBSQ HOSP IP/OBS MODERATE 35: CPT | Performed by: SURGERY

## 2023-12-23 PROCEDURE — 85025 COMPLETE CBC W/AUTO DIFF WBC: CPT

## 2023-12-23 PROCEDURE — 70450 CT HEAD/BRAIN W/O DYE: CPT

## 2023-12-23 PROCEDURE — 97167 OT EVAL HIGH COMPLEX 60 MIN: CPT

## 2023-12-23 RX ORDER — ENOXAPARIN SODIUM 100 MG/ML
30 INJECTION SUBCUTANEOUS EVERY 12 HOURS SCHEDULED
Status: DISCONTINUED | OUTPATIENT
Start: 2023-12-23 | End: 2023-12-24 | Stop reason: HOSPADM

## 2023-12-23 RX ADMIN — MELATONIN 3 MG: at 21:33

## 2023-12-23 RX ADMIN — LEVETIRACETAM 500 MG: 500 TABLET, FILM COATED ORAL at 17:48

## 2023-12-23 RX ADMIN — MELATONIN 3 MG: at 00:52

## 2023-12-23 RX ADMIN — DIVALPROEX SODIUM 250 MG: 250 TABLET, DELAYED RELEASE ORAL at 09:37

## 2023-12-23 RX ADMIN — ACETAMINOPHEN 650 MG: 325 TABLET, FILM COATED ORAL at 00:52

## 2023-12-23 RX ADMIN — DONEPEZIL HYDROCHLORIDE 5 MG: 5 TABLET ORAL at 00:52

## 2023-12-23 RX ADMIN — DIVALPROEX SODIUM 250 MG: 250 TABLET, DELAYED RELEASE ORAL at 21:33

## 2023-12-23 RX ADMIN — DONEPEZIL HYDROCHLORIDE 5 MG: 5 TABLET ORAL at 21:33

## 2023-12-23 RX ADMIN — DIVALPROEX SODIUM 250 MG: 250 TABLET, DELAYED RELEASE ORAL at 17:48

## 2023-12-23 RX ADMIN — LEVETIRACETAM 500 MG: 500 TABLET, FILM COATED ORAL at 09:37

## 2023-12-23 RX ADMIN — DIVALPROEX SODIUM 250 MG: 250 TABLET, DELAYED RELEASE ORAL at 00:52

## 2023-12-23 RX ADMIN — ENOXAPARIN SODIUM 30 MG: 30 INJECTION SUBCUTANEOUS at 21:33

## 2023-12-23 RX ADMIN — POLYETHYLENE GLYCOL 3350 17 G: 17 POWDER, FOR SOLUTION ORAL at 09:37

## 2023-12-23 RX ADMIN — ERGOCALCIFEROL 50000 UNITS: 1.25 CAPSULE ORAL at 00:52

## 2023-12-23 NOTE — PROGRESS NOTES
[facility]  Progress Note  Name: Ann Cross I  MRN: [id number]  Unit/Bed#: PPHP 613-01 I Date of Admission: 12/22/2023   Date of Service: 12/23/2023 I Hospital Day: 1    Assessment/Plan   Fall  Assessment & Plan  -PT/OT consult  -Geriatrics consult    Acute pain due to trauma  Assessment & Plan  -Multimodal pain control    Subdural hemorrhage (HCC)  Assessment & Plan  -Trace acute subdural hemorrhage along the right tentorium cerebelli.  -S/p DDAVP given aspirin use  -HOT protocol  -Keppra seizure prophylaxis  -Neurosurgery consult  -Repeat CTH stable  -Multimodal pain control    Paroxysmal atrial fibrillation (HCC)  Assessment & Plan  -Continue beta blocker    Minimal cognitive impairment  Assessment & Plan  -Continue aricept             TRAUMA TERTIARY SURVEY NOTE    VTE Prophylaxis:Reason for no pharmacologic prophylaxis SAH      Disposition: Med surg    Code status:  Level 1 - Full Code    Consultants: IP CONSULT TO NEUROSURGERY  IP CONSULT TO GERONTOLOGY    Subjective     Patient feels well today and like her pain has almost completely resolved. She has been able to eat and drink normally.     Mechanism of Injury:Fall     Chief Complaint: Fall    HPI/Last 24 hour events: Per chart review: Ann Cross is a 83 y.o. female who presents with following a fall.  She reports that she was hanging up some [place] decorations when she had a mechanical fall striking her head.  She denies loss of consciousness.  She is on aspirin.  She was found to have a subdural hemorrhage on imaging. Repeat CTH this morning shows stable subdural.      Objective   Vitals:   Temp:  [97.5 °F (36.4 °C)-98.8 °F (37.1 °C)] 98.5 °F (36.9 °C)  HR:  [60-66] 60  Resp:  [16-18] 16  BP: (121-159)/(54-77) 134/61    I/O         12/21 0701  12/22 0700 12/22 0701 12/23 0700 12/23 0701 12/24 0700    IV Piggyback  50     Total Intake(mL/kg)  50 (0.7)     Net  +50                     Physical Exam:    GENERAL APPEARANCE: NAD  NEURO: AAOx3, strength 5/5 b/l upper and lower extremities   HEENT: EOMI, PERRLA, MMM  CV: RRR, no murmurs, rubs, or gallops  LUNGS: CTAB, normal work of breathing   GI: Soft, non-tender, non-distended  MSK: No LE edema  SKIN: Warm, dry     Invasive Devices       Peripheral Intravenous Line  Duration             Peripheral IV 12/22/23 Left Antecubital <1 day                       1. Before the illness or injury that brought you to the Emergency, did you need someone to help you on a regular basis? 1=Yes   2. Since the illness or injury that brought you to the Emergency, have you needed more help than usual to take care of yourself? 0=No   3. Have you been hospitalized for one or more nights during the past 6 months (excluding a stay in the Emergency Department)? 0=No   4. In general, do you see well? 0=Yes   5. In general, do you have serious problems with your memory? 1= Yes   6. Do you take more than three different medications everyday? 1=Yes   TOTAL   3     Did you order a geriatric consult if the score was 2 or greater?: yes         Lab Results: Results: I have personally reviewed all pertinent laboratory/tests results    Imaging Results: I have personally reviewed pertinent reports.    Chest Xray(s): N/A   FAST exam(s): N/A   CT Scan(s): positive for acute findings: Subdural    Additional Xray(s): N/A     Other Studies: None

## 2023-12-23 NOTE — PHYSICAL THERAPY NOTE
Physical Therapy Evaluation     Patient's Name: Ann Cross    Admitting Diagnosis  Subdural hemorrhage (HCC) [I62.00]  Unspecified multiple injuries, initial encounter [T07.XXXA]    Problem List  Patient Active Problem List   Diagnosis    Pain, joint, knee, right    Obstructive sleep apnea syndrome     Mood disorder with hypomanic features    Generalized anxiety disorder    Minimal cognitive impairment    Hyperlipemia    Hypertension    Anxiety state    Degeneration of lumbar intervertebral disc    History of total bilateral knee replacement    Metabolic syndrome X    Migraine    Osteoporosis    Paroxysmal atrial fibrillation (HCC)    Peripheral venous insufficiency    Tachy-alison syndrome (HCC)    Stage 3 chronic kidney disease, unspecified whether stage 3a or 3b CKD (HCC)    COVID-19    Thrombocytopenia (HCC)    Sinus pause    Subdural hemorrhage (HCC)    Acute pain due to trauma    Fall       Past Medical History  Past Medical History:   Diagnosis Date    Afib (HCC)     Arthritis     Hyperlipidemia     Hypertension     Osteopenia     Sleep apnea     Squamous cell skin cancer     LEF TEMPLE    Varicose veins of both lower extremities        Past Surgical History  Past Surgical History:   Procedure Laterality Date    CARDIAC ELECTROPHYSIOLOGY PROCEDURE N/A 10/12/2022    Procedure: Cardiac loop recorder implant;  Surgeon: Rashid Carlos MD;  [facility];  Service: Cardiology    CARDIAC ELECTROPHYSIOLOGY PROCEDURE N/A 4/21/2023    Procedure: Cardiac pacer implant;  Surgeon: Simba Chilel MD;  [facility];  Service: Cardiology    CATARACT EXTRACTION      COLONOSCOPY      complete, for polyp removal    EYE SURGERY      HYSTERECTOMY      age 45    INCISION AND DRAINAGE OF WOUND Right 06/28/2016    Procedure: ARTHROSCOPY,EVACUATION OF HEMATOMA, OPEN EXPLORATION OF WOUND;  Surgeon: Adam Brice MD;  Location: [facility];  Service:     MOHS SURGERY Left 12/27/2022    left  temple    SKIN BIOPSY      TONSILLECTOMY AND ADENOIDECTOMY      TOTAL KNEE ARTHROPLASTY Right     TOTAL KNEE ARTHROPLASTY Left     WISDOM TOOTH EXTRACTION            12/23/23 1001   PT Last Visit   PT Visit Date 12/23/23   Note Type   Note type Evaluation   Pain Assessment   Pain Assessment Tool 0-10   Pain Score No Pain   Restrictions/Precautions   Braces or Orthoses   (denies)   Other Precautions Multiple lines;Telemetry;Fall Risk;Bed Alarm   Home Living   Type of Home Assisted living  ([facility])   Home Layout One level   Home Equipment Cane   Prior Function   Level of [place] Independent with functional mobility  (amb w/ SPC)   Lives With Facility staff   Receives Help From Personal care attendant   Falls in the last 6 months 1 to 4  (a fall leading to current admission)   General   Additional Pertinent History cleared for assessment (spoke to MELONIE w/ NS and nsg)   Family/Caregiver Present Yes   Cognition   Overall Cognitive Status WFL   Arousal/Participation Alert   Orientation Level Oriented to person;Oriented to place;Oriented to situation   Memory Decreased recall of biographical information;Decreased recall of recent events   Following Commands Follows one step commands without difficulty   Subjective   Subjective Alert; in bed; agreeable to mobilize   RUE Assessment   RUE Assessment WFL  (AROM)   LUE Assessment   LUE Assessment WFL  (AROM)   RLE Assessment   RLE Assessment WFL  (AROM)   Strength RLE   RLE Overall Strength   (fair +)   LLE Assessment   LLE Assessment WFL  (AROM)   Strength LLE   LLE Overall Strength   (fair +)   Bed Mobility   Supine to Sit 3  Moderate assistance   Additional items Assist x 2;HOB elevated;Increased time required;Verbal cues;LE management   Sit to Supine 3  Moderate assistance   Additional items Assist x 1;Increased time required;Verbal cues;LE management   Transfers   Sit to Stand 3  Moderate assistance   Additional items Assist x 1;Verbal cues   Stand to Sit 3   Moderate assistance   Additional items Assist x 1;Verbal cues   Ambulation/Elevation   Gait pattern Excessively slow;Short stride;Inconsistent jonah  (generally shaky)   Gait Assistance 4  Minimal assist   Additional items Assist x 1;Verbal cues;Tactile cues   Assistive Device Rolling walker   Distance 40 ft   Balance   Static Sitting Fair +   Dynamic Sitting Fair   Static Standing Fair -   Dynamic Standing Poor +   Ambulatory Poor +   Activity Tolerance   Activity Tolerance Patient limited by fatigue   Medical Staff Made Aware Co-eval performed w/ OTR due to complexity of medical status and multiple comorbidities; spoke to MELONIE Domínguez w/ CT sx   Nurse Made Aware spoke to CARYN Garcia   Assessment   Prognosis Good   Problem List Decreased strength;Decreased endurance;Impaired balance;Decreased mobility   Assessment Pt is 83 y.o. female admitted with hx of fall and Dx of SDH. Pt 's comorbidities affecting POC include: Afib (HCC), Arthritis, Hypertension, Osteopenia, Sleep apnea, and Minimal cognitive impairment and personal factors of: advanced age and uses SPC for amb. Pt's clinical presentation is currently unstable/unpredictable which is evident in ongoing management of current Dx w/ NS consult pending and need for assist w/ all phases of mobility when usually mobilizing independently. Pt presents w/ generalized guarding, min overall weakness, incl decreased LE strength, decreased functional endurance and inconsistent amb balance and gait patterns w/ use of rw at this time. Will cont to follow pt in PT for progressive mobilization to address above functional deficits and to max level of (I), endurance, and safety. Currently recommend  rehab upon D/C. Will cont to follow until then.   Goals   Patient Goals to walk   STG Expiration Date 01/02/24   Short Term Goal #1 7-10 days. Pt will amb 150 ft w/ rw <--> SPC, mod (I) in order to facilitate safe return to premorbid environment and to initiate return to community amb  status. Pt will achieve (I) level w/ bed mob in order to facilitate safety with OOB and back to bed transitions in own living environment. Pt will perform transfers w/ mod (I) to assure (I) and safety w/ functional mobility/transitions w/ all aspects of mobility/locomotion. Pt will participate in LE therex and balance activities to max progression w/ mobility skills.   PT Treatment Day 0   Plan   Treatment/Interventions Functional transfer training;LE strengthening/ROM;Therapeutic exercise;Endurance training;Bed mobility;Equipment eval/education;Gait training;Spoke to nursing;OT;Family   PT Frequency 3-5x/wk   Discharge Recommendation   Rehab Resource Intensity Level, PT I (Maximum Resource Intensity)   Equipment Recommended Walker   Walker Package Recommended Wheeled walker   AM-PAC Basic Mobility Inpatient   Turning in Flat Bed Without Bedrails 3   Lying on Back to Sitting on Edge of Flat Bed Without Bedrails 2   Moving Bed to Chair 2   Standing Up From Chair Using Arms 2   Walk in Room 3   Climb 3-5 Stairs With Railing 1   Basic Mobility Inpatient Raw Score 13   Basic Mobility Standardized Score 33.99   Highest Level Of Mobility   -HLM Goal 4: Move to chair/commode   JH-HLM Achieved 7: Walk 25 feet or more   Modified Cecy Scale   Modified Rombauer Scale 4   End of Consult   Patient Position at End of Consult Supine;Bed/Chair alarm activated;All needs within reach     Edy Persaud PT

## 2023-12-23 NOTE — ASSESSMENT & PLAN NOTE
Trace right tentorial SDH  S/p fall off chair while standing on it, 12/22.  Hx Afib on ASA, DDAVP given.  Exam is non-focal. GCS 15.    Imaging:  CT head wo, 12/22/2023: Stable trace acute subdural hemorrhage along the right tentorium cerebelli. No mass effect or midline shift. Mild to moderate chronic small vessel ischemic changes.    Plan:  Continue to monitor neuro exam closely.  STAT CT head with decline in GCS > 2 pts in 1 hour.  Keppra per trauma team.  Hold AC/AP.  Continue to hold home ASA x 2 weeks.   Mobilize with PT/OT.  DVT ppx: SCD's, ok for pharmacologic DVT ppx.    Neurosurgery will sign off. Follow up outpatient in 2 weeks with repeat CT head. Call with questions.

## 2023-12-23 NOTE — CONSULTS
[facility]  Consult  Name: Ann Cross 83 y.o. female I MRN: [id number]  Unit/Bed#: [facility] 613-01 I Date of Admission: 12/22/2023   Date of Service: 12/23/2023 I Hospital Day: 1    Inpatient consult to Neurosurgery  Consult performed by: LUZ Arreguin  Consult ordered by: Ronnie Singh,           Assessment/Plan   Subdural hemorrhage (HCC)  Assessment & Plan  Trace right tentorial SDH  S/p fall off chair while standing on it, 12/22.  Hx Afib on ASA, DDAVP given.  Exam is non-focal. GCS 15.    Imaging:  CT head wo, 12/22/2023: Stable trace acute subdural hemorrhage along the right tentorium cerebelli. No mass effect or midline shift. Mild to moderate chronic small vessel ischemic changes.    Plan:  Continue to monitor neuro exam closely.  STAT CT head with decline in GCS > 2 pts in 1 hour.  Keppra per trauma team.  Hold AC/AP.  Continue to hold home ASA x 2 weeks.   Mobilize with PT/OT.  DVT ppx: SCD's, ok for pharmacologic DVT ppx.    Neurosurgery will sign off. Follow up outpatient in 2 weeks with repeat CT head. Call with questions.           History of Present Illness     HPI: Ann Cross is a 83 y.o. female with PMH including atrial fibrillation on aspirin, hyperlipidemia, hypertension, VIRGILIO, who presented to [facility] emergency department on 12/22 after sustaining a fall off of a chair while she was trying to hang some decorations.  Initially she was complaining of significant low back pain.  She was noted on imaging with trace right tentorial leaflet subdural hemorrhage.    Currently she denies any headache.  She denies any nausea or vomiting.  She denies any visual disturbance.  She is accompanied by her daughter and grandchild.  She still works and is able to complete all of her own ADLs.    Review of Systems   Constitutional: Negative.    HENT: Negative.     Respiratory: Negative.     Cardiovascular: Negative.    Gastrointestinal:  Negative.    Musculoskeletal:  Positive for back pain.   Neurological: Negative.  Negative for dizziness, tremors, seizures, syncope, facial asymmetry, speech difficulty, weakness, light-headedness, numbness and headaches.       Historical Information   Past Medical History:   Diagnosis Date    Afib (HCC)     Arthritis     Hyperlipidemia     Hypertension     Osteopenia     Sleep apnea     Squamous cell skin cancer     LEF TEMPLE    Varicose veins of both lower extremities      Past Surgical History:   Procedure Laterality Date    CARDIAC ELECTROPHYSIOLOGY PROCEDURE N/A 10/12/2022    Procedure: Cardiac loop recorder implant;  Surgeon: Rashid Carlos MD;  [facility];  Service: Cardiology    CARDIAC ELECTROPHYSIOLOGY PROCEDURE N/A 4/21/2023    Procedure: Cardiac pacer implant;  Surgeon: Simba Chilel MD;  [facility];  Service: Cardiology    CATARACT EXTRACTION      COLONOSCOPY      complete, for polyp removal    EYE SURGERY      HYSTERECTOMY      age 45    INCISION AND DRAINAGE OF WOUND Right 06/28/2016    Procedure: ARTHROSCOPY,EVACUATION OF HEMATOMA, OPEN EXPLORATION OF WOUND;  Surgeon: Adam Brice MD;  Location[facility];  Service:     MOHS SURGERY Left 12/27/2022    left temple    SKIN BIOPSY      TONSILLECTOMY AND ADENOIDECTOMY      TOTAL KNEE ARTHROPLASTY Right     TOTAL KNEE ARTHROPLASTY Left     WISDOM TOOTH EXTRACTION       Social History     Substance and Sexual Activity   Alcohol Use Not Currently     Social History     Substance and Sexual Activity   Drug Use No     Social History     Tobacco Use   Smoking Status Never   Smokeless Tobacco Never     Family History   Problem Relation Age of Onset    Diabetes Mother     Diabetes Father     Diabetes Brother     No Known Problems Sister     No Known Problems Daughter     No Known Problems Maternal Grandmother     No Known Problems Maternal Grandfather     No Known Problems Paternal Grandmother     No Known Problems  Paternal Grandfather     No Known Problems Daughter     Brain cancer Son     No Known Problems Maternal Aunt        Meds/Allergies   all current active meds have been reviewed and current meds:   Current Facility-Administered Medications   Medication Dose Route Frequency    acebutolol (SECTRAL) capsule 200 mg  200 mg Oral Daily    acetaminophen (TYLENOL) tablet 650 mg  650 mg Oral Q4H PRN    divalproex sodium (DEPAKOTE) DR tablet 250 mg  250 mg Oral TID    donepezil (ARICEPT) tablet 5 mg  5 mg Oral HS    ergocalciferol (VITAMIN D2) capsule 50,000 Units  50,000 Units Oral Q30 Days    HYDROmorphone HCl (DILAUDID) injection 0.2 mg  0.2 mg Intravenous Q2H PRN    levETIRAcetam (KEPPRA) tablet 500 mg  500 mg Oral BID    melatonin tablet 3 mg  3 mg Oral HS    naloxone (NARCAN) 0.04 mg/mL syringe 0.04 mg  0.04 mg Intravenous Q1MIN PRN    ondansetron (ZOFRAN) injection 4 mg  4 mg Intravenous Q6H PRN    oxyCODONE (ROXICODONE) split tablet 2.5 mg  2.5 mg Oral Q4H PRN    Or    oxyCODONE (ROXICODONE) IR tablet 5 mg  5 mg Oral Q4H PRN    polyethylene glycol (MIRALAX) packet 17 g  17 g Oral Daily     Allergies   Allergen Reactions    Atorvastatin Other (See Comments)     myalgia    Statins Other (See Comments)     Weakness in legs       Objective   I/O         12/21 0701  12/22 0700 12/22 0701  12/23 0700 12/23 0701  12/24 0700    IV Piggyback  50     Total Intake(mL/kg)  50 (0.7)     Net  +50                    Physical Exam  Constitutional:       Appearance: She is well-developed.   HENT:      Head: Normocephalic and atraumatic.   Eyes:      Extraocular Movements: EOM normal.      Pupils: Pupils are equal, round, and reactive to light.   Pulmonary:      Effort: Pulmonary effort is normal.   Abdominal:      Palpations: Abdomen is soft.   Musculoskeletal:         General: Normal range of motion.      Cervical back: Normal range of motion and neck supple.   Skin:     General: Skin is warm and dry.   Neurological:      Mental  Status: She is alert and oriented to person, place, and time.      Coordination: Finger-Nose-Finger Test normal.   Psychiatric:         Speech: Speech normal.       Neurologic Exam     Mental Status   Oriented to person, place, and time.   Oriented to person.   Oriented to place.   Oriented to time. Oriented to year, month and date.   Attention: normal. Concentration: normal.   Speech: speech is normal   Level of consciousness: alert    Cranial Nerves     CN III, IV, VI   Pupils are equal, round, and reactive to light.  Extraocular motions are normal.   Right pupil: Size: 3 mm. Shape: regular. Reactivity: brisk. Consensual response: intact. Accommodation: intact.   Left pupil: Size: 3 mm. Shape: regular. Reactivity: brisk. Consensual response: intact. Accommodation: intact.   Nystagmus: none   Diplopia: none  Conjugate gaze: present    CN V   Right facial sensation deficit: none  Left facial sensation deficit: none    CN VII   Facial expression full, symmetric.     CN VIII   Hearing: intact    CN IX, X   Palate: symmetric    CN XI   Right sternocleidomastoid strength: normal  Left sternocleidomastoid strength: normal  Right trapezius strength: normal  Left trapezius strength: normal    CN XII   Tongue: not atrophic  Fasciculations: absent  Tongue deviation: none    Motor Exam   Muscle bulk: normal  Overall muscle tone: normal  Right arm pronator drift: absent  Left arm pronator drift: absent    Strength   Right deltoid: 5/5  Left deltoid: 5/5  Right biceps: 5/5  Left biceps: 5/5  Right triceps: 5/5  Left triceps: 5/5  Right quadriceps: 5/5  Left quadriceps: 5/5  Right hamstrin/5  Left hamstrin/5  Right anterior tibial: 5/5  Left anterior tibial: 5/5  Right posterior tibial: 5/5  Left posterior tibial: 5/5  Right peroneal: 5/5  Left peroneal: 5/5  Right gastroc: 5/5  Left gastroc: 5/5    Sensory Exam   Light touch normal.   Proprioception normal.     Gait, Coordination, and Reflexes     Coordination   Finger  "to nose coordination: normal    Tremor   Resting tremor: absent  Intention tremor: absent  Action tremor: absent      Vitals:Blood pressure 121/54, pulse 65, temperature 98.4 °F (36.9 °C), resp. rate 17, height 5' 4\" (1.626 m), weight 70.5 kg (155 lb 6.8 oz), SpO2 96%.,Body mass index is 26.68 kg/m².     Lab Results:   Results from last 7 days   Lab Units 12/23/23  0448   WBC Thousand/uL 7.83   HEMOGLOBIN g/dL 14.8   HEMATOCRIT % 44.6   PLATELETS Thousands/uL 153   NEUTROS PCT % 67   MONOS PCT % 12   EOS PCT % 0     Results from last 7 days   Lab Units 12/23/23  0448   POTASSIUM mmol/L 4.5   CHLORIDE mmol/L 101   CO2 mmol/L 25   BUN mg/dL 15   CREATININE mg/dL 0.82   CALCIUM mg/dL 8.9                 No results found for: \"TROPONINT\"  ABG:No results found for: \"PHART\", \"CCW7QWC\", \"PO2ART\", \"HFX5TWA\", \"X0MRBBDA\", \"BEART\", \"SOURCE\"    Imaging Studies: I have personally reviewed pertinent reports.   and I have personally reviewed pertinent films in PACS    CT head wo contrast    Result Date: 12/23/2023  Impression: Stable trace acute subdural hemorrhage along the right tentorium cerebelli. No mass effect or midline shift. Mild to moderate chronic small vessel ischemic changes. Workstation performed: [id number]     CT chest without contrast    Result Date: 12/22/2023  Impression: No acute intrathoracic abnormality. Workstation performed: [id number]     CT thoracic spine without contrast    Result Date: 12/22/2023  Impression: No acute thoracic spine fracture or subluxation. Mild ankylotic and spondylotic degenerative changes of the thoracic spine. Workstation performed: [id number]     CT cervical spine without contrast    Result Date: 12/22/2023  Impression: No cervical spine fracture or traumatic malalignment. Workstation performed: [id number]     CT head without contrast    Result Date: 12/22/2023  Impression: Trace acute subdural hemorrhage along the right tentorium cerebelli. No acute intraparenchymal hemorrhage. No " mass effect or midline shift. Mild to moderate chronic small vessel ischemic changes. I personally discussed this study with MADISON VOGEL on 12/22/2023 10:24 PM. Workstation performed: [id number]       EKG, Pathology, and Other Studies: I have personally reviewed pertinent reports.   and I have personally reviewed pertinent films in PACS    VTE Prophylaxis: Sequential compression device (Venodyne)     Code Status: Level 1 - Full Code  Advance Directive and Living Will:      Power of :    POLST:      Counseling / Coordination of Care  I spent 20 minutes with the patient.

## 2023-12-23 NOTE — PROGRESS NOTES
[facility]  Progress Note  Name: Ann Cross I  MRN: [id number]  [facility] Date of Admission: 12/22/2023   Date of Service: 12/23/2023 I Hospital Day: 1    Assessment/Plan   Fall  Assessment & Plan  -PT/OT consult  -Geriatrics consult    Acute pain due to trauma  Assessment & Plan  -Multimodal pain control    Subdural hemorrhage (HCC)  Assessment & Plan  -Trace acute subdural hemorrhage along the right tentorium cerebelli.  -S/p DDAVP given aspirin use  -HOT protocol  -Keppra seizure prophylaxis  -Neurosurgery consult  -Repeat CTH stable  -Multimodal pain control    Paroxysmal atrial fibrillation (HCC)  Assessment & Plan  -Continue beta blocker    Minimal cognitive impairment  Assessment & Plan  -Continue aricept             Bowel Regimen: Miralax  VTE Prophylaxis:Reason for no pharmacologic prophylaxis SDH      Disposition: Downgrade to Med/surg level of care until evaluated by PT/OT      Subjective   Chief Complaint: Fall    Subjective: Patient feels much better and her headache has almost completely resolved.      Objective   Vitals:   Temp:  [97.5 °F (36.4 °C)-98.8 °F (37.1 °C)] 97.5 °F (36.4 °C)  HR:  [63-66] 65  Resp:  [17-18] 17  BP: (127-159)/(58-77) 127/60    I/O         12/21 0701  12/22 0700 12/22 0701  12/23 0700 12/23 0701  12/24 0700    IV Piggyback  50     Total Intake(mL/kg)  50 (0.7)     Net  +50                     Physical Exam:   GENERAL APPEARANCE: NAD  NEURO: AAOx3, strength 5/5 bilateral upper and lower extremities, sensation intact bilaterally   HEENT: EOMI, PERRLA, MMM  CV: RRR, no murmurs, gallops, or rubs  LUNGS: CTAB, normal work of breathing  GI: Soft, non-distended, non-tender  MSK: Moves all 4 extremities  SKIN: ***    Invasive Devices       Peripheral Intravenous Line  Duration             Peripheral IV 12/22/23 Left Antecubital <1 day                          Lab Results: Results: I have personally reviewed all pertinent  laboratory/tests results  Imaging: I have personally reviewed pertinent reports.   ***  Other Studies: None

## 2023-12-23 NOTE — ED PROVIDER NOTES
Emergency Department Trauma Note  Ann Cross 83 y.o. female MRN: [id number]  Unit/Bed#: [facility] 613/[facility] 613-01 Encounter: [id number]      Trauma Alert: Trauma Acuity: C  Model of Arrival: Mode of Arrival: ALS via Trauma Squad Name and Number: [facility]  Trauma Team: Current Providers  Attending Provider: Stevo Egan MD  Attending Provider: Gamal Ma DO  Resident: Dennis King MD  Resident: Kacy Ríos MD  Registered Nurse: Dang Carbajal RN  Registered Nurse: Ximena Marrero RN  Patient Care Assistant: Eleni Elena  Unit Clerk: Leslie Edward  Patient Care Assistant: Kirstin Delacruz  Registered Nurse: Radha Manning RN  Occupational Therapist: Kacy Chandler OT  Patient Care Assistant: Em Sierra  Physical Therapist: Edy Persuad, IVAN  Patient Care Assistant: Stevo Walsh  Patient Care Assistant: Melanie Cooper  Consultants:     None      History of Present Illness     Chief Complaint:   Chief Complaint   Patient presents with    Fall     Pt stated she was standing on a chair and fell backwards. +HS, +ASA, -LOC.      HPI:  Ann Cross is a 83 y.o. female who presents with mechanical fall from chair while attempting to hang up [place] stockings.  Patient had positive head strike, is on aspirin but denies any other blood thinners, no loss consciousness.  Patient states since that time she has had T-spine tenderness and mild tenderness on the posterior aspect of her skull from where she hit her head.  Patient is moving all extremities, denies any chest pain, abdominal pain, pelvic pain.    Mechanism:Details of Incident: fall, standing on chair, fell off chair onto back Injury Date: 12/22/23        HPI  Review of Systems   Constitutional: Negative.    HENT: Negative.     Eyes: Negative.    Respiratory: Negative.     Cardiovascular: Negative.    Gastrointestinal: Negative.    Endocrine: Negative.    Genitourinary: Negative.    Musculoskeletal:  Positive for back pain. Negative  for neck pain.        Patient endorses T-spine tenderness over approximately T6-T7.  Patient also endorses tenderness to palpation on posterior aspect of her skull.   Skin: Negative.    Allergic/Immunologic: Negative.    Neurological: Negative.    Hematological: Negative.    Psychiatric/Behavioral: Negative.     All other systems reviewed and are negative.      Historical Information     Immunizations:   Immunization History   Administered Date(s) Administered    COVID-19 PFIZER VACCINE 0.3 ML IM 02/26/2021, 03/19/2021, 12/01/2021       Past Medical History:   Diagnosis Date    Afib (HCC)     Arthritis     Hyperlipidemia     Hypertension     Osteopenia     Sleep apnea     Squamous cell skin cancer     LEF TEMPLE    Varicose veins of both lower extremities        Family History   Problem Relation Age of Onset    Diabetes Mother     Diabetes Father     Diabetes Brother     No Known Problems Sister     No Known Problems Daughter     No Known Problems Maternal Grandmother     No Known Problems Maternal Grandfather     No Known Problems Paternal Grandmother     No Known Problems Paternal Grandfather     No Known Problems Daughter     Brain cancer Son     No Known Problems Maternal Aunt      Past Surgical History:   Procedure Laterality Date    CARDIAC ELECTROPHYSIOLOGY PROCEDURE N/A 10/12/2022    Procedure: Cardiac loop recorder implant;  Surgeon: Rashid Carlos MD;  Location: [facility];  Service: Cardiology    CARDIAC ELECTROPHYSIOLOGY PROCEDURE N/A 4/21/2023    Procedure: Cardiac pacer implant;  Surgeon: Simba Chilel MD;  Location: [facility];  Service: Cardiology    CATARACT EXTRACTION      COLONOSCOPY      complete, for polyp removal    EYE SURGERY      HYSTERECTOMY      age 45    INCISION AND DRAINAGE OF WOUND Right 06/28/2016    Procedure: ARTHROSCOPY,EVACUATION OF HEMATOMA, OPEN EXPLORATION OF WOUND;  Surgeon: Adam Brice MD;  Location: [facility];  Service:     MOHS SURGERY Left 12/27/2022     left temple    SKIN BIOPSY      TONSILLECTOMY AND ADENOIDECTOMY      TOTAL KNEE ARTHROPLASTY Right     TOTAL KNEE ARTHROPLASTY Left     WISDOM TOOTH EXTRACTION       Social History     Tobacco Use    Smoking status: Never    Smokeless tobacco: Never   Vaping Use    Vaping status: Never Used   Substance Use Topics    Alcohol use: Not Currently    Drug use: No     E-Cigarette/Vaping    E-Cigarette Use Never User      E-Cigarette/Vaping Substances    Nicotine No     THC No     CBD No     Flavoring No     Other No     Unknown No        Family History: non-contributory    Meds/Allergies   Prior to Admission Medications   Prescriptions Last Dose Informant Patient Reported? Taking?   Aspirin Low Dose 81 MG EC tablet  Self Yes No   Sig: Take 81 mg by mouth daily   [employer] Senna Plus 8.6-50 MG per tablet  Self Yes No   Sig: every other day   Calcium Carbonate 1500 (600 Ca) MG TABS  Self Yes No   Sig: Take 1 tablet by mouth daily   Donepezil HCl 5 MG/DAY PTWK  Self Yes No   Sig: Place on the skin   Melatonin 3 MG CAPS  Self Yes No   Sig: Take 6 mg by mouth   acebutolol (SECTRAL) 200 mg capsule  Self Yes No   Sig: Take 200 mg by mouth daily   amLODIPine (NORVASC) 10 mg tablet  Self No No   Sig: Take 1 tablet (10 mg total) by mouth daily   divalproex sodium (DEPAKOTE) 250 mg EC tablet  Self Yes No   Sig: Take 250 mg by mouth 3 (three) times a day   donepezil (ARICEPT) 5 mg tablet  Self No No   Sig: Take 1 tablet (5 mg total) by mouth daily at bedtime   ergocalciferol (VITAMIN D2) 50,000 units  Self Yes No   Sig: Take 50,000 Units by mouth every 30 (thirty) days   flecainide (TAMBOCOR) 50 mg tablet  Self No No   Sig: Take 1 tablet (50 mg total) by mouth every 12 (twelve) hours   fluticasone (FLONASE) 50 mcg/act nasal spray   No No   Si spray into each nostril daily for 7 days   ibuprofen (MOTRIN) 600 mg tablet  Self Yes No   Sig: Take 600 mg by mouth every 6 (six) hours as needed   rosuvastatin (CRESTOR) 5 mg tablet  Self Yes  No   Sig: TAKE 1/2 TABLET BY MOUTH EVERY OTHER DAY IN THE EVENING   sodium chloride (MARITZA 128) 5 % hypertonic ophthalmic ointment  Self No No   Sig: Administer to both eyes every 3 (three) hours as needed (irritation)      Facility-Administered Medications: None       Allergies   Allergen Reactions    Atorvastatin Other (See Comments)     myalgia    Statins Other (See Comments)     Weakness in legs       PHYSICAL EXAM    PE limited by: N/A    Objective   Vitals:   First set: Temperature: 98.8 °F (37.1 °C) (12/22/23 2025)  Pulse: 66 (12/22/23 2025)  Respirations: 18 (12/22/23 2025)  Blood Pressure: 159/77 (12/22/23 2025)  SpO2: 96 % (12/22/23 2025)    Primary Survey:   (A) Airway: Clear  (B) Breathing: Bilateral breath sounds auscultation  (C) Circulation: Pulses:   normal  (D) Disabliity:  GCS Total:  15  (E) Expose:  Completed    Secondary Survey: (Click on Physical Exam tab above)  Physical Exam  Vitals and nursing note reviewed.   Constitutional:       Appearance: Normal appearance. She is normal weight.   HENT:      Head: Normocephalic.      Comments: Patient has small superficial abrasion from where she hit her head on the carpet.  However no repairable laceration.  No palpable deformity, crepitus.  No other tenderness to palpation on scalp examination.     Right Ear: Tympanic membrane, ear canal and external ear normal.      Left Ear: Tympanic membrane, ear canal and external ear normal.      Nose: Nose normal.      Mouth/Throat:      Mouth: Mucous membranes are moist.      Pharynx: Oropharynx is clear.   Eyes:      Extraocular Movements: Extraocular movements intact.      Conjunctiva/sclera: Conjunctivae normal.      Pupils: Pupils are equal, round, and reactive to light.   Neck:      Comments: Patient has no C-spine tenderness.  Cardiovascular:      Rate and Rhythm: Normal rate and regular rhythm.      Pulses: Normal pulses.      Heart sounds: Normal heart sounds.   Pulmonary:      Effort: Pulmonary effort  is normal.      Breath sounds: Normal breath sounds.   Abdominal:      General: Abdomen is flat. Bowel sounds are normal.      Palpations: Abdomen is soft.   Musculoskeletal:         General: Tenderness present. Normal range of motion.      Cervical back: Normal range of motion and neck supple. No rigidity or tenderness.      Comments: Patient endorses T-spine tenderness over T6, T7.  No palpable deformity, no step-off.  Rest of spinal examination negative for any point tenderness, step-off, deformity.  Patient moves all extremities, no sensory deficits, 5 out of 5 strength in all extremities.   Skin:     General: Skin is warm and dry.      Capillary Refill: Capillary refill takes less than 2 seconds.   Neurological:      General: No focal deficit present.      Mental Status: She is alert and oriented to person, place, and time.   Psychiatric:         Mood and Affect: Mood normal.         Behavior: Behavior normal.         Thought Content: Thought content normal.         Judgment: Judgment normal.         Cervical spine cleared by clinical criteria? Yes     Invasive Devices       Peripheral Intravenous Line  Duration             Peripheral IV 12/22/23 Left Antecubital <1 day                    Lab Results:   Results Reviewed       Procedure Component Value Units Date/Time    Basic metabolic panel [[id number]] Collected: 12/23/23 0448    Lab Status: Final result Specimen: Blood from Arm, Right Updated: 12/23/23 0617     Sodium 138 mmol/L      Potassium 4.5 mmol/L      Chloride 101 mmol/L      CO2 25 mmol/L      ANION GAP 12 mmol/L      BUN 15 mg/dL      Creatinine 0.82 mg/dL      Glucose 135 mg/dL      Calcium 8.9 mg/dL      eGFR 66 ml/min/1.73sq m     Narrative:      National Kidney Disease Foundation guidelines for Chronic Kidney Disease (CKD):     Stage 1 with normal or high GFR (GFR > 90 mL/min/1.73 square meters)    Stage 2 Mild CKD (GFR = 60-89 mL/min/1.73 square meters)    Stage 3A Moderate CKD (GFR = 45-59  mL/min/1.73 square meters)    Stage 3B Moderate CKD (GFR = 30-44 mL/min/1.73 square meters)    Stage 4 Severe CKD (GFR = 15-29 mL/min/1.73 square meters)    Stage 5 End Stage CKD (GFR <15 mL/min/1.73 square meters)  Note: GFR calculation is accurate only with a steady state creatinine    CBC and differential [[id number]] Collected: 12/23/23 0448    Lab Status: Final result Specimen: Blood from Arm, Right Updated: 12/23/23 0557     WBC 7.83 Thousand/uL      RBC 4.91 Million/uL      Hemoglobin 14.8 g/dL      Hematocrit 44.6 %      MCV 91 fL      MCH 30.1 pg      MCHC 33.2 g/dL      RDW 13.4 %      MPV 10.4 fL      Platelets 153 Thousands/uL      nRBC 0 /100 WBCs      Neutrophils Relative 67 %      Immat GRANS % 1 %      Lymphocytes Relative 20 %      Monocytes Relative 12 %      Eosinophils Relative 0 %      Basophils Relative 0 %      Neutrophils Absolute 5.24 Thousands/µL      Immature Grans Absolute 0.06 Thousand/uL      Lymphocytes Absolute 1.55 Thousands/µL      Monocytes Absolute 0.93 Thousand/µL      Eosinophils Absolute 0.03 Thousand/µL      Basophils Absolute 0.02 Thousands/µL                    Imaging Studies:   Direct to CT: No  CT head wo contrast   Final Result by Ang Mas MD (12/23 0642)      Stable trace acute subdural hemorrhage along the right tentorium cerebelli.      No mass effect or midline shift.      Mild to moderate chronic small vessel ischemic changes.            Workstation performed: [id number]         CT head without contrast   Final Result by Ang Mas MD (12/22 2226)      Trace acute subdural hemorrhage along the right tentorium cerebelli.      No acute intraparenchymal hemorrhage.      No mass effect or midline shift.      Mild to moderate chronic small vessel ischemic changes.            I personally discussed this study with MADISON VOGEL on 12/22/2023 10:24 PM.            Workstation performed: [id number]         CT cervical spine without contrast   Final Result by  Ang Mas MD (12/22 2228)      No cervical spine fracture or traumatic malalignment.                  Workstation performed: [id number]         CT thoracic spine without contrast   Final Result by Ang Mas MD (12/22 2232)      No acute thoracic spine fracture or subluxation.      Mild ankylotic and spondylotic degenerative changes of the thoracic spine.            Workstation performed: [id number]         CT chest without contrast   Final Result by Ang Mas MD (12/22 2243)      No acute intrathoracic abnormality.                     Workstation performed: [id number]         CT head wo contrast    (Results Pending)         Procedures  Procedures         ED Course           Medical Decision Making  Patient is 83-year-old female presenting for mechanical fall.  DDx: Mechanical fall, rule out intracranial hemorrhage or abnormality, rule out T-spine fracture.  Patient is a 3-year-old female presenting for mechanical fall.  Based on physical exam findings and presentation, plans for CT of head, C-spine and T-spine.  Tylenol given for pain.  CT head significant for small subdural.  DDAVP given, trauma contacted.  Plan for admission at this time.    Problems Addressed:  Subdural hemorrhage (HCC): acute illness or injury    Amount and/or Complexity of Data Reviewed  Radiology: ordered.    Risk  OTC drugs.                Disposition  [facility] Transfer: No  Final diagnoses:   Subdural hemorrhage (HCC)     Time reflects when diagnosis was documented in both MDM as applicable and the Disposition within this note       Time User Action Codes Description Comment    12/22/2023 10:45 PM Ronnie Singh Add [S06.5XAA] SDH (subdural hematoma) (HCC)     12/22/2023 10:45 PM Ronnie Singh Remove [S06.5XAA] SDH (subdural hematoma) (HCC)     12/22/2023 10:45 PM Ronnie Singh Add [I62.00] Subdural hemorrhage (HCC)           ED Disposition       None          Follow-up Information       Follow up With  Specialties Details Why Contact Info Additional Information    [facility] Neurosurgical [facility] Neurosurgery Follow up in 2 week(s) Follow up as scheduled. Please obtain CT head 2-3 days prior to appointment at any [facility]'s facility. Order has been placed electronically. [address]  [address]  [facility] [address] [phone] [facility], [address] [facility] [address], [place], [place], [address] [phone]          Current Discharge Medication List        CONTINUE these medications which have NOT CHANGED    Details   acebutolol (SECTRAL) 200 mg capsule Take 200 mg by mouth daily      amLODIPine (NORVASC) 10 mg tablet Take 1 tablet (10 mg total) by mouth daily  Qty: 30 tablet, Refills: 0    Associated Diagnoses: Essential hypertension      Aspirin Low Dose 81 MG EC tablet Take 81 mg by mouth daily      Calcium Carbonate 1500 (600 Ca) MG TABS Take 1 tablet by mouth daily      [employer] Senna Plus 8.6-50 MG per tablet every other day      divalproex sodium (DEPAKOTE) 250 mg EC tablet Take 250 mg by mouth 3 (three) times a day      donepezil (ARICEPT) 5 mg tablet Take 1 tablet (5 mg total) by mouth daily at bedtime  Qty: 30 tablet, Refills: 1    Associated Diagnoses: Mood disorder (HCC)      Donepezil HCl 5 MG/DAY PTWK Place on the skin      ergocalciferol (VITAMIN D2) 50,000 units Take 50,000 Units by mouth every 30 (thirty) days      flecainide (TAMBOCOR) 50 mg tablet Take 1 tablet (50 mg total) by mouth every 12 (twelve) hours  Qty: 60 tablet, Refills: 0    Associated Diagnoses: Mood disorder (HCC); Paroxysmal atrial fibrillation (HCC)      fluticasone (FLONASE) 50 mcg/act nasal spray 1 spray into each nostril daily for 7 days  Qty: 9.9 mL, Refills: 0    Associated Diagnoses: COVID      ibuprofen (MOTRIN) 600 mg tablet Take 600 mg by mouth every 6 (six) hours as needed      Melatonin 3 MG CAPS Take 6 mg by mouth      rosuvastatin (CRESTOR) 5 mg tablet  TAKE 1/2 TABLET BY MOUTH EVERY OTHER DAY IN THE EVENING      sodium chloride (MARITZA 128) 5 % hypertonic ophthalmic ointment Administer to both eyes every 3 (three) hours as needed (irritation)  Qty: 3.5 g, Refills: 0    Associated Diagnoses: Mood disorder (HCC)           Outpatient Discharge Orders   CT head wo contrast   Standing Status: Future Standing Exp. Date: 12/23/27       PDMP Review         Value Time User    PDMP Reviewed  Yes 2/20/2023  4:58 PM Sterling Taylor MD            ED Provider  Electronically Signed by           Dennis King MD  12/23/23 8431

## 2023-12-23 NOTE — ASSESSMENT & PLAN NOTE
-Trace acute subdural hemorrhage along the right tentorium cerebelli.  -S/p DDAVP given aspirin use  -HOT protocol  -Keppra seizure prophylaxis  -Neurosurgery consult  -Repeat CTH in morning  -Multimodal pain control

## 2023-12-23 NOTE — PLAN OF CARE
Problem: PHYSICAL THERAPY ADULT  Goal: Performs mobility at highest level of function for planned discharge setting.  See evaluation for individualized goals.  Description: Treatment/Interventions: Functional transfer training, LE strengthening/ROM, Therapeutic exercise, Endurance training, Bed mobility, Equipment eval/education, Gait training, Spoke to nursing, OT, Family  Equipment Recommended: Walker       See flowsheet documentation for full assessment, interventions and recommendations.  Note: Prognosis: Good  Problem List: Decreased strength, Decreased endurance, Impaired balance, Decreased mobility  Assessment: Pt is 83 y.o. female admitted with hx of fall and Dx of SDH. Pt 's comorbidities affecting POC include: Afib (HCC), Arthritis, Hypertension, Osteopenia, Sleep apnea, and Minimal cognitive impairment and personal factors of: advanced age and uses SPC for amb. Pt's clinical presentation is currently unstable/unpredictable which is evident in ongoing management of current Dx w/ NS consult pending and need for assist w/ all phases of mobility when usually mobilizing independently. Pt presents w/ generalized guarding, min overall weakness, incl decreased LE strength, decreased functional endurance and inconsistent amb balance and gait patterns w/ use of rw at this time. Will cont to follow pt in PT for progressive mobilization to address above functional deficits and to max level of (I), endurance, and safety. Currently recommend  rehab upon D/C. Will cont to follow until then.        Rehab Resource Intensity Level, PT: I (Maximum Resource Intensity)    See flowsheet documentation for full assessment.

## 2023-12-23 NOTE — PLAN OF CARE
Problem: PAIN - ADULT  Goal: Verbalizes/displays adequate comfort level or baseline comfort level  Description: Interventions:  - Encourage patient to monitor pain and request assistance  - Assess pain using appropriate pain scale  - Administer analgesics based on type and severity of pain and evaluate response  - Implement non-pharmacological measures as appropriate and evaluate response  - Notify physician/advanced practitioner if interventions unsuccessful or patient reports new pain  Outcome: Progressing     Problem: SAFETY ADULT  Goal: Patient will remain free of falls  Description: INTERVENTIONS:  - Educate patient/family on patient safety including physical limitations  - Instruct patient to call for assistance with activity   - Consult OT/PT to assist with strengthening/mobility   - Keep Call bell within reach  - Keep bed low and locked with side rails adjusted as appropriate  - Keep care items and personal belongings within reach  - Initiate and maintain comfort rounds  - Make Fall Risk Sign visible to staff  - Offer Toileting every two Hours, in advance of need  - Initiate/Maintain bed alarm  - Obtain necessary fall risk management equipment  - Apply yellow socks and bracelet for high fall risk patients  - Consider moving patient to room near nurses station  Outcome: Progressing     Problem: NEUROSENSORY - ADULT  Goal: Achieves stable or improved neurological status  Description: INTERVENTIONS  - Monitor and report changes in neurological status  - Monitor vital signs such as temperature, blood pressure, and any other labs ordered   Outcome: Progressing

## 2023-12-23 NOTE — ASSESSMENT & PLAN NOTE
-Continue aricept   [FreeTextEntry1] : Annual physical [de-identified] : DAVID MOORE is a 64 year old M who presents today for annual physical. Patient has no new complaints. Pt has DM, cholesterol and BP.

## 2023-12-23 NOTE — PLAN OF CARE
Problem: OCCUPATIONAL THERAPY ADULT  Goal: Performs self-care activities at highest level of function for planned discharge setting.  See evaluation for individualized goals.  Description: Treatment Interventions: ADL retraining, Functional transfer training, Endurance training, Patient/family training, Equipment evaluation/education, Continued evaluation, Energy conservation, Activityengagement          See flowsheet documentation for full assessment, interventions and recommendations.   Outcome: Progressing  Note: Limitation: Decreased ADL status, Decreased Safe judgement during ADL, Decreased cognition, Decreased endurance, Decreased self-care trans, Decreased high-level ADLs  Prognosis: Fair  Assessment: Pt is a 83 y.o. female who was admitted [facility] on 12/22/2023 with fall + head strike, with trace acute subdural hemorrhage along the R tentorium cerebelli. Pt seen for an OT evaluation per active OT orders.  Pt  has a past medical history of Afib (HCC), Arthritis, Hyperlipidemia, Hypertension, Osteopenia, Sleep apnea, Squamous cell skin cancer, and Varicose veins of both lower extremities. Pt lives at [facility] in a 1 level apt, uses a walk-in shower with GB and shower chair, standard toilet with GB. Pta, pt was independent w/ ADL and functional mobility, has assist for IADLs, was (-) driving and was using a cane. Currently, pt is Min Ax1 for UB ADL, Mod Ax1 for LB ADL, and completed transfers/FM w Mod Ax1- Min A x 1 with RW. Pt currently presents with impairments in the following categories -difficulty performing ADLS activity tolerance, endurance, memory, insight, and safety . These impairments, as well as pt's fatigue and risk for falls  limit pt's ability to safely engage in all baseline areas of occupation, includinggrooming, bathing, dressing, toileting, and functional mobility/transfers.    The patient's raw score on the AM-PAC Daily Activity Inpatient Short Form is 16. A raw  score of less than 19 suggests the patient may benefit from discharge to post-acute rehabilitation services. Please refer to the recommendation of the Occupational Therapist for safe discharge planning. Pt would benefit from continued acute OT services throughout hospital course and following D/C. Plan for OT interventions 2-3x per week. From OT standpoint, recommend post acute rehab services, unless SHAINA can provide required assist as well as rehab services upon D/C. Pt was left supine in bed with alarm on, family present and all needs within reach.     Rehab Resource Intensity Level, OT: I (Maximum Resource Intensity) (pending progress)

## 2023-12-23 NOTE — OCCUPATIONAL THERAPY NOTE
Occupational Therapy Evaluation     Patient Name: Ann Cross  Today's Date: 12/23/2023  Problem List  Active Problems:    Minimal cognitive impairment    Paroxysmal atrial fibrillation (HCC)    Subdural hemorrhage (HCC)    Acute pain due to trauma    Fall    Past Medical History  Past Medical History:   Diagnosis Date    Afib (HCC)     Arthritis     Hyperlipidemia     Hypertension     Osteopenia     Sleep apnea     Squamous cell skin cancer     LEF TEMPLE    Varicose veins of both lower extremities      Past Surgical History  Past Surgical History:   Procedure Laterality Date    CARDIAC ELECTROPHYSIOLOGY PROCEDURE N/A 10/12/2022    Procedure: Cardiac loop recorder implant;  Surgeon: Rashid Carlos MD;  [facility];  Service: Cardiology    CARDIAC ELECTROPHYSIOLOGY PROCEDURE N/A 4/21/2023    Procedure: Cardiac pacer implant;  Surgeon: Simba Chilel MD;  [facility];  Service: Cardiology    CATARACT EXTRACTION      COLONOSCOPY      complete, for polyp removal    EYE SURGERY      HYSTERECTOMY      age 45    INCISION AND DRAINAGE OF WOUND Right 06/28/2016    Procedure: ARTHROSCOPY,EVACUATION OF HEMATOMA, OPEN EXPLORATION OF WOUND;  Surgeon: Adam Brice MD;  [facility]: [facility];  Service:     MOHS SURGERY Left 12/27/2022    left temple    SKIN BIOPSY      TONSILLECTOMY AND ADENOIDECTOMY      TOTAL KNEE ARTHROPLASTY Right     TOTAL KNEE ARTHROPLASTY Left     WISDOM TOOTH EXTRACTION               12/23/23 1002   OT Last Visit   OT Visit Date 12/23/23   Note Type   Note type Evaluation   Pain Assessment   Pain Assessment Tool 0-10   Pain Score No Pain   Restrictions/Precautions   Weight Bearing Precautions Per Order No   Other Precautions Multiple lines;Telemetry;Fall Risk;Bed Alarm;Cognitive   Home Living   Type of Home Assisted living  ([facility])   Home Layout One level;Performs ADLs on one level   Bathroom Shower/Tub Walk-in shower   Bathroom Toilet Standard  "  Bathroom Equipment Grab bars in shower;Grab bars around toilet;Shower chair   Bathroom Accessibility Accessible   Home Equipment Cane   Additional Comments Pta pt I with ADL and fxnal mobility, has assist with IADLs   Prior Function   Level of [place] Independent with ADLs;Independent with functional mobility;Needs assistance with IADLS   Lives With Facility staff   Receives Help From Personal care attendant   IADLs Family/Friend/Other provides transportation;Family/Friend/Other provides meals;Family/Friend/Other provides medication management   Falls in the last 6 months 1 to 4  (fall leading to current admission)   Vocational Retired   Lifestyle   Autonomy Pta pt I with ADL and fxnal mobility, has assist for IADLs, (-)    Reciprocal Relationships supportive family   Service to Others retired   Intrinsic Gratification enjoys activities at [facility]   Subjective   Subjective \"I am definitely feeling my old lady kicking on\"- referring to difficulty with word finding a couple times throughout session   ADL   Where Assessed Edge of bed   Eating Assistance 5  Supervision/Setup   Grooming Assistance 4  Minimal Assistance   UB Bathing Assistance 4  Minimal Assistance   LB Bathing Assistance 3  Moderate Assistance   UB Dressing Assistance 4  Minimal Assistance   LB Dressing Assistance 3  Moderate Assistance   Toileting Assistance  3  Moderate Assistance   Functional Assistance 3  Moderate Assistance   Bed Mobility   Supine to Sit 3  Moderate assistance   Additional items Assist x 2;HOB elevated;Verbal cues;Increased time required   Sit to Supine 3  Moderate assistance   Additional items Assist x 1;Increased time required;Verbal cues;LE management   Additional Comments Pt greeted and left in bed with alarm on, family present and all needs within reach   Transfers   Sit to Stand 3  Moderate assistance   Additional items Assist x 1;Increased time required;Verbal cues   Stand to Sit 3  Moderate assistance   Additional " items Assist x 1;Increased time required;Verbal cues   Additional Comments with RW   Functional Mobility   Functional Mobility 4  Minimal assistance   Additional Comments Pt performs short household distances with MIN A x 1 with RW   Additional items Rolling walker   Balance   Static Sitting Fair +   Dynamic Sitting Fair   Static Standing Fair -   Dynamic Standing Poor +   Ambulatory Poor +   Activity Tolerance   Activity Tolerance Patient limited by fatigue   Medical Staff Made Aware Co-eval with DPT due to high medical complexity   Nurse Made Aware RN cleared for therapy   RUE Assessment   RUE Assessment WFL   LUE Assessment   LUE Assessment WFL   Hand Function   Gross Motor Coordination Functional  (although with some slightly decreased GM coordination of LUE as seen during finger to nose exercise)   Fine Motor Coordination Functional   Sensation   Light Touch No apparent deficits   Psychosocial   Psychosocial (WDL) WDL   Cognition   Overall Cognitive Status WFL  (although with higher level deficits detected, difficulty with word finding)   Arousal/Participation Alert;Cooperative   Attention Within functional limits   Orientation Level Oriented to person;Oriented to place;Oriented to situation   Memory Decreased recall of recent events;Decreased recall of biographical information  (with difficulty with word finding)   Following Commands Follows one step commands without difficulty   Comments Pt cooperative to therapy, with some difficulty with word finding at times, per EMR with minimal cognitive impairment.   Assessment   Limitation Decreased ADL status;Decreased Safe judgement during ADL;Decreased cognition;Decreased endurance;Decreased self-care trans;Decreased high-level ADLs   Prognosis Fair   Assessment Pt is a 83 y.o. female who was [facility] to [facility] with fall + head strike, with trace acute subdural hemorrhage along the R tentorium cerebelli. Pt seen for an OT evaluation per  active OT orders.  Pt  has a past medical history of Afib (HCC), Arthritis, Hyperlipidemia, Hypertension, Osteopenia, Sleep apnea, Squamous cell skin cancer, and Varicose veins of both lower extremities. [facility], uses a walk-in shower with GB and shower chair, standard toilet with GB. Pta, pt was independent w/ ADL and functional mobility, has assist for IADLs, was (-) driving and was using a cane. Currently, pt is Min Ax1 for UB ADL, Mod Ax1 for LB ADL, and completed transfers/FM w Mod Ax1- Min A x 1 with RW. Pt currently presents with impairments in the following categories -difficulty performing ADLS activity tolerance, endurance, memory, insight, and safety . These impairments, as well as pt's fatigue and risk for falls  limit pt's ability to safely engage in all baseline areas of occupation, includinggrooming, bathing, dressing, toileting, and functional mobility/transfers.    The patient's raw score on the AM-PAC Daily Activity Inpatient Short Form is 16. A raw score of less than 19 suggests the patient may benefit from discharge to post-acute rehabilitation services. Please refer to the recommendation of the Occupational Therapist for safe discharge planning. Pt would benefit from continued acute OT services throughout hospital course and following D/C. Plan for OT interventions 2-3x per week. From OT standpoint, recommend post acute rehab services, unless [facility] can provide required assist as well as rehab services upon D/C. Pt was left supine in bed with alarm on, family present and all needs within reach.   Goals   Patient Goals to get better   Plan   Treatment Interventions ADL retraining;Functional transfer training;Endurance training;Patient/family training;Equipment evaluation/education;Continued evaluation;Energy conservation;Activityengagement   Goal Expiration Date 01/06/24   OT Frequency 2-3x/wk   Discharge Recommendation   Rehab Resource Intensity Level, OT I  (Maximum Resource Intensity)  (pending progress)   AM-PAC Daily Activity Inpatient   Lower Body Dressing 2   Bathing 2   Toileting 2   Upper Body Dressing 3   Grooming 3   Eating 4   Daily Activity Raw Score 16   Daily Activity Standardized Score (Calc for Raw Score >=11) 35.96   AM-PAC Applied Cognition Inpatient   Following a Speech/Presentation 3   Understanding Ordinary Conversation 4   Taking Medications 3   Remembering Where Things Are Placed or Put Away 3   Remembering List of 4-5 Errands 2   Taking Care of Complicated Tasks 2   Applied Cognition Raw Score 17   Applied Cognition Standardized Score 36.52   Modified [place] Scale   Modified [place] Scale 4   End of Consult   Education Provided Yes;Family or social support of family present for education by provider   Patient Position at End of Consult Supine;Bed/Chair alarm activated;All needs within reach   Nurse Communication Nurse aware of consult       OT Goals    - Pt will be Supervision with LB ADL by end of hospital course.    - Pt will be Mod I with UB ADL by end of hospital course.    - Pt will be Mod I with all functional transfers required for patient safety by end of hospital course.    - Pt will be Mod I with functional mobility to/from bathroom for increased independence with toileting tasks.    - Pt will independently recall and implement safety precautions during OT sessions.     - Pt activity tolerance will increase to 30 minutes in order to safely engage in ADL and transfers.     - Pt standing tolerance will increase to 10 minutes  in order to promote sink side ADLs and IADL activities.    - Pt will consistently follow one-step directions with min to no vc or prompting.     - Pt will attend to functional tasks for 10 minutes with min to no vc for attention/redirection.        ANTONIA Armendariz, OTR/L

## 2023-12-23 NOTE — ASSESSMENT & PLAN NOTE
-Trace acute subdural hemorrhage along the right tentorium cerebelli.  -S/p DDAVP given aspirin use  -HOT protocol  -Keppra seizure prophylaxis  -Neurosurgery consult  -Repeat CTH stable  -Multimodal pain control

## 2023-12-23 NOTE — PLAN OF CARE
Problem: PAIN - ADULT  Goal: Verbalizes/displays adequate comfort level or baseline comfort level  Description: Interventions:  - Encourage patient to monitor pain and request assistance  - Assess pain using appropriate pain scale  - Administer analgesics based on type and severity of pain and evaluate response  - Implement non-pharmacological measures as appropriate and evaluate response  - Consider cultural and social influences on pain and pain management  - Notify physician/advanced practitioner if interventions unsuccessful or patient reports new pain  Outcome: Progressing     Problem: SAFETY ADULT  Goal: Patient will remain free of falls  Description: INTERVENTIONS:  - Educate patient/family on patient safety including physical limitations  - Instruct patient to call for assistance with activity   - Consult OT/PT to assist with strengthening/mobility   - Keep Call bell within reach  - Keep bed low and locked with side rails adjusted as appropriate  - Keep care items and personal belongings within reach  - Initiate and maintain comfort rounds  - Make Fall Risk Sign visible to staff  - Offer Toileting every 2 Hours, in advance of need  - Initiate/Maintain alarm  - Obtain necessary fall risk management equipment:   - Apply yellow socks and bracelet for high fall risk patients  - Consider moving patient to room near nurses station  Outcome: Progressing     Problem: DISCHARGE PLANNING  Goal: Discharge to home or other facility with appropriate resources  Description: INTERVENTIONS:  - Identify barriers to discharge w/patient and caregiver  - Arrange for needed discharge resources and transportation as appropriate  - Identify discharge learning needs (meds, wound care, etc.)  - Arrange for interpretive services to assist at discharge as needed  - Refer to Case Management Department for coordinating discharge planning if the patient needs post-hospital services based on physician/advanced practitioner order or  complex needs related to functional status, cognitive ability, or social support system  Outcome: Progressing     Problem: Knowledge Deficit  Goal: Patient/family/caregiver demonstrates understanding of disease process, treatment plan, medications, and discharge instructions  Description: Complete learning assessment and assess knowledge base.  Interventions:  - Provide teaching at level of understanding  - Provide teaching via preferred learning methods  Outcome: Progressing

## 2023-12-23 NOTE — H&P
[facility]  H&P  Name: Ann Corss 83 y.o. female I MRN: [id number]  Unit/Bed#: ED 26 I Date of Admission: 12/22/2023   Date of Service: 12/22/2023 I Hospital Day: 0      Assessment/Plan   Fall  Assessment & Plan  -PT/OT consult  -Geriatrics consult    Acute pain due to trauma  Assessment & Plan  -Multimodal pain control    Subdural hemorrhage (HCC)  Assessment & Plan  -Trace acute subdural hemorrhage along the right tentorium cerebelli.  -S/p DDAVP given aspirin use  -HOT protocol  -Keppra seizure prophylaxis  -Neurosurgery consult  -Repeat CTH in morning  -Multimodal pain control    Paroxysmal atrial fibrillation (HCC)  Assessment & Plan  -Continue beta blocker    Minimal cognitive impairment  Assessment & Plan  -Continue aricept           Trauma Alert: Evaluation; trauma team notified at 10:40pm via in person   Model of Arrival: Ambulance    Trauma Team: Attending Anil and Residents [place]  Consultants:     Neurosurgery: routine consult; Epic consult order placed;     History of Present Illness     Chief Complaint: fall  Mechanism:Fall     HPI:    Ann Cross is a 83 y.o. female who presents with following a fall.  She reports that she was hanging up some [place] decorations when she had a mechanical fall striking her head.  She denies loss of consciousness.  She is on aspirin.  She was found to have a subdural hemorrhage on imaging    Review of Systems   Gastrointestinal:  Negative for vomiting.   Musculoskeletal:  Positive for back pain. Negative for neck pain.   Neurological:  Negative for weakness, numbness and headaches.     12-point, complete review of systems was reviewed and negative except as stated above.     Historical Information     Past Medical History:   Diagnosis Date    Afib (HCC)     Arthritis     Hyperlipidemia     Hypertension     Osteopenia     Sleep apnea     Squamous cell skin cancer     LEF TEMPLE    Varicose veins of both lower  extremities      Past Surgical History:   Procedure Laterality Date    CARDIAC ELECTROPHYSIOLOGY PROCEDURE N/A 10/12/2022    Procedure: Cardiac loop recorder implant;  Surgeon: Rashid Carlos MD;  [facility];  Service: Cardiology    CARDIAC ELECTROPHYSIOLOGY PROCEDURE N/A 4/21/2023    Procedure: Cardiac pacer implant;  Surgeon: Simba Chilel MD;  [facility];  Service: Cardiology    CATARACT EXTRACTION      COLONOSCOPY      complete, for polyp removal    EYE SURGERY      HYSTERECTOMY      age 45    INCISION AND DRAINAGE OF WOUND Right 06/28/2016    Procedure: ARTHROSCOPY,EVACUATION OF HEMATOMA, OPEN EXPLORATION OF WOUND;  Surgeon: Adam Brice MD;  Location[facility];  Service:     MOHS SURGERY Left 12/27/2022    left temple    SKIN BIOPSY      TONSILLECTOMY AND ADENOIDECTOMY      TOTAL KNEE ARTHROPLASTY Right     TOTAL KNEE ARTHROPLASTY Left     WISDOM TOOTH EXTRACTION          Social History     Tobacco Use    Smoking status: Never    Smokeless tobacco: Never   Vaping Use    Vaping status: Never Used   Substance Use Topics    Alcohol use: Not Currently    Drug use: No     Immunization History   Administered Date(s) Administered    COVID-19 PFIZER VACCINE 0.3 ML IM 02/26/2021, 03/19/2021, 12/01/2021     Last Tetanus: unknown  Family History: Non-contributory    1. Before the illness or injury that brought you to the Emergency, did you need someone to help you on a regular basis? 1=Yes   2. Since the illness or injury that brought you to the Emergency, have you needed more help than usual to take care of yourself? 1=Yes   3. Have you been hospitalized for one or more nights during the past 6 months (excluding a stay in the Emergency Department)? 0=No   4. In general, do you see well? 0=Yes   5. In general, do you have serious problems with your memory? 1=Yes   6. Do you take more than three different medications everyday? 1=Yes   TOTAL   4     Did you order a geriatric  consult if the score was 2 or greater?: yes     Meds/Allergies   all current active meds have been reviewed     Allergies   Allergen Reactions    Atorvastatin Other (See Comments)     myalgia    Statins Other (See Comments)     Weakness in legs       Objective   Initial Vitals:   Temperature: 98.8 °F (37.1 °C) (12/22/23 2025)  Pulse: 66 (12/22/23 2025)  Respirations: 18 (12/22/23 2025)  Blood Pressure: 159/77 (12/22/23 2025)    Primary Survey:   Airway:        Status: patent;        Pre-hospital Interventions: none        Hospital Interventions: none  Breathing:        Pre-hospital Interventions: none       Effort: normal       Right breath sounds: normal       Left breath sounds: normal  Circulation:        Rhythm: regular       Rate: regular   Right Pulses Left Pulses    R radial: 2+    R pedal: 2+     L radial: 2+    L pedal: 2+       Disability:        GCS: Eye: 4; Verbal: 5 Motor: 6 Total: 15       Right Pupil:       Left Pupil:     R Motor Strength L Motor Strength    R : 5/5  R dorsiflex: 5/5  R plantarflex: 5/5 L : 5/5  L dorsiflex: 5/5  L plantarflex: 5/5        Sensory:  No sensory deficit  Exposure:           Secondary Survey:  Physical Exam  Constitutional:       Appearance: Normal appearance.   HENT:      Head: Normocephalic and atraumatic.      Right Ear: External ear normal.      Left Ear: External ear normal.      Nose: Nose normal.      Mouth/Throat:      Mouth: Mucous membranes are moist.   Eyes:      Extraocular Movements: Extraocular movements intact.      Pupils: Pupils are equal, round, and reactive to light.   Cardiovascular:      Rate and Rhythm: Normal rate and regular rhythm.      Pulses: Normal pulses.   Pulmonary:      Effort: Pulmonary effort is normal.      Breath sounds: Normal breath sounds.   Abdominal:      General: Abdomen is flat.      Tenderness: There is no abdominal tenderness.   Musculoskeletal:      Cervical back: Normal range of motion. No tenderness.      Comments:  "There is tenderness palpation along the mid thoracic spine.  There is mild tenderness to palpation along the right anterior lateral ribs.   Neurological:      General: No focal deficit present.      Mental Status: She is alert.         Invasive Devices       Peripheral Intravenous Line  Duration             Peripheral IV 12/22/23 Left Antecubital <1 day                  Lab Results: I have personally reviewed all pertinent laboratory/test results from 12/23/23, including the preceding 24 hours.  No results for input(s): \"WBC\", \"HGB\", \"HCT\", \"PLT\", \"BANDSPCT\", \"SODIUM\", \"K\", \"CL\", \"CO2\", \"BUN\", \"CREATININE\", \"GLUC\", \"CAIONIZED\", \"MG\", \"PHOS\", \"AST\", \"ALT\", \"ALB\", \"TBILI\", \"DBILI\", \"ALKPHOS\", \"PTT\", \"INR\", \"HSTNI0\", \"HSTNI2\", \"BNP\", \"LACTICACID\" in the last 72 hours.    Imaging Results: I have personally reviewed pertinent images saved in PACS. CT scan findings (and other pertinent positive findings on images) were discussed with radiology. My interpretation of the images/reports are as follows:  Chest Xray(s): N/A   FAST exam(s): N/A   CT Scan(s): positive for acute findings: SDH   Additional Xray(s): N/A     Other Studies: none    Code Status: Level 1 - Full Code  Advance Directive and Living Will:      Power of :    POLST:    I have spent 38 minutes with Patient  today in which greater than 50% of this time was spent in counseling/coordination of care regarding Impressions, Documenting in the medical record, Reviewing / ordering tests, medicine, procedures  , and Obtaining or reviewing history  .       "

## 2023-12-23 NOTE — ED ATTENDING ATTESTATION
12/22/2023  I, Stevo Egan MD, saw and evaluated the patient. I have discussed the patient with the resident/non-physician practitioner and agree with the resident's/non-physician practitioner's findings, Plan of Care, and MDM as documented in the resident's/non-physician practitioner's note, except where noted. All available labs and Radiology studies were reviewed.  I was present for key portions of any procedure(s) performed by the resident/non-physician practitioner and I was immediately available to provide assistance.       At this point I agree with the current assessment done in the Emergency Department.  I have conducted an independent evaluation of this patient a history and physical is as follows:  Patient is a level C trauma she fell off a chair when she was putting gas into a stocking she fell backward struck the back of her head on a carpeted floor she is on aspirin complains of midthoracic back pain she does not have a headache and did not have loss of consciousness he denies shortness of breath denies abdominal pain  Exam patient awake alert and orient x 3 HEENT exam normocephalic atraumatic was equal reactive neck nontender tenderness is noted in the mid thoracic area between the shoulder blades is midline lumbar spine tenderness chest wall is nontender heart regular abdomen soft nontender no bruising pelvis stable neurologic exam nonfocal  Sent for CT of head and chest  CT scan shows a very small 2 mm subdural hematoma    Trauma consult for admission  ED Course         Critical Care Time  Procedures

## 2023-12-24 ENCOUNTER — APPOINTMENT (INPATIENT)
Dept: RADIOLOGY | Facility: HOSPITAL | Age: 83
DRG: 084 | End: 2023-12-24
Payer: MEDICARE

## 2023-12-24 VITALS
OXYGEN SATURATION: 95 % | DIASTOLIC BLOOD PRESSURE: 57 MMHG | RESPIRATION RATE: 16 BRPM | HEIGHT: 64 IN | WEIGHT: 155.42 LBS | BODY MASS INDEX: 26.53 KG/M2 | TEMPERATURE: 98.2 F | SYSTOLIC BLOOD PRESSURE: 135 MMHG | HEART RATE: 62 BPM

## 2023-12-24 LAB
ANION GAP SERPL CALCULATED.3IONS-SCNC: 8 MMOL/L
BASOPHILS # BLD AUTO: 0.02 THOUSANDS/ÂΜL (ref 0–0.1)
BASOPHILS NFR BLD AUTO: 0 % (ref 0–1)
BUN SERPL-MCNC: 22 MG/DL (ref 5–25)
CALCIUM SERPL-MCNC: 8.4 MG/DL (ref 8.4–10.2)
CHLORIDE SERPL-SCNC: 103 MMOL/L (ref 96–108)
CO2 SERPL-SCNC: 28 MMOL/L (ref 21–32)
CREAT SERPL-MCNC: 0.69 MG/DL (ref 0.6–1.3)
EOSINOPHIL # BLD AUTO: 0.15 THOUSAND/ÂΜL (ref 0–0.61)
EOSINOPHIL NFR BLD AUTO: 2 % (ref 0–6)
ERYTHROCYTE [DISTWIDTH] IN BLOOD BY AUTOMATED COUNT: 13.5 % (ref 11.6–15.1)
GFR SERPL CREATININE-BSD FRML MDRD: 80 ML/MIN/1.73SQ M
GLUCOSE SERPL-MCNC: 130 MG/DL (ref 65–140)
HCT VFR BLD AUTO: 42.1 % (ref 34.8–46.1)
HGB BLD-MCNC: 14.1 G/DL (ref 11.5–15.4)
IMM GRANULOCYTES # BLD AUTO: 0.03 THOUSAND/UL (ref 0–0.2)
IMM GRANULOCYTES NFR BLD AUTO: 0 % (ref 0–2)
LYMPHOCYTES # BLD AUTO: 3.08 THOUSANDS/ÂΜL (ref 0.6–4.47)
LYMPHOCYTES NFR BLD AUTO: 41 % (ref 14–44)
MCH RBC QN AUTO: 30.8 PG (ref 26.8–34.3)
MCHC RBC AUTO-ENTMCNC: 33.5 G/DL (ref 31.4–37.4)
MCV RBC AUTO: 92 FL (ref 82–98)
MONOCYTES # BLD AUTO: 0.95 THOUSAND/ÂΜL (ref 0.17–1.22)
MONOCYTES NFR BLD AUTO: 13 % (ref 4–12)
NEUTROPHILS # BLD AUTO: 3.38 THOUSANDS/ÂΜL (ref 1.85–7.62)
NEUTS SEG NFR BLD AUTO: 44 % (ref 43–75)
NRBC BLD AUTO-RTO: 0 /100 WBCS
PLATELET # BLD AUTO: 132 THOUSANDS/UL (ref 149–390)
PMV BLD AUTO: 11.2 FL (ref 8.9–12.7)
POTASSIUM SERPL-SCNC: 3.7 MMOL/L (ref 3.5–5.3)
RBC # BLD AUTO: 4.58 MILLION/UL (ref 3.81–5.12)
SODIUM SERPL-SCNC: 139 MMOL/L (ref 135–147)
WBC # BLD AUTO: 7.61 THOUSAND/UL (ref 4.31–10.16)

## 2023-12-24 PROCEDURE — 80048 BASIC METABOLIC PNL TOTAL CA: CPT

## 2023-12-24 PROCEDURE — 71045 X-RAY EXAM CHEST 1 VIEW: CPT

## 2023-12-24 PROCEDURE — 85025 COMPLETE CBC W/AUTO DIFF WBC: CPT

## 2023-12-24 RX ORDER — ACETAMINOPHEN 325 MG/1
650 TABLET ORAL EVERY 4 HOURS PRN
Qty: 30 TABLET | Refills: 0
Start: 2023-12-24

## 2023-12-24 RX ORDER — LEVETIRACETAM 500 MG/1
500 TABLET ORAL 2 TIMES DAILY
Qty: 14 TABLET | Refills: 0
Start: 2023-12-24

## 2023-12-24 RX ORDER — LEVETIRACETAM 500 MG/1
500 TABLET ORAL 2 TIMES DAILY
Qty: 14 TABLET | Refills: 0 | Status: SHIPPED
Start: 2023-12-24 | End: 2023-12-24

## 2023-12-24 RX ORDER — OXYCODONE HYDROCHLORIDE 5 MG/1
2.5 TABLET ORAL EVERY 4 HOURS PRN
Qty: 20 TABLET | Refills: 0 | Status: SHIPPED | OUTPATIENT
Start: 2023-12-24 | End: 2023-12-31

## 2023-12-24 RX ADMIN — ENOXAPARIN SODIUM 30 MG: 30 INJECTION SUBCUTANEOUS at 09:05

## 2023-12-24 RX ADMIN — POLYETHYLENE GLYCOL 3350 17 G: 17 POWDER, FOR SOLUTION ORAL at 09:05

## 2023-12-24 RX ADMIN — LEVETIRACETAM 500 MG: 500 TABLET, FILM COATED ORAL at 09:04

## 2023-12-24 RX ADMIN — DIVALPROEX SODIUM 250 MG: 250 TABLET, DELAYED RELEASE ORAL at 09:05

## 2023-12-24 NOTE — UTILIZATION REVIEW
"NOTIFICATION OF INPATIENT ADMISSION   AUTHORIZATION REQUEST   SERVICING FACILITY:   [facility]  Address: [address] [place] [address]  Tax ID: [id number]  NPI: [id number] ATTENDING PROVIDER:  Attending Name and NPI#: Gamal Ma Do [[id number]]  Address: [address] [place] [address]  Phone: [phone]   ADMISSION INFORMATION:  Place of Service: Inpatient [facility]  Place of Service Code: 21  Inpatient Admission Date/Time: 12/22/23 10:47 PM  Discharge Date/Time: No discharge date for patient encounter.  Admitting Diagnosis Code/Description:  Subdural hemorrhage (HCC) [I62.00]  Unspecified multiple injuries, initial encounter [T07.XXXA]     UTILIZATION REVIEW CONTACT:  Sheryl Leos"Kelsey\" Rufus Utilization   Network Utilization Review Department  Phone: [phone]  Fax: [phone]  Email: [email or web address]  Contact for approvals/pending authorizations, clinical reviews, and discharge.     PHYSICIAN ADVISORY SERVICES:  Medical Necessity Denial & Rmus-ls-Cxvb Review  Phone: [phone]  Fax: [phone]  Email: [email or web address]     DISCHARGE SUPPORT TEAM:  For Patients Discharge Needs & Updates  Phone: [phone] opt. 2 Fax: [phone]  Email: [email or web address]     "

## 2023-12-24 NOTE — PROGRESS NOTES
[facility]  Progress Note  Name: Ann Cross I  MRN: [id number]  Unit/Bed#: [facility] 613-01 I Date of Admission: 12/22/2023   Date of Service: 12/24/2023 I Hospital Day: 2    Assessment/Plan   No new Assessment & Plan notes have been filed under this hospital service since the last note was generated.  Service: Trauma           Bowel Regimen: Miralax  VTE Prophylaxis:Enoxaparin (Lovenox)     Disposition: Med surg     Subjective   Chief Complaint: Fall    Subjective: Patient woke up today with a cough and congestion. Otherwise feeling well. Headache has resolved. She would like to go home today.      Objective   Vitals:   Temp:  [97.5 °F (36.4 °C)-98.6 °F (37 °C)] 98.3 °F (36.8 °C)  HR:  [60-68] 67  Resp:  [14-19] 16  BP: (121-134)/(54-64) 132/64    I/O         12/22 0701  12/23 0700 12/23 0701  12/24 0700    IV Piggyback 50     Total Intake(mL/kg) 50 (0.7)     Urine (mL/kg/hr)  600 (0.4)    Total Output  600    Net +50 -600          Unmeasured Urine Occurrence  1 x             Physical Exam:   GENERAL APPEARANCE: NAD  NEURO: AAOx3, Strength 5/5 BLE and BUE, sensation intact b/l   HEENT: EOMI,MMM  CV: RRR, no murmurs, gallops, or rubs  LUNGS: CTAB, normal work of breathing   GI: Soft, non-distended, non-tender  MSK: No LE edema  SKIN: Warm, dry   Invasive Devices       Peripheral Intravenous Line  Duration             Peripheral IV 12/22/23 Left Antecubital 1 day                          Lab Results: Results: I have personally reviewed all pertinent laboratory/tests results  Imaging: I have personally reviewed pertinent reports.     Other Studies: None

## 2023-12-24 NOTE — DISCHARGE SUMMARY
"  Discharge Summary - Ann Cross 83 y.o. female MRN: [id number]    Unit/Bed#: [facility] 613-01 Encounter: [id number]    Admission Date:   Admission Orders (From admission, onward)       Ordered        12/22/23 2247  Inpatient Admission  Once                            Admitting Diagnosis: Subdural hemorrhage (HCC) [I62.00]  Unspecified multiple injuries, initial encounter [T07.XXXA]    HPI: per resident:  ERROL Singh, DO:  \"Ann Cross is a 83 y.o. female who presents with following a fall.  She reports that she was hanging up some [place] decorations when she had a mechanical fall striking her head.  She denies loss of consciousness.  She is on aspirin.  She was found to have a subdural hemorrhage on imaging\"    Procedures Performed: No orders of the defined types were placed in this encounter.      Summary of Hospital Course: 84 y/o female from assisted living admitted to Trauma after a fall resulting in a SDH.  Neurosurgery consulted and evaluated patient.  HOT protocol, frequent neuro checks.  Doing well and working with therapy.  Will follow up with PCP and with Neurosurgery.  For details of her stay, please refer to medical records.    Significant Findings, Care, Treatment and Services Provided: XR chest portable    Result Date: 12/24/2023  Impression: No acute cardiopulmonary disease. Resident: JESUS CALLE I, the attending radiologist, have reviewed the images and agree with the final report above. Workstation performed: [id number]     CT head wo contrast    Result Date: 12/23/2023  Impression: Stable trace acute subdural hemorrhage along the right tentorium cerebelli. No mass effect or midline shift. Mild to moderate chronic small vessel ischemic changes. Workstation performed: [id number]     CT chest without contrast    Result Date: 12/22/2023  Impression: No acute intrathoracic abnormality. Workstation performed: [id number]     CT thoracic spine without contrast    Result Date: 12/22/2023  Impression: " No acute thoracic spine fracture or subluxation. Mild ankylotic and spondylotic degenerative changes of the thoracic spine. Workstation performed: [id number]     CT cervical spine without contrast    Result Date: 12/22/2023  Impression: No cervical spine fracture or traumatic malalignment. Workstation performed: [id number]     CT head without contrast    Result Date: 12/22/2023  Impression: Trace acute subdural hemorrhage along the right tentorium cerebelli. No acute intraparenchymal hemorrhage. No mass effect or midline shift. Mild to moderate chronic small vessel ischemic changes. I personally discussed this study with MADISON VOGEL on 12/22/2023 10:24 PM. Workstation performed: [id number]         Complications: none    Discharge Diagnosis: S/P Fall   SDH    Medical Problems       Resolved Problems  Date Reviewed: 12/24/2023   None         Condition at Discharge: stable         Discharge instructions/Information to patient and family:   See after visit summary for information provided to patient and family.      Provisions for Follow-Up Care:  See after visit summary for information related to follow-up care and any pertinent home health orders.      PCP: Emelina Swann MD    Disposition:  back to facility    Planned Readmission: No      Discharge Statement   I spent 30 minutes discharging the patient. This time was spent on the day of discharge. I had direct contact with the patient on the day of discharge. Additional documentation is required if more than 30 minutes were spent on discharge.     Discharge Medications:  See after visit summary for reconciled discharge medications provided to patient and family.

## 2023-12-24 NOTE — PROGRESS NOTES
Patient:    MRN:  [id number]    [place] Request ID:  [id number]    Level of care reserved:  Skilled Nursing Facility    Partner Reserved:  [facility] , [place] [address] [phone]    Clinical needs requested:    Geography searched:  20 miles around [id number]    Start of Service:    Request sent:  12:12pm EST on 12/24/2023 by Ly Petersen    Partner reserved:  12:32pm EST on 12/24/2023 by Ly Petersen    Choice list shared:  12:32pm EST on 12/24/2023 by Ly Petersen

## 2023-12-24 NOTE — UTILIZATION REVIEW
Initial Clinical Review  Date: 12/24/23     Day 3: Has surpassed a 2nd midnight with active treatments and services, which include hourly neuro checks, pain control, continued Keppra.    neurosurgery and geriatrics on consult.     Admission: Date/Time/Statement:   Admission Orders (From admission, onward)       Ordered        12/22/23 2247  Inpatient Admission  Once                          Orders Placed This Encounter   Procedures    Inpatient Admission     Standing Status:   Standing     Number of Occurrences:   1     Order Specific Question:   Level of Care     Answer:   Level 2 Stepdown / HOT [14]     Order Specific Question:   Estimated length of stay     Answer:   More than 2 Midnights     Order Specific Question:   Certification     Answer:   I certify that inpatient services are medically necessary for this patient for a duration of greater than two midnights. See H&P and MD Progress Notes for additional information about the patient's course of treatment.     ED Arrival Information       Expected   -    Arrival   12/22/2023 20:23    Acuity   Urgent              Means of arrival   Ambulance    Escorted by   [facility]    Service   Trauma    Admission type   Emergency              Arrival complaint   Fall             Chief Complaint   Patient presents with    Fall     Pt stated she was standing on a chair and fell backwards. +HS, +ASA, -LOC.        Initial Presentation: 83 y.o. female from home to ED via ems admitted inpatient due to Subdural hemorrhage/Fall/acute pain due to trauma.  PMH of PAF, cognitive impairment.   Presented as a trauma alert due to fall on carpet while hanging [place] stocking just prior to arrival.  + head strike.  Now with pain of thoracic pain and head tenderness.    On asa.  On exam:  T spine tenderness at T6-7.  Tenderness posterior skull.   Superficial abrasion on head.  Strength 5/5.   Ct showed acute subdural hemorrhage.  Given DDAVP.   Started on Depakote.  Plan is  consult neurosurgery. Start   Keppra.  Multimodal pain control.   Consult Geriatrics.  Neuro checks every hour.  Ct tomorrow.     Date: 12/23/23    Day 2: pain is improved.   Today ct head shows stable subdural.  On exam alert and oriented.   Continue multimodal pain regimen, Keppra, hourly neuro checks, vitals every 2 hours.     Per neurosurgery 12/23/23:  patient with right tentorial SDH. Plan is monitor neuro exam.  Stat ct head if GCS decrease > 2 points in 1 hour.  Keppra.   Hold AC/AP.  Hold home asa for 2 weeks.   PT/OT.  SCDs    ED Triage Vitals [12/22/23 2025]   Temperature Pulse Respirations Blood Pressure SpO2   98.8 °F (37.1 °C) 66 18 159/77 96 %      Temp Source Heart Rate Source Patient Position - Orthostatic VS BP Location FiO2 (%)   Oral Monitor Lying Left arm --      Pain Score       4          Wt Readings from Last 1 Encounters:   12/23/23 70.5 kg (155 lb 6.8 oz)     Additional Vital Signs:   12/24/23 09:03:41 98.2 °F (36.8 °C) 74 16 146/70 95 97 % -- --   12/24/23 07:12:14 98.3 °F (36.8 °C) 67 16 132/64 87 94 % -- --   12/24/23 03:13:47 -- 67 14 133/63 86 94 % -- --   12/23/23 2248 98.5 °F (36.9 °C) 61 19 132/60 88 90 % None (Room air) --   12/23/23 2000 -- -- -- -- -- -- None (Room air) --   12/23/23 18:52:21 98.6 °F (37 °C) 68 19 133/63 86 94 % -- --   12/23/23 12:00:43 98.5 °F (36.9 °C) 60 16 134/61 85 94 % -- --   12/23/23 11:06:44 98.4 °F (36.9 °C) 65 17 121/54 76 96 % -- --   12/23/23 0800 -- -- -- -- -- 91 % None (Room air) --   12/23/23 07:36:06 97.5 °F (36.4 °C) 65 17 127/60 82 94 % -- --   12/23/23 02:48:31 98.6 °F (37 °C) 63 18 127/58 81 91 % -- --   12/23/23 01:33:46 98.8 °F (37.1 °C) 64 18 156/71 99 94 % None (Room air) Lying   12/22/23 2330 -- 66 18 -- -- 97 % None (Room air)      Date and Time Eye Opening Best Verbal Response Best Motor Response Jem Coma Scale Score   12/24/23 0600 4 5 6 15   12/24/23 0500 4 5 6 15   12/24/23 0400 4 5 6 15   12/24/23 0300 4 5 6 15   12/24/23  0200 4 5 6 15   12/24/23 0100 4 5 6 15   12/24/23 0000 4 5 6 15   12/23/23 2300 4 5 6 15   12/23/23 2200 4 5 6 15   12/23/23 2100 4 5 6 15   12/23/23 2000 4 5 6 15   12/23/23 1900 4 5 6 15   12/23/23 1800 4 5 6 15   12/23/23 1700 4 5 6 15   12/23/23 1600 4 5 6 15   12/23/23 1500 4 5 6 15   12/23/23 1400 4 5 6 15   12/23/23 1300 4 5 6 15   12/23/23 1200 4 5 6 15   12/23/23 1100 4 5 6 15   12/23/23 1000 4 5 6 15   12/23/23 0900 4 5 6 15   12/23/23 0800 4 5 6 15   12/23/23 0600 4 5 6 15   12/23/23 0500 4 5 6 15   12/23/23 0400 4 5 6 15   12/23/23 0300 4 5 6 15   12/23/23 0200 4 5 6 15   12/23/23 0133 4 5 6 15   12/23/23 0100 4 5 6 15   12/23/23 0000 4 5 6 15   12/22/23 2031 4 5 6 15     Pertinent Labs/Diagnostic Test Results:   CT head wo contrast   Final Result by Ang Mas MD (12/23 0642)      Stable trace acute subdural hemorrhage along the right tentorium cerebelli.      No mass effect or midline shift.      Mild to moderate chronic small vessel ischemic changes.            Workstation performed: [id number]         CT head without contrast   Final Result by Ang Mas MD (12/22 2226)      Trace acute subdural hemorrhage along the right tentorium cerebelli.      No acute intraparenchymal hemorrhage.      No mass effect or midline shift.      Mild to moderate chronic small vessel ischemic changes.            I personally discussed this study with MADISON VOGEL on 12/22/2023 10:24 PM.            Workstation performed: [id number]         CT cervical spine without contrast   Final Result by Ang Mas MD (12/22 2228)      No cervical spine fracture or traumatic malalignment.                  Workstation performed: [id number]         CT thoracic spine without contrast   Final Result by Ang Mas MD (12/22 2232)      No acute thoracic spine fracture or subluxation.      Mild ankylotic and spondylotic degenerative changes of the thoracic spine.            Workstation performed:  [id number]         CT chest without contrast   Final Result by Ang Mas MD (12/22 2243)      No acute intrathoracic abnormality.                     Workstation performed: [id number]         CT head wo contrast    (Results Pending)   XR chest portable    (Results Pending)       Results from last 7 days   Lab Units 12/24/23  0607 12/23/23  0448   WBC Thousand/uL 7.61 7.83   HEMOGLOBIN g/dL 14.1 14.8   HEMATOCRIT % 42.1 44.6   PLATELETS Thousands/uL 132* 153   NEUTROS ABS Thousands/µL 3.38 5.24     Results from last 7 days   Lab Units 12/24/23  1014 12/23/23  0448   SODIUM mmol/L 139 138   POTASSIUM mmol/L 3.7 4.5   CHLORIDE mmol/L 103 101   CO2 mmol/L 28 25   ANION GAP mmol/L 8 12   BUN mg/dL 22 15   CREATININE mg/dL 0.69 0.82   EGFR ml/min/1.73sq m 80 66   CALCIUM mg/dL 8.4 8.9     Results from last 7 days   Lab Units 12/24/23  1014 12/23/23  0448   GLUCOSE RANDOM mg/dL 130 135       ED Treatment:   Medication Administration from 12/22/2023 2023 to 12/23/2023 0120         Date/Time Order Dose Route Action Comments     12/23/2023 0052 EST acetaminophen (TYLENOL) tablet 650 mg 650 mg Oral Given --     12/22/2023 2339 EST desmopressin (DDAVP) 26.668 mcg in sodium chloride 0.9 % 50 mL IVPB 26.668 mcg Intravenous New Bag --     12/23/2023 0052 EST divalproex sodium (DEPAKOTE) DR tablet 250 mg 250 mg Oral Given --     12/23/2023 0052 EST donepezil (ARICEPT) tablet 5 mg 5 mg Oral Given --     12/23/2023 0052 EST ergocalciferol (VITAMIN D2) capsule 50,000 Units 50,000 Units Oral Given --     12/23/2023 0052 EST melatonin tablet 3 mg 3 mg Oral Given --          Past Medical History:   Diagnosis Date    Afib (HCC)     Arthritis     Hyperlipidemia     Hypertension     Osteopenia     Sleep apnea     Squamous cell skin cancer     LEF TEMPLE    Varicose veins of both lower extremities      Present on Admission:   Paroxysmal atrial fibrillation (HCC)   Minimal cognitive impairment      Admitting Diagnosis: Subdural  hemorrhage (HCC) [I62.00]  Unspecified multiple injuries, initial encounter [T07.XXXA]  Age/Sex: 83 y.o. female  Admission Orders: 12/22/23 5157 inpatient   Scheduled Medications:  acebutolol, 200 mg, Oral, Daily  divalproex sodium, 250 mg, Oral, TID  donepezil, 5 mg, Oral, HS  enoxaparin, 30 mg, Subcutaneous, Q12H LILI  ergocalciferol, 50,000 Units, Oral, Q30 Days  levETIRAcetam, 500 mg, Oral, BID  melatonin, 3 mg, Oral, HS  polyethylene glycol, 17 g, Oral, Daily    Continuous IV Infusions: none      PRN Meds: not used.   acetaminophen, 650 mg, Oral, Q4H PRN  HYDROmorphone, 0.2 mg, Intravenous, Q2H PRN  naloxone, 0.04 mg, Intravenous, Q1MIN PRN  ondansetron, 4 mg, Intravenous, Q6H PRN  oxyCODONE, 2.5 mg, Oral, Q4H PRN   Or  oxyCODONE, 5 mg, Oral, Q4H PRN    StepDown- vitals every 2 hours  Neuro check every hour    IP CONSULT TO NEUROSURGERY  IP CONSULT TO GERONTOLOGY    Network Utilization Review Department  ATTENTION: Please call with any questions or concerns to [phone] and carefully listen to the prompts so that you are directed to the right person. All voicemails are confidential.   For Discharge needs, contact Care Management DC Support Team at [phone] opt. 2  Send all requests for admission clinical reviews, approved or denied determinations and any other requests to dedicated fax number below belonging to the campus where the patient is receiving treatment. List of dedicated fax numbers for the Facilities:  FACILITY NAME UR FAX NUMBER   ADMISSION DENIALS (Administrative/Medical Necessity) [phone]   DISCHARGE SUPPORT TEAM (NETWORK) [phone]   PARENT CHILD HEALTH (Maternity/NICU/Pediatrics) [phone]   [facility] [phone]   [facility] [phone]   [facility] [phone]   [facility] [phone]   [facility] [phone]   [facility]  [place] [phone]   [facility] [phone]   [facility] [phone]   [facility] [phone]   [facility] [phone]   [facility] [phone]

## 2023-12-24 NOTE — CASE MANAGEMENT
Case Management Assessment & Discharge Planning Note    Patient name Ann Cross  Location [facility] 613/[facility] 613-01 MRN [id number]  : 1940 Date 2023       Current Admission Date: 2023  Current Admission Diagnosis:Subdural hemorrhage (HCC)   Patient Active Problem List    Diagnosis Date Noted    Subdural hemorrhage (HCC) 2023    Acute pain due to trauma 2023    Fall 2023    Sinus pause 2023    Thrombocytopenia (HCC) 2023    COVID-19 2023    Stage 3 chronic kidney disease, unspecified whether stage 3a or 3b CKD (HCC) 2022    Tachy-alison syndrome (HCC) 2022    Hyperlipemia 2022    Hypertension 2022    Generalized anxiety disorder 2021    Obstructive sleep apnea syndrome 2020    Degeneration of lumbar intervertebral disc 2017    History of total bilateral knee replacement 2017    Osteoporosis 05/10/2017    Minimal cognitive impairment 2016    Pain, joint, knee, right 2016     Mood disorder with hypomanic features 2016    Paroxysmal atrial fibrillation (HCC) 2014    Metabolic syndrome X 2007    Anxiety state 2007    Migraine 2007    Peripheral venous insufficiency 2007      LOS (days): 2  Geometric Mean LOS (GMLOS) (days):   Days to GMLOS:     OBJECTIVE:    Risk of Unplanned Readmission Score: 8.4         Current admission status: Inpatient       Preferred Pharmacy:   [id number] [place] - [address] [address]  Phone: [phone] Fax: [phone]    Primary Care Provider: Emelina Swann MD    Primary Insurance: [employer] [facility]  Secondary Insurance:     ASSESSMENT:  Active Health Care Proxies       [employer] Care Representative - Daughter   Primary Phone: [phone] (Mobile)  Home Phone: [phone]                     Patient Information  Admitted from:: Facility ([place]  [place])  Assessment information provided by:: Daughter  Support Systems: Daughter, Family members  County of Residence: [place]  What city do you live in?: [place]         Housing Stability: Low Risk  (4/21/2023)    Housing Stability Vital Sign     Unable to Pay for Housing in the Last Year: No     Number of Places Lived in the Last Year: 1     Unstable Housing in the Last Year: No   Food Insecurity: No Food Insecurity (4/21/2023)    Hunger Vital Sign     Worried About Running Out of Food in the Last Year: Never true     Ran Out of Food in the Last Year: Never true   Transportation Needs: No Transportation Needs (4/21/2023)    PRAPARE - Transportation     Lack of Transportation (Medical): No     Lack of Transportation (Non-Medical): No   Utilities: Not on file       DISCHARGE DETAILS:    Discharge planning discussed with:: Pt's daughterLotus, via phone call.  Freedom of Choice: Yes  Comments - Freedom of Choice: Pt's daughter would like pt to go to [facility] for STR. Referral sent in AIDIN. Pt has [employer] for insurance.  CM contacted family/caregiver?: Yes  Were Treatment Team discharge recommendations reviewed with patient/caregiver?: Yes  Did patient/caregiver verbalize understanding of patient care needs?: Yes  Were patient/caregiver advised of the risks associated with not following Treatment Team discharge recommendations?: Yes    Contacts  Patient Contacts: Lotus Hilliard  Relationship to Patient:: Family  Contact Method: Phone  Phone Number: [phone]  Reason/Outcome: Continuity of Care, Emergency Contact, Discharge Planning    Other Referral/Resources/Interventions Provided:  Interventions: Short Term Rehab  Referral Comments: Referral sent to [facility]. Awaiting response if they can accept pt or not today.         Treatment Team Recommendation: Short Term Rehab  Discharge Destination Plan:: Short Term Rehab

## 2023-12-24 NOTE — CASE MANAGEMENT
Case Management Discharge Planning Note    Patient name Ann Cross  Location [facility] 613/[facility] 613-01 MRN [id number]  : 1940 Date 2023       Current Admission Date: 2023  Current Admission Diagnosis:Subdural hemorrhage (HCC)   Patient Active Problem List    Diagnosis Date Noted    Subdural hemorrhage (HCC) 2023    Acute pain due to trauma 2023    Fall 2023    Sinus pause 2023    Thrombocytopenia (HCC) 2023    COVID-19 2023    Stage 3 chronic kidney disease, unspecified whether stage 3a or 3b CKD (HCC) 2022    Tachy-alison syndrome (HCC) 2022    Hyperlipemia 2022    Hypertension 2022    Generalized anxiety disorder 2021    Obstructive sleep apnea syndrome 2020    Degeneration of lumbar intervertebral disc 2017    History of total bilateral knee replacement 2017    Osteoporosis 05/10/2017    Minimal cognitive impairment 2016    Pain, joint, knee, right 2016     Mood disorder with hypomanic features 2016    Paroxysmal atrial fibrillation (HCC) 2014    Metabolic syndrome X 2007    Anxiety state 2007    Migraine 2007    Peripheral venous insufficiency 2007      LOS (days): 2  Geometric Mean LOS (GMLOS) (days):   Days to GMLOS:     OBJECTIVE:  Risk of Unplanned Readmission Score: 8.4         Current admission status: Inpatient   Preferred Pharmacy:   [id number] [place] [address] [address]  Phone: [phone] Fax: [phone]    Primary Care Provider: Emelina Swann MD    Primary Insurance: [employer] [facility]  Secondary Insurance:     DISCHARGE DETAILS:    Discharge planning discussed with:: Pt's daughterLotus, via phone call.  Freedom of Choice: Yes  Comments - Freedom of Choice: Pt's daughter would like pt to go to [facility] for STR. Referral sent in AIDIN. Pt has [employer] for  insurance.  CM contacted family/caregiver?: Yes  Were Treatment Team discharge recommendations reviewed with patient/caregiver?: Yes  Did patient/caregiver verbalize understanding of patient care needs?: Yes  Were patient/caregiver advised of the risks associated with not following Treatment Team discharge recommendations?: Yes    Contacts  Patient Contacts: Lotus Hilliard  Relationship to Patient:: Family  Contact Method: Phone  Phone Number: [phone]  Reason/Outcome: Continuity of Care, Emergency Contact, Discharge Planning         Other Referral/Resources/Interventions Provided:  Interventions: Short Term Rehab  Referral Comments: Referral sent to [facility]. Awaiting response if they can accept pt or not today.         Treatment Team Recommendation: Short Term Rehab  Discharge Destination Plan:: Short Term Rehab  Transport at Discharge : [facility]  Dispatcher Contacted: Yes  Number/Name of Dispatcher: Roundtrip  Transported by (Company and Unit #): [facility]  ETA of Transport (Date): 12/24/23  ETA of Transport (Time): 1530     Accepting Facility Name, City & State : [facility]  Receiving Facility/Agency Phone Number: Phone: [phone]  Facility/Agency Fax Number: Fax: [phone]     VANIA spoke with Joan of [employer] and she gave the okay for pt to go to [facility] today. CM informed [facility] and they are able to accept. VANIA spoke with pt's daughter and informed her of transport time and location. Provider and nursing also aware of transport time and location.

## 2023-12-26 ENCOUNTER — TELEPHONE (OUTPATIENT)
Dept: NEUROSURGERY | Facility: CLINIC | Age: 83
End: 2023-12-26

## 2023-12-26 NOTE — TELEPHONE ENCOUNTER
12/26/2023-CALLED [facility] 2x [phone] TO CONFIRM 01/08/2024 APT IN [place] AND SCHEDULE CT HEAD PRIOR, BUT WAS PLACED ON HOLD AND DISCONNECTED.      12/23/2023-[facility]  Patient needs follow up in 2 weeks with AP. I've ordered CT head.

## 2023-12-27 NOTE — UTILIZATION REVIEW
NOTIFICATION OF ADMISSION DISCHARGE   This is a Notification of Discharge from [facility]. Please be advised that this patient has been discharge from our facility. Below you will find the admission and discharge date and time including the patient’s disposition.   UTILIZATION REVIEW CONTACT:  Yumiko Mae  Utilization   Network Utilization Review Department  Phone: [phone] x carefully listen to the prompts. All voicemails are confidential.  Email: [email or web address]     ADMISSION INFORMATION  PRESENTATION DATE: 12/22/2023  8:24 PM  OBERVATION ADMISSION DATE:   INPATIENT ADMISSION DATE: 12/22/23 10:47 PM   DISCHARGE DATE: 12/24/2023  3:54 PM   DISPOSITION:Non [facility] SNF/TCU/SNU    Network Utilization Review Department  ATTENTION: Please call with any questions or concerns to [phone] and carefully listen to the prompts so that you are directed to the right person. All voicemails are confidential.   For Discharge needs, contact Care Management DC Support Team at [phone] opt. [phone]  Send all requests for admission clinical reviews, approved or denied determinations and any other requests to dedicated fax number below belonging to the campus where the patient is receiving treatment. List of dedicated fax numbers for the Facilities:  FACILITY NAME UR FAX NUMBER   ADMISSION DENIALS (Administrative/Medical Necessity) [phone]   DISCHARGE SUPPORT TEAM ([facility]) [phone]   PARENT CHILD HEALTH (Maternity/NICU/Pediatrics) [phone]   [facility] [phone]   [facility] [phone]   [facility] [phone]   [facility] [phone]   [facility] [phone]   [facility] [phone]   [facility] [phone]   [facility]  [phone]   [facility] [phone]   [facility] [phone]   [facility] [phone]

## 2023-12-28 NOTE — TELEPHONE ENCOUNTER
12/28/23 - LEFT  ON NURSES LINE IN REGARDS TO BOTH UPCOMING APTS.        1/4/24 CT HEAD  1/8/24 2 [facility] F/U WITH CT HEAD      12/26/2023-CALLED [facility] 2x [phone] TO CONFIRM 01/08/2024 APT IN [place] AND SCHEDULE CT HEAD PRIOR, BUT WAS PLACED ON HOLD AND DISCONNECTED.      12/23/2023-[facility]  Patient needs follow up in 2 weeks with AP. I've ordered CT head.

## 2023-12-29 ENCOUNTER — HOSPITAL ENCOUNTER (INPATIENT)
Facility: HOSPITAL | Age: 83
LOS: 2 days | Discharge: NURSING, REHAB OR LONG-TERM CARE | DRG: 083 | End: 2023-12-31
Attending: EMERGENCY MEDICINE | Admitting: SURGERY
Payer: MEDICARE

## 2023-12-29 ENCOUNTER — APPOINTMENT (EMERGENCY)
Dept: RADIOLOGY | Facility: HOSPITAL | Age: 83
DRG: 083 | End: 2023-12-29
Payer: MEDICARE

## 2023-12-29 DIAGNOSIS — Z86.79 HISTORY OF HYPERTENSION: ICD-10-CM

## 2023-12-29 DIAGNOSIS — R41.82 ALTERED MENTAL STATUS: ICD-10-CM

## 2023-12-29 DIAGNOSIS — I62.00 SUBDURAL HEMORRHAGE (HCC): Primary | ICD-10-CM

## 2023-12-29 DIAGNOSIS — Z91.81 HISTORY OF RECENT FALL: ICD-10-CM

## 2023-12-29 DIAGNOSIS — S06.5XAA TRAUMATIC SUBDURAL HEMATOMA (HCC): ICD-10-CM

## 2023-12-29 LAB
ALBUMIN SERPL BCP-MCNC: 3.8 G/DL (ref 3.5–5)
ALP SERPL-CCNC: 47 U/L (ref 34–104)
ALT SERPL W P-5'-P-CCNC: 12 U/L (ref 7–52)
ANION GAP SERPL CALCULATED.3IONS-SCNC: 6 MMOL/L
APTT PPP: 24 SECONDS (ref 23–37)
AST SERPL W P-5'-P-CCNC: 17 U/L (ref 13–39)
ATRIAL RATE: 67 BPM
BASOPHILS # BLD MANUAL: 0 THOUSAND/UL (ref 0–0.1)
BASOPHILS NFR MAR MANUAL: 0 % (ref 0–1)
BILIRUB SERPL-MCNC: 0.53 MG/DL (ref 0.2–1)
BUN SERPL-MCNC: 18 MG/DL (ref 5–25)
CALCIUM SERPL-MCNC: 9.1 MG/DL (ref 8.4–10.2)
CHLORIDE SERPL-SCNC: 97 MMOL/L (ref 96–108)
CO2 SERPL-SCNC: 29 MMOL/L (ref 21–32)
CREAT SERPL-MCNC: 0.71 MG/DL (ref 0.6–1.3)
EOSINOPHIL # BLD MANUAL: 0.17 THOUSAND/UL (ref 0–0.4)
EOSINOPHIL NFR BLD MANUAL: 2 % (ref 0–6)
ERYTHROCYTE [DISTWIDTH] IN BLOOD BY AUTOMATED COUNT: 13.4 % (ref 11.6–15.1)
FLUAV RNA RESP QL NAA+PROBE: NEGATIVE
FLUBV RNA RESP QL NAA+PROBE: NEGATIVE
GFR SERPL CREATININE-BSD FRML MDRD: 79 ML/MIN/1.73SQ M
GLUCOSE SERPL-MCNC: 101 MG/DL (ref 65–140)
HCT VFR BLD AUTO: 45.5 % (ref 34.8–46.1)
HGB BLD-MCNC: 15 G/DL (ref 11.5–15.4)
INR PPP: 1.03 (ref 0.84–1.19)
LYMPHOCYTES # BLD AUTO: 3.44 THOUSAND/UL (ref 0.6–4.47)
LYMPHOCYTES # BLD AUTO: 30 % (ref 14–44)
MCH RBC QN AUTO: 29.5 PG (ref 26.8–34.3)
MCHC RBC AUTO-ENTMCNC: 33 G/DL (ref 31.4–37.4)
MCV RBC AUTO: 89 FL (ref 82–98)
MONOCYTES # BLD AUTO: 1.03 THOUSAND/UL (ref 0–1.22)
MONOCYTES NFR BLD: 12 % (ref 4–12)
NEUTROPHILS # BLD MANUAL: 3.95 THOUSAND/UL (ref 1.85–7.62)
NEUTS BAND NFR BLD MANUAL: 1 % (ref 0–8)
NEUTS SEG NFR BLD AUTO: 45 % (ref 43–75)
P AXIS: -1 DEGREES
PLATELET # BLD AUTO: 192 THOUSANDS/UL (ref 149–390)
PLATELET BLD QL SMEAR: ADEQUATE
PMV BLD AUTO: 11.5 FL (ref 8.9–12.7)
POTASSIUM SERPL-SCNC: 4.2 MMOL/L (ref 3.5–5.3)
PR INTERVAL: 154 MS
PROT SERPL-MCNC: 6.9 G/DL (ref 6.4–8.4)
PROTHROMBIN TIME: 13.4 SECONDS (ref 11.6–14.5)
QRS AXIS: 19 DEGREES
QRSD INTERVAL: 70 MS
QT INTERVAL: 408 MS
QTC INTERVAL: 431 MS
RBC # BLD AUTO: 5.09 MILLION/UL (ref 3.81–5.12)
RBC MORPH BLD: NORMAL
RSV RNA RESP QL NAA+PROBE: NEGATIVE
SARS-COV-2 RNA RESP QL NAA+PROBE: NEGATIVE
SODIUM SERPL-SCNC: 132 MMOL/L (ref 135–147)
T WAVE AXIS: 50 DEGREES
TSH SERPL DL<=0.05 MIU/L-ACNC: 1.66 UIU/ML (ref 0.45–4.5)
VARIANT LYMPHS # BLD AUTO: 10 %
VENTRICULAR RATE: 67 BPM
WBC # BLD AUTO: 8.59 THOUSAND/UL (ref 4.31–10.16)

## 2023-12-29 PROCEDURE — 93005 ELECTROCARDIOGRAM TRACING: CPT

## 2023-12-29 PROCEDURE — 87081 CULTURE SCREEN ONLY: CPT | Performed by: SURGERY

## 2023-12-29 PROCEDURE — 85730 THROMBOPLASTIN TIME PARTIAL: CPT | Performed by: SURGERY

## 2023-12-29 PROCEDURE — G1004 CDSM NDSC: HCPCS

## 2023-12-29 PROCEDURE — 99285 EMERGENCY DEPT VISIT HI MDM: CPT | Performed by: EMERGENCY MEDICINE

## 2023-12-29 PROCEDURE — 99285 EMERGENCY DEPT VISIT HI MDM: CPT

## 2023-12-29 PROCEDURE — 84443 ASSAY THYROID STIM HORMONE: CPT

## 2023-12-29 PROCEDURE — 70450 CT HEAD/BRAIN W/O DYE: CPT

## 2023-12-29 PROCEDURE — 85007 BL SMEAR W/DIFF WBC COUNT: CPT

## 2023-12-29 PROCEDURE — 80053 COMPREHEN METABOLIC PANEL: CPT

## 2023-12-29 PROCEDURE — 36415 COLL VENOUS BLD VENIPUNCTURE: CPT

## 2023-12-29 PROCEDURE — 85027 COMPLETE CBC AUTOMATED: CPT

## 2023-12-29 PROCEDURE — 0241U HB NFCT DS VIR RESP RNA 4 TRGT: CPT

## 2023-12-29 PROCEDURE — 85610 PROTHROMBIN TIME: CPT | Performed by: SURGERY

## 2023-12-29 PROCEDURE — 71046 X-RAY EXAM CHEST 2 VIEWS: CPT

## 2023-12-29 PROCEDURE — 99223 1ST HOSP IP/OBS HIGH 75: CPT | Performed by: SURGERY

## 2023-12-29 RX ORDER — DOCUSATE SODIUM 100 MG/1
100 CAPSULE, LIQUID FILLED ORAL 2 TIMES DAILY
Status: DISCONTINUED | OUTPATIENT
Start: 2023-12-29 | End: 2023-12-31 | Stop reason: HOSPADM

## 2023-12-29 RX ORDER — SENNOSIDES 8.6 MG
2 TABLET ORAL DAILY
Status: DISCONTINUED | OUTPATIENT
Start: 2023-12-30 | End: 2023-12-31 | Stop reason: HOSPADM

## 2023-12-29 RX ORDER — ONDANSETRON 2 MG/ML
4 INJECTION INTRAMUSCULAR; INTRAVENOUS EVERY 6 HOURS PRN
Status: DISCONTINUED | OUTPATIENT
Start: 2023-12-29 | End: 2023-12-31 | Stop reason: HOSPADM

## 2023-12-29 RX ORDER — ACETAMINOPHEN 325 MG/1
650 TABLET ORAL EVERY 6 HOURS PRN
Status: DISCONTINUED | OUTPATIENT
Start: 2023-12-29 | End: 2023-12-31 | Stop reason: HOSPADM

## 2023-12-29 RX ORDER — SODIUM CHLORIDE, SODIUM GLUCONATE, SODIUM ACETATE, POTASSIUM CHLORIDE, MAGNESIUM CHLORIDE, SODIUM PHOSPHATE, DIBASIC, AND POTASSIUM PHOSPHATE .53; .5; .37; .037; .03; .012; .00082 G/100ML; G/100ML; G/100ML; G/100ML; G/100ML; G/100ML; G/100ML
100 INJECTION, SOLUTION INTRAVENOUS CONTINUOUS
Status: DISCONTINUED | OUTPATIENT
Start: 2023-12-29 | End: 2023-12-30

## 2023-12-29 RX ORDER — POLYETHYLENE GLYCOL 3350 17 G/17G
17 POWDER, FOR SOLUTION ORAL DAILY
Status: DISCONTINUED | OUTPATIENT
Start: 2023-12-30 | End: 2023-12-31 | Stop reason: HOSPADM

## 2023-12-29 RX ADMIN — SODIUM CHLORIDE, SODIUM GLUCONATE, SODIUM ACETATE, POTASSIUM CHLORIDE, MAGNESIUM CHLORIDE, SODIUM PHOSPHATE, DIBASIC, AND POTASSIUM PHOSPHATE 100 ML/HR: .53; .5; .37; .037; .03; .012; .00082 INJECTION, SOLUTION INTRAVENOUS at 21:59

## 2023-12-29 RX ADMIN — LEVETIRACETAM 500 MG: 100 INJECTION, SOLUTION INTRAVENOUS at 22:57

## 2023-12-29 RX ADMIN — DOCUSATE SODIUM 100 MG: 100 CAPSULE, LIQUID FILLED ORAL at 21:59

## 2023-12-29 NOTE — ED PROVIDER NOTES
History  Chief Complaint   Patient presents with    Altered Mental Status     HPI    ED Course as of 12/30/23 0149   Fri Dec 29, 2023   1746 Blood Pressure: 158/68   1746 Temperature: 98.6 °F (37 °C)   1746 Temp Source: Oral   1747 Pulse: 63   1747 Respirations: 16   1747 SpO2: 97 %   1748 Patient is a 83 y.o. female with PMHx HTN, HLD, atrial fibrillation, recent traumatic SDH on 12/22 who presents to the ED via EMS for evaluation of AMS. Daughter Lotus at bedside helps provide the history. Patient offers no complaint. Daughter states patient has mild dementia and typically is alert and oriented x3. Daughter reports a fall that resulted in SDH on 12/22 and patient has been more altered ever since. She notes patient having difficulty having conversation/explaining herself which has been progressing worsening since the fall. Daughter also has noticed a cough since she was hospitalized. Denies fever, chills, congestion, vomiting. Daughter states patient typically gets more confused when she has an infection. Denies any other falls. Denies new medication changes. HD stable. Afebrile. Patient appears in no acute distress. No focal deficits. Patient alert and oriented to self and place. Slightly slow to respond to questions. Follows commands.    1827 ASSESSMENT: Patient is a 83 y.o. female who presents with altered mental status in setting of recent traumatic SDH on 12/22.   DDX includes but not limited to: infection, worsening SDH.   PLAN: CBC, CMP, TSH, Covid/flu/rsv, UA, CXR, CTH.    1839 WBC: 8.59  No leukocytosis   1839 Hemoglobin: 15.0  No anemia    1839 Platelet Count: 192  wnl   1852 CT head wo contrast  Interval increase in the subdural blood products, now noted along the interhemispheric falx and layering on the right tentorium when comparing to the December 23, 2023 study. Consultation with the neurosurgical service and follow-up imaging is recommended to ensure stability.   1857 Called trauma Dr. Beck  about interval increase in SDH   1857 TT sent to trauma resident    1906 Trauma resident will eval patient   2014 Admitted to trauma for increase in SDH and AMS         Prior to Admission Medications   Prescriptions Last Dose Informant Patient Reported? Taking?   [employer] Senna Plus 8.6-50 MG per tablet  Self Yes No   Sig: every other day   Calcium Carbonate 1500 (600 Ca) MG TABS  Self Yes No   Sig: Take 1 tablet by mouth daily   Donepezil HCl 5 MG/DAY PTWK  Self Yes No   Sig: Place on the skin   Melatonin 3 MG CAPS  Self Yes No   Sig: Take 6 mg by mouth   acebutolol (SECTRAL) 200 mg capsule  Self Yes No   Sig: Take 200 mg by mouth daily   acetaminophen (TYLENOL) 325 mg tablet   No No   Sig: Take 2 tablets (650 mg total) by mouth every 4 (four) hours as needed for mild pain   amLODIPine (NORVASC) 10 mg tablet  Self No No   Sig: Take 1 tablet (10 mg total) by mouth daily   divalproex sodium (DEPAKOTE) 250 mg EC tablet  Self Yes No   Sig: Take 250 mg by mouth 3 (three) times a day   donepezil (ARICEPT) 5 mg tablet  Self No No   Sig: Take 1 tablet (5 mg total) by mouth daily at bedtime   ergocalciferol (VITAMIN D2) 50,000 units  Self Yes No   Sig: Take 50,000 Units by mouth every 30 (thirty) days   flecainide (TAMBOCOR) 50 mg tablet  Self No No   Sig: Take 1 tablet (50 mg total) by mouth every 12 (twelve) hours   fluticasone (FLONASE) 50 mcg/act nasal spray   No No   Si spray into each nostril daily for 7 days   levETIRAcetam (KEPPRA) 500 mg tablet   No No   Sig: Take 1 tablet (500 mg total) by mouth 2 (two) times a day   oxyCODONE (ROXICODONE) 5 immediate release tablet   No No   Sig: Take 0.5 tablets (2.5 mg total) by mouth every 4 (four) hours as needed for moderate pain for up to 10 days Max Daily Amount: 15 mg   rosuvastatin (CRESTOR) 5 mg tablet  Self Yes No   Sig: TAKE 1/2 TABLET BY MOUTH EVERY OTHER DAY IN THE EVENING   sodium chloride (MARITZA 128) 5 % hypertonic ophthalmic ointment  Self No No   Sig: Administer  to both eyes every 3 (three) hours as needed (irritation)      Facility-Administered Medications: None       Past Medical History:   Diagnosis Date    Afib (HCC)     Arthritis     Hyperlipidemia     Hypertension     Osteopenia     Sleep apnea     Squamous cell skin cancer     LEF TEMPLE    Subdural hemorrhage (HCC)     Varicose veins of both lower extremities        Past Surgical History:   Procedure Laterality Date    CARDIAC ELECTROPHYSIOLOGY PROCEDURE N/A 10/12/2022    Procedure: Cardiac loop recorder implant;  Surgeon: Rashid Carlos MD;  [facility];  Service: Cardiology    CARDIAC ELECTROPHYSIOLOGY PROCEDURE N/A 4/21/2023    Procedure: Cardiac pacer implant;  Surgeon: Simba Chilel MD;  Location: [facility];  Service: Cardiology    CATARACT EXTRACTION      COLONOSCOPY      complete, for polyp removal    EYE SURGERY      HYSTERECTOMY      age 45    INCISION AND DRAINAGE OF WOUND Right 06/28/2016    Procedure: ARTHROSCOPY,EVACUATION OF HEMATOMA, OPEN EXPLORATION OF WOUND;  Surgeon: Adam Brice MD;  Location: [facility];  Service:     MOHS SURGERY Left 12/27/2022    left temple    SKIN BIOPSY      TONSILLECTOMY AND ADENOIDECTOMY      TOTAL KNEE ARTHROPLASTY Right     TOTAL KNEE ARTHROPLASTY Left     WISDOM TOOTH EXTRACTION         Family History   Problem Relation Age of Onset    Diabetes Mother     Diabetes Father     Diabetes Brother     No Known Problems Sister     No Known Problems Daughter     No Known Problems Maternal Grandmother     No Known Problems Maternal Grandfather     No Known Problems Paternal Grandmother     No Known Problems Paternal Grandfather     No Known Problems Daughter     Brain cancer Son     No Known Problems Maternal Aunt      I have reviewed and agree with the history as documented.    E-Cigarette/Vaping    E-Cigarette Use Never User      E-Cigarette/Vaping Substances    Nicotine No     THC No     CBD No     Flavoring No     Other No     Unknown No       Social History     Tobacco Use    Smoking status: Never    Smokeless tobacco: Never   Vaping Use    Vaping status: Never Used   Substance Use Topics    Alcohol use: Not Currently    Drug use: No        Review of Systems   Respiratory:  Positive for cough.    Psychiatric/Behavioral:  Positive for confusion.        Physical Exam  ED Triage Vitals   Temperature Pulse Respirations Blood Pressure SpO2   12/29/23 1737 12/29/23 1737 12/29/23 1737 12/29/23 1737 12/29/23 1737   98.6 °F (37 °C) 63 16 158/68 97 %      Temp Source Heart Rate Source Patient Position - Orthostatic VS BP Location FiO2 (%)   12/29/23 1737 12/29/23 1737 -- 12/29/23 1737 --   Oral Monitor  Right arm       Pain Score       12/29/23 2200       No Pain             Orthostatic Vital Signs  Vitals:    12/29/23 1737 12/29/23 1945 12/29/23 2153 12/29/23 2329   BP: 158/68  128/61 147/69   Pulse: 63 62 68        Physical Exam  Vitals and nursing note reviewed.   Constitutional:       General: She is not in acute distress.     Appearance: Normal appearance. She is well-developed. She is not ill-appearing, toxic-appearing or diaphoretic.   HENT:      Head: Normocephalic and atraumatic.      Mouth/Throat:      Mouth: Mucous membranes are moist.   Eyes:      General: No scleral icterus.        Right eye: No discharge.         Left eye: No discharge.      Extraocular Movements: Extraocular movements intact.      Conjunctiva/sclera: Conjunctivae normal.      Pupils: Pupils are equal, round, and reactive to light.   Cardiovascular:      Rate and Rhythm: Normal rate and regular rhythm.      Heart sounds: No murmur heard.  Pulmonary:      Effort: Pulmonary effort is normal. No respiratory distress.      Breath sounds: Normal breath sounds. No stridor. No wheezing, rhonchi or rales.   Abdominal:      Palpations: Abdomen is soft.      Tenderness: There is no abdominal tenderness.   Musculoskeletal:         General: No swelling. Normal range of motion.      Cervical  back: Normal range of motion and neck supple. No rigidity or tenderness.   Skin:     General: Skin is warm and dry.      Capillary Refill: Capillary refill takes less than 2 seconds.   Neurological:      General: No focal deficit present.      Mental Status: She is alert.      Cranial Nerves: No cranial nerve deficit.      Sensory: No sensory deficit.      Motor: No weakness.      Comments: Oriented to self and place. Follows commands.    Psychiatric:         Mood and Affect: Mood normal.         ED Medications  Medications   multi-electrolyte (PLASMALYTE-A/ISOLYTE-S PH 7.4) IV solution (100 mL/hr Intravenous Restarted 12/29/23 2350)   polyethylene glycol (MIRALAX) packet 17 g (has no administration in time range)   senna (SENOKOT) tablet 17.2 mg (has no administration in time range)   docusate sodium (COLACE) capsule 100 mg (100 mg Oral Given 12/29/23 2159)   acetaminophen (TYLENOL) tablet 650 mg (has no administration in time range)   ondansetron (ZOFRAN) injection 4 mg (has no administration in time range)   levETIRAcetam (KEPPRA) 500 mg in sodium chloride 0.9 % 100 mL IVPB (500 mg Intravenous New Bag 12/29/23 2257)       Diagnostic Studies  Results Reviewed       Procedure Component Value Units Date/Time    TSH, 3rd generation with Free T4 reflex [[id number]]  (Normal) Collected: 12/29/23 1947    Lab Status: Final result Specimen: Blood from Arm, Left Updated: 12/29/23 2029     TSH 3RD GENERATON 1.657 uIU/mL     Comprehensive metabolic panel [[id number]]  (Abnormal) Collected: 12/29/23 1947    Lab Status: Final result Specimen: Blood from Arm, Left Updated: 12/29/23 2015     Sodium 132 mmol/L      Potassium 4.2 mmol/L      Chloride 97 mmol/L      CO2 29 mmol/L      ANION GAP 6 mmol/L      BUN 18 mg/dL      Creatinine 0.71 mg/dL      Glucose 101 mg/dL      Calcium 9.1 mg/dL      AST 17 U/L      ALT 12 U/L      Alkaline Phosphatase 47 U/L      Total Protein 6.9 g/dL      Albumin 3.8 g/dL      Total Bilirubin 0.53  mg/dL      eGFR 79 ml/min/1.73sq m     Narrative:      National Kidney Disease Foundation guidelines for Chronic Kidney Disease (CKD):     Stage 1 with normal or high GFR (GFR > 90 mL/min/1.73 square meters)    Stage 2 Mild CKD (GFR = 60-89 mL/min/1.73 square meters)    Stage 3A Moderate CKD (GFR = 45-59 mL/min/1.73 square meters)    Stage 3B Moderate CKD (GFR = 30-44 mL/min/1.73 square meters)    Stage 4 Severe CKD (GFR = 15-29 mL/min/1.73 square meters)    Stage 5 End Stage CKD (GFR <15 mL/min/1.73 square meters)  Note: GFR calculation is accurate only with a steady state creatinine    Protime-INR [[id number]]  (Normal) Collected: 12/29/23 1947    Lab Status: Final result Specimen: Blood from Arm, Left Updated: 12/29/23 2009     Protime 13.4 seconds      INR 1.03    APTT [[id number]]  (Normal) Collected: 12/29/23 1947    Lab Status: Final result Specimen: Blood from Arm, Left Updated: 12/29/23 2009     PTT 24 seconds     Urinalysis with microscopic [[id number]]     Lab Status: No result Specimen: Urine     RBC Morphology Reflex Test [[id number]] Collected: 12/29/23 1821    Lab Status: Final result Specimen: Blood from Arm, Left Updated: 12/29/23 2001    COVID/FLU/RSV [[id number]]  (Normal) Collected: 12/29/23 1821    Lab Status: Final result Specimen: Nares from Nose Updated: 12/29/23 1923     SARS-CoV-2 Negative     INFLUENZA A PCR Negative     INFLUENZA B PCR Negative     RSV PCR Negative    Narrative:      FOR PEDIATRIC PATIENTS - copy/paste COVID Guidelines URL to browser: [email or web address]    SARS-CoV-2 assay is a Nucleic Acid Amplification assay intended for the  qualitative detection of nucleic acid from SARS-CoV-2 in nasopharyngeal  swabs. Results are for the presumptive identification of SARS-CoV-2 RNA.    Positive results are indicative of infection with SARS-CoV-2, the virus  causing COVID-19, but do not rule out bacterial infection or  co-infection  with other viruses. Laboratories within the [place] and its  territories are required to report all positive results to the appropriate  public health authorities. Negative results do not preclude SARS-CoV-2  infection and should not be used as the sole basis for treatment or other  patient management decisions. Negative results must be combined with  clinical observations, patient history, and epidemiological information.  This test has not been FDA cleared or approved.    This test has been authorized by FDA under an Emergency Use Authorization  (EUA). This test is only authorized for the duration of time the  declaration that circumstances exist justifying the authorization of the  emergency use of an in vitro diagnostic tests for detection of SARS-CoV-2  virus and/or diagnosis of COVID-19 infection under section 564(b)(1) of  the Act, 21 U.S.C. 360bbb-3(b)(1), unless the authorization is terminated  or revoked sooner. The test has been validated but independent review by FDA  and CLIA is pending.    Test performed using [employer] GeneXpert: This RT-PCR assay targets N2,  a region unique to SARS-CoV-2. A conserved region in the E-gene was chosen  for pan-Sarbecovirus detection which includes SARS-CoV-2.    According to CMS-2020-01-R, this platform meets the definition of high-throughput technology.    CBC and differential [[id number]]  (Normal) Collected: 12/29/23 1821    Lab Status: Final result Specimen: Blood from Arm, Left Updated: 12/29/23 1907     WBC 8.59 Thousand/uL      RBC 5.09 Million/uL      Hemoglobin 15.0 g/dL      Hematocrit 45.5 %      MCV 89 fL      MCH 29.5 pg      MCHC 33.0 g/dL      RDW 13.4 %      MPV 11.5 fL      Platelets 192 Thousands/uL     Narrative:      This is an appended report.  These results have been appended to a previously verified report.    Manual Differential(PHLEBS Do Not Order) [[id number]]  (Abnormal) Collected: 12/29/23 1821    Lab Status: Final result  Specimen: Blood from Arm, Left Updated: 12/29/23 1907     Segmented % 45 %      Bands % 1 %      Lymphocytes % 30 %      Monocytes % 12 %      Eosinophils, % 2 %      Basophils % 0 %      Atypical Lymphocytes % 10 %      Absolute Neutrophils 3.95 Thousand/uL      Lymphocytes Absolute 3.44 Thousand/uL      Monocytes Absolute 1.03 Thousand/uL      Eosinophils Absolute 0.17 Thousand/uL      Basophils Absolute 0.00 Thousand/uL      Total Counted --     RBC Morphology Normal     Platelet Estimate Adequate    UA w Reflex to Microscopic w Reflex to Culture [[id number]]     Lab Status: No result Specimen: Urine                    CT head wo contrast   Final Result by Pallav N Shah, MD (12/29 8920)      Interval increase in the subdural blood products, now noted along the interhemispheric falx and layering on the right tentorium when comparing to the December 23, 2023 study. Consultation with the neurosurgical service and follow-up imaging is    recommended to ensure stability.      Incidentally noted lucent lesions within the occipital calvarium, not previously described on outside imaging studies. The remote MRI and CT studies from [facility] mentioned above are not available at the time of this interpretation.    An addendum could be issued once those studies become available.      Chronic microangiopathic changes.         I personally discussed this study with Dr. Luis Mayfield on 12/29/2023 6:48 PM.                  Workstation performed: [id number]         XR chest 2 views    (Results Pending)   CT head wo contrast    (Results Pending)         Procedures  Procedures        Identification of Seniors at Risk      Flowsheet Row Most Recent Value   (ISAR) Identification of Seniors at Risk    Before the illness or injury that brought you to the Emergency, did you need someone to help you on a regular basis? 1 Filed at: 12/29/2023 2865   In the last 24 hours, have you needed more help than usual? 1 Filed at:  12/29/2023 1744   Have you been hospitalized for one or more nights during the past 6 months? 1 Filed at: 12/29/2023 1744   In general, do you see well? 1 Filed at: 12/29/2023 1744   In general, do you have serious problems with your memory? 1 Filed at: 12/29/2023 1744   Do you take more than three different medications every day? 1 Filed at: 12/29/2023 1744   ISAR Score 6 Filed at: 12/29/2023 1744                      SBIRT 22yo+      Flowsheet Row Most Recent Value   Initial Alcohol Screen: US AUDIT-C     1. How often do you have a drink containing alcohol? 0 Filed at: 12/29/2023 1744   2. How many drinks containing alcohol do you have on a typical day you are drinking?  0 Filed at: 12/29/2023 1744   3a. Male UNDER 65: How often do you have five or more drinks on one occasion? 0 Filed at: 12/29/2023 1744   3b. FEMALE Any Age, or MALE 65+: How often do you have 4 or more drinks on one occassion? 0 Filed at: 12/29/2023 1744   Audit-C Score 0 Filed at: 12/29/2023 1744   SHANNA: How many times in the past year have you...    Used an illegal drug or used a prescription medication for non-medical reasons? Never Filed at: 12/29/2023 1744                  Medical Decision Making  Amount and/or Complexity of Data Reviewed  Labs: ordered. Decision-making details documented in ED Course.  Radiology: ordered. Decision-making details documented in ED Course.    Risk  Decision regarding hospitalization.        See ED course above for additional details including HPI, MDM including PLAN, and disposition.    Disposition  Final diagnoses:   Traumatic subdural hematoma (HCC)   History of hypertension   History of recent fall   Altered mental status     Time reflects when diagnosis was documented in both MDM as applicable and the Disposition within this note       Time User Action Codes Description Comment    12/29/2023  8:07 PM Mario Bush Add [I62.00] Subdural hemorrhage (HCC)     12/30/2023  1:24 AM Nel Glass Add [S06.5XAA]  Traumatic subdural hematoma (HCC)     12/30/2023  1:25 AM Nel Glass Add [Z86.79] History of hypertension     12/30/2023  1:25 AM Nel Glass Add [Z91.81] History of recent fall     12/30/2023  1:25 AM Nel Glass Add [R41.82] Altered mental status           ED Disposition       ED Disposition   Admit    Condition   Stable    Date/Time   Sat Dec 30, 2023 0124    Comment   Case was discussed with Dr. Beck and the patient's admission status was agreed to be Admission Status: inpatient status to the service of Dr. Beck.               Follow-up Information    None         Current Discharge Medication List        CONTINUE these medications which have NOT CHANGED    Details   acebutolol (SECTRAL) 200 mg capsule Take 200 mg by mouth daily      acetaminophen (TYLENOL) 325 mg tablet Take 2 tablets (650 mg total) by mouth every 4 (four) hours as needed for mild pain  Qty: 30 tablet, Refills: 0    Associated Diagnoses: Subdural hemorrhage (HCC)      amLODIPine (NORVASC) 10 mg tablet Take 1 tablet (10 mg total) by mouth daily  Qty: 30 tablet, Refills: 0    Associated Diagnoses: Essential hypertension      Calcium Carbonate 1500 (600 Ca) MG TABS Take 1 tablet by mouth daily      [employer]S Senna Plus 8.6-50 MG per tablet every other day      divalproex sodium (DEPAKOTE) 250 mg EC tablet Take 250 mg by mouth 3 (three) times a day      donepezil (ARICEPT) 5 mg tablet Take 1 tablet (5 mg total) by mouth daily at bedtime  Qty: 30 tablet, Refills: 1    Associated Diagnoses: Mood disorder (HCC)      Donepezil HCl 5 MG/DAY PTWK Place on the skin      ergocalciferol (VITAMIN D2) 50,000 units Take 50,000 Units by mouth every 30 (thirty) days      flecainide (TAMBOCOR) 50 mg tablet Take 1 tablet (50 mg total) by mouth every 12 (twelve) hours  Qty: 60 tablet, Refills: 0    Associated Diagnoses: Mood disorder (HCC); Paroxysmal atrial fibrillation (HCC)      fluticasone (FLONASE) 50 mcg/act nasal spray 1 spray into each nostril daily  for 7 days  Qty: 9.9 mL, Refills: 0    Associated Diagnoses: COVID      levETIRAcetam (KEPPRA) 500 mg tablet Take 1 tablet (500 mg total) by mouth 2 (two) times a day  Qty: 14 tablet, Refills: 0    Associated Diagnoses: Subdural hemorrhage (HCC)      Melatonin 3 MG CAPS Take 6 mg by mouth      oxyCODONE (ROXICODONE) 5 immediate release tablet Take 0.5 tablets (2.5 mg total) by mouth every 4 (four) hours as needed for moderate pain for up to 10 days Max Daily Amount: 15 mg  Qty: 20 tablet, Refills: 0    Associated Diagnoses: Subdural hemorrhage (HCC)      rosuvastatin (CRESTOR) 5 mg tablet TAKE 1/2 TABLET BY MOUTH EVERY OTHER DAY IN THE EVENING      sodium chloride (MARITZA 128) 5 % hypertonic ophthalmic ointment Administer to both eyes every 3 (three) hours as needed (irritation)  Qty: 3.5 g, Refills: 0    Associated Diagnoses: Mood disorder (HCC)           No discharge procedures on file.    PDMP Review         Value Time User    PDMP Reviewed  Yes 2/20/2023  4:58 PM Sterling Taylor MD             ED Provider  Attending physically available and evaluated Ann Cross. I managed the patient along with the ED Attending.    Electronically Signed by           Nel Glass MD  12/30/23 0149

## 2023-12-29 NOTE — Clinical Note
Case was discussed with Dr. Beck and the patient's admission status was agreed to be Admission Status: observation status to the service of Dr. Beck.

## 2023-12-30 ENCOUNTER — APPOINTMENT (INPATIENT)
Dept: RADIOLOGY | Facility: HOSPITAL | Age: 83
DRG: 083 | End: 2023-12-30
Payer: MEDICARE

## 2023-12-30 LAB
ANION GAP SERPL CALCULATED.3IONS-SCNC: 11 MMOL/L
BACTERIA UR QL AUTO: ABNORMAL /HPF
BILIRUB UR QL STRIP: NEGATIVE
BUN SERPL-MCNC: 16 MG/DL (ref 5–25)
CALCIUM SERPL-MCNC: 8.7 MG/DL (ref 8.4–10.2)
CHLORIDE SERPL-SCNC: 100 MMOL/L (ref 96–108)
CLARITY UR: CLEAR
CO2 SERPL-SCNC: 26 MMOL/L (ref 21–32)
COLOR UR: ABNORMAL
CREAT SERPL-MCNC: 0.72 MG/DL (ref 0.6–1.3)
ERYTHROCYTE [DISTWIDTH] IN BLOOD BY AUTOMATED COUNT: 13.2 % (ref 11.6–15.1)
GFR SERPL CREATININE-BSD FRML MDRD: 77 ML/MIN/1.73SQ M
GLUCOSE SERPL-MCNC: 83 MG/DL (ref 65–140)
GLUCOSE UR STRIP-MCNC: NEGATIVE MG/DL
HCT VFR BLD AUTO: 43.3 % (ref 34.8–46.1)
HGB BLD-MCNC: 14.5 G/DL (ref 11.5–15.4)
HGB UR QL STRIP.AUTO: NEGATIVE
KETONES UR STRIP-MCNC: ABNORMAL MG/DL
LEUKOCYTE ESTERASE UR QL STRIP: ABNORMAL
MCH RBC QN AUTO: 29.7 PG (ref 26.8–34.3)
MCHC RBC AUTO-ENTMCNC: 33.5 G/DL (ref 31.4–37.4)
MCV RBC AUTO: 89 FL (ref 82–98)
NITRITE UR QL STRIP: NEGATIVE
NON-SQ EPI CELLS URNS QL MICRO: ABNORMAL /HPF
PH UR STRIP.AUTO: 7.5 [PH]
PLATELET # BLD AUTO: 178 THOUSANDS/UL (ref 149–390)
PMV BLD AUTO: 10.1 FL (ref 8.9–12.7)
POTASSIUM SERPL-SCNC: 3.9 MMOL/L (ref 3.5–5.3)
PROT UR STRIP-MCNC: NEGATIVE MG/DL
RBC # BLD AUTO: 4.88 MILLION/UL (ref 3.81–5.12)
RBC #/AREA URNS AUTO: ABNORMAL /HPF
SODIUM SERPL-SCNC: 137 MMOL/L (ref 135–147)
SP GR UR STRIP.AUTO: 1.01 (ref 1–1.03)
UROBILINOGEN UR STRIP-ACNC: <2 MG/DL
WBC # BLD AUTO: 8.01 THOUSAND/UL (ref 4.31–10.16)
WBC #/AREA URNS AUTO: ABNORMAL /HPF

## 2023-12-30 PROCEDURE — 80048 BASIC METABOLIC PNL TOTAL CA: CPT

## 2023-12-30 PROCEDURE — 70450 CT HEAD/BRAIN W/O DYE: CPT

## 2023-12-30 PROCEDURE — 85027 COMPLETE CBC AUTOMATED: CPT

## 2023-12-30 PROCEDURE — 99232 SBSQ HOSP IP/OBS MODERATE 35: CPT | Performed by: PHYSICIAN ASSISTANT

## 2023-12-30 PROCEDURE — 81001 URINALYSIS AUTO W/SCOPE: CPT

## 2023-12-30 PROCEDURE — 97167 OT EVAL HIGH COMPLEX 60 MIN: CPT

## 2023-12-30 PROCEDURE — 99223 1ST HOSP IP/OBS HIGH 75: CPT | Performed by: PHYSICIAN ASSISTANT

## 2023-12-30 PROCEDURE — 97163 PT EVAL HIGH COMPLEX 45 MIN: CPT

## 2023-12-30 PROCEDURE — 87086 URINE CULTURE/COLONY COUNT: CPT

## 2023-12-30 PROCEDURE — G1004 CDSM NDSC: HCPCS

## 2023-12-30 PROCEDURE — 87077 CULTURE AEROBIC IDENTIFY: CPT

## 2023-12-30 RX ORDER — LEVETIRACETAM 500 MG/1
500 TABLET ORAL EVERY 12 HOURS SCHEDULED
Status: DISCONTINUED | OUTPATIENT
Start: 2023-12-30 | End: 2023-12-31 | Stop reason: HOSPADM

## 2023-12-30 RX ORDER — ENOXAPARIN SODIUM 100 MG/ML
30 INJECTION SUBCUTANEOUS EVERY 12 HOURS SCHEDULED
Status: DISCONTINUED | OUTPATIENT
Start: 2023-12-30 | End: 2023-12-31 | Stop reason: HOSPADM

## 2023-12-30 RX ADMIN — DOCUSATE SODIUM 100 MG: 100 CAPSULE, LIQUID FILLED ORAL at 17:02

## 2023-12-30 RX ADMIN — LEVETIRACETAM 500 MG: 500 TABLET, FILM COATED ORAL at 21:09

## 2023-12-30 RX ADMIN — LEVETIRACETAM 500 MG: 100 INJECTION, SOLUTION INTRAVENOUS at 08:18

## 2023-12-30 RX ADMIN — ENOXAPARIN SODIUM 30 MG: 30 INJECTION SUBCUTANEOUS at 13:32

## 2023-12-30 RX ADMIN — SENNOSIDES 17.2 MG: 8.6 TABLET, FILM COATED ORAL at 08:21

## 2023-12-30 RX ADMIN — DOCUSATE SODIUM 100 MG: 100 CAPSULE, LIQUID FILLED ORAL at 08:21

## 2023-12-30 NOTE — PROGRESS NOTES
[facility]  Progress Note  Name: Ann Cross I  MRN: [id number]  Unit/Bed#: [facility] 634-01 I Date of Admission: 12/29/2023   Date of Service: 12/30/2023 I Hospital Day: 1    Assessment/Plan   Subdural hemorrhage (HCC)  Assessment & Plan  - Patient with known subdural hematomas in the interhemispheric falx and layering on the right tentorium following recent fall.   - Patient returned and was readmitted with change in mental status and repeat CT imaging from 12/29/2023 demonstrating interval increase in the subdural blood products.   - Repeat CT imaging of the head on 12/30/2022 demonstrated stable findings with no evidence of increased or new bleeding and no significant mass effect.  - Neurosurgery evaluation and recommendations appreciated.   - No further workup or intervention necessary at this time.  - Hold anti-platelet and anticoagulant medications for least 2 weeks and/or until cleared by Neurosurgery.  - Continue Keppra for 7 days for seizure prophylaxis.  - Monitor neurologic exam.  - Continue symptomatic management with analgesia as needed.  - PT and OT evaluation and treatment as indicated.  - Outpatient Neurosurgery follow-up in 2 weeks with repeat CT scan of the head prior to follow-up appointment.        Hypertension  Assessment & Plan  - Patient with chronic history of hypertension, but adequate heart rate control at this time.  - Continue current medication regimen and resume home medication therapy on discharge as appropriate.  - Outpatient follow-up per routine.    Stage 3 chronic kidney disease, unspecified whether stage 3a or 3b CKD ([facility])  Assessment & Plan  Lab Results   Component Value Date    EGFR 77 12/30/2023    EGFR 79 12/29/2023    EGFR 80 12/24/2023    CREATININE 0.72 12/30/2023    CREATININE 0.71 12/29/2023    CREATININE 0.69 12/24/2023     - Patient with chronic history of CKD stage III without evidence of acute kidney injury.  - Avoid nephrotoxic  "medications and hypotension.  - Monitor volume status.  - Outpatient follow-up routine.             TRAUMA TERTIARY SURVEY NOTE    VTE Prophylaxis:Sequential compression device (Venodyne)      Disposition: Anticipate discharge back to postacute care facility for ongoing rehab.  Therapy evaluation and treatment as indicated.  Case management following for disposition planning.    Code status:  Level 1 - Full Code    Consultants: IP CONSULT TO NEUROSURGERY  IP CONSULT TO GERONTOLOGY  IP CONSULT TO CASE MANAGEMENT    Subjective   Transfer from: N/A    Mechanism of Injury:Fall     Chief Complaint: \"I feel pretty well.\"    HPI/Last 24 hour events: Patient is doing well this morning.  No significant events overnight.  Patient states that she was able to get sleep.  She is without nausea or vomiting.  She denies any headaches, visual changes, lightheadedness or dizziness, neck or back pain, chest pain, abdominal pain or extremity pain.     Objective   Vitals:   Temp:  [98 °F (36.7 °C)-99.8 °F (37.7 °C)] 98.2 °F (36.8 °C)  HR:  [62-68] 62  Resp:  [16-22] 17  BP: (128-158)/(61-69) 134/61    I/O         12/28 0701  12/29 0700 12/29 0701  12/30 0700 12/30 0701  12/31 0700    P.O.   0    I.V. (mL/kg)   1666.7 (23.7)    Total Intake(mL/kg)   1666.7 (23.7)    Urine (mL/kg/hr)  141 100 (0.2)    Total Output  141 100    Net  -141 +1566.7           Unmeasured Urine Occurrence   1 x             Physical Exam:   GENERAL APPEARANCE: Patient in no acute distress.  HEENT: NCAT; PERRL, EOMs intact; Mucous membranes moist  NECK / BACK: No midline cervical, thoracic or lumbar spine tenderness, step-offs or deformities.  No paraspinal muscular tenderness in the neck or back.  CV: Regular rate and rhythm; no murmur/gallops/rubs appreciated.  CHEST / LUNGS: Clear to auscultation; no wheezes/rales/rhonci.  ABD: NABS; soft; non-distended; non-tender.  : Voiding spontaneously.  EXT: +2 pulses bilaterally upper & lower extremities; no edema. " Normal range of motion in all 4 extremities without pain, tenderness or deformity.  NEURO: GCS 15; no focal neurologic deficits; neurovascularly intact.  SKIN: Warm, dry and well perfused; no rash; no jaundice.    Invasive Devices       Peripheral Intravenous Line  Duration             Peripheral IV 12/30/23 Right Antecubital <1 day                       1. Before the illness or injury that brought you to the Emergency, did you need someone to help you on a regular basis? 1=Yes   2. Since the illness or injury that brought you to the Emergency, have you needed more help than usual to take care of yourself? 1=Yes   3. Have you been hospitalized for one or more nights during the past 6 months (excluding a stay in the Emergency Department)? 1=Yes   4. In general, do you see well? 0=Yes   5. In general, do you have serious problems with your memory? 1=Yes   6. Do you take more than three different medications everyday? 1=Yes   TOTAL   5     Did you order a geriatric consult if the score was 2 or greater?: yes         Lab Results: Results: I have personally reviewed all pertinent laboratory/tests results    Imaging Results: I have personally reviewed pertinent reports.    Chest Xray(s): negative for acute findings   FAST exam(s): N/A   CT Scan(s): positive for acute findings: Interval increase in the subdural blood products, now noted along the interhemispheric falx and layering on the right tentorium when comparing to the December 23, 2023 study.   Additional Xray(s): N/A     Other Studies: N/A     Rashel Pascual PA-C  12/30/2023  07:19 AM

## 2023-12-30 NOTE — DISCHARGE INSTR - AVS FIRST PAGE
Neurosurgery discharge instructions following traumatic head bleed:     Do not take any blood thinning medications (ie. No Advil. No motrin. No ibuprofen. No Aleve. No Aspirin. No fishoil. No heparin. No antiplatelet / no anticoagulation medication).  Refrain from activity that increases chance of trauma to head or falls. Recommend you take fall precaution.  No strenuous activity or sports.  Return to hospital Emergency Room if you experience worsening / new headache, nausea/vomiting, speech/vision change, seizure, confusion / mental status change, weakness, or other neurological changes.      Follow-up as scheduled with a repeat CT head without contrast to be completed 2-3 days prior to visit.  Prescription has been entered electronically.  Please call [phone] to reschedule if needed.

## 2023-12-30 NOTE — PLAN OF CARE
Problem: PHYSICAL THERAPY ADULT  Goal: Performs mobility at highest level of function for planned discharge setting.  See evaluation for individualized goals.  Description: Treatment/Interventions: OT, Spoke to nursing, Gait training, Bed mobility, Patient/family training, Endurance training, LE strengthening/ROM, Functional transfer training          See flowsheet documentation for full assessment, interventions and recommendations.  Note: Prognosis: Fair  Problem List: Decreased strength, Decreased range of motion, Decreased endurance, Impaired balance, Decreased mobility, Decreased coordination, Impaired judgement, Decreased cognition, Decreased safety awareness  Assessment: Pt is 83 y.o. female seen for PT evaluation s/p admit to [facility] on 12/29/2023 w/ increase in SDH. PT consulted to assess pt's functional mobility and d/c needs. Order placed for PT eval and tx, w/ up w/ A order. Comorbidities affecting pt's physical performance at time of assessment include:  has a past medical history of Afib (HCC), Arthritis, Hyperlipidemia, Hypertension, Osteopenia, Sleep apnea, Squamous cell skin cancer, Subdural hemorrhage (HCC), and Varicose veins of both lower extremities. PTA, pt was requiring A for mobility and ambulates household distances. Personal factors affecting pt at time of IE include: ambulating w/ assistive device, inability to ambulate household distances, and inability to perform IADLs. Please find objective findings from PT assessment regarding body systems outlined above with impairments and limitations including weakness, impaired balance, decreased endurance, impaired coordination, gait deviations, pain, decreased activity tolerance, decreased functional mobility tolerance, decreased safety awareness, impaired judgement, and fall risk. The following objective measures performed on IE also reveal limitations: The patient's AM-PAC Basic Mobility Inpatient Short Form Raw Score is 10.  A  standardized score less than 42.9 suggests the patient may benefit from discharge to post-acute rehabilitation services. Please also refer to the recommendation of the Physical Therapist for safe discharge planning. Pt's clinical presentation is currently unstable/unpredictable seen in pt's presentation of ongoing medical workup. Pt to benefit from continued PT tx to address deficits as defined above and maximize level of functional independent mobility and consistency. From PT/mobility standpoint, recommendation at time of d/c would be return to facility with rehabilitation services pending progress in order to facilitate return to PLOF.  Barriers to Discharge: Decreased caregiver support, Inaccessible home environment     Rehab Resource Intensity Level, PT: II (Moderate Resource Intensity)    See flowsheet documentation for full assessment.

## 2023-12-30 NOTE — H&P
H&P - Trauma   Ann Cross 83 y.o. female MRN: [id number]  Unit/Bed#: [facility] 634-01 Encounter: [id number]     Model of Arrival: Ambulance    Trauma Team: Attending Dr. Kiran DO and Residents Mario Bush DO PGY2  Consultants:     Neurosurgery: routine consult; Epic consult order placed;     Assessment/Plan   * Traumatic subdural hematoma (HCC)   - 12/29 CT head noncon: interval increase in subdural blood products noted along the interhemispheric falx in comparison to study on 12.23  - admission to trauma service SD2   - Repeat head CT 12/30   - consultation to neurosurgery; appreciate recommendations  - Keppra seizure ppx  - EHOB  - Neuro checks per HOT protocol   - Holding DVT ppx   - NPO for now until consultation    Mechanical fall  - Geriatrics c/s  - fall precautions  - PT/OT    Hypertension  - Patient with hx of HTN, currently controlled  - home Norvasc restarted  - Monitor vitals    Hyperlipidemia  - home statin to be restarted  - outpatient follow up appropriate     History of Present Illness     Chief Complaint: None  Mechanism:Other: AMS     HPI:    Ann Cross is a 83 y.o. female who presents and is confused. HPI obtained primarily via chart review. She has a PMHx HTN, HLD, atrial fibrillation, recent traumatic SDH on 12/22 who presents to the ED via EMS for evaluation of AMS. Daughter Lotus at bedside helps provide the history. Patient offers no complaint. Daughter states patient has mild dementia and typically is alert and oriented x3. Daughter reports a fall that resulted on SDH on 12/22 and patient has been more altered ever since. She notes patient having difficulty having conversation/explaining herself which has been progressing worsening since the fall. Daughter also has noticed a cough since she was hospitalized. Denies fever, chills, congestion, vomiting. Daughter states patient typically gets more confused when she has an infection. Denies any other falls. Denies new medication  changes. HD stable. Afebrile. Patient appears in no acute distress. No focal deficits. Patient alert and oriented to self and place. Slightly slow to respond to questions. Follows commands.       Review of Systems   Constitutional:  Positive for appetite change.   HENT: Negative.     Eyes: Negative.    Respiratory: Negative.     Cardiovascular: Negative.    Neurological:  Positive for dizziness.     12-point, complete review of systems was reviewed and negative except as stated above.     Historical Information     Past Medical History:   Diagnosis Date    Afib (HCC)     Arthritis     Hyperlipidemia     Hypertension     Osteopenia     Sleep apnea     Squamous cell skin cancer     LEF TEMPLE    Subdural hemorrhage (HCC)     Varicose veins of both lower extremities      Past Surgical History:   Procedure Laterality Date    CARDIAC ELECTROPHYSIOLOGY PROCEDURE N/A 10/12/2022    Procedure: Cardiac loop recorder implant;  Surgeon: Rashid Carlos MD;  Location: [facility];  Service: Cardiology    CARDIAC ELECTROPHYSIOLOGY PROCEDURE N/A 4/21/2023    Procedure: Cardiac pacer implant;  Surgeon: Simba Chilel MD;  Location: [facility];  Service: Cardiology    CATARACT EXTRACTION      COLONOSCOPY      complete, for polyp removal    EYE SURGERY      HYSTERECTOMY      age 45    INCISION AND DRAINAGE OF WOUND Right 06/28/2016    Procedure: ARTHROSCOPY,EVACUATION OF HEMATOMA, OPEN EXPLORATION OF WOUND;  Surgeon: Adam Brice MD;  Location: [facility];  Service:     MOHS SURGERY Left 12/27/2022    left temple    SKIN BIOPSY      TONSILLECTOMY AND ADENOIDECTOMY      TOTAL KNEE ARTHROPLASTY Right     TOTAL KNEE ARTHROPLASTY Left     WISDOM TOOTH EXTRACTION          Social History     Tobacco Use    Smoking status: Never    Smokeless tobacco: Never   Vaping Use    Vaping status: Never Used   Substance Use Topics    Alcohol use: Not Currently    Drug use: No     Immunization History   Administered Date(s)  Administered    COVID-19 PFIZER VACCINE 0.3 ML IM 02/26/2021, 03/19/2021, 12/01/2021        Meds/Allergies   all current active meds have been reviewed  Allergies have not been reviewed;    Allergies   Allergen Reactions    Atorvastatin Other (See Comments)     myalgia    Statins Other (See Comments)     Weakness in legs       Objective   Initial Vitals:   Temperature: 98.6 °F (37 °C) (12/29/23 1737)  Pulse: 63 (12/29/23 1737)  Respirations: 16 (12/29/23 1737)  Blood Pressure: 158/68 (12/29/23 1737)    Primary Survey:   Airway:        Status: patent;                  Breathing:        Pre-hospital Interventions: none       Effort: normal       Right breath sounds: normal       Left breath sounds: normal  Circulation:        Rhythm: regular       Rate: regular   Right Pulses Left Pulses    R radial: 2+         L radial: 2+           Disability:        GCS: Eye: 4; Verbal: 5 Motor: 6 Total: 15       Right Pupil: round;  reactive         Left Pupil:  round;  reactive      R Motor Strength L Motor Strength               Exposure:       Completed: Yes      Secondary Survey:  Physical Exam  Constitutional:       Appearance: Normal appearance.   HENT:      Head: Normocephalic and atraumatic.   Eyes:      Pupils: Pupils are equal, round, and reactive to light.   Cardiovascular:      Rate and Rhythm: Normal rate.   Pulmonary:      Effort: Pulmonary effort is normal.   Abdominal:      General: There is no distension.      Palpations: Abdomen is soft.      Tenderness: There is no abdominal tenderness.   Musculoskeletal:      Cervical back: Normal range of motion.   Skin:     Capillary Refill: Capillary refill takes less than 2 seconds.   Neurological:      General: No focal deficit present.      Mental Status: She is alert. She is disoriented.   Psychiatric:         Mood and Affect: Mood normal.         Behavior: Behavior normal.         Invasive Devices       Peripheral Intravenous Line  Duration             Peripheral IV  12/29/23 Left Antecubital <1 day                  Lab Results: I have personally reviewed all pertinent laboratory/test results 12/29/23 and in the preceding 24 hours.  Recent Labs     12/29/23  1821 12/29/23 1947   WBC 8.59  --    HGB 15.0  --    HCT 45.5  --      --    BANDSPCT 1  --    SODIUM  --  132*   K  --  4.2   CL  --  97   CO2  --  29   BUN  --  18   CREATININE  --  0.71   GLUC  --  101   AST  --  17   ALT  --  12   ALB  --  3.8   TBILI  --  0.53   ALKPHOS  --  47   PTT  --  24   INR  --  1.03       Mario Bush, DO PGY2  Trauma Surgery

## 2023-12-30 NOTE — CONSULTS
[facility]  Consult  Name: Ann Cross 83 y.o. female I MRN: [id number]  Unit/Bed#: [facility] 634-01 I Date of Admission: 12/29/2023   Date of Service: 12/30/2023 I Hospital Day: 1    Inpatient consult to Neurosurgery  Consult performed by: Pily Urias PA-C  Consult ordered by: Mario Bush,           Assessment/Plan   Subdural hemorrhage (HCC)  Assessment & Plan  Patient noted to have increased subdural products, now with subdural hematoma along the interhemispheric falx and on the right tentorium. On this admission pt presented with increased altered mental status.  Patient initially sustained a fall on December 23, 2023,  she was noted to have a right tentorial subdural hematoma then  Previously, her aspirin for history of A-fib was stopped at the last admission on December 23, 2023.    Imaging reviewed personally and by attending. Final results as below  CT head wo 12/30/2023: Stable parafalcine subdural hematoma.  Stable right tentorial subdural hematoma that extends into the inferomedial right middle cranial fossa.    Plan  Continue frequent neurological checks.   STAT CT head with any neurological decline or a decrease in GCS of 2pts within 1 hr.  Recommend SBP <160 mmHg.  Keppra for seizure ppx per primary team.  Hold/ avoid all antiplatelet and anticoagulation medications.  Continue to hold aspirin at this time.  Pain control per primary team  Mobilize and eval by PT/OT when able to.   DVT PPX: SCDs, given stable CTH, okay for pharm dvt ppx this pm as needed or indicated.   Ongoing medical management per primary team.  No neurosurgical intervention is anticipated at this time.   Neurosurgery will see patient PRN during the remainder of this hospitalization.  Patient will have close follow-up with neurosurgery in 2 weeks with repeat CT head.  Please call with any questions/concerns.              Patient personally accessed and examined on 12/30/2023 at 9  AM    History of Present Illness   History, ROS and PFSH obtained from patient and chart review.  HPI: Ann Cross is a 83 y.o. female with PMH including A-fib who was previously on aspirin that was held for acute subdural hematoma, hyperlipidemia, hypertension,history of squamous cell skin cancer on the left temple, mild dementia who presented to the ED with increased altered mental status.  Patient initially had a fall on December 23, 2023.  At that time, patient was noted to have a right tentorial subdural hematoma.  Per patient's daughter, patient has mild dementia but is usually oriented x 3.  She reported that since patient had a fall on December 22, she has been more altered since then.  She reports that patient has been having more difficulty with cutting out a conversation and explaining herself which has progressively worsened since the fall.  She also reported the patient had a cough.    This morning, patient denies having a headache, dizziness, lightheadedness, nausea or vomiting. Patient denies any new numbness, tingling, or weakness in bilateral upper and lower extremities.      ROS limited due to AMS.   Review of Systems   Constitutional:  Positive for appetite change.   Respiratory:  Positive for cough.    Neurological:  Negative for dizziness, weakness, numbness and headaches.       Historical Information   Past Medical History:   Diagnosis Date    Afib (HCC)     Arthritis     Hyperlipidemia     Hypertension     Osteopenia     Sleep apnea     Squamous cell skin cancer     LEF TEMPLE    Subdural hemorrhage (HCC)     Varicose veins of both lower extremities      Past Surgical History:   Procedure Laterality Date    CARDIAC ELECTROPHYSIOLOGY PROCEDURE N/A 10/12/2022    Procedure: Cardiac loop recorder implant;  Surgeon: Rashid Carlos MD;  [facility];  Service: Cardiology    CARDIAC ELECTROPHYSIOLOGY PROCEDURE N/A 4/21/2023    Procedure: Cardiac pacer implant;  Surgeon: Simba  MD Ranjana;  Location:  CARDIAC CATH LAB;  Service: Cardiology    CATARACT EXTRACTION      COLONOSCOPY      complete, for polyp removal    EYE SURGERY      HYSTERECTOMY      age 45    INCISION AND DRAINAGE OF WOUND Right 06/28/2016    Procedure: ARTHROSCOPY,EVACUATION OF HEMATOMA, OPEN EXPLORATION OF WOUND;  Surgeon: Adam Brice MD;  Location: [facility] OR;  Service:     MOHS SURGERY Left 12/27/2022    left temple    SKIN BIOPSY      TONSILLECTOMY AND ADENOIDECTOMY      TOTAL KNEE ARTHROPLASTY Right     TOTAL KNEE ARTHROPLASTY Left     WISDOM TOOTH EXTRACTION       Social History     Substance and Sexual Activity   Alcohol Use Not Currently     Social History     Substance and Sexual Activity   Drug Use No     Social History     Tobacco Use   Smoking Status Never   Smokeless Tobacco Never     Family History   Problem Relation Age of Onset    Diabetes Mother     Diabetes Father     Diabetes Brother     No Known Problems Sister     No Known Problems Daughter     No Known Problems Maternal Grandmother     No Known Problems Maternal Grandfather     No Known Problems Paternal Grandmother     No Known Problems Paternal Grandfather     No Known Problems Daughter     Brain cancer Son     No Known Problems Maternal Aunt        Meds/Allergies   all current active meds have been reviewed and current meds:   Current Facility-Administered Medications   Medication Dose Route Frequency    acetaminophen (TYLENOL) tablet 650 mg  650 mg Oral Q6H PRN    docusate sodium (COLACE) capsule 100 mg  100 mg Oral BID    levETIRAcetam (KEPPRA) tablet 500 mg  500 mg Oral Q12H LILI    ondansetron (ZOFRAN) injection 4 mg  4 mg Intravenous Q6H PRN    polyethylene glycol (MIRALAX) packet 17 g  17 g Oral Daily    senna (SENOKOT) tablet 17.2 mg  2 tablet Oral Daily     Allergies   Allergen Reactions    Atorvastatin Other (See Comments)     myalgia    Statins Other (See Comments)     Weakness in legs       Objective   I/O         12/28 0701 12/29 0700  "12/29 0701  12/30 0700 12/30 0701  12/31 0700    P.O.   0    Total Intake(mL/kg)   0 (0)    Urine (mL/kg/hr)  141     Total Output  141     Net  -141 0           Unmeasured Urine Occurrence   1 x            Physical Exam  Constitutional:       General: She is not in acute distress.     Appearance: She is well-developed.   HENT:      Head: Normocephalic and atraumatic.   Eyes:      Extraocular Movements: Extraocular movements intact.      Pupils: Pupils are equal, round, and reactive to light.   Neck:      Trachea: No tracheal deviation.   Cardiovascular:      Rate and Rhythm: Normal rate.   Pulmonary:      Effort: Pulmonary effort is normal.   Abdominal:      Palpations: Abdomen is soft.      Tenderness: There is no abdominal tenderness. There is no guarding.   Musculoskeletal:      Cervical back: Normal range of motion and neck supple.   Skin:     General: Skin is warm and dry.      Coloration: Skin is not pale.   Neurological:      Mental Status: She is alert.      Comments: GCS 14 (-1 mild confusion), Awake, Alert, Oriented to person, not oriented to place, oriented to month, not oriented to date or year.     Motor: CRUZ, strength 5-/5 throughout    Sensation:  intact to LT X 4     Reflexes: no shepard's or clonus     Coordination: no drift bilateral upper extremities.   Psychiatric:         Behavior: Behavior normal.       Neurologic Exam     Cranial Nerves     CN III, IV, VI   Pupils are equal, round, and reactive to light.      Vitals:Blood pressure 134/61, pulse 62, temperature 98.2 °F (36.8 °C), resp. rate 17, height 5' 4\" (1.626 m), weight 70.3 kg (155 lb), SpO2 95%.,Body mass index is 26.61 kg/m².     Lab Results:   Results from last 7 days   Lab Units 12/30/23  0450 12/29/23  1821 12/24/23  0607   WBC Thousand/uL 8.01 8.59 7.61   HEMOGLOBIN g/dL 14.5 15.0 14.1   HEMATOCRIT % 43.3 45.5 42.1   PLATELETS Thousands/uL 178 192 132*   NEUTROS PCT %  --   --  44   MONOS PCT %  --   --  13*   MONO PCT %  --  12  " --    EOS PCT %  --  2 2     Results from last 7 days   Lab Units 12/30/23  0450 12/29/23 1947 12/24/23  1014   POTASSIUM mmol/L 3.9 4.2 3.7   CHLORIDE mmol/L 100 97 103   CO2 mmol/L 26 29 28   BUN mg/dL 16 18 22   CREATININE mg/dL 0.72 0.71 0.69   CALCIUM mg/dL 8.7 9.1 8.4   ALK PHOS U/L  --  47  --    ALT U/L  --  12  --    AST U/L  --  17  --              Results from last 7 days   Lab Units 12/29/23 1947   INR  1.03   PTT seconds 24         Imaging Studies: I have personally reviewed pertinent reports.   and I have personally reviewed pertinent films in PACS    .IMGR    EKG, Pathology, and Other Studies: I have personally reviewed pertinent reports.      VTE Prophylaxis: Sequential compression device (Venodyne)     Code Status: Level 1 - Full Code  Advance Directive and Living Will:      Power of :    POLST:      Counseling / Coordination of Care  I spent 45 minutes with the patient.    PLEASE NOTE:  This encounter may have been completed utilizing the "[employer] Direct Speech Voice Recognition Software. Grammatical errors, random word insertions, pronoun errors and incomplete sentences are occasional consequences of the system due to software limitations, ambient noise and hardware issues.These may be missed even after proof reading prior to affixing electronic signature. Please do not hesitate to contact me directly if you have any questions or concerns about the content, text or information contained within the body of this dictation.

## 2023-12-30 NOTE — ASSESSMENT & PLAN NOTE
- Patient with known subdural hematomas in the interhemispheric falx and layering on the right tentorium following recent fall.   - Patient returned and was readmitted with change in mental status and repeat CT imaging from 12/29/2023 demonstrating interval increase in the subdural blood products.   - Repeat CT imaging of the head on 12/30/2022 demonstrated stable findings with no evidence of increased or new bleeding and no significant mass effect.  - Neurosurgery evaluation and recommendations appreciated.   - No further workup or intervention necessary at this time.  - Hold anti-platelet and anticoagulant medications for least 2 weeks and/or until cleared by Neurosurgery.  - Continue Keppra for 7 days for seizure prophylaxis.  - Monitor neurologic exam.  - Continue symptomatic management with analgesia as needed.  - PT and OT evaluation and treatment as indicated.  - Outpatient Neurosurgery follow-up in 2 weeks with repeat CT scan of the head prior to follow-up appointment.

## 2023-12-30 NOTE — ASSESSMENT & PLAN NOTE
- Patient with chronic history of hypertension, but adequate heart rate control at this time.  - Continue current medication regimen and resume home medication therapy on discharge as appropriate.  - Outpatient follow-up per routine.

## 2023-12-30 NOTE — PHYSICAL THERAPY NOTE
Physical Therapy Evaluation     Patient's Name: Ann Cross    Admitting Diagnosis  Subdural hemorrhage (HCC) [I62.00]  AMS (altered mental status) [R41.82]    Problem List  Patient Active Problem List   Diagnosis    Pain, joint, knee, right    Obstructive sleep apnea syndrome     Mood disorder with hypomanic features    Generalized anxiety disorder    Minimal cognitive impairment    Hyperlipemia    Hypertension    Anxiety state    Degeneration of lumbar intervertebral disc    History of total bilateral knee replacement    Metabolic syndrome X    Migraine    Osteoporosis    Paroxysmal atrial fibrillation (HCC)    Peripheral venous insufficiency    Tachy-alison syndrome (HCC)    Stage 3 chronic kidney disease, unspecified whether stage 3a or 3b CKD (HCC)    COVID-19    Thrombocytopenia (HCC)    Sinus pause    Subdural hemorrhage (HCC)    Acute pain due to trauma    Fall       Past Medical History  Past Medical History:   Diagnosis Date    Afib (HCC)     Arthritis     Hyperlipidemia     Hypertension     Osteopenia     Sleep apnea     Squamous cell skin cancer     LEF TEMPLE    Subdural hemorrhage (HCC)     Varicose veins of both lower extremities        Past Surgical History  Past Surgical History:   Procedure Laterality Date    CARDIAC ELECTROPHYSIOLOGY PROCEDURE N/A 10/12/2022    Procedure: Cardiac loop recorder implant;  Surgeon: Rashid Carlos MD;  [facility];  Service: Cardiology    CARDIAC ELECTROPHYSIOLOGY PROCEDURE N/A 4/21/2023    Procedure: Cardiac pacer implant;  Surgeon: Simba Chilel MD;  [facility]: [facility];  Service: Cardiology    CATARACT EXTRACTION      COLONOSCOPY      complete, for polyp removal    EYE SURGERY      HYSTERECTOMY      age 45    INCISION AND DRAINAGE OF WOUND Right 06/28/2016    Procedure: ARTHROSCOPY,EVACUATION OF HEMATOMA, OPEN EXPLORATION OF WOUND;  Surgeon: Adam Brice MD;  Location: [facility];  Service:     MOHS SURGERY Left 12/27/2022    left  temple    SKIN BIOPSY      TONSILLECTOMY AND ADENOIDECTOMY      TOTAL KNEE ARTHROPLASTY Right     TOTAL KNEE ARTHROPLASTY Left     WISDOM TOOTH EXTRACTION          12/30/23 1101   PT Last Visit   PT Visit Date 12/30/23   Note Type   Note type Evaluation   Pain Assessment   Pain Assessment Tool 0-10   Pain Score No Pain   Restrictions/Precautions   Weight Bearing Precautions Per Order No   Other Precautions Cognitive;Chair Alarm;Bed Alarm;Fall Risk;Pain;Multiple lines;Telemetry   Home Living   Type of Home Assisted living  ([facility])   Prior Function   Level of [place] Independent with functional mobility   Lives With Facility staff   Receives Help From Family;Personal care attendant   Falls in the last 6 months 1 to 4   Vocational Retired   General   Family/Caregiver Present No   Cognition   Orientation Level Oriented to person;Oriented to place;Oriented to situation   Subjective   Subjective Pt willing and agreeable to PT session   RLE Assessment   RLE Assessment WFL   LLE Assessment   LLE Assessment WFL   Coordination   Movements are Fluid and Coordinated 0   Coordination and Movement Description slow and guarded   Bed Mobility   Supine to Sit 3  Moderate assistance   Additional items Assist x 1   Sit to Supine Unable to assess   Additional Comments Pt left resting in chair, call bell in reach, chair alarm active   Transfers   Sit to Stand 3  Moderate assistance   Additional items Assist x 1   Stand to Sit 3  Moderate assistance   Additional items Assist x 1   Ambulation/Elevation   Gait pattern Excessively slow;Short stride;Shuffling;Decreased foot clearance   Gait Assistance 3  Moderate assist   Additional items Assist x 1   Assistive Device Other (Comment)  ([facility])   Distance 4'   Balance   Static Sitting Fair   Dynamic Sitting Fair -   Static Standing Fair -   Dynamic Standing Poor +   Ambulatory Poor   Endurance Deficit   Endurance Deficit Yes   Endurance Deficit Description limited by fatigue compared  to baseline mobility   Activity Tolerance   Activity Tolerance Patient limited by fatigue;Patient limited by pain   Medical Staff Made Aware OT and trauma for D/C planning   Nurse Made Aware yes   Assessment   Prognosis Fair   Problem List Decreased strength;Decreased range of motion;Decreased endurance;Impaired balance;Decreased mobility;Decreased coordination;Impaired judgement;Decreased cognition;Decreased safety awareness   Assessment Pt is 83 y.o. female seen for PT evaluation s/p admit to [facility] on 12/29/2023 w/ increase in SDH. PT consulted to assess pt's functional mobility and d/c needs. Order placed for PT eval and tx, w/ up w/ A order. Comorbidities affecting pt's physical performance at time of assessment include:  has a past medical history of Afib (HCC), Arthritis, Hyperlipidemia, Hypertension, Osteopenia, Sleep apnea, Squamous cell skin cancer, Subdural hemorrhage (HCC), and Varicose veins of both lower extremities. PTA, pt was requiring A for mobility and ambulates household distances. Personal factors affecting pt at time of IE include: ambulating w/ assistive device, inability to ambulate household distances, and inability to perform IADLs. Please find objective findings from PT assessment regarding body systems outlined above with impairments and limitations including weakness, impaired balance, decreased endurance, impaired coordination, gait deviations, pain, decreased activity tolerance, decreased functional mobility tolerance, decreased safety awareness, impaired judgement, and fall risk. The following objective measures performed on IE also reveal limitations: The patient's AM-PAC Basic Mobility Inpatient Short Form Raw Score is 10.  A standardized score less than 42.9 suggests the patient may benefit from discharge to post-acute rehabilitation services. Please also refer to the recommendation of the Physical Therapist for safe discharge planning. Pt's clinical presentation is  currently unstable/unpredictable seen in pt's presentation of ongoing medical workup. Pt to benefit from continued PT tx to address deficits as defined above and maximize level of functional independent mobility and consistency. From PT/mobility standpoint, recommendation at time of d/c would be return to facility with rehabilitation services pending progress in order to facilitate return to PLOF.   Barriers to Discharge Decreased caregiver support;Inaccessible home environment   Goals   Patient Goals None stated   STG Expiration Date 01/11/24   Short Term Goal #1 1. Complete bed mobility and transfers I to decrease need for caregiver in home. 2. Ambulate 100' with S and least restrictive AD to complete household mobility without A. 3. Improve dynamic balance to good to decrease need for UE support during ambulation.   Plan   Treatment/Interventions OT;Spoke to nursing;Gait training;Bed mobility;Patient/family training;Endurance training;LE strengthening/ROM;Functional transfer training   PT Frequency 3-5x/wk   Discharge Recommendation   Rehab Resource Intensity Level, PT II (Moderate Resource Intensity)   AM-PAC Basic Mobility Inpatient   Turning in Flat Bed Without Bedrails 2   Lying on Back to Sitting on Edge of Flat Bed Without Bedrails 2   Moving Bed to Chair 2   Standing Up From Chair Using Arms 2   Walk in Room 1   Climb 3-5 Stairs With Railing 1   Basic Mobility Inpatient Raw Score 10   Turning Head Towards Sound 3   Follow Simple Instructions 3   Low Function Basic Mobility Raw Score  16   Low Function Basic Mobility Standardized Score  25.72   Highest Level Of Mobility   JH-HLM Goal 4: Move to chair/commode   JH-HLM Achieved 4: Move to chair/commode           Hazel Nicholas, PT

## 2023-12-30 NOTE — QUICK NOTE
Patient seen and reevaluated with patient's daughter, Lotus, at bedside.  The patient's daughter was provided an update regarding her clinical condition, the repeat CT findings this presentation and that the suspected cause of her altered mental status being the increased subdural hematoma products.  We discussed the recommendations from neurosurgery that no additional workup or intervention is deemed necessary at this time following stable repeat CT scan findings this morning.  Urine studies were still pending at the time of our conversation, though patient is afebrile with normal white blood cell count and no subjective symptoms of UTI.  Unless significant findings were noted on urine evaluation, there is no plan for antibiotic therapy at this time.    We discussed the plan for reevaluation by the therapy service and anticipated return to postacute care facility for ongoing rehab when able.  Case management has been contacted and will work on disposition planning.  Further updates to the patient's daughter can be provided as indicated.    Rashel Pascual PA-C  12/30/2023  12:04 PM

## 2023-12-30 NOTE — PLAN OF CARE
Problem: Potential for Falls  Goal: Patient will remain free of falls  Description: INTERVENTIONS:  - Educate patient/family on patient safety including physical limitations  - Instruct patient to call for assistance with activity   - Consult OT/PT to assist with strengthening/mobility   - Keep Call bell within reach  - Keep bed low and locked with side rails adjusted as appropriate  - Keep care items and personal belongings within reach  - Initiate and maintain comfort rounds  - Make Fall Risk Sign visible to staff  - Offer Toileting every  Hours, in advance of need  - Initiate/Maintain alarm  - Obtain necessary fall risk management equipment:   - Apply yellow socks and bracelet for high fall risk patients  - Consider moving patient to room near nurses station  Outcome: Progressing     Problem: Prexisting or High Potential for Compromised Skin Integrity  Goal: Skin integrity is maintained or improved  Description: INTERVENTIONS:  - Identify patients at risk for skin breakdown  - Assess and monitor skin integrity  - Assess and monitor nutrition and hydration status  - Monitor labs   - Assess for incontinence   - Turn and reposition patient  - Assist with mobility/ambulation  - Relieve pressure over bony prominences  - Avoid friction and shearing  - Provide appropriate hygiene as needed including keeping skin clean and dry  - Evaluate need for skin moisturizer/barrier cream  - Collaborate with interdisciplinary team   - Patient/family teaching  - Consider wound care consult   Outcome: Progressing

## 2023-12-30 NOTE — PLAN OF CARE
Problem: OCCUPATIONAL THERAPY ADULT  Goal: Performs self-care activities at highest level of function for planned discharge setting.  See evaluation for individualized goals.  Description: Treatment Interventions: ADL retraining, Functional transfer training, Endurance training, Cognitive reorientation, Patient/family training, Equipment evaluation/education, Compensatory technique education, Activityengagement          See flowsheet documentation for full assessment, interventions and recommendations.   Note: Limitation: Decreased ADL status, Decreased Safe judgement during ADL, Decreased endurance, Decreased self-care trans  Prognosis: Fair  Assessment: Pt is a 83 y.o. female who was admitted to [facility] on 12/29/2023 with altered mental status s/p recent fall with SDH . Patient  has a past medical history of Afib (HCC), Arthritis, Hyperlipidemia, Hypertension, Osteopenia, Sleep apnea, Squamous cell skin cancer, Subdural hemorrhage (HCC), and Varicose veins of both lower extremities.  At baseline pt receives assist for adls and mobility - meals/homemaking provided by facility. Pt [facility] at [facility] however was at [facility] for [facility]. Currently pt requires mod to max assist for overall ADLS and moderate assist for functional mobility/transfers. Pt currently presents with impairments in the following categories -difficulty performing ADLS, limited insight into deficits, decreased initiation and engagement , and environment activity tolerance, endurance, standing balance/tolerance, sitting balance/tolerance, arousal, memory, insight, safety , judgement , attention , sequencing , task initiation , and task termination . These impairments, as well as pt's fatigue, risk for falls, and home environment  limit pt's ability to safely engage in all baseline areas of occupation, includingbathing, dressing, toileting, functional mobility/transfers, social participation , and leisure  activities  From OT standpoint, recommend Level II resources upon D/C. OT will continue to follow to address the below stated goals.     Rehab Resource Intensity Level, OT: II (Moderate Resource Intensity)

## 2023-12-30 NOTE — OCCUPATIONAL THERAPY NOTE
Occupational Therapy Evaluation     Patient Name: Ann Cross  Today's Date: 12/30/2023  Problem List  Active Problems:    Hypertension    Stage 3 chronic kidney disease, unspecified whether stage 3a or 3b CKD (HCC)    Subdural hemorrhage (HCC)    Past Medical History  Past Medical History:   Diagnosis Date    Afib (HCC)     Arthritis     Hyperlipidemia     Hypertension     Osteopenia     Sleep apnea     Squamous cell skin cancer     LEF TEMPLE    Subdural hemorrhage (HCC)     Varicose veins of both lower extremities      Past Surgical History  Past Surgical History:   Procedure Laterality Date    CARDIAC ELECTROPHYSIOLOGY PROCEDURE N/A 10/12/2022    Procedure: Cardiac loop recorder implant;  Surgeon: Rashid Carlos MD;  [facility]: [facility];  Service: Cardiology    CARDIAC ELECTROPHYSIOLOGY PROCEDURE N/A 4/21/2023    Procedure: Cardiac pacer implant;  Surgeon: Simba Chilel MD;  Location: [facility];  Service: Cardiology    CATARACT EXTRACTION      COLONOSCOPY      complete, for polyp removal    EYE SURGERY      HYSTERECTOMY      age 45    INCISION AND DRAINAGE OF WOUND Right 06/28/2016    Procedure: ARTHROSCOPY,EVACUATION OF HEMATOMA, OPEN EXPLORATION OF WOUND;  Surgeon: Adam Brice MD;  Location: [facility];  Service:     MOHS SURGERY Left 12/27/2022    left temple    SKIN BIOPSY      TONSILLECTOMY AND ADENOIDECTOMY      TOTAL KNEE ARTHROPLASTY Right     TOTAL KNEE ARTHROPLASTY Left     WISDOM TOOTH EXTRACTION           12/30/23 1100   OT Last Visit   OT Visit Date 12/30/23   Note Type   Note type Evaluation   Pain Assessment   Pain Assessment Tool 0-10   Pain Score No Pain   Restrictions/Precautions   Weight Bearing Precautions Per Order No   Other Precautions Cognitive;Chair Alarm;Bed Alarm;Fall Risk;Multiple lines;Pain   Home Living   Type of Home Assisted living  (Angela Delarosa was at [facility] for rehab PTA)   Prior Function   Level of [place] Needs assistance  with ADLs;Needs assistance with functional mobility;Needs assistance with IADLS   Lives With Facility staff   Receives Help From Family;Personal care attendant   IADLs Family/Friend/Other provides transportation;Family/Friend/Other provides meals;Family/Friend/Other provides medication management   Falls in the last 6 months 1 to 4   Vocational Retired   Lifestyle   Autonomy receives assist for adls and mobility- meals/homemaking provided   Reciprocal Relationships supportive family and facility staff   Service to Others retired   Subjective   Subjective offers no c/o   ADL   Eating Assistance 5  Supervision/Setup   Grooming Assistance 4  Minimal Assistance   UB Bathing Assistance 3  Moderate Assistance   LB Bathing Assistance 2  Maximal Assistance   UB Dressing Assistance 3  Moderate Assistance   LB Dressing Assistance 2  Maximal Assistance   Toileting Assistance  2  Maximal Assistance   Bed Mobility   Supine to Sit 3  Moderate assistance   Transfers   Sit to Stand 3  Moderate assistance   Stand to Sit 3  Moderate assistance   Functional Mobility   Functional Mobility 3  Moderate assistance   Additional items Hand hold assistance   Balance   Static Sitting Fair   Dynamic Sitting Fair -   Static Standing Fair -   Dynamic Standing Poor +   Ambulatory Poor   Activity Tolerance   Activity Tolerance Patient limited by fatigue;Patient limited by pain;Treatment limited secondary to medical complications (Comment)   RUE Assessment   RUE Assessment WFL   LUE Assessment   LUE Assessment WFL   Cognition   Arousal/Participation Arousable;Cooperative   Attention Attends with cues to redirect   Orientation Level Oriented to person;Oriented to place;Disoriented to time;Disoriented to situation   Memory Decreased short term memory;Decreased recall of recent events;Decreased recall of precautions   Following Commands Follows one step commands with increased time or repetition   Assessment   Limitation Decreased ADL status;Decreased  Safe judgement during ADL;Decreased endurance;Decreased self-care trans   Prognosis Fair   Assessment Pt is a 83 y.o. female who was admitted to [facility] on 12/29/2023 with altered mental status s/p recent fall with SDH . Patient  has a past medical history of Afib (HCC), Arthritis, Hyperlipidemia, Hypertension, Osteopenia, Sleep apnea, Squamous cell skin cancer, Subdural hemorrhage (HCC), and Varicose veins of both lower extremities.  At baseline pt receives assist for adls and mobility - meals/homemaking provided by facility. Pt lives at [facility] however was at [facility] for STR PTA. Currently pt requires mod to max assist for overall ADLS and moderate assist for functional mobility/transfers. Pt currently presents with impairments in the following categories -difficulty performing ADLS, limited insight into deficits, decreased initiation and engagement , and environment activity tolerance, endurance, standing balance/tolerance, sitting balance/tolerance, arousal, memory, insight, safety , judgement , attention , sequencing , task initiation , and task termination . These impairments, as well as pt's fatigue, risk for falls, and home environment  limit pt's ability to safely engage in all baseline areas of occupation, includingbathing, dressing, toileting, functional mobility/transfers, social participation , and leisure activities  From OT standpoint, recommend Level II resources upon D/C. OT will continue to follow to address the below stated goals.   Goals   Patient Goals none stated at this time 2* cognitive limitations   LTG Time Frame 10-14   Long Term Goal #1 refer to established goals below   Plan   Treatment Interventions ADL retraining;Functional transfer training;Endurance training;Cognitive reorientation;Patient/family training;Equipment evaluation/education;Compensatory technique education;Activityengagement   Goal Expiration Date 01/13/24   OT Frequency 2-3x/wk    Discharge Recommendation   Rehab Resource Intensity Level, OT II (Moderate Resource Intensity)   AM-PAC Daily Activity Inpatient   Lower Body Dressing 2   Bathing 2   Toileting 2   Upper Body Dressing 2   Grooming 3   Eating 4   Daily Activity Raw Score 15   Daily Activity Standardized Score (Calc for Raw Score >=11) 34.69   AM-PAC Applied Cognition Inpatient   Following a Speech/Presentation 2   Understanding Ordinary Conversation 3   Taking Medications 2   Remembering Where Things Are Placed or Put Away 2   Remembering List of 4-5 Errands 1   Taking Care of Complicated Tasks 1   Applied Cognition Raw Score 11   Applied Cognition Standardized Score 27.03   End of Consult   Education Provided Yes   Patient Position at End of Consult Bedside chair;Bed/Chair alarm activated;All needs within reach   Nurse Communication Nurse aware of consult       OCCUPATIONAL THERAPY GOALS:      *S feeding/grooming after setup  *Min a adls after setup with min cues to initiate, sequence and complete tasks PRN  *Min a toileting and clothing management  *Min a bed mobility with fair to fair+ sitting balance on EOB to engage in light grooming/self care and enjoyable activities  *Min a transfers on/off all surfaces with fair to fair+ balance/safety  *Demonstrate fair to fair+ carryover with safe use of RW during functional tasks   *Increase dynamic balance to fair for improved safety during participation in adls and iadl tasks   *Increase activity tolerance to 25-30 min for participation in adls and enjoyable activities  *Assess DME needs  *Assist with safe d/c recommendations          The patient's raw score on the AM-PAC Daily Activity Inpatient Short Form is 15. A raw score of less than 19 suggests the patient may benefit from discharge to post-acute rehabilitation services. Please refer to the recommendation of the Occupational Therapist for safe discharge planning.        Documentation Completed By:    NIEVES Lamb/JOELLE Whitley  Certified - [id number]

## 2023-12-30 NOTE — ASSESSMENT & PLAN NOTE
Patient noted to have increased subdural products, now with subdural hematoma along the interhemispheric falx and on the right tentorium. On this admission pt presented with increased altered mental status.  Patient initially sustained a fall on December 23, 2023,  she was noted to have a right tentorial subdural hematoma then  Previously, her aspirin for history of A-fib was stopped at the last admission on December 23, 2023.    Imaging reviewed personally and by attending. Final results as below  CT head wo 12/30/2023: Stable parafalcine subdural hematoma.  Stable right tentorial subdural hematoma that extends into the inferomedial right middle cranial fossa.    Plan  Continue frequent neurological checks.   STAT CT head with any neurological decline or a decrease in GCS of 2pts within 1 hr.  Recommend SBP <160 mmHg.  Keppra for seizure ppx per primary team.  Hold/ avoid all antiplatelet and anticoagulation medications.  Continue to hold aspirin at this time.  Pain control per primary team  Mobilize and eval by PT/OT when able to.   DVT PPX: SCDs, given stable CTH, okay for pharm dvt ppx this pm as needed or indicated.   Ongoing medical management per primary team.  No neurosurgical intervention is anticipated at this time.   Neurosurgery will see patient PRN during the remainder of this hospitalization.  Patient will have close follow-up with neurosurgery in 2 weeks with repeat CT head.  Please call with any questions/concerns.

## 2023-12-30 NOTE — ASSESSMENT & PLAN NOTE
Lab Results   Component Value Date    EGFR 77 12/30/2023    EGFR 79 12/29/2023    EGFR 80 12/24/2023    CREATININE 0.72 12/30/2023    CREATININE 0.71 12/29/2023    CREATININE 0.69 12/24/2023     - Patient with chronic history of CKD stage III without evidence of acute kidney injury.  - Avoid nephrotoxic medications and hypotension.  - Monitor volume status.  - Outpatient follow-up routine.

## 2023-12-30 NOTE — PLAN OF CARE
Problem: Potential for Falls  Goal: Patient will remain free of falls  Description: INTERVENTIONS:  - Educate patient/family on patient safety including physical limitations  - Instruct patient to call for assistance with activity   - Consult OT/PT to assist with strengthening/mobility   - Keep Call bell within reach  - Keep bed low and locked with side rails adjusted as appropriate  - Keep care items and personal belongings within reach  - Initiate and maintain comfort rounds  - Make Fall Risk Sign visible to staff  - Offer Toileting every  Hours, in advance of need  - Initiate/Maintain alarm  - Obtain necessary fall risk management equipment:   - Apply yellow socks and bracelet for high fall risk patients  - Consider moving patient to room near nurses station  12/30/2023 0726 by Jamaica Escalante RN  Outcome: Progressing  12/30/2023 0725 by Jamaica Escalante RN  Outcome: Progressing     Problem: Prexisting or High Potential for Compromised Skin Integrity  Goal: Skin integrity is maintained or improved  Description: INTERVENTIONS:  - Identify patients at risk for skin breakdown  - Assess and monitor skin integrity  - Assess and monitor nutrition and hydration status  - Monitor labs   - Assess for incontinence   - Turn and reposition patient  - Assist with mobility/ambulation  - Relieve pressure over bony prominences  - Avoid friction and shearing  - Provide appropriate hygiene as needed including keeping skin clean and dry  - Evaluate need for skin moisturizer/barrier cream  - Collaborate with interdisciplinary team   - Patient/family teaching  - Consider wound care consult   12/30/2023 0726 by Jamaica Escalante RN  Outcome: Progressing  12/30/2023 0725 by Jamaica Escalante RN  Outcome: Progressing     Problem: PAIN - ADULT  Goal: Verbalizes/displays adequate comfort level or baseline comfort level  Description: Interventions:  - Encourage patient to monitor pain and request assistance  - Assess pain using appropriate  pain scale  - Administer analgesics based on type and severity of pain and evaluate response  - Implement non-pharmacological measures as appropriate and evaluate response  - Consider cultural and social influences on pain and pain management  - Notify physician/advanced practitioner if interventions unsuccessful or patient reports new pain  Outcome: Progressing     Problem: INFECTION - ADULT  Goal: Absence or prevention of progression during hospitalization  Description: INTERVENTIONS:  - Assess and monitor for signs and symptoms of infection  - Monitor lab/diagnostic results  - Monitor all insertion sites, i.e. indwelling lines, tubes, and drains  - Monitor endotracheal if appropriate and nasal secretions for changes in amount and color  - [place] appropriate cooling/warming therapies per order  - Administer medications as ordered  - Instruct and encourage patient and family to use good hand hygiene technique  - Identify and instruct in appropriate isolation precautions for identified infection/condition  Outcome: Progressing     Problem: SAFETY ADULT  Goal: Patient will remain free of falls  Description: INTERVENTIONS:  - Educate patient/family on patient safety including physical limitations  - Instruct patient to call for assistance with activity   - Consult OT/PT to assist with strengthening/mobility   - Keep Call bell within reach  - Keep bed low and locked with side rails adjusted as appropriate  - Keep care items and personal belongings within reach  - Initiate and maintain comfort rounds  - Make Fall Risk Sign visible to staff  - Offer Toileting every  Hours, in advance of need  - Initiate/Maintain alarm  - Obtain necessary fall risk management equipment:   - Apply yellow socks and bracelet for high fall risk patients  - Consider moving patient to room near nurses station  12/30/2023 0726 by Jamaica Escalante RN  Outcome: Progressing  12/30/2023 0725 by Jamaica Escalante RN  Outcome: Progressing  Goal:  Maintain or return to baseline ADL function  Description: INTERVENTIONS:  -  Assess patient's ability to carry out ADLs; assess patient's baseline for ADL function and identify physical deficits which impact ability to perform ADLs (bathing, care of mouth/teeth, toileting, grooming, dressing, etc.)  - Assess/evaluate cause of self-care deficits   - Assess range of motion  - Assess patient's mobility; develop plan if impaired  - Assess patient's need for assistive devices and provide as appropriate  - Encourage maximum independence but intervene and supervise when necessary  - Involve family in performance of ADLs  - Assess for home care needs following discharge   - Consider OT consult to assist with ADL evaluation and planning for discharge  - Provide patient education as appropriate  Outcome: Progressing  Goal: Maintains/Returns to pre admission functional level  Description: INTERVENTIONS:  - Perform AM-PAC 6 Click Basic Mobility/ Daily Activity assessment daily.  - Set and communicate daily mobility goal to care team and patient/family/caregiver.   - Collaborate with rehabilitation services on mobility goals if consulted  - Perform Range of Motion  times a day.  - Reposition patient every  hours.  - Dangle patient  times a day  - Stand patient  times a day  - Ambulate patient  times a day  - Out of bed to chair  times a day   - Out of bed for meals  times a day  - Out of bed for toileting  - Record patient progress and toleration of activity level   Outcome: Progressing     Problem: DISCHARGE PLANNING  Goal: Discharge to home or other facility with appropriate resources  Description: INTERVENTIONS:  - Identify barriers to discharge w/patient and caregiver  - Arrange for needed discharge resources and transportation as appropriate  - Identify discharge learning needs (meds, wound care, etc.)  - Arrange for interpretive services to assist at discharge as needed  - Refer to Case Management Department for  coordinating discharge planning if the patient needs post-hospital services based on physician/advanced practitioner order or complex needs related to functional status, cognitive ability, or social support system  Outcome: Progressing     Problem: Knowledge Deficit  Goal: Patient/family/caregiver demonstrates understanding of disease process, treatment plan, medications, and discharge instructions  Description: Complete learning assessment and assess knowledge base.  Interventions:  - Provide teaching at level of understanding  - Provide teaching via preferred learning methods  Outcome: Progressing     Problem: NEUROSENSORY - ADULT  Goal: Achieves stable or improved neurological status  Description: INTERVENTIONS  - Monitor and report changes in neurological status  - Monitor vital signs such as temperature, blood pressure, glucose, and any other labs ordered   - Initiate measures to prevent increased intracranial pressure  - Monitor for seizure activity and implement precautions if appropriate      Outcome: Progressing  Goal: Remains free of injury related to seizures activity  Description: INTERVENTIONS  - Maintain airway, patient safety  and administer oxygen as ordered  - Monitor patient for seizure activity, document and report duration and description of seizure to physician/advanced practitioner  - If seizure occurs,  ensure patient safety during seizure  - Reorient patient post seizure  - Seizure pads on all 4 side rails  - Instruct patient/family to notify RN of any seizure activity including if an aura is experienced  - Instruct patient/family to call for assistance with activity based on nursing assessment  - Administer anti-seizure medications if ordered    Outcome: Progressing     Problem: CARDIOVASCULAR - ADULT  Goal: Maintains optimal cardiac output and hemodynamic stability  Description: INTERVENTIONS:  - Monitor I/O, vital signs and rhythm  - Monitor for S/S and trends of decreased cardiac  output  - Administer and titrate ordered vasoactive medications to optimize hemodynamic stability  - Assess quality of pulses, skin color and temperature  - Assess for signs of decreased coronary artery perfusion  - Instruct patient to report change in severity of symptoms  Outcome: Progressing     Problem: GASTROINTESTINAL - ADULT  Goal: Minimal or absence of nausea and/or vomiting  Description: INTERVENTIONS:  - Administer IV fluids if ordered to ensure adequate hydration  - Maintain NPO status until nausea and vomiting are resolved  - Nasogastric tube if ordered  - Administer ordered antiemetic medications as needed  - Provide nonpharmacologic comfort measures as appropriate  - Advance diet as tolerated, if ordered  - Consider nutrition services referral to assist patient with adequate nutrition and appropriate food choices  Outcome: Progressing  Goal: Maintains or returns to baseline bowel function  Description: INTERVENTIONS:  - Assess bowel function  - Encourage oral fluids to ensure adequate hydration  - Administer IV fluids if ordered to ensure adequate hydration  - Administer ordered medications as needed  - Encourage mobilization and activity  - Consider nutritional services referral to assist patient with adequate nutrition and appropriate food choices  Outcome: Progressing     Problem: METABOLIC, FLUID AND ELECTROLYTES - ADULT  Goal: Electrolytes maintained within normal limits  Description: INTERVENTIONS:  - Monitor labs and assess patient for signs and symptoms of electrolyte imbalances  - Administer electrolyte replacement as ordered  - Monitor response to electrolyte replacements, including repeat lab results as appropriate  - Instruct patient on fluid and nutrition as appropriate  Outcome: Progressing  Goal: Fluid balance maintained  Description: INTERVENTIONS:  - Monitor labs   - Monitor I/O and WT  - Instruct patient on fluid and nutrition as appropriate  - Assess for signs & symptoms of volume  excess or deficit  Outcome: Progressing     Problem: SKIN/TISSUE INTEGRITY - ADULT  Goal: Skin Integrity remains intact(Skin Breakdown Prevention)  Description: Assess:  -Perform Waqas assessment every   -Clean and moisturize skin every   -Inspect skin when repositioning, toileting, and assisting with ADLS  -Assess under medical devices such as  every   -Assess extremities for adequate circulation and sensation     Bed Management:  -Have minimal linens on bed & keep smooth, unwrinkled  -Change linens as needed when moist or perspiring  -Avoid sitting or lying in one position for more than  hours while in bed  -Keep HOB at degrees     Toileting:  -Offer bedside commode  -Assess for incontinence every   -Use incontinent care products after each incontinent episode such as     Activity:  -Mobilize patient  times a day  -Encourage activity and walks on unit  -Encourage or provide ROM exercises   -Turn and reposition patient every  Hours  -Use appropriate equipment to lift or move patient in bed  -Instruct/ Assist with weight shifting every  when out of bed in chair  -Consider limitation of chair time  hour intervals    Skin Care:  -Avoid use of baby powder, tape, friction and shearing, hot water or constrictive clothing  -Relieve pressure over bony prominences using   -Do not massage red bony areas    Next Steps:  -Teach patient strategies to minimize risks such as    -Consider consults to  interdisciplinary teams such as  Outcome: Progressing     Problem: HEMATOLOGIC - ADULT  Goal: Maintains hematologic stability  Description: INTERVENTIONS  - Assess for signs and symptoms of bleeding or hemorrhage  - Monitor labs  - Administer supportive blood products/factors as ordered and appropriate  Outcome: Progressing     Problem: MUSCULOSKELETAL - ADULT  Goal: Maintain or return mobility to safest level of function  Description: INTERVENTIONS:  - Assess patient's ability to carry out ADLs; assess patient's baseline for ADL  function and identify physical deficits which impact ability to perform ADLs (bathing, care of mouth/teeth, toileting, grooming, dressing, etc.)  - Assess/evaluate cause of self-care deficits   - Assess range of motion  - Assess patient's mobility  - Assess patient's need for assistive devices and provide as appropriate  - Encourage maximum independence but intervene and supervise when necessary  - Involve family in performance of ADLs  - Assess for home care needs following discharge   - Consider OT consult to assist with ADL evaluation and planning for discharge  - Provide patient education as appropriate  Outcome: Progressing

## 2023-12-30 NOTE — CASE MANAGEMENT
Case Management Assessment & Discharge Planning Note    Patient name Ann Cross  Location [facility]01 MRN [id number]  : 1940 Date 2023       Current Admission Date: 2023  Current Admission Diagnosis:Subdural hemorrhage (HCC)   Patient Active Problem List    Diagnosis Date Noted    Subdural hemorrhage (HCC) 2023    Acute pain due to trauma 2023    Fall 2023    Sinus pause 2023    Thrombocytopenia (HCC) 2023    COVID-19 2023    Stage 3 chronic kidney disease, unspecified whether stage 3a or 3b CKD (HCC) 2022    Tachy-alison syndrome (HCC) 2022    Hyperlipemia 2022    Hypertension 2022    Generalized anxiety disorder 2021    Obstructive sleep apnea syndrome 2020    Degeneration of lumbar intervertebral disc 2017    History of total bilateral knee replacement 2017    Osteoporosis 05/10/2017    Minimal cognitive impairment 2016    Pain, joint, knee, right 2016     Mood disorder with hypomanic features 2016    Paroxysmal atrial fibrillation (HCC) 2014    Metabolic syndrome X 2007    Anxiety state 2007    Migraine 2007    Peripheral venous insufficiency 2007      LOS (days): 1  Geometric Mean LOS (GMLOS) (days):   Days to GMLOS:     OBJECTIVE:  PATIENT READMITTED TO HOSPITAL  Risk of Unplanned Readmission Score: 9.97         Current admission status: Inpatient       Preferred Pharmacy:   [id number] [place] [facility] [address] [address]  Phone: [phone] Fax: [phone]    Primary Care Provider: Emelina Swann MD    Primary Insurance: [employer] [facility]  Secondary Insurance:     ASSESSMENT:  Active Health Care Proxies       Lotus Hilliard Health Care Representative - Daughter   Primary Phone: [phone] (Mobile)  Home Phone: [phone]                                Patient  Information  Admitted from:: Facility ([facility])  Mental Status: Confused, Alert  During Assessment patient was accompanied by: Not accompanied during assessment  Assessment information provided by:: Daughter  Primary Caregiver: Self  Support Systems: Daughter, Family members  What city do you live in?: [place]  Home entry access options. Select all that apply.: No steps to enter home  Type of Current Residence: Facility  Upon entering residence, is there a bedroom on the main floor (no further steps)?: Yes  Upon entering residence, is there a bathroom on the main floor (no further steps)?: Yes  Living Arrangements: Lives Alone  Is patient a ?: No    Activities of Daily Living Prior to Admission  Functional Status: Independent  Completes ADLs independently?: No  Level of ADL dependence: Assistance  Ambulates independently?: No  Level of ambulatory dependence: Assistance  Does patient use assisted devices?: Yes  Assisted Devices (DME) used: Walker, Wheelchair  Does patient currently own DME?: Yes  What DME does the patient currently own?: Walker, Wheelchair  Does patient have a history of Outpatient Therapy (PT/OT)?: No  Does the patient have a history of Short-Term Rehab?: Yes  Does patient have a history of HHC?: No  Does patient currently have HHC?: No         Patient Information Continued  Does patient have prescription coverage?: Yes  Does patient receive dialysis treatments?: No         Means of Transportation  Means of Transport to Appts:: Family transport      Housing Stability: Low Risk  (12/30/2023)    Housing Stability Vital Sign     Unable to Pay for Housing in the Last Year: No     Number of Places Lived in the Last Year: 1     Unstable Housing in the Last Year: No   Food Insecurity: No Food Insecurity (12/30/2023)    Hunger Vital Sign     Worried About Running Out of Food in the Last Year: Never true     Ran Out of Food in the Last Year: Never true   Transportation Needs: No  Transportation Needs (12/30/2023)    PRAPARE - Transportation     Lack of Transportation (Medical): No     Lack of Transportation (Non-Medical): No   Utilities: Not At Risk (12/30/2023)    [facility] Utilities     Threatened with loss of utilities: No       DISCHARGE DETAILS:    Discharge planning discussed with:: Pt Lotus gonzalez  Freedom of Choice: Yes  Comments - Freedom of Choice: Choice offered  CM contacted family/caregiver?: Yes             Contacts  Patient Contacts: Lotus Hilliard  Relationship to Patient:: Family  Contact Method: Phone  Phone Number: [phone]  Reason/Outcome: Continuity of Care, Emergency Contact, Discharge Planning, Referral    Requested Home Health Care         Is the patient interested in C at discharge?: No    DME Referral Provided  Referral made for DME?: No    Other Referral/Resources/Interventions Provided:  Interventions: Short Term Rehab  Referral Comments: Pending final PT recommendations, pt expected to require additional STR stay. Pt lisa aGutam would like referral sent to [facility]         Treatment Team Recommendation: Short Term Rehab                                            Additional Comments: Cm assessment completed with pt Lotus gonzalez. Pt arrived from STR at [facility], primary residence is [facility]. Lotus requesting referral sent to [facility] for return upon medical stabilization. CM to place referral for return consideration for STR. CM following for d/c plan coordination.

## 2023-12-31 VITALS
WEIGHT: 155 LBS | TEMPERATURE: 98.2 F | DIASTOLIC BLOOD PRESSURE: 50 MMHG | HEIGHT: 64 IN | RESPIRATION RATE: 18 BRPM | SYSTOLIC BLOOD PRESSURE: 111 MMHG | BODY MASS INDEX: 26.46 KG/M2 | OXYGEN SATURATION: 95 % | HEART RATE: 61 BPM

## 2023-12-31 PROBLEM — G93.40 ENCEPHALOPATHY ACUTE: Status: ACTIVE | Noted: 2023-12-31

## 2023-12-31 LAB — MRSA NOSE QL CULT: NORMAL

## 2023-12-31 PROCEDURE — NC001 PR NO CHARGE: Performed by: PHYSICIAN ASSISTANT

## 2023-12-31 PROCEDURE — 99238 HOSP IP/OBS DSCHRG MGMT 30/<: CPT | Performed by: PHYSICIAN ASSISTANT

## 2023-12-31 RX ORDER — HYDRALAZINE HYDROCHLORIDE 20 MG/ML
5 INJECTION INTRAMUSCULAR; INTRAVENOUS ONCE
Status: COMPLETED | OUTPATIENT
Start: 2023-12-31 | End: 2023-12-31

## 2023-12-31 RX ADMIN — DOCUSATE SODIUM 100 MG: 100 CAPSULE, LIQUID FILLED ORAL at 08:32

## 2023-12-31 RX ADMIN — POLYETHYLENE GLYCOL 3350 17 G: 17 POWDER, FOR SOLUTION ORAL at 08:32

## 2023-12-31 RX ADMIN — HYDRALAZINE HYDROCHLORIDE 5 MG: 20 INJECTION, SOLUTION INTRAMUSCULAR; INTRAVENOUS at 08:32

## 2023-12-31 RX ADMIN — LEVETIRACETAM 500 MG: 500 TABLET, FILM COATED ORAL at 08:32

## 2023-12-31 RX ADMIN — ENOXAPARIN SODIUM 30 MG: 30 INJECTION SUBCUTANEOUS at 08:32

## 2023-12-31 RX ADMIN — ENOXAPARIN SODIUM 30 MG: 30 INJECTION SUBCUTANEOUS at 00:20

## 2023-12-31 RX ADMIN — SENNOSIDES 17.2 MG: 8.6 TABLET, FILM COATED ORAL at 08:32

## 2023-12-31 NOTE — UTILIZATION REVIEW
"  NOTIFICATION OF INPATIENT ADMISSION   AUTHORIZATION REQUEST   SERVICING FACILITY:   [facility]  Address: [address] [place] [address]  Tax ID: [id number]  NPI: [id number] ATTENDING PROVIDER:  Attending Name and NPI#: Jace Beck Do [[id number]]  Address: [address] [place] [address]  Phone: [phone]   ADMISSION INFORMATION:  Place of Service: Inpatient [facility]  Place of Service Code: 21  Inpatient Admission Date/Time: 12/29/23  8:11 PM  Discharge Date/Time: No discharge date for patient encounter.  Admitting Diagnosis Code/Description:  Subdural hemorrhage (HCC) [I62.00]  AMS (altered mental status) [R41.82]     UTILIZATION REVIEW CONTACT:  Sheryl Leos"Kelsey\"Rufus Utilization   Network Utilization Review Department  Phone: [phone]  Fax: [phone]  Email: [email or web address]  Contact for approvals/pending authorizations, clinical reviews, and discharge.     PHYSICIAN ADVISORY SERVICES:  Medical Necessity Denial & Uzom-bz-Ooqq Review  Phone: [phone]  Fax: [phone]  Email: [email or web address]     DISCHARGE SUPPORT TEAM:  For Patients Discharge Needs & Updates  Phone: [phone] opt. 2 Fax: [phone]  Email: [email or web address]      "

## 2023-12-31 NOTE — ASSESSMENT & PLAN NOTE
- Acute encephalopathy, present on presentation, likely secondary to recent TBI and increased subdural blood products and repeat imaging this presentation.   - Patient's daughter expressed concern for UTI, but UA not necessarily consistent with infection and patient has remained afebrile with no leukocytosis and no subjective or objective findings consistent with UTI.  - Urine culture is pending; continue to hold off on antibiotic therapy unless urine culture returns positive.  - Continue frequent reorientation.  - Delirium precautions.  - Continue therapy evaluation and treatment as indicated.  - Focus on day/night and routine regulation.  - Outpatient follow-up per routine.

## 2023-12-31 NOTE — PLAN OF CARE
Problem: Potential for Falls  Goal: Patient will remain free of falls  Description: INTERVENTIONS:  - Educate patient/family on patient safety including physical limitations  - Instruct patient to call for assistance with activity   - Consult OT/PT to assist with strengthening/mobility   - Keep Call bell within reach  - Keep bed low and locked with side rails adjusted as appropriate  - Keep care items and personal belongings within reach  - Initiate and maintain comfort rounds  - Make Fall Risk Sign visible to staff  - Offer Toileting every  Hours, in advance of need  - Initiate/Maintain alarm  - Obtain necessary fall risk management equipment:   - Apply yellow socks and bracelet for high fall risk patients  - Consider moving patient to room near nurses station  Outcome: Progressing     Problem: Prexisting or High Potential for Compromised Skin Integrity  Goal: Skin integrity is maintained or improved  Description: INTERVENTIONS:  - Identify patients at risk for skin breakdown  - Assess and monitor skin integrity  - Assess and monitor nutrition and hydration status  - Monitor labs   - Assess for incontinence   - Turn and reposition patient  - Assist with mobility/ambulation  - Relieve pressure over bony prominences  - Avoid friction and shearing  - Provide appropriate hygiene as needed including keeping skin clean and dry  - Evaluate need for skin moisturizer/barrier cream  - Collaborate with interdisciplinary team   - Patient/family teaching  - Consider wound care consult   Outcome: Progressing     Problem: PAIN - ADULT  Goal: Verbalizes/displays adequate comfort level or baseline comfort level  Description: Interventions:  - Encourage patient to monitor pain and request assistance  - Assess pain using appropriate pain scale  - Administer analgesics based on type and severity of pain and evaluate response  - Implement non-pharmacological measures as appropriate and evaluate response  - Consider cultural and  social influences on pain and pain management  - Notify physician/advanced practitioner if interventions unsuccessful or patient reports new pain  Outcome: Progressing     Problem: INFECTION - ADULT  Goal: Absence or prevention of progression during hospitalization  Description: INTERVENTIONS:  - Assess and monitor for signs and symptoms of infection  - Monitor lab/diagnostic results  - Monitor all insertion sites, i.e. indwelling lines, tubes, and drains  - Monitor endotracheal if appropriate and nasal secretions for changes in amount and color  - [place] appropriate cooling/warming therapies per order  - Administer medications as ordered  - Instruct and encourage patient and family to use good hand hygiene technique  - Identify and instruct in appropriate isolation precautions for identified infection/condition  Outcome: Progressing     Problem: SAFETY ADULT  Goal: Patient will remain free of falls  Description: INTERVENTIONS:  - Educate patient/family on patient safety including physical limitations  - Instruct patient to call for assistance with activity   - Consult OT/PT to assist with strengthening/mobility   - Keep Call bell within reach  - Keep bed low and locked with side rails adjusted as appropriate  - Keep care items and personal belongings within reach  - Initiate and maintain comfort rounds  - Make Fall Risk Sign visible to staff  - Offer Toileting every  Hours, in advance of need  - Initiate/Maintain alarm  - Obtain necessary fall risk management equipment:   - Apply yellow socks and bracelet for high fall risk patients  - Consider moving patient to room near nurses station  Outcome: Progressing  Goal: Maintain or return to baseline ADL function  Description: INTERVENTIONS:  -  Assess patient's ability to carry out ADLs; assess patient's baseline for ADL function and identify physical deficits which impact ability to perform ADLs (bathing, care of mouth/teeth, toileting, grooming, dressing, etc.)  -  Assess/evaluate cause of self-care deficits   - Assess range of motion  - Assess patient's mobility; develop plan if impaired  - Assess patient's need for assistive devices and provide as appropriate  - Encourage maximum independence but intervene and supervise when necessary  - Involve family in performance of ADLs  - Assess for home care needs following discharge   - Consider OT consult to assist with ADL evaluation and planning for discharge  - Provide patient education as appropriate  Outcome: Progressing  Goal: Maintains/Returns to pre admission functional level  Description: INTERVENTIONS:  - Perform AM-PAC 6 Click Basic Mobility/ Daily Activity assessment daily.  - Set and communicate daily mobility goal to care team and patient/family/caregiver.   - Collaborate with rehabilitation services on mobility goals if consulted  - Perform Range of Motion  times a day.  - Reposition patient every  hours.  - Dangle patient  times a day  - Stand patient  times a day  - Ambulate patient  times a day  - Out of bed to chair  times a day   - Out of bed for meals  times a day  - Out of bed for toileting  - Record patient progress and toleration of activity level   Outcome: Progressing     Problem: DISCHARGE PLANNING  Goal: Discharge to home or other facility with appropriate resources  Description: INTERVENTIONS:  - Identify barriers to discharge w/patient and caregiver  - Arrange for needed discharge resources and transportation as appropriate  - Identify discharge learning needs (meds, wound care, etc.)  - Arrange for interpretive services to assist at discharge as needed  - Refer to Case Management Department for coordinating discharge planning if the patient needs post-hospital services based on physician/advanced practitioner order or complex needs related to functional status, cognitive ability, or social support system  Outcome: Progressing     Problem: Knowledge Deficit  Goal: Patient/family/caregiver demonstrates  understanding of disease process, treatment plan, medications, and discharge instructions  Description: Complete learning assessment and assess knowledge base.  Interventions:  - Provide teaching at level of understanding  - Provide teaching via preferred learning methods  Outcome: Progressing     Problem: NEUROSENSORY - ADULT  Goal: Achieves stable or improved neurological status  Description: INTERVENTIONS  - Monitor and report changes in neurological status  - Monitor vital signs such as temperature, blood pressure, glucose, and any other labs ordered   - Initiate measures to prevent increased intracranial pressure  - Monitor for seizure activity and implement precautions if appropriate      Outcome: Progressing  Goal: Remains free of injury related to seizures activity  Description: INTERVENTIONS  - Maintain airway, patient safety  and administer oxygen as ordered  - Monitor patient for seizure activity, document and report duration and description of seizure to physician/advanced practitioner  - If seizure occurs,  ensure patient safety during seizure  - Reorient patient post seizure  - Seizure pads on all 4 side rails  - Instruct patient/family to notify RN of any seizure activity including if an aura is experienced  - Instruct patient/family to call for assistance with activity based on nursing assessment  - Administer anti-seizure medications if ordered    Outcome: Progressing     Problem: CARDIOVASCULAR - ADULT  Goal: Maintains optimal cardiac output and hemodynamic stability  Description: INTERVENTIONS:  - Monitor I/O, vital signs and rhythm  - Monitor for S/S and trends of decreased cardiac output  - Administer and titrate ordered vasoactive medications to optimize hemodynamic stability  - Assess quality of pulses, skin color and temperature  - Assess for signs of decreased coronary artery perfusion  - Instruct patient to report change in severity of symptoms  Outcome: Progressing     Problem:  GASTROINTESTINAL - ADULT  Goal: Minimal or absence of nausea and/or vomiting  Description: INTERVENTIONS:  - Administer IV fluids if ordered to ensure adequate hydration  - Maintain NPO status until nausea and vomiting are resolved  - Nasogastric tube if ordered  - Administer ordered antiemetic medications as needed  - Provide nonpharmacologic comfort measures as appropriate  - Advance diet as tolerated, if ordered  - Consider nutrition services referral to assist patient with adequate nutrition and appropriate food choices  Outcome: Progressing  Goal: Maintains or returns to baseline bowel function  Description: INTERVENTIONS:  - Assess bowel function  - Encourage oral fluids to ensure adequate hydration  - Administer IV fluids if ordered to ensure adequate hydration  - Administer ordered medications as needed  - Encourage mobilization and activity  - Consider nutritional services referral to assist patient with adequate nutrition and appropriate food choices  Outcome: Progressing     Problem: METABOLIC, FLUID AND ELECTROLYTES - ADULT  Goal: Electrolytes maintained within normal limits  Description: INTERVENTIONS:  - Monitor labs and assess patient for signs and symptoms of electrolyte imbalances  - Administer electrolyte replacement as ordered  - Monitor response to electrolyte replacements, including repeat lab results as appropriate  - Instruct patient on fluid and nutrition as appropriate  Outcome: Progressing  Goal: Fluid balance maintained  Description: INTERVENTIONS:  - Monitor labs   - Monitor I/O and WT  - Instruct patient on fluid and nutrition as appropriate  - Assess for signs & symptoms of volume excess or deficit  Outcome: Progressing     Problem: SKIN/TISSUE INTEGRITY - ADULT  Goal: Skin Integrity remains intact(Skin Breakdown Prevention)  Description: Assess:  -Perform Waqas assessment every   -Clean and moisturize skin every   -Inspect skin when repositioning, toileting, and assisting with  ADLS  -Assess under medical devices such as  every   -Assess extremities for adequate circulation and sensation     Bed Management:  -Have minimal linens on bed & keep smooth, unwrinkled  -Change linens as needed when moist or perspiring  -Avoid sitting or lying in one position for more than  hours while in bed  -Keep HOB at degrees     Toileting:  -Offer bedside commode  -Assess for incontinence every   -Use incontinent care products after each incontinent episode such as     Activity:  -Mobilize patient  times a day  -Encourage activity and walks on unit  -Encourage or provide ROM exercises   -Turn and reposition patient every  Hours  -Use appropriate equipment to lift or move patient in bed  -Instruct/ Assist with weight shifting every  when out of bed in chair  -Consider limitation of chair time  hour intervals    Skin Care:  -Avoid use of baby powder, tape, friction and shearing, hot water or constrictive clothing  -Relieve pressure over bony prominences using   -Do not massage red bony areas    Next Steps:  -Teach patient strategies to minimize risks such as    -Consider consults to  interdisciplinary teams such as  Outcome: Progressing     Problem: HEMATOLOGIC - ADULT  Goal: Maintains hematologic stability  Description: INTERVENTIONS  - Assess for signs and symptoms of bleeding or hemorrhage  - Monitor labs  - Administer supportive blood products/factors as ordered and appropriate  Outcome: Progressing     Problem: MUSCULOSKELETAL - ADULT  Goal: Maintain or return mobility to safest level of function  Description: INTERVENTIONS:  - Assess patient's ability to carry out ADLs; assess patient's baseline for ADL function and identify physical deficits which impact ability to perform ADLs (bathing, care of mouth/teeth, toileting, grooming, dressing, etc.)  - Assess/evaluate cause of self-care deficits   - Assess range of motion  - Assess patient's mobility  - Assess patient's need for assistive devices and provide  as appropriate  - Encourage maximum independence but intervene and supervise when necessary  - Involve family in performance of ADLs  - Assess for home care needs following discharge   - Consider OT consult to assist with ADL evaluation and planning for discharge  - Provide patient education as appropriate  Outcome: Progressing

## 2023-12-31 NOTE — UTILIZATION REVIEW
Date: 12/31/2023    Day 3: Has surpassed a 2nd midnight with active treatments and services, which include prn IV BP medication, UA specimen/ MRSA specimen, ongoing assessment & vitals, IP CM coordination for DC dispo.   See initial review completed by Lawanda HAIDER RN  on 12/30/2023.

## 2023-12-31 NOTE — CASE MANAGEMENT
Case Management Discharge Planning Note    Patient name Ann Cross  Location [facility] 634/[facility] 634-01 MRN [id number]  : 1940 Date 2023       Current Admission Date: 2023  Current Admission Diagnosis:Subdural hemorrhage (HCC)   Patient Active Problem List    Diagnosis Date Noted    Subdural hemorrhage (HCC) 2023    Acute pain due to trauma 2023    Fall 2023    Sinus pause 2023    Thrombocytopenia (HCC) 2023    COVID-19 2023    Stage 3 chronic kidney disease, unspecified whether stage 3a or 3b CKD (HCC) 2022    Tachy-alison syndrome (HCC) 2022    Hyperlipemia 2022    Hypertension 2022    Generalized anxiety disorder 2021    Obstructive sleep apnea syndrome 2020    Degeneration of lumbar intervertebral disc 2017    History of total bilateral knee replacement 2017    Osteoporosis 05/10/2017    Minimal cognitive impairment 2016    Pain, joint, knee, right 2016     Mood disorder with hypomanic features 2016    Paroxysmal atrial fibrillation (HCC) 2014    Metabolic syndrome X 2007    Anxiety state 2007    Migraine 2007    Peripheral venous insufficiency 2007      LOS (days): 2  Geometric Mean LOS (GMLOS) (days):   Days to GMLOS:     OBJECTIVE:  Risk of Unplanned Readmission Score: 11.09         Current admission status: Inpatient   [facility]erred Pharmacy:   [id number] [place] [facility] [address] [address]  Phone: [phone] Fax: [phone]    Primary Care Provider: Emelina Swann MD    Primary Insurance: [employer] [facility]  Secondary Insurance:     DISCHARGE DETAILS:    Discharge planning discussed with:: Pt daughter, Lotus  Freedom of Choice: Yes                   Contacts  Patient Contacts: Lotus Hilliard  Relationship to Patient:: Family  Contact Method: Phone  Phone Number: [phone]  Reason/Outcome:  Continuity of Care, Emergency Contact, Discharge Planning, Referral              Other Referral/Resources/Interventions Provided:  Referral Comments: [facility], referral in [facility], messaging and phone call to Liaison on for weekend Amanda,  [phone]. Confirmed acceptance whenever medically stable ofr d/c.         Treatment Team Recommendation: Short Term Rehab  Discharge Destination Plan:: Short Term Rehab  Transport at Discharge : [facility]                                   Family notified:: Outreach to pt daughter Lotus. Review of plan for d/c and return to [facility].  Additional Comments: Outreach to [facility], confirmed acceptance back to short term rehab with approval from pt's insurance,  [employer] program. Outreach to [employer] program [phone], s/w RN on call Felicia, confirmed okay to send back to [facility] once medically stable. Felicia to put pt on call list for outreach to facility and visit. No additional questions or concerns from facility or insurance provider. CM to set up transport through [facility] and will confirm time once known.        [facility] report # [phone], fax - [phone]

## 2023-12-31 NOTE — PROGRESS NOTES
[facility]  Progress Note  Name: Ann Cross I  MRN: [id number]  Unit/Bed#: [facility] 634-01 I Date of Admission: 12/29/2023   Date of Service: 12/31/2023 I Hospital Day: 2    Assessment/Plan   Subdural hemorrhage (HCC)  Assessment & Plan  - Patient with known subdural hematomas in the interhemispheric falx and layering on the right tentorium following recent fall.   - Patient returned and was readmitted with change in mental status and repeat CT imaging from 12/29/2023 demonstrating interval increase in the subdural blood products.   - Repeat CT imaging of the head on 12/30/2022 demonstrated stable findings with no evidence of increased or new bleeding and no significant mass effect.  - Neurosurgery evaluation and recommendations appreciated.   - No further workup or intervention necessary at this time.  - Hold anti-platelet and anticoagulant medications for least 2 weeks and/or until cleared by Neurosurgery.  - Continue Keppra for 7 days for seizure prophylaxis.  - Monitor neurologic exam.  - Continue symptomatic management with analgesia as needed.  - PT and OT evaluation and treatment as indicated.  - Outpatient Neurosurgery follow-up in 2 weeks with repeat CT scan of the head prior to follow-up appointment.        Hypertension  Assessment & Plan  - Patient with chronic history of hypertension, but adequate heart rate control at this time.  - Continue current medication regimen and resume home medication therapy on discharge as appropriate.  - Outpatient follow-up per routine.    Stage 3 chronic kidney disease, unspecified whether stage 3a or 3b CKD (HCC)  Assessment & Plan  Lab Results   Component Value Date    EGFR 77 12/30/2023    EGFR 79 12/29/2023    EGFR 80 12/24/2023    CREATININE 0.72 12/30/2023    CREATININE 0.71 12/29/2023    CREATININE 0.69 12/24/2023     - Patient with chronic history of CKD stage III without evidence of acute kidney injury.  - Avoid nephrotoxic  "medications and hypotension.  - Monitor volume status.  - Outpatient follow-up routine.             Bowel Regimen: On MiraLAX and senna.  VTE Prophylaxis:Sequential compression device (Venodyne)  and Enoxaparin (Lovenox)     Disposition: Continue current level of care.  Anticipate discharge to postacute care facility for rehab.  Continue therapy evaluation and treatment as indicated.  Case management following for disposition planning.    Subjective   Chief Complaint: \"I feel hyper.\"    Subjective: Patient is doing well this morning.  She feels energetic today.  She offers no complaints and denies having any pain.  She specifically denied any headaches, visual changes, lightheadedness or dizziness, nausea or vomiting.  No overnight events per nursing.  Patient reports tolerating oral intake.     Objective   Vitals:   Temp:  [97.7 °F (36.5 °C)-99.3 °F (37.4 °C)] 98 °F (36.7 °C)  HR:  [61-73] 66  Resp:  [15-18] 18  BP: (116-170)/(59-80) 170/80    I/O         12/29 0701  12/30 0700 12/30 0701  12/31 0700 12/31 0701  01/01 0700    P.O.  0     I.V. (mL/kg)  1666.7 (23.7)     Total Intake(mL/kg)  1666.7 (23.7)     Urine (mL/kg/hr) 141 750 (0.4) 600 (2)    Stool   0    Total Output 141 750 600    Net -141 +916.7 -600           Unmeasured Urine Occurrence  1 x 1 x    Unmeasured Stool Occurrence   1 x             Physical Exam:   GENERAL APPEARANCE: Patient in no acute distress.  HEENT: NCAT; PERRL, EOMs intact; Mucous membranes moist  CV: Regular rate and rhythm; no murmur/gallops/rubs appreciated.  CHEST / LUNGS: Clear to auscultation; no wheezes/rales/rhonci.  ABD: NABS; soft; non-distended; non-tender.  : Voiding spontaneously.  EXT: +2 pulses bilaterally upper & lower extremities; no edema.  NEURO: GCS 15; no focal neurologic deficits; neurovascularly intact.  SKIN: Warm, dry and well perfused; no rash; no jaundice.    Invasive Devices       Peripheral Intravenous Line  Duration             Peripheral IV 12/30/23 " Right Antecubital <1 day              Drain  Duration             External Urinary Catheter <1 day                          Lab Results: Results: I have personally reviewed all pertinent laboratory/tests results  Imaging: I have personally reviewed pertinent reports.   and I have personally reviewed pertinent films in PACS   Other Studies: N/A       Rashel Pascual PA-C  12/31/2023  07:16 AM

## 2023-12-31 NOTE — DISCHARGE SUMMARY
[facility]  Discharge- Ann Cross 1940, 83 y.o. female MRN: [id number]  Unit/Bed#: [facility] 634-01 Encounter: [id number]  Primary Care Provider: Emelina Swann MD   Date and time admitted to hospital: 12/29/2023  5:32 PM    * Subdural hemorrhage (HCC)  Assessment & Plan  - Patient with known subdural hematomas in the interhemispheric falx and layering on the right tentorium following recent fall.   - Patient returned and was readmitted with change in mental status and repeat CT imaging from 12/29/2023 demonstrating interval increase in the subdural blood products.   - Repeat CT imaging of the head on 12/30/2022 demonstrated stable findings with no evidence of increased or new bleeding and no significant mass effect.  - Neurosurgery evaluation and recommendations appreciated.   - No further workup or intervention necessary at this time.  - Hold anti-platelet and anticoagulant medications for least 2 weeks and/or until cleared by Neurosurgery.  - Continue Keppra for 7 days for seizure prophylaxis.  - Monitor neurologic exam.  - Continue symptomatic management with analgesia as needed.  - PT and OT evaluation and treatment as indicated.  - Outpatient Neurosurgery follow-up in 2 weeks with repeat CT scan of the head prior to follow-up appointment.        Hypertension  Assessment & Plan  - Patient with chronic history of hypertension, but adequate heart rate control at this time.  - Continue current medication regimen and resume home medication therapy on discharge as appropriate.  - Outpatient follow-up per routine.    Stage 3 chronic kidney disease, unspecified whether stage 3a or 3b CKD (HCC)  Assessment & Plan  Lab Results   Component Value Date    EGFR 77 12/30/2023    EGFR 79 12/29/2023    EGFR 80 12/24/2023    CREATININE 0.72 12/30/2023    CREATININE 0.71 12/29/2023    CREATININE 0.69 12/24/2023     - Patient with chronic history of CKD stage III without evidence of  acute kidney injury.  - Avoid nephrotoxic medications and hypotension.  - Monitor volume status.  - Outpatient follow-up routine.    Encephalopathy acute  Assessment & Plan  - Acute encephalopathy, present on presentation, likely secondary to recent TBI and increased subdural blood products and repeat imaging this presentation.   - Patient's daughter expressed concern for UTI, but UA not necessarily consistent with infection and patient has remained afebrile with no leukocytosis and no subjective or objective findings consistent with UTI.  - Urine culture is pending; continue to hold off on antibiotic therapy unless urine culture returns positive.  - Continue frequent reorientation.  - Delirium precautions.  - Continue therapy evaluation and treatment as indicated.  - Focus on day/night and routine regulation.  - Outpatient follow-up per routine.        Discharge Summary - Trauma Service   Ann Cross 83 y.o. female MRN: [id number]  Unit/Bed#: [facility] 634-01 Encounter: [id number]    Admission Date: 12/29/2023     Discharge Date: 12/31/2023     Admitting Diagnosis: Subdural hemorrhage (HCC) [I62.00]  AMS (altered mental status) [R41.82]    Discharge Diagnosis: See above.    Attending and Service: Dr. Beck, Acute [facility] Surgical Services.    Consulting Physician(s):     Neurosurgery.  Geriatric medicine.    Imaging and Procedures Performed:     XR chest 2 views    Result Date: 12/30/2023  Impression: Trace effusions. Workstation performed: [id number]     CT head wo contrast    Result Date: 12/30/2023  Impression: Stable subdural hematomas without significant mass effect. Workstation performed: [id number]     CT head wo contrast    Result Date: 12/29/2023  Impression: Interval increase in the subdural blood products, now noted along the interhemispheric falx and layering on the right tentorium when comparing to the December 23, 2023 study. Consultation with the neurosurgical service and follow-up imaging is  recommended to ensure stability. Incidentally noted lucent lesions within the occipital calvarium, not previously described on outside imaging studies. The remote MRI and CT studies from [facility] mentioned above are not available at the time of this interpretation. An addendum could be issued once those studies become available. Chronic microangiopathic changes. I personally discussed this study with Dr. Luis Mayfield on 12/29/2023 6:48 PM. Workstation performed: [id number]     Hospital Course: Ann Cross is a 83-year-old was recently admitted following a fall with traumatic subdural hematoma.  Following discharge to rehab facility, she developed altered mental status and returns to the ED at this time via ambulance for further evaluation.  During ER evaluation, she was found to have increased subdural hematoma on repeat CT imaging of the head and trauma was consulted.  On her initial evaluation by the trauma services encounter, the patient's daughter provided history including noting progressively worsening mental status since discharge.  The patient offers no complaints.  Her primary survey was unremarkable.  On secondary survey, she was mildly hypertensive with normal vital signs otherwise; she was disoriented with normal exam otherwise.  Her initial workup included labs and the above and imaging studies.    She was admitted to the trauma service status post recent fall with no recurrent traumatic injuries or events and interval increase in subdural blood products compared to prior hospital encounter CT imaging as well as medical comorbidities including dementia with cognitive decline, hypertension and hyperlipidemia.  Geriatric medicine was consulted to assist with medication review medical management.  Neurosurgery was consulted to assist with management of her traumatic brain injury.  She had stable repeat imaging with follow-up CT scan of the head within 24 hours of her initial repeat CT  scan this hospital encounter.  Though she had persistent acute encephalopathy, she had no worsening mental status or other neurologic findings and no further workup or interventions were deemed necessary by neurosurgery at this time.  There was some concern for possible UTI identified by her daughter, but the patient had no subjective or objective findings consistent with UTI with urine culture pending.  PT and OT evaluated patient and recommended return to rehab.  Case management assisted with disposition planning.  The patient is deemed stable for discharge on 12/31/2023.  For further details of the hospital encounter, please see her complete medical records.    On discharge, the patient is instructed to follow-up with the patient's primary care provider to review the events of the patient's recent hospitalization. The patient is instructed to follow-up in the Trauma Clinic as needed.  Please follow-up with neurosurgery as scheduled with repeat CT scan of the head prior to follow-up.  The patient should follow the provided discharge instructions.    Condition at Discharge: stable     Discharge instructions/Information to patient and family:   See after visit summary for information provided to patient and family.      Provisions for Follow-Up Care:  See after visit summary for information related to follow-up care and any pertinent home health orders.      Disposition: See After Visit Summary for discharge disposition information.    Planned Readmission: No    Discharge Statement   I spent 30 minutes discharging the patient. This time was spent on the day of discharge. I had direct contact with the patient on the day of discharge. Additional documentation is required if more than 30 minutes were spent on discharge.     Discharge Medications:  See after visit summary for reconciled discharge medications provided to patient and family.      Rashel Pascual PA-C  12/31/2023  11:53 AM

## 2023-12-31 NOTE — QUICK NOTE
I spoke with the patient's daughter, Lotus, via the phone to provide an update and addressed any questions and concerns she had.  We did discuss her continued altered mental status likely being a result of her subdural hematoma and TBI.  We did discuss the concern over a possible UTI at length.  At this time, we will continue to hold off on any antibiotic therapy as she remains afebrile with no leukocytosis and no subjective symptoms or objective findings consistent with UTI.  Her urinalysis was not entirely normal, it was not suggestive of UTI; urine culture is pending at this time.  We did discuss that if the urine culture returns positive, her nursing facility can be contacted for initiation of antibiotics.    Plan for return to short-term rehab today pending transport arrangements with planned follow-up CT scan of the head next week and short-term follow-up with neurosurgery after that.    I spent greater than 50 minutes reviewing the patient's chart and updating her daughter via the phone.    Rashel Pascual PA-C  12/31/2023 11:44 AM

## 2023-12-31 NOTE — PLAN OF CARE
Problem: Potential for Falls  Goal: Patient will remain free of falls  Description: INTERVENTIONS:  - Educate patient/family on patient safety including physical limitations  - Instruct patient to call for assistance with activity   - Consult OT/PT to assist with strengthening/mobility   - Keep Call bell within reach  - Keep bed low and locked with side rails adjusted as appropriate  - Keep care items and personal belongings within reach  - Initiate and maintain comfort rounds  - Make Fall Risk Sign visible to staff  - Offer Toileting every 2 Hours, in advance of need  - Initiate/Maintain bed/chair alarm  - Obtain necessary fall risk management equipment  - Apply yellow socks and bracelet for high fall risk patients  - Consider moving patient to room near nurses station  Outcome: Progressing     Problem: Prexisting or High Potential for Compromised Skin Integrity  Goal: Skin integrity is maintained or improved  Description: INTERVENTIONS:  - Identify patients at risk for skin breakdown  - Assess and monitor skin integrity  - Assess and monitor nutrition and hydration status  - Monitor labs   - Assess for incontinence   - Turn and reposition patient  - Assist with mobility/ambulation  - Relieve pressure over bony prominences  - Avoid friction and shearing  - Provide appropriate hygiene as needed including keeping skin clean and dry  - Evaluate need for skin moisturizer/barrier cream  - Collaborate with interdisciplinary team   - Patient/family teaching  - Consider wound care consult   Outcome: Progressing     Problem: PAIN - ADULT  Goal: Verbalizes/displays adequate comfort level or baseline comfort level  Description: Interventions:  - Encourage patient to monitor pain and request assistance  - Assess pain using appropriate pain scale  - Administer analgesics based on type and severity of pain and evaluate response  - Implement non-pharmacological measures as appropriate and evaluate response  - Consider cultural  and social influences on pain and pain management  - Notify physician/advanced practitioner if interventions unsuccessful or patient reports new pain  Outcome: Progressing     Problem: INFECTION - ADULT  Goal: Absence or prevention of progression during hospitalization  Description: INTERVENTIONS:  - Assess and monitor for signs and symptoms of infection  - Monitor lab/diagnostic results  - Monitor all insertion sites, i.e. indwelling lines, tubes, and drains  - Monitor endotracheal if appropriate and nasal secretions for changes in amount and color  - Holden appropriate cooling/warming therapies per order  - Administer medications as ordered  - Instruct and encourage patient and family to use good hand hygiene technique  - Identify and instruct in appropriate isolation precautions for identified infection/condition  Outcome: Progressing     Problem: SAFETY ADULT  Goal: Patient will remain free of falls  Description: INTERVENTIONS:  - Educate patient/family on patient safety including physical limitations  - Instruct patient to call for assistance with activity   - Consult OT/PT to assist with strengthening/mobility   - Keep Call bell within reach  - Keep bed low and locked with side rails adjusted as appropriate  - Keep care items and personal belongings within reach  - Initiate and maintain comfort rounds  - Make Fall Risk Sign visible to staff  - Offer Toileting every 2 Hours, in advance of need  - Initiate/Maintain bed/chair alarm  - Obtain necessary fall risk management equipment  - Apply yellow socks and bracelet for high fall risk patients  - Consider moving patient to room near nurses station  Outcome: Progressing  Goal: Maintain or return to baseline ADL function  Description: INTERVENTIONS:  -  Assess patient's ability to carry out ADLs; assess patient's baseline for ADL function and identify physical deficits which impact ability to perform ADLs (bathing, care of mouth/teeth, toileting, grooming,  dressing, etc.)  - Assess/evaluate cause of self-care deficits   - Assess range of motion  - Assess patient's mobility; develop plan if impaired  - Assess patient's need for assistive devices and provide as appropriate  - Encourage maximum independence but intervene and supervise when necessary  - Involve family in performance of ADLs  - Assess for home care needs following discharge   - Consider OT consult to assist with ADL evaluation and planning for discharge  - Provide patient education as appropriate  Outcome: Progressing  Goal: Maintains/Returns to pre admission functional level  Description: INTERVENTIONS:  - Perform AM-PAC 6 Click Basic Mobility/ Daily Activity assessment daily.  - Set and communicate daily mobility goal to care team and patient/family/caregiver.   - Collaborate with rehabilitation services on mobility goals if consulted  - Reposition patient   - Out of bed for toileting  - Record patient progress and toleration of activity level   Outcome: Progressing     Problem: DISCHARGE PLANNING  Goal: Discharge to home or other facility with appropriate resources  Description: INTERVENTIONS:  - Identify barriers to discharge w/patient and caregiver  - Arrange for needed discharge resources and transportation as appropriate  - Identify discharge learning needs (meds, wound care, etc.)  - Arrange for interpretive services to assist at discharge as needed  - Refer to Case Management Department for coordinating discharge planning if the patient needs post-hospital services based on physician/advanced practitioner order or complex needs related to functional status, cognitive ability, or social support system  Outcome: Progressing     Problem: Knowledge Deficit  Goal: Patient/family/caregiver demonstrates understanding of disease process, treatment plan, medications, and discharge instructions  Description: Complete learning assessment and assess knowledge base.  Interventions:  - Provide teaching at level  of understanding  - Provide teaching via preferred learning methods  Outcome: Progressing     Problem: NEUROSENSORY - ADULT  Goal: Achieves stable or improved neurological status  Description: INTERVENTIONS  - Monitor and report changes in neurological status  - Monitor vital signs such as temperature, blood pressure, glucose, and any other labs ordered   - Initiate measures to prevent increased intracranial pressure  - Monitor for seizure activity and implement precautions if appropriate      Outcome: Progressing  Goal: Remains free of injury related to seizures activity  Description: INTERVENTIONS  - Maintain airway, patient safety  and administer oxygen as ordered  - Monitor patient for seizure activity, document and report duration and description of seizure to physician/advanced practitioner  - If seizure occurs,  ensure patient safety during seizure  - Reorient patient post seizure  - Seizure pads on all 4 side rails  - Instruct patient/family to notify RN of any seizure activity including if an aura is experienced  - Instruct patient/family to call for assistance with activity based on nursing assessment  - Administer anti-seizure medications if ordered    Outcome: Progressing     Problem: CARDIOVASCULAR - ADULT  Goal: Maintains optimal cardiac output and hemodynamic stability  Description: INTERVENTIONS:  - Monitor I/O, vital signs and rhythm  - Monitor for S/S and trends of decreased cardiac output  - Administer and titrate ordered vasoactive medications to optimize hemodynamic stability  - Assess quality of pulses, skin color and temperature  - Assess for signs of decreased coronary artery perfusion  - Instruct patient to report change in severity of symptoms  Outcome: Progressing     Problem: GASTROINTESTINAL - ADULT  Goal: Minimal or absence of nausea and/or vomiting  Description: INTERVENTIONS:  - Administer IV fluids if ordered to ensure adequate hydration  - Maintain NPO status until nausea and  vomiting are resolved  - Nasogastric tube if ordered  - Administer ordered antiemetic medications as needed  - Provide nonpharmacologic comfort measures as appropriate  - Advance diet as tolerated, if ordered  - Consider nutrition services referral to assist patient with adequate nutrition and appropriate food choices  Outcome: Progressing  Goal: Maintains or returns to baseline bowel function  Description: INTERVENTIONS:  - Assess bowel function  - Encourage oral fluids to ensure adequate hydration  - Administer IV fluids if ordered to ensure adequate hydration  - Administer ordered medications as needed  - Encourage mobilization and activity  - Consider nutritional services referral to assist patient with adequate nutrition and appropriate food choices  Outcome: Progressing     Problem: METABOLIC, FLUID AND ELECTROLYTES - ADULT  Goal: Electrolytes maintained within normal limits  Description: INTERVENTIONS:  - Monitor labs and assess patient for signs and symptoms of electrolyte imbalances  - Administer electrolyte replacement as ordered  - Monitor response to electrolyte replacements, including repeat lab results as appropriate  - Instruct patient on fluid and nutrition as appropriate  Outcome: Progressing  Goal: Fluid balance maintained  Description: INTERVENTIONS:  - Monitor labs   - Monitor I/O and WT  - Instruct patient on fluid and nutrition as appropriate  - Assess for signs & symptoms of volume excess or deficit  Outcome: Progressing     Problem: SKIN/TISSUE INTEGRITY - ADULT  Goal: Skin Integrity remains intact(Skin Breakdown Prevention)  Description: Assess:  -Perform Waqas assessment   -Inspect skin when repositioning, toileting, and assisting with ADLS  -Assess under medical devices  -Assess extremities for adequate circulation and sensation     Bed Management:  -Have minimal linens on bed & keep smooth, unwrinkled  -Change linens as needed when moist or perspiring  -Avoid sitting or lying in one  position     Toileting:  -Offer bedside commode  -Assess for incontinence   -Use incontinent care products after each incontinent episode     Activity:  -Mobilize patient   -Encourage or provide ROM exercises   -Turn and reposition patient   -Use appropriate equipment to lift or move patient in bed  -Instruct/ Assist with weight shifting     Skin Care:  -Avoid use of baby powder, tape, friction and shearing, hot water or constrictive clothing  -Relieve pressure over bony prominences  -Do not massage red bony areas    Next Steps:  -Teach patient strategies to minimize risks   Outcome: Progressing     Problem: HEMATOLOGIC - ADULT  Goal: Maintains hematologic stability  Description: INTERVENTIONS  - Assess for signs and symptoms of bleeding or hemorrhage  - Monitor labs  - Administer supportive blood products/factors as ordered and appropriate  Outcome: Progressing     Problem: MUSCULOSKELETAL - ADULT  Goal: Maintain or return mobility to safest level of function  Description: INTERVENTIONS:  - Assess patient's ability to carry out ADLs; assess patient's baseline for ADL function and identify physical deficits which impact ability to perform ADLs (bathing, care of mouth/teeth, toileting, grooming, dressing, etc.)  - Assess/evaluate cause of self-care deficits   - Assess range of motion  - Assess patient's mobility  - Assess patient's need for assistive devices and provide as appropriate  - Encourage maximum independence but intervene and supervise when necessary  - Involve family in performance of ADLs  - Assess for home care needs following discharge   - Consider OT consult to assist with ADL evaluation and planning for discharge  - Provide patient education as appropriate  Outcome: Progressing

## 2024-01-01 LAB
ATRIAL RATE: 67 BPM
BACTERIA UR CULT: ABNORMAL
BACTERIA UR CULT: ABNORMAL
P AXIS: -1 DEGREES
PR INTERVAL: 154 MS
QRS AXIS: 19 DEGREES
QRSD INTERVAL: 70 MS
QT INTERVAL: 408 MS
QTC INTERVAL: 431 MS
T WAVE AXIS: 50 DEGREES
VENTRICULAR RATE: 67 BPM

## 2024-01-02 NOTE — UTILIZATION REVIEW
NOTIFICATION OF ADMISSION DISCHARGE   This is a Notification of Discharge from [facility]. Please be advised that this patient has been discharge from our facility. Below you will find the admission and discharge date and time including the patient’s disposition.   UTILIZATION REVIEW CONTACT:  Yumiko Mae  Utilization   Network Utilization Review Department  Phone: [phone] x carefully listen to the prompts. All voicemails are confidential.  Email: [email or web address]     ADMISSION INFORMATION  PRESENTATION DATE: 12/29/2023  5:32 PM  OBERVATION ADMISSION DATE:   INPATIENT ADMISSION DATE: 12/29/23  8:11 PM   DISCHARGE DATE: 12/31/2023  1:00 AM   DISPOSITION:Non [facility] SNF/TCU/SNU    Network Utilization Review Department  ATTENTION: Please call with any questions or concerns to [phone] and carefully listen to the prompts so that you are directed to the right person. All voicemails are confidential.   For Discharge needs, contact Care Management DC Support Team at [phone] opt. 2  Send all requests for admission clinical reviews, approved or denied determinations and any other requests to dedicated fax number below belonging to the campus where the patient is receiving treatment. List of dedicated fax numbers for the Facilities:  FACILITY NAME UR FAX NUMBER   ADMISSION DENIALS (Administrative/Medical Necessity) [phone]   DISCHARGE SUPPORT TEAM ([facility]) [phone]   PARENT CHILD HEALTH (Maternity/NICU/Pediatrics) [phone]   [facility] [phone]   [facility] [phone]   Critical access hospital [phone]   [facility] [phone]   [facility] [phone]   [facility] [phone]   [facility] [phone]   [facility]  [phone]   [facility] [phone]   [facility] [phone]   [facility] [phone]

## 2024-01-06 NOTE — ED ATTENDING ATTESTATION
Final Diagnoses:     1. Subdural hemorrhage (HCC)    2. Traumatic subdural hematoma (HCC)    3. History of hypertension    4. History of recent fall    5. Altered mental status           I, Luis Mayfield DO, saw and evaluated the patient. All available labs and X-rays were ordered by me or the resident / non-physician and have been reviewed by myself. I discussed the patient with the resident / non-physician and agree with the resident's / non-physician practitioner's findings and plan as documented in the resident's / non-physician practicitioner's note, except where noted.   At this point, I agree with the current assessment done in the ED.   I was present during key portions of all procedures performed unless otherwise stated.     Nursing Triage:     Chief Complaint   Patient presents with    Altered Mental Status       HPI:   This is a 83 y.o. female presenting for evaluation of 83-year-old female recent fall with subdural hematoma.  Discharged to rehab.  Daughter reports that she is having worsening mental status for the past day.  Patient is having difficulty with speaking our conversation.  Also developed a cough patient herself has no complaints but unreliable historian normal vital signs relatively normal exam    ASSESSMENT + PLAN:   Recent subdural and altered mental status evaluate for infection versus worsening subdural versus metabolic derangement    Physical:     Vital signs reviewed.  Gen. appearance: No acute distress. Alert and oriented x2 some confusion  Head: Atraumatic, normocephalic.  Eyes: EOMI, PERRLA. No icterus.  Neck: Supple, no lymphadenopathy, full range of motion.  Chest: Regular rate and rhythm. Lungs are clear to auscultation bilaterally. Nontender.  Abdomen: Soft nontender nondistended. No signs of ecchymosis. Positive bowel sounds.  Extremities: Full range of motion in all extremities. DTRs intact.  Neuro: Cranial nerves II through XII intact. Nonfocal exam. GCS 15  Skin: Warm,  dry.Vital signs reviewed.            Past Medical:    has a past medical history of Afib (HCC), Arthritis, Hyperlipidemia, Hypertension, Osteopenia, Sleep apnea, Squamous cell skin cancer, Subdural hemorrhage (HCC), and Varicose veins of both lower extremities.    Past Surgical:    has a past surgical history that includes Total knee arthroplasty (Right); Total knee arthroplasty (Left); Cataract extraction; Incision and drainage of wound (Right, 06/28/2016); Colonoscopy; Eye surgery; [place] tooth extraction; Tonsillectomy and adenoidectomy; Hysterectomy; Cardiac electrophysiology procedure (N/A, 10/12/2022); Mohs surgery (Left, 12/27/2022); Skin biopsy; and Cardiac electrophysiology procedure (N/A, 4/21/2023).      CT head wo contrast   Final Result by E. Alec Schoenberger, MD (12/30 0701)      Stable subdural hematomas without significant mass effect.            Workstation performed: [id number]         XR chest 2 views   Final Result by Ivet Gustafson MD (12/30 1336)      Trace effusions.               Workstation performed: [id number]         CT head wo contrast   Final Result by Pallav N Shah, MD (12/29 1850)      Interval increase in the subdural blood products, now noted along the interhemispheric falx and layering on the right tentorium when comparing to the December 23, 2023 study. Consultation with the neurosurgical service and follow-up imaging is    recommended to ensure stability.      Incidentally noted lucent lesions within the occipital calvarium, not previously described on outside imaging studies. The remote MRI and CT studies from [facility] mentioned above are not available at the time of this interpretation.    An addendum could be issued once those studies become available.      Chronic microangiopathic changes.         I personally discussed this study with Dr. Luis Mayfield on 12/29/2023 6:48 PM.                  Workstation performed: [id number]         CT head wo contrast  "   (Results Pending)       Procedures      I reviewed patient's most recent discharge summary and/or outpatient office notes.      Portions of the record may have been created with voice recognition software. Occasional wrong word or \"sound a like\" substitutions may have occurred due to the inherent limitations of voice recognition software. Read the chart carefully and recognize, using context, where substitutions have occurred.    Electronically signed:  Luis Mayfield, DO  "

## 2024-01-12 ENCOUNTER — HOSPITAL ENCOUNTER (OUTPATIENT)
Dept: RADIOLOGY | Age: 84
Discharge: HOME | End: 2024-01-12
Payer: MEDICARE

## 2024-01-12 DIAGNOSIS — I62.00 SUBDURAL HEMORRHAGE (HCC): ICD-10-CM

## 2024-01-12 PROCEDURE — G1004 CDSM NDSC: HCPCS

## 2024-01-12 PROCEDURE — 70450 CT HEAD/BRAIN W/O DYE: CPT

## 2024-01-18 ENCOUNTER — TELEPHONE (OUTPATIENT)
Dept: NEUROSURGERY | Facility: CLINIC | Age: 84
End: 2024-01-18

## 2024-01-18 ENCOUNTER — OFFICE VISIT (OUTPATIENT)
Dept: NEUROSURGERY | Facility: CLINIC | Age: 84
End: 2024-01-18
Payer: MEDICARE

## 2024-01-18 VITALS
BODY MASS INDEX: 26.65 KG/M2 | TEMPERATURE: 98.1 F | DIASTOLIC BLOOD PRESSURE: 76 MMHG | HEIGHT: 64 IN | OXYGEN SATURATION: 98 % | RESPIRATION RATE: 17 BRPM | SYSTOLIC BLOOD PRESSURE: 126 MMHG | WEIGHT: 156.1 LBS | HEART RATE: 66 BPM

## 2024-01-18 DIAGNOSIS — I62.00 SUBDURAL HEMORRHAGE (HCC): Primary | ICD-10-CM

## 2024-01-18 DIAGNOSIS — J63.4 SIDEROSIS (HCC): ICD-10-CM

## 2024-01-18 DIAGNOSIS — S06.5X0A TRAUMATIC SUBDURAL HEMORRHAGE WITHOUT LOSS OF CONSCIOUSNESS, INITIAL ENCOUNTER (HCC): ICD-10-CM

## 2024-01-18 PROBLEM — M89.9 BONE LESION: Status: ACTIVE | Noted: 2024-01-18

## 2024-01-18 PROCEDURE — 99203 OFFICE O/P NEW LOW 30 MIN: CPT | Performed by: NURSE PRACTITIONER

## 2024-01-18 NOTE — PROGRESS NOTES
Assessment/Plan:    Traumatic subdural hemorrhage without loss of consciousness, initial encounter (HCC)  As addressed in HPI  Presents from [facility] today for a 2-week follow-up visit for reassessment of SDH.  She is alert, pleasant, has a history of dementia and is pleasantly confused with short-term memory loss.  Patient recognizes that she is confused and verbalizes concern over same.  She ambulates with a rolling walker.  Daughter arrived for appointment about 20 minutes post patient arrival.  His primary complaint is urinary incontinence.  She denies symptoms associated with the subdural hematoma, headache, vision changes, weakness, numbness, and no reported seizures.  Nonfocal examination  ASA 81 mg remain on hold.  Is a history of paroxysmal atrial fibrillation and is managed by [facility] cardiology, Bharath Leos.    Imaging  CT wo 1/12/2024 Parafalcine and right tentorial subdural hematomas have improved. Small amount of residual mostly isodense blood products. No acute hemorrhage. No significant mass effect, shift or herniation.          Plan  Reviewed CTh imaging with patient, discussed results with daughter.  Ordered for follow-up CTh in 4 weeks due on February 9, 2024.  Will return to office 3 days to 1 week post completion.  Advised to continue holding ASA.  Informed daughter I will contact the patient's cardiologist that she continues under neurosurgery care and holding ASA for her diagnosis of paroxysmal atrial fibs due to a persistent SDH.  Will forward office visit note.  Reviewed red flag signs associated with intracranial bleeding, progression of SDH.  Provided AVS teaching for SDH, red flag symptoms, and continue care as per SDH protocol.  Advised if additional questions or concerns to please notify the neurosurgery department.        Siderosis (HCC)  12/29/23 CT WO @ [facility]  Incidentally noted lucent lesions within the occipital calvarium, not previously  Pt aware described on outside imaging studies. The remote MRI and CT studies from [facility] mentioned above are not available at the time of this interpretation. An addendum could be issued once those studies become available.    Imagining [facility]   [facility] MRI brain w/wo 11/24/2019 Focal siderosis over the left occipital lobe, which could reflect sequela of prior subarachnoid hemorrhage in this region. No abnormal FLAIR signal within the sulcus.     [facility] 11/24/2019 MRA Head wo---Focal siderosis over the left occipital lobe, which could reflect sequela of   prior subarachnoid hemorrhage in this region. No abnormal FLAIR signal within the sulcus. There is focal superficial siderosis over the left occipital region, suggesting sequela of prior subarachnoid hemorrhage in this area.       Daughter aware of lesions , she responded while shrugging her shoulders even so what are we going to do about that           Subjective: This is a -3-week follow-up visit status post recent inpatient admission.    Patient ID: Ann Cross is a 83 y.o. female HTN , persistent atrial fib/paroxysmal atrial fibrillation , HLD, tachybrady/SSS, HLD,PVC, LVH, VIRGILIO/bipap, squamous cell carcinoma s/p Mohs procedure,seborrheic keratosis, CKD3. 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  HPI   Presented in the ED department on 12/29/2023 for manage improved 12/31/2023.  On 12/29/2023 presented in the ED with new onset mental status changes.  Patient with a known subdural hematoma in the interhemispheric phalanx right tentorium secondary to a recent fall.  CT of the brain demonstrated interval increase in subdural blood products.  A repeat CT imaging on 12/30/2023 demonstrated stable findings with no evidence of increased or new bleeding and no significant mass effect.    Consulted with neurosurgery on 12/30/2023  Care recommendations as per protocol  for the management of SDH  She has a history of atrial fibs and is treated with flecainide, need to continue hold on aspirin.  Schedule follow-up neurosurgery appointment in 2 weeks with repeat CT head.      Presents today for this follow-up appointment approximately 2 weeks and 7 days post hospital admit on 12/29/23, has completed CTH imaging for review.          REVIEW OF SYSTEMS  Review of Systems   Constitutional: Negative.    HENT: Negative.     Eyes: Negative.    Respiratory: Negative.     Cardiovascular: Negative.    Gastrointestinal:         URGENCY INC BOWELS   Endocrine: Negative.    Genitourinary:  Positive for urgency.        INCONTINENT URINE AT TIMES   Musculoskeletal:  Positive for gait problem (UNBALNCED SINCE INCIDENT USES WALKER).   Skin: Negative.    Allergic/Immunologic: Negative.    Neurological:  Negative for dizziness, seizures, speech difficulty, weakness, light-headedness, numbness and headaches.   Hematological:  Does not bruise/bleed easily.   Psychiatric/Behavioral:  Positive for confusion (FORGET FUL AT TIMES STM).          Meds/Allergies     Current Outpatient Medications   Medication Sig Dispense Refill    acebutolol (SECTRAL) 200 mg capsule Take 200 mg by mouth daily      acetaminophen (TYLENOL) 325 mg tablet Take 2 tablets (650 mg total) by mouth every 4 (four) hours as needed for mild pain 30 tablet 0    amLODIPine (NORVASC) 10 mg tablet Take 1 tablet (10 mg total) by mouth daily 30 tablet 0    Calcium Carbonate 1500 (600 Ca) MG TABS Take 1 tablet by mouth daily      [employer] Senna Plus 8.6-50 MG per tablet every other day      divalproex sodium (DEPAKOTE) 250 mg EC tablet Take 250 mg by mouth 3 (three) times a day      donepezil (ARICEPT) 5 mg tablet Take 1 tablet (5 mg total) by mouth daily at bedtime 30 tablet 1    Donepezil HCl 5 MG/DAY PTWK Place on the skin      ergocalciferol (VITAMIN D2) 50,000 units Take 50,000 Units by mouth every 30 (thirty) days      flecainide (TAMBOCOR) 50  mg tablet Take 1 tablet (50 mg total) by mouth every 12 (twelve) hours 60 tablet 0    fluticasone (FLONASE) 50 mcg/act nasal spray 1 spray into each nostril daily for 7 days 9.9 mL 0    levETIRAcetam (KEPPRA) 500 mg tablet Take 1 tablet (500 mg total) by mouth 2 (two) times a day (Patient not taking: Reported on 1/18/2024) 14 tablet 0    Melatonin 3 MG CAPS Take 6 mg by mouth      rosuvastatin (CRESTOR) 5 mg tablet TAKE 1/2 TABLET BY MOUTH EVERY OTHER DAY IN THE EVENING      sodium chloride (MARITZA 128) 5 % hypertonic ophthalmic ointment Administer to both eyes every 3 (three) hours as needed (irritation) 3.5 g 0     No current facility-administered medications for this visit.       Allergies   Allergen Reactions    Atorvastatin Other (See Comments)     myalgia    Statins Other (See Comments)     Weakness in legs       PAST HISTORY    Past Medical History:   Diagnosis Date    Afib (HCC)     Arthritis     Hyperlipidemia     Hypertension     Osteopenia     Sleep apnea     Squamous cell skin cancer     LEF TEMPLE    Subdural hemorrhage (HCC)     Varicose veins of both lower extremities        Past Surgical History:   Procedure Laterality Date    CARDIAC ELECTROPHYSIOLOGY PROCEDURE N/A 10/12/2022    Procedure: Cardiac loop recorder implant;  Surgeon: Rashid Carlos MD;  [facility]: [facility];  Service: Cardiology    CARDIAC ELECTROPHYSIOLOGY PROCEDURE N/A 4/21/2023    Procedure: Cardiac pacer implant;  Surgeon: Simba Chilel MD;  Location: [facility];  Service: Cardiology    CATARACT EXTRACTION      COLONOSCOPY      complete, for polyp removal    EYE SURGERY      HYSTERECTOMY      age 45    INCISION AND DRAINAGE OF WOUND Right 06/28/2016    Procedure: ARTHROSCOPY,EVACUATION OF HEMATOMA, OPEN EXPLORATION OF WOUND;  Surgeon: Adam Brice MD;  Location: [facility];  Service:     MOHS SURGERY Left 12/27/2022    left temple    SKIN BIOPSY      TONSILLECTOMY AND ADENOIDECTOMY      TOTAL KNEE ARTHROPLASTY  "Right     TOTAL KNEE ARTHROPLASTY Left     WISDOM TOOTH EXTRACTION         Social History     Tobacco Use    Smoking status: Never    Smokeless tobacco: Never   Vaping Use    Vaping status: Never Used   Substance Use Topics    Alcohol use: Not Currently    Drug use: No       Family History   Problem Relation Age of Onset    Diabetes Mother     Diabetes Father     Diabetes Brother     No Known Problems Sister     No Known Problems Daughter     No Known Problems Maternal Grandmother     No Known Problems Maternal Grandfather     No Known Problems Paternal Grandmother     No Known Problems Paternal Grandfather     No Known Problems Daughter     Brain cancer Son     No Known Problems Maternal Aunt        The following portions of the patient's history were reviewed and updated as appropriate: allergies, current medications, past family history, past medical history, past social history, past surgical history and problem list.      EXAM    Vitals:Blood pressure 126/76, pulse 66, temperature 98.1 °F (36.7 °C), temperature source Temporal, resp. rate 17, height 5' 4\" (1.626 m), weight 70.8 kg (156 lb 1.6 oz), SpO2 98%.,Body mass index is 26.79 kg/m².     Physical Exam  Vitals and nursing note reviewed. Exam conducted with a chaperone present (daughter).   Constitutional:       General: She is not in acute distress.     Appearance: She is not ill-appearing.   HENT:      Head: Normocephalic and atraumatic.      Nose: No rhinorrhea.   Eyes:      General: No scleral icterus.        Right eye: No discharge.         Left eye: No discharge.      Extraocular Movements: Extraocular movements intact.   Pulmonary:      Effort: Pulmonary effort is normal. No respiratory distress.      Breath sounds: No wheezing.   Musculoskeletal:      Right lower leg: No edema.      Left lower leg: No edema.   Skin:     General: Skin is warm and dry.   Neurological:      Sensory: No sensory deficit.      Gait: Gait abnormal.   Psychiatric:         " Mood and Affect: Mood normal.         Speech: Speech normal.         Behavior: Behavior normal.         Neurologic Exam     Mental Status   Disoriented to person.   Oriented to place. Disoriented to area, street and number. Oriented to country and city.   Oriented to time. Disoriented to year, date and day. Oriented to month and season.   Attention: normal. Concentration: normal.   Speech: speech is normal   Level of consciousness: alert    Motor Exam   Muscle bulk: decreased    Strength   Right deltoid: 5/5  Left deltoid: 5/5  Right biceps: 5/5  Left biceps: 5/5  Right triceps: 5/5  Left triceps: 5/5  Right wrist flexion: 4/5  Left wrist flexion: 4/5  Right wrist extension: 4/5  Left wrist extension: 4/5  Right interossei: 4/5  Left interossei: 4/5  Right quadriceps: 5/5  Left quadriceps: 5/5  Right hamstrin/5  Left hamstrin/5  Right glutei: 5/5  Left glutei: 5/5  Right anterior tibial: 5/5  Left anterior tibial: 5/5  Right posterior tibial: 5/5  Left posterior tibial: 5/5  Right gastroc: 5/5    Sensory Exam   Light touch normal.     Gait, Coordination, and Reflexes     Gait  Gait: (Walker)      Imaging Studies  CT head wo contrast    Result Date: 2024  Narrative: CT BRAIN - WITHOUT CONTRAST INDICATION:   I62.00: Nontraumatic subdural hemorrhage, unspecified. COMPARISON: 2023.. TECHNIQUE:  CT examination of the brain was performed.  Multiplanar 2D reformatted images were created from the source data. Radiation dose length product (DLP) for this visit:  780 mGy-cm .  This examination, like all CT scans performed in the [facility], was performed utilizing techniques to minimize radiation dose exposure, including the use of iterative reconstruction and automated exposure control. IMAGE QUALITY:  Diagnostic. FINDINGS: PARENCHYMA: Parafalcine and right tentorial subdural hematomas have improved. Small amount of residual mostly isodense blood products. No acute hemorrhage. No  significant mass effect, shift or herniation. Stable diminished attenuation in the periventricular and subcortical white matter due to chronic microangiopathy. VENTRICLES AND EXTRA-AXIAL SPACES:  Normal for the patient's age. VISUALIZED ORBITS: Bilateral lens replacement. PARANASAL SINUSES: Right maxillary sinus mucosal thickening. CALVARIUM AND EXTRACRANIAL SOFT TISSUES:  Normal.     Impression: Improving subdural hematomas with amount of residual, mostly isodense blood products. Workstation performed: [id number]     XR chest 2 views    Result Date: 12/30/2023  Narrative: CHEST INDICATION:   cough, concern for pna. COMPARISON: CXR 12/24/2023 and chest CT 12/22/2023. EXAM PERFORMED/VIEWS: AP AND LATERAL CHEST RADIOGRAPHS. FINDINGS: Normal heart size with left subclavian pacemaker leads in the right atrial appendage and right ventricular septum. Loop recorder in the left chest wall. Lungs clear. Trace effusions on the lateral projection. No pneumothorax. Upper abdomen normal. Bones normal for age.     Impression: Trace effusions. Workstation performed: [id number]     CT head wo contrast    Result Date: 12/30/2023  Narrative: CT BRAIN - WITHOUT CONTRAST INDICATION:   Subdural hematoma SDH eval. COMPARISON: CT from yesterday. TECHNIQUE:  CT examination of the brain was performed.  Multiplanar 2D reformatted images were created from the source data. Radiation dose length product (DLP) for this visit:  857.42 mGy-cm .  This examination, like all CT scans performed in the [facility], was performed utilizing techniques to minimize radiation dose exposure, including the use of iterative  reconstruction and automated exposure control. IMAGE QUALITY:  Diagnostic. FINDINGS: PARENCHYMA: Stable parafalcine subdural hematoma. Stable right tentorial subdural hematoma that extends into the inferomedial right middle cranial fossa.. No parenchymal hemorrhage. No acute infarct. No significant mass effect, shift or  herniation. Stable diminished attenuation the periventricular and subcortical white matter due to chronic microangiopathy. VENTRICLES AND EXTRA-AXIAL SPACES: No hydrocephalus. VISUALIZED ORBITS: Bilateral lens replacement. PARANASAL SINUSES: Mild paranasal sinus mucosal thickening. Fluid level in the maxillary sinuses mildly increased. CALVARIUM AND EXTRACRANIAL SOFT TISSUES: Stable lucencies in the occipital calvarium that may be due to arachnoid granulations.     Impression: Stable subdural hematomas without significant mass effect. Workstation performed: [id number]     CT head wo contrast  Result Date: 12/29/2023  Narrative: CT BRAIN - WITHOUT CONTRAST INDICATION:   altered mental status. recent traumatic SDH. COMPARISON: Head CT from December 23, 2023. TECHNIQUE:  CT examination of the brain was performed.  Multiplanar 2D reformatted images were created from the source data. Radiation dose length product (DLP) for this visit:  829.53 mGy-cm .  This examination, like all CT scans performed in the [facility], was performed utilizing techniques to minimize radiation dose exposure, including the use of iterative  reconstruction and automated exposure control. IMAGE QUALITY:  Diagnostic. FINDINGS: PARENCHYMA: No acute parenchymal abnormality. Mild periventricular and deep cerebral white matter chronic microangiopathic changes are stable. Intracranial carotid and vertebral artery atherosclerotic calcifications are noted bilaterally. VENTRICLES AND EXTRA-AXIAL SPACES: There our acute blood products within the bilateral parafalcine subdural, along the region of the straight sinus, right tentorium and right incisura. The amount of acute blood products has increased when compared to the  prior CT scan from December 23, 2023. The ventricles are stable in size and morphology. Mild dilatation of the sylvian fissures is noted. VISUALIZED ORBITS: Evidence of prior bilateral lens surgery. PARANASAL SINUSES: New  air-fluid level in the right maxillary sinus. There is mild hyperattenuation within the layering fluid which may represent blood products versus inspissated secretions. CALVARIUM AND EXTRACRANIAL SOFT TISSUES: Lucencies within the occiput are nonspecific. Comparison with more remote prior studies would be helpful. The patient has an outside MRI study from November 24, 2019 performed at [facility] which  is not available for direct comparison at the time of this interpretation. There is also a remote head CT study from April 19, 2014 from [facility]. If these images become available, an addendum could be issued. If the studies are not available, consider follow-up MR imaging for additional characterization.     Impression: Interval increase in the subdural blood products, now noted along the interhemispheric falx and layering on the right tentorium when comparing to the December 23, 2023 study.   Consultation with the neurosurgical service and follow-up imaging is recommended to ensure stability.     Incidentally noted lucent lesions within the occipital calvarium, not previously described on outside imaging studies. The remote MRI and CT studies from [facility] mentioned above are not available at the time of this interpretation. An addendum could be issued once those studies become available. Chronic microangiopathic changes. I personally discussed this study with Dr. Luis Mayfield on 12/29/2023 6:48 PM. Workstation performed: [id number]     XR chest portable    Result Date: 12/24/2023  Narrative: CHEST INDICATION:   Cough. COMPARISON: Radiograph 4/22/2023 and CT 12/22/2023. EXAM PERFORMED/VIEWS:  XR CHEST PORTABLE FINDINGS:  Left-sided chest wall intracardiac device is identified.  Leads are intact. Left chest cardiac loop recorder. Heart shadow appears unremarkable.  Atherosclerotic vascular calcifications are noted. The lungs are clear.  No pneumothorax or  pleural effusion. Osseous structures appear within normal limits for patient age.     Impression: No acute cardiopulmonary disease. Resident: JESUS CALLE I, the attending radiologist, have reviewed the images and agree with the final report above. Workstation performed: [id number]     CT head wo contrast    Result Date: 12/23/2023  Narrative: CT BRAIN - WITHOUT CONTRAST INDICATION:   Subdural hemorrhage f/u SDH. COMPARISON: CT brain dated December 22, 2023. TECHNIQUE:  CT examination of the brain was performed.  Multiplanar 2D reformatted images were created from the source data. Radiation dose length product (DLP) for this visit:  918.17 mGy-cm .  This examination, like all CT scans performed in the [facility], was performed utilizing techniques to minimize radiation dose exposure, including the use of iterative  reconstruction and automated exposure control. IMAGE QUALITY:  Diagnostic. FINDINGS: PARENCHYMA:  No intracranial mass, mass effect or midline shift. No CT signs of acute infarction.  No acute parenchymal hemorrhage. There is mild to moderate periventricular white matter low attenuation which is nonspecific and most likely related to chronic small vessel ischemic changes. VENTRICLES AND EXTRA-AXIAL SPACES:  There is trace acute subdural hemorrhage along the right tentorium cerebelli measuring up to 2 mm in thickness, best seen on the coronal series. VISUALIZED ORBITS: Normal visualized orbits. PARANASAL SINUSES: Normal visualized paranasal sinuses. CALVARIUM AND EXTRACRANIAL SOFT TISSUES:  Normal.     Impression: Stable trace acute subdural hemorrhage along the right tentorium cerebelli. No mass effect or midline shift. Mild to moderate chronic small vessel ischemic changes. Workstation performed: [id number]     CT chest without contrast    Result Date: 12/22/2023  Narrative: CT CHEST WITHOUT IV CONTRAST INDICATION:   Chest pain, nonspecific Fall with chest tenderness. COMPARISON:  None.  TECHNIQUE: CT examination of the chest was performed without intravenous contrast. Multiplanar 2D reformatted images were created from the source data. This examination, like all CT scans performed in the [facility], was performed utilizing techniques to minimize radiation dose exposure, including the use of iterative reconstruction and automated exposure control. Radiation dose length product (DLP) for this visit:  466.13 mGy-cm FINDINGS: LUNGS: There is no infiltrate or pleural effusion. Mild bilateral lower lobe atelectatic changes are present. No pneumothorax. There is no tracheal or endobronchial lesion. PLEURA:  Unremarkable. HEART/GREAT VESSELS: Atherosclerotic coronary artery calcification is noted.  Heart is otherwise unremarkable.  No thoracic aortic aneurysm. There is a left anterior chest wall cardiac pacer with the lead tips which appear to be in appropriate position. MEDIASTINUM AND RAF:  Unremarkable. CHEST WALL AND LOWER NECK:  Unremarkable. VISUALIZED STRUCTURES IN THE UPPER ABDOMEN:  Unremarkable. OSSEOUS STRUCTURES:  No acute fracture or destructive osseous lesion.     Impression: No acute intrathoracic abnormality. Workstation performed: [id number]     CT thoracic spine without contrast    Result Date: 12/22/2023  Narrative: CT THORACIC SPINE INDICATION:   Back trauma, no prior imaging (Age >= 16y) fall with T spine tenderness. COMPARISON:  None. TECHNIQUE:  Contiguous axial images were obtained. Sagittal and coronal reconstructions were performed. Radiation dose length product (DLP) for this visit:  0 mGy-cm .  This examination, like all CT scans performed in the [facility], was performed utilizing techniques to minimize radiation dose exposure, including the use of iterative reconstruction and automated exposure control. IMAGE QUALITY:  Diagnostic. FINDINGS: ALIGNMENT: There is mild upper thoracic kyphoscoliosis. No subluxation. VERTEBRAE:  No fracture.  DEGENERATIVE CHANGES: Mild multilevel spondylotic and ankylotic degenerative changes of the thoracic spine are present. PARASPINAL SOFT TISSUES: Normal. Please refer to the report of the CT chest performed at same time for intrathoracic findings.     Impression: No acute thoracic spine fracture or subluxation. Mild ankylotic and spondylotic degenerative changes of the thoracic spine. Workstation performed: [id number]     CT cervical spine without contrast    Result Date: 12/22/2023  Narrative: CT CERVICAL SPINE - WITHOUT CONTRAST INDICATION:   Neck trauma (Age >= 65y) fall. COMPARISON:  None. TECHNIQUE:  CT examination of the cervical spine was performed without intravenous contrast.  Contiguous axial images were obtained. Multiplanar 2D reformatted images were created from the source data. Radiation dose length product (DLP) for this visit:  428.32 mGy-cm .  This examination, like all CT scans performed in the [facility], was performed utilizing techniques to minimize radiation dose exposure, including the use of iterative  reconstruction and automated exposure control. IMAGE QUALITY:  Diagnostic. FINDINGS: ALIGNMENT:  Normal alignment of the cervical spine. No subluxation. VERTEBRAE:  No fracture. DEGENERATIVE CHANGES:  Mild multilevel cervical degenerative changes are noted without critical central canal stenosis. PREVERTEBRAL AND PARASPINAL SOFT TISSUES: Unremarkable THORACIC INLET:  Normal.     Impression: No cervical spine fracture or traumatic malalignment. Workstation performed: [id number]     CT head without contrast    Result Date: 12/22/2023  Narrative: CT BRAIN - WITHOUT CONTRAST INDICATION:   Head trauma, moderate-severe fall on aspirin with headstrike. COMPARISON:  None. TECHNIQUE:  CT examination of the brain was performed.  Multiplanar 2D reformatted images were created from the source data. Radiation dose length product (DLP) for this visit:  915.84 mGy-cm .  This examination, like  all CT scans performed in the [facility], was performed utilizing techniques to minimize radiation dose exposure, including the use of iterative  reconstruction and automated exposure control. IMAGE QUALITY:  Diagnostic. FINDINGS: PARENCHYMA:  No intracranial mass, mass effect or midline shift. No CT signs of acute infarction.  No acute parenchymal hemorrhage. There is mild to moderate periventricular white matter low attenuation which is nonspecific and most likely related to chronic small vessel ischemic changes. VENTRICLES AND EXTRA-AXIAL SPACES: There is trace acute subdural hemorrhage along the right tentorium cerebelli measuring up to 2 mm in thickness, best seen on the coronal series. VISUALIZED ORBITS: Normal visualized orbits. PARANASAL SINUSES: Normal visualized paranasal sinuses. CALVARIUM AND EXTRACRANIAL SOFT TISSUES:  Normal.     Impression: Trace acute subdural hemorrhage along the right tentorium cerebelli. No acute intraparenchymal hemorrhage. No mass effect or midline shift. Mild to moderate chronic small vessel ischemic changes. I personally discussed this study with MADISON VOGEL on 12/22/2023 10:24 PM. Workstation performed: [id number]       I have personally reviewed pertinent reports.   and I have personally reviewed pertinent films in PACS    I have spent a total time of 35 minutes on 01/18/24 in caring for this patient including Diagnostic results, Risks and benefits of tx options, Instructions for management, Patient and family education, Importance of tx compliance, Risk factor reductions, Impressions, Counseling / Coordination of care, Documenting in the medical record, Reviewing / ordering tests, medicine, procedures  , Obtaining or reviewing history  , and Communicating with other healthcare professionals .

## 2024-01-18 NOTE — ASSESSMENT & PLAN NOTE
12/29/23 [facility] WO @ [facility]  Incidentally noted lucent lesions within the occipital calvarium, not previously described on outside imaging studies. The remote MRI and CT studies from [facility] mentioned above are not available at the time of this interpretation. An addendum could be issued once those studies become available.    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 No abnormal FLAIR signal within the sulcus.     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 No abnormal FLAIR signal within the sulcus. There is focal superficial siderosis over the left occipital region, suggesting sequela of prior subarachnoid hemorrhage in this area.       Daughter aware of lesions , she responded while shrugging her shoulders even so what are we going to do about that

## 2024-01-18 NOTE — TELEPHONE ENCOUNTER
Sent request via email to denise @ [facility] ,  and kath from [facility] to push over imaging for

## 2024-01-18 NOTE — ASSESSMENT & PLAN NOTE
As addressed in HPI  Presents from [facility] today for a 2-week follow-up visit for reassessment of SDH.  She is alert, pleasant, has a history of dementia and is pleasantly confused with short-term memory loss.  Patient recognizes that she is confused and verbalizes concern over same.  She ambulates with a rolling walker.  Daughter arrived for appointment about 20 minutes post patient arrival.  His primary complaint is urinary incontinence.  She denies symptoms associated with the subdural hematoma, headache, vision changes, weakness, numbness, and no reported seizures.  Nonfocal examination  ASA 81 mg remain on hold.  Is a history of paroxysmal atrial fibrillation and is managed by [facility] cardiology, Dr. Babcock, Bharath Abdalla.    Imaging  [facility] wo 1/12/2024 Parafalcine and right tentorial subdural hematomas have improved. Small amount of residual mostly isodense blood products. No acute hemorrhage. No significant mass effect, shift or herniation.          Plan  Reviewed CTh imaging with patient, discussed results with daughter.  Ordered for follow-up CTh in 4 weeks due on February 9, 2024.  Will return to office 3 days to 1 week post completion.  Advised to continue holding ASA.  Informed daughter I will contact the patient's cardiologist that she continues under neurosurgery care and holding ASA for her diagnosis of paroxysmal atrial fibs due to a persistent SDH.  Will forward office visit note.  Reviewed red flag signs associated with intracranial bleeding, progression of SDH.  Provided AVS teaching for SDH, red flag symptoms, and continue care as per SDH protocol.  Advised if additional questions or concerns to please notify the neurosurgery department.

## 2024-01-18 NOTE — PATIENT INSTRUCTIONS
RTO after repeat CTH  2/9/24     then office appointment with in 3 days to one week of completion   Instructions for Brain Bleed    Refrain from alcohol use.   Continue to hold medications such as blood thinners, NSAID such as ASA, advil/ibuprofen, aleve, vitamins and dietary supplements.  Take only acetaminophen for pain, read labeling for instructions for use.    Prevent falls, ensure the environment is not cluttered.     Immediately report to the ED for symptoms of increased headache, dizziness, drowsiness, extremity or generalized weakness numbness on one side/bilateral of the body, changes in speech, difficultly comprehending speech, nausea/vomiting or seizure., difficultly walking,         Activity as tolerated, no bending, no lifting greater than 10 lbs, avoid quick turning, stretching movement above head, pulling or pushing. Can walk as tolerated ambulation as tolerated. Refrain from strenuous activity.    F/U office appointment in 4 weeks, complete CTH due on 2/9/2024. RTO visit 3 days to 1 week post completion.     Reviewed with patient signs and symptoms expanding subdural hematoma including but not limited to headaches, vision or speech difficulties, weakness, numbness, gait disturbance, seizures, altered mental status.      Symptoms of subdural hematoma may include:    Headache.    Confusion.    Weakness, or numbness on one side of the body.    Drowsiness./altered mental status    Speech and comprehension problems.    Dizziness.    Nausea or vomiting.    Seizures.    Gait instability, unsteadiness       PLEASE CONTACT HER CARDIOLOGIST THAT SHE REMAINS OFF ASA  SHE HAS A HISTORY OF ATRIAL FIB AND IS TREATING W/ FLECAINIDE.     Call the neurosurgery office with additional  question  or concerns   Of note Patient continues with  concern for urinary incontinence

## 2024-01-26 ENCOUNTER — APPOINTMENT (EMERGENCY)
Dept: RADIOLOGY | Facility: HOSPITAL | Age: 84
DRG: 177 | End: 2024-01-26
Payer: MEDICARE

## 2024-01-26 ENCOUNTER — HOSPITAL ENCOUNTER (INPATIENT)
Facility: HOSPITAL | Age: 84
LOS: 4 days | Discharge: NURSING, REHAB OR LONG-TERM CARE | DRG: 177 | End: 2024-01-31
Attending: EMERGENCY MEDICINE | Admitting: INTERNAL MEDICINE
Payer: MEDICARE

## 2024-01-26 DIAGNOSIS — R41.82 ALTERED MENTAL STATUS: ICD-10-CM

## 2024-01-26 DIAGNOSIS — N39.0 UTI (URINARY TRACT INFECTION): ICD-10-CM

## 2024-01-26 DIAGNOSIS — U07.1 COVID: Primary | ICD-10-CM

## 2024-01-26 PROBLEM — S06.5XAD: Status: ACTIVE | Noted: 2024-01-18

## 2024-01-26 PROBLEM — N30.00 ACUTE CYSTITIS: Status: ACTIVE | Noted: 2024-01-26

## 2024-01-26 LAB
ANION GAP SERPL CALCULATED.3IONS-SCNC: 8 MMOL/L
APTT PPP: 24 SECONDS (ref 23–37)
ATRIAL RATE: 61 BPM
ATRIAL RATE: 68 BPM
BACTERIA UR QL AUTO: ABNORMAL /HPF
BASOPHILS # BLD MANUAL: 0.08 THOUSAND/UL (ref 0–0.1)
BASOPHILS NFR MAR MANUAL: 1 % (ref 0–1)
BILIRUB UR QL STRIP: NEGATIVE
BUN SERPL-MCNC: 18 MG/DL (ref 5–25)
CALCIUM SERPL-MCNC: 9.5 MG/DL (ref 8.4–10.2)
CHLORIDE SERPL-SCNC: 96 MMOL/L (ref 96–108)
CLARITY UR: ABNORMAL
CO2 SERPL-SCNC: 31 MMOL/L (ref 21–32)
COLOR UR: YELLOW
CREAT SERPL-MCNC: 0.87 MG/DL (ref 0.6–1.3)
EOSINOPHIL # BLD MANUAL: 0 THOUSAND/UL (ref 0–0.4)
EOSINOPHIL NFR BLD MANUAL: 0 % (ref 0–6)
ERYTHROCYTE [DISTWIDTH] IN BLOOD BY AUTOMATED COUNT: 13.7 % (ref 11.6–15.1)
FLUAV RNA RESP QL NAA+PROBE: NEGATIVE
FLUBV RNA RESP QL NAA+PROBE: NEGATIVE
GFR SERPL CREATININE-BSD FRML MDRD: 61 ML/MIN/1.73SQ M
GLUCOSE SERPL-MCNC: 108 MG/DL (ref 65–140)
GLUCOSE UR STRIP-MCNC: NEGATIVE MG/DL
HCT VFR BLD AUTO: 48.5 % (ref 34.8–46.1)
HGB BLD-MCNC: 15.9 G/DL (ref 11.5–15.4)
HGB UR QL STRIP.AUTO: ABNORMAL
INR PPP: 1.04 (ref 0.84–1.19)
KETONES UR STRIP-MCNC: NEGATIVE MG/DL
LEUKOCYTE ESTERASE UR QL STRIP: ABNORMAL
LYMPHOCYTES # BLD AUTO: 1.23 THOUSAND/UL (ref 0.6–4.47)
LYMPHOCYTES # BLD AUTO: 12 % (ref 14–44)
MAGNESIUM SERPL-MCNC: 2.1 MG/DL (ref 1.9–2.7)
MCH RBC QN AUTO: 29.3 PG (ref 26.8–34.3)
MCHC RBC AUTO-ENTMCNC: 32.8 G/DL (ref 31.4–37.4)
MCV RBC AUTO: 89 FL (ref 82–98)
METAMYELOCYTES NFR BLD MANUAL: 1 % (ref 0–1)
MONOCYTES # BLD AUTO: 1.56 THOUSAND/UL (ref 0–1.22)
MONOCYTES NFR BLD: 19 % (ref 4–12)
MUCOUS THREADS UR QL AUTO: ABNORMAL
MYELOCYTES NFR BLD MANUAL: 1 % (ref 0–1)
NEUTROPHILS # BLD MANUAL: 5.18 THOUSAND/UL (ref 1.85–7.62)
NEUTS BAND NFR BLD MANUAL: 6 % (ref 0–8)
NEUTS SEG NFR BLD AUTO: 57 % (ref 43–75)
NITRITE UR QL STRIP: POSITIVE
NON-SQ EPI CELLS URNS QL MICRO: ABNORMAL /HPF
P AXIS: 112 DEGREES
PH UR STRIP.AUTO: 6 [PH]
PLATELET # BLD AUTO: 161 THOUSANDS/UL (ref 149–390)
PLATELET BLD QL SMEAR: ADEQUATE
PMV BLD AUTO: 10.1 FL (ref 8.9–12.7)
POTASSIUM SERPL-SCNC: 4.1 MMOL/L (ref 3.5–5.3)
PROT UR STRIP-MCNC: ABNORMAL MG/DL
PROTHROMBIN TIME: 13.5 SECONDS (ref 11.6–14.5)
QRS AXIS: 36 DEGREES
QRS AXIS: 38 DEGREES
QRSD INTERVAL: 66 MS
QRSD INTERVAL: 84 MS
QT INTERVAL: 410 MS
QT INTERVAL: 418 MS
QTC INTERVAL: 422 MS
QTC INTERVAL: 431 MS
RBC # BLD AUTO: 5.43 MILLION/UL (ref 3.81–5.12)
RBC #/AREA URNS AUTO: ABNORMAL /HPF
RBC MORPH BLD: NORMAL
RSV RNA RESP QL NAA+PROBE: NEGATIVE
SARS-COV-2 RNA RESP QL NAA+PROBE: POSITIVE
SODIUM SERPL-SCNC: 135 MMOL/L (ref 135–147)
SP GR UR STRIP.AUTO: 1.01 (ref 1–1.03)
T WAVE AXIS: 24 DEGREES
T WAVE AXIS: 49 DEGREES
UROBILINOGEN UR STRIP-ACNC: <2 MG/DL
VARIANT LYMPHS # BLD AUTO: 3 %
VENTRICULAR RATE: 64 BPM
VENTRICULAR RATE: 64 BPM
WBC # BLD AUTO: 8.22 THOUSAND/UL (ref 4.31–10.16)
WBC #/AREA URNS AUTO: ABNORMAL /HPF
WBC CLUMPS # UR AUTO: PRESENT /UL

## 2024-01-26 PROCEDURE — 70450 CT HEAD/BRAIN W/O DYE: CPT

## 2024-01-26 PROCEDURE — 83735 ASSAY OF MAGNESIUM: CPT

## 2024-01-26 PROCEDURE — 99285 EMERGENCY DEPT VISIT HI MDM: CPT | Performed by: EMERGENCY MEDICINE

## 2024-01-26 PROCEDURE — 85007 BL SMEAR W/DIFF WBC COUNT: CPT

## 2024-01-26 PROCEDURE — 87086 URINE CULTURE/COLONY COUNT: CPT

## 2024-01-26 PROCEDURE — 93005 ELECTROCARDIOGRAM TRACING: CPT

## 2024-01-26 PROCEDURE — 99223 1ST HOSP IP/OBS HIGH 75: CPT | Performed by: STUDENT IN AN ORGANIZED HEALTH CARE EDUCATION/TRAINING PROGRAM

## 2024-01-26 PROCEDURE — 85730 THROMBOPLASTIN TIME PARTIAL: CPT

## 2024-01-26 PROCEDURE — 36415 COLL VENOUS BLD VENIPUNCTURE: CPT

## 2024-01-26 PROCEDURE — 96365 THER/PROPH/DIAG IV INF INIT: CPT

## 2024-01-26 PROCEDURE — 0241U HB NFCT DS VIR RESP RNA 4 TRGT: CPT

## 2024-01-26 PROCEDURE — 85610 PROTHROMBIN TIME: CPT

## 2024-01-26 PROCEDURE — G1004 CDSM NDSC: HCPCS

## 2024-01-26 PROCEDURE — 87077 CULTURE AEROBIC IDENTIFY: CPT

## 2024-01-26 PROCEDURE — 80048 BASIC METABOLIC PNL TOTAL CA: CPT

## 2024-01-26 PROCEDURE — 85027 COMPLETE CBC AUTOMATED: CPT

## 2024-01-26 PROCEDURE — XW033E5 INTRODUCTION OF REMDESIVIR ANTI-INFECTIVE INTO PERIPHERAL VEIN, PERCUTANEOUS APPROACH, NEW TECHNOLOGY GROUP 5: ICD-10-PCS | Performed by: STUDENT IN AN ORGANIZED HEALTH CARE EDUCATION/TRAINING PROGRAM

## 2024-01-26 PROCEDURE — 87186 SC STD MICRODIL/AGAR DIL: CPT

## 2024-01-26 PROCEDURE — 81001 URINALYSIS AUTO W/SCOPE: CPT

## 2024-01-26 PROCEDURE — 99285 EMERGENCY DEPT VISIT HI MDM: CPT

## 2024-01-26 RX ORDER — BUSPIRONE HYDROCHLORIDE 5 MG/1
7.5 TABLET ORAL 2 TIMES DAILY
Status: DISCONTINUED | OUTPATIENT
Start: 2024-01-26 | End: 2024-01-31 | Stop reason: HOSPADM

## 2024-01-26 RX ORDER — LANOLIN ALCOHOL/MO/W.PET/CERES
6 CREAM (GRAM) TOPICAL
Status: DISCONTINUED | OUTPATIENT
Start: 2024-01-26 | End: 2024-01-31 | Stop reason: HOSPADM

## 2024-01-26 RX ORDER — AMLODIPINE BESYLATE 10 MG/1
10 TABLET ORAL DAILY
Status: DISCONTINUED | OUTPATIENT
Start: 2024-01-27 | End: 2024-01-31 | Stop reason: HOSPADM

## 2024-01-26 RX ORDER — CITALOPRAM HYDROBROMIDE 10 MG/1
10 TABLET ORAL DAILY
COMMUNITY

## 2024-01-26 RX ORDER — ENOXAPARIN SODIUM 100 MG/ML
40 INJECTION SUBCUTANEOUS DAILY
Status: DISCONTINUED | OUTPATIENT
Start: 2024-01-26 | End: 2024-01-31 | Stop reason: HOSPADM

## 2024-01-26 RX ORDER — DEXAMETHASONE SODIUM PHOSPHATE 10 MG/ML
6 INJECTION, SOLUTION INTRAMUSCULAR; INTRAVENOUS EVERY 24 HOURS
Status: DISCONTINUED | OUTPATIENT
Start: 2024-01-26 | End: 2024-01-27

## 2024-01-26 RX ORDER — SILICONE ADHESIVE 1.5" X 3"
2 SHEET (EA) TOPICAL
Status: DISCONTINUED | OUTPATIENT
Start: 2024-01-26 | End: 2024-01-31 | Stop reason: HOSPADM

## 2024-01-26 RX ORDER — DIVALPROEX SODIUM 250 MG/1
250 TABLET, DELAYED RELEASE ORAL 3 TIMES DAILY
Status: DISCONTINUED | OUTPATIENT
Start: 2024-01-26 | End: 2024-01-31 | Stop reason: HOSPADM

## 2024-01-26 RX ORDER — ACEBUTOLOL HYDROCHLORIDE 200 MG/1
200 CAPSULE ORAL DAILY
Status: DISCONTINUED | OUTPATIENT
Start: 2024-01-27 | End: 2024-01-27

## 2024-01-26 RX ORDER — CITALOPRAM HYDROBROMIDE 10 MG/1
10 TABLET ORAL DAILY
Status: DISCONTINUED | OUTPATIENT
Start: 2024-01-27 | End: 2024-01-31 | Stop reason: HOSPADM

## 2024-01-26 RX ORDER — ACETAMINOPHEN 325 MG/1
650 TABLET ORAL EVERY 4 HOURS PRN
Status: DISCONTINUED | OUTPATIENT
Start: 2024-01-26 | End: 2024-01-31 | Stop reason: HOSPADM

## 2024-01-26 RX ORDER — FLECAINIDE ACETATE 50 MG/1
50 TABLET ORAL EVERY 12 HOURS SCHEDULED
Status: DISCONTINUED | OUTPATIENT
Start: 2024-01-26 | End: 2024-01-31 | Stop reason: HOSPADM

## 2024-01-26 RX ORDER — BUSPIRONE HYDROCHLORIDE 7.5 MG/1
7.5 TABLET ORAL 2 TIMES DAILY
COMMUNITY

## 2024-01-26 RX ORDER — DONEPEZIL HYDROCHLORIDE 5 MG/1
5 TABLET, FILM COATED ORAL
Status: DISCONTINUED | OUTPATIENT
Start: 2024-01-26 | End: 2024-01-31 | Stop reason: HOSPADM

## 2024-01-26 RX ADMIN — ENOXAPARIN SODIUM 40 MG: 40 INJECTION SUBCUTANEOUS at 16:03

## 2024-01-26 RX ADMIN — BUSPIRONE HYDROCHLORIDE 7.5 MG: 5 TABLET ORAL at 21:35

## 2024-01-26 RX ADMIN — REMDESIVIR 200 MG: 100 INJECTION, POWDER, LYOPHILIZED, FOR SOLUTION INTRAVENOUS at 17:53

## 2024-01-26 RX ADMIN — DEXAMETHASONE SODIUM PHOSPHATE 6 MG: 10 INJECTION, SOLUTION INTRAMUSCULAR; INTRAVENOUS at 16:03

## 2024-01-26 RX ADMIN — DIVALPROEX SODIUM 250 MG: 250 TABLET, DELAYED RELEASE ORAL at 17:53

## 2024-01-26 RX ADMIN — DONEPEZIL HYDROCHLORIDE 5 MG: 5 TABLET ORAL at 21:35

## 2024-01-26 RX ADMIN — FLECAINIDE ACETATE 50 MG: 50 TABLET ORAL at 21:35

## 2024-01-26 RX ADMIN — MELATONIN 6 MG: at 21:34

## 2024-01-26 RX ADMIN — CEFTRIAXONE SODIUM 1000 MG: 10 INJECTION, POWDER, FOR SOLUTION INTRAVENOUS at 12:45

## 2024-01-26 NOTE — H&P
[facility]  H&P  Name: Ann Cross 83 y.o. female I MRN: [id number]  Unit/Bed#: [facility] I Date of Admission: 1/26/2024   Date of Service: 1/26/2024 I Hospital Day: 0      Assessment/Plan   * Acute metabolic encephalopathy  Assessment & Plan  Pmhx of dementia, recent traumatic SDH on 12/2022 who presented to the ED for evaluation of altered mental status  Patient tested positive for COVID-19 and UTI which are likely contributing to patient's encephalopathy in the setting of underlying dementia  CT head on admission shows near complete resolution of parafalcine and decreased size of right tentorial subdural hematoma  Monitor mental status closely  Delirium precautions    COVID-19  Assessment & Plan  Tested positive for COVID, presenting with mild URI symptoms and encephalopathy  Was placed on 2 L nasal cannula for satting 90% in the ED  Start Decadron and remdesivir  Wean oxygen as tolerated  Continue mild pathway    Acute cystitis  Assessment & Plan  Presenting with encephalopathy without any suprapubic pain  UA WBC innumerable, bacteria moderate, nitrite positive  Continue ceftriaxone  Follow-up urine culture    Traumatic subdural hemorrhage, subsequent encounter  Assessment & Plan  History of traumatic SDH on 12/22 after fall  CT head on admission shows near complete resolution of parafalcine and decreased size of right tentorial subdural  Outpatient neurosurgery note reviewed from 1/18/2024 which recommends continued holding aspirin with plan for repeat CT head in 4 weeks around 2/9/2024  Consider discussion with neurosurgery tomorrow if aspirin can be resumed based on current CT head    Tachy-alison syndrome (HCC)  Assessment & Plan  S/p PPM    Paroxysmal atrial fibrillation (HCC)  Assessment & Plan  Sinus rhythm on admission, rate controlled  Continue beta-blocker  Currently ACE and AP held due to subdural hematoma  Continue flecainide    Hypertension  Assessment &  Plan  Blood pressure stable  Continue amlodipine    Minimal cognitive impairment  Assessment & Plan  Continue Aricept  Delirium precautions     Mood disorder with hypomanic features  Assessment & Plan  Continue citalopram, BuSpar, and Depakote         VTE Pharmacologic Prophylaxis:   Moderate Risk (Score 3-4) - Pharmacological DVT Prophylaxis Ordered: enoxaparin (Lovenox).  Code Status: Level 1 - Full Code   Discussion with family: Updated  (daughter) via phone.    Anticipated Length of Stay: Patient will be admitted on an observation basis with an anticipated length of stay of less than 2 midnights secondary to UTI and COVID, encephalopathy.    Total Time Spent on Date of Encounter in care of patient: 75 mins. This time was spent on one or more of the following: performing physical exam; counseling and coordination of care; obtaining or reviewing history; documenting in the medical record; reviewing/ordering tests, medications or procedures; communicating with other healthcare professionals and discussing with patient's family/caregivers.    Chief Complaint: Encephalopathy    History of Present Illness:  Ann Cross is a 83 y.o. female with a PMH of mild cognitive impairment, hypertension, paroxysmal atrial fibrillation, mood disorder, VIRGILIO, SSS s/p PPM, CKD, hypertension, recent traumatic SDH on 12/22 who presents from [facility] for evaluation of altered mental status.  HPI obtained from ED physician and daughter over the phone.  Daughter noted patient was having URI symptoms starting yesterday and was later found down this morning where she tested positive for COVID at the facility.  Patient was also disoriented this morning.  In the ED, patient denies any symptoms aside from feeling sleepy.  He denies any shortness of breath or cough.  HPI limited secondary to dementia and confusion.  In the ED, patient tested positive for COVID-19 and urine appeared to show signs of infection.  Patient  was placed on 2 L nasal cannula as her O2 was borderline 90%.  Patient received IV ceftriaxone in the ED.  Patient will be admitted to medicine for further management.    Review of Systems:  Review of Systems   Unable to perform ROS: Dementia       Past Medical and Surgical History:   Past Medical History:   Diagnosis Date    Afib (HCC)     Arthritis     Hyperlipidemia     Hypertension     Osteopenia     Sleep apnea     Squamous cell skin cancer     LEF TEMPLE    Subdural hemorrhage (HCC)     Varicose veins of both lower extremities        Past Surgical History:   Procedure Laterality Date    CARDIAC ELECTROPHYSIOLOGY PROCEDURE N/A 10/12/2022    Procedure: Cardiac loop recorder implant;  Surgeon: Rashid Carlos MD;  [facility]: [facility];  Service: Cardiology    CARDIAC ELECTROPHYSIOLOGY PROCEDURE N/A 4/21/2023    Procedure: Cardiac pacer implant;  Surgeon: Simba Chilel MD;  Location: [facility];  Service: Cardiology    CATARACT EXTRACTION      COLONOSCOPY      complete, for polyp removal    EYE SURGERY      HYSTERECTOMY      age 45    INCISION AND DRAINAGE OF WOUND Right 06/28/2016    Procedure: ARTHROSCOPY,EVACUATION OF HEMATOMA, OPEN EXPLORATION OF WOUND;  Surgeon: Adam Brice MD;  Location: [facility];  Service:     MOHS SURGERY Left 12/27/2022    left temple    SKIN BIOPSY      TONSILLECTOMY AND ADENOIDECTOMY      TOTAL KNEE ARTHROPLASTY Right     TOTAL KNEE ARTHROPLASTY Left     WISDOM TOOTH EXTRACTION         Meds/Allergies:  Prior to Admission medications    Medication Sig Start Date End Date Taking? Authorizing Provider   acebutolol (SECTRAL) 200 mg capsule Take 200 mg by mouth daily 9/9/22  Yes Historical Provider, MD   amLODIPine (NORVASC) 10 mg tablet Take 1 tablet (10 mg total) by mouth daily 8/19/22 1/26/24 Yes Mary Kate Regalado PA-C   aspirin (ECOTRIN LOW STRENGTH) 81 mg EC tablet Take 81 mg by mouth daily   Yes Historical Provider, MD   busPIRone (BUSPAR) 7.5 mg tablet Take  7.5 mg by mouth 2 (two) times a day   Yes Historical Provider, MD   Calcium Carbonate 1500 (600 Ca) MG TABS Take 1 tablet by mouth daily 9/9/22  Yes Historical Provider, MD   citalopram (CeleXA) 10 mg tablet Take 10 mg by mouth daily   Yes Historical Provider, MD   [employer] Senna Plus 8.6-50 MG per tablet every other day 9/9/22  Yes Historical Provider, MD   divalproex sodium (DEPAKOTE) 250 mg EC tablet Take 250 mg by mouth 3 (three) times a day 9/9/22  Yes Historical Provider, MD   donepezil (ARICEPT) 5 mg tablet Take 1 tablet (5 mg total) by mouth daily at bedtime 8/19/22 1/26/24 Yes Mary Kate Regalado PA-C   flecainide (TAMBOCOR) 50 mg tablet Take 1 tablet (50 mg total) by mouth every 12 (twelve) hours 8/19/22 1/26/24 Yes Mary Kate Regalado PA-C   Melatonin 3 MG CAPS Take 6 mg by mouth   Yes Historical Provider, MD   acetaminophen (TYLENOL) 325 mg tablet Take 2 tablets (650 mg total) by mouth every 4 (four) hours as needed for mild pain 12/24/23   LUZ Garber   Donepezil HCl 5 MG/DAY PTWK Place on the skin    Historical Provider, MD   ergocalciferol (VITAMIN D2) 50,000 units Take 50,000 Units by mouth every 30 (thirty) days 9/9/22   Historical Provider, MD   fluticasone (FLONASE) 50 mcg/act nasal spray 1 spray into each nostril daily for 7 days 2/20/23 4/21/23  Sterling Taylor MD   levETIRAcetam (KEPPRA) 500 mg tablet Take 1 tablet (500 mg total) by mouth 2 (two) times a day  Patient not taking: Reported on 1/18/2024 12/24/23   LUZ Garber   rosuvastatin (CRESTOR) 5 mg tablet TAKE 1/2 TABLET BY MOUTH EVERY OTHER DAY IN THE EVENING  Patient not taking: Reported on 1/26/2024 9/9/22   Historical Provider, MD   sodium chloride (MARITZA 128) 5 % hypertonic ophthalmic ointment Administer to both eyes every 3 (three) hours as needed (irritation) 8/19/22 11/16/22  Mary Kate Regalado PA-C     I have reveiwed home medications using records provided by [facility].    Allergies:   Allergies   Allergen  Reactions    Atorvastatin Other (See Comments)     myalgia    Statins Other (See Comments)     Weakness in legs       Social History:  Marital Status:    Substance Use History:   Social History     Substance and Sexual Activity   Alcohol Use Not Currently     Social History     Tobacco Use   Smoking Status Never   Smokeless Tobacco Never     Social History     Substance and Sexual Activity   Drug Use No       Family History:  Family History   Problem Relation Age of Onset    Diabetes Mother     Diabetes Father     Diabetes Brother     No Known Problems Sister     No Known Problems Daughter     No Known Problems Maternal Grandmother     No Known Problems Maternal Grandfather     No Known Problems Paternal Grandmother     No Known Problems Paternal Grandfather     No Known Problems Daughter     Brain cancer Son     No Known Problems Maternal Aunt        Physical Exam:     Vitals:   Blood Pressure: 115/57 (01/26/24 1500)  Pulse: 58 (01/26/24 1500)  Temperature: 98.4 °F (36.9 °C) (01/26/24 1038)  Temp Source: Oral (01/26/24 1038)  Respirations: 17 (01/26/24 1500)  SpO2: 98 % (01/26/24 1500)    Physical Exam  Vitals reviewed.   Constitutional:       General: She is sleeping. She is not in acute distress.     Appearance: She is not ill-appearing.      Comments: Drowsy   HENT:      Head: Normocephalic and atraumatic.   Cardiovascular:      Rate and Rhythm: Normal rate and regular rhythm.   Pulmonary:      Effort: Pulmonary effort is normal. No respiratory distress.      Breath sounds: No wheezing or rales.      Comments: 2 L nasal cannula saturating 97%  Abdominal:      General: Bowel sounds are normal.      Palpations: Abdomen is soft.      Tenderness: There is no abdominal tenderness.   Musculoskeletal:      Right lower leg: No edema.      Left lower leg: No edema.   Skin:     General: Skin is warm and dry.   Neurological:      Mental Status: She is easily aroused. She is disoriented.      Comments: Oriented to  self          Additional Data:     Lab Results:  Results from last 7 days   Lab Units 01/26/24  1137   WBC Thousand/uL 8.22   HEMOGLOBIN g/dL 15.9*   HEMATOCRIT % 48.5*   PLATELETS Thousands/uL 161   BANDS PCT % 6   LYMPHO PCT % 12*   MONO PCT % 19*   EOS PCT % 0     Results from last 7 days   Lab Units 01/26/24  1137   SODIUM mmol/L 135   POTASSIUM mmol/L 4.1   CHLORIDE mmol/L 96   CO2 mmol/L 31   BUN mg/dL 18   CREATININE mg/dL 0.87   ANION GAP mmol/L 8   CALCIUM mg/dL 9.5   GLUCOSE RANDOM mg/dL 108     Results from last 7 days   Lab Units 01/26/24  1137   INR  1.04                   Lines/Drains:  Invasive Devices       Peripheral Intravenous Line  Duration             Peripheral IV 01/26/24 Right Antecubital <1 day              Drain  Duration             External Urinary Catheter 26 days                        Imaging: Reviewed radiology reports from this admission including: CT head  CT head wo contrast   Final Result by Emiliano Cordero MD (01/26 1324)      -Near complete resolution of parafalcine and decreased size of right tentorial subdural hematoma.   -No new intracranial abnormality. Microangiopathic changes.                  Workstation performed: [id number]             EKG and Other Studies Reviewed on Admission:   EKG: NSR. HR 61.    ** Please Note: This note has been constructed using a voice recognition system. **

## 2024-01-26 NOTE — Clinical Note
Case was discussed with BILLY and the patient's admission status was agreed to be Admission Status: inpatient status to the service of Dr. Cordero.

## 2024-01-26 NOTE — ASSESSMENT & PLAN NOTE
History of traumatic SDH on 12/22 after fall  CT head on admission shows near complete resolution of parafalcine and decreased size of right tentorial subdural  Outpatient neurosurgery note reviewed from 1/18/2024 which recommends continued holding aspirin with plan for repeat CT head in 4 weeks around 2/9/2024  Consider discussion with neurosurgery tomorrow if aspirin can be resumed based on current CT head

## 2024-01-26 NOTE — ED PROVIDER NOTES
History  Chief Complaint   Patient presents with    COVID-19 Exposure     Pt sent over from [facility] for covid symptoms. Staff reported shaking and disorientation this morning.     HPI    Patient is a 83 y.o. female with PMHx dementia, traumatic SDH on 12/22, pAF, HTN, HLD, sick sinus syndrome, VIRGILIO, SCC s/p Mohs procedure, CKD who presents to the ED via EMS for evaluation of altered mental status. Patient was sent from [facility].  Patient denies any complaints at this time including any fever, chills, cough, chest pain, shortness of breath, abdominal pain, nausea, vomiting, diarrhea, dysuria, hematuria, dizziness, headache.  Limited history secondary to the patient's dementia.  Per chart review, patient had a follow-up appointment with neurosurgery on 1/18/2024 and CT head imaging performed on 1/12/2024 showed improving subdural hematoma.        Prior to Admission Medications   Prescriptions Last Dose Informant Patient Reported? Taking?   [facility] Senna Plus 8.6-50 MG per tablet Past Week Self Yes Yes   Sig: every other day   Calcium Carbonate 1500 (600 Ca) MG TABS 1/26/2024 Self Yes Yes   Sig: Take 1 tablet by mouth daily   Donepezil HCl 5 MG/DAY PTWK Unknown Self Yes No   Sig: Place on the skin   Melatonin 3 MG CAPS 1/25/2024 Self Yes Yes   Sig: Take 6 mg by mouth   acebutolol (SECTRAL) 200 mg capsule 1/26/2024 Self Yes Yes   Sig: Take 200 mg by mouth daily   acetaminophen (TYLENOL) 325 mg tablet Unknown  No No   Sig: Take 2 tablets (650 mg total) by mouth every 4 (four) hours as needed for mild pain   amLODIPine (NORVASC) 10 mg tablet 1/26/2024 Self No Yes   Sig: Take 1 tablet (10 mg total) by mouth daily   aspirin (ECOTRIN LOW STRENGTH) 81 mg EC tablet 1/26/2024 Outside Facility (Specify) Yes Yes   Sig: Take 81 mg by mouth daily   busPIRone (BUSPAR) 7.5 mg tablet 1/26/2024 Outside Facility (Specify) Yes Yes   Sig: Take 7.5 mg by mouth 2 (two) times a day   citalopram (CeleXA) 10 mg tablet 1/26/2024  Outside Facility (Specify) Yes Yes   Sig: Take 10 mg by mouth daily   divalproex sodium (DEPAKOTE) 250 mg EC tablet 2024 Self Yes Yes   Sig: Take 250 mg by mouth 3 (three) times a day   donepezil (ARICEPT) 5 mg tablet 2024 Self No Yes   Sig: Take 1 tablet (5 mg total) by mouth daily at bedtime   ergocalciferol (VITAMIN D2) 50,000 units Unknown Self Yes No   Sig: Take 50,000 Units by mouth every 30 (thirty) days   flecainide (TAMBOCOR) 50 mg tablet 2024 Self No Yes   Sig: Take 1 tablet (50 mg total) by mouth every 12 (twelve) hours   fluticasone (FLONASE) 50 mcg/act nasal spray   No No   Si spray into each nostril daily for 7 days   levETIRAcetam (KEPPRA) 500 mg tablet Not Taking  No No   Sig: Take 1 tablet (500 mg total) by mouth 2 (two) times a day   Patient not taking: Reported on 2024   rosuvastatin (CRESTOR) 5 mg tablet Not Taking Self Yes No   Sig: TAKE 1/2 TABLET BY MOUTH EVERY OTHER DAY IN THE EVENING   Patient not taking: Reported on 2024   sodium chloride (MARITZA 128) 5 % hypertonic ophthalmic ointment  Self No No   Sig: Administer to both eyes every 3 (three) hours as needed (irritation)      Facility-Administered Medications: None       Past Medical History:   Diagnosis Date    Afib (HCC)     Arthritis     Hyperlipidemia     Hypertension     Osteopenia     Sleep apnea     Squamous cell skin cancer     LEF TEMPLE    Subdural hemorrhage (HCC)     Varicose veins of both lower extremities        Past Surgical History:   Procedure Laterality Date    CARDIAC ELECTROPHYSIOLOGY PROCEDURE N/A 10/12/2022    Procedure: Cardiac loop recorder implant;  Surgeon: Rashid Carlos MD;  Location: [facility];  Service: Cardiology    CARDIAC ELECTROPHYSIOLOGY PROCEDURE N/A 2023    Procedure: Cardiac pacer implant;  Surgeon: Simba Chilel MD;  Location: [facility];  Service: Cardiology    CATARACT EXTRACTION      COLONOSCOPY      complete, for polyp removal    EYE SURGERY       HYSTERECTOMY      age 45    INCISION AND DRAINAGE OF WOUND Right 06/28/2016    Procedure: ARTHROSCOPY,EVACUATION OF HEMATOMA, OPEN EXPLORATION OF WOUND;  Surgeon: Adam Brice MD;  Location: [facility];  Service:     MOHS SURGERY Left 12/27/2022    left temple    SKIN BIOPSY      TONSILLECTOMY AND ADENOIDECTOMY      TOTAL KNEE ARTHROPLASTY Right     TOTAL KNEE ARTHROPLASTY Left     WISDOM TOOTH EXTRACTION         Family History   Problem Relation Age of Onset    Diabetes Mother     Diabetes Father     Diabetes Brother     No Known Problems Sister     No Known Problems Daughter     No Known Problems Maternal Grandmother     No Known Problems Maternal Grandfather     No Known Problems Paternal Grandmother     No Known Problems Paternal Grandfather     No Known Problems Daughter     Brain cancer Son     No Known Problems Maternal Aunt      I have reviewed and agree with the history as documented.    E-Cigarette/Vaping    E-Cigarette Use Never User      E-Cigarette/Vaping Substances    Nicotine No     THC No     CBD No     Flavoring No     Other No     Unknown No      Social History     Tobacco Use    Smoking status: Never    Smokeless tobacco: Never   Vaping Use    Vaping status: Never Used   Substance Use Topics    Alcohol use: Not Currently    Drug use: No        Review of Systems   Psychiatric/Behavioral:  Positive for confusion.        Physical Exam  ED Triage Vitals [01/26/24 1038]   Temperature Pulse Respirations Blood Pressure SpO2   98.4 °F (36.9 °C) 76 19 134/69 95 %      Temp Source Heart Rate Source Patient Position - Orthostatic VS BP Location FiO2 (%)   Oral Monitor Sitting Right arm --      Pain Score       No Pain             Orthostatic Vital Signs  Vitals:    01/26/24 1038 01/26/24 1415   BP: 134/69 128/58   Pulse: 76 58   Patient Position - Orthostatic VS: Sitting Lying       Physical Exam  Vitals and nursing note reviewed.   Constitutional:       General: She is not in acute distress.      Appearance: Normal appearance. She is well-developed. She is not ill-appearing, toxic-appearing or diaphoretic.   HENT:      Head: Normocephalic and atraumatic.      Nose: Congestion present.      Mouth/Throat:      Mouth: Mucous membranes are dry.   Eyes:      Extraocular Movements: Extraocular movements intact.      Conjunctiva/sclera: Conjunctivae normal.      Pupils: Pupils are equal, round, and reactive to light.   Cardiovascular:      Rate and Rhythm: Normal rate and regular rhythm.      Pulses: Normal pulses.      Heart sounds: Normal heart sounds. No murmur heard.  Pulmonary:      Effort: Pulmonary effort is normal. No respiratory distress.      Breath sounds: Normal breath sounds. No stridor. No wheezing, rhonchi or rales.   Abdominal:      General: There is no distension.      Palpations: Abdomen is soft.      Tenderness: There is no abdominal tenderness.   Musculoskeletal:         General: No swelling. Normal range of motion.      Cervical back: Neck supple.   Skin:     General: Skin is warm and dry.      Capillary Refill: Capillary refill takes less than 2 seconds.   Neurological:      General: No focal deficit present.      Mental Status: She is alert.      Sensory: No sensory deficit.      Motor: No weakness.      Comments: Easily arouses to voice. Follows simple commands. Oriented to self and place    Psychiatric:         Mood and Affect: Mood normal.         ED Medications  Medications   acebutolol (SECTRAL) capsule 200 mg (has no administration in time range)   acetaminophen (TYLENOL) tablet 650 mg (has no administration in time range)   amLODIPine (NORVASC) tablet 10 mg (has no administration in time range)   busPIRone (BUSPAR) tablet 7.5 mg (has no administration in time range)   citalopram (CeleXA) tablet 10 mg (has no administration in time range)   divalproex sodium (DEPAKOTE) DR tablet 250 mg (has no administration in time range)   donepezil (ARICEPT) tablet 5 mg (has no administration in time  range)   flecainide (TAMBOCOR) tablet 50 mg (has no administration in time range)   melatonin tablet 6 mg (has no administration in time range)   sodium chloride (MARITZA 128) 5 % hypertonic ophthalmic solution 2 drop (has no administration in time range)   enoxaparin (LOVENOX) subcutaneous injection 40 mg (has no administration in time range)   remdesivir (Veklury) 200 mg in sodium chloride 0.9 % 290 mL IVPB (has no administration in time range)     Followed by   remdesivir (Veklury) 100 mg in sodium chloride 0.9 % 270 mL IVPB (has no administration in time range)   dexamethasone (PF) (DECADRON) injection 6 mg (has no administration in time range)   ceftriaxone (ROCEPHIN) 1 g/50 mL in dextrose IVPB (has no administration in time range)   ceftriaxone (ROCEPHIN) 1 g/50 mL in dextrose IVPB (0 mg Intravenous Stopped 1/26/24 1406)       Diagnostic Studies  Results Reviewed       Procedure Component Value Units Date/Time    RBC Morphology Reflex Test [[id number]] Collected: 01/26/24 1137    Lab Status: Final result Specimen: Blood from Arm, Right Updated: 01/26/24 1401    COVID/FLU/RSV [[id number]]  (Abnormal) Collected: 01/26/24 1137    Lab Status: Final result Specimen: Nares from Nose Updated: 01/26/24 1320     SARS-CoV-2 Positive     INFLUENZA A PCR Negative     INFLUENZA B PCR Negative     RSV PCR Negative    Narrative:      FOR PEDIATRIC PATIENTS - copy/paste COVID Guidelines URL to browser: [email or web address]    SARS-CoV-2 assay is a Nucleic Acid Amplification assay intended for the  qualitative detection of nucleic acid from SARS-CoV-2 in nasopharyngeal  swabs. Results are for the presumptive identification of SARS-CoV-2 RNA.    Positive results are indicative of infection with SARS-CoV-2, the virus  causing COVID-19, but do not rule out bacterial infection or co-infection  with other viruses. Laboratories within the [place] and its  territories are  required to report all positive results to the appropriate  public health authorities. Negative results do not preclude SARS-CoV-2  infection and should not be used as the sole basis for treatment or other  patient management decisions. Negative results must be combined with  clinical observations, patient history, and epidemiological information.  This test has not been FDA cleared or approved.    This test has been authorized by FDA under an Emergency Use Authorization  (EUA). This test is only authorized for the duration of time the  declaration that circumstances exist justifying the authorization of the  emergency use of an in vitro diagnostic tests for detection of SARS-CoV-2  virus and/or diagnosis of COVID-19 infection under section 564(b)(1) of  the Act, 21 U.S.C. 360bbb-3(b)(1), unless the authorization is terminated  or revoked sooner. The test has been validated but independent review by FDA  and CLIA is pending.    Test performed using [employer] GeneXpert: This RT-PCR assay targets N2,  a region unique to SARS-CoV-2. A conserved region in the E-gene was chosen  for pan-Sarbecovirus detection which includes SARS-CoV-2.    According to CMS-2020-01-R, this platform meets the definition of high-throughput technology.    CBC and differential [[id number]]  (Abnormal) Collected: 01/26/24 1137    Lab Status: Final result Specimen: Blood from Arm, Right Updated: 01/26/24 1301     WBC 8.22 Thousand/uL      RBC 5.43 Million/uL      Hemoglobin 15.9 g/dL      Hematocrit 48.5 %      MCV 89 fL      MCH 29.3 pg      MCHC 32.8 g/dL      RDW 13.7 %      MPV 10.1 fL      Platelets 161 Thousands/uL     Narrative:      This is an appended report.  These results have been appended to a previously verified report.    Manual Differential(PHLEBS Do Not Order) [[id number]]  (Abnormal) Collected: 01/26/24 1137    Lab Status: Final result Specimen: Blood from Arm, Right Updated: 01/26/24 1301     Segmented % 57 %      Bands % 6 %       Lymphocytes % 12 %      Monocytes % 19 %      Eosinophils, % 0 %      Basophils % 1 %      Metamyelocytes% 1 %      Myelocytes % 1 %      Atypical Lymphocytes % 3 %      Absolute Neutrophils 5.18 Thousand/uL      Lymphocytes Absolute 1.23 Thousand/uL      Monocytes Absolute 1.56 Thousand/uL      Eosinophils Absolute 0.00 Thousand/uL      Basophils Absolute 0.08 Thousand/uL      Total Counted --     RBC Morphology Normal     Platelet Estimate Adequate    Basic metabolic panel [[id number]] Collected: 01/26/24 1137    Lab Status: Final result Specimen: Blood from Arm, Right Updated: 01/26/24 1224     Sodium 135 mmol/L      Potassium 4.1 mmol/L      Chloride 96 mmol/L      CO2 31 mmol/L      ANION GAP 8 mmol/L      BUN 18 mg/dL      Creatinine 0.87 mg/dL      Glucose 108 mg/dL      Calcium 9.5 mg/dL      eGFR 61 ml/min/1.73sq m     Narrative:      National Kidney Disease Foundation guidelines for Chronic Kidney Disease (CKD):     Stage 1 with normal or high GFR (GFR > 90 mL/min/1.73 square meters)    Stage 2 Mild CKD (GFR = 60-89 mL/min/1.73 square meters)    Stage 3A Moderate CKD (GFR = 45-59 mL/min/1.73 square meters)    Stage 3B Moderate CKD (GFR = 30-44 mL/min/1.73 square meters)    Stage 4 Severe CKD (GFR = 15-29 mL/min/1.73 square meters)    Stage 5 End Stage CKD (GFR <15 mL/min/1.73 square meters)  Note: GFR calculation is accurate only with a steady state creatinine    Magnesium [[id number]]  (Normal) Collected: 01/26/24 1137    Lab Status: Final result Specimen: Blood from Arm, Right Updated: 01/26/24 1224     Magnesium 2.1 mg/dL     Protime-INR [[id number]]  (Normal) Collected: 01/26/24 1137    Lab Status: Final result Specimen: Blood from Arm, Right Updated: 01/26/24 1215     Protime 13.5 seconds      INR 1.04    APTT [[id number]]  (Normal) Collected: 01/26/24 1137    Lab Status: Final result Specimen: Blood from Arm, Right Updated: 01/26/24 1215     PTT 24 seconds     Urine Microscopic [[id number]]  (Abnormal)  Collected: 01/26/24 1146    Lab Status: Final result Specimen: Urine, Straight Cath Updated: 01/26/24 1214     RBC, UA 20-30 /hpf      WBC, UA Innumerable /hpf      Epithelial Cells None Seen /hpf      Bacteria, UA Moderate /hpf      MUCUS THREADS Innumerable     WBC Clumps Present    Urine culture [[id number]] Collected: 01/26/24 1146    Lab Status: In process Specimen: Urine, Straight Cath Updated: 01/26/24 1214    UA w Reflex to Microscopic w Reflex to Culture [[id number]]  (Abnormal) Collected: 01/26/24 1146    Lab Status: Final result Specimen: Urine, Straight Cath Updated: 01/26/24 1208     Color, UA Yellow     Clarity, UA Extra Turbid     Specific Gravity, UA 1.014     pH, UA 6.0     Leukocytes, UA Large     Nitrite, UA Positive     Protein, UA 70 (1+) mg/dl      Glucose, UA Negative mg/dl      Ketones, UA Negative mg/dl      Urobilinogen, UA <2.0 mg/dl      Bilirubin, UA Negative     Occult Blood, UA Small                   CT head wo contrast   Final Result by Emiliano Cordero MD (01/26 1324)      -Near complete resolution of parafalcine and decreased size of right tentorial subdural hematoma.   -No new intracranial abnormality. Microangiopathic changes.                  Workstation performed: [id number]               Procedures  Procedures      ED Course  ED Course as of 01/26/24 1529   Fri Jan 26, 2024   1220 Blood Pressure: 134/69   1220 Temperature: 98.4 °F (36.9 °C)   1220 Temp Source: Oral   1220 Pulse: 76   1220 Respirations: 19   1220 SpO2: 95 %   1220 WBC: 8.22  No leukocytosis    1220 Hemoglobin(!): 15.9  No anemia    1220 Platelet Count: 161  wnl   1220 Leukocytes, UA(!): Large   1220 Nitrite, UA(!): Positive   1220 WBC, UA(!): Innumerable   1220 Bacteria, UA(!): Moderate   1221 Rocephin ordered    1242 MAGNESIUM: 2.1  wnl   1336 SARS-COV-2(!): Positive   1336 CT head wo contrast  -Near complete resolution of parafalcine and decreased size of right tentorial subdural hematoma.  -No new intracranial  abnormality. Microangiopathic changes.   1354 TT sent to [facility]    1411 Admitted to [facility] for UTI and COVID                                       Medical Decision Making  Amount and/or Complexity of Data Reviewed  Labs: ordered. Decision-making details documented in ED Course.  Radiology: ordered. Decision-making details documented in ED Course.    Risk  Decision regarding hospitalization.        ASSESSMENT: Patient is a 83 y.o. female who presents with AMS.   DDX includes but not limited to: infection, DAVID, expanding subdural hematoma.   PLAN: CBC, BMP, coags, mag, UA, COVID/flu/RSV, EKG, CT head without contrast.    See ED course for details.      Disposition  Final diagnoses:   COVID   UTI (urinary tract infection)   Altered mental status     Time reflects when diagnosis was documented in both MDM as applicable and the Disposition within this note       Time User Action Codes Description Comment    1/26/2024  1:55 PM Nel Glass Add [U07.1] COVID     1/26/2024  1:55 PM Nel Glass Add [N39.0] UTI (urinary tract infection)     1/26/2024  1:55 PM Nel Glass Add [R41.82] Altered mental status           ED Disposition       ED Disposition   Admit    Condition   Stable    Date/Time   Fri Jan 26, 2024  1:58 PM    Comment   Case was discussed with [facility] and the patient's admission status was agreed to be Admission Status: observation status to the service of Dr. Cordero.               Follow-up Information    None         Patient's Medications   Discharge Prescriptions    No medications on file     No discharge procedures on file.    PDMP Review         Value Time User    PDMP Reviewed  Yes 12/31/2023 11:50 AM Rashel Pascual PA-C             ED Provider  Attending physically available and evaluated Ann Cross. I managed the patient along with the ED Attending.    Electronically Signed by           Nel Glass MD  01/26/24 2046

## 2024-01-26 NOTE — ASSESSMENT & PLAN NOTE
Sinus rhythm on admission, rate controlled  Continue beta-blocker  Currently ACE and AP held due to subdural hematoma  Continue flecainide

## 2024-01-26 NOTE — ASSESSMENT & PLAN NOTE
Pmhx of dementia, recent traumatic SDH on 12/2022 who presented to the ED for evaluation of altered mental status  Patient tested positive for COVID-19 and UTI which are likely contributing to patient's encephalopathy in the setting of underlying dementia  CT head on admission shows near complete resolution of parafalcine and decreased size of right tentorial subdural hematoma  Monitor mental status closely  Delirium precautions

## 2024-01-26 NOTE — ASSESSMENT & PLAN NOTE
Tested positive for COVID, presenting with mild URI symptoms and encephalopathy however patient currently denies any shortness of breath or cough on admission  Was placed on 2 L nasal cannula for satting 90% in the ED  Start Decadron and remdesivir  Wean oxygen as tolerated  Continue mild pathway

## 2024-01-26 NOTE — ASSESSMENT & PLAN NOTE
Presenting with encephalopathy without any suprapubic pain  UA WBC innumerable, bacteria moderate, nitrite positive  Continue ceftriaxone  Follow-up urine culture

## 2024-01-27 LAB
ALBUMIN SERPL BCP-MCNC: 3.7 G/DL (ref 3.5–5)
ALP SERPL-CCNC: 47 U/L (ref 34–104)
ALT SERPL W P-5'-P-CCNC: 19 U/L (ref 7–52)
ANION GAP SERPL CALCULATED.3IONS-SCNC: 9 MMOL/L
AST SERPL W P-5'-P-CCNC: 35 U/L (ref 13–39)
BASOPHILS # BLD AUTO: 0.01 THOUSANDS/ÂΜL (ref 0–0.1)
BASOPHILS NFR BLD AUTO: 0 % (ref 0–1)
BILIRUB SERPL-MCNC: 0.31 MG/DL (ref 0.2–1)
BUN SERPL-MCNC: 23 MG/DL (ref 5–25)
CALCIUM SERPL-MCNC: 8.9 MG/DL (ref 8.4–10.2)
CHLORIDE SERPL-SCNC: 97 MMOL/L (ref 96–108)
CO2 SERPL-SCNC: 29 MMOL/L (ref 21–32)
CREAT SERPL-MCNC: 1.01 MG/DL (ref 0.6–1.3)
EOSINOPHIL # BLD AUTO: 0.06 THOUSAND/ÂΜL (ref 0–0.61)
EOSINOPHIL NFR BLD AUTO: 1 % (ref 0–6)
ERYTHROCYTE [DISTWIDTH] IN BLOOD BY AUTOMATED COUNT: 13.7 % (ref 11.6–15.1)
GFR SERPL CREATININE-BSD FRML MDRD: 51 ML/MIN/1.73SQ M
GLUCOSE P FAST SERPL-MCNC: 112 MG/DL (ref 65–99)
GLUCOSE SERPL-MCNC: 112 MG/DL (ref 65–140)
HCT VFR BLD AUTO: 45.1 % (ref 34.8–46.1)
HGB BLD-MCNC: 14.7 G/DL (ref 11.5–15.4)
IMM GRANULOCYTES # BLD AUTO: 0.04 THOUSAND/UL (ref 0–0.2)
IMM GRANULOCYTES NFR BLD AUTO: 1 % (ref 0–2)
LYMPHOCYTES # BLD AUTO: 1.36 THOUSANDS/ÂΜL (ref 0.6–4.47)
LYMPHOCYTES NFR BLD AUTO: 23 % (ref 14–44)
MCH RBC QN AUTO: 29.5 PG (ref 26.8–34.3)
MCHC RBC AUTO-ENTMCNC: 32.6 G/DL (ref 31.4–37.4)
MCV RBC AUTO: 90 FL (ref 82–98)
MONOCYTES # BLD AUTO: 0.77 THOUSAND/ÂΜL (ref 0.17–1.22)
MONOCYTES NFR BLD AUTO: 13 % (ref 4–12)
NEUTROPHILS # BLD AUTO: 3.67 THOUSANDS/ÂΜL (ref 1.85–7.62)
NEUTS SEG NFR BLD AUTO: 62 % (ref 43–75)
NRBC BLD AUTO-RTO: 0 /100 WBCS
PLATELET # BLD AUTO: 152 THOUSANDS/UL (ref 149–390)
PMV BLD AUTO: 10.1 FL (ref 8.9–12.7)
POTASSIUM SERPL-SCNC: 4.8 MMOL/L (ref 3.5–5.3)
PROT SERPL-MCNC: 6.4 G/DL (ref 6.4–8.4)
RBC # BLD AUTO: 4.99 MILLION/UL (ref 3.81–5.12)
SODIUM SERPL-SCNC: 135 MMOL/L (ref 135–147)
WBC # BLD AUTO: 5.91 THOUSAND/UL (ref 4.31–10.16)

## 2024-01-27 PROCEDURE — 85025 COMPLETE CBC W/AUTO DIFF WBC: CPT | Performed by: STUDENT IN AN ORGANIZED HEALTH CARE EDUCATION/TRAINING PROGRAM

## 2024-01-27 PROCEDURE — 97530 THERAPEUTIC ACTIVITIES: CPT

## 2024-01-27 PROCEDURE — 99232 SBSQ HOSP IP/OBS MODERATE 35: CPT | Performed by: PHYSICIAN ASSISTANT

## 2024-01-27 PROCEDURE — 80053 COMPREHEN METABOLIC PANEL: CPT | Performed by: STUDENT IN AN ORGANIZED HEALTH CARE EDUCATION/TRAINING PROGRAM

## 2024-01-27 PROCEDURE — 97163 PT EVAL HIGH COMPLEX 45 MIN: CPT

## 2024-01-27 RX ADMIN — DIVALPROEX SODIUM 250 MG: 250 TABLET, DELAYED RELEASE ORAL at 16:22

## 2024-01-27 RX ADMIN — CITALOPRAM HYDROBROMIDE 10 MG: 10 TABLET ORAL at 09:25

## 2024-01-27 RX ADMIN — MELATONIN 6 MG: at 21:41

## 2024-01-27 RX ADMIN — DIVALPROEX SODIUM 250 MG: 250 TABLET, DELAYED RELEASE ORAL at 11:36

## 2024-01-27 RX ADMIN — AMLODIPINE BESYLATE 10 MG: 10 TABLET ORAL at 09:24

## 2024-01-27 RX ADMIN — DIVALPROEX SODIUM 250 MG: 250 TABLET, DELAYED RELEASE ORAL at 05:17

## 2024-01-27 RX ADMIN — FLECAINIDE ACETATE 50 MG: 50 TABLET ORAL at 21:41

## 2024-01-27 RX ADMIN — BUSPIRONE HYDROCHLORIDE 7.5 MG: 5 TABLET ORAL at 09:24

## 2024-01-27 RX ADMIN — FLECAINIDE ACETATE 50 MG: 50 TABLET ORAL at 09:26

## 2024-01-27 RX ADMIN — DONEPEZIL HYDROCHLORIDE 5 MG: 5 TABLET ORAL at 21:41

## 2024-01-27 RX ADMIN — DIVALPROEX SODIUM 250 MG: 250 TABLET, DELAYED RELEASE ORAL at 21:41

## 2024-01-27 RX ADMIN — REMDESIVIR 100 MG: 100 INJECTION, POWDER, LYOPHILIZED, FOR SOLUTION INTRAVENOUS at 15:24

## 2024-01-27 RX ADMIN — BUSPIRONE HYDROCHLORIDE 7.5 MG: 5 TABLET ORAL at 21:41

## 2024-01-27 RX ADMIN — CEFTRIAXONE SODIUM 1000 MG: 10 INJECTION, POWDER, FOR SOLUTION INTRAVENOUS at 14:15

## 2024-01-27 RX ADMIN — ENOXAPARIN SODIUM 40 MG: 40 INJECTION SUBCUTANEOUS at 09:24

## 2024-01-27 NOTE — ASSESSMENT & PLAN NOTE
Sinus rhythm on admission, rate controlled  Continue beta-blocker -- Acebutolol on med list, however unclear if she takes this as not documented in cardiology notes? Pharmacy does not see any dispense reports for this med. Not on formulary so not getting this regardless.  Can f/u   Currently AC and AP held due to subdural hematoma  Continue flecainide

## 2024-01-27 NOTE — ED ATTENDING ATTESTATION
1/26/2024   IEllie MD, saw and evaluated the patient. I have discussed the patient with the resident/non-physician practitioner and agree with the resident's/non-physician practitioner's findings, Plan of Care, and MDM as documented in the resident's/non-physician practitioner's note, except where noted. All available labs and Radiology studies were reviewed.  I was present for key portions of any procedure(s) performed by the resident/non-physician practitioner and I was immediately available to provide assistance.       At this point I agree with the current assessment done in the Emergency Department.  I have conducted an independent evaluation of this patient a history and physical is as follows:    Unit/Bed#: 2 214-01 Encounter: [id number]    Chief Complaint   Patient presents with    COVID-19 Exposure     Pt sent over from [facility] for covid symptoms. Staff reported shaking and disorientation this morning.     83 y.o. female with PMH of dementia, HTN, HLD, pAF, traumatic SDH in December of 2023, CKD, sick sinus syndrome with AICD in place, presenting with altered mental status and shakiness. Patient is unable to provide history of HPI due to dementia: she denies any symptoms on ROS and has no complaints.    Physical Exam  ED Triage Vitals [01/26/24 1038]   Temperature Pulse Respirations Blood Pressure SpO2   98.4 °F (36.9 °C) 76 19 134/69 95 %      Temp Source Heart Rate Source Patient Position - Orthostatic VS BP Location FiO2 (%)   Oral Monitor Sitting Right arm --      Pain Score       No Pain           Vital signs and nursing notes reviewed    CONSTITUTIONAL: female appearing stated age resting in bed, in no acute distress. Rests with eyes closed, however, opens eyes when spoken to and is answering questions. Generalized weakness: patient unable to sit up on her own without assist.  HEENT: atraumatic, normocephalic. Sclera anicteric, conjunctiva are not injected. Moist oral  mucosa  CARDIOVASCULAR/CHEST: RRR, no M/R/G. 2+ radial pulses. Pacemaker in left upper chest.  PULMONARY: Breathing comfortably on RA. Breath sounds are equal and clear to auscultation  ABDOMEN: non-distended. BS present, normoactive. Non-tender. No sacral wounds.  MSK: moves all extremities, no deformities, no peripheral edema, no calf asymmetry  NEURO: Awake, alert, and oriented x to self and location, not date. Face symmetric. Moves all extremities spontaneously. No focal neurologic deficits  SKIN: Warm, appears well-perfused  MENTAL STATUS: Nconfused.      Labs and Imaging  Labs Reviewed   COVID19, INFLUENZA A/B, RSV PCR, [facility] - Abnormal       Result Value Ref Range Status    SARS-CoV-2 Positive (*) Negative Final    Comment:      INFLUENZA A PCR Negative  Negative Final    Comment:      INFLUENZA B PCR Negative  Negative Final    Comment:      RSV PCR Negative  Negative Final    Comment:      Narrative:     FOR PEDIATRIC PATIENTS - copy/paste COVID Guidelines URL to browser: [email or web address]    SARS-CoV-2 assay is a Nucleic Acid Amplification assay intended for the  qualitative detection of nucleic acid from SARS-CoV-2 in nasopharyngeal  swabs. Results are for the presumptive identification of SARS-CoV-2 RNA.    Positive results are indicative of infection with SARS-CoV-2, the virus  causing COVID-19, but do not rule out bacterial infection or co-infection  with other viruses. Laboratories within the [place] and its  territories are required to report all positive results to the appropriate  public health authorities. Negative results do not preclude SARS-CoV-2  infection and should not be used as the sole basis for treatment or other  patient management decisions. Negative results must be combined with  clinical observations, patient history, and epidemiological information.  This test has not been FDA cleared or approved.    This test has been  authorized by FDA under an Emergency Use Authorization  (EUA). This test is only authorized for the duration of time the  declaration that circumstances exist justifying the authorization of the  emergency use of an in vitro diagnostic tests for detection of SARS-CoV-2  virus and/or diagnosis of COVID-19 infection under section 564(b)(1) of  the Act, 21 U.S.C. 360bbb-3(b)(1), unless the authorization is terminated  or revoked sooner. The test has been validated but independent review by FDA  and CLIA is pending.    Test performed using [employer]: This RT-PCR assay targets N2,  a region unique to SARS-CoV-2. A conserved region in the E-gene was chosen  for pan-Sarbecovirus detection which includes SARS-CoV-2.    According to CMS-2020-01-R, this platform meets the definition of high-throughput technology.   CBC AND DIFFERENTIAL - Abnormal    WBC 8.22  4.31 - 10.16 Thousand/uL Final    RBC 5.43 (*) 3.81 - 5.12 Million/uL Final    Hemoglobin 15.9 (*) 11.5 - 15.4 g/dL Final    Hematocrit 48.5 (*) 34.8 - 46.1 % Final    MCV 89  82 - 98 fL Final    MCH 29.3  26.8 - 34.3 pg Final    MCHC 32.8  31.4 - 37.4 g/dL Final    RDW 13.7  11.6 - 15.1 % Final    MPV 10.1  8.9 - 12.7 fL Final    Platelets 161  149 - 390 Thousands/uL Final    Narrative:     This is an appended report.  These results have been appended to a previously verified report.   UA W REFLEX TO MICROSCOPIC WITH REFLEX TO CULTURE - Abnormal    Color, UA Yellow   Final    Clarity, UA Extra Turbid   Final    Specific Gravity, UA 1.014  1.003 - 1.030 Final    pH, UA 6.0  4.5, 5.0, 5.5, 6.0, 6.5, 7.0, 7.5, 8.0 Final    Leukocytes, UA Large (*) Negative Final    Nitrite, UA Positive (*) Negative Final    Protein, UA 70 (1+) (*) Negative mg/dl Final    Glucose, UA Negative  Negative mg/dl Final    Ketones, UA Negative  Negative mg/dl Final    Urobilinogen, UA <2.0  <2.0 mg/dl mg/dl Final    Bilirubin, UA Negative  Negative Final    Occult Blood, UA Small (*)  Negative Final   URINE MICROSCOPIC - Abnormal    RBC, UA 20-30 (*) None Seen, 1-2 /hpf Final    WBC, UA Innumerable (*) None Seen, 1-2 /hpf Final    Epithelial Cells None Seen  None Seen, Occasional /hpf Final    Bacteria, UA Moderate (*) None Seen, Occasional /hpf Final    MUCUS THREADS Innumerable (*) None Seen Final    WBC Clumps Present   Final   MANUAL DIFFERENTIAL(PHLEBS DO NOT ORDER) - Abnormal    Segmented % 57  43 - 75 % Final    Bands % 6  0 - 8 % Final    Lymphocytes % 12 (*) 14 - 44 % Final    Monocytes % 19 (*) 4 - 12 % Final    Eosinophils, % 0  0 - 6 % Final    Basophils % 1  0 - 1 % Final    Metamyelocytes% 1  0 - 1 % Final    Myelocytes % 1  0 - 1 % Final    Atypical Lymphocytes % 3 (*) <=0 % Final    Absolute Neutrophils 5.18  1.85 - 7.62 Thousand/uL Final    Lymphocytes Absolute 1.23  0.60 - 4.47 Thousand/uL Final    Monocytes Absolute 1.56 (*) 0.00 - 1.22 Thousand/uL Final    Eosinophils Absolute 0.00  0.00 - 0.40 Thousand/uL Final    Basophils Absolute 0.08  0.00 - 0.10 Thousand/uL Final    Total Counted     Final    RBC Morphology Normal   Final    Platelet Estimate Adequate  Adequate Final   PROTIME-INR - Normal    Protime 13.5  11.6 - 14.5 seconds Final    INR 1.04  0.84 - 1.19 Final   APTT - Normal    PTT 24  23 - 37 seconds Final    Comment: Therapeutic Heparin Range =  60-90 seconds   MAGNESIUM - Normal    Magnesium 2.1  1.9 - 2.7 mg/dL Final   URINE CULTURE   BASIC METABOLIC PANEL    Sodium 135  135 - 147 mmol/L Final    Potassium 4.1  3.5 - 5.3 mmol/L Final    Chloride 96  96 - 108 mmol/L Final    CO2 31  21 - 32 mmol/L Final    ANION GAP 8  mmol/L Final    BUN 18  5 - 25 mg/dL Final    Creatinine 0.87  0.60 - 1.30 mg/dL Final    Comment: Standardized to IDMS reference method    Glucose 108  65 - 140 mg/dL Final    Comment: If the patient is fasting, the ADA then defines impaired fasting glucose as > 100 mg/dL and diabetes as > or equal to 123 mg/dL.    Calcium 9.5  8.4 - 10.2 mg/dL  Final    eGFR 61  ml/min/1.73sq m Final    Narrative:     National Kidney Disease Foundation guidelines for Chronic Kidney Disease (CKD):     Stage 1 with normal or high GFR (GFR > 90 mL/min/1.73 square meters)    Stage 2 Mild CKD (GFR = 60-89 mL/min/1.73 square meters)    Stage 3A Moderate CKD (GFR = 45-59 mL/min/1.73 square meters)    Stage 3B Moderate CKD (GFR = 30-44 mL/min/1.73 square meters)    Stage 4 Severe CKD (GFR = 15-29 mL/min/1.73 square meters)    Stage 5 End Stage CKD (GFR <15 mL/min/1.73 square meters)  Note: GFR calculation is accurate only with a steady state creatinine   RBC MORPHOLOGY REFLEX TEST       CT head wo contrast   Final Result      -Near complete resolution of parafalcine and decreased size of right tentorial subdural hematoma.   -No new intracranial abnormality. Microangiopathic changes.                  Workstation performed: [id number]               Procedures  ECG 12 Lead Documentation Only    Date/Time: 1/26/2024 11:55 AM    Performed by: Ellie Rush MD  Authorized by: Ellie Rush MD    Comments:      Normal sinus rhythm ventricular rate 64, WA interval grossly within normal limits, QRS 84, QTc 431, normal axis, nonspecific ST segment flattening and high lateral leads, wandering baseline limits interpretation somewhat, however, no obvious STEMI.  Overall, no significant change from prior EKG dated 12/29/2023.          ED Course  Medications   ceftriaxone (ROCEPHIN) 1 g/50 mL in dextrose IVPB (0 mg Intravenous Stopped 1/26/24 1406)     83-year-old female presenting with generalized weakness as well as concern for altered mental status.  Vital signs reviewed, afebrile and within normal limits.  Differential diagnosis includes viral illness, electrolyte abnormality, bacterial infection such as pneumonia, urinary tract infection, skin/soft tissue infection, progression of intracranial hemorrhage in setting of recent traumatic subdural hematoma, versus another etiology of symptoms.   EKG to my interpretation as above.  Patient does test positive for COVID-19.  Her UA is concerning for UTI.  BMP is without significant electrolyte abnormality.  CBC without leukocytosis.  CT head is reassuring with improvement in traumatic subdural hematoma.  Ceftriaxone administered for UTI.  Given UTI and COVID-19 infection with associated altered mental status and generalized weakness, patient admitted to internal medicine for further evaluation and treatment.

## 2024-01-27 NOTE — PLAN OF CARE
Problem: Potential for Falls  Goal: Patient will remain free of falls  Description: INTERVENTIONS:  - Educate patient/family on patient safety including physical limitations  - Instruct patient to call for assistance with activity   - Consult OT/PT to assist with strengthening/mobility   - Keep Call bell within reach  - Keep bed low and locked with side rails adjusted as appropriate  - Keep care items and personal belongings within reach  - Initiate and maintain comfort rounds  - Make Fall Risk Sign visible to staff  - Offer Toileting every   Hours, in advance of need  - Initiate/Maintain  alarm  - Obtain necessary fall risk management equipment:     - Apply yellow socks and bracelet for high fall risk patients  - Consider moving patient to room near nurses station  Outcome: Progressing

## 2024-01-27 NOTE — ASSESSMENT & PLAN NOTE
History of traumatic SDH on 12/22/23 after fall  CT head on admission shows near complete resolution of parafalcine and decreased size of right tentorial subdural  Outpatient neurosurgery note reviewed from 1/18/2024 which recommends continued holding aspirin with plan for repeat CT head in 4 weeks around 2/9/2024

## 2024-01-27 NOTE — PHYSICAL THERAPY NOTE
PHYSICAL THERAPY EVALUATION  NAME:  Ann Cross  DATE: 01/27/24    AGE:   83 y.o.  Mrn:   [id number]  ADMIT DX:  UTI (urinary tract infection) [N39.0]  Altered mental status [R41.82]  Tremors of nervous system [R25.1]  COVID [U07.1]  Problem List:   Patient Active Problem List   Diagnosis    Pain, joint, knee, right    Obstructive sleep apnea syndrome     Mood disorder with hypomanic features    Generalized anxiety disorder    Minimal cognitive impairment    Hyperlipemia    Hypertension    Anxiety state    Degeneration of lumbar intervertebral disc    History of total bilateral knee replacement    Metabolic syndrome X    Migraine    Osteoporosis    Paroxysmal atrial fibrillation (HCC)    Peripheral venous insufficiency    Tachy-alison syndrome (HCC)    Stage 3 chronic kidney disease, unspecified whether stage 3a or 3b CKD (HCC)    Acute metabolic encephalopathy    COVID-19    Thrombocytopenia (HCC)    Sinus pause    Acute pain due to trauma    Fall    Encephalopathy acute    Traumatic subdural hemorrhage, subsequent encounter    Siderosis (HCC)    Acute cystitis       Past Medical History  Past Medical History:   Diagnosis Date    Afib (HCC)     Arthritis     Hyperlipidemia     Hypertension     Osteopenia     Sleep apnea     Squamous cell skin cancer     LEF TEMPLE    Subdural hemorrhage (HCC)     Varicose veins of both lower extremities        Past Surgical History  Past Surgical History:   Procedure Laterality Date    CARDIAC ELECTROPHYSIOLOGY PROCEDURE N/A 10/12/2022    Procedure: Cardiac loop recorder implant;  Surgeon: Rashid Carlos MD;  [facility]: [facility];  Service: Cardiology    CARDIAC ELECTROPHYSIOLOGY PROCEDURE N/A 4/21/2023    Procedure: Cardiac pacer implant;  Surgeon: Simba Chilel MD;  Location: [facility];  Service: Cardiology    CATARACT EXTRACTION      COLONOSCOPY      complete, for polyp removal    EYE SURGERY      HYSTERECTOMY      age 45    INCISION AND DRAINAGE OF  WOUND Right 06/28/2016    Procedure: ARTHROSCOPY,EVACUATION OF HEMATOMA, OPEN EXPLORATION OF WOUND;  Surgeon: Adam Brice MD;  Location: [facility];  Service:     MOHS SURGERY Left 12/27/2022    left temple    SKIN BIOPSY      TONSILLECTOMY AND ADENOIDECTOMY      TOTAL KNEE ARTHROPLASTY Right     TOTAL KNEE ARTHROPLASTY Left     WISDOM TOOTH EXTRACTION         Length Of Stay: 0  Performed at least 2 patient identifiers during session: Name and ID bracelet         01/27/24 0832   PT Last Visit   PT Visit Date 01/27/24   Note Type   Note type Evaluation   Pain Assessment   Pain Assessment Tool 0-10   Pain Score No Pain   Restrictions/Precautions   Weight Bearing Precautions Per Order No   Other Precautions Contact/isolation;Airborne/isolation;Cognitive;Bed Alarm;Fall Risk   Home Living   Type of Home Assisted living  ([facility])   Home Layout Able to live on main level with bedroom/bathroom;Ramped entrance;Elevator   Bathroom Shower/Tub Walk-in shower   Bathroom Equipment Shower chair;Grab bars in shower   Home Equipment Cane  (uses SPC at baseline)   Prior Function   Level of [place] Independent with ADLs;Independent with functional mobility;Needs assistance with IADLS   Lives With Facility staff   Receives Help From Personal care attendant;Family   IADLs Family/Friend/Other provides transportation;Family/Friend/Other provides meals;Family/Friend/Other provides medication management   Falls in the last 6 months 1 to 4   Vocational Retired   General   Family/Caregiver Present No   Cognition   Overall Cognitive Status Impaired   Arousal/Participation Responsive   Orientation Level Oriented to person;Oriented to time;Oriented to place;Disoriented to situation   Memory Decreased recall of recent events;Decreased short term memory   Following Commands Follows one step commands inconsistently   Comments increase response time noted; inconsistant alertness level   RLE Assessment   RLE Assessment X   Strength RLE    RLE Overall Strength 3+/5   LLE Assessment   LLE Assessment X   Strength LLE   LLE Overall Strength 3+/5   Vision-Basic Assessment   Current Vision Wears glasses all the time   Bed Mobility   Supine to Sit 4  Minimal assistance   Additional items Assist x 1;Verbal cues;Increased time required;HOB elevated   Sit to Supine 4  Minimal assistance   Additional items Assist x 1;Verbal cues;Increased time required;HOB elevated  (pt required CGA to maintain seated posture; pt reported dizziness with sitting EOB; /58; dizziness resolved within 1 min of sitting)   Transfers   Sit to Stand 4  Minimal assistance   Additional items Assist x 1;Verbal cues;Increased time required   Stand to Sit 4  Minimal assistance   Additional items Assist x 1;Verbal cues;Increased time required   Additional Comments cues for proper hand placement and generalized safety awareness   Ambulation/Elevation   Gait pattern Decreased foot clearance;Improper Weight shift;Narrow STANLEY;Step to;Excessively slow;Short stride   Gait Assistance 4  Minimal assist   Additional items Assist x 1;Verbal cues   Assistive Device Rolling walker   Distance 15 ft   Ambulation/Elevation Additional Comments tremors noted with movement; generalized unsteady gait pattern   Balance   Static Sitting Fair +   Dynamic Sitting Fair   Static Standing Fair -   Dynamic Standing Poor +   Ambulatory Poor +   Endurance Deficit   Endurance Deficit Yes   Endurance Deficit Description SaO2 decreased to 83% with ambulation however returned to WNL within 20 seconds of sitting   Activity Tolerance   Activity Tolerance Patient limited by fatigue   Assessment   Prognosis Fair   Assessment Pt is 83 y.o. female seen for PT evaluation s/p admit to [facility] on 1/26/2024 w/ Acute metabolic encephalopathy. PT consulted to assess pt's functional mobility and d/c needs. Order placed for PT eval and tx, w/ up and OOB as tolerated order. Pt agreeable to PT  session upon arrival, pt  found supine in bed.  PTA, pt was independent w/ all functional mobility w/ SPC, ambulates household distances, resident of skilled nursing, and retired.  Pt to benefit from continued PT tx to address deficits and maximize level of functional independent mobility and consistency. Upon conclusion pt  supine in bed. Complexity: Comorbidities affecting pt's physical performance at time of assessment include: anxiety and cognitive impairment, COVID infection, UTI, htn, encephalopathy and a fib . Personal factors affecting pt at time of IE include: advanced age, limited mobility, history of falls, limited insight into impairments, ambulating with assistive device, inability to ambulate household distances, anxiety, decreased cognition, and inability to perform ADLs. Please find objective findings from PT assessment regarding body systems outlined above with impairments and limitations including weakness, impaired balance, decreased endurance, gait deviations, decreased functional mobility tolerance, decreased safety awareness, impaired judgement, fall risk, and decreased cognition.  Pt's clinical presentation is currently unstable/unpredictable seen in pt's presentation of abnormal blood sugar levels, reports of dizziness with activity, confusion, active infection, and multiple readmissions. The patient's AM-PAC Basic Mobility Inpatient Short Form Raw Score is 16.  Based on patient presentations and impairments, pt would most appropriately benefit from Level 2 resource intensity upon discharge. Please also refer to the recommendation of the Physical Therapist for safe discharge planning. RN verbalized pt appropriate for PT session.   Barriers to Discharge   (decreased mobility)   Goals   Patient Goals to go to the bathroom   LTG Expiration Date 02/06/24   Long Term Goal #1 Pt will: Perform bed mobility tasks to modified I to improve ease of bed mobility. Perform transfers to modified I to improve ease of transfers. Perform  ambulation with MI and RW for 250 feet to  increase Indep in home environment. Increase dynamic standing balance to F+ to decrease fall risk.  Increase BLE strength to 4+/5 to improve functional mobility.  Increase OOB activity tolerance to 10 minutes without s/s of exertion to decrease fall risk.   Plan   Treatment/Interventions Functional transfer training;LE strengthening/ROM;Therapeutic exercise;Endurance training;Gait training;Bed mobility;Equipment eval/education   PT Frequency 3-5x/wk   Discharge Recommendation   Rehab Resource Intensity Level, PT II (Moderate Resource Intensity)   Equipment Recommended   (TBD)   AM-PAC Basic Mobility Inpatient   Turning in Flat Bed Without Bedrails 3   Lying on Back to Sitting on Edge of Flat Bed Without Bedrails 3   Moving Bed to Chair 3   Standing Up From Chair Using Arms 3   Walk in Room 2   Climb 3-5 Stairs With Railing 2   Basic Mobility Inpatient Raw Score 16   Basic Mobility Standardized Score 38.32   Highest Level Of Mobility   JH-HLM Goal 5: Stand one or more mins   JH-HLM Achieved 6: Walk 10 steps or more   Additional Treatment Session   Start Time 0820   End Time 0832   Treatment Assessment Toileting with Min A and RW. Max A with pericare. Cues for safety and proper technique. Increased trunk flexion noted with sitting requiring cues to extend posture for safety. Pt with difficulty following cues     Time In: 0754  Time Out: 0918  Total Evaluation Minutes: 24    Roopa Orona, PT

## 2024-01-27 NOTE — PLAN OF CARE
Problem: PHYSICAL THERAPY ADULT  Goal: Performs mobility at highest level of function for planned discharge setting.  See evaluation for individualized goals.  Description: Treatment/Interventions: Functional transfer training, LE strengthening/ROM, Therapeutic exercise, Endurance training, Gait training, Bed mobility, Equipment eval/education  Equipment Recommended:  (TBD)       See flowsheet documentation for full assessment, interventions and recommendations.  Outcome: Progressing  Note: Prognosis: Fair     Assessment: Pt is 83 y.o. female seen for PT evaluation s/p admit to [facility] on 1/26/2024 w/ Acute metabolic encephalopathy. PT consulted to assess pt's functional mobility and d/c needs. Order placed for PT eval and tx, w/ up and OOB as tolerated order. Pt agreeable to PT  session upon arrival, pt found supine in bed.  PTA, pt was independent w/ all functional mobility w/ SPC, ambulates household distances, resident of [facility], and retired.  Pt to benefit from continued PT tx to address deficits and maximize level of functional independent mobility and consistency. Upon conclusion pt  supine in bed. Complexity: Comorbidities affecting pt's physical performance at time of assessment include: anxiety and cognitive impairment, COVID infection, UTI, htn, encephalopathy and a fib . Personal factors affecting pt at time of IE include: advanced age, limited mobility, history of falls, limited insight into impairments, ambulating with assistive device, inability to ambulate household distances, anxiety, decreased cognition, and inability to perform ADLs. Please find objective findings from PT assessment regarding body systems outlined above with impairments and limitations including weakness, impaired balance, decreased endurance, gait deviations, decreased functional mobility tolerance, decreased safety awareness, impaired judgement, fall risk, and decreased cognition.  Pt's clinical presentation is  currently unstable/unpredictable seen in pt's presentation of abnormal blood sugar levels, reports of dizziness with activity, confusion, active infection, and multiple readmissions. The patient's AM-PAC Basic Mobility Inpatient Short Form Raw Score is 16.  Based on patient presentations and impairments, pt would most appropriately benefit from Level 2 resource intensity upon discharge. Please also refer to the recommendation of the Physical Therapist for safe discharge planning. RN verbalized pt appropriate for PT session.  Barriers to Discharge:  (decreased mobility)     Rehab Resource Intensity Level, PT: II (Moderate Resource Intensity)    See flowsheet documentation for full assessment.

## 2024-01-27 NOTE — PROGRESS NOTES
[facility]  Progress Note  Name: Ann Cross I  MRN: [id number]  Unit/Bed#: CW2 214-01 I Date of Admission: 1/26/2024   Date of Service: 1/27/2024 I Hospital Day: 0    Assessment/Plan   * Acute metabolic encephalopathy  Assessment & Plan  Pmhx of dementia, recent traumatic SDH on 12/2022 who presented to the ED for evaluation of altered mental status  Patient tested positive for COVID-19 and UTI which are likely contributing to patient's encephalopathy in the setting of underlying dementia  CT head on admission shows near complete resolution of parafalcine and decreased size of right tentorial subdural hematoma  Monitor mental status closely  Delirium precautions    Acute cystitis  Assessment & Plan  Presenting with encephalopathy without any suprapubic pain  UA WBC innumerable, bacteria moderate, nitrite positive  Continue ceftriaxone  Follow-up urine culture    Traumatic subdural hemorrhage, subsequent encounter  Assessment & Plan  History of traumatic SDH on 12/22/23 after fall  CT head on admission shows near complete resolution of parafalcine and decreased size of right tentorial subdural  Outpatient neurosurgery note reviewed from 1/18/2024 which recommends continued holding aspirin with plan for repeat CT head in 4 weeks around 2/9/2024    COVID-19  Assessment & Plan  Tested positive for COVID, presenting with mild URI symptoms and encephalopathy however patient currently denies any shortness of breath or cough on admission  Was placed on 2 L nasal cannula for satting 90% in the ED -- now on room air  Continue remdesivir, will d/c decadron   Continue mild pathway    Tachy-alison syndrome (HCC)  Assessment & Plan  S/p PPM    Paroxysmal atrial fibrillation (HCC)  Assessment & Plan  Sinus rhythm on admission, rate controlled  Continue beta-blocker -- Acebutolol on med list, however unclear if she takes this as not documented in cardiology notes? Pharmacy does not see any  dispense reports for this med. Not on formulary so not getting this regardless.  Can f/u   Currently AC and AP held due to subdural hematoma  Continue flecainide    Hypertension  Assessment & Plan  Blood pressure stable  Continue amlodipine    Minimal cognitive impairment  Assessment & Plan  Continue Aricept  Delirium precautions     Mood disorder with hypomanic features  Assessment & Plan  Continue citalopram, BuSpar, and Depakote         VTE Pharmacologic Prophylaxis:   Moderate Risk (Score 3-4) - Pharmacological DVT Prophylaxis Ordered: enoxaparin (Lovenox).    Mobility:   Basic Mobility Inpatient Raw Score: 12  -HLM Goal: 4: Move to chair/commode  -HLM Achieved: 1: Laying in bed  HLM Goal NOT achieved. Continue with multidisciplinary rounding and encourage appropriate mobility to improve upon HLM goals.    Patient Centered Rounds: I performed bedside rounds with nursing staff today.   Discussions with Specialists or Other Care Team Provider:     Education and Discussions with Family / Patient: Updated  (daughter) via phone.    Total Time Spent on Date of Encounter in care of patient: 20 mins. This time was spent on one or more of the following: performing physical exam; counseling and coordination of care; obtaining or reviewing history; documenting in the medical record; reviewing/ordering tests, medications or procedures; communicating with other healthcare professionals and discussing with patient's family/caregivers.    Current Length of Stay: 0 day(s)  Current Patient Status: Observation   Certification Statement: The patient, admitted on an observation basis, will now require > 2 midnight hospital stay due to IV abx, pending Ucx, AMS   Discharge Plan: Anticipate discharge in 48-72 hrs to prior assisted or independent living facility.    Code Status: Level 1 - Full Code    Subjective:   No acute complaints, she feels ok.      Objective:     Vitals:   Temp (24hrs), Av.2 °F (36.8 °C),  Min:98 °F (36.7 °C), Max:98.9 °F (37.2 °C)    Temp:  [98 °F (36.7 °C)-98.9 °F (37.2 °C)] 98.9 °F (37.2 °C)  HR:  [58-78] 72  Resp:  [16-18] 18  BP: (111-130)/(57-75) 118/58  SpO2:  [92 %-98 %] 94 %  There is no height or weight on file to calculate BMI.     Input and Output Summary (last 24 hours):     Intake/Output Summary (Last 24 hours) at 1/27/2024 1051  Last data filed at 1/27/2024 0506  Gross per 24 hour   Intake --   Output 240 ml   Net -240 ml       Physical Exam:   Physical Exam  Vitals reviewed.   Constitutional:       General: She is not in acute distress.     Appearance: She is not toxic-appearing.   HENT:      Head: Normocephalic and atraumatic.   Eyes:      Extraocular Movements: Extraocular movements intact.   Cardiovascular:      Rate and Rhythm: Normal rate and regular rhythm.   Pulmonary:      Effort: Pulmonary effort is normal. No respiratory distress.      Breath sounds: Normal breath sounds.   Abdominal:      General: Bowel sounds are normal. There is no distension.      Palpations: Abdomen is soft.      Tenderness: There is no abdominal tenderness.   Musculoskeletal:         General: Normal range of motion.   Neurological:      General: No focal deficit present.      Mental Status: She is alert.      Comments: Oriented to person, place, year, president.  Not month or situation    Psychiatric:         Mood and Affect: Mood normal.         Behavior: Behavior normal.         Additional Data:     Labs:  Results from last 7 days   Lab Units 01/27/24  0458 01/26/24  1137   WBC Thousand/uL 5.91 8.22   HEMOGLOBIN g/dL 14.7 15.9*   HEMATOCRIT % 45.1 48.5*   PLATELETS Thousands/uL 152 161   BANDS PCT %  --  6   NEUTROS PCT % 62  --    LYMPHS PCT % 23  --    LYMPHO PCT %  --  12*   MONOS PCT % 13*  --    MONO PCT %  --  19*   EOS PCT % 1 0     Results from last 7 days   Lab Units 01/27/24  0458   SODIUM mmol/L 135   POTASSIUM mmol/L 4.8   CHLORIDE mmol/L 97   CO2 mmol/L 29   BUN mg/dL 23   CREATININE  mg/dL 1.01   ANION GAP mmol/L 9   CALCIUM mg/dL 8.9   ALBUMIN g/dL 3.7   TOTAL BILIRUBIN mg/dL 0.31   ALK PHOS U/L 47   ALT U/L 19   AST U/L 35   GLUCOSE RANDOM mg/dL 112     Results from last 7 days   Lab Units 01/26/24  1137   INR  1.04                   Lines/Drains:  Invasive Devices       Peripheral Intravenous Line  Duration             Peripheral IV 01/26/24 Right Antecubital <1 day              Drain  Duration             External Urinary Catheter 27 days    External Urinary Catheter <1 day                          Imaging: No pertinent imaging reviewed.    Recent Cultures (last 7 days):         Last 24 Hours Medication List:   Current Facility-Administered Medications   Medication Dose Route Frequency Provider Last Rate    acetaminophen  650 mg Oral Q4H PRN Harnishkumar Cordero, DO      amLODIPine  10 mg Oral Daily [facility] [place] [place]      busPIRone  7.5 mg Oral BID [facility] [place]      cefTRIAXone  1,000 mg Intravenous Q24H [facility], DO      citalopram  10 mg Oral Daily [facility] [place], DO      dexamethasone  6 mg Intravenous Q24H [facility]      divalproex sodium  250 mg Oral TID [facility], DO      donepezil  5 mg Oral HS [facility]      enoxaparin  40 mg Subcutaneous Daily [facility] [place], DO      flecainide  50 mg Oral Q12H [facility], DO      melatonin  6 mg Oral HS [facility]      remdesivir  100 mg Intravenous Q24H [facility], DO      sodium chloride  2 drop Both Eyes Q3H PRN [facility], [facility]          Today, Patient Was Seen By: Araceli Larson PA-C    **Please Note: This note may have been constructed using a voice recognition system.**

## 2024-01-27 NOTE — ASSESSMENT & PLAN NOTE
Tested positive for COVID, presenting with mild URI symptoms and encephalopathy however patient currently denies any shortness of breath or cough on admission  Was placed on 2 L nasal cannula for satting 90% in the ED -- now on room air  Continue remdesivir, will d/c decadron   Continue mild pathway

## 2024-01-27 NOTE — UTILIZATION REVIEW
Initial Clinical Review    Admission: Date/Time/Statement:   Admission Orders (From admission, onward)       Ordered        01/27/24 1055  Inpatient Admission  Once            01/26/24 1358  Place in Observation  Once                          Orders Placed This Encounter   Procedures    Inpatient Admission     Standing Status:   Standing     Number of Occurrences:   1     Order Specific Question:   Level of Care     Answer:   Med Surg [16]     Order Specific Question:   Estimated length of stay     Answer:   More than 2 Midnights     Order Specific Question:   Certification     Answer:   I certify that inpatient services are medically necessary for this patient for a duration of greater than two midnights. See H&P and MD Progress Notes for additional information about the patient's course of treatment.     ED Arrival Information       Expected   -    Arrival   1/26/2024 10:35    Acuity   Urgent              Means of arrival   Ambulance    Escorted by   [facility]    Service   Hospitalist    Admission type   Emergency              Arrival complaint   tremors             Chief Complaint   Patient presents with    COVID-19 Exposure     Pt sent over from [facility] for covid symptoms. Staff reported shaking and disorientation this morning.       Initial Presentation: 83 y.o. female to ER from assisted living via EMS.   PMH of dementia, HTN, HLD, pAF, traumatic SDH in December of 2023,  CKD, sick sinus syndrome with AICD in place   Family reports pt having URI Symptoms, onset yesterday.  Today pt was found down, disoriented.  Tested POS for covid at facility.    In the ED, patient denies any symptoms aside from feeling sleepy.  again tested POS for COVID.  UA indicated infection.  2L NC Oxygen initiated as O2 sat was borderine @ 90%.   CT head on admission shows near complete resolution of parafalcine and decreased size of right tentorial subdural hematoma    1/26    Admit  OBS  Status,  MS  Level of  care for  supportive care/management of  covid symptoms, close monitoring of mental status  (suspect acute metabolic encephalopathy 2/2  covid and UTI)  Cont monitoring pulse oximetry w/supplemental oxygen prn.  Start IV steroid and remdesivir.  Cont IVAB, fup urine culture,  implement safety measures.      Date: 1/27       Day 2:   CHANGED TO INPATIENT  STATUS,  ms  level of care   for ongoing care.  More alert today - ? Baseline mental status.     Has weaned down to room air, continue pulse ox monitoring,  ivab therapy.  Cont remdesivir, dc decadron.      Date: 1/28    Day 3: Has surpassed a 2nd midnight with active treatments and services, which include ongoing abx  (urine cx  >100,000 E coli)   VS/pulse oximetry monitoring.   ---------------------------------------------------------------------------------------------------------------    ED Triage Vitals [01/26/24 1038]   Temperature Pulse Respirations Blood Pressure SpO2   98.4 °F (36.9 °C) 76 19 134/69 95 %      Temp Source Heart Rate Source Patient Position - Orthostatic VS BP Location FiO2 (%)   Oral Monitor Sitting Right arm --      Pain Score       No Pain          Wt Readings from Last 1 Encounters:   01/18/24 70.8 kg (156 lb 1.6 oz)     Additional Vital Signs:   01/28/24 13:58:43 99.8 °F (37.7 °C) 61 18 109/52 71 88 % Abnormal        01/27/24 08:12:43 98.9 °F (37.2 °C) 72 18 118/58 78 94 % -- -- -- Lying   01/27/24 05:09:07 -- 78 18 130/67 88 93 % -- -- -- Lying   01/26/24 23:26:33 98.1 °F (36.7 °C) 62 -- 111/57 75 92 % -- -- -- --   01/26/24 23:26:08 98.1 °F (36.7 °C) 63 -- 111/57 75 93 % -- -- -- --   01/26/24 2140 -- -- -- -- -- -- -- -- None (Room air) --   01/26/24 1743 -- -- -- -- -- -- -- -- None (Room air) --   01/26/24 17:15:34 98 °F (36.7 °C) 71 -- 120/75 90 93 % -- -- -- --   01/26/24 17:13:33 98 °F (36.7 °C) 65 -- 120/75 90 92 % -- -- -- --   01/26/24 1500 -- 58 17 115/57 82 98 % -- -- None (Room air) Sitting   01/26/24 1415 -- 58 16 128/58  83 98 % 28 2 L/min Nasal cannula Lying     Pertinent Labs/Diagnostic Test Results:     1/26  EKG:  SR ,  rate controlled     CT head wo contrast   Final Result by Emiliano Cordero MD (01/26 1324)   -Near complete resolution of parafalcine and decreased size of right tentorial subdural hematoma.   -No new intracranial abnormality. Microangiopathic changes.     Results from last 7 days   Lab Units 01/26/24  1137   SARS-COV-2  Positive*     Results from last 7 days   Lab Units 01/27/24  0458 01/26/24  1137   WBC Thousand/uL 5.91 8.22   HEMOGLOBIN g/dL 14.7 15.9*   HEMATOCRIT % 45.1 48.5*   PLATELETS Thousands/uL 152 161   NEUTROS ABS Thousands/µL 3.67  --    BANDS PCT %  --  6         Results from last 7 days   Lab Units 01/27/24  0458 01/26/24  1137   SODIUM mmol/L 135 135   POTASSIUM mmol/L 4.8 4.1   CHLORIDE mmol/L 97 96   CO2 mmol/L 29 31   ANION GAP mmol/L 9 8   BUN mg/dL 23 18   CREATININE mg/dL 1.01 0.87   EGFR ml/min/1.73sq m 51 61   CALCIUM mg/dL 8.9 9.5   MAGNESIUM mg/dL  --  2.1     Results from last 7 days   Lab Units 01/27/24  0458   AST U/L 35   ALT U/L 19   ALK PHOS U/L 47   TOTAL PROTEIN g/dL 6.4   ALBUMIN g/dL 3.7   TOTAL BILIRUBIN mg/dL 0.31         Results from last 7 days   Lab Units 01/27/24  0458 01/26/24  1137   GLUCOSE RANDOM mg/dL 112 108       Results from last 7 days   Lab Units 01/26/24  1137   PROTIME seconds 13.5   INR  1.04   PTT seconds 24       Results from last 7 days   Lab Units 01/26/24  1146   CLARITY UA  Extra Turbid   COLOR UA  Yellow   SPEC GRAV UA  1.014   PH UA  6.0   GLUCOSE UA mg/dl Negative   KETONES UA mg/dl Negative   BLOOD UA  Small*   PROTEIN UA mg/dl 70 (1+)*   NITRITE UA  Positive*   BILIRUBIN UA  Negative   UROBILINOGEN UA (BE) mg/dl <2.0   LEUKOCYTES UA  Large*   WBC UA /hpf Innumerable*   RBC UA /hpf 20-30*   BACTERIA UA /hpf Moderate*   EPITHELIAL CELLS WET PREP /hpf None Seen   MUCUS THREADS  Innumerable*     Results from last 7 days   Lab Units 01/26/24  2627    INFLUENZA A PCR  Negative   INFLUENZA B PCR  Negative   RSV PCR  Negative       ED Treatment:   Medication Administration from 01/26/2024 1035 to 01/26/2024 1659         Date/Time Order Dose Route Action     01/26/2024 1245 EST ceftriaxone (ROCEPHIN) 1 g/50 mL in dextrose IVPB 1,000 mg Intravenous New Bag     01/26/2024 1603 EST enoxaparin (LOVENOX) subcutaneous injection 40 mg 40 mg Subcutaneous Given     01/26/2024 1603 EST dexamethasone (PF) (DECADRON) injection 6 mg 6 mg Intravenous Given          Past Medical History:   Diagnosis Date    Afib (HCC)     Arthritis     Hyperlipidemia     Hypertension     Osteopenia     Sleep apnea     Squamous cell skin cancer     LEF TEMPLE    Subdural hemorrhage (HCC)     Varicose veins of both lower extremities      Present on Admission:   COVID-19   Acute metabolic encephalopathy   Acute cystitis   Tachy-aliosn syndrome (HCC)   Paroxysmal atrial fibrillation (HCC)    Mood disorder with hypomanic features   Hypertension   Minimal cognitive impairment    Admitting Diagnosis: UTI (urinary tract infection) [N39.0]  Altered mental status [R41.82]  Tremors of nervous system [R25.1]  COVID [U07.1]  Age/Sex: 83 y.o. female    Admission Orders:     see above .  Scd's b/l LE.  PT/OT eval and treat.  Routine VS.  Hourly inc spirometry when awake.  OOB as tolerated/ amb q shift.  Cardiac diet.     Scheduled Medications:  amLODIPine, 10 mg, Oral, Daily  busPIRone, 7.5 mg, Oral, BID  cefTRIAXone, 1,000 mg, Intravenous, Q24H  citalopram, 10 mg, Oral, Daily  dexamethasone, 6 mg, Intravenous, Q24H  divalproex sodium, 250 mg, Oral, TID  donepezil, 5 mg, Oral, HS  enoxaparin, 40 mg, Subcutaneous, Daily  flecainide, 50 mg, Oral, Q12H LILI  melatonin, 6 mg, Oral, HS  remdesivir, 100 mg, Intravenous, Q24H      Continuous IV Infusions:     PRN Meds:  acetaminophen, 650 mg, Oral, Q4H PRN  sodium chloride, 2 drop, Both Eyes, Q3H PRN      Network Utilization Review Department  ATTENTION: Please call  with any questions or concerns to [phone] and carefully listen to the prompts so that you are directed to the right person. All voicemails are confidential.   For Discharge needs, contact Care Management DC Support Team at [phone] opt. [phone]  Send all requests for admission clinical reviews, approved or denied determinations and any other requests to dedicated fax number below belonging to the campus where the patient is receiving treatment. List of dedicated fax numbers for the Facilities:  FACILITY NAME UR FAX NUMBER   ADMISSION DENIALS (Administrative/Medical Necessity) [phone]   DISCHARGE SUPPORT TEAM (NETWORK) [phone]   PARENT CHILD HEALTH (Maternity/NICU/Pediatrics) [phone]   [facility] [phone]   [facility] [phone]   [facility] [phone]   [facility] [phone]   [facility] [phone]   [facility] [phone]   [facility] [phone]   [facility] [phone]   [facility] [phone]   [facility] [phone]   General acute hospital [phone]   [facility] [phone]

## 2024-01-28 LAB
ANION GAP SERPL CALCULATED.3IONS-SCNC: 10 MMOL/L
BACTERIA UR CULT: ABNORMAL
BASOPHILS # BLD AUTO: 0.03 THOUSANDS/ÂΜL (ref 0–0.1)
BASOPHILS NFR BLD AUTO: 1 % (ref 0–1)
BUN SERPL-MCNC: 25 MG/DL (ref 5–25)
CALCIUM SERPL-MCNC: 8 MG/DL (ref 8.4–10.2)
CHLORIDE SERPL-SCNC: 101 MMOL/L (ref 96–108)
CO2 SERPL-SCNC: 28 MMOL/L (ref 21–32)
CREAT SERPL-MCNC: 0.86 MG/DL (ref 0.6–1.3)
EOSINOPHIL # BLD AUTO: 0.02 THOUSAND/ÂΜL (ref 0–0.61)
EOSINOPHIL NFR BLD AUTO: 0 % (ref 0–6)
ERYTHROCYTE [DISTWIDTH] IN BLOOD BY AUTOMATED COUNT: 14 % (ref 11.6–15.1)
GFR SERPL CREATININE-BSD FRML MDRD: 62 ML/MIN/1.73SQ M
GLUCOSE SERPL-MCNC: 83 MG/DL (ref 65–140)
HCT VFR BLD AUTO: 39.8 % (ref 34.8–46.1)
HGB BLD-MCNC: 13.1 G/DL (ref 11.5–15.4)
IMM GRANULOCYTES # BLD AUTO: 0.01 THOUSAND/UL (ref 0–0.2)
IMM GRANULOCYTES NFR BLD AUTO: 0 % (ref 0–2)
LYMPHOCYTES # BLD AUTO: 3.42 THOUSANDS/ÂΜL (ref 0.6–4.47)
LYMPHOCYTES NFR BLD AUTO: 59 % (ref 14–44)
MCH RBC QN AUTO: 29.8 PG (ref 26.8–34.3)
MCHC RBC AUTO-ENTMCNC: 32.9 G/DL (ref 31.4–37.4)
MCV RBC AUTO: 91 FL (ref 82–98)
MONOCYTES # BLD AUTO: 0.87 THOUSAND/ÂΜL (ref 0.17–1.22)
MONOCYTES NFR BLD AUTO: 15 % (ref 4–12)
NEUTROPHILS # BLD AUTO: 1.42 THOUSANDS/ÂΜL (ref 1.85–7.62)
NEUTS SEG NFR BLD AUTO: 25 % (ref 43–75)
NRBC BLD AUTO-RTO: 0 /100 WBCS
PLATELET # BLD AUTO: 146 THOUSANDS/UL (ref 149–390)
PMV BLD AUTO: 10.5 FL (ref 8.9–12.7)
POTASSIUM SERPL-SCNC: 4.3 MMOL/L (ref 3.5–5.3)
RBC # BLD AUTO: 4.4 MILLION/UL (ref 3.81–5.12)
SODIUM SERPL-SCNC: 139 MMOL/L (ref 135–147)
WBC # BLD AUTO: 5.77 THOUSAND/UL (ref 4.31–10.16)

## 2024-01-28 PROCEDURE — 80048 BASIC METABOLIC PNL TOTAL CA: CPT | Performed by: PHYSICIAN ASSISTANT

## 2024-01-28 PROCEDURE — 85025 COMPLETE CBC W/AUTO DIFF WBC: CPT | Performed by: PHYSICIAN ASSISTANT

## 2024-01-28 PROCEDURE — 99232 SBSQ HOSP IP/OBS MODERATE 35: CPT | Performed by: PHYSICIAN ASSISTANT

## 2024-01-28 RX ADMIN — AMLODIPINE BESYLATE 10 MG: 10 TABLET ORAL at 07:55

## 2024-01-28 RX ADMIN — DONEPEZIL HYDROCHLORIDE 5 MG: 5 TABLET ORAL at 21:35

## 2024-01-28 RX ADMIN — DIVALPROEX SODIUM 250 MG: 250 TABLET, DELAYED RELEASE ORAL at 21:35

## 2024-01-28 RX ADMIN — REMDESIVIR 100 MG: 100 INJECTION, POWDER, LYOPHILIZED, FOR SOLUTION INTRAVENOUS at 15:31

## 2024-01-28 RX ADMIN — ENOXAPARIN SODIUM 40 MG: 40 INJECTION SUBCUTANEOUS at 07:55

## 2024-01-28 RX ADMIN — BUSPIRONE HYDROCHLORIDE 7.5 MG: 5 TABLET ORAL at 07:56

## 2024-01-28 RX ADMIN — MELATONIN 6 MG: at 21:34

## 2024-01-28 RX ADMIN — DIVALPROEX SODIUM 250 MG: 250 TABLET, DELAYED RELEASE ORAL at 07:56

## 2024-01-28 RX ADMIN — BUSPIRONE HYDROCHLORIDE 7.5 MG: 5 TABLET ORAL at 21:35

## 2024-01-28 RX ADMIN — DIVALPROEX SODIUM 250 MG: 250 TABLET, DELAYED RELEASE ORAL at 17:34

## 2024-01-28 RX ADMIN — CEFTRIAXONE SODIUM 1000 MG: 10 INJECTION, POWDER, FOR SOLUTION INTRAVENOUS at 14:49

## 2024-01-28 RX ADMIN — FLECAINIDE ACETATE 50 MG: 50 TABLET ORAL at 08:07

## 2024-01-28 RX ADMIN — CITALOPRAM HYDROBROMIDE 10 MG: 10 TABLET ORAL at 07:55

## 2024-01-28 RX ADMIN — FLECAINIDE ACETATE 50 MG: 50 TABLET ORAL at 21:35

## 2024-01-28 NOTE — PROGRESS NOTES
[facility]  Progress Note  Name: Ann Cross I  MRN: [id number]  Unit/Bed#: [facility] 816-01 I Date of Admission: 1/26/2024   Date of Service: 1/28/2024 I Hospital Day: 1    Assessment/Plan   * Acute metabolic encephalopathy  Assessment & Plan  Pmhx of dementia, recent traumatic SDH on 12/2022 who presented to the ED for evaluation of altered mental status  Patient tested positive for COVID-19 and UTI which are likely contributing to patient's encephalopathy in the setting of underlying dementia  CT head on admission shows near complete resolution of parafalcine and decreased size of right tentorial subdural hematoma  Monitor mental status closely  Delirium precautions    Acute cystitis  Assessment & Plan  Presenting with encephalopathy without any suprapubic pain  UA WBC innumerable, bacteria moderate, nitrite positive  Continue ceftriaxone  Follow-up urine culture -- prelim >100,000 E coli    Traumatic subdural hemorrhage, subsequent encounter  Assessment & Plan  History of traumatic SDH on 12/22/23 after fall  CT head on admission shows near complete resolution of parafalcine and decreased size of right tentorial subdural  Outpatient neurosurgery note reviewed from 1/18/2024 which recommends continued holding aspirin with plan for repeat CT head in 4 weeks around 2/9/2024    COVID-19  Assessment & Plan  Tested positive for COVID, presenting with mild URI symptoms and encephalopathy however patient currently denies any shortness of breath or cough on admission  Was placed on 2 L nasal cannula for satting 90% in the ED -- now on room air  Continue remdesivir, will d/c decadron   Continue mild pathway    Tachy-alison syndrome (HCC)  Assessment & Plan  S/p PPM    Paroxysmal atrial fibrillation (HCC)  Assessment & Plan  Sinus rhythm on admission, rate controlled  Acebutolol on med list, however unclear if she takes this as not documented in cardiology notes? Pharmacy does not see  any dispense reports for this med. Not on formulary so not getting this regardless.  Can f/u   Currently AC and AP held due to subdural hematoma  Continue flecainide    Hypertension  Assessment & Plan  Blood pressure stable  Continue amlodipine    Minimal cognitive impairment  Assessment & Plan  Continue Aricept  Delirium precautions     Mood disorder with hypomanic features  Assessment & Plan  Continue citalopram, BuSpar, and Depakote           VTE Pharmacologic Prophylaxis:   Moderate Risk (Score 3-4) - Pharmacological DVT Prophylaxis Ordered: enoxaparin (Lovenox).    Mobility:   Basic Mobility Inpatient Raw Score: 16  JH-HLM Goal: 5: Stand one or more mins  JH-HLM Achieved: 6: Walk 10 steps or more  HLM Goal achieved. Continue to encourage appropriate mobility.    Patient Centered Rounds: I performed bedside rounds with nursing staff today.   Discussions with Specialists or Other Care Team Provider:     Education and Discussions with Family / Patient: Updated  (daughter) via phone.    Total Time Spent on Date of Encounter in care of patient: 20 mins. This time was spent on one or more of the following: performing physical exam; counseling and coordination of care; obtaining or reviewing history; documenting in the medical record; reviewing/ordering tests, medications or procedures; communicating with other healthcare professionals and discussing with patient's family/caregivers.    Current Length of Stay: 1 day(s)  Current Patient Status: Inpatient   Certification Statement: The patient will continue to require additional inpatient hospital stay due to IV abx, pending ucx  Discharge Plan: Anticipate discharge in 24-48 hrs to prior assisted or independent living facility.    Code Status: Level 1 - Full Code    Subjective:   No acute complaints, frustrated that she still has to be here.  Denies sob, pain, urinary difficulty.  Appetite is fair.  She has occasional cough     Objective:     Vitals:    Temp (24hrs), Av.8 °F (37.1 °C), Min:97.9 °F (36.6 °C), Max:99.7 °F (37.6 °C)    Temp:  [97.9 °F (36.6 °C)-99.7 °F (37.6 °C)] 99.7 °F (37.6 °C)  HR:  [62-68] 62  Resp:  [16-18] 18  BP: (102-129)/(48-60) 113/52  SpO2:  [92 %-96 %] 92 %  Body mass index is 25.75 kg/m².     Input and Output Summary (last 24 hours):     Intake/Output Summary (Last 24 hours) at 2024 1226  Last data filed at 2024 0305  Gross per 24 hour   Intake 180 ml   Output 700 ml   Net -520 ml       Physical Exam:   Physical Exam  Vitals reviewed.   Constitutional:       General: She is not in acute distress.     Appearance: She is not toxic-appearing.   HENT:      Head: Normocephalic and atraumatic.   Eyes:      Extraocular Movements: Extraocular movements intact.   Cardiovascular:      Rate and Rhythm: Normal rate and regular rhythm.   Pulmonary:      Effort: Pulmonary effort is normal. No respiratory distress.      Breath sounds: Normal breath sounds.   Abdominal:      General: Bowel sounds are normal. There is no distension.      Palpations: Abdomen is soft.      Tenderness: There is no abdominal tenderness.   Musculoskeletal:         General: Normal range of motion.   Neurological:      General: No focal deficit present.      Mental Status: She is alert and oriented to person, place, and time.   Psychiatric:         Mood and Affect: Mood normal.         Behavior: Behavior normal.         Thought Content: Thought content normal.         Additional Data:     Labs:  Results from last 7 days   Lab Units 24  0530 24  0458 24  1137   WBC Thousand/uL 5.77   < > 8.22   HEMOGLOBIN g/dL 13.1   < > 15.9*   HEMATOCRIT % 39.8   < > 48.5*   PLATELETS Thousands/uL 146*   < > 161   BANDS PCT %  --   --  6   NEUTROS PCT % 25*   < >  --    LYMPHS PCT % 59*   < >  --    LYMPHO PCT %  --   --  12*   MONOS PCT % 15*   < >  --    MONO PCT %  --   --  19*   EOS PCT % 0   < > 0    < > = values in this interval not displayed.      Results from last 7 days   Lab Units 01/28/24  0530 01/27/24  0458   SODIUM mmol/L 139 135   POTASSIUM mmol/L 4.3 4.8   CHLORIDE mmol/L 101 97   CO2 mmol/L 28 29   BUN mg/dL 25 23   CREATININE mg/dL 0.86 1.01   ANION GAP mmol/L 10 9   CALCIUM mg/dL 8.0* 8.9   ALBUMIN g/dL  --  3.7   TOTAL BILIRUBIN mg/dL  --  0.31   ALK PHOS U/L  --  47   ALT U/L  --  19   AST U/L  --  35   GLUCOSE RANDOM mg/dL 83 112     Results from last 7 days   Lab Units 01/26/24  1137   INR  1.04                   Lines/Drains:  Invasive Devices       Peripheral Intravenous Line  Duration             Peripheral IV 01/26/24 Right Antecubital 2 days              Drain  Duration             External Urinary Catheter <1 day                          Imaging: No pertinent imaging reviewed.    Recent Cultures (last 7 days):   Results from last 7 days   Lab Units 01/26/24  1146   URINE CULTURE  >100,000 cfu/ml Escherichia coli*       Last 24 Hours Medication List:   Current Facility-Administered Medications   Medication Dose Route Frequency Provider Last Rate    acetaminophen  650 mg Oral Q4H PRN Harnishkumar Cordero, DO      amLODIPine  10 mg Oral Daily Harnishkumar Cordero, DO      busPIRone  7.5 mg Oral BID Harnishkumar Cordero, DO      cefTRIAXone  1,000 mg Intravenous Q24H Araceli Larson PA-C 1,000 mg (01/27/24 1415)    citalopram  10 mg Oral Daily Harnishkumar Cordero, DO      divalproex sodium  250 mg Oral TID Harnishkladonna Cordero, DO      donepezil  5 mg Oral HS Harnishkumar Cordero, DO      enoxaparin  40 mg Subcutaneous Daily Harnishkumar Cordero, DO      flecainide  50 mg Oral Q12H [facility] Harnishkladonna Cordero, DO      melatonin  6 mg Oral HS Harnishkumar Cordero, DO      remdesivir  100 mg Intravenous Q24H Harnishkumar Cordero, DO      sodium chloride  2 drop Both Eyes Q3H PRN Harnishkumar Cordero, DO          Today, Patient Was Seen By: Araceli Larson PA-C    **Please Note: This note may have been constructed using a voice recognition system.**

## 2024-01-28 NOTE — CASE MANAGEMENT
Case Management Assessment & Discharge Planning Note    Patient name Ann Cross  Location [facility] 816/[facility] 816-01 MRN [id number]  : 1940 Date 2024       Current Admission Date: 2024  Current Admission Diagnosis:Acute metabolic encephalopathy   Patient Active Problem List    Diagnosis Date Noted    Acute cystitis 2024    Traumatic subdural hemorrhage, subsequent encounter 2024    Siderosis (HCC) 2024    Encephalopathy acute 2023    Acute pain due to trauma 2023    Fall 2023    Sinus pause 2023    Thrombocytopenia ([facility]) 2023    Acute metabolic encephalopathy 2023    COVID-19 2023    Stage 3 chronic kidney disease, unspecified whether stage 3a or 3b CKD ([facility]) 2022    Tachy-alison syndrome (HCC) 2022    Hyperlipemia 2022    Hypertension 2022    Generalized anxiety disorder 2021    Obstructive sleep apnea syndrome 2020    Degeneration of lumbar intervertebral disc 2017    History of total bilateral knee replacement 2017    Osteoporosis 05/10/2017    Minimal cognitive impairment 2016    Pain, joint, knee, right 2016     Mood disorder with hypomanic features 2016    Paroxysmal atrial fibrillation (HCC) 2014    Metabolic syndrome X 2007    Anxiety state 2007    Migraine 2007    Peripheral venous insufficiency 2007      LOS (days): 1  Geometric Mean LOS (GMLOS) (days):   Days to GMLOS:     OBJECTIVE:  PATIENT READMITTED TO HOSPITAL  Risk of Unplanned Readmission Score: 16.27         Current admission status: Inpatient       Preferred Pharmacy:   [facility] - [address] [place] [address]  [address] [facility] [address]  Phone: [phone] Fax: [phone]    Primary Care Provider: Emelina Swann MD    Primary Insurance: [employer] [facility]  Secondary Insurance:     ASSESSMENT:  Active Health Care  Proxies       Lotus Hilliard Health Care Representative - Daughter   Primary Phone: [phone] (Mobile)  Home Phone: [phone]                 Advance Directives  Does patient have a Health Care POA?: Yes  Does patient have Advance Directives?: Yes  Advance Directives: Living will, Power of  for health care  Primary Contact: Lotus Hilliard (Daughter) [phone] (H) [phone]    Readmission Root Cause  30 Day Readmission: Yes  Who directed you to return to the hospital?: Family  Did you understand whom to contact if you had questions or problems?: Yes  Did you get your prescriptions before you left the hospital?: Yes  Were you able to get your prescriptions filled when you left the hospital?: Yes  Did you take your medications as prescribed?: Yes  Were you able to get to your follow-up appointments?: Yes  During previous admission, was a post-acute recommendation made?: Yes  What post-acute resources were offered?: STR  Patient was readmitted due to: new fall, acute metabolic encephalopathy,  Action Plan: work up, therapy    Patient Information  Admitted from:: Facility ([facility])  Mental Status: Alert, Confused  During Assessment patient was accompanied by: Not accompanied during assessment  Assessment information provided by:: Daughter  Primary Caregiver: Self  Support Systems: Daughter, Family members, Self  County of Residence: [place]  What city do you live in?: [place]  Home entry access options. Select all that apply.: No steps to enter home  Type of Current Residence: Other (Comment)  Living Arrangements: Lives Alone  Is patient a ?: No    Activities of Daily Living Prior to Admission  Functional Status: Assistance  Completes ADLs independently?: No  Level of ADL dependence: Assistance  Ambulates independently?: Yes  Does patient use assisted devices?: Yes  Assisted Devices (DME) used: Walker  Does patient currently own DME?: Yes  What DME does the patient currently own?:  Walker  Does patient have a history of Outpatient Therapy (PT/OT)?: Yes  Does the patient have a history of Short-Term Rehab?: Yes  Does patient have a history of HHC?: Yes  Does patient currently have HHC?: No    Patient Information Continued  Income Source: Pension/[facility]  Does patient have prescription coverage?: Yes  Does patient receive dialysis treatments?: No  Does patient have a history of substance abuse?: No  Does patient have a history of Mental Health Diagnosis?: No    Means of Transportation  Means of Transport to Appts:: Family transport    Housing Stability: Low Risk  (1/28/2024)    Housing Stability Vital Sign     Unable to Pay for Housing in the Last Year: No     Number of Places Lived in the Last Year: 1     Unstable Housing in the Last Year: No   Food Insecurity: No Food Insecurity (1/28/2024)    Hunger Vital Sign     Worried About Running Out of Food in the Last Year: Never true     Ran Out of Food in the Last Year: Never true   Transportation Needs: No Transportation Needs (1/28/2024)    PRAPARE - Transportation     Lack of Transportation (Medical): No     Lack of Transportation (Non-Medical): No   Utilities: Not At Risk (1/28/2024)    [facility] Utilities     Threatened with loss of utilities: No       DISCHARGE DETAILS:    Discharge planning discussed with:: Lotus Hilliard (Daughter) [phone] (h) [phone]  [place] of Choice: Yes     CM contacted family/caregiver?: Yes  Were Treatment Team discharge recommendations reviewed with patient/caregiver?: Yes  Did patient/caregiver verbalize understanding of patient care needs?: N/A- going to facility  Were patient/caregiver advised of the risks associated with not following Treatment Team discharge recommendations?: Yes    Contacts  Patient Contacts: Lotus Hilliard (Daughter) [phone] (R) [phone]  Relationship to Patient:: Family  Contact Method: Phone  Phone Number: [phone] (h) [phone]  Reason/Outcome: Continuity of Care, Emergency  Contact, Discharge Planning, Referral    CM spoke to pt's dtr, Lotus, to discuss the role of CM as well as d/c planning  Pt typically resides at [facility], where she was mostly independent prior to a fall and SDH in late December. Since then, pt went to IP rehab at [facility], and has needed more assistance due to cognitive decline.   Pt has a walker and will use from time to time.   Pt's had 2-3 falls in the last 6 months.    In speaking with the pt's dtr, she may attempt to remove [employer] and secure a Traditional Medicare plan and Medicaid secondary, as the pt would qualify for the latter.   Pt's dtr will begin calling and looking into local SNF in the are who accept medicaid as possible long term solutions as she feels that [facility] may not take the pt back due to increased need of supports. CM review and discussed some local SNF options for short term and long term care.    Noted that pt was evaluated by PT/OT and recommended for Level II.  CM will place blanket referral for dual eligible SNF locations and inform them that pt's family are looking into different insurance options.       CM reviewed d/c planning process including the following: identifying help at home, patient preference for d/c planning needs, Discharge Lounge, [facility] Meds to Bed program, availability of treatment team to discuss questions or concerns patient and/or family may have regarding understanding medications and recognizing signs and symptoms once discharged.  CM also encouraged patient to follow up with all recommended appointments after discharge. Patient advised of importance for patient and family to participate in managing patient’s medical well being.

## 2024-01-28 NOTE — ASSESSMENT & PLAN NOTE
Presenting with encephalopathy without any suprapubic pain  UA WBC innumerable, bacteria moderate, nitrite positive  Continue ceftriaxone  Follow-up urine culture -- prelim >100,000 E coli

## 2024-01-28 NOTE — ASSESSMENT & PLAN NOTE
Sinus rhythm on admission, rate controlled  Acebutolol on med list, however unclear if she takes this as not documented in cardiology notes? Pharmacy does not see any dispense reports for this med. Not on formulary so not getting this regardless.  Can f/u   Currently AC and AP held due to subdural hematoma  Continue flecainide

## 2024-01-28 NOTE — PLAN OF CARE
Problem: Potential for Falls  Goal: Patient will remain free of falls  Description: INTERVENTIONS:  - Educate patient/family on patient safety including physical limitations  - Instruct patient to call for assistance with activity   - Consult OT/PT to assist with strengthening/mobility   - Keep Call bell within reach  - Keep bed low and locked with side rails adjusted as appropriate  - Keep care items and personal belongings within reach  - Initiate and maintain comfort rounds  - Make Fall Risk Sign visible to staff  - Apply yellow socks and bracelet for high fall risk patients  - Consider moving patient to room near nurses station  1/28/2024 1000 by Sallie Mejia RN  Outcome: Progressing  1/28/2024 1000 by Sallie Mejia, RN  Outcome: Progressing

## 2024-01-28 NOTE — PLAN OF CARE
Problem: PAIN - ADULT  Goal: Verbalizes/displays adequate comfort level or baseline comfort level  Description: Interventions:  - Encourage patient to monitor pain and request assistance  - Assess pain using appropriate pain scale  - Administer analgesics based on type and severity of pain and evaluate response  - Implement non-pharmacological measures as appropriate and evaluate response  - Consider cultural and social influences on pain and pain management  - Notify physician/advanced practitioner if interventions unsuccessful or patient reports new pain  Outcome: Progressing     Problem: INFECTION - ADULT  Goal: Absence or prevention of progression during hospitalization  Description: INTERVENTIONS:  - Assess and monitor for signs and symptoms of infection  - Monitor lab/diagnostic results  - Monitor all insertion sites, i.e. indwelling lines, tubes, and drains  - Monitor endotracheal if appropriate and nasal secretions for changes in amount and color  - [place] appropriate cooling/warming therapies per order  - Administer medications as ordered  - Instruct and encourage patient and family to use good hand hygiene technique  - Identify and instruct in appropriate isolation precautions for identified infection/condition  Outcome: Progressing

## 2024-01-29 LAB
ANION GAP SERPL CALCULATED.3IONS-SCNC: 10 MMOL/L
BASOPHILS # BLD AUTO: 0.03 THOUSANDS/ÂΜL (ref 0–0.1)
BASOPHILS NFR BLD AUTO: 1 % (ref 0–1)
BUN SERPL-MCNC: 22 MG/DL (ref 5–25)
CALCIUM SERPL-MCNC: 8 MG/DL (ref 8.4–10.2)
CHLORIDE SERPL-SCNC: 103 MMOL/L (ref 96–108)
CO2 SERPL-SCNC: 24 MMOL/L (ref 21–32)
CREAT SERPL-MCNC: 0.78 MG/DL (ref 0.6–1.3)
EOSINOPHIL # BLD AUTO: 0.06 THOUSAND/ÂΜL (ref 0–0.61)
EOSINOPHIL NFR BLD AUTO: 1 % (ref 0–6)
ERYTHROCYTE [DISTWIDTH] IN BLOOD BY AUTOMATED COUNT: 13.8 % (ref 11.6–15.1)
GFR SERPL CREATININE-BSD FRML MDRD: 70 ML/MIN/1.73SQ M
GLUCOSE SERPL-MCNC: 94 MG/DL (ref 65–140)
HCT VFR BLD AUTO: 43.2 % (ref 34.8–46.1)
HGB BLD-MCNC: 14.1 G/DL (ref 11.5–15.4)
IMM GRANULOCYTES # BLD AUTO: 0.02 THOUSAND/UL (ref 0–0.2)
IMM GRANULOCYTES NFR BLD AUTO: 0 % (ref 0–2)
LYMPHOCYTES # BLD AUTO: 3.38 THOUSANDS/ÂΜL (ref 0.6–4.47)
LYMPHOCYTES NFR BLD AUTO: 60 % (ref 14–44)
MCH RBC QN AUTO: 29.1 PG (ref 26.8–34.3)
MCHC RBC AUTO-ENTMCNC: 32.6 G/DL (ref 31.4–37.4)
MCV RBC AUTO: 89 FL (ref 82–98)
MONOCYTES # BLD AUTO: 0.75 THOUSAND/ÂΜL (ref 0.17–1.22)
MONOCYTES NFR BLD AUTO: 13 % (ref 4–12)
NEUTROPHILS # BLD AUTO: 1.38 THOUSANDS/ÂΜL (ref 1.85–7.62)
NEUTS SEG NFR BLD AUTO: 25 % (ref 43–75)
NRBC BLD AUTO-RTO: 0 /100 WBCS
PLATELET # BLD AUTO: 145 THOUSANDS/UL (ref 149–390)
PMV BLD AUTO: 9.9 FL (ref 8.9–12.7)
POTASSIUM SERPL-SCNC: 4 MMOL/L (ref 3.5–5.3)
RBC # BLD AUTO: 4.84 MILLION/UL (ref 3.81–5.12)
SODIUM SERPL-SCNC: 137 MMOL/L (ref 135–147)
WBC # BLD AUTO: 5.62 THOUSAND/UL (ref 4.31–10.16)

## 2024-01-29 PROCEDURE — 80048 BASIC METABOLIC PNL TOTAL CA: CPT | Performed by: INTERNAL MEDICINE

## 2024-01-29 PROCEDURE — 85025 COMPLETE CBC W/AUTO DIFF WBC: CPT | Performed by: INTERNAL MEDICINE

## 2024-01-29 PROCEDURE — 99232 SBSQ HOSP IP/OBS MODERATE 35: CPT | Performed by: PHYSICIAN ASSISTANT

## 2024-01-29 PROCEDURE — 97167 OT EVAL HIGH COMPLEX 60 MIN: CPT

## 2024-01-29 RX ADMIN — AMLODIPINE BESYLATE 10 MG: 10 TABLET ORAL at 08:01

## 2024-01-29 RX ADMIN — BUSPIRONE HYDROCHLORIDE 7.5 MG: 5 TABLET ORAL at 08:01

## 2024-01-29 RX ADMIN — DIVALPROEX SODIUM 250 MG: 250 TABLET, DELAYED RELEASE ORAL at 15:46

## 2024-01-29 RX ADMIN — CITALOPRAM HYDROBROMIDE 10 MG: 10 TABLET ORAL at 08:01

## 2024-01-29 RX ADMIN — DIVALPROEX SODIUM 250 MG: 250 TABLET, DELAYED RELEASE ORAL at 21:24

## 2024-01-29 RX ADMIN — DONEPEZIL HYDROCHLORIDE 5 MG: 5 TABLET ORAL at 21:24

## 2024-01-29 RX ADMIN — DIVALPROEX SODIUM 250 MG: 250 TABLET, DELAYED RELEASE ORAL at 08:01

## 2024-01-29 RX ADMIN — FLECAINIDE ACETATE 50 MG: 50 TABLET ORAL at 08:02

## 2024-01-29 RX ADMIN — ENOXAPARIN SODIUM 40 MG: 40 INJECTION SUBCUTANEOUS at 08:01

## 2024-01-29 RX ADMIN — MELATONIN 6 MG: at 21:24

## 2024-01-29 RX ADMIN — BUSPIRONE HYDROCHLORIDE 7.5 MG: 5 TABLET ORAL at 21:24

## 2024-01-29 RX ADMIN — FLECAINIDE ACETATE 50 MG: 50 TABLET ORAL at 21:25

## 2024-01-29 RX ADMIN — REMDESIVIR 100 MG: 100 INJECTION, POWDER, LYOPHILIZED, FOR SOLUTION INTRAVENOUS at 15:46

## 2024-01-29 NOTE — PROGRESS NOTES
[facility]  Progress Note  Name: Ann Cross I  MRN: [id number]  Unit/Bed#: [facility] 816-01 I Date of Admission: 1/26/2024   Date of Service: 1/29/2024 I Hospital Day: 2    Assessment/Plan   * Acute metabolic encephalopathy  Assessment & Plan  Pmhx of dementia, recent traumatic SDH on 12/2022 who presented to the ED for evaluation of altered mental status  Patient tested positive for COVID-19 and UTI which are likely contributing to patient's encephalopathy in the setting of underlying dementia  CT head on admission shows near complete resolution of parafalcine and decreased size of right tentorial subdural hematoma  Monitor mental status closely  Delirium precautions    Acute cystitis  Assessment & Plan  Presenting with encephalopathy without any suprapubic pain  UA WBC innumerable, bacteria moderate, nitrite positive  Continue ceftriaxone  Follow-up urine culture -- prelim >100,000 E coli.  Completed 3 days of IV ceftriaxone     Traumatic subdural hemorrhage, subsequent encounter  Assessment & Plan  History of traumatic SDH on 12/22/23 after fall  CT head on admission shows near complete resolution of parafalcine and decreased size of right tentorial subdural  Outpatient neurosurgery note reviewed from 1/18/2024 which recommends continued holding aspirin with plan for repeat CT head in 4 weeks around 2/9/2024    COVID-19  Assessment & Plan  Tested positive for COVID, presenting with mild URI symptoms and encephalopathy however patient currently denies any shortness of breath or cough on admission  Was placed on 2 L nasal cannula for satting 90% in the ED -- now on room air  Continue remdesivir, will d/c decadron   Continue mild pathway    Tachy-alison syndrome (HCC)  Assessment & Plan  S/p PPM    Paroxysmal atrial fibrillation (HCC)  Assessment & Plan  Sinus rhythm on admission, rate controlled  Acebutolol on med list, however unclear if she takes this as not documented in  cardiology notes? Pharmacy does not see any dispense reports for this med. Not on formulary so not getting this regardless.  Can f/u   Currently AC and AP held due to subdural hematoma  Continue flecainide    Hypertension  Assessment & Plan  Blood pressure stable  Continue amlodipine    Minimal cognitive impairment  Assessment & Plan  Continue Aricept  Delirium precautions     Mood disorder with hypomanic features  Assessment & Plan  Continue citalopram, BuSpar, and Depakote         VTE Pharmacologic Prophylaxis:   Moderate Risk (Score 3-4) - Pharmacological DVT Prophylaxis Ordered: enoxaparin (Lovenox).    Mobility:   Basic Mobility Inpatient Raw Score: 16  -HLM Goal: 5: Stand one or more mins  JH-HLM Achieved: 4: Move to chair/commode  HLM Goal NOT achieved. Continue with multidisciplinary rounding and encourage appropriate mobility to improve upon HLM goals.    Patient Centered Rounds: I performed bedside rounds with nursing staff today.   Discussions with Specialists or Other Care Team Provider: CM     Education and Discussions with Family / Patient: Updated  (daughter) via phone.    Total Time Spent on Date of Encounter in care of patient: 25 mins. This time was spent on one or more of the following: performing physical exam; counseling and coordination of care; obtaining or reviewing history; documenting in the medical record; reviewing/ordering tests, medications or procedures; communicating with other healthcare professionals and discussing with patient's family/caregivers.    Current Length of Stay: 2 day(s)  Current Patient Status: Inpatient   Certification Statement: The patient will continue to require additional inpatient hospital stay due to rehab planning  Discharge Plan: Anticipate discharge in 24-48 hrs to rehab facility.    Code Status: Level 1 - Full Code    Subjective:   Patient has no acute complaints today     Objective:     Vitals:   Temp (24hrs), Av.1 °F (36.7 °C),  Min:97.3 °F (36.3 °C), Max:99 °F (37.2 °C)    Temp:  [97.3 °F (36.3 °C)-99 °F (37.2 °C)] 97.9 °F (36.6 °C)  HR:  [61-63] 62  Resp:  [16-18] 18  BP: (119-122)/(53-55) 119/55  SpO2:  [91 %-95 %] 92 %  Body mass index is 25.75 kg/m².     Input and Output Summary (last 24 hours):     Intake/Output Summary (Last 24 hours) at 1/29/2024 1716  Last data filed at 1/29/2024 1100  Gross per 24 hour   Intake 420 ml   Output 650 ml   Net -230 ml       Physical Exam:   Physical Exam  Vitals reviewed.   Constitutional:       General: She is not in acute distress.     Appearance: She is not toxic-appearing.   HENT:      Head: Normocephalic and atraumatic.   Eyes:      Extraocular Movements: Extraocular movements intact.   Cardiovascular:      Rate and Rhythm: Normal rate and regular rhythm.   Pulmonary:      Effort: Pulmonary effort is normal.      Breath sounds: Normal breath sounds.   Abdominal:      General: Bowel sounds are normal. There is no distension.      Palpations: Abdomen is soft.   Musculoskeletal:         General: Normal range of motion.   Neurological:      General: No focal deficit present.      Mental Status: She is alert and oriented to person, place, and time.   Psychiatric:         Mood and Affect: Mood normal.         Behavior: Behavior normal.         Thought Content: Thought content normal.       Additional Data:     Labs:  Results from last 7 days   Lab Units 01/29/24  0657 01/27/24  0458 01/26/24  1137   WBC Thousand/uL 5.62   < > 8.22   HEMOGLOBIN g/dL 14.1   < > 15.9*   HEMATOCRIT % 43.2   < > 48.5*   PLATELETS Thousands/uL 145*   < > 161   BANDS PCT %  --   --  6   NEUTROS PCT % 25*   < >  --    LYMPHS PCT % 60*   < >  --    LYMPHO PCT %  --   --  12*   MONOS PCT % 13*   < >  --    MONO PCT %  --   --  19*   EOS PCT % 1   < > 0    < > = values in this interval not displayed.     Results from last 7 days   Lab Units 01/29/24  0419 01/28/24  0530 01/27/24  0458   SODIUM mmol/L 137   < > 135   POTASSIUM  mmol/L 4.0   < > 4.8   CHLORIDE mmol/L 103   < > 97   CO2 mmol/L 24   < > 29   BUN mg/dL 22   < > 23   CREATININE mg/dL 0.78   < > 1.01   ANION GAP mmol/L 10   < > 9   CALCIUM mg/dL 8.0*   < > 8.9   ALBUMIN g/dL  --   --  3.7   TOTAL BILIRUBIN mg/dL  --   --  0.31   ALK PHOS U/L  --   --  47   ALT U/L  --   --  19   AST U/L  --   --  35   GLUCOSE RANDOM mg/dL 94   < > 112    < > = values in this interval not displayed.     Results from last 7 days   Lab Units 01/26/24  1137   INR  1.04                   Lines/Drains:  Invasive Devices       Peripheral Intravenous Line  Duration             Peripheral IV 01/26/24 Right Antecubital 3 days              Drain  Duration             External Urinary Catheter 2 days                      Imaging: No pertinent imaging reviewed.    Recent Cultures (last 7 days):   Results from last 7 days   Lab Units 01/26/24  1146   URINE CULTURE  >100,000 cfu/ml Escherichia coli*       Last 24 Hours Medication List:   Current Facility-Administered Medications   Medication Dose Route Frequency Provider Last Rate    acetaminophen  650 mg Oral Q4H PRN Harnishkumar Cordero, DO      amLODIPine  10 mg Oral Daily Harnishkumar Cordero, DO      busPIRone  7.5 mg Oral BID Harnishkumar Cordero, DO      citalopram  10 mg Oral Daily Harnishkumar Cordero, DO      divalproex sodium  250 mg Oral TID Harnishkumar Cordero, DO      donepezil  5 mg Oral HS Harnishkumar Cordero, DO      enoxaparin  40 mg Subcutaneous Daily Harnishkumar Cordero, DO      flecainide  50 mg Oral Q12H [facility] Harnishkumar Cordero, DO      melatonin  6 mg Oral HS Harnishkumar Cordero, DO      remdesivir  100 mg Intravenous Q24H Harnishkumar Cordero, DO      sodium chloride  2 drop Both Eyes Q3H PRN Harnishkumar Cordero, DO          Today, Patient Was Seen By: Araceli Larson PA-C    **Please Note: This note may have been constructed using a voice recognition system.**

## 2024-01-29 NOTE — PLAN OF CARE
Problem: OCCUPATIONAL THERAPY ADULT  Goal: Performs self-care activities at highest level of function for planned discharge setting.  See evaluation for individualized goals.  Description: Treatment Interventions: ADL retraining, Functional transfer training, Endurance training, Cognitive reorientation, Patient/family training, Compensatory technique education, Continued evaluation, Energy conservation, Activityengagement          See flowsheet documentation for full assessment, interventions and recommendations.   Note: Limitation: Decreased ADL status, Decreased Safe judgement during ADL, Decreased endurance, Decreased cognition, Decreased self-care trans, Decreased high-level ADLs  Prognosis: Fair  Assessment: 83 year old pt seen today for an OT evaluation following [facility] 2/2 Acute metabolic encephalopathy with present symptoms significant for AMS, UTI, COVID.  has a past medical history of Afib (HCC), Arthritis, Hyperlipidemia, Hypertension, Osteopenia, Sleep apnea, Squamous cell skin cancer, Subdural hemorrhage (HCC), and Varicose veins of both lower extremities. Pt reported living at [facility]. Receives assist PRN for ADLs/IADLs. Reported being IND with ADLs PTA. Pt reports being very social, and doesn't understand what COVID is. Pt pleasant and cooperative t/o session, pleasantly confused, with increased VCs required for task completion and increased processing time. Pt completed functional bed mobility with min A. Min Ax1 for functional STS txfs with RW. Min Ax1 for functional mobility to take a couple steps with RW. Pt was min A for UB ADLs and min A for LB ADLs 2/2 fatigue. The patient's raw score on the AM-PAC Daily Activity Inpatient Short Form is 15. A raw score of less than 19 suggests the patient may benefit from discharge to post-acute rehabilitation services. Please refer to the recommendation of the Occupational Therapist for safe discharge  planning. Pt will continue to benefit from skilled acute OT to promote increased independence and return to PLOF. At this time, current OT recommendation is Level 2 resources.     Rehab Resource Intensity Level, OT: II (Moderate Resource Intensity)

## 2024-01-29 NOTE — PLAN OF CARE
Problem: PAIN - ADULT  Goal: Verbalizes/displays adequate comfort level or baseline comfort level  Description: Interventions:  - Encourage patient to monitor pain and request assistance  - Assess pain using appropriate pain scale  - Administer analgesics based on type and severity of pain and evaluate response  - Implement non-pharmacological measures as appropriate and evaluate response  - Consider cultural and social influences on pain and pain management  - Notify physician/advanced practitioner if interventions unsuccessful or patient reports new pain  Outcome: Progressing     Problem: INFECTION - ADULT  Goal: Absence or prevention of progression during hospitalization  Description: INTERVENTIONS:  - Assess and monitor for signs and symptoms of infection  - Monitor lab/diagnostic results  - Monitor all insertion sites, i.e. indwelling lines, tubes, and drains  - Monitor endotracheal if appropriate and nasal secretions for changes in amount and color  - [place] appropriate cooling/warming therapies per order  - Administer medications as ordered  - Instruct and encourage patient and family to use good hand hygiene technique  - Identify and instruct in appropriate isolation precautions for identified infection/condition  Outcome: Progressing

## 2024-01-29 NOTE — ASSESSMENT & PLAN NOTE
Presenting with encephalopathy without any suprapubic pain  UA WBC innumerable, bacteria moderate, nitrite positive  Continue ceftriaxone  Follow-up urine culture -- prelim >100,000 E coli.  Completed 3 days of IV ceftriaxone

## 2024-01-29 NOTE — CASE MANAGEMENT
Case Management Discharge Planning Note    Patient name Ann Cross  Location [facility] 816/[facility] 816-01 MRN [id number]  : 1940 Date 2024       Current Admission Date: 2024  Current Admission Diagnosis:Acute metabolic encephalopathy   Patient Active Problem List    Diagnosis Date Noted    Acute cystitis 2024    Traumatic subdural hemorrhage, subsequent encounter 2024    Siderosis (HCC) 2024    Encephalopathy acute 2023    Acute pain due to trauma 2023    Fall 2023    Sinus pause 2023    Thrombocytopenia ([facility]) 2023    Acute metabolic encephalopathy 2023    COVID-19 2023    Stage 3 chronic kidney disease, unspecified whether stage 3a or 3b CKD ([facility]) 2022    Tachy-alison syndrome (HCC) 2022    Hyperlipemia 2022    Hypertension 2022    Generalized anxiety disorder 2021    Obstructive sleep apnea syndrome 2020    Degeneration of lumbar intervertebral disc 2017    History of total bilateral knee replacement 2017    Osteoporosis 05/10/2017    Minimal cognitive impairment 2016    Pain, joint, knee, right 2016     Mood disorder with hypomanic features 2016    Paroxysmal atrial fibrillation (HCC) 2014    Metabolic syndrome X 2007    Anxiety state 2007    Migraine 2007    Peripheral venous insufficiency 2007      LOS (days): 2  Geometric Mean LOS (GMLOS) (days):   Days to GMLOS:     OBJECTIVE:  Risk of Unplanned Readmission Score: 16.51         Current admission status: Inpatient   Preferred Pharmacy:   [facility] [place] - [address] [address]  Phone: [phone] Fax: [phone]    Primary Care Provider: Emelina Swann MD    Primary Insurance: [employer] [facility]  Secondary Insurance:     DISCHARGE DETAILS:                  Treatment Team Recommendation: Short Term  Rehab  Discharge Destination Plan:: Short Term Rehab  Transport at Discharge : BLS Ambulance               Additional Comments: Received call from Karmen Stiles at [employer]; patient will continue to be on [employer] for the month of February; [employer] will negotiate a one-time contract with facility of daughter's choice, at this time considering [facility].  Spoke with daughter Lotus via phone and discussed discharge plan.  Daughter would like first choice as [facility] with possibly [facility] second.  Referral updated in [facility], list of referrals placed e-mailed to daughter at [email or web address].  CM to follow.        ADDENDUM:  Spoke with daughter and Jose,  at [facility] [phone].  Jose will reach out to [employer] to discuss possibility of one-time contract.  CM will conitnue to follow.

## 2024-01-29 NOTE — OCCUPATIONAL THERAPY NOTE
Occupational Therapy Evaluation     Patient Name: Ann Cross  Today's Date: 1/29/2024  Problem List  Principal Problem:    Acute metabolic encephalopathy  Active Problems:     Mood disorder with hypomanic features    Minimal cognitive impairment    Hypertension    Paroxysmal atrial fibrillation (HCC)    Tachy-alison syndrome (HCC)    COVID-19    Traumatic subdural hemorrhage, subsequent encounter    Acute cystitis    Past Medical History  Past Medical History:   Diagnosis Date    Afib (HCC)     Arthritis     Hyperlipidemia     Hypertension     Osteopenia     Sleep apnea     Squamous cell skin cancer     LEF TEMPLE    Subdural hemorrhage (HCC)     Varicose veins of both lower extremities      Past Surgical History  Past Surgical History:   Procedure Laterality Date    CARDIAC ELECTROPHYSIOLOGY PROCEDURE N/A 10/12/2022    Procedure: Cardiac loop recorder implant;  Surgeon: Rashid Carlos MD;  [facility]: [facility];  Service: Cardiology    CARDIAC ELECTROPHYSIOLOGY PROCEDURE N/A 4/21/2023    Procedure: Cardiac pacer implant;  Surgeon: Simba Chilel MD;  Location: [facility];  Service: Cardiology    CATARACT EXTRACTION      COLONOSCOPY      complete, for polyp removal    EYE SURGERY      HYSTERECTOMY      age 45    INCISION AND DRAINAGE OF WOUND Right 06/28/2016    Procedure: ARTHROSCOPY,EVACUATION OF HEMATOMA, OPEN EXPLORATION OF WOUND;  Surgeon: Adam Brice MD;  Location: [facility];  Service:     MOHS SURGERY Left 12/27/2022    left temple    SKIN BIOPSY      TONSILLECTOMY AND ADENOIDECTOMY      TOTAL KNEE ARTHROPLASTY Right     TOTAL KNEE ARTHROPLASTY Left     WISDOM TOOTH EXTRACTION             01/29/24 1140   OT Last Visit   OT Visit Date 01/29/24   Note Type   Note type Evaluation   Pain Assessment   Pain Assessment Tool 0-10   Pain Score No Pain   Restrictions/Precautions   Weight Bearing Precautions Per Order No   Other Precautions Contact/isolation;Airborne/isolation;Cognitive;Chair  "Alarm;Bed Alarm;Multiple lines;Fall Risk;Pain   Home Living   Type of Home Assisted living  ([facility])   Home Layout One level;Able to live on main level with bedroom/bathroom;Performs ADLs on one level;Ramped entrance;Elevator   Bathroom Shower/Tub Walk-in shower   Bathroom Toilet Standard   Bathroom Equipment Grab bars in shower;Shower chair   Bathroom Accessibility Accessible   Home Equipment Cane  (SPC at baseline)   Prior Function   Level of [place] Independent with ADLs;Independent with functional mobility;Needs assistance with IADLS   Lives With Facility staff   Receives Help From Personal care attendant;Family   IADLs Family/Friend/Other provides transportation;Family/Friend/Other provides meals;Family/Friend/Other provides medication management   Falls in the last 6 months 1 to 4   Vocational Retired   Lifestyle   Autonomy Pt reported being IND for ADLs with assist from facility staff PRN. Assist for IADLs. Uses SPC at baseline. (-).   Reciprocal Relationships Pt lives in [facility] facility, family visits   Service to Others Pt is retired   Intrinsic Gratification Pt reprted she is very social and enjoys spending time with others   Subjective   Subjective \"What is this virus\"   ADL   Where Assessed Edge of bed   Eating Assistance 5  Supervision/Setup   Grooming Assistance 5  Supervision/Setup   UB Bathing Assistance 4  Minimal Assistance   LB Bathing Assistance 4  Minimal Assistance   UB Dressing Assistance 4  Minimal Assistance   LB Dressing Assistance 4  Minimal Assistance   Toileting Assistance  4  Minimal Assistance   Functional Assistance 4  Minimal Assistance   Bed Mobility   Supine to Sit 4  Minimal assistance   Additional items Assist x 1;Increased time required;Bedrails;HOB elevated;Verbal cues   Sit to Supine 4  Minimal assistance   Additional items Assist x 1;HOB elevated;Increased time required;Verbal cues   Transfers   Sit to Stand 4  Minimal assistance   Additional items Assist x " 1;Increased time required;Verbal cues   Stand to Sit 4  Minimal assistance   Additional items Assist x 1;Verbal cues;Increased time required   Additional Comments RW in stance   Functional Mobility   Functional Mobility 4  Minimal assistance   Additional Comments Ax1, side steps in bed.   Additional items Rolling walker   Balance   Static Sitting Fair +   Dynamic Sitting Fair   Static Standing Fair -   Dynamic Standing Poor +   Ambulatory Poor +   Activity Tolerance   Activity Tolerance Patient limited by fatigue   Nurse Made Aware RN cleared to see   RUE Assessment   RUE Assessment WFL   LUE Assessment   LUE Assessment WFL   Vision-Basic Assessment   Current Vision Wears glasses all the time   Cognition   Overall Cognitive Status Impaired   Arousal/Participation Alert;Cooperative   Attention Attends with cues to redirect   Orientation Level Oriented to person;Oriented to place;Oriented to situation  (Knows month, not year, or day)   Memory Decreased long term memory;Decreased recall of biographical information;Decreased short term memory;Decreased recall of recent events;Decreased recall of precautions   Following Commands Follows one step commands with increased time or repetition   Comments Increased processing time, decreased recall of recent events. Did not know what COVID was and required extensive explanation to understand. Difficulty responding   Assessment   Limitation Decreased ADL status;Decreased Safe judgement during ADL;Decreased endurance;Decreased cognition;Decreased self-care trans;Decreased high-level ADLs   Prognosis Fair   Assessment 83 year old pt seen today for an OT evaluation following admission to [facility] 2/2 Acute metabolic encephalopathy with present symptoms significant for AMS, UTI, COVID.  has a past medical history of Afib (HCC), Arthritis, Hyperlipidemia, Hypertension, Osteopenia, Sleep apnea, Squamous cell skin cancer, Subdural hemorrhage (HCC), and Varicose  veins of both lower extremities. Pt [facility] at [facility]. Receives assist PRN for ADLs/IADLs. Reported being IND with ADLs PTA. Pt reports being very social, and doesn't understand what COVID is. Pt pleasant and cooperative t/o session, pleasantly confused, with increased VCs required for task completion and increased processing time. Pt completed functional bed mobility with min A. Min Ax1 for functional STS txfs with RW. Min Ax1 for functional mobility to take a couple steps with RW. Pt was min A for UB ADLs and min A for LB ADLs 2/2 fatigue. The patient's raw score on the AM-PAC Daily Activity Inpatient Short Form is 15. A raw score of less than 19 suggests the patient may benefit from discharge to post-acute rehabilitation services. Please refer to the recommendation of the Occupational Therapist for safe discharge planning. Pt will continue to benefit from skilled acute OT to promote increased independence and return to PLOF. At this time, current OT recommendation is Level 2 resources.   Goals   Patient Goals to get back to socializing   LTG Time Frame 10-14   Long Term Goal #1 See below for goals   Plan   Treatment Interventions ADL retraining;Functional transfer training;Endurance training;Cognitive reorientation;Patient/family training;Compensatory technique education;Continued evaluation;Energy conservation;Activityengagement   Goal Expiration Date 02/12/24   OT Frequency 2-3x/wk   Discharge Recommendation   Rehab Resource Intensity Level, OT II (Moderate Resource Intensity)   AM-PAC Daily Activity Inpatient   Lower Body Dressing 2   Bathing 2   Toileting 2   Upper Body Dressing 3   Grooming 3   Eating 3   Daily Activity Raw Score 15   Daily Activity Standardized Score (Calc for Raw Score >=11) 34.69   AM-PAC Applied Cognition Inpatient   Following a Speech/Presentation 2   Understanding Ordinary Conversation 2   Taking Medications 2   Remembering Where Things Are Placed or Put Away  2   Remembering List of 4-5 Errands 2   Taking Care of Complicated Tasks 2   Applied Cognition Raw Score 12   Applied Cognition Standardized Score 28.82   End of Consult   Education Provided Yes   Patient Position at End of Consult Supine;Bed/Chair alarm activated;All needs within reach   Nurse Communication Nurse aware of consult       Occupational Therapy Goals    Pt will be Mod I with bed mobility with good sitting balance/tolerance to engage in self care tasks within this plan of care.      Pt will be Mod I for UB dressing and bathing with use of assistive devices PRN within this plan of care.     Pt will be Mod I for LB dressing and bathing with use of assistive devices PRN within this plan of care.     Pt will demonstrate standing tolerance of 2-3 minutes increase independence for toileting ADLs/tasks with use of assistive devices PRN within this plan of care.     Pt will demonstrate good carryover of safety awareness with use of RW during functional tasks within this plan of care.     Pt will demonstrated increased activity tolerance to 30 mins for participation in ADLs and functional tasks within this plan of care.     Pt will complete functional cognitive assessment with good attention/participation to assist with safe d/c planning recommendations.      SAVANNAH Myers, OTR/L

## 2024-01-29 NOTE — UTILIZATION REVIEW
"NOTIFICATION OF INPATIENT ADMISSION   AUTHORIZATION REQUEST   SERVICING FACILITY:   [facility]  Address: [address] [place] [address]  Tax ID: [id number]  NPI: [id number] ATTENDING PROVIDER:  Attending Name and NPI#: Shankar Hensley Md [[id number]]  Address: [address] [place] [address]  Phone: [phone]   ADMISSION INFORMATION:  Place of Service: Inpatient [facility]  Place of Service Code: 21  Inpatient Admission Date/Time: 1/27/24 10:55 AM  Discharge Date/Time: No discharge date for patient encounter.  Admitting Diagnosis Code/Description:  UTI (urinary tract infection) [N39.0]  Altered mental status [R41.82]  Tremors of nervous system [R25.1]  COVID [U07.1]     UTILIZATION REVIEW CONTACT:  Sheryl \"Kelsey\"Rufus Utilization   Network Utilization Review Department  Phone: [phone]  Fax: [phone]  Email: [email or web address]  Contact for approvals/pending authorizations, clinical reviews, and discharge.     PHYSICIAN ADVISORY SERVICES:  Medical Necessity Denial & Wfox-ig-Czkn Review  Phone: [phone]  Fax: [phone]  Email: [email or web address]     DISCHARGE SUPPORT TEAM:  For Patients Discharge Needs & Updates  Phone: [phone] opt. 2 Fax: [phone]  Email: [email or web address]      " Stage 2: Number Of Blocks?: 2

## 2024-01-30 PROCEDURE — 99232 SBSQ HOSP IP/OBS MODERATE 35: CPT | Performed by: FAMILY MEDICINE

## 2024-01-30 RX ADMIN — CITALOPRAM HYDROBROMIDE 10 MG: 10 TABLET ORAL at 08:05

## 2024-01-30 RX ADMIN — DONEPEZIL HYDROCHLORIDE 5 MG: 5 TABLET ORAL at 21:53

## 2024-01-30 RX ADMIN — DIVALPROEX SODIUM 250 MG: 250 TABLET, DELAYED RELEASE ORAL at 16:22

## 2024-01-30 RX ADMIN — REMDESIVIR 100 MG: 100 INJECTION, POWDER, LYOPHILIZED, FOR SOLUTION INTRAVENOUS at 14:07

## 2024-01-30 RX ADMIN — DIVALPROEX SODIUM 250 MG: 250 TABLET, DELAYED RELEASE ORAL at 22:15

## 2024-01-30 RX ADMIN — BUSPIRONE HYDROCHLORIDE 7.5 MG: 5 TABLET ORAL at 21:53

## 2024-01-30 RX ADMIN — MELATONIN 6 MG: at 21:53

## 2024-01-30 RX ADMIN — BUSPIRONE HYDROCHLORIDE 7.5 MG: 5 TABLET ORAL at 08:05

## 2024-01-30 RX ADMIN — FLECAINIDE ACETATE 50 MG: 50 TABLET ORAL at 21:54

## 2024-01-30 RX ADMIN — DIVALPROEX SODIUM 250 MG: 250 TABLET, DELAYED RELEASE ORAL at 08:05

## 2024-01-30 RX ADMIN — AMLODIPINE BESYLATE 10 MG: 10 TABLET ORAL at 08:05

## 2024-01-30 RX ADMIN — ENOXAPARIN SODIUM 40 MG: 40 INJECTION SUBCUTANEOUS at 08:05

## 2024-01-30 RX ADMIN — FLECAINIDE ACETATE 50 MG: 50 TABLET ORAL at 10:14

## 2024-01-30 NOTE — ASSESSMENT & PLAN NOTE
Tested positive for COVID, presenting with mild URI symptoms and encephalopathy however patient currently denies any shortness of breath or cough on admission  Was placed on 2 L nasal cannula for satting 90% in the ED --currently saturating well on room air  Completed remdesivir as well as Decadron

## 2024-01-30 NOTE — CASE MANAGEMENT
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    Encephalopathy acute 2023    Acute pain due to trauma 2023    Fall 2023    Sinus pause 2023    Thrombocytopenia ([facility]) 2023    Acute metabolic encephalopathy 2023    COVID-19 2023    Stage 3 chronic kidney disease, unspecified whether stage 3a or 3b CKD ([facility]) 2022    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    Hyperlipemia 2022    Hypertension 2022    Generalized anxiety disorder 2021    Obstructive sleep apnea syndrome 2020    Degeneration of lumbar intervertebral disc 2017    History of total bilateral knee replacement 2017    Osteoporosis 05/10/2017    Minimal cognitive impairment 2016    Pain, joint, knee, right 2016     Mood disorder with hypomanic features 2016    Paroxysmal atrial fibrillation (HCC) 2014    Metabolic syndrome X 2007    Anxiety state 2007    Migraine 2007    Peripheral venous insufficiency 2007      LOS (days): 3  Geometric Mean LOS (GMLOS) (days): 5  Days to GMLOS:1.8     OBJECTIVE:  Risk of Unplanned Readmission Score: 16.62         Current admission status: Inpatient   Preferred Pharmacy:   [facility] [place] - [address]  [address] [facility] [address]  Phone: [phone] Fax: [phone]    Primary Care Provider: Emelina Swann MD    Primary Insurance: [employer] [facility]  Secondary Insurance:     DISCHARGE DETAILS:                       Additional Comments: Reviewed rehab  referrals and responses with daughter Lotus via e-mail and phone throughout the day.  [facility] unable to offer patient a bed and will not be able to enter into a one-time agreement with [facility].  Daughter selected [facility], facility notified in [facility].  CM will follow for d/c Wednesday AM.

## 2024-01-30 NOTE — ASSESSMENT & PLAN NOTE
Presenting with encephalopathy without any suprapubic pain  UA WBC innumerable, bacteria moderate, nitrite positive  Completed treatment with ceftriaxone  Follow-up urine culture -- prelim >100,000 E coli.  Completed 3 days of IV ceftriaxone

## 2024-01-30 NOTE — RESTORATIVE TECHNICIAN NOTE
Restorative Technician Note      Patient Name: Ann Cross     Restorative Tech Visit Date: 01/30/24  Note Type: Mobility  Patient Position Upon Consult: Supine  Activity Performed: Dangled; Stood; Ambulated; Other (Comment); Range of motion (EOB BLE therex sets of 10; sit to stands w RW; lateral stepping; ambulation within room)  Assistive Device: Standard walker  Education Provided: Yes  Patient Position at End of Consult: Supine; All needs within reach; Bed/Chair alarm activated    Charla Hardy  DPT, Restorative Technician

## 2024-01-30 NOTE — PROGRESS NOTES
[facility]  Progress Note  Name: Ann Cross I  MRN: [id number]  Unit/Bed#: [facility] 816-01 I Date of Admission: 1/26/2024   Date of Service: 1/30/2024 I Hospital Day: 3    Assessment/Plan   * Acute metabolic encephalopathy  Assessment & Plan  Pmhx of dementia, recent traumatic SDH on 12/2022 who presented to the ED for evaluation of altered mental status  Patient tested positive for COVID-19 and UTI which are likely contributing to patient's encephalopathy in the setting of underlying dementia  CT head on admission shows near complete resolution of parafalcine and decreased size of right tentorial subdural hematoma  Monitor mental status closely  Delirium precautions    Acute cystitis  Assessment & Plan  Presenting with encephalopathy without any suprapubic pain  UA WBC innumerable, bacteria moderate, nitrite positive  Completed treatment with ceftriaxone  Follow-up urine culture -- prelim >100,000 E coli.  Completed 3 days of IV ceftriaxone     COVID-19  Assessment & Plan  Tested positive for COVID, presenting with mild URI symptoms and encephalopathy however patient currently denies any shortness of breath or cough on admission  Was placed on 2 L nasal cannula for satting 90% in the ED --currently saturating well on room air  Completed remdesivir as well as Decadron      Traumatic subdural hemorrhage, subsequent encounter  Assessment & Plan  History of traumatic SDH on 12/22/23 after fall  CT head on admission shows near complete resolution of parafalcine and decreased size of right tentorial subdural  Outpatient neurosurgery note reviewed from 1/18/2024 which recommends continued holding aspirin with plan for repeat CT head in 4 weeks around 2/9/2024    Tachy-alison syndrome (HCC)  Assessment & Plan  S/p PPM    Paroxysmal atrial fibrillation (HCC)  Assessment & Plan  Sinus rhythm on admission, rate controlled  Acebutolol on med list, however unclear if she takes this as not  documented in cardiology notes? Pharmacy does not see any dispense reports for this med. Not on formulary so not getting this regardless.  Can f/u   Currently AC and AP held due to subdural hematoma  Continue flecainide    Hypertension  Assessment & Plan  Blood pressure stable  Continue amlodipine    Minimal cognitive impairment  Assessment & Plan  Continue Aricept  Delirium precautions     Mood disorder with hypomanic features  Assessment & Plan  Continue citalopram, BuSpar, and Depakote               VTE Pharmacologic Prophylaxis:   Moderate Risk (Score 3-4) - Pharmacological DVT Prophylaxis Ordered: enoxaparin (Lovenox).    Mobility:   Basic Mobility Inpatient Raw Score: 17  JH-HLM Goal: 5: Stand one or more mins  JH-HLM Achieved: 7: Walk 25 feet or more  HLM Goal achieved. Continue to encourage appropriate mobility.    Patient Centered Rounds: I performed bedside rounds with nursing staff today.   Discussions with Specialists or Other Care Team Provider: CM    Education and Discussions with Family / Patient: Patient declined call to .     Total Time Spent on Date of Encounter in care of patient: 40 mins. This time was spent on one or more of the following: performing physical exam; counseling and coordination of care; obtaining or reviewing history; documenting in the medical record; reviewing/ordering tests, medications or procedures; communicating with other healthcare professionals and discussing with patient's family/caregivers.    Current Length of Stay: 3 day(s)  Current Patient Status: Inpatient   Certification Statement: The patient will continue to require additional inpatient hospital stay due to Pending rehab  Discharge Plan: Anticipate discharge tomorrow to rehab facility.    Code Status: Level 1 - Full Code    Subjective:   This is a very pleasant 83 y.o. female who was seen today at bedside. Patient has no new complaints. Patient is not in any acute distress.       Objective:      Vitals:   Temp (24hrs), Av.6 °F (37 °C), Min:98.4 °F (36.9 °C), Max:98.7 °F (37.1 °C)    Temp:  [98.4 °F (36.9 °C)-98.7 °F (37.1 °C)] 98.7 °F (37.1 °C)  HR:  [60-62] 62  Resp:  [16-19] 16  BP: (117-124)/(55-61) 124/61  SpO2:  [89 %-92 %] 91 %  Body mass index is 25.75 kg/m².     Input and Output Summary (last 24 hours):     Intake/Output Summary (Last 24 hours) at 2024 1533  Last data filed at 2024 1100  Gross per 24 hour   Intake 180 ml   Output --   Net 180 ml       Physical Exam:   Physical Exam  Vitals reviewed.   Constitutional:       General: She is not in acute distress.     Appearance: She is not ill-appearing.   HENT:      Head: Normocephalic.   Eyes:      Conjunctiva/sclera: Conjunctivae normal.   Cardiovascular:      Rate and Rhythm: Normal rate and regular rhythm.   Pulmonary:      Effort: Pulmonary effort is normal.   Abdominal:      General: Abdomen is flat.      Palpations: Abdomen is soft.      Tenderness: There is no abdominal tenderness.   Skin:     General: Skin is warm and dry.   Neurological:      Mental Status: She is alert. Mental status is at baseline.          Additional Data:     Labs:  Results from last 7 days   Lab Units 24  0657 24  0458 24  1137   WBC Thousand/uL 5.62   < > 8.22   HEMOGLOBIN g/dL 14.1   < > 15.9*   HEMATOCRIT % 43.2   < > 48.5*   PLATELETS Thousands/uL 145*   < > 161   BANDS PCT %  --   --  6   NEUTROS PCT % 25*   < >  --    LYMPHS PCT % 60*   < >  --    LYMPHO PCT %  --   --  12*   MONOS PCT % 13*   < >  --    MONO PCT %  --   --  19*   EOS PCT % 1   < > 0    < > = values in this interval not displayed.     Results from last 7 days   Lab Units 24  0419 24  0530 24  0458   SODIUM mmol/L 137   < > 135   POTASSIUM mmol/L 4.0   < > 4.8   CHLORIDE mmol/L 103   < > 97   CO2 mmol/L 24   < > 29   BUN mg/dL 22   < > 23   CREATININE mg/dL 0.78   < > 1.01   ANION GAP mmol/L 10   < > 9   CALCIUM mg/dL 8.0*   < > 8.9   ALBUMIN  g/dL  --   --  3.7   TOTAL BILIRUBIN mg/dL  --   --  0.31   ALK PHOS U/L  --   --  47   ALT U/L  --   --  19   AST U/L  --   --  35   GLUCOSE RANDOM mg/dL 94   < > 112    < > = values in this interval not displayed.     Results from last 7 days   Lab Units 01/26/24  1137   INR  1.04                   Lines/Drains:  Invasive Devices       Peripheral Intravenous Line  Duration             Peripheral IV 01/26/24 Right Antecubital 4 days                          Imaging: Reviewed radiology reports from this admission including: CT head    Recent Cultures (last 7 days):   Results from last 7 days   Lab Units 01/26/24  1146   URINE CULTURE  >100,000 cfu/ml Escherichia coli*       Last 24 Hours Medication List:   Current Facility-Administered Medications   Medication Dose Route Frequency Provider Last Rate    acetaminophen  650 mg Oral Q4H PRN Harnishkumar Cordero, DO      amLODIPine  10 mg Oral Daily Harnishkumar Cordero, DO      busPIRone  7.5 mg Oral BID Harnishkumar Cordero, DO      citalopram  10 mg Oral Daily Harnishkumar Cordero, DO      divalproex sodium  250 mg Oral TID Harnishkumar Cordero, DO      donepezil  5 mg Oral HS Harnishkumar Cordero, DO      enoxaparin  40 mg Subcutaneous Daily Harnishkumar Cordero, DO      flecainide  50 mg Oral Q12H LILI Harnishkumar Cordero, DO      melatonin  6 mg Oral HS Harnishkumar Cordero, DO      sodium chloride  2 drop Both Eyes Q3H PRN Harnishkumar Cordero, DO          Today, Patient Was Seen By: Kayden Gay MD    **Please Note: This note may have been constructed using a voice recognition system.**

## 2024-01-30 NOTE — ASSESSMENT & PLAN NOTE
Sinus rhythm on admission, rate controlled  Acebutolol on med list, however unclear if she takes this as not documented in cardiology notes? Pharmacy does not see any dispense reports for this med. Not on formulary so not getting this regardless.  Can f/u   Currently AC and AP held due to subdural hematoma  Continue flecainide   show

## 2024-01-31 VITALS
RESPIRATION RATE: 16 BRPM | TEMPERATURE: 97.9 F | HEART RATE: 62 BPM | OXYGEN SATURATION: 91 % | DIASTOLIC BLOOD PRESSURE: 58 MMHG | WEIGHT: 150 LBS | HEIGHT: 64 IN | SYSTOLIC BLOOD PRESSURE: 117 MMHG | BODY MASS INDEX: 25.61 KG/M2

## 2024-01-31 LAB
ALBUMIN SERPL BCP-MCNC: 3.4 G/DL (ref 3.5–5)
ALP SERPL-CCNC: 41 U/L (ref 34–104)
ALT SERPL W P-5'-P-CCNC: 14 U/L (ref 7–52)
ANION GAP SERPL CALCULATED.3IONS-SCNC: 11 MMOL/L
AST SERPL W P-5'-P-CCNC: 18 U/L (ref 13–39)
BASOPHILS # BLD AUTO: 0.02 THOUSANDS/ÂΜL (ref 0–0.1)
BASOPHILS NFR BLD AUTO: 0 % (ref 0–1)
BILIRUB SERPL-MCNC: 0.44 MG/DL (ref 0.2–1)
BUN SERPL-MCNC: 18 MG/DL (ref 5–25)
CALCIUM ALBUM COR SERPL-MCNC: 8.9 MG/DL (ref 8.3–10.1)
CALCIUM SERPL-MCNC: 8.4 MG/DL (ref 8.4–10.2)
CHLORIDE SERPL-SCNC: 102 MMOL/L (ref 96–108)
CO2 SERPL-SCNC: 24 MMOL/L (ref 21–32)
CREAT SERPL-MCNC: 0.79 MG/DL (ref 0.6–1.3)
EOSINOPHIL # BLD AUTO: 0.17 THOUSAND/ÂΜL (ref 0–0.61)
EOSINOPHIL NFR BLD AUTO: 3 % (ref 0–6)
ERYTHROCYTE [DISTWIDTH] IN BLOOD BY AUTOMATED COUNT: 13.7 % (ref 11.6–15.1)
GFR SERPL CREATININE-BSD FRML MDRD: 69 ML/MIN/1.73SQ M
GLUCOSE SERPL-MCNC: 95 MG/DL (ref 65–140)
HCT VFR BLD AUTO: 43 % (ref 34.8–46.1)
HGB BLD-MCNC: 14.3 G/DL (ref 11.5–15.4)
IMM GRANULOCYTES # BLD AUTO: 0.05 THOUSAND/UL (ref 0–0.2)
IMM GRANULOCYTES NFR BLD AUTO: 1 % (ref 0–2)
LYMPHOCYTES # BLD AUTO: 2.83 THOUSANDS/ÂΜL (ref 0.6–4.47)
LYMPHOCYTES NFR BLD AUTO: 41 % (ref 14–44)
MCH RBC QN AUTO: 29.4 PG (ref 26.8–34.3)
MCHC RBC AUTO-ENTMCNC: 33.3 G/DL (ref 31.4–37.4)
MCV RBC AUTO: 88 FL (ref 82–98)
MONOCYTES # BLD AUTO: 0.95 THOUSAND/ÂΜL (ref 0.17–1.22)
MONOCYTES NFR BLD AUTO: 14 % (ref 4–12)
NEUTROPHILS # BLD AUTO: 2.86 THOUSANDS/ÂΜL (ref 1.85–7.62)
NEUTS SEG NFR BLD AUTO: 41 % (ref 43–75)
NRBC BLD AUTO-RTO: 0 /100 WBCS
PLATELET # BLD AUTO: 167 THOUSANDS/UL (ref 149–390)
PMV BLD AUTO: 9.9 FL (ref 8.9–12.7)
POTASSIUM SERPL-SCNC: 3.9 MMOL/L (ref 3.5–5.3)
PROT SERPL-MCNC: 5.9 G/DL (ref 6.4–8.4)
RBC # BLD AUTO: 4.87 MILLION/UL (ref 3.81–5.12)
SODIUM SERPL-SCNC: 137 MMOL/L (ref 135–147)
WBC # BLD AUTO: 6.88 THOUSAND/UL (ref 4.31–10.16)

## 2024-01-31 PROCEDURE — 80053 COMPREHEN METABOLIC PANEL: CPT | Performed by: FAMILY MEDICINE

## 2024-01-31 PROCEDURE — 99239 HOSP IP/OBS DSCHRG MGMT >30: CPT | Performed by: FAMILY MEDICINE

## 2024-01-31 PROCEDURE — 85025 COMPLETE CBC W/AUTO DIFF WBC: CPT | Performed by: FAMILY MEDICINE

## 2024-01-31 RX ADMIN — DIVALPROEX SODIUM 250 MG: 250 TABLET, DELAYED RELEASE ORAL at 08:54

## 2024-01-31 RX ADMIN — FLECAINIDE ACETATE 50 MG: 50 TABLET ORAL at 08:54

## 2024-01-31 RX ADMIN — CITALOPRAM HYDROBROMIDE 10 MG: 10 TABLET ORAL at 08:54

## 2024-01-31 RX ADMIN — AMLODIPINE BESYLATE 10 MG: 10 TABLET ORAL at 08:54

## 2024-01-31 RX ADMIN — BUSPIRONE HYDROCHLORIDE 7.5 MG: 5 TABLET ORAL at 08:54

## 2024-01-31 RX ADMIN — ENOXAPARIN SODIUM 40 MG: 40 INJECTION SUBCUTANEOUS at 08:54

## 2024-01-31 NOTE — PLAN OF CARE
Problem: Potential for Falls  Goal: Patient will remain free of falls  Description: INTERVENTIONS:  - Educate patient/family on patient safety including physical limitations  - Instruct patient to call for assistance with activity   - Consult OT/PT to assist with strengthening/mobility   - Keep Call bell within reach  - Keep bed low and locked with side rails adjusted as appropriate  - Keep care items and personal belongings within reach  - Initiate and maintain comfort rounds  - Make Fall Risk Sign visible to staff  - Apply yellow socks and bracelet for high fall risk patients  - Consider moving patient to room near nurses station  Outcome: Progressing     Problem: Prexisting or High Potential for Compromised Skin Integrity  Goal: Skin integrity is maintained or improved  Description: INTERVENTIONS:  - Identify patients at risk for skin breakdown  - Assess and monitor skin integrity  - Assess and monitor nutrition and hydration status  - Monitor labs   - Assess for incontinence   - Turn and reposition patient  - Assist with mobility/ambulation  - Relieve pressure over bony prominences  - Avoid friction and shearing  - Provide appropriate hygiene as needed including keeping skin clean and dry  - Evaluate need for skin moisturizer/barrier cream  - Collaborate with interdisciplinary team   - Patient/family teaching  - Consider wound care consult   Outcome: Progressing     Problem: PAIN - ADULT  Goal: Verbalizes/displays adequate comfort level or baseline comfort level  Description: Interventions:  - Encourage patient to monitor pain and request assistance  - Assess pain using appropriate pain scale  - Administer analgesics based on type and severity of pain and evaluate response  - Implement non-pharmacological measures as appropriate and evaluate response  - Consider cultural and social influences on pain and pain management  - Notify physician/advanced practitioner if interventions unsuccessful or patient reports  new pain  Outcome: Progressing     Problem: INFECTION - ADULT  Goal: Absence or prevention of progression during hospitalization  Description: INTERVENTIONS:  - Assess and monitor for signs and symptoms of infection  - Monitor lab/diagnostic results  - Monitor all insertion sites, i.e. indwelling lines, tubes, and drains  - Monitor endotracheal if appropriate and nasal secretions for changes in amount and color  - [place] appropriate cooling/warming therapies per order  - Administer medications as ordered  - Instruct and encourage patient and family to use good hand hygiene technique  - Identify and instruct in appropriate isolation precautions for identified infection/condition  Outcome: Progressing  Goal: Absence of fever/infection during neutropenic period  Description: INTERVENTIONS:  - Monitor WBC    Outcome: Progressing     Problem: SAFETY ADULT  Goal: Patient will remain free of falls  Description: INTERVENTIONS:  - Educate patient/family on patient safety including physical limitations  - Instruct patient to call for assistance with activity   - Consult OT/PT to assist with strengthening/mobility   - Keep Call bell within reach  - Keep bed low and locked with side rails adjusted as appropriate  - Keep care items and personal belongings within reach  - Initiate and maintain comfort rounds  - Make Fall Risk Sign visible to staff  - Apply yellow socks and bracelet for high fall risk patients  - Consider moving patient to room near nurses station  Outcome: Progressing  Goal: Maintain or return to baseline ADL function  Description: INTERVENTIONS:  -  Assess patient's ability to carry out ADLs; assess patient's baseline for ADL function and identify physical deficits which impact ability to perform ADLs (bathing, care of mouth/teeth, toileting, grooming, dressing, etc.)  - Assess/evaluate cause of self-care deficits   - Assess range of motion  - Assess patient's mobility; develop plan if impaired  - Assess  patient's need for assistive devices and provide as appropriate  - Encourage maximum independence but intervene and supervise when necessary  - Involve family in performance of ADLs  - Assess for home care needs following discharge   - Consider OT consult to assist with ADL evaluation and planning for discharge  - Provide patient education as appropriate  Outcome: Progressing  Goal: Maintains/Returns to pre admission functional level  Description: INTERVENTIONS:  - Perform AM-PAC 6 Click Basic Mobility/ Daily Activity assessment daily.  - Set and communicate daily mobility goal to care team and patient/family/caregiver.   - Collaborate with rehabilitation services on mobility goals if consulted  - Perform Range of Motion 3 times a day.  - Reposition patient every 2 hours.  - Dangle patient 3 times a day  - Stand patient 3 times a day  - Ambulate patient 3 times a day  - Out of bed to chair 3 times a day   - Out of bed for meals 3 times a day  - Out of bed for toileting  - Record patient progress and toleration of activity level   Outcome: Progressing     Problem: DISCHARGE PLANNING  Goal: Discharge to home or other facility with appropriate resources  Description: INTERVENTIONS:  - Identify barriers to discharge w/patient and caregiver  - Arrange for needed discharge resources and transportation as appropriate  - Identify discharge learning needs (meds, wound care, etc.)  - Arrange for interpretive services to assist at discharge as needed  - Refer to Case Management Department for coordinating discharge planning if the patient needs post-hospital services based on physician/advanced practitioner order or complex needs related to functional status, cognitive ability, or social support system  Outcome: Progressing     Problem: Knowledge Deficit  Goal: Patient/family/caregiver demonstrates understanding of disease process, treatment plan, medications, and discharge instructions  Description: Complete learning  assessment and assess knowledge base.  Interventions:  - Provide teaching at level of understanding  - Provide teaching via preferred learning methods  Outcome: Progressing

## 2024-01-31 NOTE — ASSESSMENT & PLAN NOTE
Sinus rhythm on admission, rate controlled  Acebutolol on med list, however unclear if she takes this as not documented in cardiology notes? Pharmacy does not see any dispense reports for this med. Will D/C  Currently AC and AP held due to subdural hematoma  Continue flecainide

## 2024-01-31 NOTE — CASE MANAGEMENT
Case Management Discharge Planning Note    Patient name Ann Cross  Location [facility] 816/[facility] 816-01 MRN [id number]  : 1940 Date 2024       Current Admission Date: 2024  Current Admission Diagnosis:Acute metabolic encephalopathy   Patient Active Problem List    Diagnosis Date Noted    Acute cystitis 2024    Traumatic subdural hemorrhage, subsequent encounter 2024    Siderosis (HCC) 2024    Encephalopathy acute 2023    Acute pain due to trauma 2023    Fall 2023    Sinus pause 2023    Thrombocytopenia ([facility]) 2023    Acute metabolic encephalopathy 2023    COVID-19 2023    Stage 3 chronic kidney disease, unspecified whether stage 3a or 3b CKD ([facility]) 2022    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    Hyperlipemia 2022    Hypertension 2022    Generalized anxiety disorder 2021    Obstructive sleep apnea syndrome 2020    Degeneration of lumbar intervertebral disc 2017    History of total bilateral knee replacement 2017    Osteoporosis 05/10/2017    Minimal cognitive impairment 2016    Pain, joint, knee, right 2016     Mood disorder with hypomanic features 2016    Paroxysmal atrial fibrillation (HCC) 2014    Metabolic syndrome X 2007    Anxiety state 2007    Migraine 2007    Peripheral venous insufficiency 2007      LOS (days): 4  Geometric Mean LOS (GMLOS) (days): 5  Days to GMLOS:1.1     OBJECTIVE:  Risk of Unplanned Readmission Score: 14.59         Current admission status: Inpatient   Preferred Pharmacy:   [facility] [place], [place] - [address] [place] [address]  Phone: [phone] Fax: [phone]    Primary Care Provider: Emelina Swann MD    Primary Insurance: [employer] [facility]  Secondary Insurance:     DISCHARGE DETAILS:                          IMM Given (Date):: 24  IMM  Given to:: Family  Family notified:: daughter Lotus  Additional Comments: Patient cleared for discharge today to [facility] for rehab.  Transportation arranged and confirmed via [facility] at 1100 AM.  Daughter Lotus, nursing, provider, facility and [facility] aware of d/c arrangements.    Accepting Facility Name, City & State : [facility]  Receiving Facility/Agency Phone Number: [phone]  Facility/Agency Fax Number: [phone]

## 2024-01-31 NOTE — DISCHARGE SUMMARY
[facility]  Discharge- Ann Cross 1940, 83 y.o. female MRN: [id number]  Unit/Bed#: [facility] 816-01 Encounter: [id number]  Primary Care Provider: Emelina Swann MD   Date and time admitted to hospital: 1/26/2024 10:36 AM    * Acute metabolic encephalopathy  Assessment & Plan  Pmhx of dementia, recent traumatic SDH on 12/2022 who presented to the ED for evaluation of altered mental status  Patient tested positive for COVID-19 and UTI which are likely contributing to patient's encephalopathy in the setting of underlying dementia  CT head on admission shows near complete resolution of parafalcine and decreased size of right tentorial subdural hematoma  Monitor mental status closely  Delirium precautions    Acute cystitis  Assessment & Plan  Presenting with encephalopathy without any suprapubic pain  UA WBC innumerable, bacteria moderate, nitrite positive  Completed treatment with ceftriaxone  Follow-up urine culture -- prelim >100,000 E coli.  Completed 3 days of IV ceftriaxone     COVID-19  Assessment & Plan  Tested positive for COVID, presenting with mild URI symptoms and encephalopathy however patient currently denies any shortness of breath or cough on admission  Was placed on 2 L nasal cannula for satting 90% in the ED --currently saturating well on room air  Completed remdesivir as well as Decadron      Traumatic subdural hemorrhage, subsequent encounter  Assessment & Plan  History of traumatic SDH on 12/22/23 after fall  CT head on admission shows near complete resolution of parafalcine and decreased size of right tentorial subdural  Outpatient neurosurgery note reviewed from 1/18/2024 which recommends continued holding aspirin with plan for repeat CT head in 4 weeks around 2/9/2024    Tachy-alison syndrome (HCC)  Assessment & Plan  S/p PPM    Paroxysmal atrial fibrillation (HCC)  Assessment & Plan  Sinus rhythm on admission, rate controlled  Acebutolol on med list,  however unclear if she takes this as not documented in cardiology notes? Pharmacy does not see any dispense reports for this med. Will D/C  Currently AC and AP held due to subdural hematoma  Continue flecainide    Hypertension  Assessment & Plan  Blood pressure stable  Continue amlodipine    Minimal cognitive impairment  Assessment & Plan  Continue Aricept  Delirium precautions     Mood disorder with hypomanic features  Assessment & Plan  Continue citalopram, BuSpar, and Depakote        Medical Problems       Resolved Problems  Date Reviewed: 1/29/2024   None       Discharging Physician / Practitioner: Kayden Gay MD  PCP: Emelina Swann MD  Admission Date:   Admission Orders (From admission, onward)       Ordered        01/27/24 1055  Inpatient Admission  Once            01/26/24 1358  Place in Observation  Once                          Discharge Date: 01/31/24    Consultations During Hospital Stay:  None    Procedures Performed:   None    Significant Findings / Test Results:   -Near complete resolution of parafalcine and decreased size of right tentorial subdural hematoma.  -No new intracranial abnormality. Microangiopathic changes.      Incidental Findings:   None     Test Results Pending at Discharge (will require follow up):   None     Outpatient Tests Requested:  None    Complications:  None    Reason for Admission: Encephalopathy    Hospital Course:   Ann Cross is a 83 y.o. female patient who originally presented to the hospital on 1/26/2024 due to encephalopathy.  Patient was found to have COVID as well as an acute cystitis.  Patient was started on Decadron and remdesivir for COVID and ceftriaxone for her acute cystitis.  Patient's acute metabolic encephalopathy had resolved.  Patient will be discharged to [facility].      Please see above list of diagnoses and related plan for additional information.     Condition at Discharge: stable    Discharge Day Visit / Exam:   Subjective:  "Pleasant 83-year-old female who was seen evaluated today at bedside.  Patient has no acute complaints at this time.  Vitals: Blood Pressure: 117/58 (01/31/24 0852)  Pulse: 62 (01/31/24 0852)  Temperature: 97.9 °F (36.6 °C) (01/31/24 0852)  Temp Source: Oral (01/27/24 0812)  Respirations: 16 (01/31/24 0852)  Height: 5' 4\" (162.6 cm) (01/28/24 1004)  Weight - Scale: 68 kg (150 lb) (01/28/24 1004)  SpO2: 91 % (01/31/24 0852)  Exam:   Physical Exam  Vitals reviewed.   Constitutional:       General: She is not in acute distress.     Appearance: She is not ill-appearing.   HENT:      Head: Normocephalic.   Eyes:      Conjunctiva/sclera: Conjunctivae normal.   Cardiovascular:      Rate and Rhythm: Normal rate and regular rhythm.   Pulmonary:      Effort: Pulmonary effort is normal.   Abdominal:      General: Abdomen is flat.      Palpations: Abdomen is soft.      Tenderness: There is no abdominal tenderness.   Skin:     General: Skin is warm and dry.   Neurological:      Mental Status: She is alert. Mental status is at baseline.          Discussion with Family:  SW communicated with patient's daughter regarding her discharge to [facility].     Discharge instructions/Information to patient and family:   See after visit summary for information provided to patient and family.      Provisions for Follow-Up Care:  See after visit summary for information related to follow-up care and any pertinent home health orders.      Mobility at time of Discharge:   Basic Mobility Inpatient Raw Score: 17  JH-HLM Goal: 5: Stand one or more mins  JH-HLM Achieved: 4: Move to chair/commode  HLM Goal NOT achieved. Continue to encourage mobility in post discharge setting.     Disposition:   Other Skilled Nursing Facility at [facility]    Planned Readmission: None     Discharge Statement:  I spent 40 minutes discharging the patient. This time was spent on the day of discharge. I had direct contact with the patient on the day of discharge. Greater than " 50% of the total time was spent examining patient, answering all patient questions, arranging and discussing plan of care with patient as well as directly providing post-discharge instructions.  Additional time then spent on discharge activities.    Discharge Medications:  See after visit summary for reconciled discharge medications provided to patient and/or family.      **Please Note: This note may have been constructed using a voice recognition system**

## 2024-02-01 NOTE — UTILIZATION REVIEW
NOTIFICATION OF ADMISSION DISCHARGE   This is a Notification of Discharge from [facility]. Please be advised that this patient has been discharge from our facility. Below you will find the admission and discharge date and time including the patient’s disposition.   UTILIZATION REVIEW CONTACT:  Yumiko Mae  Utilization   Network Utilization Review Department  Phone: [phone] x carefully listen to the prompts. All voicemails are confidential.  Email: [email or web address]     ADMISSION INFORMATION  PRESENTATION DATE: 1/26/2024 10:36 AM  OBERVATION ADMISSION DATE:   INPATIENT ADMISSION DATE: 1/27/24 10:55 AM   DISCHARGE DATE: 1/31/2024  1:00 PM   DISPOSITION:Non [facility] SNF/TCU/SNU    Network Utilization Review Department  ATTENTION: Please call with any questions or concerns to [phone] and carefully listen to the prompts so that you are directed to the right person. All voicemails are confidential.   For Discharge needs, contact Care Management DC Support Team at [phone] opt. 2  Send all requests for admission clinical reviews, approved or denied determinations and any other requests to dedicated fax number below belonging to the campus where the patient is receiving treatment. List of dedicated fax numbers for the Facilities:  FACILITY NAME UR FAX NUMBER   ADMISSION DENIALS (Administrative/Medical Necessity) [phone]   DISCHARGE SUPPORT TEAM ([facility]) [phone]   PARENT CHILD HEALTH (Maternity/NICU/Pediatrics) [phone]   [facility] [phone]   [facility] [phone]   [facility] [phone]   [facility] [phone]   [facility] [phone]   [facility] [phone]   [facility] [phone]   [facility]  [phone]   [facility] [phone]   [facility] [phone]   [facility] [phone]   [facility] [phone]

## 2024-02-09 ENCOUNTER — REMOTE DEVICE CLINIC VISIT (OUTPATIENT)
Dept: CARDIOLOGY CLINIC | Facility: CLINIC | Age: 84
End: 2024-02-09
Payer: MEDICARE

## 2024-02-09 DIAGNOSIS — Z95.0 CARDIAC PACEMAKER IN SITU: Primary | ICD-10-CM

## 2024-02-09 PROCEDURE — 93294 REM INTERROG EVL PM/LDLS PM: CPT | Performed by: INTERNAL MEDICINE

## 2024-02-09 PROCEDURE — 93296 REM INTERROG EVL PM/IDS: CPT | Performed by: INTERNAL MEDICINE

## 2024-02-09 NOTE — PROGRESS NOTES
MDT DUAL CHAMBER PM - ACTIVE SYSTEM IS MRI CONDITIONAL   CARELINK TRANSMISSION: BATTERY VOLTAGE ADEQUATE (13.2 YRS). AP-86%, <0.1%. ALL AVAILABLE LEAD PARAMETERS WITHIN NORMAL LIMITS. 1 NSVT EPISODE ON 12/29/23 FOR 7 BEATS, AVG CL~360MS. EF-65% (ECHO 5/16/23). PT ON ASA 81MG & FLECAINIDE. NORMAL DEVICE FUNCTION. GV

## 2024-02-12 ENCOUNTER — TELEPHONE (OUTPATIENT)
Dept: NEUROSURGERY | Facility: CLINIC | Age: 84
End: 2024-02-12

## 2024-02-12 ENCOUNTER — HOSPITAL ENCOUNTER (OUTPATIENT)
Dept: RADIOLOGY | Facility: HOSPITAL | Age: 84
Discharge: HOME | End: 2024-02-12
Payer: MEDICARE

## 2024-02-12 DIAGNOSIS — I62.00 SUBDURAL HEMORRHAGE (HCC): ICD-10-CM

## 2024-02-12 PROCEDURE — 70450 CT HEAD/BRAIN W/O DYE: CPT

## 2024-02-12 NOTE — TELEPHONE ENCOUNTER
JOSE J for Tonya at [facility] to inform her that I need to r/s pts appt for tomorrow as our office will be closed to in person visits.  Left our main phone number for return call to r/s appt.

## 2024-02-12 NOTE — TELEPHONE ENCOUNTER
LMOM at [employer] [facility] requesting that the pt come in earlier for her appt tomorrow due to Dr. Small having an urgent add on surgical case.  I offered times of either 8:30 AM or 9:30 AM on 2/13/24.  Also offered to move to another day if these times do not work for them.  Main office phone number provided for return call.

## 2024-02-19 ENCOUNTER — TELEPHONE (OUTPATIENT)
Dept: NEUROSURGERY | Facility: CLINIC | Age: 84
End: 2024-02-19

## 2024-02-19 ENCOUNTER — OFFICE VISIT (OUTPATIENT)
Dept: NEUROSURGERY | Facility: CLINIC | Age: 84
End: 2024-02-19
Payer: MEDICARE

## 2024-02-19 VITALS
HEIGHT: 64 IN | HEART RATE: 99 BPM | BODY MASS INDEX: 25.61 KG/M2 | SYSTOLIC BLOOD PRESSURE: 112 MMHG | TEMPERATURE: 96.3 F | DIASTOLIC BLOOD PRESSURE: 66 MMHG | WEIGHT: 150 LBS | RESPIRATION RATE: 16 BRPM | OXYGEN SATURATION: 97 %

## 2024-02-19 DIAGNOSIS — I61.9 BRAIN BLEED (HCC): Primary | ICD-10-CM

## 2024-02-19 PROCEDURE — 99213 OFFICE O/P EST LOW 20 MIN: CPT | Performed by: SPECIALIST

## 2024-02-19 NOTE — TELEPHONE ENCOUNTER
Spoke with  at [facility] /adminsitrator     Telephoned nursing home patient without deficits , no stroke like symptoms , is functioning well , is  OOB participating in activities . Reports she  restarted ASA unknown when , advised in OV note not to restart.    Patient has an appointment today w/ JATIN Small I notified hime vis TT.    CTH 2/12/2024   PARENCHYMA AND EXTRA-AXIAL SPACES:  New hyperdensity in the region of the left frontal lobe cortex/sulci (2:32, 303:31, 304:33). Resolution of parafalcine subdural hematomas and near complete resolution of right tentorial subdural hematoma.

## 2024-02-19 NOTE — PROGRESS NOTES
History and Physical - Neurosurgery   Ann Cross 83 y.o. female MRN: [id number]      Assessment:    I examined the patient, reviewed the diagnostic studies, and had a very pleasant and detailed discussion with Ann and her daughter regarding our findings.  We reviewed with the patient in detail the CT brain study and I explained to them that the old injury is essentially resolved but has new small punctate hemorrhage in the right frontal lobe. Interestingly, she was supposed to be off aspirin, but I was just informed by our team here and the nursing facility that she was at (daughter called them during this visit), that she was never off the aspirin as we instructed. She went back to the nursing home on aspirin.  This does make a bit more sense now, as perhaps that contributed to this new hyperdensity. After careful discussions today, since she has been on it this entire time, we decided to let her remain on it and repeat the CT in 2 weeks. She has afib, and her stroke risk is high. Therefore it always comes down to risk/benefit ratios. In the end, the bleed in her brain that is new is fairly tiny, so maybe we just don't stop the aspirin and repeat the CT brain. Patient and daughter understand the risk of bleeding is certainly higher, but are in agreement with this plan considering this case in its totality.  Fortunately, on exam the patient says she is feeling better, although cognitively not at her baseline as per her daughter. This change was noted after the initial traumatic event in December, 2023. Although better, still not back to where she wants to be. I explained that sometimes this takes time, and hard to predict if, and when, she will return to baseline. Otherwise, I am pleased with how she looks.        Plan:    Follow-up: Repeat CT brain in 2 weeks    Follow up after CT brain in clinic    Decided to continue aspirin, as it has not been stopped, as best as I Can understand. Perhaps stopped  transiently but then restarted.    Otherwsie she is getting better slowly.     Problem List Items Addressed This Visit    None  Visit Diagnoses       Brain bleed (HCC)    -  Primary    Relevant Orders    CT head without contrast            Subjective/Objective     Patient is doing better. Here with results of CT brain. She has been maintained on aspirin at nursing home as I understand. This was not stopped as instructed. Unclear of circumstances.     ROS personally reviewed and updated.    Review of Systems   Constitutional: Negative.    HENT: Negative.     Eyes: Negative.    Respiratory: Negative.     Cardiovascular: Negative.    Gastrointestinal:         URGENCY INC BOWELS   Endocrine: Negative.    Genitourinary:  Positive for urgency.        INCONTINENT URINE AT TIMES   Musculoskeletal:  Positive for gait problem (UNBALNCED SINCE INCIDENT USES WALKER).   Skin: Negative.    Allergic/Immunologic: Negative.    Neurological:  Negative for dizziness, seizures, speech difficulty (troulbe forgetting words per daughter), weakness, light-headedness, numbness and headaches.   Hematological:  Does not bruise/bleed easily.   Psychiatric/Behavioral:  Positive for confusion (FORGET FUL AT TIMES STM) and decreased concentration.        Family History    Family History   Problem Relation Age of Onset    Diabetes Mother     Diabetes Father     Diabetes Brother     No Known Problems Sister     No Known Problems Daughter     No Known Problems Maternal Grandmother     No Known Problems Maternal Grandfather     No Known Problems Paternal Grandmother     No Known Problems Paternal Grandfather     No Known Problems Daughter     Brain cancer Son     No Known Problems Maternal Aunt        Social History    Social History     Socioeconomic History    Marital status:      Spouse name: Not on file    Number of children: Not on file    Years of education: Not on file    Highest education level: Not on file   Occupational History     Not on file   Tobacco Use    Smoking status: Never    Smokeless tobacco: Never   Vaping Use    Vaping status: Never Used   Substance and Sexual Activity    Alcohol use: Not Currently    Drug use: No    Sexual activity: Not Currently     Comment:    Other Topics Concern    Not on file   Social History Narrative    No domestic violence      Social Determinants of Health     Financial Resource Strain: Not on file   Food Insecurity: No Food Insecurity (1/31/2024)    Hunger Vital Sign     Worried About Running Out of Food in the Last Year: Never true     Ran Out of Food in the Last Year: Never true   Transportation Needs: No Transportation Needs (1/31/2024)    PRAPARE - Transportation     Lack of Transportation (Medical): No     Lack of Transportation (Non-Medical): No   Physical Activity: Not on file   Stress: Not on file   Social Connections: Not on file   Intimate Partner Violence: Not on file   Housing Stability: Low Risk  (1/31/2024)    Housing Stability Vital Sign     Unable to Pay for Housing in the Last Year: No     Number of Places Lived in the Last Year: 1     Unstable Housing in the Last Year: No       Past Medical History    Past Medical History:   Diagnosis Date    Afib (HCC)     Arthritis     Hyperlipidemia     Hypertension     Osteopenia     Sleep apnea     Squamous cell skin cancer     LEF TEMPLE    Subdural hemorrhage (HCC)     Varicose veins of both lower extremities        Surgical History    Past Surgical History:   Procedure Laterality Date    CARDIAC ELECTROPHYSIOLOGY PROCEDURE N/A 10/12/2022    Procedure: Cardiac loop recorder implant;  Surgeon: Rashid Carlos MD;  [facility]: [facility];  Service: Cardiology    CARDIAC ELECTROPHYSIOLOGY PROCEDURE N/A 4/21/2023    Procedure: Cardiac pacer implant;  Surgeon: Simba Chilel MD;  Location: [facility];  Service: Cardiology    CATARACT EXTRACTION      COLONOSCOPY      complete, for polyp removal    EYE SURGERY      HYSTERECTOMY       age 45    INCISION AND DRAINAGE OF WOUND Right 06/28/2016    Procedure: ARTHROSCOPY,EVACUATION OF HEMATOMA, OPEN EXPLORATION OF WOUND;  Surgeon: Adam Brice MD;  Location: [facility];  Service:     MOHS SURGERY Left 12/27/2022    left temple    SKIN BIOPSY      TONSILLECTOMY AND ADENOIDECTOMY      TOTAL KNEE ARTHROPLASTY Right     TOTAL KNEE ARTHROPLASTY Left     WISDOM TOOTH EXTRACTION         Medications      Current Outpatient Medications:     acetaminophen (TYLENOL) 325 mg tablet, Take 2 tablets (650 mg total) by mouth every 4 (four) hours as needed for mild pain, Disp: 30 tablet, Rfl: 0    amLODIPine (NORVASC) 10 mg tablet, Take 1 tablet (10 mg total) by mouth daily, Disp: 30 tablet, Rfl: 0    aspirin (ECOTRIN LOW STRENGTH) 81 mg EC tablet, Take 81 mg by mouth daily, Disp: , Rfl:     busPIRone (BUSPAR) 7.5 mg tablet, Take 7.5 mg by mouth 2 (two) times a day, Disp: , Rfl:     Calcium Carbonate 1500 (600 Ca) MG TABS, Take 1 tablet by mouth daily, Disp: , Rfl:     citalopram (CeleXA) 10 mg tablet, Take 10 mg by mouth daily, Disp: , Rfl:     [employer] Senna Plus 8.6-50 MG per tablet, every other day, Disp: , Rfl:     divalproex sodium (DEPAKOTE) 250 mg EC tablet, Take 250 mg by mouth 3 (three) times a day, Disp: , Rfl:     donepezil (ARICEPT) 5 mg tablet, Take 1 tablet (5 mg total) by mouth daily at bedtime, Disp: 30 tablet, Rfl: 1    ergocalciferol (VITAMIN D2) 50,000 units, Take 50,000 Units by mouth every 30 (thirty) days, Disp: , Rfl:     flecainide (TAMBOCOR) 50 mg tablet, Take 1 tablet (50 mg total) by mouth every 12 (twelve) hours, Disp: 60 tablet, Rfl: 0    fluticasone (FLONASE) 50 mcg/act nasal spray, 1 spray into each nostril daily for 7 days, Disp: 9.9 mL, Rfl: 0    Melatonin 3 MG CAPS, Take 6 mg by mouth, Disp: , Rfl:     sodium chloride (MARITZA 128) 5 % hypertonic ophthalmic ointment, Administer to both eyes every 3 (three) hours as needed (irritation), Disp: 3.5 g, Rfl: 0    Allergies    Allergies  "  Allergen Reactions    Atorvastatin Other (See Comments)     myalgia    Statins Other (See Comments)     Weakness in legs       The following portions of the patient's history were reviewed and updated as appropriate: allergies, current medications, past family history, past medical history, past social history, past surgical history, and problem list.    Investigations    I personally reviewed the CT brain 2/12/24 results with the patient:    -New hyperdensity within the left frontal lobe cortex versus sulci, which could represent subarachnoid hemorrhage versus cortical hemorrhage/contusion.     -Near complete resolution of right tentorial subdural hematoma. Resolved parafalcine subdural hematoma.     -Microangiopathic changes.       Physical Exam    Vitals:  Height 5' 4\" (1.626 m), weight 68 kg (150 lb).,Body mass index is 25.75 kg/m².    Physical Exam    Neurologic:   Mental status: Alert, oriented, thought content appropriate  Cranial nerves: grossly intact (Cranial nerves II-XII)  Motor: moving all extremities without focal weakness            Anne Marie Small MD   "

## 2024-02-21 PROBLEM — N30.00 ACUTE CYSTITIS: Status: RESOLVED | Noted: 2024-01-26 | Resolved: 2024-02-21

## 2024-03-04 ENCOUNTER — HOSPITAL ENCOUNTER (OUTPATIENT)
Dept: RADIOLOGY | Facility: HOSPITAL | Age: 84
Discharge: HOME | End: 2024-03-04
Attending: SPECIALIST
Payer: MEDICARE

## 2024-03-04 DIAGNOSIS — I61.9 BRAIN BLEED (HCC): ICD-10-CM

## 2024-03-04 PROCEDURE — G1004 CDSM NDSC: HCPCS

## 2024-03-04 PROCEDURE — 70450 CT HEAD/BRAIN W/O DYE: CPT

## 2024-03-06 ENCOUNTER — OFFICE VISIT (OUTPATIENT)
Dept: NEUROSURGERY | Facility: CLINIC | Age: 84
End: 2024-03-06
Payer: MEDICARE

## 2024-03-06 VITALS
OXYGEN SATURATION: 97 % | RESPIRATION RATE: 17 BRPM | SYSTOLIC BLOOD PRESSURE: 138 MMHG | HEIGHT: 64 IN | HEART RATE: 73 BPM | TEMPERATURE: 97.4 F | DIASTOLIC BLOOD PRESSURE: 86 MMHG | WEIGHT: 150 LBS | BODY MASS INDEX: 25.61 KG/M2

## 2024-03-06 DIAGNOSIS — I62.00 SUBDURAL HEMORRHAGE (HCC): Primary | ICD-10-CM

## 2024-03-06 PROCEDURE — 99213 OFFICE O/P EST LOW 20 MIN: CPT | Performed by: SPECIALIST

## 2024-03-06 NOTE — PROGRESS NOTES
History and Physical - Neurosurgery   Ann Cross 83 y.o. female MRN: [id number]      Assessment:      I examined the patient, reviewed the new CT brain, and had a very pleasant and detailed discussion with Ann and her daughter regarding our findings.  We reviewed with the patient in detail the CT study and explained to her the bleed in the frontal region that was new last time has essentially gone away. Fortunately, on exam the patient continues to make nice sow progress. Since there was no worsening on CT but instead marked improvement in the prior bleed, she may continue the aspirin and follow up with us prn.        Plan:    Follow-up: prn        Subjective/Objective     Patient is here accompanied with her daughter.  Overall she states that she is doing better.  She has no real new complaints.  She still does use a walker to ambulate.  Overall she was in better spirits today.    MIGUEL ANGEL MICHELLE personally reviewed and updated.    Review of Systems   Constitutional: Negative.    HENT: Negative.     Eyes: Negative.    Respiratory: Negative.     Cardiovascular: Negative.    Gastrointestinal: Negative.         URGENCY INC BOWELS   Endocrine: Negative.    Genitourinary:  Positive for urgency.        INCONTINENT URINE AT TIMES   Musculoskeletal:  Positive for gait problem (UNBALNCED SINCE INCIDENT USES WALKER).   Skin: Negative.    Allergic/Immunologic: Negative.    Neurological:  Positive for light-headedness. Negative for dizziness, seizures, speech difficulty (troulbe forgetting words per daughter), weakness, numbness and headaches.   Hematological: Negative.  Does not bruise/bleed easily.   Psychiatric/Behavioral:  Positive for confusion (FORGET FUL AT TIMES STM) and decreased concentration.        Family History    Family History   Problem Relation Age of Onset    Diabetes Mother     Diabetes Father     Diabetes Brother     No Known Problems Sister     No Known Problems Daughter     No Known Problems Maternal  Grandmother     No Known Problems Maternal Grandfather     No Known Problems Paternal Grandmother     No Known Problems Paternal Grandfather     No Known Problems Daughter     Brain cancer Son     No Known Problems Maternal Aunt        Social History    Social History     Socioeconomic History    Marital status:      Spouse name: Not on file    Number of children: Not on file    Years of education: Not on file    Highest education level: Not on file   Occupational History    Not on file   Tobacco Use    Smoking status: Never    Smokeless tobacco: Never   Vaping Use    Vaping status: Never Used   Substance and Sexual Activity    Alcohol use: Not Currently    Drug use: No    Sexual activity: Not Currently     Comment:    Other Topics Concern    Not on file   Social History Narrative    No domestic violence      Social Determinants of Health     Financial Resource Strain: Not on file   Food Insecurity: No Food Insecurity (1/31/2024)    Hunger Vital Sign     Worried About Running Out of Food in the Last Year: Never true     Ran Out of Food in the Last Year: Never true   Transportation Needs: No Transportation Needs (1/31/2024)    PRAPARE - Transportation     Lack of Transportation (Medical): No     Lack of Transportation (Non-Medical): No   Physical Activity: Not on file   Stress: Not on file   Social Connections: Not on file   Intimate Partner Violence: Not on file   Housing Stability: Low Risk  (1/31/2024)    Housing Stability Vital Sign     Unable to Pay for Housing in the Last Year: No     Number of Places Lived in the Last Year: 1     Unstable Housing in the Last Year: No       Past Medical History    Past Medical History:   Diagnosis Date    Afib (HCC)     Arthritis     Hyperlipidemia     Hypertension     Osteopenia     Sleep apnea     Squamous cell skin cancer     LEF TEMPLE    Subdural hemorrhage (HCC)     Varicose veins of both lower extremities        Surgical History    Past Surgical History:    Procedure Laterality Date    CARDIAC ELECTROPHYSIOLOGY PROCEDURE N/A 10/12/2022    Procedure: Cardiac loop recorder implant;  Surgeon: Rashid Carlos MD;  [facility];  Service: Cardiology    CARDIAC ELECTROPHYSIOLOGY PROCEDURE N/A 4/21/2023    Procedure: Cardiac pacer implant;  Surgeon: Simba Chilel MD;  [facility];  Service: Cardiology    CATARACT EXTRACTION      COLONOSCOPY      complete, for polyp removal    EYE SURGERY      HYSTERECTOMY      age 45    INCISION AND DRAINAGE OF WOUND Right 06/28/2016    Procedure: ARTHROSCOPY,EVACUATION OF HEMATOMA, OPEN EXPLORATION OF WOUND;  Surgeon: Adam Brice MD;  Location: [facility];  Service:     MOHS SURGERY Left 12/27/2022    left temple    SKIN BIOPSY      TONSILLECTOMY AND ADENOIDECTOMY      TOTAL KNEE ARTHROPLASTY Right     TOTAL KNEE ARTHROPLASTY Left     WISDOM TOOTH EXTRACTION         Medications      Current Outpatient Medications:     acetaminophen (TYLENOL) 325 mg tablet, Take 2 tablets (650 mg total) by mouth every 4 (four) hours as needed for mild pain, Disp: 30 tablet, Rfl: 0    amLODIPine (NORVASC) 10 mg tablet, Take 1 tablet (10 mg total) by mouth daily, Disp: 30 tablet, Rfl: 0    aspirin (ECOTRIN LOW STRENGTH) 81 mg EC tablet, Take 81 mg by mouth daily, Disp: , Rfl:     busPIRone (BUSPAR) 7.5 mg tablet, Take 7.5 mg by mouth 2 (two) times a day, Disp: , Rfl:     Calcium Carbonate 1500 (600 Ca) MG TABS, Take 1 tablet by mouth daily, Disp: , Rfl:     citalopram (CeleXA) 10 mg tablet, Take 10 mg by mouth daily, Disp: , Rfl:     [employer]S Senna Plus 8.6-50 MG per tablet, every other day, Disp: , Rfl:     divalproex sodium (DEPAKOTE) 250 mg EC tablet, Take 250 mg by mouth 3 (three) times a day, Disp: , Rfl:     donepezil (ARICEPT) 5 mg tablet, Take 1 tablet (5 mg total) by mouth daily at bedtime, Disp: 30 tablet, Rfl: 1    ergocalciferol (VITAMIN D2) 50,000 units, Take 50,000 Units by mouth every 30 (thirty) days, Disp: ,  "Rfl:     flecainide (TAMBOCOR) 50 mg tablet, Take 1 tablet (50 mg total) by mouth every 12 (twelve) hours, Disp: 60 tablet, Rfl: 0    fluticasone (FLONASE) 50 mcg/act nasal spray, 1 spray into each nostril daily for 7 days, Disp: 9.9 mL, Rfl: 0    Melatonin 3 MG CAPS, Take 6 mg by mouth, Disp: , Rfl:     sodium chloride (MARITZA 128) 5 % hypertonic ophthalmic ointment, Administer to both eyes every 3 (three) hours as needed (irritation), Disp: 3.5 g, Rfl: 0    Allergies    Allergies   Allergen Reactions    Atorvastatin Other (See Comments)     myalgia    Statins Other (See Comments)     Weakness in legs       The following portions of the patient's history were reviewed and updated as appropriate: allergies, current medications, past family history, past medical history, past social history, past surgical history, and problem list.    Investigations    I personally reviewed the CT results with the patient:    There was essentially complete to near complete resolution of the previously identified left frontal bleed.    Physical Exam    Vitals:  Blood pressure 138/86, pulse 73, temperature (!) 97.4 °F (36.3 °C), temperature source Temporal, resp. rate 17, height 5' 4\" (1.626 m), weight 68 kg (150 lb), SpO2 97%.,Body mass index is 25.75 kg/m².    Physical Exam  General appearance: alert, appears stated age, cooperative and no distress  Head: Normocephalic, without obvious abnormality, atraumatic  Eyes: EOMI, PERRL  Neck: supple, symmetrical, trachea midline and NT  Back: no kyphosis present, no tenderness to percussion or palpation  Lungs: non labored breathing, equal breath sounds bilaterally without wheezing or crackles  Heart: regular heart rate, normal S1, S2, no murmors  Abdomen: Soft, nontender, nondistended. No pain to palpation  Neurologic:   Mental status: Alert, oriented x 3, thought content appropriate  No aphasia  Cranial nerves: grossly intact (Cranial nerves II-XII). No facial droop  Sensory: normal to " LT  Motor: moving all extremities without focal weakness          Anne Marie Small MD

## 2024-03-15 ENCOUNTER — REMOTE DEVICE CLINIC VISIT (OUTPATIENT)
Dept: CARDIOLOGY CLINIC | Facility: CLINIC | Age: 84
End: 2024-03-15

## 2024-03-15 DIAGNOSIS — Z95.0 PRESENCE OF CARDIAC PACEMAKER: Primary | ICD-10-CM

## 2024-03-15 PROCEDURE — RECHECK: Performed by: INTERNAL MEDICINE

## 2024-03-15 NOTE — PROGRESS NOTES
NON-BILLABLE CARELINK TRANSMISSION: PER DR. CASTRO DUE TO AF EPISODES : BATTERY VOLTAGE ADEQUATE (13.1 YRS) AP: 82.8%. : 0.1% (MVP-ON). ALL AVAILABLE LEAD PARAMETERS WITHIN NORMAL LIMITS. 2 AT/AF EPISODES BOTH ON 3/11 W/ EGMS SHOWING PAT/AF, DURATION 4 MINS 44 SECS. AF BURDEN: 3.2%. PT TAKES FELCAINIDE, ASA 81MG. EF: 65% (ECHO 05/16/23). TASK DR MATTHEW. NORMAL DEVICE FUNCTION. CH  NOTE FROM NEUROSURGERY PT SEEN 3/6/24 I examined the patient, reviewed the new CT brain, and had a very pleasant and detailed discussion with Ann and her daughter regarding our findings.  We reviewed with the patient in detail the CT study and explained to her the bleed in the frontal region that was new last time has essentially gone away. Fortunately, on exam the patient continues to make nice sow progress. Since there was no worsening on CT but instead marked improvement in the prior bleed, she may continue the aspirin and follow up with us prn.

## 2024-04-07 ENCOUNTER — HOSPITAL ENCOUNTER (EMERGENCY)
Facility: HOSPITAL | Age: 84
Discharge: HOME | End: 2024-04-08
Attending: EMERGENCY MEDICINE
Payer: MEDICARE

## 2024-04-07 ENCOUNTER — APPOINTMENT (EMERGENCY)
Dept: RADIOLOGY | Facility: HOSPITAL | Age: 84
End: 2024-04-07
Payer: MEDICARE

## 2024-04-07 VITALS
HEART RATE: 70 BPM | SYSTOLIC BLOOD PRESSURE: 133 MMHG | RESPIRATION RATE: 20 BRPM | TEMPERATURE: 98 F | OXYGEN SATURATION: 98 % | DIASTOLIC BLOOD PRESSURE: 73 MMHG

## 2024-04-07 DIAGNOSIS — R40.4 TRANSIENT ALTERATION OF AWARENESS: Primary | ICD-10-CM

## 2024-04-07 LAB
ALBUMIN SERPL BCP-MCNC: 3.6 G/DL (ref 3.5–5)
ALP SERPL-CCNC: 48 U/L (ref 34–104)
ALT SERPL W P-5'-P-CCNC: 8 U/L (ref 7–52)
ANION GAP SERPL CALCULATED.3IONS-SCNC: 9 MMOL/L (ref 4–13)
AST SERPL W P-5'-P-CCNC: 14 U/L (ref 13–39)
BASOPHILS # BLD AUTO: 0.04 THOUSANDS/ÂΜL (ref 0–0.1)
BASOPHILS NFR BLD AUTO: 0 % (ref 0–1)
BILIRUB SERPL-MCNC: 0.37 MG/DL (ref 0.2–1)
BILIRUB UR QL STRIP: NEGATIVE
BUN SERPL-MCNC: 14 MG/DL (ref 5–25)
CALCIUM SERPL-MCNC: 9 MG/DL (ref 8.4–10.2)
CHLORIDE SERPL-SCNC: 100 MMOL/L (ref 96–108)
CLARITY UR: CLEAR
CO2 SERPL-SCNC: 27 MMOL/L (ref 21–32)
COLOR UR: COLORLESS
CREAT SERPL-MCNC: 0.8 MG/DL (ref 0.6–1.3)
EOSINOPHIL # BLD AUTO: 0.06 THOUSAND/ÂΜL (ref 0–0.61)
EOSINOPHIL NFR BLD AUTO: 0 % (ref 0–6)
ERYTHROCYTE [DISTWIDTH] IN BLOOD BY AUTOMATED COUNT: 14.5 % (ref 11.6–15.1)
FLUAV RNA RESP QL NAA+PROBE: NEGATIVE
FLUBV RNA RESP QL NAA+PROBE: NEGATIVE
GFR SERPL CREATININE-BSD FRML MDRD: 68 ML/MIN/1.73SQ M
GLUCOSE SERPL-MCNC: 118 MG/DL (ref 65–140)
GLUCOSE UR STRIP-MCNC: NEGATIVE MG/DL
HCT VFR BLD AUTO: 45.1 % (ref 34.8–46.1)
HGB BLD-MCNC: 14.5 G/DL (ref 11.5–15.4)
HGB UR QL STRIP.AUTO: NEGATIVE
IMM GRANULOCYTES # BLD AUTO: 0.06 THOUSAND/UL (ref 0–0.2)
IMM GRANULOCYTES NFR BLD AUTO: 0 % (ref 0–2)
KETONES UR STRIP-MCNC: ABNORMAL MG/DL
LEUKOCYTE ESTERASE UR QL STRIP: NEGATIVE
LYMPHOCYTES # BLD AUTO: 2.76 THOUSANDS/ÂΜL (ref 0.6–4.47)
LYMPHOCYTES NFR BLD AUTO: 19 % (ref 14–44)
MCH RBC QN AUTO: 28.8 PG (ref 26.8–34.3)
MCHC RBC AUTO-ENTMCNC: 32.2 G/DL (ref 31.4–37.4)
MCV RBC AUTO: 90 FL (ref 82–98)
MONOCYTES # BLD AUTO: 2.17 THOUSAND/ÂΜL (ref 0.17–1.22)
MONOCYTES NFR BLD AUTO: 15 % (ref 4–12)
NEUTROPHILS # BLD AUTO: 9.43 THOUSANDS/ÂΜL (ref 1.85–7.62)
NEUTS SEG NFR BLD AUTO: 66 % (ref 43–75)
NITRITE UR QL STRIP: NEGATIVE
NRBC BLD AUTO-RTO: 0 /100 WBCS
PH UR STRIP.AUTO: 7.5 [PH]
PLATELET # BLD AUTO: 173 THOUSANDS/UL (ref 149–390)
PMV BLD AUTO: 9.8 FL (ref 8.9–12.7)
POTASSIUM SERPL-SCNC: 4 MMOL/L (ref 3.5–5.3)
PROT SERPL-MCNC: 6.3 G/DL (ref 6.4–8.4)
PROT UR STRIP-MCNC: NEGATIVE MG/DL
RBC # BLD AUTO: 5.03 MILLION/UL (ref 3.81–5.12)
RSV RNA RESP QL NAA+PROBE: NEGATIVE
SARS-COV-2 RNA RESP QL NAA+PROBE: NEGATIVE
SODIUM SERPL-SCNC: 136 MMOL/L (ref 135–147)
SP GR UR STRIP.AUTO: 1.01 (ref 1–1.03)
TSH SERPL DL<=0.05 MIU/L-ACNC: 2.81 UIU/ML (ref 0.45–4.5)
UROBILINOGEN UR STRIP-ACNC: <2 MG/DL
WBC # BLD AUTO: 14.52 THOUSAND/UL (ref 4.31–10.16)

## 2024-04-07 PROCEDURE — 85025 COMPLETE CBC W/AUTO DIFF WBC: CPT

## 2024-04-07 PROCEDURE — 0241U HB NFCT DS VIR RESP RNA 4 TRGT: CPT

## 2024-04-07 PROCEDURE — 81003 URINALYSIS AUTO W/O SCOPE: CPT

## 2024-04-07 PROCEDURE — 36415 COLL VENOUS BLD VENIPUNCTURE: CPT

## 2024-04-07 PROCEDURE — 71046 X-RAY EXAM CHEST 2 VIEWS: CPT

## 2024-04-07 PROCEDURE — 84443 ASSAY THYROID STIM HORMONE: CPT

## 2024-04-07 PROCEDURE — 93005 ELECTROCARDIOGRAM TRACING: CPT

## 2024-04-07 PROCEDURE — 99285 EMERGENCY DEPT VISIT HI MDM: CPT | Performed by: EMERGENCY MEDICINE

## 2024-04-07 PROCEDURE — 70450 CT HEAD/BRAIN W/O DYE: CPT

## 2024-04-07 PROCEDURE — 80053 COMPREHEN METABOLIC PANEL: CPT

## 2024-04-07 RX ORDER — ACETAMINOPHEN 160 MG/5ML
2 SUSPENSION, ORAL (FINAL DOSE FORM) ORAL ONCE
Status: COMPLETED | OUTPATIENT
Start: 2024-04-07 | End: 2024-04-07

## 2024-04-07 NOTE — ED PROVIDER NOTES
History  Chief Complaint   Patient presents with    Altered Mental Status     Patient arrived via EMS from [facility], has dementia at baseline,patients daughter was visiting today and noticed increased confusion, patient reports no GI/ issues and denies CP, SOB,patient A&Ox3, not oriented to time     HPI  Patient is 83-year-old female with past medical history of dementia, hypertension, hyperlipidemia, A-fib, CKD, recent traumatic subdural hematoma presenting from [facility] for altered mental status.  Per EMS patient's daughter was visiting and noticed increased confusion and called EMS.  EMS reports febrile to 100.7 on arrival and given 1000 mg of Tylenol.  Patient has no complaints at this time.  Patient denies chest pain, shortness of breath, urinary complaints.  Prior to Admission Medications   Prescriptions Last Dose Informant Patient Reported? Taking?   CVS Senna Plus 8.6-50 MG per tablet  Self Yes No   Sig: every other day   Calcium Carbonate 1500 (600 Ca) MG TABS  Self Yes No   Sig: Take 1 tablet by mouth daily   Melatonin 3 MG CAPS  Self Yes No   Sig: Take 6 mg by mouth   acetaminophen (TYLENOL) 325 mg tablet   No No   Sig: Take 2 tablets (650 mg total) by mouth every 4 (four) hours as needed for mild pain   amLODIPine (NORVASC) 10 mg tablet  Self No No   Sig: Take 1 tablet (10 mg total) by mouth daily   aspirin (ECOTRIN LOW STRENGTH) 81 mg EC tablet  Outside Facility (Specify) Yes No   Sig: Take 81 mg by mouth daily   busPIRone (BUSPAR) 7.5 mg tablet  Outside Facility (Specify) Yes No   Sig: Take 7.5 mg by mouth 2 (two) times a day   citalopram (CeleXA) 10 mg tablet  Outside Facility (Specify) Yes No   Sig: Take 10 mg by mouth daily   divalproex sodium (DEPAKOTE) 250 mg EC tablet  Self Yes No   Sig: Take 250 mg by mouth 3 (three) times a day   donepezil (ARICEPT) 5 mg tablet  Self No No   Sig: Take 1 tablet (5 mg total) by mouth daily at bedtime   ergocalciferol (VITAMIN D2) 50,000 units   Self Yes No   Sig: Take 50,000 Units by mouth every 30 (thirty) days   flecainide (TAMBOCOR) 50 mg tablet  Self No No   Sig: Take 1 tablet (50 mg total) by mouth every 12 (twelve) hours   fluticasone (FLONASE) 50 mcg/act nasal spray   No No   Si spray into each nostril daily for 7 days   sodium chloride (MARITZA 128) 5 % hypertonic ophthalmic ointment  Self No No   Sig: Administer to both eyes every 3 (three) hours as needed (irritation)      Facility-Administered Medications: None       Past Medical History:   Diagnosis Date    Afib (HCC)     Arthritis     Hyperlipidemia     Hypertension     Osteopenia     Sleep apnea     Squamous cell skin cancer     LEF TEMPLE    Subdural hemorrhage (HCC)     Varicose veins of both lower extremities        Past Surgical History:   Procedure Laterality Date    CARDIAC ELECTROPHYSIOLOGY PROCEDURE N/A 10/12/2022    Procedure: Cardiac loop recorder implant;  Surgeon: Rashid Carlos MD;  [facility];  Service: Cardiology    CARDIAC ELECTROPHYSIOLOGY PROCEDURE N/A 2023    Procedure: Cardiac pacer implant;  Surgeon: Simba Chiell MD;  [facility];  Service: Cardiology    CATARACT EXTRACTION      COLONOSCOPY      complete, for polyp removal    EYE SURGERY      HYSTERECTOMY      age 45    INCISION AND DRAINAGE OF WOUND Right 2016    Procedure: ARTHROSCOPY,EVACUATION OF HEMATOMA, OPEN EXPLORATION OF WOUND;  Surgeon: Adam Brice MD;  [facility];  Service:     MOHS SURGERY Left 2022    left temple    SKIN BIOPSY      TONSILLECTOMY AND ADENOIDECTOMY      TOTAL KNEE ARTHROPLASTY Right     TOTAL KNEE ARTHROPLASTY Left     WISDOM TOOTH EXTRACTION         Family History   Problem Relation Age of Onset    Diabetes Mother     Diabetes Father     Diabetes Brother     No Known Problems Sister     No Known Problems Daughter     No Known Problems Maternal Grandmother     No Known Problems Maternal Grandfather     No Known Problems  Paternal Grandmother     No Known Problems Paternal Grandfather     No Known Problems Daughter     Brain cancer Son     No Known Problems Maternal Aunt      I have reviewed and agree with the history as documented.    E-Cigarette/Vaping    E-Cigarette Use Never User      E-Cigarette/Vaping Substances    Nicotine No     THC No     CBD No     Flavoring No     Other No     Unknown No      Social History     Tobacco Use    Smoking status: Never    Smokeless tobacco: Never   Vaping Use    Vaping status: Never Used   Substance Use Topics    Alcohol use: Not Currently    Drug use: No        Review of Systems   Unable to perform ROS: Dementia       Physical Exam  ED Triage Vitals [04/07/24 1924]   Temperature Pulse Respirations Blood Pressure SpO2   98 °F (36.7 °C) 70 20 133/73 98 %      Temp Source Heart Rate Source Patient Position - Orthostatic VS BP Location FiO2 (%)   Oral Monitor Lying Right arm --      Pain Score       --             Orthostatic Vital Signs  Vitals:    04/07/24 1924   BP: 133/73   Pulse: 70   Patient Position - Orthostatic VS: Lying       Physical Exam  Vitals reviewed.   Constitutional:       General: She is awake. She is not in acute distress.  HENT:      Head: Normocephalic and atraumatic.      Mouth/Throat:      Mouth: Mucous membranes are moist.   Eyes:      Extraocular Movements: Extraocular movements intact.      Right eye: No nystagmus.      Left eye: No nystagmus.      Conjunctiva/sclera: Conjunctivae normal.      Pupils: Pupils are equal, round, and reactive to light.   Cardiovascular:      Rate and Rhythm: Normal rate and regular rhythm.      Pulses: Normal pulses.      Heart sounds: Normal heart sounds, S1 normal and S2 normal. Heart sounds not distant. No murmur heard.     No friction rub. No gallop.   Pulmonary:      Breath sounds: No stridor. No wheezing, rhonchi or rales.      Comments: CTA b/l   Abdominal:      General: Bowel sounds are normal.      Palpations: Abdomen is soft.       Tenderness: There is no abdominal tenderness.   Musculoskeletal:      Right lower leg: No edema.      Left lower leg: No edema.   Skin:     General: Skin is warm and dry.      Capillary Refill: Capillary refill takes less than 2 seconds.   Neurological:      Mental Status: She is alert.      GCS: GCS eye subscore is 4. GCS verbal subscore is 4. GCS motor subscore is 6.      Cranial Nerves: Cranial nerves 2-12 are intact.      Sensory: Sensation is intact.      Motor: No weakness or pronator drift.      Coordination: Coordination normal. Finger-Nose-Finger Test normal.      Comments: Oriented to self and place         ED Medications  Medications   acetaminophen (FOR EMS ONLY) (TYLENOL) oral suspension 1,300 mg (has no administration in time range)       Diagnostic Studies  Results Reviewed       None                   No orders to display         Procedures  Procedures      ED Course  ED Course as of 04/07/24 2114   Sun Apr 07, 2024 2029 WBC(!): 14.52  Mild leukocytosis    2030 Spoke with daughter, Lotus, who states patient more altered today when picked up for dinner. Usually can hold conversation and had trouble stringing together sentences and more difficulty ambulating. Also fell asleep at table after eating some of her dinner.    2045 Procedure Note: EKG  Date/Time: 04/07/24 9:12 PM   Interpreted by: RANDALL BUTLER  Indications / Diagnosis: AMS  ECG reviewed by me, the ED Provider: yes   The EKG demonstrates:  Rhythm: normal sinus  Intervals: normal intervals  Axis: normal axis  QRS/Blocks: normal QRS  ST Changes: No acute ST Changes, no STD/ZAK.     2055 CT head without contrast  IMPRESSION:     No acute intracranial hemorrhage seen  No mass effect or midline shift seen                                         Medical Decision Making  Amount and/or Complexity of Data Reviewed  Labs: ordered. Decision-making details documented in ED Course.  Radiology: ordered and independent interpretation performed.  Decision-making details documented in ED Course.    Risk  OTC drugs.        Patient is 83 y.o. female with PMH of dementia, hypertension, hyperlipidemia, A-fib, CKD, recent traumatic subdural hematoma presenting from [facility] for altered mental status.  See history and physical documented above.       Differential diagnosis included but not limited to worsening of underlying dementia, infectious process, dehydration, electrolyte disturbance. Plan basic labs,, TSH, viral swab, UA, EKG, chest x-ray, CT head.  Dispo per reevaluation and workup    View ED course above for further discussion on patient workup.     All labs reviewed and utilized in the medical decision making process  All radiology studies independently viewed by me and interpreted by the radiologist.  I reviewed all testing with the patient.     Upon re-evaluation patient remains stable.       Patient signed out to Dr. Vásquez pending completion of labs and UA.  If negative can be discharged back to [facility] pending walking trial with walker, otherwise will need admission for ambulatory dysfunction  Disposition  Final diagnoses:   Transient alteration of awareness     Time reflects when diagnosis was documented in both MDM as applicable and the Disposition within this note       Time User Action Codes Description Comment    4/7/2024 11:33 PM Bao Vásquez Add [R40.4] Transient alteration of awareness           ED Disposition       ED Disposition   Discharge    Condition   Stable    Date/Time   Sun Apr 7, 2024 11:32 PM    Comment   Ann Cross discharge to home/self care.                   Follow-up Information       Follow up With Specialties Details Why Contact Info    Emelina Swann MD Palliative Care   [address] [place] [address] [id number] [phone]              Patient's Medications   Discharge Prescriptions    No medications on file     No discharge procedures on file.    PDMP Review         Value Time User    PDMP  Reviewed  Yes 12/31/2023 11:50 AM Rashel Pascual PA-C             ED Provider  Attending physically available and evaluated Ann Cross. I managed the patient along with the ED Attending.    Electronically Signed by           Alpa Stringer DO  04/09/24 [id number]

## 2024-04-07 NOTE — ED ATTENDING ATTESTATION
"I, Ever Cotton MD, saw and evaluated the patient. I have discussed the patient with the resident and agree with the resident's findings, Plan of Care, and MDM as documented in the resident's note, except where noted. All available labs and Radiology studies were reviewed.  I was present for key portions of any procedure(s) performed by the resident and I was immediately available to provide assistance.    At this point I agree with the current assessment done in the Emergency Department.  I have conducted an independent evaluation of this patient a history and physical is as follows:    84 yo female with a history of dementia, atrial fibrillation, VIRGILIO, anxiety, hyperlipidemia, HTN, CKD, and recent traumatic SDH brought to the ED by EMS from her nursing facility for evaluation of an altered mental status. The patient's daughter thought she was more confused than usual and fell asleep during dinner tonight. Per report she also seemed a bit more unsteady to family. No falls or head strikes. The patient has no appreciable complaints and says she feels \"fine\". Medics got a temperature of 100.7 in the field and administered PO APAP en route. No chest pain, shortness of breath, nausea, vomiting, or diaphoresis. She denies cough and dysuria. No other specific complaints.    ROS: per resident physician note    Gen: NAD, AA&Ox2 (not oriented to time)  HEENT: PERRL, EOMI  Neck: supple  CV: RRR  Lungs: CTA B/L  Abdomen: soft, NT/ND  Ext: no swelling or deformity  Neuro: 5/5 strength all extremities, sensation grossly intact  Skin: no rash    ED Course  The patient is comfortable appearing with stable vital signs and a benign physical examination on arrival. She has no appreciable complaints. Unclear etiology of earlier symptoms. Underlying dementia vs an infectious process vs dehydration vs electrolyte disturbance vs FTT? Will check EKG, basic labs, TSH, viral swab, CXR, and CT head. Will continue to monitor in the ED. " Disposition per workup and reassessment.      Critical Care Time  Procedures

## 2024-04-08 LAB
ATRIAL RATE: 70 BPM
P AXIS: 90 DEGREES
PR INTERVAL: 168 MS
QRS AXIS: 5 DEGREES
QRSD INTERVAL: 68 MS
QT INTERVAL: 382 MS
QTC INTERVAL: 412 MS
T WAVE AXIS: 46 DEGREES
VENTRICULAR RATE: 70 BPM

## 2024-04-08 PROCEDURE — 93010 ELECTROCARDIOGRAM REPORT: CPT | Performed by: INTERNAL MEDICINE

## 2024-04-08 NOTE — DISCHARGE INSTRUCTIONS
Please follow up with your PCP     Call your doctor or return to the ER if you experience fevers, chills, difficulty walking, or any other concerning symptoms

## 2024-04-15 ENCOUNTER — APPOINTMENT (EMERGENCY)
Dept: RADIOLOGY | Facility: HOSPITAL | Age: 84
DRG: 071 | End: 2024-04-15
Payer: MEDICARE

## 2024-04-15 ENCOUNTER — HOSPITAL ENCOUNTER (INPATIENT)
Facility: HOSPITAL | Age: 84
LOS: 7 days | Discharge: HOME HEALTH | DRG: 071 | End: 2024-04-22
Attending: EMERGENCY MEDICINE | Admitting: INTERNAL MEDICINE
Payer: MEDICARE

## 2024-04-15 DIAGNOSIS — R19.7 DIARRHEA: ICD-10-CM

## 2024-04-15 DIAGNOSIS — R41.82 ALTERED MENTAL STATUS: ICD-10-CM

## 2024-04-15 DIAGNOSIS — A04.72 CLOSTRIDIUM DIFFICILE DIARRHEA: ICD-10-CM

## 2024-04-15 DIAGNOSIS — G31.84 MINIMAL COGNITIVE IMPAIRMENT: ICD-10-CM

## 2024-04-15 DIAGNOSIS — F39 MOOD DISORDER (HCC): ICD-10-CM

## 2024-04-15 DIAGNOSIS — G93.40 ENCEPHALOPATHY ACUTE: ICD-10-CM

## 2024-04-15 DIAGNOSIS — G93.40 ENCEPHALOPATHY: Primary | ICD-10-CM

## 2024-04-15 LAB
2HR DELTA HS TROPONIN: 1 NG/L
ALBUMIN SERPL BCP-MCNC: 3.2 G/DL (ref 3.5–5)
ALP SERPL-CCNC: 36 U/L (ref 34–104)
ALT SERPL W P-5'-P-CCNC: 6 U/L (ref 7–52)
ANION GAP SERPL CALCULATED.3IONS-SCNC: 6 MMOL/L (ref 4–13)
APPEARANCE CSF: NORMAL
AST SERPL W P-5'-P-CCNC: 11 U/L (ref 13–39)
ATRIAL RATE: 65 BPM
BACTERIA UR QL AUTO: NORMAL /HPF
BASE EX.OXY STD BLDV CALC-SCNC: 81.5 % (ref 60–80)
BASE EXCESS BLDV CALC-SCNC: 1 MMOL/L
BASOPHILS # BLD AUTO: 0.03 THOUSANDS/ÂΜL (ref 0–0.1)
BASOPHILS NFR BLD AUTO: 0 % (ref 0–1)
BILIRUB SERPL-MCNC: 0.29 MG/DL (ref 0.2–1)
BILIRUB UR QL STRIP: NEGATIVE
BUN SERPL-MCNC: 14 MG/DL (ref 5–25)
C GATTII+NEOFOR DNA CSF QL NAA+NON-PROBE: NOT DETECTED
CALCIUM ALBUM COR SERPL-MCNC: 8.8 MG/DL (ref 8.3–10.1)
CALCIUM SERPL-MCNC: 8.2 MG/DL (ref 8.4–10.2)
CARDIAC TROPONIN I PNL SERPL HS: 4 NG/L
CARDIAC TROPONIN I PNL SERPL HS: 5 NG/L
CHLORIDE SERPL-SCNC: 101 MMOL/L (ref 96–108)
CLARITY UR: CLEAR
CMV DNA CSF QL NAA+NON-PROBE: NOT DETECTED
CO2 SERPL-SCNC: 31 MMOL/L (ref 21–32)
COLOR UR: ABNORMAL
CREAT SERPL-MCNC: 0.74 MG/DL (ref 0.6–1.3)
CRP SERPL QL: 51.6 MG/L
D DIMER PPP FEU-MCNC: 0.43 UG/ML FEU
E COLI K1 DNA CSF QL NAA+NON-PROBE: NOT DETECTED
EOSINOPHIL # BLD AUTO: 0.07 THOUSAND/ÂΜL (ref 0–0.61)
EOSINOPHIL NFR BLD AUTO: 1 % (ref 0–6)
ERYTHROCYTE [DISTWIDTH] IN BLOOD BY AUTOMATED COUNT: 14.6 % (ref 11.6–15.1)
ERYTHROCYTE [SEDIMENTATION RATE] IN BLOOD: 14 MM/HOUR (ref 0–29)
EV RNA CSF QL NAA+NON-PROBE: NOT DETECTED
GFR SERPL CREATININE-BSD FRML MDRD: 75 ML/MIN/1.73SQ M
GLUCOSE CSF-MCNC: 64 MG/DL (ref 40–70)
GLUCOSE SERPL-MCNC: 90 MG/DL (ref 65–140)
GLUCOSE UR STRIP-MCNC: NEGATIVE MG/DL
GP B STREP DNA CSF QL NAA+NON-PROBE: NOT DETECTED
HAEM INFLU DNA CSF QL NAA+NON-PROBE: NOT DETECTED
HCO3 BLDV-SCNC: 25.2 MMOL/L (ref 24–30)
HCT VFR BLD AUTO: 42.9 % (ref 34.8–46.1)
HGB BLD-MCNC: 13.9 G/DL (ref 11.5–15.4)
HGB UR QL STRIP.AUTO: NEGATIVE
HHV6 DNA CSF QL NAA+NON-PROBE: NOT DETECTED
HSV1 DNA CSF QL NAA+NON-PROBE: NOT DETECTED
HSV1 DNA CSF QL NAA+PROBE: NOT DETECTED
HSV1 DNA CSF QL NAA+PROBE: NOT DETECTED
HSV2 DNA CSF QL NAA+NON-PROBE: NOT DETECTED
IMM GRANULOCYTES # BLD AUTO: 0.08 THOUSAND/UL (ref 0–0.2)
IMM GRANULOCYTES NFR BLD AUTO: 1 % (ref 0–2)
KETONES UR STRIP-MCNC: ABNORMAL MG/DL
L MONOCYTOG DNA CSF QL NAA+NON-PROBE: NOT DETECTED
LACTATE SERPL-SCNC: 1.6 MMOL/L (ref 0.5–2)
LACTATE SERPL-SCNC: 2.6 MMOL/L (ref 0.5–2)
LEUKOCYTE ESTERASE UR QL STRIP: ABNORMAL
LYMPHOCYTES # BLD AUTO: 2.23 THOUSANDS/ÂΜL (ref 0.6–4.47)
LYMPHOCYTES NFR BLD AUTO: 25 % (ref 14–44)
LYMPHOCYTES NFR CSF MANUAL: 97 %
MCH RBC QN AUTO: 29.2 PG (ref 26.8–34.3)
MCHC RBC AUTO-ENTMCNC: 32.4 G/DL (ref 31.4–37.4)
MCV RBC AUTO: 90 FL (ref 82–98)
MONOCYTES # BLD AUTO: 1.66 THOUSAND/ÂΜL (ref 0.17–1.22)
MONOCYTES NFR BLD AUTO: 19 % (ref 4–12)
N MEN DNA CSF QL NAA+NON-PROBE: NOT DETECTED
NEUTROPHILS # BLD AUTO: 4.9 THOUSANDS/ÂΜL (ref 1.85–7.62)
NEUTROPHILS NFR CSF MANUAL: 3 %
NEUTS SEG NFR BLD AUTO: 54 % (ref 43–75)
NITRITE UR QL STRIP: NEGATIVE
NON-SQ EPI CELLS URNS QL MICRO: NORMAL /HPF
NRBC BLD AUTO-RTO: 0 /100 WBCS
O2 CT BLDV-SCNC: 16.9 ML/DL
P AXIS: 87 DEGREES
PARECHOVIRUS A RNA CSF QL NAA+NON-PROBE: NOT DETECTED
PCO2 BLDV: 38.6 MM HG (ref 42–50)
PH BLDV: 7.43 [PH] (ref 7.3–7.4)
PH UR STRIP.AUTO: 6.5 [PH]
PLATELET # BLD AUTO: 161 THOUSANDS/UL (ref 149–390)
PMV BLD AUTO: 9.7 FL (ref 8.9–12.7)
PO2 BLDV: 48.8 MM HG (ref 35–45)
POTASSIUM SERPL-SCNC: 4.4 MMOL/L (ref 3.5–5.3)
PR INTERVAL: 156 MS
PROT CSF-MCNC: 35 MG/DL (ref 15–45)
PROT SERPL-MCNC: 5.5 G/DL (ref 6.4–8.4)
PROT UR STRIP-MCNC: NEGATIVE MG/DL
QRS AXIS: 38 DEGREES
QRSD INTERVAL: 72 MS
QT INTERVAL: 422 MS
QTC INTERVAL: 438 MS
RBC # BLD AUTO: 4.76 MILLION/UL (ref 3.81–5.12)
RBC #/AREA URNS AUTO: NORMAL /HPF
S PNEUM DNA CSF QL NAA+NON-PROBE: NOT DETECTED
SODIUM SERPL-SCNC: 138 MMOL/L (ref 135–147)
SP GR UR STRIP.AUTO: 1.01 (ref 1–1.03)
T WAVE AXIS: 25 DEGREES
TOTAL CELLS COUNTED BLD: NO
TOTAL CELLS COUNTED SPEC: 36
TREPONEMA PALLIDUM IGG+IGM AB [PRESENCE] IN SERUM OR PLASMA BY IMMUNOASSAY: NORMAL
TSH SERPL DL<=0.05 MIU/L-ACNC: 2.53 UIU/ML (ref 0.45–4.5)
TUBE # CSF: 4
UROBILINOGEN UR STRIP-ACNC: <2 MG/DL
VENTRICULAR RATE: 65 BPM
VZV DNA CSF QL NAA+NON-PROBE: NOT DETECTED
WBC # BLD AUTO: 8.97 THOUSAND/UL (ref 4.31–10.16)
WBC # CSF AUTO: 2 /UL (ref 0–5)
WBC #/AREA URNS AUTO: NORMAL /HPF

## 2024-04-15 PROCEDURE — 36415 COLL VENOUS BLD VENIPUNCTURE: CPT

## 2024-04-15 PROCEDURE — 84157 ASSAY OF PROTEIN OTHER: CPT

## 2024-04-15 PROCEDURE — 96365 THER/PROPH/DIAG IV INF INIT: CPT

## 2024-04-15 PROCEDURE — 99255 IP/OBS CONSLTJ NEW/EST HI 80: CPT | Performed by: PSYCHIATRY & NEUROLOGY

## 2024-04-15 PROCEDURE — 99285 EMERGENCY DEPT VISIT HI MDM: CPT | Performed by: EMERGENCY MEDICINE

## 2024-04-15 PROCEDURE — 62270 DX LMBR SPI PNXR: CPT | Performed by: EMERGENCY MEDICINE

## 2024-04-15 PROCEDURE — 85652 RBC SED RATE AUTOMATED: CPT

## 2024-04-15 PROCEDURE — 84484 ASSAY OF TROPONIN QUANT: CPT

## 2024-04-15 PROCEDURE — 71260 CT THORAX DX C+: CPT

## 2024-04-15 PROCEDURE — 85025 COMPLETE CBC W/AUTO DIFF WBC: CPT

## 2024-04-15 PROCEDURE — 93010 ELECTROCARDIOGRAM REPORT: CPT | Performed by: INTERNAL MEDICINE

## 2024-04-15 PROCEDURE — 86255 FLUORESCENT ANTIBODY SCREEN: CPT

## 2024-04-15 PROCEDURE — 87483 CNS DNA AMP PROBE TYPE 12-25: CPT

## 2024-04-15 PROCEDURE — 99285 EMERGENCY DEPT VISIT HI MDM: CPT

## 2024-04-15 PROCEDURE — 70450 CT HEAD/BRAIN W/O DYE: CPT

## 2024-04-15 PROCEDURE — 85379 FIBRIN DEGRADATION QUANT: CPT

## 2024-04-15 PROCEDURE — 86140 C-REACTIVE PROTEIN: CPT

## 2024-04-15 PROCEDURE — 87529 HSV DNA AMP PROBE: CPT

## 2024-04-15 PROCEDURE — 86592 SYPHILIS TEST NON-TREP QUAL: CPT

## 2024-04-15 PROCEDURE — 86780 TREPONEMA PALLIDUM: CPT

## 2024-04-15 PROCEDURE — 93005 ELECTROCARDIOGRAM TRACING: CPT

## 2024-04-15 PROCEDURE — 81001 URINALYSIS AUTO W/SCOPE: CPT

## 2024-04-15 PROCEDURE — 80053 COMPREHEN METABOLIC PANEL: CPT

## 2024-04-15 PROCEDURE — 89051 BODY FLUID CELL COUNT: CPT

## 2024-04-15 PROCEDURE — 99223 1ST HOSP IP/OBS HIGH 75: CPT | Performed by: INTERNAL MEDICINE

## 2024-04-15 PROCEDURE — 86341 ISLET CELL ANTIBODY: CPT

## 2024-04-15 PROCEDURE — 84443 ASSAY THYROID STIM HORMONE: CPT

## 2024-04-15 PROCEDURE — 87070 CULTURE OTHR SPECIMN AEROBIC: CPT

## 2024-04-15 PROCEDURE — 86618 LYME DISEASE ANTIBODY: CPT

## 2024-04-15 PROCEDURE — 96366 THER/PROPH/DIAG IV INF ADDON: CPT

## 2024-04-15 PROCEDURE — 82805 BLOOD GASES W/O2 SATURATION: CPT

## 2024-04-15 PROCEDURE — 83605 ASSAY OF LACTIC ACID: CPT

## 2024-04-15 PROCEDURE — 82945 GLUCOSE OTHER FLUID: CPT

## 2024-04-15 PROCEDURE — 74177 CT ABD & PELVIS W/CONTRAST: CPT

## 2024-04-15 PROCEDURE — 71046 X-RAY EXAM CHEST 2 VIEWS: CPT

## 2024-04-15 RX ORDER — ACETAMINOPHEN 325 MG/1
650 TABLET ORAL EVERY 6 HOURS PRN
Status: DISCONTINUED | OUTPATIENT
Start: 2024-04-15 | End: 2024-04-22 | Stop reason: HOSPADM

## 2024-04-15 RX ORDER — SODIUM CHLORIDE, SODIUM GLUCONATE, SODIUM ACETATE, POTASSIUM CHLORIDE, MAGNESIUM CHLORIDE, SODIUM PHOSPHATE, DIBASIC, AND POTASSIUM PHOSPHATE .53; .5; .37; .037; .03; .012; .00082 G/100ML; G/100ML; G/100ML; G/100ML; G/100ML; G/100ML; G/100ML
1000 INJECTION, SOLUTION INTRAVENOUS ONCE
Status: COMPLETED | OUTPATIENT
Start: 2024-04-15 | End: 2024-04-15

## 2024-04-15 RX ORDER — BUSPIRONE HYDROCHLORIDE 5 MG/1
7.5 TABLET ORAL 2 TIMES DAILY
Status: DISCONTINUED | OUTPATIENT
Start: 2024-04-15 | End: 2024-04-22 | Stop reason: HOSPADM

## 2024-04-15 RX ORDER — ERGOCALCIFEROL 1.25 MG/1
50000 CAPSULE ORAL
Status: DISCONTINUED | OUTPATIENT
Start: 2024-04-15 | End: 2024-04-22 | Stop reason: HOSPADM

## 2024-04-15 RX ORDER — DIVALPROEX SODIUM 250 MG/1
250 TABLET, DELAYED RELEASE ORAL 3 TIMES DAILY
Status: DISCONTINUED | OUTPATIENT
Start: 2024-04-15 | End: 2024-04-22 | Stop reason: HOSPADM

## 2024-04-15 RX ORDER — POLYETHYLENE GLYCOL 3350 17 G/17G
17 POWDER, FOR SOLUTION ORAL DAILY
Status: DISCONTINUED | OUTPATIENT
Start: 2024-04-16 | End: 2024-04-16

## 2024-04-15 RX ORDER — FLUTICASONE PROPIONATE 50 MCG
1 SPRAY, SUSPENSION (ML) NASAL DAILY
Status: DISCONTINUED | OUTPATIENT
Start: 2024-04-16 | End: 2024-04-22 | Stop reason: HOSPADM

## 2024-04-15 RX ORDER — DOCUSATE SODIUM 100 MG/1
100 CAPSULE, LIQUID FILLED ORAL 2 TIMES DAILY
Status: DISCONTINUED | OUTPATIENT
Start: 2024-04-15 | End: 2024-04-16

## 2024-04-15 RX ORDER — CITALOPRAM HYDROBROMIDE 10 MG/1
10 TABLET ORAL DAILY
Status: DISCONTINUED | OUTPATIENT
Start: 2024-04-16 | End: 2024-04-22 | Stop reason: HOSPADM

## 2024-04-15 RX ORDER — CALCIUM CARBONATE 500 MG/1
500 TABLET, CHEWABLE ORAL 2 TIMES DAILY PRN
Status: DISCONTINUED | OUTPATIENT
Start: 2024-04-15 | End: 2024-04-22 | Stop reason: HOSPADM

## 2024-04-15 RX ORDER — AMOXICILLIN 250 MG
1 CAPSULE ORAL 2 TIMES DAILY
Status: DISCONTINUED | OUTPATIENT
Start: 2024-04-15 | End: 2024-04-16

## 2024-04-15 RX ORDER — ENOXAPARIN SODIUM 100 MG/ML
40 INJECTION SUBCUTANEOUS DAILY
Status: DISCONTINUED | OUTPATIENT
Start: 2024-04-16 | End: 2024-04-22 | Stop reason: HOSPADM

## 2024-04-15 RX ORDER — ONDANSETRON 2 MG/ML
4 INJECTION INTRAMUSCULAR; INTRAVENOUS EVERY 6 HOURS PRN
Status: DISCONTINUED | OUTPATIENT
Start: 2024-04-15 | End: 2024-04-22 | Stop reason: HOSPADM

## 2024-04-15 RX ORDER — FLECAINIDE ACETATE 50 MG/1
50 TABLET ORAL EVERY 12 HOURS SCHEDULED
Status: DISCONTINUED | OUTPATIENT
Start: 2024-04-15 | End: 2024-04-22 | Stop reason: HOSPADM

## 2024-04-15 RX ORDER — LANOLIN ALCOHOL/MO/W.PET/CERES
6 CREAM (GRAM) TOPICAL
Status: DISCONTINUED | OUTPATIENT
Start: 2024-04-15 | End: 2024-04-22 | Stop reason: HOSPADM

## 2024-04-15 RX ADMIN — SODIUM CHLORIDE, SODIUM GLUCONATE, SODIUM ACETATE, POTASSIUM CHLORIDE, MAGNESIUM CHLORIDE, SODIUM PHOSPHATE, DIBASIC, AND POTASSIUM PHOSPHATE 1000 ML: .53; .5; .37; .037; .03; .012; .00082 INJECTION, SOLUTION INTRAVENOUS at 12:43

## 2024-04-15 RX ADMIN — DIVALPROEX SODIUM 250 MG: 250 TABLET, DELAYED RELEASE ORAL at 20:26

## 2024-04-15 RX ADMIN — IOHEXOL 100 ML: 350 INJECTION, SOLUTION INTRAVENOUS at 14:41

## 2024-04-15 RX ADMIN — MELATONIN 6 MG: at 21:33

## 2024-04-15 RX ADMIN — FLECAINIDE ACETATE 50 MG: 50 TABLET ORAL at 20:26

## 2024-04-15 RX ADMIN — BUSPIRONE HYDROCHLORIDE 7.5 MG: 5 TABLET ORAL at 20:26

## 2024-04-15 NOTE — H&P
[facility]  H&P  Name: Ann Cross 83 y.o. female I MRN: [id number]  Unit/Bed#: ED 01 I Date of Admission: 4/15/2024   Date of Service: 4/15/2024 I Hospital Day: 0      Assessment/Plan   * Encephalopathy  Assessment & Plan  Patient presents with worsening cognition, forgetfulness generalized weakness lethargy.  Patient and daughter are worried about worsening memory issues  ED plans for further workup including CSF analysis noted  CT head no acute intracranial abnormality  Neurology following  Avoid neurotoxins  Monitor    Diarrhea  Assessment & Plan  Reports occasional episodes of loose stool/diarrhea  Abdominal imaging concerning for sigmoid colon thickening  Outpatient GI follow-up  Supportive cares    Stage 3 chronic kidney disease, unspecified whether stage 3a or 3b CKD (HCC)  Assessment & Plan  Lab Results   Component Value Date    EGFR 75 04/15/2024    EGFR 68 04/07/2024    EGFR 69 01/31/2024    CREATININE 0.74 04/15/2024    CREATININE 0.80 04/07/2024    CREATININE 0.79 01/31/2024     Monitor kidney function close  Avoid nephrotoxins    Tachy-alison syndrome (HCC)  Assessment & Plan  S/p PPM placement    Paroxysmal atrial fibrillation (HCC)  Assessment & Plan  Continue flecainide  Not on anticoagulation      Degeneration of lumbar intervertebral disc  Assessment & Plan  History of degenerative lumbar disc disease  Analgesics  Physical therapy    Hypertension  Assessment & Plan  Monitor blood pressures  Monitor orthostatics  Avoid hypotension    Hyperlipemia  Assessment & Plan  Outpatient follow-up    Minimal cognitive impairment  Assessment & Plan  History of cognitive impairment  Delirium precautions  Reorientation, sleep hygiene     Mood disorder with hypomanic features  Assessment & Plan  Continue buspirone, citalopram             VTE Pharmacologic Prophylaxis: VTE Score: 6 High Risk (Score >/= 5) - Pharmacological DVT Prophylaxis Ordered: enoxaparin (Lovenox).  "Sequential Compression Devices Ordered.  Code Status: Level 1 - Full Code     Discussion with family:  Discussed with the patient, called updated daughter Lotus in detail questions answered.     Anticipated Length of Stay: Patient will be admitted on an inpatient basis with an anticipated length of stay of greater than 2 midnights secondary to encephalopathy for management and workup as outlined, including neurology and geriatric evaluation.    Chief Complaint:     Altered mental status    History of Present Illness:  Ann Cross is a 83 y.o. female with a PMH of paroxysmal atrial fibrillation, hypertension, history of mild cognitive impairment, mood disorder with hypomanic features, tachybradycardia syndrome s/p PPM, history of traumatic subdural hemorrhage who presents with altered mental status.    Patient reports she is feeling \" I just feel very old\", she reports cognitive impairment.  She reports slowing of her thoughts difficulty with processing information.  History is obtained from the patient as well as her daughter Lotus.  They are concerned about ongoing cognitive impairment which is worsening rapidly.    No history of headaches.  No history of focal weakness.  No history of dysphagia diplopia dysarthria.    Discussed with ER physician with plans for further workup including CSF analysis noted.  Neurology at bedside.    Patient denies history of fever chills sweats constitutional symptoms.  No history of chest pain shortness of breath palpitations presyncope syncope.  Denies cough chest tightness wheezing.  Denies abdominal pain nausea vomiting.  She reports occasional episodes of loose stools.      Prior hospitalization outpatient notes reviewed in [facility]  Medical records from [facility] reviewed    Review of Systems:  Review of Systems   All other systems reviewed and are negative.      Past Medical and Surgical History:   Past Medical History:   Diagnosis Date    Afib (HCC)     Arthritis     Hyperlipidemia  "    Hypertension     Osteopenia     Sleep apnea     Squamous cell skin cancer     LEF TEMPLE    Subdural hemorrhage (HCC)     Varicose veins of both lower extremities        Past Surgical History:   Procedure Laterality Date    CARDIAC ELECTROPHYSIOLOGY PROCEDURE N/A 10/12/2022    Procedure: Cardiac loop recorder implant;  Surgeon: Rashid Carlos MD;  Location: [facility];  Service: Cardiology    CARDIAC ELECTROPHYSIOLOGY PROCEDURE N/A 4/21/2023    Procedure: Cardiac pacer implant;  Surgeon: Simba Chilel MD;  Location: [facility];  Service: Cardiology    CATARACT EXTRACTION      COLONOSCOPY      complete, for polyp removal    EYE SURGERY      HYSTERECTOMY      age 45    INCISION AND DRAINAGE OF WOUND Right 06/28/2016    Procedure: ARTHROSCOPY,EVACUATION OF HEMATOMA, OPEN EXPLORATION OF WOUND;  Surgeon: Adam Brice MD;  Location: [facility];  Service:     MOHS SURGERY Left 12/27/2022    left temple    SKIN BIOPSY      TONSILLECTOMY AND ADENOIDECTOMY      TOTAL KNEE ARTHROPLASTY Right     TOTAL KNEE ARTHROPLASTY Left     WISDOM TOOTH EXTRACTION         Meds/Allergies:  Prior to Admission medications    Medication Sig Start Date End Date Taking? Authorizing Provider   acetaminophen (TYLENOL) 325 mg tablet Take 2 tablets (650 mg total) by mouth every 4 (four) hours as needed for mild pain 12/24/23   LUZ Garber   amLODIPine (NORVASC) 10 mg tablet Take 1 tablet (10 mg total) by mouth daily 8/19/22 2/19/24  Mary Kate Regalado PA-C   aspirin (ECOTRIN LOW STRENGTH) 81 mg EC tablet Take 81 mg by mouth daily    Historical Provider, MD   busPIRone (BUSPAR) 7.5 mg tablet Take 7.5 mg by mouth 2 (two) times a day    Historical Provider, MD   Calcium Carbonate 1500 (600 Ca) MG TABS Take 1 tablet by mouth daily 9/9/22   Historical Provider, MD   citalopram (CeleXA) 10 mg tablet Take 10 mg by mouth daily    Historical Provider, MD   [facility] Senna Plus 8.6-50 MG per tablet every other day 9/9/22    Historical Provider, MD   divalproex sodium (DEPAKOTE) 250 mg EC tablet Take 250 mg by mouth 3 (three) times a day 9/9/22   Historical Provider, MD   donepezil (ARICEPT) 5 mg tablet Take 1 tablet (5 mg total) by mouth daily at bedtime 8/19/22 2/19/24  Mary Kate Regalado PA-C   ergocalciferol (VITAMIN D2) 50,000 units Take 50,000 Units by mouth every 30 (thirty) days 9/9/22   Historical Provider, MD   flecainide (TAMBOCOR) 50 mg tablet Take 1 tablet (50 mg total) by mouth every 12 (twelve) hours 8/19/22 2/19/24  Mary Kate Regalado PA-C   fluticasone (FLONASE) 50 mcg/act nasal spray 1 spray into each nostril daily for 7 days 2/20/23 4/21/23  Sterling Taylor MD   Melatonin 3 MG CAPS Take 6 mg by mouth    Historical Provider, MD   sodium chloride (MARITZA 128) 5 % hypertonic ophthalmic ointment Administer to both eyes every 3 (three) hours as needed (irritation) 8/19/22 2/19/24  Mary Kate Regalado PA-C     I have reviewed home medications with patient family member.    Allergies:   Allergies   Allergen Reactions    Atorvastatin Other (See Comments)     myalgia    Statins Other (See Comments)     Weakness in legs       Social History:  Marital Status:    Occupation:   Patient Pre-hospital Living Situation: SNF  Patient Pre-hospital Level of Mobility:  Ambulatory dysfunction ambulates with a cane/walker  Patient Pre-hospital Diet Restrictions: no  Substance Use History:   Social History     Substance and Sexual Activity   Alcohol Use Not Currently     Social History     Tobacco Use   Smoking Status Never   Smokeless Tobacco Never     Social History     Substance and Sexual Activity   Drug Use No       Family History:  Family History   Problem Relation Age of Onset    Diabetes Mother     Diabetes Father     Diabetes Brother     No Known Problems Sister     No Known Problems Daughter     No Known Problems Maternal Grandmother     No Known Problems Maternal Grandfather     No Known Problems Paternal  Grandmother     No Known Problems Paternal Grandfather     No Known Problems Daughter     Brain cancer Son     No Known Problems Maternal Aunt        Physical Exam:     Vitals:   Blood Pressure: 138/64 (04/15/24 1700)  Pulse: 63 (04/15/24 1700)  Temperature: 98.7 °F (37.1 °C) (04/15/24 1137)  Temp Source: Oral (04/15/24 1137)  Respirations: 18 (04/15/24 1700)  SpO2: 93 % (04/15/24 1400)    Physical Exam       Comfortably in bed  Neck supple  Lungs diminished breath sounds bilateral  Vesicular breath sounds  Heart sounds S1 and S2 noted  Abdomen soft nontender  Awake follows commands  Moves extremities  No pedal edema  No rash    Additional Data:     Lab Results:  Results from last 7 days   Lab Units 04/15/24  1152   WBC Thousand/uL 8.97   HEMOGLOBIN g/dL 13.9   HEMATOCRIT % 42.9   PLATELETS Thousands/uL 161   SEGS PCT % 54   LYMPHO PCT % 25   MONO PCT % 19*   EOS PCT % 1     Results from last 7 days   Lab Units 04/15/24  1303   SODIUM mmol/L 138   POTASSIUM mmol/L 4.4   CHLORIDE mmol/L 101   CO2 mmol/L 31   BUN mg/dL 14   CREATININE mg/dL 0.74   ANION GAP mmol/L 6   CALCIUM mg/dL 8.2*   ALBUMIN g/dL 3.2*   TOTAL BILIRUBIN mg/dL 0.29   ALK PHOS U/L 36   ALT U/L 6*   AST U/L 11*   GLUCOSE RANDOM mg/dL 90                 Results from last 7 days   Lab Units 04/15/24  1427 04/15/24  1152   LACTIC ACID mmol/L 1.6 2.6*       Lines/Drains:  Invasive Devices       Peripheral Intravenous Line  Duration             Peripheral IV 04/15/24 Left Antecubital <1 day                        Imaging: Reviewed radiology reports from this admission including: chest CT scan, abdominal/pelvic CT, CT head, and ECHO  CT chest abdomen pelvis w contrast   Final Result by Leon Rangel MD (04/15 1626)      1.  No acute abnormality in the chest.      2.  Segmental thickening of the sigmoid colon with adjacent mesenteric lymphadenopathy of uncertain etiology. Segmental inflammatory or infectious colitis is possible though there is no  surrounding stranding. Malignancy is also possible though a discrete    measurable mass is not identified. Clinical correlation and follow-up colonoscopy recommended.      3.  Ectatic aorta measuring up to 2.7 cm. Recommendation is for follow-up in 5 years though considerations related to the patient's age and/or comorbidities may be used to alter this recommendation.      The study was marked in EPIC for immediate notification.      Workstation performed: [id number]         XR chest 2 views   Final Result by Hunter Nixon MD (04/15 8125)      No acute cardiopulmonary disease.            Workstation performed: [id number]         CT head without contrast   Final Result by Pallav N Shah, MD (04/15 0151)      No acute intracranial abnormality.      Similar moderate chronic microangiopathic changes.                  Resident: Ameya Hurley I, the attending radiologist, have reviewed the images and agree with the final report above.      Workstation performed: [id number]             EKG and Other Studies Reviewed on Admission:   EKG:  Sinus rhythm.    ** Please Note: This note has been constructed using a voice recognition system. **

## 2024-04-15 NOTE — ASSESSMENT & PLAN NOTE
Patient presents with worsening cognition, forgetfulness generalized weakness lethargy.  Patient and daughter are worried about worsening memory issues  ED plans for further workup including CSF analysis noted  CT head no acute intracranial abnormality  Neurology following  Avoid neurotoxins  Monitor

## 2024-04-15 NOTE — ASSESSMENT & PLAN NOTE
Reports occasional episodes of loose stool/diarrhea  Abdominal imaging concerning for sigmoid colon thickening  Outpatient GI follow-up  Supportive cares

## 2024-04-15 NOTE — ASSESSMENT & PLAN NOTE
Lab Results   Component Value Date    EGFR 75 04/15/2024    EGFR 68 04/07/2024    EGFR 69 01/31/2024    CREATININE 0.74 04/15/2024    CREATININE 0.80 04/07/2024    CREATININE 0.79 01/31/2024     Monitor kidney function close  Avoid nephrotoxins

## 2024-04-15 NOTE — CONSULTS
NEUROLOGY RESIDENCY CONSULT NOTE     Name: Ann Cross   Age & Sex: 83 y.o. female   MRN: [id number]  Unit/Bed#: ED 01   Encounter: [id number]  Length of Stay: 0    Recommendations for outpatient neurological follow up have yet to be determined.     Pending for discharge: AMS workup    ASSESSMENT & PLAN   # Altered mental status  83-year-old female with a past medical history of dementia on Aricept, traumatic subdural in 2023, paroxysmal not on AC, mood disorder on BuSpar and Celexa, HLD, HTN, CKD presenting from nursing home with increased lethargy and altered mental status.  Daughter reports decline in cognition over the last 6 months with notable abrupt change over the last week with increased lethargy, difficulty following commands, and decline in ADLs.  Noted to have loose stools over the last week.  Recent ED visit on 4/7 for similar complaint with initial labs and UA unremarkable and was discharged home.  Represented on 4/15/2024.    Workup:  Initial ED labs: CBC and BMP unremarkable.  Lactic acid 2.6.  TSH 2.592.  ESR 14, CRP 51.6  UA: Trace leukocytes  CT head: No acute intracranial abnormalities, similar moderate chronic microangiopathic changes.  CT CAP: Segmental thickening of the sigmoid colon with adjacent mesenteric LAD of uncertain etiology.  Segmental inflammatory infectious colitis possible although there is no - surrounding stranding.  Malignancy also possible though discrete nodule is not identified.    Impression: Patient with acute on chronic decline in cognition with difficulty maintaining her ADLs.  Suspect toxic metabolic encephalopathy in a patient with a already poor baseline cognitive reserve.    Plan:  Frequent neurochecks  Obtain MRI brain to rule out intracranial pathology  Follow-up LP studies obtained in the ED  Follow-up labs: Syphilis, HSV  Ongoing evaluation and treatment for infectious process per primary team  PT OT evaluate and treat    SUBJECTIVE     Reason for Consult /  Principal Problem: Altered mental status  Hx and PE limited by: Altered mental status    HPI: Ann Cross is a 83 y.o. with a past medical history of dementia on Aricept, traumatic SDH in 12/2023, paroxysmal A-fib not on AC?,  Mood disorder on BuSpar and Celexa, HLD, HTN, CKD who presents from [facility] for increased lethargy and altered mental status.  Majority of history obtained from chart review and patient's daughter Lotus.  Patient's daughter states that patient has had a abrupt onset but steady decline in her cognition since December 2023 where she noticed changes in behavior and her mentation.  Around Romance time patient had a traumatic SDH that was sustained after standing up on a chair and falling backwards with positive head strike.  Daughter reports labs were discharged from that hospitalization mentation has fluctuated with some periods of increased improvement.  Notes that last Sunday her daughter visited her and took her out to dinner where it was noted that patient had increased lethargy and was falling asleep during the meal, was noted to have difficulty feeding herself, and had difficulty following commands.  She was seen in the ED on 4/7/2024 for these complaints more workup consisting of baseline labs and UA was overall unremarkable and was discharged home.  Over the last week patient was noted to have some decline in her ADLs with difficulty changing her close and toileting.  Has had multiple loose stools over the week with fluctuations in her ability to follow commands and directions.    Due to ongoing symptoms patient presented to the ED today for further evaluation.  VS 98.7 °F, HR 62, SpO2 96%, RR 18, /56. EKG normal sinus rhythm HR 65.  EKG NSR.  CBC and BMP unremarkable.  D-dimer 0.43, troponins negative, lactic acid 2.6, TSH 2.592, ESR 14, CRP 51.6.  UA trace leukocytes.  LP performed results pending.  CT head no acute intracranial abnormalities, similar moderate  chronic microangiopathic changes.  Was admitted to [facility] for further workup and evaluation.    Inpatient consult to Neurology  Consult performed by: Nomi Skaggs DO  Consult ordered by: Portillo Rosenberg MD          Consults    Historical Information   Past Medical History:   Diagnosis Date    Afib (HCC)     Arthritis     Hyperlipidemia     Hypertension     Osteopenia     Sleep apnea     Squamous cell skin cancer     LEF TEMPLE    Subdural hemorrhage (HCC)     Varicose veins of both lower extremities      Past Surgical History:   Procedure Laterality Date    CARDIAC ELECTROPHYSIOLOGY PROCEDURE N/A 10/12/2022    Procedure: Cardiac loop recorder implant;  Surgeon: Rashid Carlos MD;  Location: [facility];  Service: Cardiology    CARDIAC ELECTROPHYSIOLOGY PROCEDURE N/A 4/21/2023    Procedure: Cardiac pacer implant;  Surgeon: Simba Chilel MD;  Location: [facility];  Service: Cardiology    CATARACT EXTRACTION      COLONOSCOPY      complete, for polyp removal    EYE SURGERY      HYSTERECTOMY      age 45    INCISION AND DRAINAGE OF WOUND Right 06/28/2016    Procedure: ARTHROSCOPY,EVACUATION OF HEMATOMA, OPEN EXPLORATION OF WOUND;  Surgeon: Adam Brice MD;  Location: [facility];  Service:     MOHS SURGERY Left 12/27/2022    left temple    SKIN BIOPSY      TONSILLECTOMY AND ADENOIDECTOMY      TOTAL KNEE ARTHROPLASTY Right     TOTAL KNEE ARTHROPLASTY Left     WISDOM TOOTH EXTRACTION       Social History   Social History     Substance and Sexual Activity   Alcohol Use Not Currently     Social History     Substance and Sexual Activity   Drug Use No     E-Cigarette/Vaping    E-Cigarette Use Never User      E-Cigarette/Vaping Substances    Nicotine No     THC No     CBD No     Flavoring No     Other No     Unknown No      Social History     Tobacco Use   Smoking Status Never   Smokeless Tobacco Never     Family History:   Family History   Problem Relation Age of Onset    Diabetes Mother      Diabetes Father     Diabetes Brother     No Known Problems Sister     No Known Problems Daughter     No Known Problems Maternal Grandmother     No Known Problems Maternal Grandfather     No Known Problems Paternal Grandmother     No Known Problems Paternal Grandfather     No Known Problems Daughter     Brain cancer Son     No Known Problems Maternal Aunt      Meds/Allergies   current meds:   Current Facility-Administered Medications   Medication Dose Route Frequency    acetaminophen (TYLENOL) tablet 650 mg  650 mg Oral Q6H PRN    [START ON 4/16/2024] aspirin (ECOTRIN LOW STRENGTH) EC tablet 81 mg  81 mg Oral Daily    busPIRone (BUSPAR) tablet 7.5 mg  7.5 mg Oral BID    calcium carbonate (TUMS) chewable tablet 500 mg  500 mg Oral BID PRN    [START ON 4/16/2024] citalopram (CeleXA) tablet 10 mg  10 mg Oral Daily    divalproex sodium (DEPAKOTE) DR tablet 250 mg  250 mg Oral TID    docusate sodium (COLACE) capsule 100 mg  100 mg Oral BID    [START ON 4/16/2024] enoxaparin (LOVENOX) subcutaneous injection 40 mg  40 mg Subcutaneous Daily    ergocalciferol (VITAMIN D2) capsule 50,000 Units  50,000 Units Oral Q30 Days    flecainide (TAMBOCOR) tablet 50 mg  50 mg Oral Q12H LILI    [START ON 4/16/2024] fluticasone (FLONASE) 50 mcg/act nasal spray 1 spray  1 spray Nasal Daily    melatonin tablet 6 mg  6 mg Oral HS    ondansetron (ZOFRAN) injection 4 mg  4 mg Intravenous Q6H PRN    [START ON 4/16/2024] polyethylene glycol (MIRALAX) packet 17 g  17 g Oral Daily    senna-docusate sodium (SENOKOT S) 8.6-50 mg per tablet 1 tablet  1 tablet Oral BID    and PTA meds:   Prior to Admission Medications   Prescriptions Last Dose Informant Patient Reported? Taking?   [facility] Senna Plus 8.6-50 MG per tablet  Self Yes No   Sig: every other day   Calcium Carbonate 1500 (600 Ca) MG TABS  Self Yes No   Sig: Take 1 tablet by mouth daily   Melatonin 3 MG CAPS  Self Yes No   Sig: Take 6 mg by mouth   acetaminophen (TYLENOL) 325 mg tablet   No No    Sig: Take 2 tablets (650 mg total) by mouth every 4 (four) hours as needed for mild pain   amLODIPine (NORVASC) 10 mg tablet  Self No No   Sig: Take 1 tablet (10 mg total) by mouth daily   aspirin (ECOTRIN LOW STRENGTH) 81 mg EC tablet  Outside Facility (Specify) Yes No   Sig: Take 81 mg by mouth daily   busPIRone (BUSPAR) 7.5 mg tablet  Outside Facility (Specify) Yes No   Sig: Take 7.5 mg by mouth 2 (two) times a day   citalopram (CeleXA) 10 mg tablet  Outside Facility (Specify) Yes No   Sig: Take 10 mg by mouth daily   divalproex sodium (DEPAKOTE) 250 mg EC tablet  Self Yes No   Sig: Take 250 mg by mouth 3 (three) times a day   donepezil (ARICEPT) 5 mg tablet  Self No No   Sig: Take 1 tablet (5 mg total) by mouth daily at bedtime   ergocalciferol (VITAMIN D2) 50,000 units  Self Yes No   Sig: Take 50,000 Units by mouth every 30 (thirty) days   flecainide (TAMBOCOR) 50 mg tablet  Self No No   Sig: Take 1 tablet (50 mg total) by mouth every 12 (twelve) hours   fluticasone (FLONASE) 50 mcg/act nasal spray   No No   Si spray into each nostril daily for 7 days   sodium chloride (MARITZA 128) 5 % hypertonic ophthalmic ointment  Self No No   Sig: Administer to both eyes every 3 (three) hours as needed (irritation)      Facility-Administered Medications: None     Allergies   Allergen Reactions    Atorvastatin Other (See Comments)     myalgia    Statins Other (See Comments)     Weakness in legs       Review of previous medical records was completed.       Review of Systems    OBJECTIVE     Patient ID: Ann Cross is a 83 y.o. female.    Vitals:   Vitals:    04/15/24 1333 04/15/24 1337 04/15/24 1400 04/15/24 1700   BP: 128/58 118/56 128/56 138/64   BP Location: Left arm Left arm     Pulse: 60  60 63   Resp: 17  16 18   Temp:       TempSrc:       SpO2: 95%  93%       There is no height or weight on file to calculate BMI.     Intake/Output Summary (Last 24 hours) at 4/15/2024 3178  Last data filed at 4/15/2024  1417  Gross per 24 hour   Intake 1000 ml   Output --   Net 1000 ml       Temperature:   Temp (24hrs), Av.7 °F (37.1 °C), Min:98.7 °F (37.1 °C), Max:98.7 °F (37.1 °C)    Temperature: 98.7 °F (37.1 °C)    Invasive Devices:   Invasive Devices       Peripheral Intravenous Line  Duration             Peripheral IV 04/15/24 Left Antecubital <1 day                  Neurologic Exam     Mental Status   Disoriented to person.   Disoriented to place.   Disoriented to month and date. Oriented to year.   Follows 3 step commands.   Attention: normal. Concentration: normal.   Speech: speech is normal   Level of consciousness: alert  Unable to name object: Names high-frequency objects, difficulty with low-frequency objects.   Able to spell the word world backwards     Cranial Nerves     CN II   Visual fields full to confrontation.     CN III, IV, VI   Pupils are equal, round, and reactive to light.  Extraocular motions are normal.     CN V   Facial sensation intact.     CN VII   Right facial weakness: Mild RFD.    CN IX, X   CN IX normal.   CN X normal.     CN XI   CN XI normal.     CN XII   CN XII normal.     Motor Exam   Muscle bulk: normal  Overall muscle tone: normal  Right arm tone: normal  Left arm tone: normal  Right arm pronator drift: absent  Left arm pronator drift: absent    Strength   Strength 5/5 throughout.     Sensory Exam   Light touch normal.     Gait, Coordination, and Reflexes Deferred         LABORATORY DATA     Labs: I have personally reviewed pertinent reports.    Results from last 7 days   Lab Units 04/15/24  1152   WBC Thousand/uL 8.97   HEMOGLOBIN g/dL 13.9   HEMATOCRIT % 42.9   PLATELETS Thousands/uL 161   SEGS PCT % 54   MONO PCT % 19*   EOS PCT % 1      Results from last 7 days   Lab Units 04/15/24  1303   POTASSIUM mmol/L 4.4   CHLORIDE mmol/L 101   CO2 mmol/L 31   BUN mg/dL 14   CREATININE mg/dL 0.74   CALCIUM mg/dL 8.2*   ALK PHOS U/L 36   ALT U/L 6*   AST U/L 11*                  Results from last  7 days   Lab Units 04/15/24  1427   LACTIC ACID mmol/L 1.6           IMAGING & DIAGNOSTIC TESTING     Radiology Results: I have personally reviewed pertinent reports.    CT chest abdomen pelvis w contrast   Final Result by Leon Rangel MD (04/15 1626)      1.  No acute abnormality in the chest.      2.  Segmental thickening of the sigmoid colon with adjacent mesenteric lymphadenopathy of uncertain etiology. Segmental inflammatory or infectious colitis is possible though there is no surrounding stranding. Malignancy is also possible though a discrete    measurable mass is not identified. Clinical correlation and follow-up colonoscopy recommended.      3.  Ectatic aorta measuring up to 2.7 cm. Recommendation is for follow-up in 5 years though considerations related to the patient's age and/or comorbidities may be used to alter this recommendation.      The study was marked in EPIC for immediate notification.      Workstation performed: [id number]         XR chest 2 views   Final Result by Hunter Nixon MD (04/15 2155)      No acute cardiopulmonary disease.            Workstation performed: [id number]         CT head without contrast   Final Result by Pallav N Shah, MD (04/15 3565)      No acute intracranial abnormality.      Similar moderate chronic microangiopathic changes.                  Resident: Ameya Hurley I, the attending radiologist, have reviewed the images and agree with the final report above.      Workstation performed: [id number]             Other Diagnostic Testing: I have personally reviewed pertinent reports.      ACTIVE MEDICATIONS     Current Facility-Administered Medications   Medication Dose Route Frequency    acetaminophen (TYLENOL) tablet 650 mg  650 mg Oral Q6H PRN    [START ON 4/16/2024] aspirin (ECOTRIN LOW STRENGTH) EC tablet 81 mg  81 mg Oral Daily    busPIRone (BUSPAR) tablet 7.5 mg  7.5 mg Oral BID    calcium carbonate (TUMS) chewable tablet 500 mg  500 mg Oral BID PRN     [START ON 4/16/2024] citalopram (CeleXA) tablet 10 mg  10 mg Oral Daily    divalproex sodium (DEPAKOTE) DR tablet 250 mg  250 mg Oral TID    docusate sodium (COLACE) capsule 100 mg  100 mg Oral BID    [START ON 4/16/2024] enoxaparin (LOVENOX) subcutaneous injection 40 mg  40 mg Subcutaneous Daily    ergocalciferol (VITAMIN D2) capsule 50,000 Units  50,000 Units Oral Q30 Days    flecainide (TAMBOCOR) tablet 50 mg  50 mg Oral Q12H LILI    [START ON 4/16/2024] fluticasone (FLONASE) 50 mcg/act nasal spray 1 spray  1 spray Nasal Daily    melatonin tablet 6 mg  6 mg Oral HS    ondansetron (ZOFRAN) injection 4 mg  4 mg Intravenous Q6H PRN    [START ON 4/16/2024] polyethylene glycol (MIRALAX) packet 17 g  17 g Oral Daily    senna-docusate sodium (SENOKOT S) 8.6-50 mg per tablet 1 tablet  1 tablet Oral BID       Prior to Admission medications    Medication Sig Start Date End Date Taking? Authorizing Provider   acetaminophen (TYLENOL) 325 mg tablet Take 2 tablets (650 mg total) by mouth every 4 (four) hours as needed for mild pain 12/24/23   LUZ Garber   amLODIPine (NORVASC) 10 mg tablet Take 1 tablet (10 mg total) by mouth daily 8/19/22 2/19/24  Mary Kate Regalado PA-C   aspirin (ECOTRIN LOW STRENGTH) 81 mg EC tablet Take 81 mg by mouth daily    Historical Provider, MD   busPIRone (BUSPAR) 7.5 mg tablet Take 7.5 mg by mouth 2 (two) times a day    Historical Provider, MD   Calcium Carbonate 1500 (600 Ca) MG TABS Take 1 tablet by mouth daily 9/9/22   Historical Provider, MD   citalopram (CeleXA) 10 mg tablet Take 10 mg by mouth daily    Historical Provider, MD   [facility] Senna Plus 8.6-50 MG per tablet every other day 9/9/22   Historical Provider, MD   divalproex sodium (DEPAKOTE) 250 mg EC tablet Take 250 mg by mouth 3 (three) times a day 9/9/22   Historical Provider, MD   donepezil (ARICEPT) 5 mg tablet Take 1 tablet (5 mg total) by mouth daily at bedtime 8/19/22 2/19/24  Mary Kate Regalado PA-C   ergocalciferol  (VITAMIN D2) 50,000 units Take 50,000 Units by mouth every 30 (thirty) days 9/9/22   Historical Provider, MD   flecainide (TAMBOCOR) 50 mg tablet Take 1 tablet (50 mg total) by mouth every 12 (twelve) hours 8/19/22 2/19/24  Mary Kate Regalado PA-C   fluticasone (FLONASE) 50 mcg/act nasal spray 1 spray into each nostril daily for 7 days 2/20/23 4/21/23  Sterling Taylor MD   Melatonin 3 MG CAPS Take 6 mg by mouth    Historical Provider, MD   sodium chloride (MARITZA 128) 5 % hypertonic ophthalmic ointment Administer to both eyes every 3 (three) hours as needed (irritation) 8/19/22 2/19/24  Mary Kate Regalado PA-C       CODE STATUS & ADVANCED DIRECTIVES     Code Status: Level 1 - Full Code  Advance Directive and Living Will:      Power of :    POLST:        VTE Pharmacologic Prophylaxis: Enoxaparin (Lovenox)  VTE Mechanical Prophylaxis: sequential compression device    ======    I have discussed the patient's history, physical exam findings, assessment, and plan in detail with attending, Dr. Yanez    Thank you for allowing me to participate in the care of your patient, Ann NEVA Cross.    Nomi Skaggs,   [facility] Neurology Residency, PGY-2

## 2024-04-15 NOTE — ED PROVIDER NOTES
History  Chief Complaint   Patient presents with    Lethargy     Patient coming from facility for lethargy and change in mentation. Patient seen here last week for same thing. No urinary symptoms -cp -sob. Patient disoriented to time      Patient is an 83-year-old female with a past medical history of dementia, subdural hemorrhage, hypertension, atrial fibrillation.  History obtained from daughter, stating that while patient has had a history of dementia, however starting about a week ago she had a precipitous decline in her mental status.  Patient was normally able to ambulate on her own and perform her activities of daily living, but starting about a week ago she was having trouble doing so.  She came to the emergency department for evaluation, without significant abnormalities noted at that time, and patient was discharged.  Patient is continue to have a decline in her mental status, being more confused and unable to take care of herself, prompting her to return to the emergency department today.  The patient's daughter and caretakers are concerned that there is something going on causing this mental decline.  Patient herself is alert and oriented, able to converse, without focal deficits noted on exam.        Prior to Admission Medications   Prescriptions Last Dose Informant Patient Reported? Taking?   [facility] Senna Plus 8.6-50 MG per tablet  Self Yes No   Sig: every other day   Calcium Carbonate 1500 (600 Ca) MG TABS  Self Yes No   Sig: Take 1 tablet by mouth daily   Melatonin 3 MG CAPS  Self Yes No   Sig: Take 6 mg by mouth   acetaminophen (TYLENOL) 325 mg tablet   No No   Sig: Take 2 tablets (650 mg total) by mouth every 4 (four) hours as needed for mild pain   amLODIPine (NORVASC) 10 mg tablet  Self No No   Sig: Take 1 tablet (10 mg total) by mouth daily   aspirin (ECOTRIN LOW STRENGTH) 81 mg EC tablet  Outside Facility (Specify) Yes No   Sig: Take 81 mg by mouth daily   busPIRone (BUSPAR) 7.5 mg tablet  Outside  Facility (Specify) Yes No   Sig: Take 7.5 mg by mouth 2 (two) times a day   citalopram (CeleXA) 10 mg tablet  Outside Facility (Specify) Yes No   Sig: Take 10 mg by mouth daily   divalproex sodium (DEPAKOTE) 250 mg EC tablet  Self Yes No   Sig: Take 250 mg by mouth 3 (three) times a day   donepezil (ARICEPT) 5 mg tablet  Self No No   Sig: Take 1 tablet (5 mg total) by mouth daily at bedtime   ergocalciferol (VITAMIN D2) 50,000 units  Self Yes No   Sig: Take 50,000 Units by mouth every 30 (thirty) days   flecainide (TAMBOCOR) 50 mg tablet  Self No No   Sig: Take 1 tablet (50 mg total) by mouth every 12 (twelve) hours   fluticasone (FLONASE) 50 mcg/act nasal spray   No No   Si spray into each nostril daily for 7 days   sodium chloride (MARITZA 128) 5 % hypertonic ophthalmic ointment  Self No No   Sig: Administer to both eyes every 3 (three) hours as needed (irritation)      Facility-Administered Medications: None       Past Medical History:   Diagnosis Date    Afib (HCC)     Arthritis     Hyperlipidemia     Hypertension     Osteopenia     Sleep apnea     Squamous cell skin cancer     LEF TEMPLE    Subdural hemorrhage (HCC)     Varicose veins of both lower extremities        Past Surgical History:   Procedure Laterality Date    CARDIAC ELECTROPHYSIOLOGY PROCEDURE N/A 10/12/2022    Procedure: Cardiac loop recorder implant;  Surgeon: Rashid Carlos MD;  Location: [facility];  Service: Cardiology    CARDIAC ELECTROPHYSIOLOGY PROCEDURE N/A 2023    Procedure: Cardiac pacer implant;  Surgeon: Simba Chilel MD;  Location: [facility];  Service: Cardiology    CATARACT EXTRACTION      COLONOSCOPY      complete, for polyp removal    EYE SURGERY      HYSTERECTOMY      age 45    INCISION AND DRAINAGE OF WOUND Right 2016    Procedure: ARTHROSCOPY,EVACUATION OF HEMATOMA, OPEN EXPLORATION OF WOUND;  Surgeon: Adam Brice MD;  Location: [facility];  Service:     MOHS SURGERY Left 2022    left  temple    SKIN BIOPSY      TONSILLECTOMY AND ADENOIDECTOMY      TOTAL KNEE ARTHROPLASTY Right     TOTAL KNEE ARTHROPLASTY Left     WISDOM TOOTH EXTRACTION         Family History   Problem Relation Age of Onset    Diabetes Mother     Diabetes Father     Diabetes Brother     No Known Problems Sister     No Known Problems Daughter     No Known Problems Maternal Grandmother     No Known Problems Maternal Grandfather     No Known Problems Paternal Grandmother     No Known Problems Paternal Grandfather     No Known Problems Daughter     Brain cancer Son     No Known Problems Maternal Aunt      I have reviewed and agree with the history as documented.    E-Cigarette/Vaping    E-Cigarette Use Never User      E-Cigarette/Vaping Substances    Nicotine No     THC No     CBD No     Flavoring No     Other No     Unknown No      Social History     Tobacco Use    Smoking status: Never    Smokeless tobacco: Never   Vaping Use    Vaping status: Never Used   Substance Use Topics    Alcohol use: Not Currently    Drug use: No        Review of Systems   Unable to perform ROS: Dementia   All other systems reviewed and are negative.      Physical Exam  ED Triage Vitals [04/15/24 1137]   Temperature Pulse Respirations Blood Pressure SpO2   98.7 °F (37.1 °C) 62 18 111/56 96 %      Temp Source Heart Rate Source Patient Position - Orthostatic VS BP Location FiO2 (%)   Oral Monitor Lying Left arm --      Pain Score       No Pain             Orthostatic Vital Signs  Vitals:    04/16/24 0958 04/16/24 1315 04/16/24 1350 04/16/24 1540   BP: 118/56 120/76 117/55 125/60   Pulse: 60 63 64 64   Patient Position - Orthostatic VS: Lying          Physical Exam  Vitals and nursing note reviewed.   Constitutional:       General: She is not in acute distress.     Appearance: She is well-developed.   HENT:      Head: Normocephalic and atraumatic.   Eyes:      Conjunctiva/sclera: Conjunctivae normal.   Cardiovascular:      Rate and Rhythm: Normal rate and  regular rhythm.      Heart sounds: No murmur heard.  Pulmonary:      Effort: Pulmonary effort is normal. No respiratory distress.      Breath sounds: Normal breath sounds.   Abdominal:      Palpations: Abdomen is soft.      Tenderness: There is no abdominal tenderness.   Musculoskeletal:         General: No swelling.      Cervical back: Neck supple.   Skin:     General: Skin is warm and dry.      Capillary Refill: Capillary refill takes less than 2 seconds.   Neurological:      Mental Status: She is alert.   Psychiatric:         Mood and Affect: Mood normal.         ED Medications  Medications   divalproex sodium (DEPAKOTE) DR tablet 250 mg (250 mg Oral Given 4/16/24 1516)   ergocalciferol (VITAMIN D2) capsule 50,000 Units (50,000 Units Oral Not Given 4/16/24 0957)   acetaminophen (TYLENOL) tablet 650 mg (has no administration in time range)   aspirin (ECOTRIN LOW STRENGTH) EC tablet 81 mg (81 mg Oral Given 4/16/24 0943)   busPIRone (BUSPAR) tablet 7.5 mg (7.5 mg Oral Given 4/16/24 0944)   citalopram (CeleXA) tablet 10 mg (10 mg Oral Given 4/16/24 0946)   calcium carbonate (TUMS) chewable tablet 500 mg (has no administration in time range)   melatonin tablet 6 mg (6 mg Oral Given 4/15/24 2133)   flecainide (TAMBOCOR) tablet 50 mg (50 mg Oral Given 4/16/24 0946)   fluticasone (FLONASE) 50 mcg/act nasal spray 1 spray (1 spray Nasal Not Given 4/16/24 0948)   ondansetron (ZOFRAN) injection 4 mg (has no administration in time range)   enoxaparin (LOVENOX) subcutaneous injection 40 mg (40 mg Subcutaneous Given 4/16/24 0948)   polyethylene glycol (MIRALAX) packet 17 g (has no administration in time range)   multi-electrolyte (ISOLYTE-S PH 7.4) bolus 1,000 mL (0 mL Intravenous Stopped 4/15/24 1417)   iohexol (OMNIPAQUE) 350 MG/ML injection (MULTI-DOSE) 100 mL (100 mL Intravenous Given 4/15/24 1441)       Diagnostic Studies  Results Reviewed       Procedure Component Value Units Date/Time    CSF VDRL [[id number]] Collected:  04/15/24 1644    Lab Status: Final result Specimen: Cerebrospinal Fluid from Lumbar Puncture Updated: 04/16/24 1506     VDRL, CSF Non Reactive    Narrative:      Performed at:  [address] [place] [address]  [id number]  : Clementina Berger MD, Phone:  [phone]    CSF culture and Gram stain [[id number]] Collected: 04/15/24 1644    Lab Status: Preliminary result Specimen: Cerebrospinal Fluid from Lumbar Puncture Updated: 04/16/24 1130     CSF Culture No growth    Clostridium difficile toxin by PCR with EIA [[id number]] Collected: 04/16/24 0942    Lab Status: In process Specimen: Stool from Rectum Updated: 04/16/24 0955    Vitamin B12 [[id number]]  (Normal) Collected: 04/16/24 0643    Lab Status: Final result Specimen: Blood from Arm, Right Updated: 04/16/24 0938     Vitamin B-12 587 pg/mL     Comprehensive metabolic panel [[id number]]  (Abnormal) Collected: 04/16/24 0643    Lab Status: Final result Specimen: Blood from Arm, Right Updated: 04/16/24 0753     Sodium 137 mmol/L      Potassium 4.4 mmol/L      Chloride 101 mmol/L      CO2 28 mmol/L      ANION GAP 8 mmol/L      BUN 11 mg/dL      Creatinine 0.67 mg/dL      Glucose 92 mg/dL      Calcium 8.2 mg/dL      Corrected Calcium 8.9 mg/dL      AST 15 U/L      ALT 7 U/L      Alkaline Phosphatase 40 U/L      Total Protein 5.7 g/dL      Albumin 3.1 g/dL      Total Bilirubin 0.40 mg/dL      eGFR 81 ml/min/1.73sq m     Narrative:      National Kidney Disease Foundation guidelines for Chronic Kidney Disease (CKD):     Stage 1 with normal or high GFR (GFR > 90 mL/min/1.73 square meters)    Stage 2 Mild CKD (GFR = 60-89 mL/min/1.73 square meters)    Stage 3A Moderate CKD (GFR = 45-59 mL/min/1.73 square meters)    Stage 3B Moderate CKD (GFR = 30-44 mL/min/1.73 square meters)    Stage 4 Severe CKD (GFR = 15-29 mL/min/1.73 square meters)    Stage 5 End Stage CKD (GFR <15 mL/min/1.73 square meters)  Note: GFR calculation is accurate only with a  steady state creatinine    Magnesium [[id number]]  (Normal) Collected: 04/16/24 0643    Lab Status: Final result Specimen: Blood from Arm, Right Updated: 04/16/24 0753     Magnesium 2.2 mg/dL     CBC and differential [[id number]]  (Abnormal) Collected: 04/16/24 0450    Lab Status: Final result Specimen: Blood from Arm, Right Updated: 04/16/24 0637     WBC 9.40 Thousand/uL      RBC 4.97 Million/uL      Hemoglobin 14.3 g/dL      Hematocrit 45.5 %      MCV 92 fL      MCH 28.8 pg      MCHC 31.4 g/dL      RDW 16.8 %      Platelets --    Narrative:      This is an appended report.  These results have been appended to a previously verified report.    Manual Differential(PHLEBS Do Not Order) [[id number]]  (Abnormal) Collected: 04/16/24 0450    Lab Status: Final result Specimen: Blood from Arm, Right Updated: 04/16/24 0637     Segmented % 40 %      Bands % 4 %      Lymphocytes % 36 %      Monocytes % 14 %      Eosinophils % 2 %      Basophils % 0 %      Metamyelocytes % 1 %      Myelocytes % 1 %      Atypical Lymphocytes % 2 %      Absolute Neutrophils 4.14 Thousand/uL      Absolute Lymphocytes 3.57 Thousand/uL      Absolute Monocytes 1.32 Thousand/uL      Absolute Eosinophils 0.19 Thousand/uL      Absolute Basophils 0.00 Thousand/uL      Total Counted --     Smudge Cells Present     RBC Morphology Present     Platelet Estimate Unable to Estimate due to clumped platelets     Pathology Review Yes     Differential Comment see note     [place] Cells Present     Poikilocytes Present    Vitamin B1, whole blood [[id number]] Collected: 04/16/24 0450    Lab Status: In process Specimen: Blood from Arm, Right Updated: 04/16/24 0456    Vitamin B6 [[id number]] Collected: 04/16/24 0450    Lab Status: In process Specimen: Blood from Arm, Right Updated: 04/16/24 0456    HSV TYPE 1,2 DNA PCR (CSF) [facility] [[id number]]  (Normal) Collected: 04/15/24 1644    Lab Status: Final result Specimen: Cerebrospinal Fluid from Lumbar Puncture Updated: 04/15/24  2330     HSV 1 PCR (CSF) Not Detected     HSV 2 PCR (CSF) Not Detected    Narrative:      This test has been performed using RT-PCR on the [employer] Simplex[employer]. This test has been FDA cleared, validated by the , and verified by the performing laboratory.  The [employer] Simplex[employer] HSV 1 & 2 Direct is intended for use on the [employer] instrument for the qualitative detection and differentiation of HSV-1 and HSV-2 DNA in mucocutaneous and cutaneous lesion swabs and cerebrospinal fluid (CSF) of patients with suspected infection. Conserved regions of HSV-1 and HSV-2 DNA polymerase genes are targeted to identify HSV-1 and HSV-2 DNA, respectively. Results are for the presumptive identification of HSV-1 and/or HSV-2. This test is intended for use in conjunction with clinical presentation and other laboratory markers for the clinical management of HSV-1/HSV-2 infected patients.     Positive results are indicative of infection, but do not rule out bacterial infection or co-infection with other viruses.     Negative results do not preclude viral infection and should not be used as the sole basis for treatment or other patient management decisions. Negative results must be combined with clinical observations, patient history, and epidemiological information.   Invalid or inconclusive results do not preclude infection. Recollection recommended.    Procedural Limitations:  Viral nucleic acid detection is dependent on sample collection, transport, handling, storage, and preparation.  Detection of target analytes(s) does not imply that the corresponding viruses are infectious or are the causative agent for clinical symptoms.     False-negative results may occur if the viruses are present at a level below the analytical sensitivity of the assay, if the virus has genomic mutations, or if the test is performed very early in the course of illness.  When very high levels of HSV-1 are present with very low  levels of HSV-2, the signal from the HSV-2 reaction may not be adequate to be detected.  This test has not been established for screening of blood or blood products for the presence of HSV.      Meningitis/Encephalitis (ME) Panel [[id number]]  (Normal) Collected: 04/15/24 1644    Lab Status: Final result Specimen: Cerebrospinal Fluid from Lumbar Puncture Updated: 04/15/24 2323     C.NEOFORMANS/GATTII Not Detected     CYTOMEGALOVIRUS Not Detected     ENTEROVIRUS Not Detected     E.COLI K1 Not Detected     H.INFLUENZAE Not Detected     H.SIMPLEX 1 Not Detected     H.SIMPLEX 2 Not Detected     HERPES VIRUS 6 Not Detected     PARECHOVIRUS Not Detected     L.MONOCYTOGENES Not Detected     N.MENINGITIDIS Not Detected     S.AGALACTIAE Not Detected     S.PNEUMONIAE Not Detected     V.ZOSTER Not Detected    RPR-Syphilis Screening (Total Syphilis IGG/IGM) [[id number]]  (Normal) Collected: 04/15/24 1427    Lab Status: Final result Specimen: Blood from Arm, Left Updated: 04/15/24 2203     Syphilis Total Antibody Non-reactive    Narrative:      Test performed on the [employer] system using multiplex flow immunoassay methodology.    CSF white cell count with differential [[id number]] Collected: 04/15/24 1644    Lab Status: Final result Specimen: Cerebrospinal Fluid from Lumbar Puncture Updated: 04/15/24 1820     Appearance, CSF Clear and colorless     Tube Number, CSF 4     WBC, CSF 2 /uL      Xanthochromia No    CSF Diff [[id number]] Collected: 04/15/24 1644    Lab Status: Final result Specimen: Cerebrospinal Fluid from Lumbar Puncture Updated: 04/15/24 1820     Total Counted 36     Neutrophils % (CSF) 3 %      Lymphs % CSF 97 %     CSF Total Protein [[id number]]  (Normal) Collected: 04/15/24 1644    Lab Status: Final result Specimen: Cerebrospinal Fluid from Lumbar Puncture Updated: 04/15/24 1713     Protein, CSF 35 mg/dL     CSF Glucose [[id number]]  (Normal) Collected: 04/15/24 1644    Lab Status: Final result Specimen:  Cerebrospinal Fluid from Lumbar Puncture Updated: 04/15/24 1713     Glucose, CSF 64 mg/dL     Autoimmune Encephalopathy CSF [[id number]] Collected: 04/15/24 1644    Lab Status: In process Specimen: Cerebrospinal Fluid from Lumbar Puncture Updated: 04/15/24 1649    Autoimmune Dementia Profile, CSF [[id number]] Collected: 04/15/24 1644    Lab Status: In process Specimen: Cerebrospinal Fluid from Lumbar Puncture Updated: 04/15/24 1649    LYME INDEX IGG/IGM, CSF [[id number]] Collected: 04/15/24 1644    Lab Status: In process Specimen: Cerebrospinal Fluid from Lumbar Puncture Updated: 04/15/24 1649    C-reactive protein [[id number]]  (Abnormal) Collected: 04/15/24 1303    Lab Status: Final result Specimen: Blood from Arm, Left Updated: 04/15/24 1602     CRP 51.6 mg/L     HS Troponin I 2hr [[id number]]  (Normal) Collected: 04/15/24 1427    Lab Status: Final result Specimen: Blood from Arm, Left Updated: 04/15/24 1514     hs TnI 2hr 5 ng/L      Delta 2hr hsTnI 1 ng/L     Urine Microscopic [[id number]]  (Normal) Collected: 04/15/24 1428    Lab Status: Final result Specimen: Urine, Clean Catch Updated: 04/15/24 1509     RBC, UA None Seen /hpf      WBC, UA None Seen /hpf      Epithelial Cells Occasional /hpf      Bacteria, UA None Seen /hpf     Lactic acid 2 Hours [[id number]]  (Normal) Collected: 04/15/24 1427    Lab Status: Final result Specimen: Blood from Arm, Left Updated: 04/15/24 1502     LACTIC ACID 1.6 mmol/L     Narrative:      Result may be elevated if tourniquet was used during collection.    UA w Reflex to Microscopic w Reflex to Culture [[id number]]  (Abnormal) Collected: 04/15/24 1428    Lab Status: Final result Specimen: Urine, Clean Catch Updated: 04/15/24 1501     Color, UA Light Yellow     Clarity, UA Clear     Specific Gravity, UA 1.014     pH, UA 6.5     Leukocytes, UA Trace     Nitrite, UA Negative     Protein, UA Negative mg/dl      Glucose, UA Negative mg/dl      Ketones, UA 10 (1+) mg/dl      Urobilinogen, UA  <2.0 mg/dl      Bilirubin, UA Negative     Occult Blood, UA Negative    Sedimentation rate, automated [[id number]]  (Normal) Collected: 04/15/24 1152    Lab Status: Final result Specimen: Blood from Arm, Left Updated: 04/15/24 1500     Sed Rate 14 mm/hour     Comprehensive metabolic panel [[id number]]  (Abnormal) Collected: 04/15/24 1303    Lab Status: Final result Specimen: Blood from Arm, Left Updated: 04/15/24 1337     Sodium 138 mmol/L      Potassium 4.4 mmol/L      Chloride 101 mmol/L      CO2 31 mmol/L      ANION GAP 6 mmol/L      BUN 14 mg/dL      Creatinine 0.74 mg/dL      Glucose 90 mg/dL      Calcium 8.2 mg/dL      Corrected Calcium 8.8 mg/dL      AST 11 U/L      ALT 6 U/L      Alkaline Phosphatase 36 U/L      Total Protein 5.5 g/dL      Albumin 3.2 g/dL      Total Bilirubin 0.29 mg/dL      eGFR 75 ml/min/1.73sq m     Narrative:      National Kidney Disease Foundation guidelines for Chronic Kidney Disease (CKD):     Stage 1 with normal or high GFR (GFR > 90 mL/min/1.73 square meters)    Stage 2 Mild CKD (GFR = 60-89 mL/min/1.73 square meters)    Stage 3A Moderate CKD (GFR = 45-59 mL/min/1.73 square meters)    Stage 3B Moderate CKD (GFR = 30-44 mL/min/1.73 square meters)    Stage 4 Severe CKD (GFR = 15-29 mL/min/1.73 square meters)    Stage 5 End Stage CKD (GFR <15 mL/min/1.73 square meters)  Note: GFR calculation is accurate only with a steady state creatinine    TSH [[id number]]  (Normal) Collected: 04/15/24 1152    Lab Status: Final result Specimen: Blood from Arm, Left Updated: 04/15/24 1249     TSH 3RD GENERATON 2.529 uIU/mL     Lactic acid, plasma (w/reflex if result > 2.0) [[id number]]  (Abnormal) Collected: 04/15/24 1152    Lab Status: Final result Specimen: Blood from Arm, Left Updated: 04/15/24 1239     LACTIC ACID 2.6 mmol/L     Narrative:      Result may be elevated if tourniquet was used during collection.    HS Troponin 0hr (reflex protocol) [[id number]]  (Normal) Collected: 04/15/24 1152    Lab  Status: Final result Specimen: Blood from Arm, Left Updated: 04/15/24 1237     hs TnI 0hr 4 ng/L     D-dimer, quantitative [[id number]]  (Normal) Collected: 04/15/24 1152    Lab Status: Final result Specimen: Blood from Arm, Left Updated: 04/15/24 1228     D-Dimer, Quant 0.43 ug/ml FEU     Narrative:      In the evaluation for possible pulmonary embolism, in the appropriate (Well's Score of 4 or less) patient, the age adjusted d-dimer cutoff for this patient can be calculated as:    Age x 0.01 (in ug/mL) for Age-adjusted D-dimer exclusion threshold for a patient over 50 years.    CBC and differential [[id number]]  (Abnormal) Collected: 04/15/24 1152    Lab Status: Final result Specimen: Blood from Arm, Left Updated: 04/15/24 1211     WBC 8.97 Thousand/uL      RBC 4.76 Million/uL      Hemoglobin 13.9 g/dL      Hematocrit 42.9 %      MCV 90 fL      MCH 29.2 pg      MCHC 32.4 g/dL      RDW 14.6 %      MPV 9.7 fL      Platelets 161 Thousands/uL      nRBC 0 /100 WBCs      Segmented % 54 %      Immature Grans % 1 %      Lymphocytes % 25 %      Monocytes % 19 %      Eosinophils Relative 1 %      Basophils Relative 0 %      Absolute Neutrophils 4.90 Thousands/µL      Absolute Immature Grans 0.08 Thousand/uL      Absolute Lymphocytes 2.23 Thousands/µL      Absolute Monocytes 1.66 Thousand/µL      Eosinophils Absolute 0.07 Thousand/µL      Basophils Absolute 0.03 Thousands/µL     Blood gas, venous [[id number]]  (Abnormal) Collected: 04/15/24 1152    Lab Status: Final result Specimen: Blood from Arm, Left Updated: 04/15/24 1209     pH, Bishnu 7.432     pCO2, Bishnu 38.6 mm Hg      pO2, Bishnu 48.8 mm Hg      HCO3, Bishnu 25.2 mmol/L      Base Excess, Bishnu 1.0 mmol/L      O2 Content, Bishnu 16.9 ml/dL      O2 HGB, VENOUS 81.5 %                    MRI brain wo contrast   Final Result by Dalton Solis MD (04/16 1205)      1.  No acute infarct.   2.  Cerebral atrophy and chronic microangiopathic change.         Workstation performed: [id number]          CT chest abdomen pelvis w contrast   Final Result by Leon Rangel MD (04/15 1626)      1.  No acute abnormality in the chest.      2.  Segmental thickening of the sigmoid colon with adjacent mesenteric lymphadenopathy of uncertain etiology. Segmental inflammatory or infectious colitis is possible though there is no surrounding stranding. Malignancy is also possible though a discrete    measurable mass is not identified. Clinical correlation and follow-up colonoscopy recommended.      3.  Ectatic aorta measuring up to 2.7 cm. Recommendation is for follow-up in 5 years though considerations related to the patient's age and/or comorbidities may be used to alter this recommendation.      The study was marked in EPIC for immediate notification.      Workstation performed: [id number]         XR chest 2 views   Final Result by Hunter Nixon MD (04/15 1625)      No acute cardiopulmonary disease.            Workstation performed: [id number]         CT head without contrast   Final Result by Pallav N Shah, MD (04/15 5850)      No acute intracranial abnormality.      Similar moderate chronic microangiopathic changes.                  Resident: Ameya Hurley I, the attending radiologist, have reviewed the images and agree with the final report above.      Workstation performed: [id number]               Procedures  Procedures      ED Course  ED Course as of 04/16/24 1541   Mon Apr 15, 2024   1529 Daughter called and consented for LP.                                        Medical Decision Making  Patient is 83-year-old female with PMH of vascular dementia who presents to the ED with aggressive inability to perform her activities of daily living.    Vital signs stable. On exam no focal deficits noted on exam, patient noted to be hypoxic to the high 80s, requiring nasal cannula supplementation.    History and physical exam most consistent with progressive dementia, likely with polypharmacy component. However,  differential diagnosis included but not limited to continued vascular dementia, meningitis, encephalitis, sepsis, electrolyte abnormality, hypoglycemia. Plan broad laboratory workup, CT chest abdomen pelvis, CT head, lumbar puncture, admission    View ED course above for further discussion on patient workup.    All labs reviewed and utilized in the medical decision making process  All radiology studies independently viewed by me and interpreted by the radiologist.  I reviewed all testing with the patient.          Amount and/or Complexity of Data Reviewed  Labs: ordered.  Radiology: ordered.    Risk  Prescription drug management.  Decision regarding hospitalization.          Disposition  Final diagnoses:   Encephalopathy     Time reflects when diagnosis was documented in both MDM as applicable and the Disposition within this note       Time User Action Codes Description Comment    4/15/2024  4:32 PM Sarabjit Elliott Add [G93.40] Encephalopathy     4/15/2024  5:37 PM Dominick Angeles Add [G31.84] Minimal cognitive impairment     4/15/2024  5:37 PM Dominick Angeles Add [G93.40] Encephalopathy acute     4/15/2024  5:38 PM Dominick Angeles Add [R41.82] Altered mental status           ED Disposition       ED Disposition   Admit    Condition   Stable    Date/Time   Mon Apr 15, 2024  4:49 PM    Comment   Case was discussed with Dr Angeles and the patient's admission status was agreed to be Admission Status: inpatient status to the service of Dr. Angeles .               Follow-up Information    None         Current Discharge Medication List        CONTINUE these medications which have NOT CHANGED    Details   acetaminophen (TYLENOL) 325 mg tablet Take 2 tablets (650 mg total) by mouth every 4 (four) hours as needed for mild pain  Qty: 30 tablet, Refills: 0    Associated Diagnoses: Subdural hemorrhage (HCC)      amLODIPine (NORVASC) 10 mg tablet Take 1 tablet (10 mg total) by mouth daily  Qty: 30 tablet,  Refills: 0    Associated Diagnoses: Essential hypertension      aspirin (ECOTRIN LOW STRENGTH) 81 mg EC tablet Take 81 mg by mouth daily      busPIRone (BUSPAR) 7.5 mg tablet Take 7.5 mg by mouth 2 (two) times a day      Calcium Carbonate 1500 (600 Ca) MG TABS Take 1 tablet by mouth daily      citalopram (CeleXA) 10 mg tablet Take 10 mg by mouth daily      [employer] Senna Plus 8.6-50 MG per tablet every other day      divalproex sodium (DEPAKOTE) 250 mg EC tablet Take 250 mg by mouth 3 (three) times a day      donepezil (ARICEPT) 5 mg tablet Take 1 tablet (5 mg total) by mouth daily at bedtime  Qty: 30 tablet, Refills: 1    Associated Diagnoses: Mood disorder (HCC)      ergocalciferol (VITAMIN D2) 50,000 units Take 50,000 Units by mouth every 30 (thirty) days      flecainide (TAMBOCOR) 50 mg tablet Take 1 tablet (50 mg total) by mouth every 12 (twelve) hours  Qty: 60 tablet, Refills: 0    Associated Diagnoses: Mood disorder (HCC); Paroxysmal atrial fibrillation (HCC)      fluticasone (FLONASE) 50 mcg/act nasal spray 1 spray into each nostril daily for 7 days  Qty: 9.9 mL, Refills: 0    Associated Diagnoses: COVID      Melatonin 3 MG CAPS Take 6 mg by mouth      sodium chloride (MARITZA 128) 5 % hypertonic ophthalmic ointment Administer to both eyes every 3 (three) hours as needed (irritation)  Qty: 3.5 g, Refills: 0    Associated Diagnoses: Mood disorder (HCC)           No discharge procedures on file.    PDMP Review         Value Time User    PDMP Reviewed  Yes 12/31/2023 11:50 AM Rashel Pascual PA-C             ED Provider  Attending physically available and evaluated Ann Cross. I managed the patient along with the ED Attending.    Electronically Signed by           Sarabjit Elliott MD  04/16/24 [id number]

## 2024-04-16 ENCOUNTER — APPOINTMENT (OUTPATIENT)
Dept: RADIOLOGY | Facility: HOSPITAL | Age: 84
DRG: 071 | End: 2024-04-16
Payer: MEDICARE

## 2024-04-16 LAB
ALBUMIN SERPL BCP-MCNC: 3.1 G/DL (ref 3.5–5)
ALP SERPL-CCNC: 40 U/L (ref 34–104)
ALT SERPL W P-5'-P-CCNC: 7 U/L (ref 7–52)
ANION GAP SERPL CALCULATED.3IONS-SCNC: 8 MMOL/L (ref 4–13)
AST SERPL W P-5'-P-CCNC: 15 U/L (ref 13–39)
BASOPHILS # BLD MANUAL: 0 THOUSAND/UL (ref 0–0.1)
BASOPHILS NFR MAR MANUAL: 0 % (ref 0–1)
BILIRUB SERPL-MCNC: 0.4 MG/DL (ref 0.2–1)
BUN SERPL-MCNC: 11 MG/DL (ref 5–25)
BURR CELLS BLD QL SMEAR: PRESENT
CALCIUM ALBUM COR SERPL-MCNC: 8.9 MG/DL (ref 8.3–10.1)
CALCIUM SERPL-MCNC: 8.2 MG/DL (ref 8.4–10.2)
CHLORIDE SERPL-SCNC: 101 MMOL/L (ref 96–108)
CO2 SERPL-SCNC: 28 MMOL/L (ref 21–32)
CREAT SERPL-MCNC: 0.67 MG/DL (ref 0.6–1.3)
DIFFERENTIAL COMMENT: ABNORMAL
EOSINOPHIL # BLD MANUAL: 0.19 THOUSAND/UL (ref 0–0.4)
EOSINOPHIL NFR BLD MANUAL: 2 % (ref 0–6)
ERYTHROCYTE [DISTWIDTH] IN BLOOD BY AUTOMATED COUNT: 16.8 % (ref 11.6–15.1)
GFR SERPL CREATININE-BSD FRML MDRD: 81 ML/MIN/1.73SQ M
GLUCOSE SERPL-MCNC: 92 MG/DL (ref 65–140)
HCT VFR BLD AUTO: 45.5 % (ref 34.8–46.1)
HGB BLD-MCNC: 14.3 G/DL (ref 11.5–15.4)
LYMPHOCYTES # BLD AUTO: 3.57 THOUSAND/UL (ref 0.6–4.47)
LYMPHOCYTES # BLD AUTO: 36 % (ref 14–44)
MAGNESIUM SERPL-MCNC: 2.2 MG/DL (ref 1.9–2.7)
MCH RBC QN AUTO: 28.8 PG (ref 26.8–34.3)
MCHC RBC AUTO-ENTMCNC: 31.4 G/DL (ref 31.4–37.4)
MCV RBC AUTO: 92 FL (ref 82–98)
METAMYELOCYTES NFR BLD MANUAL: 1 % (ref 0–1)
MONOCYTES # BLD AUTO: 1.32 THOUSAND/UL (ref 0–1.22)
MONOCYTES NFR BLD: 14 % (ref 4–12)
MYELOCYTES NFR BLD MANUAL: 1 % (ref 0–1)
NEUTROPHILS # BLD MANUAL: 4.14 THOUSAND/UL (ref 1.85–7.62)
NEUTS BAND NFR BLD MANUAL: 4 % (ref 0–8)
NEUTS SEG NFR BLD AUTO: 40 % (ref 43–75)
PATHOLOGY REVIEW: YES
PLATELET BLD QL SMEAR: ABNORMAL
POIKILOCYTOSIS BLD QL SMEAR: PRESENT
POTASSIUM SERPL-SCNC: 4.4 MMOL/L (ref 3.5–5.3)
PROT SERPL-MCNC: 5.7 G/DL (ref 6.4–8.4)
RBC # BLD AUTO: 4.97 MILLION/UL (ref 3.81–5.12)
RBC MORPH BLD: PRESENT
REAGIN AB CSF QL: NON REACTIVE
SMUDGE CELLS BLD QL SMEAR: PRESENT
SODIUM SERPL-SCNC: 137 MMOL/L (ref 135–147)
VARIANT LYMPHS # BLD AUTO: 2 %
VIT B12 SERPL-MCNC: 587 PG/ML (ref 180–914)
WBC # BLD AUTO: 9.4 THOUSAND/UL (ref 4.31–10.16)

## 2024-04-16 PROCEDURE — 99233 SBSQ HOSP IP/OBS HIGH 50: CPT | Performed by: PSYCHIATRY & NEUROLOGY

## 2024-04-16 PROCEDURE — 85007 BL SMEAR W/DIFF WBC COUNT: CPT | Performed by: INTERNAL MEDICINE

## 2024-04-16 PROCEDURE — 36415 COLL VENOUS BLD VENIPUNCTURE: CPT | Performed by: INTERNAL MEDICINE

## 2024-04-16 PROCEDURE — 82607 VITAMIN B-12: CPT

## 2024-04-16 PROCEDURE — 99232 SBSQ HOSP IP/OBS MODERATE 35: CPT | Performed by: PHYSICIAN ASSISTANT

## 2024-04-16 PROCEDURE — 83735 ASSAY OF MAGNESIUM: CPT | Performed by: INTERNAL MEDICINE

## 2024-04-16 PROCEDURE — 84207 ASSAY OF VITAMIN B-6: CPT

## 2024-04-16 PROCEDURE — 85027 COMPLETE CBC AUTOMATED: CPT | Performed by: INTERNAL MEDICINE

## 2024-04-16 PROCEDURE — 70551 MRI BRAIN STEM W/O DYE: CPT

## 2024-04-16 PROCEDURE — 84425 ASSAY OF VITAMIN B-1: CPT

## 2024-04-16 PROCEDURE — 99222 1ST HOSP IP/OBS MODERATE 55: CPT | Performed by: NURSE PRACTITIONER

## 2024-04-16 PROCEDURE — 87493 C DIFF AMPLIFIED PROBE: CPT

## 2024-04-16 PROCEDURE — 80053 COMPREHEN METABOLIC PANEL: CPT | Performed by: INTERNAL MEDICINE

## 2024-04-16 RX ORDER — POLYETHYLENE GLYCOL 3350 17 G/17G
17 POWDER, FOR SOLUTION ORAL DAILY PRN
Status: DISCONTINUED | OUTPATIENT
Start: 2024-04-16 | End: 2024-04-22 | Stop reason: HOSPADM

## 2024-04-16 RX ADMIN — SENNOSIDES AND DOCUSATE SODIUM 1 TABLET: 50; 8.6 TABLET ORAL at 09:44

## 2024-04-16 RX ADMIN — DIVALPROEX SODIUM 250 MG: 250 TABLET, DELAYED RELEASE ORAL at 21:21

## 2024-04-16 RX ADMIN — DOCUSATE SODIUM 100 MG: 100 CAPSULE, LIQUID FILLED ORAL at 09:46

## 2024-04-16 RX ADMIN — FLECAINIDE ACETATE 50 MG: 50 TABLET ORAL at 21:21

## 2024-04-16 RX ADMIN — DIVALPROEX SODIUM 250 MG: 250 TABLET, DELAYED RELEASE ORAL at 15:16

## 2024-04-16 RX ADMIN — BUSPIRONE HYDROCHLORIDE 7.5 MG: 5 TABLET ORAL at 09:44

## 2024-04-16 RX ADMIN — ENOXAPARIN SODIUM 40 MG: 40 INJECTION SUBCUTANEOUS at 09:48

## 2024-04-16 RX ADMIN — FLECAINIDE ACETATE 50 MG: 50 TABLET ORAL at 09:46

## 2024-04-16 RX ADMIN — DIVALPROEX SODIUM 250 MG: 250 TABLET, DELAYED RELEASE ORAL at 09:43

## 2024-04-16 RX ADMIN — ASPIRIN 81 MG: 81 TABLET, COATED ORAL at 09:43

## 2024-04-16 RX ADMIN — CITALOPRAM HYDROBROMIDE 10 MG: 10 TABLET ORAL at 09:46

## 2024-04-16 RX ADMIN — BUSPIRONE HYDROCHLORIDE 7.5 MG: 5 TABLET ORAL at 21:21

## 2024-04-16 RX ADMIN — MELATONIN 6 MG: at 21:22

## 2024-04-16 NOTE — QUICK NOTE
Ann Cross will need follow up in in 3 months with general AP/Attending .  She will not require outpatient neurological testing.

## 2024-04-16 NOTE — PROGRESS NOTES
NEUROLOGY RESIDENCY PROGRESS NOTE   Name: Ann Cross   Age & Sex: 83 y.o. female   MRN: [id number]  Unit/Bed#: [facility]   Encounter: [id number]    Recommendations for outpatient neurological follow up have yet to be determined.  Pending for discharge:   ASSESSMENT & PLAN   # Altered mental status  83-year-old female with a past medical history of dementia on Aricept, traumatic subdural in 2023, paroxysmal not on AC, mood disorder on BuSpar and Celexa, HLD, HTN, CKD presenting from nursing home with increased lethargy and altered mental status.  Daughter reports decline in cognition over the last 6 months with notable abrupt change over the last week with increased lethargy, difficulty following commands, and decline in ADLs.  Noted to have loose stools over the last week.  Recent ED visit on 4/7 for similar complaint with initial labs and UA unremarkable and was discharged home.  Represented on 4/15/2024.     Workup:  Initial ED labs: CBC and BMP unremarkable.  Lactic acid 2.6.  TSH 2.592.  ESR 14, CRP 51.6  UA: Trace leukocytes  CT head: No acute intracranial abnormalities, similar moderate chronic microangiopathic changes.  CT CAP: Segmental thickening of the sigmoid colon with adjacent mesenteric LAD of uncertain etiology.  Segmental inflammatory infectious colitis possible although there is no - surrounding stranding.  Malignancy also possible though discrete nodule is not identified.     Impression: Patient with acute on chronic decline in cognition with difficulty maintaining her ADLs.  Suspect toxic metabolic encephalopathy in a patient with a already poor baseline cognitive reserve.     Plan:  Frequent neurochecks  Obtain MRI brain to rule out intracranial pathology  Follow-up LP studies obtained in the ED  Follow-up labs: Syphilis, HSV  Ongoing evaluation and treatment for infectious process per primary team  PT OT evaluate and treat     SUBJECTIVE   Patient was seem and examined this morning at bedside.  No acute events over night.     Pertinent Negatives include: headaches, migraine headaches, syncope, seizures, amaurosis, diplopia, other visual changes, paralysis/weakness, numbness or tingling, involuntary movements, tremor, speech impairment     OBJECTIVE   Patient ID: Ann Cross is a 83 y.o. female.  Vitals:    04/16/24 0711 04/16/24 0900 04/16/24 0958 04/16/24 1315   BP: 119/78 118/56 118/56 120/76   BP Location:   Left arm    Pulse: 66 60 60 63   Resp: 14 12 20    Temp:       TempSrc:       SpO2: 93%  93% 94%   Weight:       Height:            Temperature:   No data recorded.    Temperature: 98.7 °F (37.1 °C)    Neurologic Exam      Mental Status   Disoriented to person.   Disoriented to place.   Disoriented to month and date. Oriented to year.   Follows 3 step commands.   Attention: normal. Concentration: normal.   Speech: speech is normal   Level of consciousness: alert  Unable to name object: Names high-frequency objects, difficulty with low-frequency objects.   Able to spell the word world backwards      Cranial Nerves      CN II   Visual fields full to confrontation.      CN III, IV, VI   Pupils are equal, round, and reactive to light.  Extraocular motions are normal.      CN V   Facial sensation intact.      CN VII   Right facial weakness: Mild RFD.     CN IX, X   CN IX normal.   CN X normal.      CN XI   CN XI normal.      CN XII   CN XII normal.      Motor Exam   Muscle bulk: normal  Overall muscle tone: normal  Right arm tone: normal  Left arm tone: normal  Right arm pronator drift: absent  Left arm pronator drift: absent     Strength   Strength 5/5 throughout.      Sensory Exam   Light touch normal.      Gait, Coordination, and Reflexes Deferred         LABORATORY DATA   Labs: I have personally reviewed pertinent reports.    Results from last 7 days   Lab Units 04/16/24  0450 04/15/24  1152   WBC Thousand/uL 9.40 8.97   HEMOGLOBIN g/dL 14.3 13.9   HEMATOCRIT % 45.5 42.9   PLATELETS Thousands/uL   --  161   SEGS PCT %  --  54   MONO PCT % 14* 19*   EOS PCT % 2 1      Results from last 7 days   Lab Units 04/16/24  0643 04/15/24  1303   SODIUM mmol/L 137 138   POTASSIUM mmol/L 4.4 4.4   CHLORIDE mmol/L 101 101   CO2 mmol/L 28 31   BUN mg/dL 11 14   CREATININE mg/dL 0.67 0.74   CALCIUM mg/dL 8.2* 8.2*   ALK PHOS U/L 40 36   ALT U/L 7 6*   AST U/L 15 11*     Results from last 7 days   Lab Units 04/16/24  0643   MAGNESIUM mg/dL 2.2              Results from last 7 days   Lab Units 04/15/24  1427   LACTIC ACID mmol/L 1.6         IMAGING & DIAGNOSTIC TESTING   Radiology Results: I have personally reviewed pertinent reports.      MRI brain wo contrast   Final Result by Dalton Solis MD (04/16 1205)      1.  No acute infarct.   2.  Cerebral atrophy and chronic microangiopathic change.         Workstation performed: [id number]         CT chest abdomen pelvis w contrast   Final Result by Leon Rangel MD (04/15 1626)      1.  No acute abnormality in the chest.      2.  Segmental thickening of the sigmoid colon with adjacent mesenteric lymphadenopathy of uncertain etiology. Segmental inflammatory or infectious colitis is possible though there is no surrounding stranding. Malignancy is also possible though a discrete    measurable mass is not identified. Clinical correlation and follow-up colonoscopy recommended.      3.  Ectatic aorta measuring up to 2.7 cm. Recommendation is for follow-up in 5 years though considerations related to the patient's age and/or comorbidities may be used to alter this recommendation.      The study was marked in EPIC for immediate notification.      Workstation performed: [id number]         XR chest 2 views   Final Result by Hunter Nixon MD (04/15 1625)      No acute cardiopulmonary disease.            Workstation performed: [id number]         CT head without contrast   Final Result by Pallav N Shah, MD (04/15 4965)      No acute intracranial abnormality.      Similar moderate chronic  microangiopathic changes.                  Resident: Ameya Hurley I, the attending radiologist, have reviewed the images and agree with the final report above.      Workstation performed: [id number]             Other Diagnostic Testing: I have personally reviewed pertinent reports.    ACTIVE MEDICATIONS     Current Facility-Administered Medications   Medication Dose Route Frequency    acetaminophen (TYLENOL) tablet 650 mg  650 mg Oral Q6H PRN    aspirin (ECOTRIN LOW STRENGTH) EC tablet 81 mg  81 mg Oral Daily    busPIRone (BUSPAR) tablet 7.5 mg  7.5 mg Oral BID    calcium carbonate (TUMS) chewable tablet 500 mg  500 mg Oral BID PRN    citalopram (CeleXA) tablet 10 mg  10 mg Oral Daily    divalproex sodium (DEPAKOTE) DR tablet 250 mg  250 mg Oral TID    enoxaparin (LOVENOX) subcutaneous injection 40 mg  40 mg Subcutaneous Daily    ergocalciferol (VITAMIN D2) capsule 50,000 Units  50,000 Units Oral Q30 Days    flecainide (TAMBOCOR) tablet 50 mg  50 mg Oral Q12H LILI    fluticasone (FLONASE) 50 mcg/act nasal spray 1 spray  1 spray Nasal Daily    melatonin tablet 6 mg  6 mg Oral HS    ondansetron (ZOFRAN) injection 4 mg  4 mg Intravenous Q6H PRN    polyethylene glycol (MIRALAX) packet 17 g  17 g Oral Daily PRN       Prior to Admission medications    Medication Sig Start Date End Date Taking? Authorizing Provider   acetaminophen (TYLENOL) 325 mg tablet Take 2 tablets (650 mg total) by mouth every 4 (four) hours as needed for mild pain 12/24/23   LUZ Garber   amLODIPine (NORVASC) 10 mg tablet Take 1 tablet (10 mg total) by mouth daily 8/19/22 2/19/24  Mary Kate Regalado PA-C   aspirin (ECOTRIN LOW STRENGTH) 81 mg EC tablet Take 81 mg by mouth daily    Historical Provider, MD   busPIRone (BUSPAR) 7.5 mg tablet Take 7.5 mg by mouth 2 (two) times a day    Historical Provider, MD   Calcium Carbonate 1500 (600 Ca) MG TABS Take 1 tablet by mouth daily 9/9/22   Historical Provider, MD   citalopram (CeleXA)  10 mg tablet Take 10 mg by mouth daily    Historical Provider, MD   [employer] Senna Plus 8.6-50 MG per tablet every other day 9/9/22   Historical Provider, MD   divalproex sodium (DEPAKOTE) 250 mg EC tablet Take 250 mg by mouth 3 (three) times a day 9/9/22   Historical Provider, MD   donepezil (ARICEPT) 5 mg tablet Take 1 tablet (5 mg total) by mouth daily at bedtime 8/19/22 2/19/24  Mary Kate Regalado PA-C   ergocalciferol (VITAMIN D2) 50,000 units Take 50,000 Units by mouth every 30 (thirty) days 9/9/22   Historical Provider, MD   flecainide (TAMBOCOR) 50 mg tablet Take 1 tablet (50 mg total) by mouth every 12 (twelve) hours 8/19/22 2/19/24  Mary Kate Regalado PA-C   fluticasone (FLONASE) 50 mcg/act nasal spray 1 spray into each nostril daily for 7 days 2/20/23 4/21/23  Sterling Taylor MD   Melatonin 3 MG CAPS Take 6 mg by mouth    Historical Provider, MD   sodium chloride (MARITZA 128) 5 % hypertonic ophthalmic ointment Administer to both eyes every 3 (three) hours as needed (irritation) 8/19/22 2/19/24  Mary Kate Regalado PA-C         VTE Pharmacologic Prophylaxis: Enoxaparin (Lovenox)  VTE Mechanical Prophylaxis: sequential compression device    ==  Nomi Skaggs,   [facility] Neurology Residency, PGY-2

## 2024-04-16 NOTE — PROGRESS NOTES
[facility]  Progress Note  Name: Ann Cross I  MRN: [id number]  Unit/Bed#: PPHP 731-01 I Date of Admission: 4/15/2024   Date of Service: 4/16/2024 I Hospital Day: 1    Assessment/Plan   * Encephalopathy  Assessment & Plan  Patient presents with worsening cognition, forgetfulness, generalized weakness, lethargy.  Patient and daughter are worried about worsening memory issues  CT head negative  MRI brain negative   Neurology evaluated.  No evidence for CVA, seizure activity.  CSF studies are benign.  Suspect worsening mental status due to underlying dementia/cognitive decline, underlying psychiatric disorder with recent hx of SDH in December, colitis/diarrhea, and familial losses recently.  No further IP neurologic work up at this time  Geriatrics following  F/u stool studies   PT/OT     Diarrhea  Assessment & Plan  Reports occasional episodes of loose stool/diarrhea  Abdominal imaging concerning for sigmoid colon thickening  Outpatient GI follow-up  Supportive cares  F/u C diff     Stage 3 chronic kidney disease, unspecified whether stage 3a or 3b CKD (HCC)  Assessment & Plan  Lab Results   Component Value Date    EGFR 81 04/16/2024    EGFR 75 04/15/2024    EGFR 68 04/07/2024    CREATININE 0.67 04/16/2024    CREATININE 0.74 04/15/2024    CREATININE 0.80 04/07/2024     Monitor kidney function -- currently at baseline   Avoid nephrotoxins    Tachy-alison syndrome (HCC)  Assessment & Plan  S/p PPM placement    Paroxysmal atrial fibrillation (HCC)  Assessment & Plan  Continue flecainide  Not on anticoagulation    Degeneration of lumbar intervertebral disc  Assessment & Plan  History of degenerative lumbar disc disease  Analgesics PRN   Physical therapy    Hypertension  Assessment & Plan  Monitor blood pressures  Monitor orthostatics -- negative   Amlodipine is on hold, BP is acceptable     Hyperlipemia  Assessment & Plan  Outpatient follow-up    Minimal cognitive  impairment  Assessment & Plan  History of cognitive impairment  Delirium precautions  Reorientation, sleep hygiene     Mood disorder with hypomanic features  Assessment & Plan  Continue buspirone, citalopram, depakote            VTE Pharmacologic Prophylaxis: VTE Score: 6 High Risk (Score >/= 5) - Pharmacological DVT Prophylaxis Ordered: enoxaparin (Lovenox). Sequential Compression Devices Ordered.    Mobility:   Basic Mobility Inpatient Raw Score: 12  JH-HLM Goal: 4: Move to chair/commode  JH-HLM Goal achieved. Continue to encourage appropriate mobility.    Patient Centered Rounds: I performed bedside rounds with nursing staff today.   Discussions with Specialists or Other Care Team Provider: RN    Education and Discussions with Family / Patient: called daughter on cell went to [facility],  picked up home phone she was not currently available.      Total Time Spent on Date of Encounter in care of patient: 25 mins. This time was spent on one or more of the following: performing physical exam; counseling and coordination of care; obtaining or reviewing history; documenting in the medical record; reviewing/ordering tests, medications or procedures; communicating with other healthcare professionals and discussing with patient's family/caregivers.    Current Length of Stay: 1 day(s)  Current Patient Status: Inpatient   Certification Statement: The patient will continue to require additional inpatient hospital stay due to AMS  Discharge Plan: Anticipate discharge in 24-48 hrs to prior assisted or independent living facility.    Code Status: Level 1 - Full Code    Subjective:   No acute complaints at this time.  Poor historian.  Denies abd pain, cp, n/v/abd pain, sob.      Objective:     Vitals:   Temp (24hrs), Av.3 °F (37.4 °C), Min:99.3 °F (37.4 °C), Max:99.3 °F (37.4 °C)    Temp:  [99.3 °F (37.4 °C)] 99.3 °F (37.4 °C)  HR:  [60-66] 64  Resp:  [12-20] 16  BP: (117-147)/(55-78) 125/60  SpO2:  [90 %-100 %] 92  %  Body mass index is 23.39 kg/m².     Input and Output Summary (last 24 hours):     Intake/Output Summary (Last 24 hours) at 4/16/2024 1607  Last data filed at 4/16/2024 1401  Gross per 24 hour   Intake 200 ml   Output 750 ml   Net -550 ml       Physical Exam:   Physical Exam  Vitals reviewed.   Constitutional:       General: She is not in acute distress.     Appearance: She is not toxic-appearing.   HENT:      Head: Normocephalic and atraumatic.   Eyes:      Extraocular Movements: Extraocular movements intact.   Pulmonary:      Effort: Pulmonary effort is normal. No respiratory distress.   Abdominal:      General: There is no distension.      Tenderness: There is no abdominal tenderness.   Musculoskeletal:         General: Normal range of motion.   Neurological:      General: No focal deficit present.      Mental Status: She is alert.   Psychiatric:         Behavior: Behavior normal.          Additional Data:     Labs:  Results from last 7 days   Lab Units 04/16/24  0450 04/15/24  1152   WBC Thousand/uL 9.40 8.97   HEMOGLOBIN g/dL 14.3 13.9   HEMATOCRIT % 45.5 42.9   PLATELETS Thousands/uL  --  161   BANDS PCT % 4  --    SEGS PCT %  --  54   LYMPHO PCT % 36 25   MONO PCT % 14* 19*   EOS PCT % 2 1     Results from last 7 days   Lab Units 04/16/24  0643   SODIUM mmol/L 137   POTASSIUM mmol/L 4.4   CHLORIDE mmol/L 101   CO2 mmol/L 28   BUN mg/dL 11   CREATININE mg/dL 0.67   ANION GAP mmol/L 8   CALCIUM mg/dL 8.2*   ALBUMIN g/dL 3.1*   TOTAL BILIRUBIN mg/dL 0.40   ALK PHOS U/L 40   ALT U/L 7   AST U/L 15   GLUCOSE RANDOM mg/dL 92                 Results from last 7 days   Lab Units 04/15/24  1427 04/15/24  1152   LACTIC ACID mmol/L 1.6 2.6*       Lines/Drains:  Invasive Devices       Peripheral Intravenous Line  Duration             Peripheral IV 04/15/24 Left Antecubital 1 day    Peripheral IV 04/16/24 Right;Ventral (anterior) Forearm <1 day                          Imaging: No pertinent imaging reviewed.    Recent  Cultures (last 7 days):         Last 24 Hours Medication List:   Current Facility-Administered Medications   Medication Dose Route Frequency Provider Last Rate    acetaminophen  650 mg Oral Q6H PRN Dominick Angeles MD      aspirin  81 mg Oral Daily Dominick Angeles MD      busPIRone  7.5 mg Oral BID Dominick Angeles MD      calcium carbonate  500 mg Oral BID PRN Dominick Angeles MD      citalopram  10 mg Oral Daily Dominick Angeles MD      divalproex sodium  250 mg Oral TID Dominick Angeles MD      enoxaparin  40 mg Subcutaneous Daily Dominick Angeles MD      ergocalciferol  50,000 Units Oral Q30 Days Dominick Angeles MD      flecainide  50 mg Oral Q12H LILI Dominick Angeles MD      fluticasone  1 spray Nasal Daily Dominick Angeles MD      melatonin  6 mg Oral HS Dominick Angeles MD      ondansetron  4 mg Intravenous Q6H PRN Dominick Angeles MD      polyethylene glycol  17 g Oral Daily PRN Tuesday M LUZ Arango          Today, Patient Was Seen By: Araceli Larson PA-C    **Please Note: This note may have been constructed using a voice recognition system.**

## 2024-04-16 NOTE — NURSING NOTE
Device interrogation for MRI.  Normal device function prior to MRI.  Leads and device meet all requirements per policy for MRI.  Device programmed AOO 80bpm per Cardiology for MRI.  Patient has no complaints.  Vital signs monitored throughout by ROBB Calderon RN.  Normal device function post MRI.  Device reprogrammed to prior settings per Cardiology.

## 2024-04-16 NOTE — NURSING NOTE
Pt arrived from ED w/PCA.  Pt calm and cooperative.   Removed pt. B/l hearing aids--put in container.     [employer] device interrogation for MRI done by GM Ortiz clinical specialist. Device set to MRI safe mode @80 bpm.    MRI brain without contrast complete. Pt tolerated well.     VSS post procedure. Pt alert and oriented on room air.   No complaints or visible signs of distess.    Device reprogrammed to prior settings per Cardiology by STEVEN Marie RN.  Transported back to ED.

## 2024-04-16 NOTE — UTILIZATION REVIEW
Initial Clinical Review    Admission: Date/Time/Statement:   Admission Orders (From admission, onward)       Ordered        04/15/24 1650  INPATIENT ADMISSION  Once                          Orders Placed This Encounter   Procedures    INPATIENT ADMISSION     Standing Status:   Standing     Number of Occurrences:   1     Order Specific Question:   Level of Care     Answer:   Med Surg [16]     Order Specific Question:   Estimated length of stay     Answer:   More than 2 Midnights     Order Specific Question:   Certification     Answer:   I certify that inpatient services are medically necessary for this patient for a duration of greater than two midnights. See H&P and MD Progress Notes for additional information about the patient's course of treatment.     ED Arrival Information       Expected   -    Arrival   4/15/2024 11:26    Acuity   Urgent              Means of arrival   Ambulance    Escorted by   [facility]    Service   Hospitalist    Admission type   Emergency              Arrival complaint   Dizzy             Chief Complaint   Patient presents with    Lethargy     Patient coming from facility for lethargy and change in mentation. Patient seen here last week for same thing. No urinary symptoms -cp -sob. Patient disoriented to time        Initial Presentation: 83 y.o. female with a PMH of PAF, HTN, h/o mild cognitive impairment, mood disorder with hypomanic features, tachybradycardia syndrome s/p PPM, h/o traumatic SDH presented to the ED from nursing home w/ AMS.  Pt reports  slowing of her thoughts difficulty with processing information. concerned about ongoing cognitive impairment which is worsening rapidly. Reports occasional episodes of loose stool/diarrhea.  In the ED, awake, follows command, Lungs diminished breath sounds bilateral, Vesicular breath sounds.  CT head no acute intracranial abnormality.   Abdominal imaging concerning for sigmoid colon thickening.    Admit as Inpatient for  evaluation and treatment of encephalopathy, diarrhea.  Plan: Neurology consulted. Mon mental status. Avoid neurotoxins.    Neurology Consulted: Altered Mental Status;  Pt w/ declining cognition over the last 6 months with notable abrupt change over the last week with increased lethargy, difficulty following commands, and decline in ADLs. Noted to have loose stools over the last week.   Plan: requent neurochecks. Obtain MRI brain to rule out intracranial pathology. Follow-up LP studies. Follow-up labs: Syphilis, HSV. PT/OT.    Date: 04/16   Day 2:   Geriatric Consult: ALS, Mild Cog impairment, diarrhea:  MRI pending. Neuro work up in progress.  daily senna BID, colace BID and daily miralax, discontinued. Ongoing evaluation of aricept. Stool for Cdiff pending. PT/OT    IM Notes: MRI brain negative. CSF studies are benign.  Suspect worsening mental status due to underlying dementia/cognitive decline, underlying psychiatric disorder with recent hx of SDH in December, colitis/diarrhea, and familial losses recently.  No further IP neurologic work up at this time. Geriatrics following. F/u stool studies. PT/OT. F/u C diff. Pain control prn. Continue buspirone, citalopram, depakote . PT/OT.    ED Triage Vitals [04/15/24 1137]   Temperature Pulse Respirations Blood Pressure SpO2   98.7 °F (37.1 °C) 62 18 111/56 96 %      Temp Source Heart Rate Source Patient Position - Orthostatic VS BP Location FiO2 (%)   Oral Monitor Lying Left arm --      Pain Score       No Pain          Wt Readings from Last 1 Encounters:   04/15/24 68 kg (149 lb 14.6 oz)     Additional Vital Signs:   Date/Time Temp Pulse Resp BP MAP (mmHg) SpO2 Calculated FIO2 (%) - Nasal Cannula Nasal Cannula O2 Flow Rate (L/min) O2 Device Patient Position - Orthostatic VS   04/16/24 0958 -- 60 20 118/56 -- 93 % 32 3 L/min None (Room air) Lying   04/16/24 0900 -- 60 12 118/56 81 -- -- -- -- --   04/16/24 0711 -- 66 14 119/78 -- 93 % -- -- -- Lying   04/16/24 0445 --  61 13 147/76 100 100 % -- -- Nasal cannula   Sitting   O2 Device: 3L at 04/16/24 0445   04/15/24 2300 -- 61 17 137/63 91 97 % -- -- None (Room air) Sitting   04/15/24 2100 -- 60 13 121/58 83 94 % -- -- None (Room air) --   04/15/24 2000 -- 60 12 117/56 81 95 % -- -- Nasal cannula Sitting   04/15/24 1908 -- -- -- -- -- -- -- -- Nasal cannula   --   O2 Device: 3 L at 04/15/24 1908   04/15/24 1900 -- 62 12 128/62 89 98 % -- -- Nasal cannula Sitting   04/15/24 1800 -- 62 14 132/63 90 -- -- -- Nasal cannula --   04/15/24 1700 -- 63 18 138/64 92 -- -- -- -- --   04/15/24 1400 -- 60 16 128/56 81 93 % -- -- -- --   04/15/24 1337 -- -- -- 118/56 -- -- -- -- -- Standing - Orthostatic VS     Patient Position - Orthostatic VS: with assistance at 04/15/24 1337   04/15/24 1333 -- 60 17 128/58 -- 95 % -- -- None (Room air) Sitting - Orthostatic VS    04/15/24 1331 -- 60 -- 122/54 -- 94 % -- -- None (Room air) Lying - Orthostatic VS    04/15/24 1300 -- 63 13 129/55 79 97 % -- -- -- --       Pertinent Labs/Diagnostic Test Results:   MRI brain wo contrast   Final Result by Dalton Solis MD (04/16 1205)      1.  No acute infarct.   2.  Cerebral atrophy and chronic microangiopathic change.         Workstation performed: [id number]         CT chest abdomen pelvis w contrast   Final Result by Leon Rangel MD (04/15 9916)      1.  No acute abnormality in the chest.      2.  Segmental thickening of the sigmoid colon with adjacent mesenteric lymphadenopathy of uncertain etiology. Segmental inflammatory or infectious colitis is possible though there is no surrounding stranding. Malignancy is also possible though a discrete    measurable mass is not identified. Clinical correlation and follow-up colonoscopy recommended.      3.  Ectatic aorta measuring up to 2.7 cm. Recommendation is for follow-up in 5 years though considerations related to the patient's age and/or comorbidities may be used to alter this recommendation.      The study was  "marked in EPIC for immediate notification.      Workstation performed: [id number]         XR chest 2 views   Final Result by Hunter Nixon MD (04/15 1625)      No acute cardiopulmonary disease.            Workstation performed: [id number]         CT head without contrast   Final Result by Pallav N Shah, MD (04/15 1409)      No acute intracranial abnormality.      Similar moderate chronic microangiopathic changes.                  Resident: Ameya Hurley I, the attending radiologist, have reviewed the images and agree with the final report above.      Workstation performed: [id number]           04/15 EKG result: NSR        Results from last 7 days   Lab Units 04/16/24  0450 04/15/24  1152   WBC Thousand/uL 9.40 8.97   HEMOGLOBIN g/dL 14.3 13.9   HEMATOCRIT % 45.5 42.9   PLATELETS Thousands/uL  --  161   TOTAL NEUT ABS Thousands/µL  --  4.90   BANDS PCT % 4  --          Results from last 7 days   Lab Units 04/16/24  0643 04/15/24  1303   SODIUM mmol/L 137 138   POTASSIUM mmol/L 4.4 4.4   CHLORIDE mmol/L 101 101   CO2 mmol/L 28 31   ANION GAP mmol/L 8 6   BUN mg/dL 11 14   CREATININE mg/dL 0.67 0.74   EGFR ml/min/1.73sq m 81 75   CALCIUM mg/dL 8.2* 8.2*   MAGNESIUM mg/dL 2.2  --      Results from last 7 days   Lab Units 04/16/24  0643 04/15/24  1303   AST U/L 15 11*   ALT U/L 7 6*   ALK PHOS U/L 40 36   TOTAL PROTEIN g/dL 5.7* 5.5*   ALBUMIN g/dL 3.1* 3.2*   TOTAL BILIRUBIN mg/dL 0.40 0.29         Results from last 7 days   Lab Units 04/16/24  0643 04/15/24  1303   GLUCOSE RANDOM mg/dL 92 90             No results found for: \"BETA-HYDROXYBUTYRATE\"       Results from last 7 days   Lab Units 04/15/24  1152   PH KAYLEE  7.432*   PCO2 KAYLEE mm Hg 38.6*   PO2 KAYLEE mm Hg 48.8*   HCO3 KAYLEE mmol/L 25.2   BASE EXC KAYLEE mmol/L 1.0   O2 CONTENT KAYLEE ml/dL 16.9   O2 HGB, VENOUS % 81.5*             Results from last 7 days   Lab Units 04/15/24  1427 04/15/24  1152   HS TNI 0HR ng/L  --  4   HS TNI 2HR ng/L 5  --    HSTNI D2 " ng/L 1  --      Results from last 7 days   Lab Units 04/15/24  1152   D-DIMER QUANTITATIVE ug/ml FEU 0.43         Results from last 7 days   Lab Units 04/15/24  1152   TSH 3RD GENERATON uIU/mL 2.529         Results from last 7 days   Lab Units 04/15/24  1427 04/15/24  1152   LACTIC ACID mmol/L 1.6 2.6*             Results from last 7 days   Lab Units 04/15/24  1303 04/15/24  1152   CRP mg/L 51.6*  --    SED RATE mm/hour  --  14             Results from last 7 days   Lab Units 04/15/24  1428   CLARITY UA  Clear   COLOR UA  Light Yellow   SPEC GRAV UA  1.014   PH UA  6.5   GLUCOSE UA mg/dl Negative   KETONES UA mg/dl 10 (1+)*   BLOOD UA  Negative   PROTEIN UA mg/dl Negative   NITRITE UA  Negative   BILIRUBIN UA  Negative   UROBILINOGEN UA (BE) mg/dl <2.0   LEUKOCYTES UA  Trace*   WBC UA /hpf None Seen   RBC UA /hpf None Seen   BACTERIA UA /hpf None Seen   EPITHELIAL CELLS WET PREP /hpf Occasional             Results from last 7 days   Lab Units 04/15/24  1644   TOTAL COUNTED  36     Results from last 7 days   Lab Units 04/15/24  1644   APPEARANCE CSF  Clear and colorless   TUBE NUM CSF  4   WBC CSF /uL 2   XANTHOCHROMIA  No   NEUTROPHILS % (CSF) % 3   LYMPHS % (CSF) % 97   GLUCOSE CSF mg/dL 64   PROTEIN CSF mg/dL 35   CSF CULTURE  No growth           ED Treatment:   Medication Administration from 04/15/2024 1126 to 04/16/2024 1245         Date/Time Order Dose Route Action     04/15/2024 1417 EDT multi-electrolyte (ISOLYTE-S PH 7.4) bolus 1,000 mL 0 mL Intravenous Stopped     04/15/2024 1243 EDT multi-electrolyte (ISOLYTE-S PH 7.4) bolus 1,000 mL 1,000 mL Intravenous New Bag     04/15/2024 1441 EDT iohexol (OMNIPAQUE) 350 MG/ML injection (MULTI-DOSE) 100 mL 100 mL Intravenous Given     04/16/2024 0943 EDT divalproex sodium (DEPAKOTE) DR tablet 250 mg 250 mg Oral Given     04/15/2024 2026 EDT divalproex sodium (DEPAKOTE) DR tablet 250 mg 250 mg Oral Given     04/16/2024 0944 EDT senna-docusate sodium (SENOKOT S) 8.6-50  mg per tablet 1 tablet 1 tablet Oral Given     04/16/2024 0943 EDT aspirin (ECOTRIN LOW STRENGTH) EC tablet 81 mg 81 mg Oral Given     04/16/2024 0944 EDT busPIRone (BUSPAR) tablet 7.5 mg 7.5 mg Oral Given     04/15/2024 2026 EDT busPIRone (BUSPAR) tablet 7.5 mg 7.5 mg Oral Given     04/16/2024 0946 EDT citalopram (CeleXA) tablet 10 mg 10 mg Oral Given     04/15/2024 2133 EDT melatonin tablet 6 mg 6 mg Oral Given     04/16/2024 0946 EDT flecainide (TAMBOCOR) tablet 50 mg 50 mg Oral Given     04/15/2024 2026 EDT flecainide (TAMBOCOR) tablet 50 mg 50 mg Oral Given     04/16/2024 0946 EDT docusate sodium (COLACE) capsule 100 mg 100 mg Oral Given     04/16/2024 0948 EDT enoxaparin (LOVENOX) subcutaneous injection 40 mg 40 mg Subcutaneous Given          Past Medical History:   Diagnosis Date    Afib (HCC)     Arthritis     Hyperlipidemia     Hypertension     Osteopenia     Sleep apnea     Squamous cell skin cancer     LEF TEMPLE    Subdural hemorrhage (HCC)     Varicose veins of both lower extremities      Present on Admission:    Mood disorder with hypomanic features   Degeneration of lumbar intervertebral disc   Hyperlipemia   Hypertension   Paroxysmal atrial fibrillation (HCC)   Stage 3 chronic kidney disease, unspecified whether stage 3a or 3b CKD (HCC)   Tachy-alison syndrome (HCC)   Minimal cognitive impairment   Encephalopathy   Diarrhea      Admitting Diagnosis: Dizzy [R42]  Age/Sex: 83 y.o. female  Admission Orders:  SCD  PT/OT  Orthostatic BP    Scheduled Medications:  aspirin, 81 mg, Oral, Daily  busPIRone, 7.5 mg, Oral, BID  citalopram, 10 mg, Oral, Daily  divalproex sodium, 250 mg, Oral, TID  enoxaparin, 40 mg, Subcutaneous, Daily  ergocalciferol, 50,000 Units, Oral, Q30 Days  flecainide, 50 mg, Oral, Q12H LILI  fluticasone, 1 spray, Nasal, Daily  melatonin, 6 mg, Oral, HS      Continuous IV Infusions: none     PRN Meds:  acetaminophen, 650 mg, Oral, Q6H PRN  calcium carbonate, 500 mg, Oral, BID  PRN  ondansetron, 4 mg, Intravenous, Q6H PRN  polyethylene glycol, 17 g, Oral, Daily PRN        IP CONSULT TO NEUROLOGY  IP CONSULT TO GERONTOLOGY    Network Utilization Review Department  ATTENTION: Please call with any questions or concerns to [phone] and carefully listen to the prompts so that you are directed to the right person. All voicemails are confidential.   For Discharge needs, contact Care Management DC Support Team at [phone] opt. [phone]  Send all requests for admission clinical reviews, approved or denied determinations and any other requests to dedicated fax number below belonging to the campus where the patient is receiving treatment. List of dedicated fax numbers for the Facilities:  FACILITY NAME UR FAX NUMBER   ADMISSION DENIALS (Administrative/Medical Necessity) [phone]   DISCHARGE SUPPORT TEAM (NETWORK) [phone]   PARENT CHILD HEALTH (Maternity/NICU/Pediatrics) [phone]   [facility] [phone]   [facility] [phone]   [facility] [phone]   [facility] [phone]   [facility] [phone]   [facility] [phone]   [facility] [phone]   [facility] [phone]   [facility] [phone]   [facility] [phone]   [facility] [phone]   [facility] [phone]

## 2024-04-16 NOTE — ED NOTES
Per lab, stool sample unusable due to urine contamination. Will recollect at next bowel movent     Laney Bowers RN  04/15/24 8003

## 2024-04-16 NOTE — ASSESSMENT & PLAN NOTE
Patient presents with worsening cognition, forgetfulness, generalized weakness, lethargy.  Patient and daughter are worried about worsening memory issues  CT head negative  MRI brain negative   Neurology evaluated.  No evidence for CVA, seizure activity.  CSF studies are benign.  Suspect worsening mental status due to underlying dementia/cognitive decline, underlying psychiatric disorder with recent hx of SDH in December, colitis/diarrhea, and familial losses recently.  No further IP neurologic work up at this time  Geriatrics following  F/u stool studies   PT/OT

## 2024-04-16 NOTE — ASSESSMENT & PLAN NOTE
Monitor blood pressures  Monitor orthostatics -- negative   Amlodipine is on hold, BP is acceptable

## 2024-04-16 NOTE — ASSESSMENT & PLAN NOTE
Lab Results   Component Value Date    EGFR 81 04/16/2024    EGFR 75 04/15/2024    EGFR 68 04/07/2024    CREATININE 0.67 04/16/2024    CREATININE 0.74 04/15/2024    CREATININE 0.80 04/07/2024     Monitor kidney function -- currently at baseline   Avoid nephrotoxins

## 2024-04-16 NOTE — ED NOTES
Pt resting in bed watching TV   Introduced myself to the patient   Patient denies any pain at this time         Andie Torres RN  04/16/24 0790

## 2024-04-16 NOTE — CONSULTS
Consultation - Geriatrics   Ann Cross 83 y.o. female MRN: [id number]  Unit/Bed#: ED 01 Encounter: [id number]      Assessment/Plan  Mild Cognitive impairment  Word finding on exam  Hx of word finding, forgetfulness and confusion since 2016  No prior work up on file, per review of epic she has been on aricept since 2022, preexisting memory issues  TSH 2.529 (4/15/24)  Vitamin B 12 level 587 (4/16/24)  CT head (4/15/24 ) moderate microangiopathic changes  MRI pending  Neuro work up in progress  Cannot exclude progress of underlying MCI to mild- moderate dementia    Altered mental status  Neurology on consult  Agree likely progression of cognitive impairment, mild to moderate    Diarrhea  Reported  Cannot exclude medication induced, pt on aricept, senna and bene fiber, flecanide  Also on admission was ordered scheduled daily senna BID, colace BID and daily miralax, discontinued  Ongoing evaluation of aricept  Stool for Cdiff pending    Mood disorder with hypomania  Hx of inpatient admission [facility] in 8/2022  Chronic use of celexa and buspar, depakote   Valproic acid level 95 (8/10/23)  Albumin 3.1 (4/16/24)  Qtc 438 (4/15/24)  Na 137    Osteoporosis  Dexa scan (2017)  Vitamin D level (25.4) 7/14/23  Continue calcium and vitamin D  Cautious use of celexa which can increase risk of bone fractures    Ambulatory dysfunction  At HIGH risk for falls secondary to age, medications, gait instability, hx of fall, visual impairment, cognitive impairment, hospitalization  Fall precautions  Ambulates with a roller walker at baseline  PT/OT    Frailty  Clinical Frail Scale: 6- Moderately Frail  Risk factors:  Needs assistance with IADLs and ADLs  Lives at assisted living  Hx of MCI  Polypharmacy  Hx of falls  Albumin 3.1, 14 pound weight loss in past year  Recommend protein supplementation  PT/OT    Delirium precautions  Alert and oriented x 2  Provide frequent redirection, reorientation, distraction techniques  Avoid deliriogenic  medications such as tramadol, benzodiazepines, anticholinergics,  Benadryl  Treat pain, See geriatric pain protocol  Monitor for constipation and urinary retention  Encourage early and frequent moblization, OOB  Encourage Hydration/ Nutrition  Implement sleep hygiene, limit night time interuptions, group activities    Home medication review  Medication list per facility  Acebutolol 200 mg po daily  Acetaminophen 1000 mg po TID prn  Amlodipine 5 mg po daily  Amoxicillin 2 gm prior to dental surgery  Asa 81 mg po daily  Benefiber 1 packets daily  Buspirone 7.5 mg po BID  Calcium D3 500- 400 iu po BID  Citaplopram 10 mg po daily  Divoaproex 250 mg po TID  Donepezil 5 mg po QHS  Flecainide 50 mg po BID  Melatonin 3 mg po daily  Peresrvision AREDs 2 gelcaps  Senna 8.6-50 mg po every other day  NSS opth 2 gtt OU QID prn          History of Present Illness   Physician Requesting Consult: Shankar Hensley MD  Reason for Consult / Principal Problem:   Hx and PE limited by: LADARIUS  HPI: Ann Cross is a 83 y.o. year old female who presents with altered mental status. Family is concerned about ongoing worsening cognitive impairment. Per EMS flowsheet, the were called because pt was walking funny and complaining of dizziness since hospitalization 1 week prior.     She has afib, HTN, mood disorder.    Prior to arrival she lives at [facility]. She needs assistance with IADLs and ADLs. She ambulates with a roller walker.     Upon exam she is lying in bed, she is alert and oriented x 2, she is calm and cooperative. She has word finding on exam.     Inpatient consult to Gerontology  Consult performed by: LUZ Underwood  Consult ordered by: Dominick Angeles MD          Review of Systems   Constitutional:  Positive for unexpected weight change.   HENT:  Negative for hearing loss.    Eyes:  Negative for visual disturbance.   Respiratory:  Negative for cough.    Cardiovascular:  Negative for chest pain.    Gastrointestinal:  Positive for diarrhea.   Genitourinary:  Negative for difficulty urinating.   Musculoskeletal:  Positive for arthralgias.   Skin:  Negative for color change.   Neurological:  Positive for weakness.   Psychiatric/Behavioral:  Negative for sleep disturbance.        Historical Information   Past Medical History:   Diagnosis Date    Afib (HCC)     Arthritis     Hyperlipidemia     Hypertension     Osteopenia     Sleep apnea     Squamous cell skin cancer     LEF TEMPLE    Subdural hemorrhage (HCC)     Varicose veins of both lower extremities      Past Surgical History:   Procedure Laterality Date    CARDIAC ELECTROPHYSIOLOGY PROCEDURE N/A 10/12/2022    Procedure: Cardiac loop recorder implant;  Surgeon: Rashid Carlos MD;  Location: [facility];  Service: Cardiology    CARDIAC ELECTROPHYSIOLOGY PROCEDURE N/A 4/21/2023    Procedure: Cardiac pacer implant;  Surgeon: Simba Chilel MD;  Location: [facility];  Service: Cardiology    CATARACT EXTRACTION      COLONOSCOPY      complete, for polyp removal    EYE SURGERY      HYSTERECTOMY      age 45    INCISION AND DRAINAGE OF WOUND Right 06/28/2016    Procedure: ARTHROSCOPY,EVACUATION OF HEMATOMA, OPEN EXPLORATION OF WOUND;  Surgeon: Adam Brice MD;  Location: [facility];  Service:     MOHS SURGERY Left 12/27/2022    left temple    SKIN BIOPSY      TONSILLECTOMY AND ADENOIDECTOMY      TOTAL KNEE ARTHROPLASTY Right     TOTAL KNEE ARTHROPLASTY Left     WISDOM TOOTH EXTRACTION       Social History   Social History     Substance and Sexual Activity   Alcohol Use Not Currently     Social History     Substance and Sexual Activity   Drug Use No     Social History     Tobacco Use   Smoking Status Never   Smokeless Tobacco Never         Family History:   Family History   Problem Relation Age of Onset    Diabetes Mother     Diabetes Father     Diabetes Brother     No Known Problems Sister     No Known Problems Daughter     No Known Problems Maternal  "Grandmother     No Known Problems Maternal Grandfather     No Known Problems Paternal Grandmother     No Known Problems Paternal Grandfather     No Known Problems Daughter     Brain cancer Son     No Known Problems Maternal Aunt        Meds/Allergies   Current meds:   Current Facility-Administered Medications   Medication Dose Route Frequency    acetaminophen (TYLENOL) tablet 650 mg  650 mg Oral Q6H PRN    aspirin (ECOTRIN LOW STRENGTH) EC tablet 81 mg  81 mg Oral Daily    busPIRone (BUSPAR) tablet 7.5 mg  7.5 mg Oral BID    calcium carbonate (TUMS) chewable tablet 500 mg  500 mg Oral BID PRN    citalopram (CeleXA) tablet 10 mg  10 mg Oral Daily    divalproex sodium (DEPAKOTE) DR tablet 250 mg  250 mg Oral TID    docusate sodium (COLACE) capsule 100 mg  100 mg Oral BID    enoxaparin (LOVENOX) subcutaneous injection 40 mg  40 mg Subcutaneous Daily    ergocalciferol (VITAMIN D2) capsule 50,000 Units  50,000 Units Oral Q30 Days    flecainide (TAMBOCOR) tablet 50 mg  50 mg Oral Q12H LILI    fluticasone (FLONASE) 50 mcg/act nasal spray 1 spray  1 spray Nasal Daily    melatonin tablet 6 mg  6 mg Oral HS    ondansetron (ZOFRAN) injection 4 mg  4 mg Intravenous Q6H PRN    polyethylene glycol (MIRALAX) packet 17 g  17 g Oral Daily    senna-docusate sodium (SENOKOT S) 8.6-50 mg per tablet 1 tablet  1 tablet Oral BID      Current PTA meds:(Not in a hospital admission)       Allergies   Allergen Reactions    Atorvastatin Other (See Comments)     myalgia    Statins Other (See Comments)     Weakness in legs       Objective   Vitals: Blood pressure 119/78, pulse 66, temperature 98.7 °F (37.1 °C), temperature source Oral, resp. rate 14, height 5' 4\" (1.626 m), weight 68 kg (149 lb 14.6 oz), SpO2 93%.,Body mass index is 25.73 kg/m².      Physical Exam  Vitals and nursing note reviewed.   HENT:      Head: Normocephalic.      Nose: No congestion.      Mouth/Throat:      Mouth: Mucous membranes are moist.   Eyes:      General:         " Right eye: No discharge.         Left eye: No discharge.   Cardiovascular:      Rate and Rhythm: Normal rate and regular rhythm.      Pulses: Normal pulses.   Pulmonary:      Effort: Pulmonary effort is normal.      Breath sounds: Normal breath sounds.   Abdominal:      General: Bowel sounds are normal.      Palpations: Abdomen is soft.   Musculoskeletal:         General: Normal range of motion.      Cervical back: Normal range of motion.   Skin:     General: Skin is warm and dry.   Neurological:      Mental Status: She is alert and oriented to person, place, and time. Mental status is at baseline.   Psychiatric:         Mood and Affect: Mood normal.         Lab Results:   Results from last 7 days   Lab Units 04/16/24  0450 04/15/24  1152   WBC Thousand/uL 9.40 8.97   HEMOGLOBIN g/dL 14.3 13.9   HEMATOCRIT % 45.5 42.9   PLATELETS Thousands/uL  --  161        Results from last 7 days   Lab Units 04/16/24  0643   POTASSIUM mmol/L 4.4   CHLORIDE mmol/L 101   CO2 mmol/L 28   BUN mg/dL 11   CREATININE mg/dL 0.67   CALCIUM mg/dL 8.2*   ALK PHOS U/L 40   ALT U/L 7   AST U/L 15       Imaging Studies: I have personally reviewed pertinent films in PACS  EKG, Pathology, and Other Studies: I have personally reviewed pertinent films in PACS  VTE Prophylaxis: Sequential compression device (Venodyne)     Code Status: Level 1 - Full Code

## 2024-04-16 NOTE — ASSESSMENT & PLAN NOTE
Reports occasional episodes of loose stool/diarrhea  Abdominal imaging concerning for sigmoid colon thickening  Outpatient GI follow-up  Supportive cares  F/u C diff

## 2024-04-17 PROBLEM — A04.72 CLOSTRIDIUM DIFFICILE DIARRHEA: Status: ACTIVE | Noted: 2024-04-17

## 2024-04-17 LAB
AMPAR1 AB CSF QL CBA IFA: NEGATIVE
AMPAR1 IGG CSF QL IF: NEGATIVE
AMPAR2 IGG CSF QL CBA IFA: NEGATIVE
AMPAR2 IGG CSF QL IF: NEGATIVE
AMPHIPHYSIN AB CSF QL IF: NEGATIVE
AMPHIPHYSIN AB CSF QL IF: NEGATIVE
ANION GAP SERPL CALCULATED.3IONS-SCNC: 10 MMOL/L (ref 4–13)
BUN SERPL-MCNC: 14 MG/DL (ref 5–25)
C DIFF TOX A+B STL QL IA: POSITIVE
C DIFF TOX GENS STL QL NAA+PROBE: POSITIVE
CALCIUM SERPL-MCNC: 8 MG/DL (ref 8.4–10.2)
CASPR2 AB CSF QL CBA IFA: NEGATIVE
CASPR2 AB CSF QL CBA IFA: NEGATIVE
CHLORIDE SERPL-SCNC: 99 MMOL/L (ref 96–108)
CO2 SERPL-SCNC: 27 MMOL/L (ref 21–32)
CREAT SERPL-MCNC: 0.67 MG/DL (ref 0.6–1.3)
CV2 AB CSF QL IF: NEGATIVE
CV2 AB CSF QL IF: NEGATIVE
DPPX IGG CSF QL IF: NEGATIVE
DPPX IGG CSF QL IF: NEGATIVE
ERYTHROCYTE [DISTWIDTH] IN BLOOD BY AUTOMATED COUNT: 14.3 % (ref 11.6–15.1)
GABABR AB CSF QL CBA IFA: NEGATIVE
GABABR IGG CSF QL CBA IFA: NEGATIVE
GAD65 AB CSF IA-SCNC: NEGATIVE NMOL/L
GAD65 AB CSF IA-SCNC: NEGATIVE NMOL/L
GFR SERPL CREATININE-BSD FRML MDRD: 81 ML/MIN/1.73SQ M
GLIAL NUC TYPE 1 AB CSF QL IF: NEGATIVE
GLIAL NUC TYPE 1 AB CSF QL IF: NEGATIVE
GLUCOSE SERPL-MCNC: 94 MG/DL (ref 65–140)
HCT VFR BLD AUTO: 45.9 % (ref 34.8–46.1)
HGB BLD-MCNC: 14.8 G/DL (ref 11.5–15.4)
HU1 AB CSF QL IF: NEGATIVE
HU1 AB CSF QL IF: NEGATIVE
HU2 AB CSF QL IF: NEGATIVE
HU2 AB CSF QL IF: NEGATIVE
HU3 AB CSF QL IF: NEGATIVE
HU3 AB CSF QL IF: NEGATIVE
IGLON5 IGG CSF QL CBA IFA: NEGATIVE
IGLON5 IGG CSF QL CBA IFA: NEGATIVE
IMP & REVIEW OF LAB RESULTS: NEGATIVE
IMP & REVIEW OF LAB RESULTS: NEGATIVE
IP3R 1 IGG CSF QL IF: NEGATIVE
LGI1 AB CSF QL CBA IFA: NEGATIVE
LGI1 AB CSF QL CBA IFA: NEGATIVE
MA+TA AB CSF QL IF: NEGATIVE
MCH RBC QN AUTO: 28.8 PG (ref 26.8–34.3)
MCHC RBC AUTO-ENTMCNC: 32.2 G/DL (ref 31.4–37.4)
MCV RBC AUTO: 89 FL (ref 82–98)
MGLUR1 IGG CSF QL CBA IFA: NEGATIVE
MGLUR1 IGG CSF QL CBA IFA: NEGATIVE
NMDAR IGG CSF QL IF: NEGATIVE
NMDAR IGG CSF QL IF: NEGATIVE
NRBC BLD AUTO-RTO: 0 /100 WBCS
PCA-2 AB CSF QL IF: NEGATIVE
PCA-2 AB CSF QL IF: NEGATIVE
PCA-TR AB CSF QL CBA IFA: NEGATIVE
PCA-TR AB CSF QL IF: NEGATIVE
PCA-TR AB CSF QL IF: NEGATIVE
PLATELET # BLD AUTO: 152 THOUSANDS/UL (ref 149–390)
PMV BLD AUTO: 9.9 FL (ref 8.9–12.7)
POTASSIUM SERPL-SCNC: 4 MMOL/L (ref 3.5–5.3)
PURKINJE CELLS AB CSF QL IF: NEGATIVE
PURKINJE CELLS AB CSF QL IF: NEGATIVE
RBC # BLD AUTO: 5.14 MILLION/UL (ref 3.81–5.12)
SODIUM SERPL-SCNC: 136 MMOL/L (ref 135–147)
WBC # BLD AUTO: 11.23 THOUSAND/UL (ref 4.31–10.16)
ZIC4 ANTIBODY, CSF: NEGATIVE

## 2024-04-17 PROCEDURE — 87505 NFCT AGENT DETECTION GI: CPT | Performed by: INTERNAL MEDICINE

## 2024-04-17 PROCEDURE — 83993 ASSAY FOR CALPROTECTIN FECAL: CPT | Performed by: INTERNAL MEDICINE

## 2024-04-17 PROCEDURE — 97163 PT EVAL HIGH COMPLEX 45 MIN: CPT

## 2024-04-17 PROCEDURE — 99232 SBSQ HOSP IP/OBS MODERATE 35: CPT | Performed by: INTERNAL MEDICINE

## 2024-04-17 PROCEDURE — 97130 THER IVNTJ EA ADDL 15 MIN: CPT

## 2024-04-17 PROCEDURE — 97129 THER IVNTJ 1ST 15 MIN: CPT

## 2024-04-17 PROCEDURE — 85027 COMPLETE CBC AUTOMATED: CPT | Performed by: PHYSICIAN ASSISTANT

## 2024-04-17 PROCEDURE — 97167 OT EVAL HIGH COMPLEX 60 MIN: CPT

## 2024-04-17 PROCEDURE — 99232 SBSQ HOSP IP/OBS MODERATE 35: CPT | Performed by: PHYSICIAN ASSISTANT

## 2024-04-17 PROCEDURE — 99222 1ST HOSP IP/OBS MODERATE 55: CPT | Performed by: INTERNAL MEDICINE

## 2024-04-17 PROCEDURE — 80048 BASIC METABOLIC PNL TOTAL CA: CPT | Performed by: PHYSICIAN ASSISTANT

## 2024-04-17 RX ORDER — SACCHAROMYCES BOULARDII 250 MG
250 CAPSULE ORAL 2 TIMES DAILY
Status: DISCONTINUED | OUTPATIENT
Start: 2024-04-17 | End: 2024-04-22 | Stop reason: HOSPADM

## 2024-04-17 RX ORDER — VANCOMYCIN HYDROCHLORIDE 125 MG/1
125 CAPSULE ORAL EVERY 6 HOURS SCHEDULED
Qty: 40 CAPSULE | Refills: 0 | Status: DISCONTINUED | OUTPATIENT
Start: 2024-04-17 | End: 2024-04-22 | Stop reason: HOSPADM

## 2024-04-17 RX ADMIN — CITALOPRAM HYDROBROMIDE 10 MG: 10 TABLET ORAL at 08:56

## 2024-04-17 RX ADMIN — ENOXAPARIN SODIUM 40 MG: 40 INJECTION SUBCUTANEOUS at 08:56

## 2024-04-17 RX ADMIN — VANCOMYCIN HYDROCHLORIDE 125 MG: 125 CAPSULE ORAL at 14:14

## 2024-04-17 RX ADMIN — BUSPIRONE HYDROCHLORIDE 7.5 MG: 5 TABLET ORAL at 08:55

## 2024-04-17 RX ADMIN — BUSPIRONE HYDROCHLORIDE 7.5 MG: 5 TABLET ORAL at 21:00

## 2024-04-17 RX ADMIN — DIVALPROEX SODIUM 250 MG: 250 TABLET, DELAYED RELEASE ORAL at 21:00

## 2024-04-17 RX ADMIN — ASPIRIN 81 MG: 81 TABLET, COATED ORAL at 08:55

## 2024-04-17 RX ADMIN — VANCOMYCIN HYDROCHLORIDE 125 MG: 125 CAPSULE ORAL at 23:06

## 2024-04-17 RX ADMIN — Medication 250 MG: at 18:15

## 2024-04-17 RX ADMIN — DIVALPROEX SODIUM 250 MG: 250 TABLET, DELAYED RELEASE ORAL at 17:56

## 2024-04-17 RX ADMIN — VANCOMYCIN HYDROCHLORIDE 125 MG: 125 CAPSULE ORAL at 18:15

## 2024-04-17 RX ADMIN — Medication 250 MG: at 14:15

## 2024-04-17 RX ADMIN — DIVALPROEX SODIUM 250 MG: 250 TABLET, DELAYED RELEASE ORAL at 08:55

## 2024-04-17 RX ADMIN — FLECAINIDE ACETATE 50 MG: 50 TABLET ORAL at 21:01

## 2024-04-17 RX ADMIN — MELATONIN 6 MG: at 21:00

## 2024-04-17 NOTE — CASE MANAGEMENT
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    Encephalopathy 2023    Acute pain due to trauma 2023    Fall 2023    Sinus pause 2023    Thrombocytopenia (HCC) 2023    Acute metabolic encephalopathy 2023    COVID-19 2023    Diarrhea 2023    Stage 3 chronic kidney disease, unspecified whether stage 3a or 3b CKD ([facility]) 2022    Tachy-alison syndrome ([facility]) 2022    Hyperlipemia 2022    Hypertension 2022    Generalized anxiety disorder 2021    Obstructive sleep apnea syndrome 2020    Degeneration of lumbar intervertebral disc 2017    History of total bilateral knee replacement 2017    Osteoporosis 05/10/2017    Minimal cognitive impairment 2016    Pain, joint, knee, right 2016     Mood disorder with hypomanic features 2016    Paroxysmal atrial fibrillation (HCC) 2014    Metabolic syndrome X 2007    Anxiety state 2007    Migraine 2007    Peripheral venous insufficiency 2007      LOS (days): 2  Geometric Mean LOS (GMLOS) (days): 2.4  Days to GMLOS:0.6     OBJECTIVE:    Risk of Unplanned Readmission Score: 19.41         Current admission status: Inpatient       Preferred Pharmacy:   [facility] [place] - [address]  [address] [address]  Phone: [phone] Fax: [phone]    Primary Care Provider: Emelina Swann MD    Primary Insurance: [employer] [facility]  Secondary Insurance:     ASSESSMENT:  Active Health Care Proxies       Lotus Hilliard Health Care Representative  - Daughter   Primary Phone: [phone] (Mobile)  Home Phone: [phone]                                Patient Information  Admitted from:: Facility ([facility])  Mental Status: Other (Comment) (CM spoke with Karmen Stiles from [facility])  Assessment information provided by:: Other - please comment (Karmen Stiles-[facility])  Support Systems: Daughter, Family members  Type of Current Residence: Facility ([facility])    Activities of Daily Living Prior to Admission  Functional Status: Independent  Completes ADLs independently?: Yes  Ambulates independently?: No  Level of ambulatory dependence: Assistance  Does patient use assisted devices?: Yes  Assisted Devices (DME) used: Walker  Does patient currently own DME?: Yes  What DME does the patient currently own?: Walker  Does patient have a history of Outpatient Therapy (PT/OT)?: Yes ([place] [facility]  Does the patient have a history of Short-Term Rehab?: Yes ([facility])         Patient Information Continued  Income Source: Pension/assisted     DISCHARGE DETAILS:  Additional Comments: CM received phone call from Karmen Stiles from [facility] ([phone]) who is patient's insurance. Karmen provided assessment information. Patient currently resides at [facility] in [place] and goes to [facility] 2x/week for outpatient PT/OT. Prior to hospitilization, patient was independently completing ADL's and ambulating with assistance. Patient uses a walker. Karmen Stiles stated [facility] will accept patient back. Patient is currently being recommended for STR. Patient previously stayed at [facility] for inpatient rehab. CM to reach out to family and continue to follow.

## 2024-04-17 NOTE — PLAN OF CARE
Problem: OCCUPATIONAL THERAPY ADULT  Goal: Performs self-care activities at highest level of function for planned discharge setting.  See evaluation for individualized goals.  Description:            See flowsheet documentation for full assessment, interventions and recommendations.   Note: Limitation: Decreased ADL status, Decreased Safe judgement during ADL, Decreased cognition, Decreased endurance, Decreased self-care trans, Decreased high-level ADLs  Prognosis: Fair  Assessment: Pt is a 83 y.o. female who was admitted to [facility] on 4/15/2024 with worsening cognition, forgetful ness, weakness/lethargy and now here with Encephalopathy . Patient  has a past medical history of Afib (HCC), Arthritis, Hyperlipidemia, Hypertension, Osteopenia, Sleep apnea, Squamous cell skin cancer, Subdural hemorrhage (HCC), and Varicose veins of both lower extremities.  At baseline pt was completing I with ADL's/assistance with IADL's. Pt lives at [facility]. Currently pt requires mod a  for overall ADLS and min a with RW for functional mobility/transfers. Pt currently presents with impairments in the following categories -difficulty performing ADLS and difficulty performing IADLS  endurance, standing balance/tolerance, sitting balance/tolerance, memory, insight, safety , judgement , attention , and sequencing . These impairments, as well as pt's fatigue and risk for falls  limit pt's ability to safely engage in all baseline areas of occupation, includingbathing, dressing, toileting, functional mobility/transfers, community mobility, house maintenance, medication management, and meal prep From OT standpoint, recommend  upon D/C. The patient's raw score on the AM-PAC Daily Activity Inpatient Short Form is 15. A raw score of less than 19 suggests the patient may benefit from discharge to . Please refer to the recommendation of the Occupational Therapist for safe discharge planning. OT will continue to follow to  address the below stated goals.     Rehab Resource Intensity Level, OT: II (Moderate Resource Intensity)

## 2024-04-17 NOTE — ASSESSMENT & PLAN NOTE
Reports occasional episodes of loose stool/diarrhea  Abdominal imaging concerning for sigmoid colon thickening  C diff PCR and EIA positive.  Started on PO Vanco  D/w her PCP and GI -- given active C diff no plans for IP scope at this time and can be followed up outpt

## 2024-04-17 NOTE — PROGRESS NOTES
Progress Note - Ann Cross 83 y.o. female MRN: [id number]    Unit/Bed#: [facility] 731-01 Encounter: [id number]      Assessment/Plan:  Mild Cognitive impairment  Word finding on exam  Hx of word finding, forgetfulness and confusion since 2016  No prior work up on file, per review of epic she has been on aricept since 2022, preexisting memory issues  TSH 2.529 (4/15/24)  Vitamin B 12 level 587 (4/16/24)  CT head (4/15/24 ) moderate microangiopathic changes  MRI pending  Neuro work up in progress  Cannot exclude progress of underlying MCI to mild- moderate dementia     Altered mental status  Neurology on consult  Agree likely progression of cognitive impairment, mild to moderate  Continue supportive care with [facility]/Angela Yoo     Cdiff  Positive on 4/16/24  Continue vancomycin and probiotics  Last BM 4/17/24, soft     Mood disorder with hypomania  Hx of inpatient admission [facility] in 8/2022  Chronic use of celexa and buspar, depakote   Valproic acid level 95 (8/10/23)  Albumin 3.1 (4/16/24)  Qtc 438 (4/15/24)  Na 137     Osteoporosis  Dexa scan (2017)  Vitamin D level (25.4) 7/14/23  Continue calcium and vitamin D  Cautious use of celexa which can increase risk of bone fractures     Ambulatory dysfunction  At HIGH risk for falls secondary to age, medications, gait instability, hx of fall, visual impairment, cognitive impairment, hospitalization  Fall precautions  Ambulates with a roller walker at baseline  PT/OT     Frailty  Clinical Frail Scale: 6- Moderately Frail  Risk factors:  Needs assistance with IADLs and ADLs  Lives at assisted living  Hx of MCI  Polypharmacy  Hx of falls  Albumin 3.1, 14 pound weight loss in past year  Recommend protein supplementation  PT/OT     Delirium precautions  Alert and oriented x 2  Provide frequent redirection, reorientation, distraction techniques  Avoid deliriogenic medications such as tramadol, benzodiazepines, anticholinergics,  Benadryl  Treat pain, See geriatric pain  "protocol  Monitor for constipation and urinary retention  Encourage early and frequent moblization, OOB  Encourage Hydration/ Nutrition  Implement sleep hygiene, limit night time interuptions, group activities     Subjective:   She is lying in bed, alert, word finding on exam.      Objective:     Vitals: Blood pressure 125/60, pulse 64, temperature 99.3 °F (37.4 °C), temperature source Oral, resp. rate 16, height 5' 4\" (1.626 m), weight 61.8 kg (136 lb 3.9 oz), SpO2 92%.,Body mass index is 23.39 kg/m².      Intake/Output Summary (Last 24 hours) at 4/17/2024 1255  Last data filed at 4/17/2024 1252  Gross per 24 hour   Intake 560 ml   Output 875 ml   Net -315 ml       Physical Exam:   General : NAD  HEENT : MMM   Heart : Normal rate, no murmur rub or gallop  Lungs : CTA no wheezes, rales or rhonchi  Abdomen : Soft, NT/ND, BS auscultated in all 4 quads.   Ext :  no edema  Skin : Pink, warm, dry, age appropriate turgor and mobility  Neuro : Nonfocal  Psych : Alert and O x 1    Invasive Devices       Peripheral Intravenous Line  Duration             Peripheral IV 04/15/24 Left Antecubital 2 days    Peripheral IV 04/16/24 Right;Ventral (anterior) Forearm 1 day                    Lab, Imaging and other studies: I have personally reviewed pertinent films in PACS  VTE Pharmacologic Prophylaxis: Sequential compression device (Venodyne)   VTE Mechanical Prophylaxis: sequential compression device   "

## 2024-04-17 NOTE — ASSESSMENT & PLAN NOTE
Patient presents with worsening cognition, forgetfulness, generalized weakness, lethargy.  Patient and daughter are worried about worsening memory issues  CT head negative  MRI brain negative   Neurology evaluated.  No evidence for CVA, seizure activity.  CSF studies are benign.  Suspect worsening mental status due to underlying dementia/cognitive decline, underlying psychiatric disorder with recent hx of SDH in December, colitis/diarrhea, and familial losses recently.  No further IP neurologic work up at this time  Geriatrics following  F/u stool studies -- C. Diff positive and likely contributing to AMS   PT/OT

## 2024-04-17 NOTE — UTILIZATION REVIEW
"NOTIFICATION OF INPATIENT ADMISSION   AUTHORIZATION REQUEST   SERVICING FACILITY:   [facility]  Address: [address] [place] [address]  Tax ID: [id number]  NPI: [id number] ATTENDING PROVIDER:  Attending Name and NPI#: Shankar Hensley Md [[id number]]  Address: [address] [place] [address]  Phone: [phone]   ADMISSION INFORMATION:  Place of Service: Inpatient [facility]  Place of Service Code: 21  Inpatient Admission Date/Time: 4/15/24  4:50 PM  Discharge Date/Time: No discharge date for patient encounter.  Admitting Diagnosis Code/Description:  Altered mental status [R41.82]  Encephalopathy acute [G93.40]  Encephalopathy [G93.40]  Dizzy [R42]  Minimal cognitive impairment [G31.84]     UTILIZATION REVIEW CONTACT:  Sheryl Leos"Kelsey\"Rufus Utilization   Network Utilization Review Department  Phone: [phone]  Fax: [phone]  Email: [email or web address]  Contact for approvals/pending authorizations, clinical reviews, and discharge.     PHYSICIAN ADVISORY SERVICES:  Medical Necessity Denial & Rjkv-ik-Ubxd Review  Phone: [phone]  Fax: [phone]  Email: [email or web address]     DISCHARGE SUPPORT TEAM:  For Patients Discharge Needs & Updates  Phone: [phone] opt. 2 Fax: [phone]  Email: [email or web address]     "

## 2024-04-17 NOTE — CONSULTS
"      Consult Service: Gastroenterology      PATIENT INFORMATION      Ann Cross 83 y.o. female MRN: [id number]  Unit/Bed#: [facility] 731-01 Encounter: [id number]  PCP: Emelina Swann MD  Date of Admission:  4/15/2024  Date of Consultation: 04/17/24  Requesting Physician: Shankar Hensley MD       ASSESSMENTS & PLAN   Ann Cross is a 83 y.o. old female with PMH of pAfib, HTN, cognitive impairment, mood disorder,hx of traumatic subdural hemorrhage, tachybrady syndrome s/p PPM presenting with encephalopathy, with GI consulted for diarrhea and sigmoid colitis.    Diarrhea  Sigmoid Colitis  Nonspecific finding noted on imaging   Pt reports several days of diarrhea  Ddx includes infectious, inflammatory colitis; less likely ischemic given no reported blood and stable BPs  Trend daily CBC, BMP  Defer abx at this time  F/u c diff, stool enteric panel, fecal calprotectin  Pending these studies if negative, will plan for colonoscopy this admission     REASON FOR CONSULTATION      Diarrhea, Sigmoid Colitis    HISTORY OF PRESENT ILLNESS      Ann Cross is a 83 y.o. female with PMH of pAfib, HTN, cognitive impairment, mood disorder,hx of traumatic subdural hemorrhage, tachybrady syndrome s/p PPM presenting with encephalopathy, with GI consulted for diarrhea.     CT chest abdomen pelvis w contrast showed \"segmental thickening of sigmoid colon with adjacent mesenteric lymphadenopathy of uncertain etiology\" with \"segmental inflammatory or infectious colitis\" on ddx. Recommended for possible f/u colonoscopy.     Enteric panel ordered but not collected, c diff collected and in process. No leukocytosis, VSS.     REVIEW OF SYSTEMS     A thorough 12-point review of systems has been conducted. Pertinent positives and negatives are mentioned in the history of present illness.       PAST MEDICAL & SURGICAL HISTORY      Past Medical History:   Diagnosis Date    Afib (HCC)     Arthritis     Hyperlipidemia     Hypertension     " Osteopenia     Sleep apnea     Squamous cell skin cancer     LEF TEMPLE    Subdural hemorrhage (HCC)     Varicose veins of both lower extremities        Past Surgical History:   Procedure Laterality Date    CARDIAC ELECTROPHYSIOLOGY PROCEDURE N/A 10/12/2022    Procedure: Cardiac loop recorder implant;  Surgeon: Rashid Carlos MD;  [facility];  Service: Cardiology    CARDIAC ELECTROPHYSIOLOGY PROCEDURE N/A 4/21/2023    Procedure: Cardiac pacer implant;  Surgeon: Simba Chilel MD;  Location: [facility];  Service: Cardiology    CATARACT EXTRACTION      COLONOSCOPY      complete, for polyp removal    EYE SURGERY      HYSTERECTOMY      age 45    INCISION AND DRAINAGE OF WOUND Right 06/28/2016    Procedure: ARTHROSCOPY,EVACUATION OF HEMATOMA, OPEN EXPLORATION OF WOUND;  Surgeon: Adam Brice MD;  Location: [facility];  Service:     MOHS SURGERY Left 12/27/2022    left temple    SKIN BIOPSY      TONSILLECTOMY AND ADENOIDECTOMY      TOTAL KNEE ARTHROPLASTY Right     TOTAL KNEE ARTHROPLASTY Left     WISDOM TOOTH EXTRACTION           MEDICATIONS & ALLERGIES       Medications:   Prior to Admission medications    Medication Sig Start Date End Date Taking? Authorizing Provider   acetaminophen (TYLENOL) 325 mg tablet Take 2 tablets (650 mg total) by mouth every 4 (four) hours as needed for mild pain 12/24/23   LUZ Garber   amLODIPine (NORVASC) 10 mg tablet Take 1 tablet (10 mg total) by mouth daily 8/19/22 2/19/24  Mary Kate Regalado PA-C   aspirin (ECOTRIN LOW STRENGTH) 81 mg EC tablet Take 81 mg by mouth daily    Historical Provider, MD   busPIRone (BUSPAR) 7.5 mg tablet Take 7.5 mg by mouth 2 (two) times a day    Historical Provider, MD   Calcium Carbonate 1500 (600 Ca) MG TABS Take 1 tablet by mouth daily 9/9/22   Historical Provider, MD   citalopram (CeleXA) 10 mg tablet Take 10 mg by mouth daily    Historical Provider, MD   [facility] Senna Plus 8.6-50 MG per tablet every other day 9/9/22   " Historical Provider, MD   divalproex sodium (DEPAKOTE) 250 mg EC tablet Take 250 mg by mouth 3 (three) times a day 9/9/22   Historical Provider, MD   donepezil (ARICEPT) 5 mg tablet Take 1 tablet (5 mg total) by mouth daily at bedtime 8/19/22 2/19/24  Mary Kate Regalado PA-C   ergocalciferol (VITAMIN D2) 50,000 units Take 50,000 Units by mouth every 30 (thirty) days 9/9/22   Historical Provider, MD   flecainide (TAMBOCOR) 50 mg tablet Take 1 tablet (50 mg total) by mouth every 12 (twelve) hours 8/19/22 2/19/24  Mary Kate Regalado PA-C   fluticasone (FLONASE) 50 mcg/act nasal spray 1 spray into each nostril daily for 7 days 2/20/23 4/21/23  Sterling Taylor MD   Melatonin 3 MG CAPS Take 6 mg by mouth    Historical Provider, MD   sodium chloride (MARITZA 128) 5 % hypertonic ophthalmic ointment Administer to both eyes every 3 (three) hours as needed (irritation) 8/19/22 2/19/24  Mary Kate Regalado PA-C       Allergies:   Allergies   Allergen Reactions    Atorvastatin Other (See Comments)     myalgia    Statins Other (See Comments)     Weakness in legs         SOCIAL HISTORY      Marital Status:     Substance Use History:   Social History     Substance and Sexual Activity   Alcohol Use Not Currently     Social History     Tobacco Use   Smoking Status Never   Smokeless Tobacco Never     Social History     Substance and Sexual Activity   Drug Use No         FAMILY HISTORY      Non-Contributory      PHYSICAL EXAM     Vitals:   Blood Pressure: 125/60 (04/16/24 1540)  Pulse: 64 (04/16/24 1540)  Temperature: 99.3 °F (37.4 °C) (04/16/24 1540)  Temp Source: Oral (04/16/24 1540)  Respirations: 16 (04/16/24 1540)  Height: 5' 4\" (162.6 cm) (04/16/24 1350)  Weight - Scale: 61.8 kg (136 lb 3.9 oz) (04/16/24 1350)  SpO2: 92 % (04/16/24 1540)    Physical Exam:   GENERAL: NAD  HEENT:  NC/AT, No Scleral Icterus  CARDIAC:  Regular Rate  PULMONARY:  Non-Labored Breathing, No Respiratory Distress  ABDOMEN:  Soft, No " Rebound/Guarding/Rigidity, No Organomegaly, RLQ Tenderness  EXTREMITIES:  No Edema, Cyanosis, or Clubbing  NEUROLOGIC:  Alert  SKIN:  No Rashes or Erythema    ADDITIONAL DATA     Lab Results:       Lab Units 04/17/24  0545 04/16/24  0643 04/15/24  1303 04/07/24 2019 01/31/24  0615 01/27/24  0458 01/26/24  1137   SODIUM mmol/L 136 137 138 136 137   < > 135   POTASSIUM mmol/L 4.0 4.4 4.4 4.0 3.9   < > 4.1   CHLORIDE mmol/L 99 101 101 100 102   < > 96   CO2 mmol/L 27 28 31 27 24   < > 31   BUN mg/dL 14 11 14 14 18   < > 18   CREATININE mg/dL 0.67 0.67 0.74 0.80 0.79   < > 0.87   GLUCOSE RANDOM mg/dL 94 92 90 118 95   < > 108   CALCIUM mg/dL 8.0* 8.2* 8.2* 9.0 8.4   < > 9.5   MAGNESIUM mg/dL  --  2.2  --   --   --   --  2.1    < > = values in this interval not displayed.            Lab Units 04/16/24  0643 04/15/24  1303 04/07/24 2019 01/31/24  0615 01/27/24  0458   TOTAL PROTEIN g/dL 5.7* 5.5* 6.3* 5.9* 6.4   ALBUMIN g/dL 3.1* 3.2* 3.6 3.4* 3.7   TOTAL BILIRUBIN mg/dL 0.40 0.29 0.37 0.44 0.31   AST U/L 15 11* 14 18 35   ALT U/L 7 6* 8 14 19   ALK PHOS U/L 40 36 48 41 47           Lab Units 04/17/24  0545 04/16/24  0450 04/15/24  1152 04/07/24 2019 01/31/24  0615 01/29/24  0657   WBC Thousand/uL 11.23* 9.40 8.97 14.52* 6.88 5.62   HEMOGLOBIN g/dL 14.8 14.3 13.9 14.5 14.3 14.1   HEMATOCRIT % 45.9 45.5 42.9 45.1 43.0 43.2   PLATELETS Thousands/uL 152  --  161 173 167 145*   MCV fL 89 92 90 90 88 89       Lab Results   Component Value Date    FERRITIN 213 02/18/2023       Lab Results   Component Value Date    INR 1.04 01/26/2024    INR 1.03 12/29/2023    INR 0.98 04/21/2023    PROTIME 13.5 01/26/2024    PROTIME 13.4 12/29/2023    PROTIME 13.1 04/21/2023         Microbiology Results:        Imaging:  Procedure: MRI brain wo contrast    Result Date: 4/16/2024  Narrative: MRI BRAIN WITHOUT CONTRAST INDICATION: ams. COMPARISON:   None. TECHNIQUE:  Multiplanar, multisequence imaging of the brain was performed. IMAGE QUALITY:   Diagnostic. FINDINGS: BRAIN PARENCHYMA: Cerebral atrophy. There is no discrete mass, mass effect or midline shift. There is no intracranial hemorrhage.  There is no evidence of acute infarction and diffusion imaging is unremarkable. Nonspecific patchy and confluent foci of T2/FLAIR hyperintensities involving periventricular and subcortical white matter as well as sujata, most compatible with advanced microangiopathic change. VENTRICLES:  Normal for the patient's age. SELLA AND PITUITARY GLAND:  Normal. ORBITS: Prior bilateral lens replacement. PARANASAL SINUSES: Minimal ethmoidal and VASCULATURE:  Evaluation of the major intracranial vasculature demonstrates appropriate flow voids. CALVARIUM AND SKULL BASE:  Normal. EXTRACRANIAL SOFT TISSUES:  Normal.     Impression: 1.  No acute infarct. 2.  Cerebral atrophy and chronic microangiopathic change. Workstation performed: [id number]     Procedure: CT chest abdomen pelvis w contrast    Result Date: 4/15/2024  Narrative: CT CHEST, ABDOMEN AND PELVIS WITH IV CONTRAST INDICATION: AMS. COMPARISON: None. TECHNIQUE: CT examination of the chest, abdomen and pelvis was performed. Multiplanar 2D reformatted images were created from the source data. This examination, like all CT scans performed in the [facility], was performed utilizing techniques to minimize radiation dose exposure, including the use of iterative reconstruction and automated exposure control. Radiation dose length product (DLP) for this visit: 849.78 mGy-cm IV Contrast: 100 mL of iohexol (OMNIPAQUE) Enteric Contrast: Not administered. FINDINGS: CHEST LUNGS: There is bibasilar atelectasis. No tracheal or endobronchial lesion. PLEURA: Unremarkable. HEART/GREAT VESSELS: Heart is unremarkable for patient's age. No thoracic aortic aneurysm. MEDIASTINUM AND RAF: Unremarkable. CHEST WALL AND LOWER NECK: Unremarkable. ABDOMEN LIVER/BILIARY TREE: Unremarkable. GALLBLADDER: No calcified gallstones. No  pericholecystic inflammatory change. SPLEEN: Unremarkable. PANCREAS: Unremarkable. ADRENAL GLANDS: Unremarkable. KIDNEYS/URETERS: There is a right-sided parapelvic cyst. No hydronephrosis. STOMACH AND BOWEL: There is segmental thickening of the sigmoid colon of uncertain etiology as there is no significant diverticulosis and no adjacent stranding. There is however mild mesenteric lymphadenopathy adjacent to the thickened colon. Largest lymph node measures 0.9 x 1.0 cm on series 2 image 190. APPENDIX: No findings to suggest appendicitis. ABDOMINOPELVIC CAVITY: There is trace free fluid in the pelvis. No pneumoperitoneum. VESSELS: Ectatic abdominal aorta measuring up to 2.7 cm. PELVIS REPRODUCTIVE ORGANS: Post hysterectomy. There is a 2 cm left ovarian cyst. URINARY BLADDER: Unremarkable. ABDOMINAL WALL/INGUINAL REGIONS: Unremarkable. BONES: No acute fracture or suspicious osseous lesion.     Impression: 1.  No acute abnormality in the chest. 2.  Segmental thickening of the sigmoid colon with adjacent mesenteric lymphadenopathy of uncertain etiology. Segmental inflammatory or infectious colitis is possible though there is no surrounding stranding. Malignancy is also possible though a discrete  measurable mass is not identified. Clinical correlation and follow-up colonoscopy recommended. 3.  Ectatic aorta measuring up to 2.7 cm. Recommendation is for follow-up in 5 years though considerations related to the patient's age and/or comorbidities may be used to alter this recommendation. The study was marked in EPIC for immediate notification. Workstation performed: [id number]     Procedure: XR chest 2 views    Result Date: 4/15/2024  Narrative: XR CHEST PA & LATERAL INDICATION: hypoxia. COMPARISON: Same day CT FINDINGS: Left chest wall intracardiac device with intact lead(s). No abandoned lead(s). Left atrial appendage clip. Clear lungs. No pneumothorax or pleural effusion. Normal cardiomediastinal silhouette.  Degenerative changes of the spine. Normal upper abdomen.     Impression: No acute cardiopulmonary disease. Workstation performed: [id number]     Procedure: CT head without contrast    Result Date: 4/15/2024  Narrative: CT BRAIN - WITHOUT CONTRAST INDICATION:   Altered mental status. Recent left frontal lobe subarachnoid hemorrhage and parafalcine subdural hematomas which have resolved as of 3/4/2024 COMPARISON: Multiple CT head studies dating from 4/7/2024 to 12/22/2023 TECHNIQUE:  CT examination of the brain was performed.  Multiplanar 2D reformatted images were created from the source data. Radiation dose length product (DLP) for this visit:  826.23 mGy-cm .  This examination, like all CT scans performed in the [facility], was performed utilizing techniques to minimize radiation dose exposure, including the use of iterative  reconstruction and automated exposure control. IMAGE QUALITY:  Diagnostic. FINDINGS: PARENCHYMA: Decreased attenuation is noted in periventricular and subcortical white matter demonstrating an appearance that is statistically most likely to represent moderate microangiopathic change; this appearance is similar when compared to most recent prior examination. No CT signs of acute infarction. No intracranial mass, mass effect or midline shift. No acute parenchymal hemorrhage. Unchanged hypodensity in the left sujata likely remote ischemic insult (series 2 image 12). This region is difficult to evaluate given the artifact which is present at this level. Bilateral internal carotid artery and distal vertebral artery atherosclerosis. VENTRICLES AND EXTRA-AXIAL SPACES:  Normal for the patient's age. Enlarged sylvian fissures suggesting temporal volume loss. VISUALIZED ORBITS: Bilateral lens replacements. PARANASAL SINUSES: Normal visualized paranasal sinuses. CALVARIUM AND EXTRACRANIAL SOFT TISSUES:  Normal.     Impression: No acute intracranial abnormality. Similar moderate chronic  microangiopathic changes. Resident: Ameya Hurley I, the attending radiologist, have reviewed the images and agree with the final report above. Workstation performed: [id number]     Narrative/Impressions - 3 day look back     EKG, Pathology, and Other Studies Reviewed on Admission:   EKG: Reviewed    PRIOR GI PROCEDURES  Pt reports prior colonoscopy     Counseling / Coordination of Care Time: 30 total mins spent n consult. Greater than 50% of total time spent on patient counseling and coordination of care.    ...............................................................................................................................................  Luke Barron M.D.  PGY-5 Gastroenterology Fellow  [facility]  Available on [email or web address]  [email or web address]  Recommendations not final until attending attestation

## 2024-04-17 NOTE — PROGRESS NOTES
[facility]  Progress Note  Name: Ann Cross I  MRN: [id number]  Unit/Bed#: [facility] 731-01 I Date of Admission: 4/15/2024   Date of Service: 4/17/2024 I Hospital Day: 2    Assessment/Plan   * Encephalopathy  Assessment & Plan  Patient presents with worsening cognition, forgetfulness, generalized weakness, lethargy.  Patient and daughter are worried about worsening memory issues  CT head negative  MRI brain negative   Neurology evaluated.  No evidence for CVA, seizure activity.  CSF studies are benign.  Suspect worsening mental status due to underlying dementia/cognitive decline, underlying psychiatric disorder with recent hx of SDH in December, colitis/diarrhea, and familial losses recently.  No further IP neurologic work up at this time  Geriatrics following  F/u stool studies -- C. Diff positive and likely contributing to AMS   PT/OT     Clostridium difficile diarrhea  Assessment & Plan  C. Diff PCR and EIA positive  PO Vanco 125 mg every 6 hours x 10 days   Can f/u with GI outpt     Diarrhea  Assessment & Plan  Reports occasional episodes of loose stool/diarrhea  Abdominal imaging concerning for sigmoid colon thickening  C diff PCR and EIA positive.  Started on PO Vanco  D/w her PCP and GI -- given active C diff no plans for IP scope at this time and can be followed up outpt     Stage 3 chronic kidney disease, unspecified whether stage 3a or 3b CKD (HCC)  Assessment & Plan  Lab Results   Component Value Date    EGFR 81 04/17/2024    EGFR 81 04/16/2024    EGFR 75 04/15/2024    CREATININE 0.67 04/17/2024    CREATININE 0.67 04/16/2024    CREATININE 0.74 04/15/2024     Monitor kidney function -- currently at baseline   Avoid nephrotoxins    Tachy-alison syndrome (HCC)  Assessment & Plan  S/p PPM placement    Paroxysmal atrial fibrillation (HCC)  Assessment & Plan  Continue flecainide  Not on anticoagulation    Hypertension  Assessment & Plan  Monitor blood pressures  Monitor  orthostatics -- negative   Amlodipine is on hold, BP is acceptable     Minimal cognitive impairment  Assessment & Plan  History of cognitive impairment  Delirium precautions  Reorientation, sleep hygiene     Mood disorder with hypomanic features  Assessment & Plan  Continue buspirone, citalopram, depakote          VTE Pharmacologic Prophylaxis: VTE Score: 6 High Risk (Score >/= 5) - Pharmacological DVT Prophylaxis Ordered: enoxaparin (Lovenox). Sequential Compression Devices Ordered.    Mobility:   Basic Mobility Inpatient Raw Score: 12  JH-HLM Goal: 4: Move to chair/commode  JH-HLM Achieved: 2: Bed activities/Dependent transfer  JH-HLM Goal NOT achieved. Continue with multidisciplinary rounding and encourage appropriate mobility to improve upon JH-HLM goals.    Patient Centered Rounds: I performed bedside rounds with nursing staff today.   Discussions with Specialists or Other Care Team Provider: CM, PCP, GI     Education and Discussions with Family / Patient: Updated  (daughter) via phone.    Total Time Spent on Date of Encounter in care of patient: 30 mins. This time was spent on one or more of the following: performing physical exam; counseling and coordination of care; obtaining or reviewing history; documenting in the medical record; reviewing/ordering tests, medications or procedures; communicating with other healthcare professionals and discussing with patient's family/caregivers.    Current Length of Stay: 2 day(s)  Current Patient Status: Inpatient   Certification Statement: The patient will continue to require additional inpatient hospital stay due to c diff, AMS, safe dc planning  Discharge Plan: Anticipate discharge in 24-48 hrs to rehab facility.    Code Status: Level 1 - Full Code    Subjective:   No acute complaints, doing ok, denies abd pain.      Objective:     Vitals:   No data recorded.       Body mass index is 23.39 kg/m².     Input and Output Summary (last 24 hours):      Intake/Output Summary (Last 24 hours) at 4/17/2024 1558  Last data filed at 4/17/2024 1252  Gross per 24 hour   Intake 360 ml   Output 875 ml   Net -515 ml       Physical Exam:   Physical Exam  Vitals reviewed.   Constitutional:       General: She is not in acute distress.     Appearance: She is not toxic-appearing.   HENT:      Head: Normocephalic and atraumatic.   Eyes:      Extraocular Movements: Extraocular movements intact.   Pulmonary:      Effort: Pulmonary effort is normal. No respiratory distress.   Abdominal:      General: There is no distension.      Tenderness: There is no abdominal tenderness.   Musculoskeletal:         General: Normal range of motion.   Neurological:      General: No focal deficit present.      Mental Status: She is alert.   Psychiatric:         Mood and Affect: Mood normal.         Behavior: Behavior normal.         Thought Content: Thought content normal.          Additional Data:     Labs:  Results from last 7 days   Lab Units 04/17/24  0545 04/16/24  0450 04/15/24  1152   WBC Thousand/uL 11.23* 9.40 8.97   HEMOGLOBIN g/dL 14.8 14.3 13.9   HEMATOCRIT % 45.9 45.5 42.9   PLATELETS Thousands/uL 152  --  161   BANDS PCT %  --  4  --    SEGS PCT %  --   --  54   LYMPHO PCT %  --  36 25   MONO PCT %  --  14* 19*   EOS PCT %  --  2 1     Results from last 7 days   Lab Units 04/17/24  0545 04/16/24  0643   SODIUM mmol/L 136 137   POTASSIUM mmol/L 4.0 4.4   CHLORIDE mmol/L 99 101   CO2 mmol/L 27 28   BUN mg/dL 14 11   CREATININE mg/dL 0.67 0.67   ANION GAP mmol/L 10 8   CALCIUM mg/dL 8.0* 8.2*   ALBUMIN g/dL  --  3.1*   TOTAL BILIRUBIN mg/dL  --  0.40   ALK PHOS U/L  --  40   ALT U/L  --  7   AST U/L  --  15   GLUCOSE RANDOM mg/dL 94 92                 Results from last 7 days   Lab Units 04/15/24  1427 04/15/24  1152   LACTIC ACID mmol/L 1.6 2.6*       Lines/Drains:  Invasive Devices       Peripheral Intravenous Line  Duration             Peripheral IV 04/15/24 Left Antecubital 2 days     Peripheral IV 04/16/24 Right;Ventral (anterior) Forearm 1 day                          Imaging: No pertinent imaging reviewed.    Recent Cultures (last 7 days):   Results from last 7 days   Lab Units 04/16/24  0942   C DIFF TOXIN B BY PCR  Positive*       Last 24 Hours Medication List:   Current Facility-Administered Medications   Medication Dose Route Frequency Provider Last Rate    acetaminophen  650 mg Oral Q6H PRN Dominick Angeles MD      aspirin  81 mg Oral Daily Dominick Angeles MD      busPIRone  7.5 mg Oral BID Dominick Angeles MD      calcium carbonate  500 mg Oral BID PRN Dominick Angeles MD      citalopram  10 mg Oral Daily Dominick Angeles MD      divalproex sodium  250 mg Oral TID Dominick Angeles MD      enoxaparin  40 mg Subcutaneous Daily Dominick Angeles MD      ergocalciferol  50,000 Units Oral Q30 Days Dominick Angeles MD      flecainide  50 mg Oral Q12H LILI Dominick Angeles MD      fluticasone  1 spray Nasal Daily Dominick Angeles MD      melatonin  6 mg Oral HS Dominick Angeles MD      ondansetron  4 mg Intravenous Q6H PRN Dominick Angeles MD      polyethylene glycol  17 g Oral Daily PRN Tuesday M Nba, CRODETTE      saccharomyces boulardii  250 mg Oral BID Tuesday M LUZ Arango      vancomycin oral (capsules or solution)  125 mg Oral Q6H [facility] Araceli Larson PA-C          Today, Patient Was Seen By: Araceli Larson PA-C    **Please Note: This note may have been constructed using a voice recognition system.**     No

## 2024-04-17 NOTE — OCCUPATIONAL THERAPY NOTE
Occupational Therapy Evaluation     Patient Name: Ann Cross  Today's Date: 4/17/2024  Problem List  Principal Problem:    Encephalopathy  Active Problems:     Mood disorder with hypomanic features    Minimal cognitive impairment    Hyperlipemia    Hypertension    Degeneration of lumbar intervertebral disc    Paroxysmal atrial fibrillation (HCC)    Tachy-alison syndrome (HCC)    Stage 3 chronic kidney disease, unspecified whether stage 3a or 3b CKD (HCC)    Diarrhea    Past Medical History  Past Medical History:   Diagnosis Date    Afib (HCC)     Arthritis     Hyperlipidemia     Hypertension     Osteopenia     Sleep apnea     Squamous cell skin cancer     LEF TEMPLE    Subdural hemorrhage (HCC)     Varicose veins of both lower extremities      Past Surgical History  Past Surgical History:   Procedure Laterality Date    CARDIAC ELECTROPHYSIOLOGY PROCEDURE N/A 10/12/2022    Procedure: Cardiac loop recorder implant;  Surgeon: Rashid Carlos MD;  [facility]: [facility];  Service: Cardiology    CARDIAC ELECTROPHYSIOLOGY PROCEDURE N/A 4/21/2023    Procedure: Cardiac pacer implant;  Surgeon: Simba Chilel MD;  Location: [facility];  Service: Cardiology    CATARACT EXTRACTION      COLONOSCOPY      complete, for polyp removal    EYE SURGERY      HYSTERECTOMY      age 45    INCISION AND DRAINAGE OF WOUND Right 06/28/2016    Procedure: ARTHROSCOPY,EVACUATION OF HEMATOMA, OPEN EXPLORATION OF WOUND;  Surgeon: Adam Brice MD;  Location: [facility];  Service:     MOHS SURGERY Left 12/27/2022    left temple    SKIN BIOPSY      TONSILLECTOMY AND ADENOIDECTOMY      TOTAL KNEE ARTHROPLASTY Right     TOTAL KNEE ARTHROPLASTY Left     WISDOM TOOTH EXTRACTION           04/17/24 0939   OT Last Visit   OT Visit Date 04/17/24   Note Type   Note type Evaluation   Pain Assessment   Pain Assessment Tool 0-10   Pain Score No Pain   Restrictions/Precautions   Weight Bearing Precautions Per Order No   Other Precautions  Cognitive;Chair Alarm;Bed Alarm;Fall Risk;Contact/isolation;Telemetry  (R/O C-diff, Memory deficit)   Home Living   Type of Home [facility]  ([place])   Home Layout One level   Bathroom Shower/Tub Walk-in shower   Bathroom Toilet Raised   Bathroom Equipment Grab bars in shower;Shower chair;Grab bars around toilet   Bathroom Accessibility Accessible   Home Equipment Walker;Cane;Grab bars   Additional Comments pt was attenting senior life outpt PT/OT prior to admission.   Prior Function   Level of [place] Independent with ADLs;Needs assistance with IADLS   Receives Help From Family;Personal care attendant  (daughter)   IADLs Family/Friend/Other provides transportation;Family/Friend/Other provides meals;Family/Friend/Other provides medication management   Falls in the last 6 months 0   Vocational Retired   Lifestyle   Autonomy I with ADL's, assistance with IADL's, +RW with functional mobility   Reciprocal Relationships daughter, facility   Service to Others retired   Intrinsic Gratification sports   General   Family/Caregiver Present No   ADL   Eating Assistance 5  Supervision/Setup   Eating Deficit Setup   Grooming Assistance 5  Supervision/Setup   Grooming Deficit Setup   UB Bathing Assistance 5  Supervision/Setup   UB Bathing Deficit Setup   LB Bathing Assistance 3  Moderate Assistance   UB Dressing Assistance 4  Minimal Assistance   LB Dressing Assistance 2  Maximal Assistance   Toileting Assistance  4  Minimal Assistance   Toileting Deficit Perineal hygiene   Bed Mobility   Supine to Sit 3  Moderate assistance with HOB elevated   Additional items Assist x 1;HOB elevated   Sit to Supine 4  Minimal assistance   Additional items Assist x 1   Transfers   Sit to Stand 4  Minimal assistance   Additional items Assist x 1   Stand to Sit 4  Minimal assistance   Additional items Assist x 1   Additional Comments +RW cues for safe hand placement   Functional Mobility   Functional Mobility 4  Minimal assistance    Additional Comments min a x 1 with Short distance functional mobility into bathroom, hallway, impaired endurance.   Additional items Rolling walker   Balance   Static Sitting Fair +   Dynamic Sitting Fair   Static Standing Fair -   Dynamic Standing Poor +   Ambulatory Poor +   Activity Tolerance   Activity Tolerance Patient limited by fatigue   Medical Staff Made Aware PT Audrey due to the patient's co-morbidities, clinically unstable presentation, and present impairments which are a regression from the patient's baseline.    Nurse Made Aware RN cleared pt for therapy   RUE Assessment   RUE Assessment WFL   LUE Assessment   LUE Assessment WFL   Vision-Basic Assessment   Current Vision Wears glasses all the time   Vision - Complex Assessment   Acuity Able to read normal print without difficulty   Cognition   Overall Cognitive Status Impaired   Arousal/Participation Alert;Responsive;Cooperative   Attention Attends with cues to redirect   Orientation Level Oriented to person;Disoriented to place;Disoriented to time;Disoriented to situation  (able to choice place, time not year with choice cues.)   Memory Decreased recall of precautions;Decreased recall of recent events;Decreased short term memory   Following Commands Follows one step commands with increased time or repetition   Comments pt motivated for therapy, demonstrating impaired, divided attention, Short term memory (temporal orientation) impaired working memory (with self care tasks during evaluation), problem solving deficits with blanche cognitive standardized assessment. CM aware of geriatric consult recommendation.   Cognition Assessment Tools ACLS   Score 3.8   Assessment   Limitation Decreased ADL status;Decreased Safe judgement during ADL;Decreased cognition;Decreased endurance;Decreased self-care trans;Decreased high-level ADLs   Prognosis Fair   Assessment Pt is a 83 y.o. female who was admitted to [facility] on 4/15/2024 with worsening  cognition, forgetful ness, weakness/lethargy and now here with Encephalopathy . Patient  has a past medical history of Afib (HCC), Arthritis, Hyperlipidemia, Hypertension, Osteopenia, Sleep apnea, Squamous cell skin cancer, Subdural hemorrhage (HCC), and Varicose veins of both lower extremities.  At baseline pt was completing I with ADL's/assistance with IADL's. Pt lives at [facility]. Currently pt requires mod a  for overall ADLS and min a with RW for functional mobility/transfers. Pt currently presents with impairments in the following categories -difficulty performing ADLS and difficulty performing IADLS  endurance, standing balance/tolerance, sitting balance/tolerance, memory, insight, safety , judgement , attention , and sequencing . These impairments, as well as pt's fatigue and risk for falls  limit pt's ability to safely engage in all baseline areas of occupation, includingbathing, dressing, toileting, functional mobility/transfers, community mobility, house maintenance, medication management, and meal prep From OT standpoint, recommend  upon D/C. The patient's raw score on the AM-PAC Daily Activity Inpatient Short Form is 15. A raw score of less than 19 suggests the patient may benefit from discharge to . Please refer to the recommendation of the Occupational Therapist for safe discharge planning. OT will continue to follow to address the below stated goals.   Goals   Patient Goals remember better   LTG Time Frame 10-14   Long Term Goal #1 see goals below   Discharge Recommendation   Rehab Resource Intensity Level, OT II (Moderate Resource Intensity)   AM-PAC Daily Activity Inpatient   Lower Body Dressing 2   Bathing 2   Toileting 2   Upper Body Dressing 3   Grooming 3   Eating 3   Daily Activity Raw Score 15   Daily Activity Standardized Score (Calc for Raw Score >=11) 34.69   AM-PAC Applied Cognition Inpatient   Following a Speech/Presentation 1   Understanding Ordinary Conversation 4   Taking  Medications 2   Remembering Where Things Are Placed or Put Away 3   Remembering List of 4-5 Errands 2   Taking Care of Complicated Tasks 2   Applied Cognition Raw Score 14   Applied Cognition Standardized Score 32.02   Romero Cognitive Level   Romero Cognitive Level Score 3.8   Romero Cognitive Score Recommendation 24 hour assistance   Romero Cognitive Level Comments pt demonstrated impaired working/STM assessment, problem solving solutions to increase accuracy and solve assessment.   End of Consult   Education Provided Yes  (pt educated and issued cognitive therapy activities inlcuding SLB work book #2, newspaper.)   Patient Position at End of Consult Bedside chair;All needs within reach;Bed/Chair alarm activated   Nurse Communication Nurse aware of consult   OT LTG's~    *S with bed mobility to engage in functional tasks.  *S Adl's after setup with use of AE PRN  *S toileting and clothing management   *S functional mobility and transfers to/from all surfaces with Fair + dynamic balance and safety for participation in dynamic adls and iadl tasks   *Demonstrate good carryover with safe use of RW during functional tasks   *Assess DME needs   *Increase activity tolerance to 25-30 minutes for participation in adls and enjoyable activities  *Pt to participate in further cognitive testing with good attention and participation to assist with safe d/c recommendations  *Demonstrate good carryover of pt/family education and training with good tolerance for increased safety and independence with ADL's/ADl's.  *Pt will improve standing balance to 4-5 minutes with functional tasks to increase I with toileting/transfers.  *Patient will demonstrate 100% carryover of energy conservation techniques t/o functional I/ADL/leisure tasks w/o cues s/p skilled education to increase endurance during functional tasks  *Pt will follow 100% simple 203  step verbal commands and be A/Ox4 consistently t/o use of external environmental cues w/ mod  FERNANDO PATRICK, OTR/L   M[employer] Certified #[id number]

## 2024-04-17 NOTE — ASSESSMENT & PLAN NOTE
Lab Results   Component Value Date    EGFR 81 04/17/2024    EGFR 81 04/16/2024    EGFR 75 04/15/2024    CREATININE 0.67 04/17/2024    CREATININE 0.67 04/16/2024    CREATININE 0.74 04/15/2024     Monitor kidney function -- currently at baseline   Avoid nephrotoxins

## 2024-04-17 NOTE — PLAN OF CARE
Problem: Potential for Falls  Goal: Patient will remain free of falls  Description: INTERVENTIONS:  - Educate patient/family on patient safety including physical limitations  - Instruct patient to call for assistance with activity   - Consult OT/PT to assist with strengthening/mobility   - Keep Call bell within reach  - Keep bed low and locked with side rails adjusted as appropriate  - Keep care items and personal belongings within reach  - Initiate and maintain comfort rounds  - Make Fall Risk Sign visible to staff  - Offer Toileting every 2 Hours, in advance of need  - Initiate/Maintain bed alarm  - Obtain necessary fall risk management equipment: non skid socks  - Apply yellow socks and bracelet for high fall risk patients  - Consider moving patient to room near nurses station  Outcome: Progressing     Problem: Prexisting or High Potential for Compromised Skin Integrity  Goal: Skin integrity is maintained or improved  Description: INTERVENTIONS:  - Identify patients at risk for skin breakdown  - Assess and monitor skin integrity  - Assess and monitor nutrition and hydration status  - Monitor labs   - Assess for incontinence   - Turn and reposition patient  - Assist with mobility/ambulation  - Relieve pressure over bony prominences  - Avoid friction and shearing  - Provide appropriate hygiene as needed including keeping skin clean and dry  - Evaluate need for skin moisturizer/barrier cream  - Collaborate with interdisciplinary team   - Patient/family teaching  - Consider wound care consult   Outcome: Progressing

## 2024-04-17 NOTE — PLAN OF CARE
Problem: PHYSICAL THERAPY ADULT  Goal: Performs mobility at highest level of function for planned discharge setting.  See evaluation for individualized goals.  Description: Treatment/Interventions: Functional transfer training, Therapeutic exercise, Bed mobility, Gait training, Spoke to nursing, OT  Equipment Recommended: Walker       See flowsheet documentation for full assessment, interventions and recommendations.  Note: Prognosis: Fair  Problem List: Decreased strength, Decreased endurance, Decreased mobility, Impaired balance, Decreased safety awareness, Impaired judgement, Decreased cognition  Assessment: Pt is a 83 y.o. female seen for PT evaluation s/p admit to [facility] on 4/15/2024. Pt was admitted with a primary dx of: Encephalopathy, Diarrhea, C-diff .with PMH sx for Minimal cognitive impairment, Mood disorder, HTN, Degeneration of lumbar intervertebral disc, CKD, A-fib, Tachy-alison syndrome, Fall, Osteoporosis  PT now consulted for assessment of mobility and d/c needs. Pt with Up and OOB as tolerated  orders.  Pts current clinical presentation is Unstable/ Unpredictable (high complexity) due to Ongoing medical management for primary dx, Decreased activity tolerance compared to baseline, Fall risk, Increased assistance needed from caregiver at current time. Prior to admission, pt was Independent for mobility .Pt lives at [facility]. Upon evaluation, pt currently is requiring Assist x 1 for all mobility aspects, limiting factor being LE weakness, fatigue, impaired balance, gait deviation, cognition. Pt presents at PT eval functioning below baseline and currently w/ overall mobility deficits 2* to: BLE weakness, impaired balance, decreased endurance, gait deviations, pain, decreased activity tolerance compared to baseline, decreased functional mobility tolerance compared to baseline, decreased safety awareness, impaired judgement, fall risk. Pt currently at a fall risk 2* to impairments  listed above.  Pt will continue to benefit from skilled acute PT interventions to address stated impairments; to maximize functional mobility; for ongoing pt/ family training; and DME needs. At conclusion of PT session pt returned back in chair, chair alarm engaged, all needs in reach, and RN notified of session findings/recommendations with phone and call bell within reach. Pt denies any further questions at this time. The patient's AM-PAC Basic Mobility Inpatient Short Form Raw Score is 15. A Raw score of less than or equal to 16 suggests the patient may benefit from discharge to post-acute rehabilitation services. Please also refer to the recommendation of the Physical Therapist for safe discharge planning. Recommend Level 2 upon hospital D/C.        Rehab Resource Intensity Level, PT: II (Moderate Resource Intensity)    See flowsheet documentation for full assessment.

## 2024-04-17 NOTE — ED ATTENDING ATTESTATION
4/15/2024  I, Portillo Rosenberg MD, saw and evaluated the patient. I have discussed the patient with the resident/non-physician practitioner and agree with the resident's/non-physician practitioner's findings, Plan of Care, and MDM as documented in the resident's/non-physician practitioner's note, except where noted. All available labs and Radiology studies were reviewed.  I was present for key portions of any procedure(s) performed by the resident/non-physician practitioner and I was immediately available to provide assistance.       At this point I agree with the current assessment done in the Emergency Department.  I have conducted an independent evaluation of this patient a history and physical is as follows:    ED Course         Patient is an 83-year-old female who presents with worsening mental status/lethargy from [facility].  Patient was seen last week for similar symptoms.  Patient denies any complaints at this time.  She is alert and oriented to person and place but not to time.  History of dementia    Past medical history reviewed patient has a history of SDH A-fib additional history obtained via daughter from phone by resident history of dementia normal is able to ambulate on own and perform ADLs but has had increased difficulty since last week.  She expresses concerns that the decline in patient's mental status has been rather abrupt.    Vitals reviewed.  Patient incidentally noted to be hypoxic on room air requiring 2 L supplemental oxygen this appears to be change from her baseline.    Patient well-appearing nontoxic no acute distress.  Heart regular rate and rhythm without murmurs.  Lungs clear to auscultation bilaterally.  Abdomen soft nontender nondistended normal bowel sounds no signs of peritonitis.  Extremities no edema.  Neuroexam nonfocal.  Gait not assessed secondary to patient being uncooperative    Impression: Acute encephalopathy, hypoxia differential diagnosis: ACS MI arrhythmia, ICH, CVA, CNS  lesion UTI, pneumonia, pulmonary embolism, symptomatic anemia,    Plan check VBG, CBC, D-dimer and serial troponins TSH lactic acid CMP CT head chest x-ray CT chest abdomen pelvis    Consider lumbar puncture if no obvious source of symptoms found.    CT head chest and pelvis reviewed reports: No intracranial abnormality, sigmoid: Thickening with mild laboratory changes possibly infectious colitis patient does report history of diarrhea.    Chest x-ray independently interpreted by me no acute cardiopulmonary disease.    Labs reviewed: VBG mild respiratory alkalosis noted.  CBC unremarkable.  D-dimer within normal limits initial troponins were 4 and 5 with a delta of 1 lactic acid was 2.6 CMP unremarkable.  Urinalysis unremarkable    Discussion with daughter we will proceed with lumbar puncture to rule out CNS process.    Patient underwent successful lumbar puncture procedure by resident I was present supervised resident procedure in its entirety as documented on procedure note.  Patient tolerated procedure without immediate complications.    Patient admitted to medicine service for further evaluation management.    Critical Care Time  Procedures

## 2024-04-17 NOTE — PHYSICAL THERAPY NOTE
Physical Therapy Evaluation     Patient's Name: Ann Cross    Admitting Diagnosis  Altered mental status [R41.82]  Encephalopathy acute [G93.40]  Encephalopathy [G93.40]  Dizzy [R42]  Minimal cognitive impairment [G31.84]    Problem List  Patient Active Problem List   Diagnosis    Pain, joint, knee, right    Obstructive sleep apnea syndrome     Mood disorder with hypomanic features    Generalized anxiety disorder    Minimal cognitive impairment    Hyperlipemia    Hypertension    Anxiety state    Degeneration of lumbar intervertebral disc    History of total bilateral knee replacement    Metabolic syndrome X    Migraine    Osteoporosis    Paroxysmal atrial fibrillation (HCC)    Peripheral venous insufficiency    Tachy-alison syndrome (HCC)    Stage 3 chronic kidney disease, unspecified whether stage 3a or 3b CKD (HCC)    Acute metabolic encephalopathy    COVID-19    Diarrhea    Thrombocytopenia (HCC)    Sinus pause    Acute pain due to trauma    Fall    Encephalopathy    Traumatic subdural hemorrhage, subsequent encounter    Siderosis (HCC)    Clostridium difficile diarrhea       Past Medical History  Past Medical History:   Diagnosis Date    Afib (HCC)     Arthritis     Hyperlipidemia     Hypertension     Osteopenia     Sleep apnea     Squamous cell skin cancer     LEF TEMPLE    Subdural hemorrhage (HCC)     Varicose veins of both lower extremities        Past Surgical History  Past Surgical History:   Procedure Laterality Date    CARDIAC ELECTROPHYSIOLOGY PROCEDURE N/A 10/12/2022    Procedure: Cardiac loop recorder implant;  Surgeon: Rashid Carlos MD;  [facility];  Service: Cardiology    CARDIAC ELECTROPHYSIOLOGY PROCEDURE N/A 4/21/2023    Procedure: Cardiac pacer implant;  Surgeon: Simba Chilel MD;  [facility]: [facility];  Service: Cardiology    CATARACT EXTRACTION      COLONOSCOPY      complete, for polyp removal    EYE SURGERY      HYSTERECTOMY      age 45    INCISION AND  DRAINAGE OF WOUND Right 06/28/2016    Procedure: ARTHROSCOPY,EVACUATION OF HEMATOMA, OPEN EXPLORATION OF WOUND;  Surgeon: Adam Brice MD;  Location: [facility];  Service:     MOHS SURGERY Left 12/27/2022    left temple    SKIN BIOPSY      TONSILLECTOMY AND ADENOIDECTOMY      TOTAL KNEE ARTHROPLASTY Right     TOTAL KNEE ARTHROPLASTY Left     WISDOM TOOTH EXTRACTION            04/17/24 0931   PT Last Visit   PT Visit Date 04/17/24   Note Type   Note type Evaluation   Pain Assessment   Pain Assessment Tool 0-10   Pain Score No Pain   Restrictions/Precautions   Weight Bearing Precautions Per Order No   Other Precautions Cognitive;Chair Alarm;Bed Alarm;Contact/isolation;Multiple lines;Fall Risk;Hard of hearing  (r/o C Diff, Memory deficit)   Home Living   Type of Home Other (Comment)  (Angela Yoo)   Home Layout One level   Bathroom Shower/Tub Walk-in shower   Bathroom Toilet Raised   Bathroom Equipment Grab bars in shower;Shower chair;Grab bars around toilet   Bathroom Accessibility Accessible   Home Equipment Walker;Cane   Prior Function   Level of [place] Needs assistance with IADLS;Independent with functional mobility   Lives With Facility staff   Receives Help From Personal care attendant   IADLs Family/Friend/Other provides transportation;Family/Friend/Other provides meals;Family/Friend/Other provides medication management   Falls in the last 6 months 0   Vocational Retired   Cognition   Overall Cognitive Status Impaired   Arousal/Participation Alert   Attention Attends with cues to redirect   Orientation Level Oriented to person;Disoriented to place;Disoriented to time;Disoriented to situation  (Able to tell place with cueing, able to tell time not year)   Following Commands Follows one step commands with increased time or repetition   Comments Pt cooperative during session, required VC to complete task   RLE Assessment   RLE Assessment   (grossly 3+/5)   LLE Assessment   LLE Assessment   (grossly 3+/5)    Bed Mobility   Supine to Sit 3  Moderate assistance   Additional items Assist x 1;HOB elevated;Increased time required;LE management;Verbal cues   Sit to Supine Unable to assess   Additional Comments Pt in bed upon arrival.   Transfers   Sit to Stand 4  Minimal assistance   Additional items Assist x 1;Increased time required;Verbal cues   Stand to Sit 4  Minimal assistance   Additional items Assist x 1;Increased time required;Verbal cues   Toilet transfer 4  Minimal assistance   Additional items Assist x 1;Increased time required;Verbal cues;Standard toilet   Additional Comments w/RW   Ambulation/Elevation   Gait pattern Narrow STANLEY;Forward Flexion;Excessively slow;Shuffling   Gait Assistance 4  Minimal assist   Additional items Assist x 1;Verbal cues;Tactile cues   Assistive Device Rolling walker   Distance 5 ft + 40 ft   Ambulation/Elevation Additional Comments Pt limited in mobility today 2* fatigue and weakness. VC for task completion , with increased time for overall motor planning and activity.   Balance   Static Sitting Fair +   Dynamic Sitting Fair   Static Standing Fair -   Dynamic Standing Poor +   Ambulatory Poor +   Endurance Deficit   Endurance Deficit Yes   Activity Tolerance   Activity Tolerance Patient limited by fatigue   Medical Staff Made Aware OT Chiquita   Nurse Made Aware RN cleared pt for therapy   Assessment   Prognosis Fair   Problem List Decreased strength;Decreased endurance;Decreased mobility;Impaired balance;Decreased safety awareness;Impaired judgement;Decreased cognition   Assessment Pt is a 83 y.o. female seen for PT evaluation s/p admit to [facility] on 4/15/2024. Pt was admitted with a primary dx of: Encephalopathy, Diarrhea, C-diff .with PMH sx for Minimal cognitive impairment, Mood disorder, HTN, Degeneration of lumbar intervertebral disc, CKD, A-fib, Tachy-alison syndrome, Fall, Osteoporosis  PT now consulted for assessment of mobility and d/c needs. Pt with Up and OOB as  tolerated  orders.  Pts current clinical presentation is Unstable/ Unpredictable (high complexity) due to Ongoing medical management for primary dx, Decreased activity tolerance compared to baseline, Fall risk, Increased assistance needed from caregiver at current time. Prior to admission, pt was Independent for mobility .Pt lives at [facility]. Upon evaluation, pt currently is requiring Assist x 1 for all mobility aspects, limiting factor being LE weakness, fatigue, impaired balance, gait deviation, cognition. Pt presents at PT eval functioning below baseline and currently w/ overall mobility deficits 2* to: BLE weakness, impaired balance, decreased endurance, gait deviations, pain, decreased activity tolerance compared to baseline, decreased functional mobility tolerance compared to baseline, decreased safety awareness, impaired judgement, fall risk. Pt currently at a fall risk 2* to impairments listed above.  Pt will continue to benefit from skilled acute PT interventions to address stated impairments; to maximize functional mobility; for ongoing pt/ family training; and DME needs. At conclusion of PT session pt returned back in chair, chair alarm engaged, all needs in reach, and RN notified of session findings/recommendations with phone and call bell within reach. Pt denies any further questions at this time. The patient's AM-PAC Basic Mobility Inpatient Short Form Raw Score is 15. A Raw score of less than or equal to 16 suggests the patient may benefit from discharge to post-acute rehabilitation services. Please also refer to the recommendation of the Physical Therapist for safe discharge planning. Recommend Level 2 upon hospital D/C.   Goals   Patient Goals to remember better   STG Expiration Date 05/01/24   Short Term Goal #1 STG 1. Pt will be able to perform bed mobility tasks with supervuision in order to improve overall functional mobility and assist in safe d/c. STG 2. Pt with sit EOB for at least  25 minutes at Ind level in order to strengthen abdominal musculature and assist in future transfers/ ambulation. STG 3. Pt will be able to perform functional transfer with supervision in order to improve overall functional mobility and assist in safe d/c. STG 4. Pt will be able to ambulate at least 200 feet with least restrictive device with supervision A in order to improve overall functional mobility and assist in safe d/c. STG 5. Pt will improve sitting/standing static/dynamic balance 1/2 grade in order to improve functional mobility and assist in safe d/c. STG 6. Pt will improve LE strength by 1/2 grade in order to improve functional mobility and assist in safe d/c.   PT Treatment Day 0   Plan   Treatment/Interventions Functional transfer training;Therapeutic exercise;Bed mobility;Gait training;Spoke to nursing;OT   PT Frequency 3-5x/wk   Discharge Recommendation   Rehab Resource Intensity Level, PT II (Moderate Resource Intensity)   Equipment Recommended Walker   AM-PAC Basic Mobility Inpatient   Turning in Flat Bed Without Bedrails 3   Lying on Back to Sitting on Edge of Flat Bed Without Bedrails 2   Moving Bed to Chair 3   Standing Up From Chair Using Arms 3   Walk in Room 3   Climb 3-5 Stairs With Railing 1   Basic Mobility Inpatient Raw Score 15   Basic Mobility Standardized Score 36.97   [facility] Highest Level Of Mobility   -[facility] Goal 4: Move to chair/commode   -HL Achieved 7: Walk 25 feet or more   Modified Brilliant Scale   Modified Brilliant Scale 4   End of Consult   Patient Position at End of Consult All needs within reach;Bed/Chair alarm activated;Bedside chair         Audrey Baptiste, PT DPT

## 2024-04-17 NOTE — QUICK NOTE
Gastroenterology Quick Note:    Patient Cdiff resulted positive - likely cause of underlying symptoms and imaging findings. Would not pursue colonoscopy at this time given active infection. Start Vancomycin 125mg Q6 hours x10 days. Follow up outpatient if symptoms persist to consider colonoscopy in future. At this time, no further inpatient GI recommendations. We will sign off. Please contact on-call provider with changes in clinical status or additional questions/concerns.    Yoselyn Bender DO  Gastroenterology Fellow  [facility]  Division of Gastroenterology and Hepatology  Available on [employer]

## 2024-04-17 NOTE — UTILIZATION REVIEW
"    Continued Stay Review    Date: 4-18-24                           Current Patient Class: inpatient  Current Level of Care: med surg     HPI:83 y.o. female initially admitted on 4-15-24  for worsening cognition, forgetfulness, generalized weakness, lethargy.      Assessment/Plan:     Patient's continues to have acute mental status changes likely related to  underlying dementia.  Neurologic work up complete.  Continue therapy for functional deficits and  ambulatory dysfunction. C -diff currently on vancomycin.        Vital Signs:     Patient Vitals for the past 24 hrs:   BP Temp Temp src Pulse Resp SpO2 Height Weight   04/16/24 1540 125/60 99.3 °F (37.4 °C) Oral 64 16 92 % -- --   04/16/24 1350 117/55 99.3 °F (37.4 °C) -- 64 16 90 % 5' 4\" (1.626 m) 61.8 kg (136 lb 3.9 oz)        Pertinent Labs/Diagnostic Results:       Results from last 7 days   Lab Units 04/17/24  0545 04/16/24  0450 04/15/24  1152   WBC Thousand/uL 11.23* 9.40 8.97   HEMOGLOBIN g/dL 14.8 14.3 13.9   HEMATOCRIT % 45.9 45.5 42.9   PLATELETS Thousands/uL 152  --  161   TOTAL NEUT ABS Thousands/µL  --   --  4.90   BANDS PCT %  --  4  --          Results from last 7 days   Lab Units 04/17/24  0545 04/16/24  0643 04/15/24  1303   SODIUM mmol/L 136 137 138   POTASSIUM mmol/L 4.0 4.4 4.4   CHLORIDE mmol/L 99 101 101   CO2 mmol/L 27 28 31   ANION GAP mmol/L 10 8 6   BUN mg/dL 14 11 14   CREATININE mg/dL 0.67 0.67 0.74   EGFR ml/min/1.73sq m 81 81 75   CALCIUM mg/dL 8.0* 8.2* 8.2*   MAGNESIUM mg/dL  --  2.2  --      Results from last 7 days   Lab Units 04/16/24  0643 04/15/24  1303   AST U/L 15 11*   ALT U/L 7 6*   ALK PHOS U/L 40 36   TOTAL PROTEIN g/dL 5.7* 5.5*   ALBUMIN g/dL 3.1* 3.2*   TOTAL BILIRUBIN mg/dL 0.40 0.29         Results from last 7 days   Lab Units 04/17/24  0545 04/16/24  0643 04/15/24  1303   GLUCOSE RANDOM mg/dL 94 92 90             No results found for: \"BETA-HYDROXYBUTYRATE\"       Results from last 7 days   Lab Units 04/15/24  1152 "   PH KAYLEE  7.432*   PCO2 KAYLEE mm Hg 38.6*   PO2 KAYLEE mm Hg 48.8*   HCO3 KAYLEE mmol/L 25.2   BASE EXC KAYLEE mmol/L 1.0   O2 CONTENT KAYLEE ml/dL 16.9   O2 HGB, VENOUS % 81.5*             Results from last 7 days   Lab Units 04/15/24  1427 04/15/24  1152   HS TNI 0HR ng/L  --  4   HS TNI 2HR ng/L 5  --    HSTNI D2 ng/L 1  --      Results from last 7 days   Lab Units 04/15/24  1152   D-DIMER QUANTITATIVE ug/ml FEU 0.43         Results from last 7 days   Lab Units 04/15/24  1152   TSH 3RD GENERATON uIU/mL 2.529         Results from last 7 days   Lab Units 04/15/24  1427 04/15/24  1152   LACTIC ACID mmol/L 1.6 2.6*     Results from last 7 days   Lab Units 04/15/24  1303 04/15/24  1152   CRP mg/L 51.6*  --    SED RATE mm/hour  --  14       Results from last 7 days   Lab Units 04/15/24  1428   CLARITY UA  Clear   COLOR UA  Light Yellow   SPEC GRAV UA  1.014   PH UA  6.5   GLUCOSE UA mg/dl Negative   KETONES UA mg/dl 10 (1+)*   BLOOD UA  Negative   PROTEIN UA mg/dl Negative   NITRITE UA  Negative   BILIRUBIN UA  Negative   UROBILINOGEN UA (BE) mg/dl <2.0   LEUKOCYTES UA  Trace*   WBC UA /hpf None Seen   RBC UA /hpf None Seen   BACTERIA UA /hpf None Seen   EPITHELIAL CELLS WET PREP /hpf Occasional     Results from last 7 days   Lab Units 04/16/24  0942   C DIFF TOXIN B BY PCR  Positive*     Results from last 7 days   Lab Units 04/15/24  1644   TOTAL COUNTED  36     Results from last 7 days   Lab Units 04/15/24  1644   APPEARANCE CSF  Clear and colorless   TUBE NUM CSF  4   WBC CSF /uL 2   XANTHOCHROMIA  No   NEUTROPHILS % (CSF) % 3   LYMPHS % (CSF) % 97   GLUCOSE CSF mg/dL 64   PROTEIN CSF mg/dL 35   CSF CULTURE  No growth           Medications:   Scheduled Medications:  aspirin, 81 mg, Oral, Daily  busPIRone, 7.5 mg, Oral, BID  citalopram, 10 mg, Oral, Daily  divalproex sodium, 250 mg, Oral, TID  enoxaparin, 40 mg, Subcutaneous, Daily  ergocalciferol, 50,000 Units, Oral, Q30 Days  flecainide, 50 mg, Oral, Q12H LILI  fluticasone, 1  spray, Nasal, Daily  melatonin, 6 mg, Oral, HS  saccharomyces boulardii, 250 mg, Oral, BID  vancomycin oral (capsules or solution), 125 mg, Oral, Q6H LILI      Continuous IV Infusions:     PRN Meds:  acetaminophen, 650 mg, Oral, Q6H PRN  calcium carbonate, 500 mg, Oral, BID PRN  ondansetron, 4 mg, Intravenous, Q6H PRN  polyethylene glycol, 17 g, Oral, Daily PRN        Discharge Plan: patient resides at [facility] and attends [facility] 2x weekly.   Referred for short term rehab at [facility] .     Network Utilization Review Department  ATTENTION: Please call with any questions or concerns to [phone] and carefully listen to the prompts so that you are directed to the right person. All voicemails are confidential.   For Discharge needs, contact Care Management DC Support Team at [phone] opt. 2  Send all requests for admission clinical reviews, approved or denied determinations and any other requests to dedicated fax number below belonging to the campus where the patient is receiving treatment. List of dedicated fax numbers for the Facilities:  FACILITY NAME UR FAX NUMBER   ADMISSION DENIALS (Administrative/Medical Necessity) [phone]   DISCHARGE SUPPORT TEAM ([facility]) [phone]   PARENT CHILD HEALTH (Maternity/NICU/Pediatrics) [phone]   [facility] [phone]   [facility] [phone]   [facility] [phone]   [facility] [phone]   [facility] [phone]   [facility] [phone]   [facility] [phone]   [facility] [phone]   [facility] [phone]   [facility] [phone]   [facility] [phone]   [facility] [place] [phone]

## 2024-04-18 LAB
BACTERIA CSF CULT: NO GROWTH
C COLI+JEJUNI TUF STL QL NAA+PROBE: NEGATIVE
EC STX1+STX2 GENES STL QL NAA+PROBE: NEGATIVE
SALMONELLA SP SPAO STL QL NAA+PROBE: NEGATIVE
SHIGELLA SP+EIEC IPAH STL QL NAA+PROBE: NEGATIVE

## 2024-04-18 PROCEDURE — 97116 GAIT TRAINING THERAPY: CPT

## 2024-04-18 PROCEDURE — 99232 SBSQ HOSP IP/OBS MODERATE 35: CPT | Performed by: INTERNAL MEDICINE

## 2024-04-18 PROCEDURE — 97110 THERAPEUTIC EXERCISES: CPT

## 2024-04-18 PROCEDURE — 97530 THERAPEUTIC ACTIVITIES: CPT

## 2024-04-18 PROCEDURE — 99232 SBSQ HOSP IP/OBS MODERATE 35: CPT | Performed by: PHYSICIAN ASSISTANT

## 2024-04-18 RX ADMIN — Medication 250 MG: at 08:44

## 2024-04-18 RX ADMIN — Medication 250 MG: at 17:13

## 2024-04-18 RX ADMIN — BUSPIRONE HYDROCHLORIDE 7.5 MG: 5 TABLET ORAL at 08:44

## 2024-04-18 RX ADMIN — VANCOMYCIN HYDROCHLORIDE 125 MG: 125 CAPSULE ORAL at 11:51

## 2024-04-18 RX ADMIN — CITALOPRAM HYDROBROMIDE 10 MG: 10 TABLET ORAL at 08:44

## 2024-04-18 RX ADMIN — VANCOMYCIN HYDROCHLORIDE 125 MG: 125 CAPSULE ORAL at 22:25

## 2024-04-18 RX ADMIN — MELATONIN 6 MG: at 22:26

## 2024-04-18 RX ADMIN — DIVALPROEX SODIUM 250 MG: 250 TABLET, DELAYED RELEASE ORAL at 17:13

## 2024-04-18 RX ADMIN — VANCOMYCIN HYDROCHLORIDE 125 MG: 125 CAPSULE ORAL at 17:14

## 2024-04-18 RX ADMIN — DIVALPROEX SODIUM 250 MG: 250 TABLET, DELAYED RELEASE ORAL at 22:25

## 2024-04-18 RX ADMIN — FLUTICASONE PROPIONATE 1 SPRAY: 50 SPRAY, METERED NASAL at 08:44

## 2024-04-18 RX ADMIN — BUSPIRONE HYDROCHLORIDE 7.5 MG: 5 TABLET ORAL at 22:25

## 2024-04-18 RX ADMIN — ASPIRIN 81 MG: 81 TABLET, COATED ORAL at 08:44

## 2024-04-18 RX ADMIN — FLECAINIDE ACETATE 50 MG: 50 TABLET ORAL at 08:44

## 2024-04-18 RX ADMIN — DIVALPROEX SODIUM 250 MG: 250 TABLET, DELAYED RELEASE ORAL at 08:44

## 2024-04-18 RX ADMIN — ENOXAPARIN SODIUM 40 MG: 40 INJECTION SUBCUTANEOUS at 08:44

## 2024-04-18 RX ADMIN — FLECAINIDE ACETATE 50 MG: 50 TABLET ORAL at 22:26

## 2024-04-18 RX ADMIN — VANCOMYCIN HYDROCHLORIDE 125 MG: 125 CAPSULE ORAL at 05:29

## 2024-04-18 NOTE — RESTORATIVE TECHNICIAN NOTE
Restorative Technician Note      Patient Name: Ann Cross     Note Type: Mobility  Patient Position Upon Consult: Bedside chair  Activity Performed: Ambulated; Dangled; Stood  Assistive Device: Roller walker  Education Provided: Yes  Patient Position at End of Consult: Bedside chair; All needs within reach; Bed/Chair alarm activated    Kay DIXON, Restorative Technician,

## 2024-04-18 NOTE — PLAN OF CARE
Problem: PHYSICAL THERAPY ADULT  Goal: Performs mobility at highest level of function for planned discharge setting.  See evaluation for individualized goals.  Description: Treatment/Interventions: Functional transfer training, Therapeutic exercise, Bed mobility, Gait training, Spoke to nursing, OT  Equipment Recommended: Walker       See flowsheet documentation for full assessment, interventions and recommendations.  Outcome: Progressing  Note: Prognosis: Fair  Problem List: Decreased strength, Decreased endurance, Decreased mobility, Impaired balance, Decreased safety awareness, Impaired judgement, Decreased cognition  Assessment: Pt seen for PT treatment session this date with interventions consisting of gait training to normalize gait pattern to decrease fall risk, therapeutic exercise to improve endurance to improve functional mobility, and therapeutic activity to improve transfers and increase activity tolerance with functional mobility to decrease fall risk. Pt agreeable to PT treatment session upon arrival, pt found supine in bed, in no apparent distress. Since previous session, pt has made good progress as evidenced by decreased assistance required with mobility  Barriers during this session include confusion and fatigue.  Pt continues to be functioning below baseline level, and remains limited 2* factors listed above and including impaired activity tolerance, impaired  balance, poor safety awareness, impaired cognition, decreased mobility, and impaired judgement.  Pt prognosis for achieving goals is fair, pending pt progress with hospitalization/medical status improvements, and indicated by static poor cognition and continued required assistance. PT will continue to see pt during current hospitalization in order to address the deficits listed above and provide interventions consistent w/ POC in effort to achieve goals. Current goals and POC remain appropriate, pt continues to have rehab potential  Upon  conclusion pt seated OOB in recliner. The patient's AM-PAC Basic Mobility Inpatient Short Form Raw Score is 17.  Based on patient presentations and impairments, pt would most appropriately benefit from Level 2 resource intensity upon discharge.  Please also refer to the recommendation of the Physical Therapist for safe discharge planning. RN verbalized pt appropriate for PT session.        Rehab Resource Intensity Level, PT: II (Moderate Resource Intensity)    See flowsheet documentation for full assessment.

## 2024-04-18 NOTE — PROGRESS NOTES
[facility]  Progress Note  Name: Ann Cross I  MRN: [id number]  Unit/Bed#: [facility] 731-01 I Date of Admission: 4/15/2024   Date of Service: 4/18/2024 I Hospital Day: 3    Assessment/Plan   * Encephalopathy  Assessment & Plan  Patient presents with worsening cognition, forgetfulness, generalized weakness, lethargy.  Patient and daughter are worried about worsening memory issues  CT head negative  MRI brain negative   Neurology evaluated.  No evidence for CVA, seizure activity.  CSF studies are benign.  Suspect worsening mental status due to underlying dementia/cognitive decline, underlying psychiatric disorder with recent hx of SDH in December, colitis/diarrhea, and familial losses recently.  No further IP neurologic work up at this time  Geriatrics following  F/u stool studies -- C. Diff positive and likely contributing to AMS   PT/OT     Clostridium difficile diarrhea  Assessment & Plan  C. Diff PCR and EIA positive  PO Vanco 125 mg every 6 hours x 10 days   Can f/u with GI outpt     Diarrhea  Assessment & Plan  Reports occasional episodes of loose stool/diarrhea  Abdominal imaging concerning for sigmoid colon thickening  C diff PCR and EIA positive.  Started on PO Vanco  D/w her PCP and GI -- given active C diff no plans for IP scope at this time and can be followed up outpt     Stage 3 chronic kidney disease, unspecified whether stage 3a or 3b CKD (HCC)  Assessment & Plan  Lab Results   Component Value Date    EGFR 81 04/17/2024    EGFR 81 04/16/2024    EGFR 75 04/15/2024    CREATININE 0.67 04/17/2024    CREATININE 0.67 04/16/2024    CREATININE 0.74 04/15/2024     Monitor kidney function -- currently at baseline   Avoid nephrotoxins    Tachy-alison syndrome (HCC)  Assessment & Plan  S/p PPM placement    Paroxysmal atrial fibrillation (HCC)  Assessment & Plan  Continue flecainide  Not on anticoagulation    Degeneration of lumbar intervertebral disc  Assessment & Plan  History  of degenerative lumbar disc disease  Analgesics PRN   Physical therapy    Hypertension  Assessment & Plan  Monitor blood pressures  Monitor orthostatics -- negative   Amlodipine is on hold, BP is acceptable     Hyperlipemia  Assessment & Plan  Outpatient follow-up    Minimal cognitive impairment  Assessment & Plan  History of cognitive impairment  Delirium precautions  Reorientation, sleep hygiene     Mood disorder with hypomanic features  Assessment & Plan  Continue buspirone, citalopram, depakote          VTE Pharmacologic Prophylaxis: VTE Score: 6 High Risk (Score >/= 5) - Pharmacological DVT Prophylaxis Ordered: enoxaparin (Lovenox). Sequential Compression Devices Ordered.    Mobility:   Basic Mobility Inpatient Raw Score: 14  JH-HLM Goal: 4: Move to chair/commode  JH-HLM Achieved: 4: Move to chair/commode  JH-HLM Goal achieved. Continue to encourage appropriate mobility.    Patient Centered Rounds: I performed bedside rounds with nursing staff today.   Discussions with Specialists or Other Care Team Provider: will d/w CM     Education and Discussions with Family / Patient: Attempted to update  (daughter) via phone. Left voicemail.     Total Time Spent on Date of Encounter in care of patient: 20 mins. This time was spent on one or more of the following: performing physical exam; counseling and coordination of care; obtaining or reviewing history; documenting in the medical record; reviewing/ordering tests, medications or procedures; communicating with other healthcare professionals and discussing with patient's family/caregivers.    Current Length of Stay: 3 day(s)  Current Patient Status: Inpatient   Certification Statement: The patient will continue to require additional inpatient hospital stay due to monitor diarrhea, safe dc planning  Discharge Plan: Anticipate discharge in 24-48 hrs to rehab facility.    Code Status: Level 1 - Full Code    Subjective:   No acute complaints, pleasantly confused      Objective:     Vitals:   Temp (24hrs), Av.5 °F (36.9 °C), Min:98.1 °F (36.7 °C), Max:98.9 °F (37.2 °C)    Temp:  [98.1 °F (36.7 °C)-98.9 °F (37.2 °C)] 98.1 °F (36.7 °C)  HR:  [60-64] 60  Resp:  [16] 16  BP: (126-128)/(60-66) 128/66  SpO2:  [93 %-96 %] 96 %  Body mass index is 23.39 kg/m².     Input and Output Summary (last 24 hours):     Intake/Output Summary (Last 24 hours) at 2024 1424  Last data filed at 2024 0700  Gross per 24 hour   Intake 360 ml   Output --   Net 360 ml       Physical Exam:   Physical Exam  Vitals reviewed.   Constitutional:       General: She is not in acute distress.     Appearance: She is not toxic-appearing.   HENT:      Head: Normocephalic and atraumatic.   Eyes:      Extraocular Movements: Extraocular movements intact.   Cardiovascular:      Rate and Rhythm: Normal rate and regular rhythm.   Pulmonary:      Effort: Pulmonary effort is normal. No respiratory distress.      Breath sounds: Normal breath sounds.   Abdominal:      General: Bowel sounds are normal. There is no distension.      Palpations: Abdomen is soft.   Neurological:      General: No focal deficit present.      Mental Status: She is alert.      Comments: Forgetful.  Oriented to self   Psychiatric:         Mood and Affect: Mood normal.         Behavior: Behavior normal.          Additional Data:     Labs:  Results from last 7 days   Lab Units 24  0545 24  0450 04/15/24  1152   WBC Thousand/uL 11.23* 9.40 8.97   HEMOGLOBIN g/dL 14.8 14.3 13.9   HEMATOCRIT % 45.9 45.5 42.9   PLATELETS Thousands/uL 152  --  161   BANDS PCT %  --  4  --    SEGS PCT %  --   --  54   LYMPHO PCT %  --  36 25   MONO PCT %  --  14* 19*   EOS PCT %  --  2 1     Results from last 7 days   Lab Units 24  0545 24  0643   SODIUM mmol/L 136 137   POTASSIUM mmol/L 4.0 4.4   CHLORIDE mmol/L 99 101   CO2 mmol/L 27 28   BUN mg/dL 14 11   CREATININE mg/dL 0.67 0.67   ANION GAP mmol/L 10 8   CALCIUM mg/dL 8.0* 8.2*    ALBUMIN g/dL  --  3.1*   TOTAL BILIRUBIN mg/dL  --  0.40   ALK PHOS U/L  --  40   ALT U/L  --  7   AST U/L  --  15   GLUCOSE RANDOM mg/dL 94 92                 Results from last 7 days   Lab Units 04/15/24  1427 04/15/24  1152   LACTIC ACID mmol/L 1.6 2.6*       Lines/Drains:  Invasive Devices       Peripheral Intravenous Line  Duration             Peripheral IV 04/16/24 Right;Ventral (anterior) Forearm 2 days                          Imaging: No pertinent imaging reviewed.    Recent Cultures (last 7 days):   Results from last 7 days   Lab Units 04/16/24  0942   C DIFF TOXIN B BY PCR  Positive*       Last 24 Hours Medication List:   Current Facility-Administered Medications   Medication Dose Route Frequency Provider Last Rate    acetaminophen  650 mg Oral Q6H PRN Dominick Angeles MD      aspirin  81 mg Oral Daily Dominick Angeles MD      busPIRone  7.5 mg Oral BID Dominick Angeles MD      calcium carbonate  500 mg Oral BID PRN Dominick Angeles MD      citalopram  10 mg Oral Daily Dominick Angeles MD      divalproex sodium  250 mg Oral TID Dominick Angeles MD      enoxaparin  40 mg Subcutaneous Daily Dominick Angeles MD      ergocalciferol  50,000 Units Oral Q30 Days Dominick Angeles MD      flecainide  50 mg Oral Q12H [facility] Dominick Angeles MD      fluticasone  1 spray Nasal Daily Dominick Angeles MD      melatonin  6 mg Oral HS Dominick Angeles MD      ondansetron  4 mg Intravenous Q6H PRN Dominick Angeles MD      polyethylene glycol  17 g Oral Daily PRN Tuesday M LUZ Arango      saccharomyces boulardii  250 mg Oral BID Tuesday M LUZ Arango      vancomycin oral (capsules or solution)  125 mg Oral Q6H [facility] Araceli Larson PA-C          Today, Patient Was Seen By: Araceli Larson PA-C    **Please Note: This note may have been constructed using a voice recognition system.**

## 2024-04-18 NOTE — PROGRESS NOTES
"Progress Note - Geriatric Medicine   Ann Cross 83 y.o. female MRN: [id number]  Unit/Bed#: [facility] 731-01 Encounter: [id number]      Assessment/Plan:    Altered mental status  Improving, likely due to progression of cognitive impairment  Continue supportive care   Delirium precautions    Cognitive impairment  Progressive, continue supportive care  Maintain chronic conditions under control  Encourage physical, mental and social activity  Comprehensive cognitive assessment as outpatient    Delirium precautions:  Non-pharmacologic measures including reorientation, redirection, early mobilization, adequate hydration, sleep hygiene, pain management, and bowel regimen to avoid constipation.     Mood disorder  Chronic use of Celexa, BuSpar and Depakote  Recent BMP revealed     Ambulatory dysfunction  Fall precautions  Physical therapy to increase muscle strength and gait stability    Frailty  Supportive care   Maintain chronic conditions under control  Patient will benefit from physical therapy to maximize safety    C.diff  Currently on oral vancomycin    Subjective:   Patient is lying in bed.  Awake, alert and oriented to self and place    Discussed patient with RN, no major events overnight.    Objective:     Vitals: Blood pressure 128/66, pulse 60, temperature 98.1 °F (36.7 °C), resp. rate 16, height 5' 4\" (1.626 m), weight 61.8 kg (136 lb 3.9 oz), SpO2 96%.,Body mass index is 23.39 kg/m².      Intake/Output Summary (Last 24 hours) at 4/18/2024 0904  Last data filed at 4/17/2024 1930  Gross per 24 hour   Intake 600 ml   Output 100 ml   Net 500 ml       Current Medications: Reviewed    Physical Exam:     General: NAD, lying in bed  Eye Exam: anicteric sclera, no discharge  Oral Exam: moist mucous membranes  Neck Exam: no anterior cervical lymphadenopathy noted, no stridor  Cardiovascular: regular rate and rhythm, no murmurs, rubs, or gallops  Pulmonary:  no wheezes or rhonchi, breathing comfortably  Abdominal: " soft, nontender, nondistended, bowel sounds audible  Extremities and skin: no edema noted, no rashes  Neurological:  Awake alert, moves extremities     Lab, Imaging and other studies: I have personally reviewed pertinent reports.     BMP 4/17/2024 : , K4.0, BUN 14, creatinine 0.67 and GFR 81  CBC/17/24 : WBC 11.23, Hb 14.8, HCT 45.9 and platelets 152  CSF unremarkable  CSF culture: No growth as of today

## 2024-04-18 NOTE — PHYSICAL THERAPY NOTE
PT Treatment Note    NAME:  Ann Cross  DATE: 04/18/24    AGE:   83 y.o.  Mrn:   [id number]  ADMIT DX:  Altered mental status [R41.82]  Encephalopathy acute [G93.40]  Encephalopathy [G93.40]  Dizzy [R42]  Minimal cognitive impairment [G31.84]  Performed at least 2 patient identifiers during session: Name and ID bracelet         04/18/24 1140   PT Last Visit   PT Visit Date 04/18/24   Note Type   Note Type Treatment   Pain Assessment   Pain Assessment Tool 0-10   Pain Score No Pain   Restrictions/Precautions   Weight Bearing Precautions Per Order No   Other Precautions Chair Alarm;Cognitive;Bed Alarm;Fall Risk   General   Chart Reviewed Yes   Family/Caregiver Present No   Cognition   Overall Cognitive Status Impaired   Arousal/Participation Alert   Attention Attends with cues to redirect   Orientation Level Oriented to person;Oriented to place;Disoriented to time;Disoriented to situation   Memory Decreased recall of recent events   Following Commands Follows one step commands with increased time or repetition   Bed Mobility   Supine to Sit 5  Supervision   Additional items Assist x 1;HOB elevated;Increased time required;LE management;Verbal cues   Transfers   Sit to Stand   (CGA)   Additional items Assist x 1;Increased time required;Verbal cues   Stand to Sit   (CGA)   Additional items Assist x 1;Increased time required;Verbal cues   Toilet transfer   (CGA)   Additional items Assist x 1;Standard toilet;Verbal cues;Increased time required   Additional Comments multiple bouts of loose stools requiring multiple STS with pericare and clothing changes   Ambulation/Elevation   Gait pattern Improper Weight shift;Decreased foot clearance;Excessively slow   Gait Assistance 4  Minimal assist   Additional items Assist x 1;Verbal cues   Assistive Device Rolling walker   Distance 10 ft x 2   Ambulation/Elevation Additional Comments assistance with RW navigation   Balance   Static Sitting Good   Dynamic Sitting Fair +    Static Standing Fair   Dynamic Standing Fair -   Ambulatory Fair -   Endurance Deficit   Endurance Deficit Yes   Endurance Deficit Description pt reporting fatigue with activity requiring seated rest breaks   Activity Tolerance   Activity Tolerance Patient limited by fatigue   Exercises   Hip Flexion Sitting;15 reps;AROM;Bilateral   Hip Adduction Sitting;15 reps;AROM;Bilateral   Knee AROM Long Arc Quad Sitting;15 reps;AROM;Bilateral   Ankle Pumps Sitting;15 reps;AROM;Bilateral   Assessment   Prognosis Fair   Assessment Pt seen for PT treatment session this date with interventions consisting of gait training to normalize gait pattern to decrease fall risk, therapeutic exercise to improve endurance to improve functional mobility, and therapeutic activity to improve transfers and increase activity tolerance with functional mobility to decrease fall risk. Pt agreeable to PT treatment session upon arrival, pt found supine in bed, in no apparent distress. Since previous session, pt has made good progress as evidenced by decreased assistance required with mobility  Barriers during this session include confusion and fatigue.  Pt continues to be functioning below baseline level, and remains limited 2* factors listed above and including impaired activity tolerance, impaired  balance, poor safety awareness, impaired cognition, decreased mobility, and impaired judgement.  Pt prognosis for achieving goals is fair, pending pt progress with hospitalization/medical status improvements, and indicated by static poor cognition and continued required assistance. PT will continue to see pt during current hospitalization in order to address the deficits listed above and provide interventions consistent w/ POC in effort to achieve goals. Current goals and POC remain appropriate, pt continues to have rehab potential  Upon conclusion pt seated OOB in recliner. The patient's AM-PAC Basic Mobility Inpatient Short Form Raw Score is 17.   Based on patient presentations and impairments, pt would most appropriately benefit from Level 2 resource intensity upon discharge.  Please also refer to the recommendation of the Physical Therapist for safe discharge planning. RN verbalized pt appropriate for PT session.   Goals   Patient Goals to go to the bathroom   STG Expiration Date 05/01/24   PT Treatment Day 1   Plan   Treatment/Interventions Functional transfer training;Therapeutic exercise;Endurance training;Gait training;Bed mobility;Equipment eval/education   Progress Slow progress, cognitive deficits   PT Frequency 3-5x/wk   Discharge Recommendation   Rehab Resource Intensity Level, PT II (Moderate Resource Intensity)   Equipment Recommended Walker   AM-PAC Basic Mobility Inpatient   Turning in Flat Bed Without Bedrails 3   Lying on Back to Sitting on Edge of Flat Bed Without Bedrails 3   Moving Bed to Chair 3   Standing Up From Chair Using Arms 3   Walk in Room 3   Climb 3-5 Stairs With Railing 2   Basic Mobility Inpatient Raw Score 17   Basic Mobility Standardized Score 39.67   [facility] Highest Level Of Mobility   JH-HLM Goal 5: Stand one or more mins   JH-HLM Achieved 6: Walk 10 steps or more       Time In: 1100  Time Out: 1140  Total Treatment Minutes: 40    Roopa Orona, PT

## 2024-04-19 LAB
ANION GAP SERPL CALCULATED.3IONS-SCNC: 8 MMOL/L (ref 4–13)
BASOPHILS # BLD AUTO: 0.07 THOUSANDS/ÂΜL (ref 0–0.1)
BASOPHILS NFR BLD AUTO: 1 % (ref 0–1)
BUN SERPL-MCNC: 13 MG/DL (ref 5–25)
CALCIUM SERPL-MCNC: 8.1 MG/DL (ref 8.4–10.2)
CHLORIDE SERPL-SCNC: 101 MMOL/L (ref 96–108)
CO2 SERPL-SCNC: 29 MMOL/L (ref 21–32)
CREAT SERPL-MCNC: 0.76 MG/DL (ref 0.6–1.3)
EOSINOPHIL # BLD AUTO: 0.05 THOUSAND/ÂΜL (ref 0–0.61)
EOSINOPHIL NFR BLD AUTO: 1 % (ref 0–6)
ERYTHROCYTE [DISTWIDTH] IN BLOOD BY AUTOMATED COUNT: 14.5 % (ref 11.6–15.1)
GFR SERPL CREATININE-BSD FRML MDRD: 72 ML/MIN/1.73SQ M
GLUCOSE SERPL-MCNC: 95 MG/DL (ref 65–140)
HCT VFR BLD AUTO: 44.5 % (ref 34.8–46.1)
HGB BLD-MCNC: 14.2 G/DL (ref 11.5–15.4)
IMM GRANULOCYTES # BLD AUTO: 0.18 THOUSAND/UL (ref 0–0.2)
IMM GRANULOCYTES NFR BLD AUTO: 2 % (ref 0–2)
LYMPHOCYTES # BLD AUTO: 2.39 THOUSANDS/ÂΜL (ref 0.6–4.47)
LYMPHOCYTES NFR BLD AUTO: 29 % (ref 14–44)
MCH RBC QN AUTO: 28.5 PG (ref 26.8–34.3)
MCHC RBC AUTO-ENTMCNC: 31.9 G/DL (ref 31.4–37.4)
MCV RBC AUTO: 89 FL (ref 82–98)
MONOCYTES # BLD AUTO: 1.52 THOUSAND/ÂΜL (ref 0.17–1.22)
MONOCYTES NFR BLD AUTO: 19 % (ref 4–12)
NEUTROPHILS # BLD AUTO: 3.93 THOUSANDS/ÂΜL (ref 1.85–7.62)
NEUTS SEG NFR BLD AUTO: 48 % (ref 43–75)
NRBC BLD AUTO-RTO: 0 /100 WBCS
PLATELET # BLD AUTO: 170 THOUSANDS/UL (ref 149–390)
PMV BLD AUTO: 9.6 FL (ref 8.9–12.7)
POTASSIUM SERPL-SCNC: 4.2 MMOL/L (ref 3.5–5.3)
RBC # BLD AUTO: 4.99 MILLION/UL (ref 3.81–5.12)
SODIUM SERPL-SCNC: 138 MMOL/L (ref 135–147)
VIT B1 BLD-SCNC: 121.4 NMOL/L (ref 66.5–200)
WBC # BLD AUTO: 8.14 THOUSAND/UL (ref 4.31–10.16)

## 2024-04-19 PROCEDURE — 97530 THERAPEUTIC ACTIVITIES: CPT

## 2024-04-19 PROCEDURE — 87081 CULTURE SCREEN ONLY: CPT | Performed by: INTERNAL MEDICINE

## 2024-04-19 PROCEDURE — 80048 BASIC METABOLIC PNL TOTAL CA: CPT | Performed by: PHYSICIAN ASSISTANT

## 2024-04-19 PROCEDURE — 99232 SBSQ HOSP IP/OBS MODERATE 35: CPT | Performed by: INTERNAL MEDICINE

## 2024-04-19 PROCEDURE — 97112 NEUROMUSCULAR REEDUCATION: CPT

## 2024-04-19 PROCEDURE — 97116 GAIT TRAINING THERAPY: CPT

## 2024-04-19 PROCEDURE — 85025 COMPLETE CBC W/AUTO DIFF WBC: CPT | Performed by: PHYSICIAN ASSISTANT

## 2024-04-19 PROCEDURE — 99232 SBSQ HOSP IP/OBS MODERATE 35: CPT | Performed by: PHYSICIAN ASSISTANT

## 2024-04-19 RX ADMIN — FLUTICASONE PROPIONATE 1 SPRAY: 50 SPRAY, METERED NASAL at 08:08

## 2024-04-19 RX ADMIN — ASPIRIN 81 MG: 81 TABLET, COATED ORAL at 08:09

## 2024-04-19 RX ADMIN — VANCOMYCIN HYDROCHLORIDE 125 MG: 125 CAPSULE ORAL at 12:35

## 2024-04-19 RX ADMIN — DIVALPROEX SODIUM 250 MG: 250 TABLET, DELAYED RELEASE ORAL at 08:08

## 2024-04-19 RX ADMIN — MELATONIN 6 MG: at 21:52

## 2024-04-19 RX ADMIN — VANCOMYCIN HYDROCHLORIDE 125 MG: 125 CAPSULE ORAL at 17:13

## 2024-04-19 RX ADMIN — Medication 250 MG: at 08:09

## 2024-04-19 RX ADMIN — BUSPIRONE HYDROCHLORIDE 7.5 MG: 5 TABLET ORAL at 08:08

## 2024-04-19 RX ADMIN — FLECAINIDE ACETATE 50 MG: 50 TABLET ORAL at 21:52

## 2024-04-19 RX ADMIN — Medication 250 MG: at 17:13

## 2024-04-19 RX ADMIN — CITALOPRAM HYDROBROMIDE 10 MG: 10 TABLET ORAL at 08:08

## 2024-04-19 RX ADMIN — VANCOMYCIN HYDROCHLORIDE 125 MG: 125 CAPSULE ORAL at 21:52

## 2024-04-19 RX ADMIN — FLECAINIDE ACETATE 50 MG: 50 TABLET ORAL at 08:08

## 2024-04-19 RX ADMIN — ENOXAPARIN SODIUM 40 MG: 40 INJECTION SUBCUTANEOUS at 08:07

## 2024-04-19 RX ADMIN — DIVALPROEX SODIUM 250 MG: 250 TABLET, DELAYED RELEASE ORAL at 21:52

## 2024-04-19 RX ADMIN — VANCOMYCIN HYDROCHLORIDE 125 MG: 125 CAPSULE ORAL at 06:28

## 2024-04-19 RX ADMIN — BUSPIRONE HYDROCHLORIDE 7.5 MG: 5 TABLET ORAL at 21:52

## 2024-04-19 RX ADMIN — DIVALPROEX SODIUM 250 MG: 250 TABLET, DELAYED RELEASE ORAL at 17:12

## 2024-04-19 NOTE — ASSESSMENT & PLAN NOTE
Reports occasional episodes of loose stool/diarrhea  Abdominal imaging concerning for sigmoid colon thickening  C diff PCR and EIA positive.  Continue p.o. vancomycin, day 3  D/w her PCP and GI -- given active C diff no plans for IP scope at this time and can be followed up outpt

## 2024-04-19 NOTE — PROGRESS NOTES
"Progress Note - Geriatric Medicine   Ann Cross 83 y.o. female MRN: [id number]  Unit/Bed#: [facility] 731-01 Encounter: [id number]      Assessment/Plan:    Altered mental status  Improving, likely due to progression of cognitive impairment  Continue supportive care   Delirium precautions    Cognitive impairment  Progressive, continue supportive care  Maintain chronic conditions under control  Encourage physical, mental and social activity  Comprehensive cognitive assessment as outpatient    Delirium precautions:  Non-pharmacologic measures including reorientation, redirection, early mobilization, adequate hydration, sleep hygiene, pain management, and bowel regimen to avoid constipation.     Mood disorder  Chronic use of Celexa, BuSpar and Depakote   Na today 138    Ambulatory dysfunction  Fall precautions  Physical therapy to increase muscle strength and gait stability    Frailty  Supportive care   Maintain chronic conditions under control  Patient will benefit from physical therapy to maximize safety    C.diff  Currently on oral vancomycin    Subjective:   Awake, alert and oriented to self and place  Discussed patient with nursing, no major events last night.  Patient is still having loose stools x 8    Objective:     Vitals: Blood pressure 138/68, pulse 63, temperature 97.7 °F (36.5 °C), resp. rate 16, height 5' 4\" (1.626 m), weight 61.8 kg (136 lb 3.9 oz), SpO2 94%.,Body mass index is 23.39 kg/m².      Intake/Output Summary (Last 24 hours) at 4/19/2024 0907  Last data filed at 4/18/2024 1801  Gross per 24 hour   Intake 458 ml   Output --   Net 458 ml     Physical Exam:     General : NAD  HEENT : MMM   Heart : Normal rate, no murmur rub or gallop  Lungs : CTA no wheezes, rales or rhonchi  Abdomen : Soft, NT/ND, BS auscultated in all 4 quads.   Ext :  no edema  Skin : Warm, dry, age appropriate turgor and mobility  Neuro : Nonfocal  Psych : Alert and oriented      Current Medications: Reviewed    Lab, Imaging and " other studies: I have personally reviewed pertinent reports.     CBC: WBC 8.14, Hb 14.2, HCT 44.5 and platelets 170  BMP: Na 138, K 4.2, BUN 13, creatinine 0.67 and GFR 72

## 2024-04-19 NOTE — CASE MANAGEMENT
Case Management Discharge Planning Note    Patient name Ann Cross  Location [facility] 731/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    Encephalopathy 2023    Acute pain due to trauma 2023    Fall 2023    Sinus pause 2023    Thrombocytopenia ([facility]) 2023    Acute metabolic encephalopathy 2023    COVID-19 2023    Diarrhea 2023    Stage 3 chronic kidney disease, unspecified whether stage 3a or 3b CKD ([facility]) 2022    Tachy-alison syndrome ([facility]) 2022    Hyperlipemia 2022    Hypertension 2022    Generalized anxiety disorder 2021    Obstructive sleep apnea syndrome 2020    Degeneration of lumbar intervertebral disc 2017    History of total bilateral knee replacement 2017    Osteoporosis 05/10/2017    Minimal cognitive impairment 2016    Pain, joint, knee, right 2016     Mood disorder with hypomanic features 2016    Paroxysmal atrial fibrillation (HCC) 2014    Metabolic syndrome X 2007    Anxiety state 2007    Migraine 2007    Peripheral venous insufficiency 2007      LOS (days): 4  Geometric Mean LOS (GMLOS) (days): 2.4  Days to GMLOS:-1.5     OBJECTIVE:  Risk of Unplanned Readmission Score: 20.37         Current admission status: Inpatient   Preferred Pharmacy:   [facility] [place] - [address] [place] [address]  [address] [facility] [address]  Phone: [phone] Fax: [phone]    Primary Care Provider: Emelina Swann MD    Primary Insurance: [facility]  Secondary Insurance:     DISCHARGE DETAILS:  Additional Comments: CM spoke with  representative from [facility] regarding patient. Patient will be required to have 24/7 care and facility stated this can be provided. Per Karmen at [facility] (patient's insurance), patient will continue OP PT/OT 2x/week, which is arranged by [facility]. CM reached out to PT Mason to confirm that this plan aligns with therapy recommendations. CM reached out to pt lisa Bass ([phone]). CM relayed everything mentioned above. Patient daughter shared concern about patient's cognition and that she is taking it day by day. Patient daughter shared she would like patient to return to [facility] if they are providing the care her mom needs. Daughter shared she placed an application in for another living facility that may provide a higher level of care, however, transitioning to this facility could be a difficult and time-consuming process as she would have to arrange several things including changing patient's insurance. CM relayed that patient is not medically stable for DC yet. Daughter shared that she is not sure what she would decide yet and wants to continue to follow medical updates. CM will continue to follow.

## 2024-04-19 NOTE — PROGRESS NOTES
[facility]  Progress Note  Name: Ann Cross I  MRN: [id number]  Unit/Bed#: [facility]PHP 731-01 I Date of Admission: 4/15/2024   Date of Service: 4/19/2024 I Hospital Day: 4    Assessment/Plan   * Encephalopathy  Assessment & Plan  Patient presents with worsening cognition, forgetfulness, generalized weakness, lethargy.  Patient and daughter are worried about worsening memory issues  CT head negative  MRI brain negative   Neurology evaluated.  No evidence for CVA, seizure activity.  CSF studies are benign.  Suspect worsening mental status due to underlying dementia/cognitive decline, underlying psychiatric disorder with recent hx of SDH in December, colitis/diarrhea, and familial losses recently.  No further IP neurologic work up at this time  Geriatrics following  F/u stool studies -- C. Diff positive and likely contributing to AMS   Continue inpatient physical and Occupational Therapy, recommending rehab level 2    Clostridium difficile diarrhea  Assessment & Plan  C. Diff PCR and EIA positive  PO Vanco 125 mg every 6 hours x 10 days   Can f/u with GI outpt     Diarrhea  Assessment & Plan  Reports occasional episodes of loose stool/diarrhea  Abdominal imaging concerning for sigmoid colon thickening  C diff PCR and EIA positive.  Continue p.o. vancomycin, day 3  D/w her PCP and GI -- given active C diff no plans for IP scope at this time and can be followed up outpt     Stage 3 chronic kidney disease, unspecified whether stage 3a or 3b CKD (HCC)  Assessment & Plan  Lab Results   Component Value Date    EGFR 72 04/19/2024    EGFR 81 04/17/2024    EGFR 81 04/16/2024    CREATININE 0.76 04/19/2024    CREATININE 0.67 04/17/2024    CREATININE 0.67 04/16/2024     Monitor kidney function -- currently at baseline   Avoid nephrotoxins    Tachy-alison syndrome (HCC)  Assessment & Plan  S/p PPM placement    Paroxysmal atrial fibrillation (HCC)  Assessment & Plan  Continue flecainide  Not on  anticoagulation    Degeneration of lumbar intervertebral disc  Assessment & Plan  History of degenerative lumbar disc disease  Analgesics PRN   Physical therapy    Hypertension  Assessment & Plan  Monitor blood pressures  Monitor orthostatics -- negative   Amlodipine remains on hold, BP remains acceptable during hospital stay    Hyperlipemia  Assessment & Plan  Not maintained on outpatient medications  Continue PCP follow-up    Minimal cognitive impairment  Assessment & Plan  History of cognitive impairment  Delirium precautions  Reorientation, sleep hygiene     Mood disorder with hypomanic features  Assessment & Plan  Continue buspirone, citalopram, depakote              VTE Pharmacologic Prophylaxis: VTE Score: 6 High Risk (Score >/= 5) - Pharmacological DVT Prophylaxis Ordered: enoxaparin (Lovenox). Sequential Compression Devices Ordered.    Mobility:   Basic Mobility Inpatient Raw Score: 17  JH-HLM Goal: 5: Stand one or more mins  JH-HLM Achieved: 8: Walk 250 feet ot more  JH-HLM Goal achieved. Continue to encourage appropriate mobility.    Patient Centered Rounds: I performed bedside rounds with nursing staff today.   Discussions with Specialists or Other Care Team Provider: None    Education and Discussions with Family / Patient: Updated  (daughter) via phone.    Total Time Spent on Date of Encounter in care of patient:  This time was spent on one or more of the following: performing physical exam; counseling and coordination of care; obtaining or reviewing history; documenting in the medical record; reviewing/ordering tests, medications or procedures; communicating with other healthcare professionals and discussing with patient's family/caregivers.    Current Length of Stay: 4 day(s)  Current Patient Status: Inpatient   Certification Statement: The patient will continue to require additional inpatient hospital stay due to diarrhea/C. difficile requiring vancomycin and close monitoring  Discharge  Plan: Anticipate discharge in 24-48 hrs to rehab facility.    Code Status: Level 1 - Full Code    Subjective:   Patient seen and examined at bedside.  Notes her stool output is getting somewhat better, less watery but still having frequent bowel movements.  Denies any pain.  Believes she is eating and drinking appropriately.    Objective:     Vitals:   Temp (24hrs), Av °F (36.7 °C), Min:97.7 °F (36.5 °C), Max:98.3 °F (36.8 °C)    Temp:  [97.7 °F (36.5 °C)-98.3 °F (36.8 °C)] 97.7 °F (36.5 °C)  HR:  [60-63] 63  Resp:  [16] 16  BP: (118-141)/(66-69) 138/68  SpO2:  [94 %-98 %] 94 %  Body mass index is 23.39 kg/m².     Input and Output Summary (last 24 hours):     Intake/Output Summary (Last 24 hours) at 2024 1038  Last data filed at 2024 0810  Gross per 24 hour   Intake 635 ml   Output --   Net 635 ml       Physical Exam:   Physical Exam  Vitals reviewed.   Constitutional:       General: She is not in acute distress.  HENT:      Head: Normocephalic and atraumatic.   Eyes:      General: No scleral icterus.     Conjunctiva/sclera: Conjunctivae normal.   Cardiovascular:      Rate and Rhythm: Normal rate and regular rhythm.      Heart sounds: No murmur heard.  Pulmonary:      Effort: Pulmonary effort is normal. No respiratory distress.      Breath sounds: Normal breath sounds. No wheezing.   Abdominal:      General: Bowel sounds are normal. There is no distension.      Palpations: Abdomen is soft.      Tenderness: There is no abdominal tenderness.   Musculoskeletal:      Cervical back: Neck supple.      Right lower leg: No edema.      Left lower leg: No edema.   Skin:     General: Skin is warm and dry.   Neurological:      General: No focal deficit present.      Mental Status: She is alert. Mental status is at baseline.   Psychiatric:         Mood and Affect: Mood normal.         Behavior: Behavior normal.          Additional Data:     Labs:  Results from last 7 days   Lab Units 24  0520 24  0596  04/16/24  0450   WBC Thousand/uL 8.14   < > 9.40   HEMOGLOBIN g/dL 14.2   < > 14.3   HEMATOCRIT % 44.5   < > 45.5   PLATELETS Thousands/uL 170   < >  --    BANDS PCT %  --   --  4   SEGS PCT % 48  --   --    LYMPHO PCT % 29  --  36   MONO PCT % 19*  --  14*   EOS PCT % 1  --  2    < > = values in this interval not displayed.     Results from last 7 days   Lab Units 04/19/24  0520 04/17/24  0545 04/16/24  0643   SODIUM mmol/L 138   < > 137   POTASSIUM mmol/L 4.2   < > 4.4   CHLORIDE mmol/L 101   < > 101   CO2 mmol/L 29   < > 28   BUN mg/dL 13   < > 11   CREATININE mg/dL 0.76   < > 0.67   ANION GAP mmol/L 8   < > 8   CALCIUM mg/dL 8.1*   < > 8.2*   ALBUMIN g/dL  --   --  3.1*   TOTAL BILIRUBIN mg/dL  --   --  0.40   ALK PHOS U/L  --   --  40   ALT U/L  --   --  7   AST U/L  --   --  15   GLUCOSE RANDOM mg/dL 95   < > 92    < > = values in this interval not displayed.                 Results from last 7 days   Lab Units 04/15/24  1427 04/15/24  1152   LACTIC ACID mmol/L 1.6 2.6*       Lines/Drains:  Invasive Devices       Peripheral Intravenous Line  Duration             Peripheral IV 04/16/24 Right;Ventral (anterior) Forearm 3 days                          Imaging: Reviewed radiology reports from this admission including: abdominal/pelvic CT    Recent Cultures (last 7 days):   Results from last 7 days   Lab Units 04/16/24  0942   C DIFF TOXIN B BY PCR  Positive*       Last 24 Hours Medication List:   Current Facility-Administered Medications   Medication Dose Route Frequency Provider Last Rate    acetaminophen  650 mg Oral Q6H PRN Dominick Angeles MD      aspirin  81 mg Oral Daily Dominick Anegles MD      busPIRone  7.5 mg Oral BID Dominick Angeles MD      calcium carbonate  500 mg Oral BID PRN Dominick Angeles MD      citalopram  10 mg Oral Daily Dominick Angeles MD      divalproex sodium  250 mg Oral TID Dominick Angeles MD      enoxaparin  40 mg Subcutaneous Daily  Dominick Angeles MD      ergocalciferol  50,000 Units Oral Q30 Days Dominick Angeles MD      flecainide  50 mg Oral Q12H [facility] Dominick Angeles MD      fluticasone  1 spray Nasal Daily Dominick Angeles MD      melatonin  6 mg Oral HS Dominick Angeles MD      ondansetron  4 mg Intravenous Q6H PRN Dominick Angeles MD      polyethylene glycol  17 g Oral Daily PRN Tuesday M LUZ Arango      saccharomyces boulardii  250 mg Oral BID Tuesday M LUZ Arango      vancomycin oral (capsules or solution)  125 mg Oral Q6H [facility] Araceli Larson PA-C          Today, Patient Was Seen By: Jennifer London PA-C    **Please Note: This note may have been constructed using a voice recognition system.**

## 2024-04-19 NOTE — PHYSICAL THERAPY NOTE
Physical Therapy Progress Note     04/19/24 1500   PT Last Visit   PT Visit Date 04/19/24   Note Type   Note Type Treatment   Pain Assessment   Pain Assessment Tool 0-10   Pain Score No Pain   Restrictions/Precautions   Other Precautions Cognitive;Chair Alarm;Bed Alarm;Fall Risk;Contact/isolation  (Alarm active post session.)   Subjective   Subjective The patient notes that she is tired, but she is amenable to work with therapy.   Transfers   Sit to Stand 4  Minimal assistance   Additional items Assist x 1;Increased time required;Verbal cues  (Instruction for hand placement.)   Stand to Sit 4  Minimal assistance   Additional items Assist x 1;Increased time required;Verbal cues   Ambulation/Elevation   Gait pattern Excessively slow;Step to;Short stride;Inconsistent jonah;Decreased foot clearance   Gait Assistance 4  Minimal assist   Additional items Assist x 1;Verbal cues   Assistive Device Rolling walker   Distance 20 feet, 30 feet, 15 feet x 2.   Balance   Static Sitting Good   Dynamic Sitting Fair +   Static Standing Fair   Dynamic Standing Fair -   Ambulatory Fair -   Activity Tolerance   Activity Tolerance Patient tolerated treatment well;Patient limited by fatigue   Assessment   Prognosis Good   Problem List Decreased strength;Decreased endurance;Decreased mobility;Impaired balance;Decreased safety awareness;Impaired judgement;Decreased cognition   Assessment The patient continues to fatigue with short distance ambulation, but she was able to increase her ambulatory trials. She had increased difficulty with manuevering around obstacles as well as performing turns, but she had no overt loss of balance. She remains limited from her baseline, and she will benefit from continued therapies.   Goals   Patient Goals To get some rest.   STG Expiration Date 05/01/24   PT Treatment Day 2   Plan   Treatment/Interventions Functional transfer training;LE strengthening/ROM;Therapeutic exercise;Cognitive  reorientation;Endurance training;Patient/family training;Bed mobility;Gait training   Progress Progressing toward goals   PT Frequency 3-5x/wk   Discharge Recommendation   Rehab Resource Intensity Level, PT II (Moderate Resource Intensity)   Equipment Recommended Walker   Walker Package Recommended Wheeled walker   AM-PAC Basic Mobility Inpatient   Turning in Flat Bed Without Bedrails 3   Lying on Back to Sitting on Edge of Flat Bed Without Bedrails 3   Moving Bed to Chair 3   Standing Up From Chair Using Arms 3   Walk in Room 3   Climb 3-5 Stairs With Railing 2   Basic Mobility Inpatient Raw Score 17   Basic Mobility Standardized Score 39.67   [facility] Highest Level Of Mobility   -HLM Goal 5: Stand one or more mins   -HLM Achieved 7: Walk 25 feet or more         An AM-PAC Basic Mobility raw score less than 16 suggests the patient may benefit from discharge to post-acute rehab services.    Jaquan Noriega, PTA

## 2024-04-19 NOTE — ASSESSMENT & PLAN NOTE
Patient presents with worsening cognition, forgetfulness, generalized weakness, lethargy.  Patient and daughter are worried about worsening memory issues  CT head negative  MRI brain negative   Neurology evaluated.  No evidence for CVA, seizure activity.  CSF studies are benign.  Suspect worsening mental status due to underlying dementia/cognitive decline, underlying psychiatric disorder with recent hx of SDH in December, colitis/diarrhea, and familial losses recently.  No further IP neurologic work up at this time  Geriatrics following  F/u stool studies -- C. Diff positive and likely contributing to AMS   Continue inpatient physical and Occupational Therapy, recommending rehab level 2

## 2024-04-19 NOTE — PLAN OF CARE
Problem: PHYSICAL THERAPY ADULT  Goal: Performs mobility at highest level of function for planned discharge setting.  See evaluation for individualized goals.  Description: Treatment/Interventions: Functional transfer training, Therapeutic exercise, Bed mobility, Gait training, Spoke to nursing, OT  Equipment Recommended: Walker       See flowsheet documentation for full assessment, interventions and recommendations.  Outcome: Progressing  Note: Prognosis: Good  Problem List: Decreased strength, Decreased endurance, Decreased mobility, Impaired balance, Decreased safety awareness, Impaired judgement, Decreased cognition  Assessment: The patient continues to fatigue with short distance ambulation, but she was able to increase her ambulatory trials. She had increased difficulty with manuevering around obstacles as well as performing turns, but she had no overt loss of balance. She remains limited from her baseline, and she will benefit from continued therapies.        Rehab Resource Intensity Level, PT: II (Moderate Resource Intensity)    See flowsheet documentation for full assessment.

## 2024-04-19 NOTE — ASSESSMENT & PLAN NOTE
Monitor blood pressures  Monitor orthostatics -- negative   Amlodipine remains on hold, BP remains acceptable during hospital stay

## 2024-04-19 NOTE — ASSESSMENT & PLAN NOTE
Lab Results   Component Value Date    EGFR 72 04/19/2024    EGFR 81 04/17/2024    EGFR 81 04/16/2024    CREATININE 0.76 04/19/2024    CREATININE 0.67 04/17/2024    CREATININE 0.67 04/16/2024     Monitor kidney function -- currently at baseline   Avoid nephrotoxins

## 2024-04-20 LAB
ANION GAP SERPL CALCULATED.3IONS-SCNC: 17 MMOL/L (ref 4–13)
BASE EX.OXY STD BLDV CALC-SCNC: 88.7 % (ref 60–80)
BASE EXCESS BLDV CALC-SCNC: 1.7 MMOL/L
BUN SERPL-MCNC: 14 MG/DL (ref 5–25)
CALCIUM SERPL-MCNC: 8.5 MG/DL (ref 8.4–10.2)
CHLORIDE SERPL-SCNC: 99 MMOL/L (ref 96–108)
CO2 SERPL-SCNC: 20 MMOL/L (ref 21–32)
CREAT SERPL-MCNC: 0.68 MG/DL (ref 0.6–1.3)
ERYTHROCYTE [DISTWIDTH] IN BLOOD BY AUTOMATED COUNT: 14.4 % (ref 11.6–15.1)
GFR SERPL CREATININE-BSD FRML MDRD: 81 ML/MIN/1.73SQ M
GLUCOSE SERPL-MCNC: 104 MG/DL (ref 65–140)
HCO3 BLDV-SCNC: 25.8 MMOL/L (ref 24–30)
HCT VFR BLD AUTO: 47.1 % (ref 34.8–46.1)
HGB BLD-MCNC: 15 G/DL (ref 11.5–15.4)
LACTATE SERPL-SCNC: 1.8 MMOL/L (ref 0.5–2)
MCH RBC QN AUTO: 28.5 PG (ref 26.8–34.3)
MCHC RBC AUTO-ENTMCNC: 31.8 G/DL (ref 31.4–37.4)
MCV RBC AUTO: 89 FL (ref 82–98)
MRSA NOSE QL CULT: NORMAL
O2 CT BLDV-SCNC: 19.3 ML/DL
PCO2 BLDV: 39 MM HG (ref 42–50)
PH BLDV: 7.44 [PH] (ref 7.3–7.4)
PLATELET # BLD AUTO: 188 THOUSANDS/UL (ref 149–390)
PMV BLD AUTO: 9.3 FL (ref 8.9–12.7)
PO2 BLDV: 60.6 MM HG (ref 35–45)
POTASSIUM SERPL-SCNC: 4 MMOL/L (ref 3.5–5.3)
RBC # BLD AUTO: 5.27 MILLION/UL (ref 3.81–5.12)
SODIUM SERPL-SCNC: 136 MMOL/L (ref 135–147)
WBC # BLD AUTO: 11.13 THOUSAND/UL (ref 4.31–10.16)

## 2024-04-20 PROCEDURE — 82805 BLOOD GASES W/O2 SATURATION: CPT | Performed by: FAMILY MEDICINE

## 2024-04-20 PROCEDURE — 99232 SBSQ HOSP IP/OBS MODERATE 35: CPT | Performed by: FAMILY MEDICINE

## 2024-04-20 PROCEDURE — 83605 ASSAY OF LACTIC ACID: CPT | Performed by: FAMILY MEDICINE

## 2024-04-20 PROCEDURE — 85027 COMPLETE CBC AUTOMATED: CPT | Performed by: PHYSICIAN ASSISTANT

## 2024-04-20 PROCEDURE — 80048 BASIC METABOLIC PNL TOTAL CA: CPT | Performed by: PHYSICIAN ASSISTANT

## 2024-04-20 RX ORDER — DEXTROSE MONOHYDRATE 25 G/50ML
INJECTION, SOLUTION INTRAVENOUS
Status: DISPENSED
Start: 2024-04-20 | End: 2024-04-21

## 2024-04-20 RX ORDER — DEXTROSE AND SODIUM CHLORIDE 5; .45 G/100ML; G/100ML
75 INJECTION, SOLUTION INTRAVENOUS CONTINUOUS
Status: DISCONTINUED | OUTPATIENT
Start: 2024-04-20 | End: 2024-04-21

## 2024-04-20 RX ADMIN — VANCOMYCIN HYDROCHLORIDE 125 MG: 125 CAPSULE ORAL at 16:34

## 2024-04-20 RX ADMIN — DIVALPROEX SODIUM 250 MG: 250 TABLET, DELAYED RELEASE ORAL at 16:35

## 2024-04-20 RX ADMIN — ASPIRIN 81 MG: 81 TABLET, COATED ORAL at 08:29

## 2024-04-20 RX ADMIN — ENOXAPARIN SODIUM 40 MG: 40 INJECTION SUBCUTANEOUS at 08:29

## 2024-04-20 RX ADMIN — BUSPIRONE HYDROCHLORIDE 7.5 MG: 5 TABLET ORAL at 22:16

## 2024-04-20 RX ADMIN — MELATONIN 6 MG: at 22:17

## 2024-04-20 RX ADMIN — CITALOPRAM HYDROBROMIDE 10 MG: 10 TABLET ORAL at 08:29

## 2024-04-20 RX ADMIN — FLECAINIDE ACETATE 50 MG: 50 TABLET ORAL at 08:29

## 2024-04-20 RX ADMIN — Medication 250 MG: at 08:29

## 2024-04-20 RX ADMIN — DIVALPROEX SODIUM 250 MG: 250 TABLET, DELAYED RELEASE ORAL at 22:17

## 2024-04-20 RX ADMIN — DEXTROSE AND SODIUM CHLORIDE 75 ML/HR: 5; .45 INJECTION, SOLUTION INTRAVENOUS at 22:26

## 2024-04-20 RX ADMIN — VANCOMYCIN HYDROCHLORIDE 125 MG: 125 CAPSULE ORAL at 11:02

## 2024-04-20 RX ADMIN — DIVALPROEX SODIUM 250 MG: 250 TABLET, DELAYED RELEASE ORAL at 08:29

## 2024-04-20 RX ADMIN — VANCOMYCIN HYDROCHLORIDE 125 MG: 125 CAPSULE ORAL at 23:37

## 2024-04-20 RX ADMIN — BUSPIRONE HYDROCHLORIDE 7.5 MG: 5 TABLET ORAL at 08:29

## 2024-04-20 RX ADMIN — VANCOMYCIN HYDROCHLORIDE 125 MG: 125 CAPSULE ORAL at 06:10

## 2024-04-20 RX ADMIN — FLUTICASONE PROPIONATE 1 SPRAY: 50 SPRAY, METERED NASAL at 08:28

## 2024-04-20 RX ADMIN — FLECAINIDE ACETATE 50 MG: 50 TABLET ORAL at 22:16

## 2024-04-20 RX ADMIN — Medication 250 MG: at 16:35

## 2024-04-20 NOTE — ASSESSMENT & PLAN NOTE
Lab Results   Component Value Date    EGFR 81 04/20/2024    EGFR 72 04/19/2024    EGFR 81 04/17/2024    CREATININE 0.68 04/20/2024    CREATININE 0.76 04/19/2024    CREATININE 0.67 04/17/2024     Monitor kidney function -- currently at baseline   Avoid nephrotoxins

## 2024-04-20 NOTE — PROGRESS NOTES
[facility]  Progress Note  Name: Ann Cross I  MRN: [id number]  Unit/Bed#: PPHP 731-01 I Date of Admission: 4/15/2024   Date of Service: 4/20/2024 I Hospital Day: 5    Assessment/Plan   Clostridium difficile diarrhea  Assessment & Plan  C. Diff PCR and EIA positive  PO Vanco 125 mg every 6 hours x 10 days   Can f/u with GI outpt   With diarrhea.  But appears to be improving    Diarrhea  Assessment & Plan  Reports occasional episodes of loose stool/diarrhea  Abdominal imaging concerning for sigmoid colon thickening  C diff PCR and EIA positive.  Continue p.o. vancomycin, day 3  D/w her PCP and GI -- given active C diff no plans for IP scope at this time and can be followed up outpt     Stage 3 chronic kidney disease, unspecified whether stage 3a or 3b CKD (HCC)  Assessment & Plan  Lab Results   Component Value Date    EGFR 81 04/20/2024    EGFR 72 04/19/2024    EGFR 81 04/17/2024    CREATININE 0.68 04/20/2024    CREATININE 0.76 04/19/2024    CREATININE 0.67 04/17/2024     Monitor kidney function -- currently at baseline   Avoid nephrotoxins    Tachy-alison syndrome (HCC)  Assessment & Plan  S/p PPM placement    Paroxysmal atrial fibrillation (HCC)  Assessment & Plan  Continue flecainide  Not on anticoagulation    Degeneration of lumbar intervertebral disc  Assessment & Plan  History of degenerative lumbar disc disease  Analgesics PRN   Physical therapy    Hypertension  Assessment & Plan  Monitor blood pressures  Monitor orthostatics -- negative   Amlodipine remains on hold, BP remains acceptable during hospital stay    Hyperlipemia  Assessment & Plan  Not maintained on outpatient medications  Continue PCP follow-up    Minimal cognitive impairment  Assessment & Plan  History of cognitive impairment  Delirium precautions  Reorientation, sleep hygiene  Continue with supportive care     Mood disorder with hypomanic features  Assessment & Plan  Continue buspirone, citalopram,  depakote     * Encephalopathy  Assessment & Plan  Patient presents with worsening cognition, forgetfulness, generalized weakness, lethargy.  Patient and daughter are worried about worsening memory issues  CT head negative  MRI brain negative   Neurology evaluated.  No evidence for CVA, seizure activity.  CSF studies are benign.  Suspect worsening mental status due to underlying dementia/cognitive decline, underlying psychiatric disorder with recent hx of SDH in December, colitis/diarrhea, and familial losses recently.  No further IP neurologic work up at this time  Geriatrics following  F/u stool studies -- C. Diff positive and likely contributing to AMS   Continue inpatient physical and Occupational Therapy, recommending rehab level 2-ambulate to decide regarding the facility.               VTE Pharmacologic Prophylaxis: VTE Score: 6 Moderate Risk (Score 3-4) - Pharmacological DVT Prophylaxis Ordered: enoxaparin (Lovenox).    Mobility:   Basic Mobility Inpatient Raw Score: 18  -[facility] Goal: 6: Walk 10 steps or more  -HLM Achieved: 2: Bed activities/Dependent transfer      Patient Centered Rounds: I performed bedside rounds with nursing staff today.   Discussions with Specialists or other Care Team Provider:     Education and Discussions with Family / Patient: Updated  (daughter) via phone.    Total Time Spent on Date of Encounter in care of patient: 35 mins. This time was spent on one or more of the following: performing physical exam; counseling and coordination of care; obtaining or reviewing history; documenting in the medical record; reviewing/ordering tests, medications or procedures; communicating with other healthcare professionals and discussing with patient's family/caregivers.    Current Length of Stay: 5 day(s)  Current Patient Status: Inpatient   Certification Statement: The patient will continue to require additional inpatient hospital stay due to C. difficile colitis  Discharge Plan:  Anticipate discharge in 24-48 hrs to discharge location to be determined pending rehab evaluations.    Code Status: Level 1 - Full Code    Subjective:   Patient seen and examined.  Patient still with confusion.  Discussed with nursing.  Diarrhea present.  Noted to have elevated anion gap and low bicarb.  Will obtain a VBG and lactic acid.  Discharge the patient on IV fluids with dextrose    Objective:     Vitals:   Temp (24hrs), Av.7 °F (37.1 °C), Min:98.5 °F (36.9 °C), Max:98.8 °F (37.1 °C)    Temp:  [98.5 °F (36.9 °C)-98.8 °F (37.1 °C)] 98.8 °F (37.1 °C)  HR:  [60] 60  Resp:  [16] 16  BP: (116-136)/(67-80) 116/67  SpO2:  [96 %] 96 %  Body mass index is 23.39 kg/m².     Input and Output Summary (last 24 hours):     Intake/Output Summary (Last 24 hours) at 2024 1657  Last data filed at 2024 1501  Gross per 24 hour   Intake 860 ml   Output --   Net 860 ml       Physical Exam:   Physical Exam  Constitutional:       General: She is not in acute distress.     Appearance: She is not ill-appearing.   HENT:      Head: Normocephalic.      Nose: Nose normal.   Eyes:      General: No scleral icterus.  Cardiovascular:      Rate and Rhythm: Normal rate and regular rhythm.   Pulmonary:      Effort: Pulmonary effort is normal. No respiratory distress.   Abdominal:      General: There is no distension.   Musculoskeletal:         General: No swelling. Normal range of motion.   Skin:     General: Skin is warm.   Neurological:      Mental Status: She is alert. Mental status is at baseline.          Additional Data:     Labs:  Results from last 7 days   Lab Units 24  0916 24  0520 24  0545 24  0450   WBC Thousand/uL 11.13* 8.14   < > 9.40   HEMOGLOBIN g/dL 15.0 14.2   < > 14.3   HEMATOCRIT % 47.1* 44.5   < > 45.5   PLATELETS Thousands/uL 188 170   < >  --    BANDS PCT %  --   --   --  4   SEGS PCT %  --  48  --   --    LYMPHO PCT %  --  29  --  36   MONO PCT %  --  19*  --  14*   EOS PCT %  --  1   --  2    < > = values in this interval not displayed.     Results from last 7 days   Lab Units 04/20/24  0916 04/17/24  0545 04/16/24  0643   SODIUM mmol/L 136   < > 137   POTASSIUM mmol/L 4.0   < > 4.4   CHLORIDE mmol/L 99   < > 101   CO2 mmol/L 20*   < > 28   BUN mg/dL 14   < > 11   CREATININE mg/dL 0.68   < > 0.67   ANION GAP mmol/L 17*   < > 8   CALCIUM mg/dL 8.5   < > 8.2*   ALBUMIN g/dL  --   --  3.1*   TOTAL BILIRUBIN mg/dL  --   --  0.40   ALK PHOS U/L  --   --  40   ALT U/L  --   --  7   AST U/L  --   --  15   GLUCOSE RANDOM mg/dL 104   < > 92    < > = values in this interval not displayed.                 Results from last 7 days   Lab Units 04/15/24  1427 04/15/24  1152   LACTIC ACID mmol/L 1.6 2.6*       Lines/Drains:  Invasive Devices       Peripheral Intravenous Line  Duration             Peripheral IV 04/20/24 Left Antecubital <1 day                          Imaging: No pertinent imaging reviewed.    Recent Cultures (last 7 days):   Results from last 7 days   Lab Units 04/16/24  0942   C DIFF TOXIN B BY PCR  Positive*       Last 24 Hours Medication List:   Current Facility-Administered Medications   Medication Dose Route Frequency Provider Last Rate    acetaminophen  650 mg Oral Q6H PRN Dominick Angeles MD      aspirin  81 mg Oral Daily Dominick Angeles MD      busPIRone  7.5 mg Oral BID Dominick Angeles MD      calcium carbonate  500 mg Oral BID PRN Dominick Angeles MD      citalopram  10 mg Oral Daily Dominick Angeles MD      divalproex sodium  250 mg Oral TID Dominick Angeles MD      enoxaparin  40 mg Subcutaneous Daily Dominick Angeles MD      ergocalciferol  50,000 Units Oral Q30 Days Dominick Angeles MD      flecainide  50 mg Oral Q12H [facility] Dominick Angeles MD      fluticasone  1 spray Nasal Daily Dominick Angeles MD      melatonin  6 mg Oral HS Dominick Angeles MD      ondansetron  4 mg Intravenous Q6H PRN Dominick ESPINO  MD Bridgette      polyethylene glycol  17 g Oral Daily PRN Tuesday M LUZ Arango      saccharomyces boulardii  250 mg Oral BID Tuesday M LUZ Arango      vancomycin oral (capsules or solution)  125 mg Oral Q6H LILI Larson PA-C          Today, Patient Was Seen By: Suyapa Bush MD    **Please Note: This note may have been constructed using a voice recognition system.**

## 2024-04-20 NOTE — ASSESSMENT & PLAN NOTE
Patient presents with worsening cognition, forgetfulness, generalized weakness, lethargy.  Patient and daughter are worried about worsening memory issues  CT head negative  MRI brain negative   Neurology evaluated.  No evidence for CVA, seizure activity.  CSF studies are benign.  Suspect worsening mental status due to underlying dementia/cognitive decline, underlying psychiatric disorder with recent hx of SDH in December, colitis/diarrhea, and familial losses recently.  No further IP neurologic work up at this time  Geriatrics following  F/u stool studies -- C. Diff positive and likely contributing to AMS   Continue inpatient physical and Occupational Therapy, recommending rehab level 2-ambulate to decide regarding the facility.

## 2024-04-20 NOTE — ASSESSMENT & PLAN NOTE
C. Diff PCR and EIA positive  PO Vanco 125 mg every 6 hours x 10 days   Can f/u with GI outpt   With diarrhea.  But appears to be improving

## 2024-04-20 NOTE — ASSESSMENT & PLAN NOTE
History of cognitive impairment  Delirium precautions  Reorientation, sleep hygiene  Continue with supportive care

## 2024-04-21 LAB
ALBUMIN SERPL BCP-MCNC: 2.9 G/DL (ref 3.5–5)
ALP SERPL-CCNC: 37 U/L (ref 34–104)
ALT SERPL W P-5'-P-CCNC: 9 U/L (ref 7–52)
ANION GAP SERPL CALCULATED.3IONS-SCNC: 8 MMOL/L (ref 4–13)
ANION GAP SERPL CALCULATED.3IONS-SCNC: 9 MMOL/L (ref 4–13)
AST SERPL W P-5'-P-CCNC: 12 U/L (ref 13–39)
BILIRUB SERPL-MCNC: 0.28 MG/DL (ref 0.2–1)
BUN SERPL-MCNC: 12 MG/DL (ref 5–25)
BUN SERPL-MCNC: 14 MG/DL (ref 5–25)
CALCIUM ALBUM COR SERPL-MCNC: 9 MG/DL (ref 8.3–10.1)
CALCIUM SERPL-MCNC: 8.1 MG/DL (ref 8.4–10.2)
CALCIUM SERPL-MCNC: 8.5 MG/DL (ref 8.4–10.2)
CHLORIDE SERPL-SCNC: 102 MMOL/L (ref 96–108)
CHLORIDE SERPL-SCNC: 97 MMOL/L (ref 96–108)
CO2 SERPL-SCNC: 29 MMOL/L (ref 21–32)
CO2 SERPL-SCNC: 30 MMOL/L (ref 21–32)
CREAT SERPL-MCNC: 0.72 MG/DL (ref 0.6–1.3)
CREAT SERPL-MCNC: 0.8 MG/DL (ref 0.6–1.3)
ERYTHROCYTE [DISTWIDTH] IN BLOOD BY AUTOMATED COUNT: 14.4 % (ref 11.6–15.1)
GFR SERPL CREATININE-BSD FRML MDRD: 68 ML/MIN/1.73SQ M
GFR SERPL CREATININE-BSD FRML MDRD: 77 ML/MIN/1.73SQ M
GLUCOSE SERPL-MCNC: 119 MG/DL (ref 65–140)
GLUCOSE SERPL-MCNC: 95 MG/DL (ref 65–140)
HCT VFR BLD AUTO: 43.8 % (ref 34.8–46.1)
HGB BLD-MCNC: 14.1 G/DL (ref 11.5–15.4)
MCH RBC QN AUTO: 28.8 PG (ref 26.8–34.3)
MCHC RBC AUTO-ENTMCNC: 32.2 G/DL (ref 31.4–37.4)
MCV RBC AUTO: 90 FL (ref 82–98)
PLATELET # BLD AUTO: 164 THOUSANDS/UL (ref 149–390)
PMV BLD AUTO: 9.2 FL (ref 8.9–12.7)
POTASSIUM SERPL-SCNC: 4.1 MMOL/L (ref 3.5–5.3)
POTASSIUM SERPL-SCNC: 4.1 MMOL/L (ref 3.5–5.3)
PROT SERPL-MCNC: 5.4 G/DL (ref 6.4–8.4)
RBC # BLD AUTO: 4.89 MILLION/UL (ref 3.81–5.12)
SODIUM SERPL-SCNC: 135 MMOL/L (ref 135–147)
SODIUM SERPL-SCNC: 140 MMOL/L (ref 135–147)
VIT B6 SERPL-MCNC: 3.7 UG/L (ref 3.4–65.2)
WBC # BLD AUTO: 8.32 THOUSAND/UL (ref 4.31–10.16)

## 2024-04-21 PROCEDURE — 80048 BASIC METABOLIC PNL TOTAL CA: CPT | Performed by: FAMILY MEDICINE

## 2024-04-21 PROCEDURE — 85027 COMPLETE CBC AUTOMATED: CPT | Performed by: FAMILY MEDICINE

## 2024-04-21 PROCEDURE — 99232 SBSQ HOSP IP/OBS MODERATE 35: CPT | Performed by: FAMILY MEDICINE

## 2024-04-21 PROCEDURE — 80053 COMPREHEN METABOLIC PANEL: CPT | Performed by: FAMILY MEDICINE

## 2024-04-21 RX ADMIN — Medication 250 MG: at 08:04

## 2024-04-21 RX ADMIN — MELATONIN 6 MG: at 22:13

## 2024-04-21 RX ADMIN — ENOXAPARIN SODIUM 40 MG: 40 INJECTION SUBCUTANEOUS at 08:04

## 2024-04-21 RX ADMIN — DIVALPROEX SODIUM 250 MG: 250 TABLET, DELAYED RELEASE ORAL at 16:16

## 2024-04-21 RX ADMIN — DIVALPROEX SODIUM 250 MG: 250 TABLET, DELAYED RELEASE ORAL at 08:04

## 2024-04-21 RX ADMIN — VANCOMYCIN HYDROCHLORIDE 125 MG: 125 CAPSULE ORAL at 05:31

## 2024-04-21 RX ADMIN — CITALOPRAM HYDROBROMIDE 10 MG: 10 TABLET ORAL at 08:04

## 2024-04-21 RX ADMIN — VANCOMYCIN HYDROCHLORIDE 125 MG: 125 CAPSULE ORAL at 16:16

## 2024-04-21 RX ADMIN — BUSPIRONE HYDROCHLORIDE 7.5 MG: 5 TABLET ORAL at 22:12

## 2024-04-21 RX ADMIN — VANCOMYCIN HYDROCHLORIDE 125 MG: 125 CAPSULE ORAL at 11:05

## 2024-04-21 RX ADMIN — ASPIRIN 81 MG: 81 TABLET, COATED ORAL at 08:04

## 2024-04-21 RX ADMIN — DIVALPROEX SODIUM 250 MG: 250 TABLET, DELAYED RELEASE ORAL at 22:12

## 2024-04-21 RX ADMIN — VANCOMYCIN HYDROCHLORIDE 125 MG: 125 CAPSULE ORAL at 23:11

## 2024-04-21 RX ADMIN — FLECAINIDE ACETATE 50 MG: 50 TABLET ORAL at 22:13

## 2024-04-21 RX ADMIN — FLECAINIDE ACETATE 50 MG: 50 TABLET ORAL at 08:05

## 2024-04-21 RX ADMIN — FLUTICASONE PROPIONATE 1 SPRAY: 50 SPRAY, METERED NASAL at 08:04

## 2024-04-21 RX ADMIN — Medication 250 MG: at 16:15

## 2024-04-21 RX ADMIN — BUSPIRONE HYDROCHLORIDE 7.5 MG: 5 TABLET ORAL at 08:04

## 2024-04-21 NOTE — ASSESSMENT & PLAN NOTE
C. Diff PCR and EIA positive  PO Vanco 125 mg every 6 hours x 10 days   Can f/u with GI outpt   Nursing reported diarrhea appears to be improving.  Monitor

## 2024-04-21 NOTE — ASSESSMENT & PLAN NOTE
Patient presents with worsening cognition, forgetfulness, generalized weakness, lethargy.  Patient and daughter are worried about worsening memory issues  CT head negative  MRI brain negative   Neurology evaluated.  No evidence for CVA, seizure activity.  CSF studies are benign.  Suspect worsening mental status due to underlying dementia/cognitive decline, underlying psychiatric disorder with recent hx of SDH in December, colitis/diarrhea, and familial losses recently.  No further IP neurologic work up at this time  Geriatrics following  F/u stool studies -- C. Diff positive and likely contributing to AMS   Continue inpatient physical and Occupational Therapy-discharge planning back to [facility] versus higher level of care

## 2024-04-21 NOTE — PLAN OF CARE
Problem: Potential for Falls  Goal: Patient will remain free of falls  Description: INTERVENTIONS:  - Educate patient/family on patient safety including physical limitations  - Instruct patient to call for assistance with activity   - Consult OT/PT to assist with strengthening/mobility   - Keep Call bell within reach  - Keep bed low and locked with side rails adjusted as appropriate  - Keep care items and personal belongings within reach  - Initiate and maintain comfort rounds  - Make Fall Risk Sign visible to staff  - Offer Toileting every 2 Hours, in advance of need  - Initiate/Maintain bed alarm  - Apply yellow socks and bracelet for high fall risk patients  - Consider moving patient to room near nurses station  Outcome: Progressing     Problem: PAIN - ADULT  Goal: Verbalizes/displays adequate comfort level or baseline comfort level  Description: Interventions:  - Encourage patient to monitor pain and request assistance  - Assess pain using appropriate pain scale  - Administer analgesics based on type and severity of pain and evaluate response  - Implement non-pharmacological measures as appropriate and evaluate response  - Consider cultural and social influences on pain and pain management  - Notify physician/advanced practitioner if interventions unsuccessful or patient reports new pain  Outcome: Progressing     Problem: SAFETY ADULT  Goal: Patient will remain free of falls  Description: INTERVENTIONS:  - Educate patient/family on patient safety including physical limitations  - Instruct patient to call for assistance with activity   - Consult OT/PT to assist with strengthening/mobility   - Keep Call bell within reach  - Keep bed low and locked with side rails adjusted as appropriate  - Keep care items and personal belongings within reach  - Initiate and maintain comfort rounds  - Make Fall Risk Sign visible to staff  - Offer Toileting every 2 Hours, in advance of need  - Initiate/Maintain bed alarm  - Apply  yellow socks and bracelet for high fall risk patients  - Consider moving patient to room near nurses station  Outcome: Progressing     Problem: DISCHARGE PLANNING  Goal: Discharge to home or other facility with appropriate resources  Description: INTERVENTIONS:  - Identify barriers to discharge w/patient and caregiver  - Arrange for needed discharge resources and transportation as appropriate  - Identify discharge learning needs (meds, wound care, etc.)  - Arrange for interpretive services to assist at discharge as needed  - Refer to Case Management Department for coordinating discharge planning if the patient needs post-hospital services based on physician/advanced practitioner order or complex needs related to functional status, cognitive ability, or social support system  Outcome: Progressing

## 2024-04-21 NOTE — PLAN OF CARE
Problem: Prexisting or High Potential for Compromised Skin Integrity  Goal: Skin integrity is maintained or improved  Description: INTERVENTIONS:  - Identify patients at risk for skin breakdown  - Assess and monitor skin integrity  - Assess and monitor nutrition and hydration status  - Monitor labs   - Assess for incontinence   - Turn and reposition patient  - Assist with mobility/ambulation  - Relieve pressure over bony prominences  - Avoid friction and shearing  - Provide appropriate hygiene as needed including keeping skin clean and dry  - Evaluate need for skin moisturizer/barrier cream  - Collaborate with interdisciplinary team   - Patient/family teaching  - Consider wound care consult   Outcome: Progressing     Problem: SAFETY ADULT  Goal: Patient will remain free of falls  Description: INTERVENTIONS:  - Educate patient/family on patient safety including physical limitations  - Instruct patient to call for assistance with activity   - Consult OT/PT to assist with strengthening/mobility   - Keep Call bell within reach  - Keep bed low and locked with side rails adjusted as appropriate  - Keep care items and personal belongings within reach  - Initiate and maintain comfort rounds  - Make Fall Risk Sign visible to staff  - Offer Toileting every 2 Hours, in advance of need  - Initiate/Maintain alarm  - Obtain necessary fall risk management equipment:   - Apply yellow socks and bracelet for high fall risk patients  - Consider moving patient to room near nurses station  Outcome: Progressing  Goal: Maintain or return to baseline ADL function  Description: INTERVENTIONS:  -  Assess patient's ability to carry out ADLs; assess patient's baseline for ADL function and identify physical deficits which impact ability to perform ADLs (bathing, care of mouth/teeth, toileting, grooming, dressing, etc.)  - Assess/evaluate cause of self-care deficits   - Assess range of motion  - Assess patient's mobility; develop plan if  impaired  - Assess patient's need for assistive devices and provide as appropriate  - Encourage maximum independence but intervene and supervise when necessary  - Involve family in performance of ADLs  - Assess for home care needs following discharge   - Consider OT consult to assist with ADL evaluation and planning for discharge  - Provide patient education as appropriate  Outcome: Progressing  Goal: Maintains/Returns to pre admission functional level  Description: INTERVENTIONS:  - Perform AM-PAC 6 Click Basic Mobility/ Daily Activity assessment daily.  - Set and communicate daily mobility goal to care team and patient/family/caregiver.   - Collaborate with rehabilitation services on mobility goals if consulted  - Perform Ra  - Record patient progress and toleration of activity level   Outcome: Progressing

## 2024-04-21 NOTE — ASSESSMENT & PLAN NOTE
Lab Results   Component Value Date    EGFR 68 04/21/2024    EGFR 81 04/20/2024    EGFR 72 04/19/2024    CREATININE 0.80 04/21/2024    CREATININE 0.68 04/20/2024    CREATININE 0.76 04/19/2024     Monitor kidney function -- currently at baseline   Avoid nephrotoxins

## 2024-04-21 NOTE — PROGRESS NOTES
[facility]  Progress Note  Name: Ann Cross I  MRN: [id number]  Unit/Bed#: [facility] 731-01 I Date of Admission: 4/15/2024   Date of Service: 4/21/2024 I Hospital Day: 6    Assessment/Plan   * Encephalopathy  Assessment & Plan  Patient presents with worsening cognition, forgetfulness, generalized weakness, lethargy.  Patient and daughter are worried about worsening memory issues  CT head negative  MRI brain negative   Neurology evaluated.  No evidence for CVA, seizure activity.  CSF studies are benign.  Suspect worsening mental status due to underlying dementia/cognitive decline, underlying psychiatric disorder with recent hx of SDH in December, colitis/diarrhea, and familial losses recently.  No further IP neurologic work up at this time  Geriatrics following  F/u stool studies -- C. Diff positive and likely contributing to AMS   Continue inpatient physical and Occupational Therapy-discharge planning back to [facility] higher level of care    Clostridium difficile diarrhea  Assessment & Plan  C. Diff PCR and EIA positive  PO Vanco 125 mg every 6 hours x 10 days   Can f/u with GI outpt   Nursing reported diarrhea appears to be improving.  Monitor    Diarrhea  Assessment & Plan  Reports occasional episodes of loose stool/diarrhea  Abdominal imaging concerning for sigmoid colon thickening  C diff PCR and EIA positive.  Continue p.o. vancomycin, day 5  D/w her PCP and GI -- given active C diff no plans for IP scope at this time and can be followed up outpt   Diarrhea improving.    Stage 3 chronic kidney disease, unspecified whether stage 3a or 3b CKD ([facility])  Assessment & Plan  Lab Results   Component Value Date    EGFR 68 04/21/2024    EGFR 81 04/20/2024    EGFR 72 04/19/2024    CREATININE 0.80 04/21/2024    CREATININE 0.68 04/20/2024    CREATININE 0.76 04/19/2024     Monitor kidney function -- currently at baseline   Avoid nephrotoxins    Tachy-alison syndrome  (HCC)  Assessment & Plan  S/p PPM placement    Paroxysmal atrial fibrillation (HCC)  Assessment & Plan  Continue flecainide  Not on anticoagulation    Degeneration of lumbar intervertebral disc  Assessment & Plan  History of degenerative lumbar disc disease  Analgesics PRN   Physical therapy    Hypertension  Assessment & Plan  Monitor blood pressures  Monitor orthostatics -- negative   Amlodipine remains on hold, BP remains acceptable during hospital stay    Hyperlipemia  Assessment & Plan  Not maintained on outpatient medications  Continue PCP follow-up    Minimal cognitive impairment  Assessment & Plan  History of cognitive impairment  Delirium precautions  Reorientation, sleep hygiene  Continue with supportive care     Mood disorder with hypomanic features  Assessment & Plan  Continue buspirone, citalopram, depakote                VTE Pharmacologic Prophylaxis: VTE Score: 6 Moderate Risk (Score 3-4) - Pharmacological DVT Prophylaxis Ordered: enoxaparin (Lovenox).    Mobility:   Basic Mobility Inpatient Raw Score: 18  JH-HLM Goal: 6: Walk 10 steps or more  JH-HLM Achieved: 6: Walk 10 steps or more      Patient Centered Rounds: I performed bedside rounds with nursing staff today.   Discussions with Specialists or Other Care Team Provider:     Education and Discussions with Family / Patient: Updated  (daughter) via phone.    Total Time Spent on Date of Encounter in care of patient: 35 mins. This time was spent on one or more of the following: performing physical exam; counseling and coordination of care; obtaining or reviewing history; documenting in the medical record; reviewing/ordering tests, medications or procedures; communicating with other healthcare professionals and discussing with patient's family/caregivers.    Current Length of Stay: 6 day(s)  Current Patient Status: Inpatient   Certification Statement: The patient will continue to require additional inpatient hospital stay due to pending  safe discharge plan  Discharge Plan: Anticipate discharge tomorrow to assisted living facility versus rehab     Code Status: Level 1 - Full Code    Subjective:   Patient seen and examined.  Denies any specific complaints.  Discussed with nursing.  Appears that the diarrhea is improving.  Patient is eating  well  Patient denies any specific complaints      Objective:     Vitals:   Temp (24hrs), Av °F (36.7 °C), Min:97.9 °F (36.6 °C), Max:98.1 °F (36.7 °C)    Temp:  [97.9 °F (36.6 °C)-98.1 °F (36.7 °C)] 97.9 °F (36.6 °C)  HR:  [61-64] 61  Resp:  [16-17] 16  BP: (128-168)/(56-81) 128/56  SpO2:  [94 %-95 %] 94 %  Body mass index is 23.39 kg/m².     Input and Output Summary (last 24 hours):     Intake/Output Summary (Last 24 hours) at 2024 1630  Last data filed at 2024 1601  Gross per 24 hour   Intake 600 ml   Output 1300 ml   Net -700 ml       Physical Exam:   Physical Exam  Constitutional:       General: She is not in acute distress.  HENT:      Head: Normocephalic and atraumatic.      Nose: Nose normal.   Eyes:      Conjunctiva/sclera: Conjunctivae normal.   Cardiovascular:      Rate and Rhythm: Normal rate and regular rhythm.      Pulses: Normal pulses.   Pulmonary:      Comments: Decreased breath sounds bilateral  Abdominal:      General: Bowel sounds are normal.   Musculoskeletal:         General: Normal range of motion.   Skin:     General: Skin is warm.   Neurological:      Mental Status: She is alert. Mental status is at baseline.          Additional Data:     Labs:  Results from last 7 days   Lab Units 24  0516 24  0916 24  0520 24  0545 24  0450   WBC Thousand/uL 8.32   < > 8.14   < > 9.40   HEMOGLOBIN g/dL 14.1   < > 14.2   < > 14.3   HEMATOCRIT % 43.8   < > 44.5   < > 45.5   PLATELETS Thousands/uL 164   < > 170   < >  --    BANDS PCT %  --   --   --   --  4   SEGS PCT %  --   --  48  --   --    LYMPHO PCT %  --   --  29  --  36   MONO PCT %  --   --  19*  --  14*    EOS PCT %  --   --  1  --  2    < > = values in this interval not displayed.     Results from last 7 days   Lab Units 04/21/24  0516   SODIUM mmol/L 140   POTASSIUM mmol/L 4.1   CHLORIDE mmol/L 102   CO2 mmol/L 30   BUN mg/dL 12   CREATININE mg/dL 0.80   ANION GAP mmol/L 8   CALCIUM mg/dL 8.1*   ALBUMIN g/dL 2.9*   TOTAL BILIRUBIN mg/dL 0.28   ALK PHOS U/L 37   ALT U/L 9   AST U/L 12*   GLUCOSE RANDOM mg/dL 119                 Results from last 7 days   Lab Units 04/20/24  2032 04/15/24  1427 04/15/24  1152   LACTIC ACID mmol/L 1.8 1.6 2.6*       Lines/Drains:  Invasive Devices       Peripheral Intravenous Line  Duration             Peripheral IV 04/20/24 Left Antecubital 1 day              Drain  Duration             External Urinary Catheter <1 day                          Imaging: No new images to review    Recent Cultures (last 7 days):   Results from last 7 days   Lab Units 04/16/24  0942   C DIFF TOXIN B BY PCR  Positive*       Last 24 Hours Medication List:   Current Facility-Administered Medications   Medication Dose Route Frequency Provider Last Rate    acetaminophen  650 mg Oral Q6H PRN Dominick Angeles MD      aspirin  81 mg Oral Daily Dominick Angeles MD      busPIRone  7.5 mg Oral BID Dominick Angeles MD      calcium carbonate  500 mg Oral BID PRN Dominick Angeles MD      citalopram  10 mg Oral Daily Dominick Angeles MD      divalproex sodium  250 mg Oral TID Dominick Angeles MD      enoxaparin  40 mg Subcutaneous Daily Dominick Angeles MD      ergocalciferol  50,000 Units Oral Q30 Days Dominick Angeles MD      flecainide  50 mg Oral Q12H LILI Dominick Angeles MD      fluticasone  1 spray Nasal Daily Dominick Angeles MD      melatonin  6 mg Oral HS Dominick Angeles MD      ondansetron  4 mg Intravenous Q6H PRN Dominick Angeles MD      polyethylene glycol  17 g Oral Daily PRN LUZ Underwood      saccharomyces  boulardii  250 mg Oral BID Tuesday LUZ Vega      vancomycin oral (capsules or solution)  125 mg Oral Q6H [facility] Araceli Larson PA-C          Today, Patient Was Seen By: Suyapa Bush MD    **Please Note: This note may have been constructed using a voice recognition system.**

## 2024-04-21 NOTE — ASSESSMENT & PLAN NOTE
Reports occasional episodes of loose stool/diarrhea  Abdominal imaging concerning for sigmoid colon thickening  C diff PCR and EIA positive.  Continue p.o. vancomycin, day 5  D/w her PCP and GI -- given active C diff no plans for IP scope at this time and can be followed up outpt   Diarrhea improving.

## 2024-04-22 VITALS
WEIGHT: 136.24 LBS | RESPIRATION RATE: 16 BRPM | HEART RATE: 67 BPM | DIASTOLIC BLOOD PRESSURE: 65 MMHG | TEMPERATURE: 98.2 F | OXYGEN SATURATION: 98 % | BODY MASS INDEX: 23.26 KG/M2 | SYSTOLIC BLOOD PRESSURE: 132 MMHG | HEIGHT: 64 IN

## 2024-04-22 LAB — CALPROTECTIN STL-MCNT: 1540 UG/G (ref 0–120)

## 2024-04-22 PROCEDURE — 97535 SELF CARE MNGMENT TRAINING: CPT

## 2024-04-22 PROCEDURE — 99239 HOSP IP/OBS DSCHRG MGMT >30: CPT | Performed by: FAMILY MEDICINE

## 2024-04-22 RX ORDER — SACCHAROMYCES BOULARDII 250 MG
250 CAPSULE ORAL 2 TIMES DAILY
Qty: 20 CAPSULE | Refills: 0 | Status: SHIPPED | OUTPATIENT
Start: 2024-04-22 | End: 2024-05-02

## 2024-04-22 RX ORDER — VANCOMYCIN HYDROCHLORIDE 125 MG/1
125 CAPSULE ORAL EVERY 6 HOURS SCHEDULED
Qty: 19 CAPSULE | Refills: 0 | Status: SHIPPED | OUTPATIENT
Start: 2024-04-22 | End: 2024-04-27

## 2024-04-22 RX ADMIN — VANCOMYCIN HYDROCHLORIDE 125 MG: 125 CAPSULE ORAL at 05:31

## 2024-04-22 RX ADMIN — VANCOMYCIN HYDROCHLORIDE 125 MG: 125 CAPSULE ORAL at 17:32

## 2024-04-22 RX ADMIN — DIVALPROEX SODIUM 250 MG: 250 TABLET, DELAYED RELEASE ORAL at 15:44

## 2024-04-22 RX ADMIN — ASPIRIN 81 MG: 81 TABLET, COATED ORAL at 08:35

## 2024-04-22 RX ADMIN — Medication 250 MG: at 17:31

## 2024-04-22 RX ADMIN — BUSPIRONE HYDROCHLORIDE 7.5 MG: 5 TABLET ORAL at 08:35

## 2024-04-22 RX ADMIN — DIVALPROEX SODIUM 250 MG: 250 TABLET, DELAYED RELEASE ORAL at 08:35

## 2024-04-22 RX ADMIN — CITALOPRAM HYDROBROMIDE 10 MG: 10 TABLET ORAL at 08:35

## 2024-04-22 RX ADMIN — Medication 250 MG: at 08:35

## 2024-04-22 RX ADMIN — FLECAINIDE ACETATE 50 MG: 50 TABLET ORAL at 08:37

## 2024-04-22 RX ADMIN — ENOXAPARIN SODIUM 40 MG: 40 INJECTION SUBCUTANEOUS at 08:35

## 2024-04-22 RX ADMIN — FLUTICASONE PROPIONATE 1 SPRAY: 50 SPRAY, METERED NASAL at 08:37

## 2024-04-22 RX ADMIN — VANCOMYCIN HYDROCHLORIDE 125 MG: 125 CAPSULE ORAL at 11:31

## 2024-04-22 NOTE — PLAN OF CARE
Problem: OCCUPATIONAL THERAPY ADULT  Goal: Performs self-care activities at highest level of function for planned discharge setting.  See evaluation for individualized goals.  Description:            See flowsheet documentation for full assessment, interventions and recommendations.   Note: Limitation: Decreased ADL status, Decreased Safe judgement during ADL, Decreased cognition, Decreased endurance, Decreased self-care trans, Decreased high-level ADLs  Prognosis: Fair  Assessment: Patient participated in Skilled OT session this date with interventions consisting of self care tasks at EOB, functional transfers/mobility . Patient agreeable to OT treatment session, upon arrival patient was found supine in bed.  In comparison to previous session, patient with improvements in  . Patient requiring frequent re direction, verbal cues for safety, verbal cues for correct technique, and verbal cues for pacing thru activity steps. Patient continues to be functioning below baseline level, occupational performance remains limited secondary to factors listed above and increased risk for falls and injury.   From OT standpoint, recommendation at time of d/c would be mod level II.  The patient's raw score on the AM-PAC Daily Activity Inpatient Short Form is 15. A raw score of less than 19 suggests the patient may benefit from discharge to post-acute rehabilitation services. Please refer to the recommendation of the Occupational Therapist for safe discharge planning.  Patient to benefit from continued Occupational Therapy treatment while in the hospital to address deficits as defined above and maximize level of functional independence with ADLs and functional mobility.     Rehab Resource Intensity Level, OT: II (Moderate Resource Intensity)

## 2024-04-22 NOTE — PLAN OF CARE
Problem: Potential for Falls  Goal: Patient will remain free of falls  Description: INTERVENTIONS:  - Educate patient/family on patient safety including physical limitations  - Instruct patient to call for assistance with activity   - Consult OT/PT to assist with strengthening/mobility   - Keep Call bell within reach  - Keep bed low and locked with side rails adjusted as appropriate  - Keep care items and personal belongings within reach  - Initiate and maintain comfort rounds  - Make Fall Risk Sign visible to staff  - Offer Toileting every 2 Hours, in advance of need  - Initiate/Maintain bed alarm  - Apply yellow socks and bracelet for high fall risk patients  - Consider moving patient to room near nurses station  Outcome: Progressing     Problem: PAIN - ADULT  Goal: Verbalizes/displays adequate comfort level or baseline comfort level  Description: Interventions:  - Encourage patient to monitor pain and request assistance  - Assess pain using appropriate pain scale  - Administer analgesics based on type and severity of pain and evaluate response  - Implement non-pharmacological measures as appropriate and evaluate response  - Consider cultural and social influences on pain and pain management  - Notify physician/advanced practitioner if interventions unsuccessful or patient reports new pain  Outcome: Progressing     Problem: INFECTION - ADULT  Goal: Absence or prevention of progression during hospitalization  Description: INTERVENTIONS:  - Assess and monitor for signs and symptoms of infection  - Monitor lab/diagnostic results  - Monitor all insertion sites, i.e. indwelling lines, tubes, and drains  - Monitor endotracheal if appropriate and nasal secretions for changes in amount and color  - [place] appropriate cooling/warming therapies per order  - Administer medications as ordered  - Instruct and encourage patient and family to use good hand hygiene technique  - Identify and instruct in appropriate isolation  precautions for identified infection/condition  Outcome: Progressing     Problem: SAFETY ADULT  Goal: Patient will remain free of falls  Description: INTERVENTIONS:  - Educate patient/family on patient safety including physical limitations  - Instruct patient to call for assistance with activity   - Consult OT/PT to assist with strengthening/mobility   - Keep Call bell within reach  - Keep bed low and locked with side rails adjusted as appropriate  - Keep care items and personal belongings within reach  - Initiate and maintain comfort rounds  - Make Fall Risk Sign visible to staff  - Offer Toileting every 2 Hours, in advance of need  - Initiate/Maintain bed alarm  - Apply yellow socks and bracelet for high fall risk patients  - Consider moving patient to room near nurses station  Outcome: Progressing     Problem: Knowledge Deficit  Goal: Patient/family/caregiver demonstrates understanding of disease process, treatment plan, medications, and discharge instructions  Description: Complete learning assessment and assess knowledge base.  Interventions:  - Provide teaching at level of understanding  - Provide teaching via preferred learning methods  Outcome: Progressing

## 2024-04-22 NOTE — INCIDENTAL FINDINGS
The following findings require follow up:  Radiographic finding   Finding: Segmental thickening of the sigmoid colon with adjacent mesenteric lymphadenopathy of uncertain etiology. Segmental inflammatory or infectious colitis is possible though there is no surrounding stranding. Malignancy is also possible though a discrete, measurable mass is not identified. Clinical correlation and follow-up colonoscopy recommended -likely due to C. difficile colitis   Follow up required: CT versus eventual colonoscopy if patient and family willing to pursue   Follow up should be done within 2 month(s)    Please notify the following clinician to assist with the follow up:   Dr. Kathleen flynn

## 2024-04-22 NOTE — CASE MANAGEMENT
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    Encephalopathy 2023    Acute pain due to trauma 2023    Fall 2023    Sinus pause 2023    Thrombocytopenia ([facility]) 2023    Acute metabolic encephalopathy 2023    COVID-19 2023    Diarrhea 2023    Stage 3 chronic kidney disease, unspecified whether stage 3a or 3b CKD ([facility]) 2022    Tachy-alison syndrome ([facility]) 2022    Hyperlipemia 2022    Hypertension 2022    Generalized anxiety disorder 2021    Obstructive sleep apnea syndrome 2020    Degeneration of lumbar intervertebral disc 2017    History of total bilateral knee replacement 2017    Osteoporosis 05/10/2017    Minimal cognitive impairment 2016    Pain, joint, knee, right 2016     Mood disorder with hypomanic features 2016    Paroxysmal atrial fibrillation (HCC) 2014    Metabolic syndrome X 2007    Anxiety state 2007    Migraine 2007    Peripheral venous insufficiency 2007      LOS (days): 7  Geometric Mean LOS (GMLOS) (days): 2.4  Days to GMLOS:-4.5     OBJECTIVE:  Risk of Unplanned Readmission Score: 18.59    Current admission status: Inpatient   Preferred Pharmacy:   [facility] [place] - [address] [place] [address]  [address] [facility] [address]  Phone: [phone] Fax: [phone]    Primary Care Provider: Emelina Swann MD    Primary Insurance: [employer] [facility]  Secondary Insurance:     DISCHARGE DETAILS:    Discharge planning discussed with::  Zander Gautam via phone  Freedom of Choice: Yes  Comments - Freedom of Choice: Patient to return to AM  CM contacted family/caregiver?: Yes  Were Treatment Team discharge recommendations reviewed with patient/caregiver?: Yes  Did patient/caregiver verbalize understanding of patient care needs?: Yes  Were patient/caregiver advised of the risks associated with not following Treatment Team discharge recommendations?: Yes         Requested Home Health Care         Is the patient interested in HHC at discharge?: No    DME Referral Provided  Referral made for DME?: No         Would you like to participate in our [facility] service program?  : No - Declined       Discharge Destination Plan:: Facility Return  Transport at Discharge : Wheelchair van   ETA of Transport (Date): 04/22/24      Accompanied by: Alone   Additional Comments: CM spoke with patient daughter Lotus. Patient is m/c for dc. Patient daughter Lotus expressed concern for patient to DC to [facility] for Rehab regarding that patient has been moving from facilities frequently over the past several months and would prefer patient to return to facility residence, [facility]. Lotus expressed permission for CM to arrange transport for patient to return to [facility]. CM reached out to [facility] who confirmed the patient can return today at anytime.    Accepting Facility Name, City & State : [facility]  Receiving Facility/Agency Phone Number: [phone]  Facility/Agency Fax Number: [phone]

## 2024-04-22 NOTE — OCCUPATIONAL THERAPY NOTE
Occupational Therapy Progress Note     Patient Name: Ann Cross  Today's Date: 4/22/2024  Problem List  Principal Problem:    Encephalopathy  Active Problems:     Mood disorder with hypomanic features    Minimal cognitive impairment    Hyperlipemia    Hypertension    Degeneration of lumbar intervertebral disc    Paroxysmal atrial fibrillation (HCC)    Tachy-alison syndrome (HCC)    Stage 3 chronic kidney disease, unspecified whether stage 3a or 3b CKD (HCC)    Diarrhea    Clostridium difficile diarrhea        04/22/24 0945   OT Last Visit   OT Visit Date 04/22/24   Note Type   Note Type Treatment   Pain Assessment   Pain Assessment Tool 0-10   Pain Score No Pain   Restrictions/Precautions   Weight Bearing Precautions Per Order No   Other Precautions Chair Alarm;Cognitive;Bed Alarm;Telemetry;Fall Risk, Contact C-diff.   Lifestyle   Autonomy I with ADL's, assistance with IaDL's, +RW with functional mobility   Reciprocal Relationships daughter, facility   Service to Others retired   Intrinsic Gratification sports   ADL   Grooming Assistance 5  Supervision/Setup   Grooming Deficit Wash/dry hands;Wash/dry face   UB Bathing Assistance 5  Supervision/Setup   UB Bathing Deficit Right arm;Left arm;Abdomen   LB Bathing Assistance 3  Moderate Assistance   LB Bathing Deficit Buttocks;Steadying;Increased time to complete;Supervision/safety   UB Dressing Assistance 4  Minimal Assistance   UB Dressing Deficit Setup   LB Dressing Assistance 3  Moderate Assistance   LB Dressing Deficit Don/doff R sock;Don/doff L sock   Toileting Assistance  3  Moderate Assistance   Toileting Deficit (perianal care for thoroughness)   Bed Mobility   Supine to Sit 5  Supervision   Additional items Assist x 1;Increased time required;Verbal cues   Transfers   Sit to Stand 4  Minimal assistance   Additional items Assist x 1   Stand to Sit 4  Minimal assistance   Additional items Assist x 1   Functional Mobility   Functional Mobility 4  Minimal  "assistance   Additional Comments min a x 1 with short distance functional mobility bed<>bathroom, cues for safety/sequencing with RW   Additional items Rolling walker   Toilet Transfers   Toilet Transfer From Rolling walker   Toilet Transfer Type To and from   Toilet Transfer to Standard toilet   Toilet Transfer Technique Stand pivot;Ambulating   Toilet Transfers Minimal assistance  (increased time)   Cognition   Overall Cognitive Status Impaired   Arousal/Participation Alert;Responsive;Cooperative   Attention Attends with cues to redirect   Orientation Level Oriented to person;Oriented to place;Oriented to time;Disoriented to situation   Memory Decreased recall of precautions;Decreased recall of recent events;Decreased short term memory;Decreased recall of biographical information;Decreased long term memory   Following Commands Follows one step commands with increased time or repetition   Comments pt pleasant and motivated for therapy slow processing, impaired STM/LTM with patient stating \"my memory is like a cement block\", when reviewed \"all about me worksheet\" ACLS performed on 4/17 -3.8 indicating 24/7 CM aware.   Activity Tolerance   Activity Tolerance Patient limited by fatigue;Patient tolerated treatment well   Medical Staff Made Aware RN cleared pt for therapy   Assessment   Assessment Patient participated in Skilled OT session this date with interventions consisting of self care tasks at EOB, functional transfers/mobility . Patient agreeable to OT treatment session, upon arrival patient was found supine in bed.  In comparison to previous session, patient with improvements in  . Patient requiring frequent re direction, verbal cues for safety, verbal cues for correct technique, and verbal cues for pacing thru activity steps. Patient continues to be functioning below baseline level, occupational performance remains limited secondary to factors listed above and increased risk for falls and injury.   From OT " standpoint, recommendation at time of d/c would be mod level II.  The patient's raw score on the AM-PAC Daily Activity Inpatient Short Form is 15. A raw score of less than 19 suggests the patient may benefit from discharge to post-acute rehabilitation services. Please refer to the recommendation of the Occupational Therapist for safe discharge planning.  Patient to benefit from continued Occupational Therapy treatment while in the hospital to address deficits as defined above and maximize level of functional independence with ADLs and functional mobility.   Plan   Treatment Interventions ADL retraining;Functional transfer training;UE strengthening/ROM;Endurance training;Cognitive reorientation;Patient/family training;Equipment evaluation/education;Compensatory technique education;Energy conservation;Activityengagement;Continued evaluation   Goal Expiration Date 05/06/24   OT Frequency 2-3x/wk   Discharge Recommendation   Rehab Resource Intensity Level, OT II (Moderate Resource Intensity)   AM-PAC Daily Activity Inpatient   Lower Body Dressing 2   Bathing 2   Toileting 2   Upper Body Dressing 2   Grooming 3   Eating 4   Daily Activity Raw Score 15   Daily Activity Standardized Score (Calc for Raw Score >=11) 34.69   -PAC Applied Cognition Inpatient   Following a Speech/Presentation 1   Understanding Ordinary Conversation 4   Taking Medications 2   Remembering Where Things Are Placed or Put Away 3   Remembering List of 4-5 Errands 2   Taking Care of Complicated Tasks 1   Applied Cognition Raw Score 13   Applied Cognition Standardized Score 30.46   Romero Cognitive Level   Romero Cognitive Level Score 3.8   Romero Cognitive Score Recommendation 24 hour assistance   Romero Cognitive Level Comments pt on 3.8 on 4/17/24   End of Consult   Education Provided Yes  (Continued cognitive therapy with issued worksheets.)   Patient Position at End of Consult Bed/Chair alarm activated;All needs within reach;Bedside chair   Nurse  Communication Nurse aware of consult     Chiquita Pace OTR/L

## 2024-04-22 NOTE — PLAN OF CARE
Problem: Potential for Falls  Goal: Patient will remain free of falls  Description: INTERVENTIONS:  - Educate patient/family on patient safety including physical limitations  - Instruct patient to call for assistance with activity   - Consult OT/PT to assist with strengthening/mobility   - Keep Call bell within reach  - Keep bed low and locked with side rails adjusted as appropriate  - Keep care items and personal belongings within reach  - Initiate and maintain comfort rounds  - Make Fall Risk Sign visible to staff  - Offer Toileting every 2 Hours, in advance of need  - Initiate/Maintain bed alarm  - Apply yellow socks and bracelet for high fall risk patients  - Consider moving patient to room near nurses station  Outcome: Progressing     Problem: Prexisting or High Potential for Compromised Skin Integrity  Goal: Skin integrity is maintained or improved  Description: INTERVENTIONS:  - Identify patients at risk for skin breakdown  - Assess and monitor skin integrity  - Assess and monitor nutrition and hydration status  - Monitor labs   - Assess for incontinence   - Turn and reposition patient  - Assist with mobility/ambulation  - Relieve pressure over bony prominences  - Avoid friction and shearing  - Provide appropriate hygiene as needed including keeping skin clean and dry  - Evaluate need for skin moisturizer/barrier cream  - Collaborate with interdisciplinary team   - Patient/family teaching  - Consider wound care consult   Outcome: Progressing     Problem: PAIN - ADULT  Goal: Verbalizes/displays adequate comfort level or baseline comfort level  Description: Interventions:  - Encourage patient to monitor pain and request assistance  - Assess pain using appropriate pain scale  - Administer analgesics based on type and severity of pain and evaluate response  - Implement non-pharmacological measures as appropriate and evaluate response  - Consider cultural and social influences on pain and pain management  -  Notify physician/advanced practitioner if interventions unsuccessful or patient reports new pain  Outcome: Progressing     Problem: INFECTION - ADULT  Goal: Absence or prevention of progression during hospitalization  Description: INTERVENTIONS:  - Assess and monitor for signs and symptoms of infection  - Monitor lab/diagnostic results  - Monitor all insertion sites, i.e. indwelling lines, tubes, and drains  - Monitor endotracheal if appropriate and nasal secretions for changes in amount and color  - [place] appropriate cooling/warming therapies per order  - Administer medications as ordered  - Instruct and encourage patient and family to use good hand hygiene technique  - Identify and instruct in appropriate isolation precautions for identified infection/condition  Outcome: Progressing  Goal: Absence of fever/infection during neutropenic period  Description: INTERVENTIONS:  - Monitor WBC    Outcome: Progressing     Problem: SAFETY ADULT  Goal: Patient will remain free of falls  Description: INTERVENTIONS:  - Educate patient/family on patient safety including physical limitations  - Instruct patient to call for assistance with activity   - Consult OT/PT to assist with strengthening/mobility   - Keep Call bell within reach  - Keep bed low and locked with side rails adjusted as appropriate  - Keep care items and personal belongings within reach  - Initiate and maintain comfort rounds  - Make Fall Risk Sign visible to staff  - Offer Toileting every 2 Hours, in advance of need  - Initiate/Maintain bed alarm  - Apply yellow socks and bracelet for high fall risk patients  - Consider moving patient to room near nurses station  Outcome: Progressing  Goal: Maintain or return to baseline ADL function  Description: INTERVENTIONS:  -  Assess patient's ability to carry out ADLs; assess patient's baseline for ADL function and identify physical deficits which impact ability to perform ADLs (bathing, care of mouth/teeth,  toileting, grooming, dressing, etc.)  - Assess/evaluate cause of self-care deficits   - Assess range of motion  - Assess patient's mobility; develop plan if impaired  - Assess patient's need for assistive devices and provide as appropriate  - Encourage maximum independence but intervene and supervise when necessary  - Involve family in performance of ADLs  - Assess for home care needs following discharge   - Consider OT consult to assist with ADL evaluation and planning for discharge  - Provide patient education as appropriate  Outcome: Progressing  Goal: Maintains/Returns to pre admission functional level  Description: INTERVENTIONS:  - Perform AM-PAC 6 Click Basic Mobility/ Daily Activity assessment daily.  - Set and communicate daily mobility goal to care team and patient/family/caregiver.   - Collaborate with rehabilitation services on mobility goals if consulted  - Perform Range of Motion 2 times a day.  - Reposition patient every 2 hours.  - Dangle patient 2 times a day  - Stand patient 2 times a day  - Ambulate patient 2 times a day  - Out of bed to chair 2 times a day   - Out of bed for meals 2 times a day  - Out of bed for toileting  - Record patient progress and toleration of activity level   Outcome: Progressing     Problem: DISCHARGE PLANNING  Goal: Discharge to home or other facility with appropriate resources  Description: INTERVENTIONS:  - Identify barriers to discharge w/patient and caregiver  - Arrange for needed discharge resources and transportation as appropriate  - Identify discharge learning needs (meds, wound care, etc.)  - Arrange for interpretive services to assist at discharge as needed  - Refer to Case Management Department for coordinating discharge planning if the patient needs post-hospital services based on physician/advanced practitioner order or complex needs related to functional status, cognitive ability, or social support system  Outcome: Progressing     Problem: Knowledge  Deficit  Goal: Patient/family/caregiver demonstrates understanding of disease process, treatment plan, medications, and discharge instructions  Description: Complete learning assessment and assess knowledge base.  Interventions:  - Provide teaching at level of understanding  - Provide teaching via preferred learning methods  Outcome: Progressing

## 2024-04-23 NOTE — DISCHARGE SUMMARY
Discharge Summary - [facility] Internal Medicine    Patient Information: Ann Cross 83 y.o. female MRN: [id number]  Unit/Bed#: [facility] 731-01 Encounter: [id number]    Discharging Physician / Practitioner: Suyapa Bush MD  PCP: Emelina Swann MD  Admission Date: 4/15/2024  Discharge Date: 4/22/2024    Disposition:     Assisted Living Facility at [facility]    Reason for Admission: encephalopathy    Discharge Diagnoses:   Encephalopathy  Assessment & Plan  Patient presents with worsening cognition, forgetfulness, generalized weakness, lethargy.  Patient and daughter are worried about worsening memory issues  CT head negative  MRI brain negative   Neurology evaluated.  No evidence for CVA, seizure activity.  CSF studies are benign.  Suspect worsening mental status due to underlying dementia/cognitive decline, underlying psychiatric disorder with recent hx of SDH in December, colitis/diarrhea, and familial losses recently.  No further IP neurologic work up at this time  Geriatrics following  Continue inpatient physical and Occupational Therapy-discharge planning back to [facility]   Clostridium difficile diarrhea  Assessment & Plan  C. Diff PCR and EIA positive  PO Vanco 125 mg every 6 hours x 10 days   Can f/u with GI outpt        Diarrhea  Assessment & Plan  Reports occasional episodes of loose stool/diarrhea  Abdominal imaging concerning for sigmoid colon thickening  C diff PCR and EIA positive.  Continue p.o. vancomycin, day 5  D/w her PCP and GI -- given active C diff no plans for IP scope at this time and can be followed up outpt   Diarrhea improving.-Will finish the course of antibiotics     Stage 3 chronic kidney disease, unspecified whether stage 3a or 3b CKD ([facility])  Assessment & Plan        Lab Results   Component Value Date     EGFR 68 04/21/2024     EGFR 81 04/20/2024     EGFR 72 04/19/2024     CREATININE 0.80 04/21/2024     CREATININE 0.68 04/20/2024     CREATININE 0.76 04/19/2024       Monitor kidney function -- currently at baseline   Avoid nephrotoxins     Tachy-alison syndrome (HCC)  Assessment & Plan  S/p PPM placement     Paroxysmal atrial fibrillation (HCC)  Assessment & Plan  Continue flecainide  Not on anticoagulation     Degeneration of lumbar intervertebral disc  Assessment & Plan  History of degenerative lumbar disc disease  Analgesics PRN        Hypertension  Assessment & Plan  Monitor blood pressures  Continue with outpatient regimen     Hyperlipemia  Assessment & Plan  Not maintained on outpatient medications  Continue PCP follow-up     Minimal cognitive impairment  Assessment & Plan  History of cognitive impairment  Geriatric evaluation appreciated      Mood disorder with hypomanic features  Assessment & Plan  Continue buspirone, citalopram, depakote         Consultations During Hospital Stay:    Gastroenterology  Neurology  Geriatrics  Procedures Performed:         Significant Findings / Test Results:     Mri brain-no acute intracranial pathology    CT chest abdomen pelvis-no acute pathology in the chest.Segmental thickening of the sigmoid colon with adjacent mesenteric lymphadenopathy of uncertain etiology. Segmental inflammatory or infectious colitis is possible though there is no surrounding stranding. Malignancy is also possible though a discrete   measurable mass is not identified. Clinical correlation and follow-up colonoscopy recommended. Ectatic aorta measuring up to 2.7 cm. Recommendation is for follow-up in 5 years though considerations related to the patient's age and/or comorbidities may be used to alter this recommendation.     Ct head-no acute intracranial abnormality     Incidental Findings:    Segmental thickening of the sigmoid colon with adjacent mesenteric lymphadenopathy of uncertain etiology. Segmental inflammatory or infectious colitis is possible though there is no surrounding stranding. Malignancy is also possible though a discretemeasurable mass is not  "identified. Clinical correlation and follow-up colonoscopy recommended.Ectatic aorta measuring up to 2.7 cm. Recommendation is for follow-up in 5 years though considerations related to the patient's age and/or comorbidities may be used to alter this recommendation.      Test Results Pending at Discharge (will require follow up):        Outpatient Tests Requested:      Complications:   none    Hospital Course:     Ann Cross is a 83 y.o. female patient who originally presented to the hospital on 4/15/2024 due to encephalopathy.  Patient is a resident of an assisted living facility.  Initial CAT scan was negative.  Was admitted to the hospital.  Neurology evaluated the patient.  Workup was negative including spinal tap.  Patient with diarrhea.  Also noted to have abnormal CT.  GI evaluated the patient.  C. difficile came back positive.  Patient was started on vancomycin and Florastor with improvement in her diarrhea.  Patient mentation improved.  PT OT evaluated the patient.  At this time patient is appropriate for discharge back to the assisted living facility.  For details refer to the chart    Condition at Discharge: good     Discharge Day Visit / Exam:     Subjective: Patient seen and examined.  Denies any specific complaints.  Discussed with nursing.  Had 2 bowel movements yesterday ,no bowel movement  today  Vitals: Blood Pressure: 132/65 (04/22/24 1528)  Pulse: 67 (04/22/24 1528)  Temperature: 98.2 °F (36.8 °C) (04/22/24 1528)  Temp Source: Oral (04/21/24 2137)  Respirations: 16 (04/22/24 1528)  Height: 5' 4\" (162.6 cm) (04/16/24 1350)  Weight - Scale: 61.8 kg (136 lb 3.9 oz) (04/16/24 1350)  SpO2: 98 % (04/22/24 1528)  Exam:   Physical Exam  Constitutional:       General: She is not in acute distress.  HENT:      Head: Normocephalic.      Nose: Nose normal.   Eyes:      Pupils: Pupils are equal, round, and reactive to light.   Cardiovascular:      Rate and Rhythm: Normal rate and regular rhythm.      " Heart sounds: No murmur heard.  Abdominal:      General: Bowel sounds are normal. There is no distension.   Musculoskeletal:      Cervical back: Neck supple.      Right lower leg: No edema.      Left lower leg: No edema.   Skin:     General: Skin is warm.   Neurological:      Mental Status: She is alert and oriented to person, place, and time. Mental status is at baseline.         Discussion with Family:  called wife , left voice mail     Discharge instructions/Information to patient and family:   See after visit summary for information provided to patient and family.      Provisions for Follow-Up Care:  See after visit summary for information related to follow-up care and any pertinent home health orders.      Planned Readmission:  none     Discharge Statement:  I spent 45 minutes discharging the patient. This time was spent on the day of discharge. I had direct contact with the patient on the day of discharge. Greater than 50% of the total time was spent examining patient, answering all patient questions, arranging and discussing plan of care with patient as well as directly providing post-discharge instructions.  Additional time then spent on discharge activities.    Discharge Medications:  See after visit summary for reconciled discharge medications provided to patient and family.      ** Please Note: This note has been constructed using a voice recognition system **

## 2024-04-25 LAB
B BURGDOR IGG CSF IA-ACNC: < 0.08 INDEX
B BURGDOR IGM CSF IA-ACNC: < 0.06 INDEX

## 2024-04-28 ENCOUNTER — APPOINTMENT (EMERGENCY)
Dept: RADIOLOGY | Facility: HOSPITAL | Age: 84
End: 2024-04-28
Payer: MEDICARE

## 2024-04-28 ENCOUNTER — HOSPITAL ENCOUNTER (EMERGENCY)
Facility: HOSPITAL | Age: 84
Discharge: NURSING, REHAB OR LONG-TERM CARE | End: 2024-04-28
Attending: EMERGENCY MEDICINE
Payer: MEDICARE

## 2024-04-28 VITALS
TEMPERATURE: 98.1 F | OXYGEN SATURATION: 94 % | SYSTOLIC BLOOD PRESSURE: 126 MMHG | DIASTOLIC BLOOD PRESSURE: 59 MMHG | HEART RATE: 60 BPM | RESPIRATION RATE: 21 BRPM

## 2024-04-28 DIAGNOSIS — F03.90 DEMENTIA (HCC): ICD-10-CM

## 2024-04-28 DIAGNOSIS — R41.0 CONFUSION: Primary | ICD-10-CM

## 2024-04-28 LAB
ALBUMIN SERPL BCP-MCNC: 3.6 G/DL (ref 3.5–5)
ALP SERPL-CCNC: 42 U/L (ref 34–104)
ALT SERPL W P-5'-P-CCNC: 17 U/L (ref 7–52)
ANION GAP SERPL CALCULATED.3IONS-SCNC: 6 MMOL/L (ref 4–13)
AST SERPL W P-5'-P-CCNC: 33 U/L (ref 13–39)
ATRIAL RATE: 64 BPM
BASOPHILS # BLD AUTO: 0.05 THOUSANDS/ÂΜL (ref 0–0.1)
BASOPHILS NFR BLD AUTO: 1 % (ref 0–1)
BILIRUB DIRECT SERPL-MCNC: 0.07 MG/DL (ref 0–0.2)
BILIRUB SERPL-MCNC: 0.34 MG/DL (ref 0.2–1)
BUN SERPL-MCNC: 16 MG/DL (ref 5–25)
CALCIUM SERPL-MCNC: 9.2 MG/DL (ref 8.4–10.2)
CHLORIDE SERPL-SCNC: 101 MMOL/L (ref 96–108)
CO2 SERPL-SCNC: 32 MMOL/L (ref 21–32)
CREAT SERPL-MCNC: 0.8 MG/DL (ref 0.6–1.3)
EOSINOPHIL # BLD AUTO: 0.1 THOUSAND/ÂΜL (ref 0–0.61)
EOSINOPHIL NFR BLD AUTO: 1 % (ref 0–6)
ERYTHROCYTE [DISTWIDTH] IN BLOOD BY AUTOMATED COUNT: 14.6 % (ref 11.6–15.1)
GFR SERPL CREATININE-BSD FRML MDRD: 68 ML/MIN/1.73SQ M
GLUCOSE SERPL-MCNC: 85 MG/DL (ref 65–140)
HCT VFR BLD AUTO: 48.3 % (ref 34.8–46.1)
HGB BLD-MCNC: 15.5 G/DL (ref 11.5–15.4)
IMM GRANULOCYTES # BLD AUTO: 0.08 THOUSAND/UL (ref 0–0.2)
IMM GRANULOCYTES NFR BLD AUTO: 1 % (ref 0–2)
LYMPHOCYTES # BLD AUTO: 1.85 THOUSANDS/ÂΜL (ref 0.6–4.47)
LYMPHOCYTES NFR BLD AUTO: 24 % (ref 14–44)
MCH RBC QN AUTO: 28.7 PG (ref 26.8–34.3)
MCHC RBC AUTO-ENTMCNC: 32.1 G/DL (ref 31.4–37.4)
MCV RBC AUTO: 89 FL (ref 82–98)
MONOCYTES # BLD AUTO: 1.41 THOUSAND/ÂΜL (ref 0.17–1.22)
MONOCYTES NFR BLD AUTO: 19 % (ref 4–12)
NEUTROPHILS # BLD AUTO: 4.12 THOUSANDS/ÂΜL (ref 1.85–7.62)
NEUTS SEG NFR BLD AUTO: 54 % (ref 43–75)
NRBC BLD AUTO-RTO: 0 /100 WBCS
P AXIS: 90 DEGREES
PLATELET # BLD AUTO: 162 THOUSANDS/UL (ref 149–390)
PMV BLD AUTO: 9.4 FL (ref 8.9–12.7)
POTASSIUM SERPL-SCNC: 4.8 MMOL/L (ref 3.5–5.3)
PR INTERVAL: 148 MS
PROT SERPL-MCNC: 6.6 G/DL (ref 6.4–8.4)
QRS AXIS: 9 DEGREES
QRSD INTERVAL: 72 MS
QT INTERVAL: 442 MS
QTC INTERVAL: 455 MS
RBC # BLD AUTO: 5.4 MILLION/UL (ref 3.81–5.12)
SODIUM SERPL-SCNC: 139 MMOL/L (ref 135–147)
T WAVE AXIS: 10 DEGREES
VALPROATE SERPL-MCNC: 54 UG/ML (ref 50–100)
VENTRICULAR RATE: 64 BPM
WBC # BLD AUTO: 7.61 THOUSAND/UL (ref 4.31–10.16)

## 2024-04-28 PROCEDURE — 80048 BASIC METABOLIC PNL TOTAL CA: CPT | Performed by: EMERGENCY MEDICINE

## 2024-04-28 PROCEDURE — 80076 HEPATIC FUNCTION PANEL: CPT | Performed by: EMERGENCY MEDICINE

## 2024-04-28 PROCEDURE — 80164 ASSAY DIPROPYLACETIC ACD TOT: CPT | Performed by: EMERGENCY MEDICINE

## 2024-04-28 PROCEDURE — 70450 CT HEAD/BRAIN W/O DYE: CPT

## 2024-04-28 PROCEDURE — 99285 EMERGENCY DEPT VISIT HI MDM: CPT

## 2024-04-28 PROCEDURE — 36415 COLL VENOUS BLD VENIPUNCTURE: CPT | Performed by: EMERGENCY MEDICINE

## 2024-04-28 PROCEDURE — 99285 EMERGENCY DEPT VISIT HI MDM: CPT | Performed by: EMERGENCY MEDICINE

## 2024-04-28 PROCEDURE — 85025 COMPLETE CBC W/AUTO DIFF WBC: CPT | Performed by: EMERGENCY MEDICINE

## 2024-04-28 PROCEDURE — 93005 ELECTROCARDIOGRAM TRACING: CPT

## 2024-04-28 PROCEDURE — 93010 ELECTROCARDIOGRAM REPORT: CPT | Performed by: INTERNAL MEDICINE

## 2024-04-28 NOTE — ED NOTES
Late entry due to patient care    Patient arrived wearing 2 briefs and an incontinence pad underneath her soaked through to her gown with back of gown completely saturated with patient wet from head to toe. Patient changed and skin cleansed, linens changed. Purewick placed on patient.     Stella Hobson RN  04/28/24 [id number]

## 2024-04-28 NOTE — ED ATTENDING ATTESTATION
4/28/2024  I, Rashel Gaitan DO, saw and evaluated the patient. I have discussed the patient with the resident/non-physician practitioner and agree with the resident's/non-physician practitioner's findings, Plan of Care, and MDM as documented in the resident's/non-physician practitioner's note, except where noted. All available labs and Radiology studies were reviewed.  I was present for key portions of any procedure(s) performed by the resident/non-physician practitioner and I was immediately available to provide assistance.       At this point I agree with the current assessment done in the Emergency Department.  I have conducted an independent evaluation of this patient a history and physical is as follows:    Patient is an 83-year-old female with a history of hyperlipidemia, hypertension, paroxysmal atrial fibrillation, CKD, C. difficile diarrhea, resident of [facility].  Staff there indicates that this morning when she was seen on rounds she seemed to be less awake and less alert.  Their vital signs were normal, EMS arrived and she was decreased responsiveness, normal blood sugar, normal vital signs.      Patient was hospitalized April 15 through April 22, 2024 for worsening cognition, lethargy and forgetfulness.  Head CT unremarkable, brain MRI unremarkable.  Neurology evaluation was unremarkable showing no evidence of CVA or seizure activity.  LP CSF studies were unremarkable.  It was suspected that she had worsening mental status due to her dementia and cognitive decline, she has an underlying psychiatric disorder with recent diagnosis of subdural hemorrhage in December as well as colitis and diarrhea.  She was found to be C. difficile positive, started on vancomycin which continues.    Patient herself says she feels fine, has no headache, no chest pain, shortness of breath, no fever, no chills, no dysuria or hematuria.  She does not know why an ambulance was called says she feels  at her baseline.    General:  Patient is well-appearing  Head:  Atraumatic  Eyes:  Conjunctiva pink, Extraocular muscle intact, PERRL  ENT:  Mucous membranes are moist  Neck:  Supple  Cardiac:  S1-S2, without murmurs  Lungs:  Clear to auscultation bilaterally  Abdomen:  Soft, nontender, normal bowel sounds, no CVA tenderness, no tympany, no rigidity, no guarding  Extremities:  Normal range of motion  Neurologic:  Awake, fluent speech, oriented To person, place, recent events, does not know the exact date.. Cranial nerves 2-12 are intact, strength is 5/5 in the bilateral upper & lower extremities, no slurred speech, no facial droop, no deficit on finger-to-nose testing, no pronator drift.  Sensation to light touch is equal and symmetric throughout the whole body  Skin:  Pink warm and dry, no rash  Psychiatric:  Alert, pleasant, cooperative          ED Course  ED Course as of 04/28/24 0906   Sun Apr 28, 2024   0900 ECG interpreted by me, atrial paced rhythm, rate of 64, no acute change from April 15, 2024     CT head wo contrast   Final Result      No acute intracranial abnormality.  Stable chronic microangiopathic changes within the brain.                  Workstation performed: [id number]           Labs Reviewed   CBC AND DIFFERENTIAL - Abnormal       Result Value Ref Range Status    WBC 7.61  4.31 - 10.16 Thousand/uL Final    RBC 5.40 (*) 3.81 - 5.12 Million/uL Final    Hemoglobin 15.5 (*) 11.5 - 15.4 g/dL Final    Hematocrit 48.3 (*) 34.8 - 46.1 % Final    MCV 89  82 - 98 fL Final    MCH 28.7  26.8 - 34.3 pg Final    MCHC 32.1  31.4 - 37.4 g/dL Final    RDW 14.6  11.6 - 15.1 % Final    MPV 9.4  8.9 - 12.7 fL Final    Platelets 162  149 - 390 Thousands/uL Final    nRBC 0  /100 WBCs Final    Segmented % 54  43 - 75 % Final    Immature Grans % 1  0 - 2 % Final    Lymphocytes % 24  14 - 44 % Final    Monocytes % 19 (*) 4 - 12 % Final    Eosinophils Relative 1  0 - 6 % Final    Basophils Relative 1  0 - 1 % Final     Absolute Neutrophils 4.12  1.85 - 7.62 Thousands/µL Final    Absolute Immature Grans 0.08  0.00 - 0.20 Thousand/uL Final    Absolute Lymphocytes 1.85  0.60 - 4.47 Thousands/µL Final    Absolute Monocytes 1.41 (*) 0.17 - 1.22 Thousand/µL Final    Eosinophils Absolute 0.10  0.00 - 0.61 Thousand/µL Final    Basophils Absolute 0.05  0.00 - 0.10 Thousands/µL Final   HEPATIC FUNCTION PANEL - Normal    Total Bilirubin 0.34  0.20 - 1.00 mg/dL Final    Comment: Use of this assay is not recommended for patients undergoing treatment with eltrombopag due to the potential for falsely elevated results.  N-acetyl-p-benzoquinone imine (metabolite of Acetaminophen) will generate erroneously low results in samples for patients that have taken an overdose of Acetaminophen.    Bilirubin, Direct 0.07  0.00 - 0.20 mg/dL Final    Alkaline Phosphatase 42  34 - 104 U/L Final    AST 33  13 - 39 U/L Final    ALT 17  7 - 52 U/L Final    Comment: Specimen collection should occur prior to Sulfasalazine administration due to the potential for falsely depressed results.     Total Protein 6.6  6.4 - 8.4 g/dL Final    Albumin 3.6  3.5 - 5.0 g/dL Final   VALPROIC ACID LEVEL, TOTAL - Normal    Valproic Acid, Total 54  50 - 100 ug/mL Final   BASIC METABOLIC PANEL    Sodium 139  135 - 147 mmol/L Final    Potassium 4.8  3.5 - 5.3 mmol/L Final    Chloride 101  96 - 108 mmol/L Final    CO2 32  21 - 32 mmol/L Final    ANION GAP 6  4 - 13 mmol/L Final    BUN 16  5 - 25 mg/dL Final    Creatinine 0.80  0.60 - 1.30 mg/dL Final    Comment: Standardized to IDMS reference method    Glucose 85  65 - 140 mg/dL Final    Comment: If the patient is fasting, the ADA then defines impaired fasting glucose as > 100 mg/dL and diabetes as > or equal to 123 mg/dL.    Calcium 9.2  8.4 - 10.2 mg/dL Final    eGFR 68  ml/min/1.73sq m Final    Narrative:     National Kid[employer] guidelines for Chronic Kidney Disease (CKD):     Stage 1 with normal or high GFR (GFR >  90 mL/min/1.73 square meters)    Stage 2 Mild CKD (GFR = 60-89 mL/min/1.73 square meters)    Stage 3A Moderate CKD (GFR = 45-59 mL/min/1.73 square meters)    Stage 3B Moderate CKD (GFR = 30-44 mL/min/1.73 square meters)    Stage 4 Severe CKD (GFR = 15-29 mL/min/1.73 square meters)    Stage 5 End Stage CKD (GFR <15 mL/min/1.73 square meters)  Note: GFR calculation is accurate only with a steady state creatinine       On reassessment there is no change in the above findings.  The patient's daughter arrived and indicated the patient is at her baseline mental status.  The daughter expressed some concern about the care the patient was receiving at the nursing facility and requested admission to the hospital or placement in a different nursing facility.  The patient has no medical indication for admission to the hospital, was just discharged after a thorough comprehensive workup, she is at her baseline mental status right now with no evidence of life-threatening abnormality.  She has no evidence of life-threatening dysrhythmia, valproic acid is therapeutic, no evidence of metabolic derangement or life-threatening anemia or hyponatremia or hypoglycemia.  Her head CT shows no evidence of life-threatening intracranial hemorrhage or other acute pathology.    Case management Francheska met with the patient and the daughter, and made multiple phone calls to attempt to see if the patient could be placed directly into a new nursing facility.  Case management Francheska indicated that due to the patient's insurance status, as well as the results of phone calls today, that she was unable to place the patient into a new facility directly from the emergency department, but did provide the family with information regarding the efforts that have been made today.  Patient's current nursing facility director will be reaching out to the patient's daughter directly to discuss further options.  Daughter indicated that she was comfortable with  patient returning back to the facility.    DIAGNOSIS:  Acute transient decreased mental status,    MEDICAL DECISION MAKING CODING    Patient presents with acute new problem with:  Threat to life or bodily function    Chronic conditions affecting care: As per HPI    COLLECTION AND INTERPRETATION OF DATA  Additional history obtained from: EMS  I reviewed prior external notes, including previous ECG and hospital discharge summary as noted above    I ordered each unique test  Tests reviewed personally by me:  ECG: See my ED course  Labs: See above  Imaging: I independently reviewed the head CT and found no acute pathology.    Discussion with other providers: See above regarding case management      RISK  All of the patient's current prescription medications should be continued.    Treatment:  Consideration of admission: See above      Surgery  -I considered surgery may be necessary prior to completion of the work up but afterwards there is no indication for immediate surgery    Social Determinants of Health:  Presentation to ED outside of business hours or on night shift          Critical Care Time  Procedures

## 2024-04-28 NOTE — DISCHARGE INSTRUCTIONS
Please follow up with your PCP regarding any medication changes and reevaluation.    Please return to the Emergency Department if you experience worsening of your current symptoms or any new/other concerning symptoms.

## 2024-05-01 NOTE — DISCHARGE INSTR - AVS FIRST PAGE
[facility] called requesting a refill of the below medication which has been pended for you:     Requested Prescriptions     Pending Prescriptions Disp Refills    albuterol sulfate HFA (VENTOLIN HFA) 108 (90 Base) MCG/ACT inhaler 1 each 3     Sig: Inhale 2 puffs into the lungs every 6 hours as needed for Wheezing    zoster recombinant adjuvanted vaccine (SHINGRIX) 50 MCG/0.5ML SUSR injection 0.5 mL 1     Si dose now and repeat in 2-6 months    mometasone-formoterol (DULERA) 200-5 MCG/ACT inhaler 13 g 3     Sig: Inhale 1 puff into the lungs in the morning and 1 puff in the evening.    losartan (COZAAR) 25 MG tablet 90 tablet 1     Sig: take 1 tablet by mouth every morning    hydrOXYzine pamoate (VISTARIL) 25 MG capsule 90 capsule 0    FLUoxetine (PROZAC) 10 MG capsule 30 capsule 1     Sig: Take 1 capsule by mouth daily       Last Appointment Date: 2023  Next Appointment Date: 2024    No Known Allergies   Neurosurgery discharge instructions following traumatic head bleed:     Do not take any blood thinning medications (ie. No Advil. No motrin. No ibuprofen. No Aleve. No Aspirin. No fishoil. No heparin. No antiplatelet / no anticoagulation medication).  Refrain from activity that increases chance of trauma to head or falls. Recommend you take fall precaution.  No strenuous activity or sports.  Return to hospital Emergency Room if you experience worsening / new headache, nausea/vomiting, speech/vision change, seizure, confusion / mental status change, weakness, or other neurological changes.      Follow-up as scheduled with a repeat CT head without contrast to be completed 2-3 days prior to visit.  Prescription has been entered electronically.  Please call [phone] to schedule.

## 2024-05-01 NOTE — ED PROVIDER NOTES
"History  Chief Complaint   Patient presents with    Altered Mental Status     Pt was not \"herself\" when day shift checked on pt     HPI  Patient is a 83 y.o. female with PMHx HLD, HTN, paroxysmal afib, CKD, C. Diff hospitalized from 4/15-4/22 who presents to the ED via EMS for evaluation of change in mental status noticed this morning. Per EMS, dayshift came in and checked on the patient and noticed that she was not behaving normally.  No last known well was reported.  No focal deficits reported.  Patient was previously well with no obvious complaints.  History is limited, provided by EMS.  Patient is alert and awake, oriented to person and place, and states she has no complaints or concerns at this time.  Patient states she was angry that she was left in her urine overnight.    Prior to Admission Medications   Prescriptions Last Dose Informant Patient Reported? Taking?   Calcium Carbonate 1500 (600 Ca) MG TABS  Self Yes No   Sig: Take 1 tablet by mouth daily   Melatonin 3 MG CAPS  Self Yes No   Sig: Take 6 mg by mouth   acetaminophen (TYLENOL) 325 mg tablet   No No   Sig: Take 2 tablets (650 mg total) by mouth every 4 (four) hours as needed for mild pain   amLODIPine (NORVASC) 10 mg tablet  Self No No   Sig: Take 1 tablet (10 mg total) by mouth daily   aspirin (ECOTRIN LOW STRENGTH) 81 mg EC tablet  Outside Facility (Specify) Yes No   Sig: Take 81 mg by mouth daily   busPIRone (BUSPAR) 7.5 mg tablet  Outside Facility (Specify) Yes No   Sig: Take 7.5 mg by mouth 2 (two) times a day   citalopram (CeleXA) 10 mg tablet  Outside Facility (Specify) Yes No   Sig: Take 10 mg by mouth daily   divalproex sodium (DEPAKOTE) 250 mg EC tablet  Self Yes No   Sig: Take 250 mg by mouth 3 (three) times a day   donepezil (ARICEPT) 5 mg tablet  Self No No   Sig: Take 1 tablet (5 mg total) by mouth daily at bedtime   ergocalciferol (VITAMIN D2) 50,000 units  Self Yes No   Sig: Take 50,000 Units by mouth every 30 (thirty) days "   flecainide (TAMBOCOR) 50 mg tablet  Self No No   Sig: Take 1 tablet (50 mg total) by mouth every 12 (twelve) hours   fluticasone (FLONASE) 50 mcg/act nasal spray   No No   Si spray into each nostril daily for 7 days   saccharomyces boulardii (FLORASTOR) 250 mg capsule   No No   Sig: Take 1 capsule (250 mg total) by mouth 2 (two) times a day for 10 days   vancomycin (VANCOCIN) 125 MG capsule   No No   Sig: Take 1 capsule (125 mg total) by mouth every 6 (six) hours for 19 doses      Facility-Administered Medications: None       Past Medical History:   Diagnosis Date    Afib (HCC)     Arthritis     Hyperlipidemia     Hypertension     Osteopenia     Sleep apnea     Squamous cell skin cancer     LEF TEMPLE    Subdural hemorrhage (HCC)     Varicose veins of both lower extremities        Past Surgical History:   Procedure Laterality Date    CARDIAC ELECTROPHYSIOLOGY PROCEDURE N/A 10/12/2022    Procedure: Cardiac loop recorder implant;  Surgeon: Rashid Carlos MD;  Location: [facility];  Service: Cardiology    CARDIAC ELECTROPHYSIOLOGY PROCEDURE N/A 2023    Procedure: Cardiac pacer implant;  Surgeon: Simba Chilel MD;  Location: [facility];  Service: Cardiology    CATARACT EXTRACTION      COLONOSCOPY      complete, for polyp removal    EYE SURGERY      HYSTERECTOMY      age 45    INCISION AND DRAINAGE OF WOUND Right 2016    Procedure: ARTHROSCOPY,EVACUATION OF HEMATOMA, OPEN EXPLORATION OF WOUND;  Surgeon: Adam Brice MD;  Location: [facility];  Service:     MOHS SURGERY Left 2022    left temple    SKIN BIOPSY      TONSILLECTOMY AND ADENOIDECTOMY      TOTAL KNEE ARTHROPLASTY Right     TOTAL KNEE ARTHROPLASTY Left     WISDOM TOOTH EXTRACTION         Family History   Problem Relation Age of Onset    Diabetes Mother     Diabetes Father     Diabetes Brother     No Known Problems Sister     No Known Problems Daughter     No Known Problems Maternal Grandmother     No Known Problems  Maternal Grandfather     No Known Problems Paternal Grandmother     No Known Problems Paternal Grandfather     No Known Problems Daughter     Brain cancer Son     No Known Problems Maternal Aunt      I have reviewed and agree with the history as documented.    E-Cigarette/Vaping    E-Cigarette Use Never User      E-Cigarette/Vaping Substances    Nicotine No     THC No     CBD No     Flavoring No     Other No     Unknown No      Social History     Tobacco Use    Smoking status: Never    Smokeless tobacco: Never   Vaping Use    Vaping status: Never Used   Substance Use Topics    Alcohol use: Not Currently    Drug use: No        Review of Systems   Unable to perform ROS: Dementia       Physical Exam  ED Triage Vitals [04/28/24 0837]   Temperature Pulse Respirations Blood Pressure SpO2   98.1 °F (36.7 °C) 66 18 (!) 179/80 94 %      Temp Source Heart Rate Source Patient Position - Orthostatic VS BP Location FiO2 (%)   Tympanic Monitor Sitting Left arm --      Pain Score       No Pain             Orthostatic Vital Signs  Vitals:    04/28/24 1101 04/28/24 1301 04/28/24 1501 04/28/24 1700   BP: 134/62 117/56 122/58 126/59   Pulse: 60 60 60 60   Patient Position - Orthostatic VS:  Lying Lying Lying       Physical Exam  Vitals and nursing note reviewed.   Constitutional:       General: She is not in acute distress.     Appearance: She is not ill-appearing or diaphoretic.   HENT:      Head: Normocephalic and atraumatic.      Mouth/Throat:      Mouth: Mucous membranes are moist.      Pharynx: Oropharynx is clear.   Eyes:      General: No scleral icterus.     Extraocular Movements: Extraocular movements intact.      Conjunctiva/sclera: Conjunctivae normal.      Pupils: Pupils are equal, round, and reactive to light. Pupils are equal.   Cardiovascular:      Rate and Rhythm: Normal rate and regular rhythm.      Heart sounds: Normal heart sounds.   Pulmonary:      Effort: Pulmonary effort is normal. No respiratory distress.       Breath sounds: Normal breath sounds.   Abdominal:      Palpations: Abdomen is soft.      Tenderness: There is no abdominal tenderness. There is no guarding or rebound.   Musculoskeletal:         General: No deformity. Normal range of motion.      Cervical back: Normal range of motion and neck supple.   Skin:     General: Skin is warm.      Capillary Refill: Capillary refill takes less than 2 seconds.      Findings: No rash.   Neurological:      General: No focal deficit present.      Mental Status: She is alert. Mental status is at baseline.      Cranial Nerves: No cranial nerve deficit, dysarthria or facial asymmetry.      Sensory: No sensory deficit.      Motor: No weakness or pronator drift.      Comments: Alert and awake, interacting appropriately, moving all extremities symmetrically, following commands.   Psychiatric:         Mood and Affect: Mood normal.         Behavior: Behavior normal.         ED Medications  Medications - No data to display    Diagnostic Studies  Results Reviewed       Procedure Component Value Units Date/Time    Hepatic function panel [[id number]]  (Normal) Collected: 04/28/24 0859    Lab Status: Final result Specimen: Blood from Arm, Right Updated: 04/28/24 0927     Total Bilirubin 0.34 mg/dL      Bilirubin, Direct 0.07 mg/dL      Alkaline Phosphatase 42 U/L      AST 33 U/L      ALT 17 U/L      Total Protein 6.6 g/dL      Albumin 3.6 g/dL     Valproic acid level, total [[id number]]  (Normal) Collected: 04/28/24 0859    Lab Status: Final result Specimen: Blood from Arm, Right Updated: 04/28/24 0927     Valproic Acid, Total 54 ug/mL     Basic metabolic panel [[id number]] Collected: 04/28/24 0859    Lab Status: Final result Specimen: Blood from Arm, Right Updated: 04/28/24 0927     Sodium 139 mmol/L      Potassium 4.8 mmol/L      Chloride 101 mmol/L      CO2 32 mmol/L      ANION GAP 6 mmol/L      BUN 16 mg/dL      Creatinine 0.80 mg/dL      Glucose 85 mg/dL      Calcium 9.2 mg/dL      eGFR 68  ml/min/1.73sq m     Narrative:      National Kidney Disease Foundation guidelines for Chronic Kidney Disease (CKD):     Stage 1 with normal or high GFR (GFR > 90 mL/min/1.73 square meters)    Stage 2 Mild CKD (GFR = 60-89 mL/min/1.73 square meters)    Stage 3A Moderate CKD (GFR = 45-59 mL/min/1.73 square meters)    Stage 3B Moderate CKD (GFR = 30-44 mL/min/1.73 square meters)    Stage 4 Severe CKD (GFR = 15-29 mL/min/1.73 square meters)    Stage 5 End Stage CKD (GFR <15 mL/min/1.73 square meters)  Note: GFR calculation is accurate only with a steady state creatinine    CBC and differential [[id number]]  (Abnormal) Collected: 04/28/24 0859    Lab Status: Final result Specimen: Blood from Arm, Right Updated: 04/28/24 0905     WBC 7.61 Thousand/uL      RBC 5.40 Million/uL      Hemoglobin 15.5 g/dL      Hematocrit 48.3 %      MCV 89 fL      MCH 28.7 pg      MCHC 32.1 g/dL      RDW 14.6 %      MPV 9.4 fL      Platelets 162 Thousands/uL      nRBC 0 /100 WBCs      Segmented % 54 %      Immature Grans % 1 %      Lymphocytes % 24 %      Monocytes % 19 %      Eosinophils Relative 1 %      Basophils Relative 1 %      Absolute Neutrophils 4.12 Thousands/µL      Absolute Immature Grans 0.08 Thousand/uL      Absolute Lymphocytes 1.85 Thousands/µL      Absolute Monocytes 1.41 Thousand/µL      Eosinophils Absolute 0.10 Thousand/µL      Basophils Absolute 0.05 Thousands/µL                    CT head wo contrast   Final Result by Pallav N Shah, MD (04/28 0915)      No acute intracranial abnormality.  Stable chronic microangiopathic changes within the brain.                  Workstation performed: [id number]               Procedures  Procedures      ED Course  ED Course as of 05/01/24 1620   Sun Apr 28, 2024   0905 EKG performed at 0843 as read by me: Atrially paced at 64 bpm, normal axis, QRS 72, QTc 455, no acute ST elevations or depressions, similar compared to prior on 15 April 2024   0931 CT head wo contrast  No acute intracranial  "abnormality.  Stable chronic microangiopathic changes within the brain.   0931 WBC: 7.61   0931 Hemoglobin(!): 15.5   0931 Platelet Count: 162   0931 Sodium: 139   0931 Potassium: 4.8   0931 Chloride: 101   0931 BUN: 16   0931 Creatinine: 0.80   0931 Total Bilirubin: 0.34   0931 AST: 33   0931 Total Protein: 6.6   0931 VALPROIC ACID TOTAL: 54   0951 Spoke with caregiver at facility at [phone] who reports patient seemed \"different today and not as up and about\" without seizure activity or a period of being unresponsive. Does not know patient's LKW as all night staff have left and there were no reports of any kind made overnight. Patient is normally AOx2 and interactive, now at baseline. Spoke with daughter at bedside who reports patient is at baseline currently.               Identification of Seniors at Risk      Flowsheet Row Most Recent Value   (ISAR) Identification of Seniors at Risk    Before the illness or injury that brought you to the Emergency, did you need someone to help you on a regular basis? 1 Filed at: 04/28/2024 0841   In the last 24 hours, have you needed more help than usual? 1 Filed at: 04/28/2024 0841   Have you been hospitalized for one or more nights during the past 6 months? 1 Filed at: 04/28/2024 0841   In general, do you see well? 0 Filed at: 04/28/2024 0841   In general, do you have serious problems with your memory? 1 Filed at: 04/28/2024 0841   Do you take more than three different medications every day? 0 Filed at: 04/28/2024 0841   ISAR Score 4 Filed at: 04/28/2024 0841                      SBIRT 22yo+      Flowsheet Row Most Recent Value   Initial Alcohol Screen: US AUDIT-C     1. How often do you have a drink containing alcohol? 0 Filed at: 04/28/2024 0841   2. How many drinks containing alcohol do you have on a typical day you are drinking?  0 Filed at: 04/28/2024 0841   3a. Male UNDER 65: How often do you have five or more drinks on one occasion? 0 Filed at: 04/28/2024 0841 " "  3b. FEMALE Any Age, or MALE 65+: How often do you have 4 or more drinks on one occassion? 0 Filed at: 04/28/2024 0841   Audit-C Score 0 Filed at: 04/28/2024 0841   SAHNNA: How many times in the past year have you...    Used an illegal drug or used a prescription medication for non-medical reasons? Never Filed at: 04/28/2024 0841                  Medical Decision Making  ASSESSMENT: Patient is a 83 y.o. female who presents with change in mental status earlier this morning without any deficits or other reported symptoms that has since resolved.  Patient's papers at bedside reviewed, patient is taking Depakote with paperwork from [facility] reporting a diagnosis of \"seizures,\" will check Depakote level.  DDX includes but not limited to: Behavioral issue, dementia, delirium, intracranial process.   PLAN: Labs, CT head, EKG. See ED course for additional details.    Patient's workup was negative, patient is back to baseline confirmed with [facility] caregivers and family.  Patient's daughter and son-in-law are now at bedside.  Discussed plan for discharge back to [facility].  Patient's daughter reports significant concerns with care facility and requesting admission for alternative placement.  Advised patient's daughter that this is likely not possible.  Will discuss with admitting team case management.  Case discussed with admitting team reports that patient has no medical necessity and there is no indication for admission to the hospital at this time as patient already has placement and care coordination.  Case discussed with case management who spoke with both patient and her daughter.  Due to patient's insurance there are limited options in regards to placement.  Patient started somewhere is trying to change her insurance and applied to have patient go to a different care facility.  Consult placed to care management.  Care management attempted to left knee with outside facilities and insurance multiple times without " success.  Discussed evaluation, care management plan outpatient coordination, and plan with patient and daughter/POA. Advised on need for outpatient follow up and case management/insurance coordination for alternative care facility placement. Given return precautions verbally and in discharge instructions, confirmed with teach back method. All questions answered prior to discharge. Patient/daughter expressed verbal understanding and are agreeable with plan for discharge with outpatient follow up.    Problems Addressed:  Confusion: acute illness or injury  Dementia (HCC): chronic illness or injury    Amount and/or Complexity of Data Reviewed  Independent Historian: guardian, caregiver and EMS  Labs: ordered. Decision-making details documented in ED Course.  Radiology: ordered. Decision-making details documented in ED Course.  ECG/medicine tests: ordered and independent interpretation performed. Decision-making details documented in ED Course.          Disposition  Final diagnoses:   Confusion   Dementia (HCC)     Time reflects when diagnosis was documented in both MDM as applicable and the Disposition within this note       Time User Action Codes Description Comment    4/28/2024  9:48 AM Eleni Ray Add [R41.0] Confusion     4/28/2024  9:48 AM Eleni Ray Add [F03.90] Dementia (HCC)           ED Disposition       ED Disposition   Discharge    Condition   Stable    Date/Time   Sun Apr 28, 2024 0958    Comment   Ann Cross discharge to home/self care.                   Follow-up Information       Follow up With Specialties Details Why Contact Info Additional Information    Emelina Swann MD Palliative Care Call   [address] [id number] [phone]       [facility] Emergency Department Emergency Medicine Go to  If symptoms worsen [address] [id number] [phone] [facility] Emergency Department, [address],  [place], [place], [address]   [phone]            Discharge Medication List as of 4/28/2024  9:51 AM        CONTINUE these medications which have NOT CHANGED    Details   acetaminophen (TYLENOL) 325 mg tablet Take 2 tablets (650 mg total) by mouth every 4 (four) hours as needed for mild pain, Starting Sun 12/24/2023, No Print      amLODIPine (NORVASC) 10 mg tablet Take 1 tablet (10 mg total) by mouth daily, Starting Fri 8/19/2022, Until Mon 2/19/2024, Normal      aspirin (ECOTRIN LOW STRENGTH) 81 mg EC tablet Take 81 mg by mouth daily, Historical Med      busPIRone (BUSPAR) 7.5 mg tablet Take 7.5 mg by mouth 2 (two) times a day, Historical Med      Calcium Carbonate 1500 (600 Ca) MG TABS Take 1 tablet by mouth daily, Starting Fri 9/9/2022, Historical Med      citalopram (CeleXA) 10 mg tablet Take 10 mg by mouth daily, Historical Med      divalproex sodium (DEPAKOTE) 250 mg EC tablet Take 250 mg by mouth 3 (three) times a day, Starting Fri 9/9/2022, Historical Med      donepezil (ARICEPT) 5 mg tablet Take 1 tablet (5 mg total) by mouth daily at bedtime, Starting Fri 8/19/2022, Until Mon 2/19/2024, Normal      ergocalciferol (VITAMIN D2) 50,000 units Take 50,000 Units by mouth every 30 (thirty) days, Starting Fri 9/9/2022, Historical Med      flecainide (TAMBOCOR) 50 mg tablet Take 1 tablet (50 mg total) by mouth every 12 (twelve) hours, Starting Fri 8/19/2022, Until Mon 2/19/2024, Normal      fluticasone (FLONASE) 50 mcg/act nasal spray 1 spray into each nostril daily for 7 days, Starting Mon 2/20/2023, Until Fri 4/21/2023, No Print      Melatonin 3 MG CAPS Take 6 mg by mouth, Historical Med      saccharomyces boulardii (FLORASTOR) 250 mg capsule Take 1 capsule (250 mg total) by mouth 2 (two) times a day for 10 days, Starting Mon 4/22/2024, Until u 5/2/2024, Normal           STOP taking these medications       vancomycin (VANCOCIN) 125 MG capsule Comments:   Reason for Stopping:             No discharge  procedures on file.    PDMP Review         Value Time User    PDMP Reviewed  Yes 12/31/2023 11:50 AM Rashel Pascual PA-C             ED Provider  Attending physically available and evaluated Ann Cross. I managed the patient along with the ED Attending.    Electronically Signed by           Eleni Magallanes DO  05/01/24 4613

## 2024-05-10 ENCOUNTER — HOSPITAL ENCOUNTER (INPATIENT)
Facility: HOSPITAL | Age: 84
LOS: 6 days | Discharge: NURSING, REHAB OR LONG-TERM CARE | DRG: 371 | End: 2024-05-16
Attending: EMERGENCY MEDICINE | Admitting: INTERNAL MEDICINE
Payer: MEDICARE

## 2024-05-10 ENCOUNTER — APPOINTMENT (EMERGENCY)
Dept: RADIOLOGY | Facility: HOSPITAL | Age: 84
DRG: 371 | End: 2024-05-10
Payer: MEDICARE

## 2024-05-10 ENCOUNTER — APPOINTMENT (INPATIENT)
Dept: RADIOLOGY | Facility: HOSPITAL | Age: 84
DRG: 371 | End: 2024-05-10
Payer: MEDICARE

## 2024-05-10 DIAGNOSIS — K37 APPENDICITIS: ICD-10-CM

## 2024-05-10 DIAGNOSIS — K52.9 COLITIS: ICD-10-CM

## 2024-05-10 DIAGNOSIS — A04.72 CLOSTRIDIUM DIFFICILE DIARRHEA: ICD-10-CM

## 2024-05-10 DIAGNOSIS — R41.82 ALTERED MENTAL STATUS: Primary | ICD-10-CM

## 2024-05-10 LAB
2HR DELTA HS TROPONIN: 0 NG/L
ALBUMIN SERPL BCP-MCNC: 4 G/DL (ref 3.5–5)
ALP SERPL-CCNC: 45 U/L (ref 34–104)
ALT SERPL W P-5'-P-CCNC: 7 U/L (ref 7–52)
ANION GAP SERPL CALCULATED.3IONS-SCNC: 11 MMOL/L (ref 4–13)
AST SERPL W P-5'-P-CCNC: 14 U/L (ref 13–39)
BACTERIA UR QL AUTO: ABNORMAL /HPF
BASOPHILS # BLD AUTO: 0.03 THOUSANDS/ÂΜL (ref 0–0.1)
BASOPHILS NFR BLD AUTO: 0 % (ref 0–1)
BILIRUB SERPL-MCNC: 0.44 MG/DL (ref 0.2–1)
BILIRUB UR QL STRIP: NEGATIVE
BILIRUB UR QL STRIP: NEGATIVE
BUN SERPL-MCNC: 13 MG/DL (ref 5–25)
CALCIUM SERPL-MCNC: 9.2 MG/DL (ref 8.4–10.2)
CARDIAC TROPONIN I PNL SERPL HS: 5 NG/L
CARDIAC TROPONIN I PNL SERPL HS: 5 NG/L
CHLORIDE SERPL-SCNC: 99 MMOL/L (ref 96–108)
CLARITY UR: CLEAR
CLARITY UR: CLEAR
CO2 SERPL-SCNC: 26 MMOL/L (ref 21–32)
COLOR UR: ABNORMAL
COLOR UR: YELLOW
CREAT SERPL-MCNC: 0.79 MG/DL (ref 0.6–1.3)
EOSINOPHIL # BLD AUTO: 0.03 THOUSAND/ÂΜL (ref 0–0.61)
EOSINOPHIL NFR BLD AUTO: 0 % (ref 0–6)
ERYTHROCYTE [DISTWIDTH] IN BLOOD BY AUTOMATED COUNT: 15.3 % (ref 11.6–15.1)
GFR SERPL CREATININE-BSD FRML MDRD: 69 ML/MIN/1.73SQ M
GLUCOSE SERPL-MCNC: 115 MG/DL (ref 65–140)
GLUCOSE UR STRIP-MCNC: NEGATIVE MG/DL
GLUCOSE UR STRIP-MCNC: NEGATIVE MG/DL
HCT VFR BLD AUTO: 45.9 % (ref 34.8–46.1)
HGB BLD-MCNC: 14.7 G/DL (ref 11.5–15.4)
HGB UR QL STRIP.AUTO: ABNORMAL
HGB UR QL STRIP.AUTO: NEGATIVE
IMM GRANULOCYTES # BLD AUTO: 0.04 THOUSAND/UL (ref 0–0.2)
IMM GRANULOCYTES NFR BLD AUTO: 0 % (ref 0–2)
KETONES UR STRIP-MCNC: ABNORMAL MG/DL
KETONES UR STRIP-MCNC: ABNORMAL MG/DL
LEUKOCYTE ESTERASE UR QL STRIP: NEGATIVE
LEUKOCYTE ESTERASE UR QL STRIP: NEGATIVE
LYMPHOCYTES # BLD AUTO: 2.53 THOUSANDS/ÂΜL (ref 0.6–4.47)
LYMPHOCYTES NFR BLD AUTO: 20 % (ref 14–44)
MCH RBC QN AUTO: 29 PG (ref 26.8–34.3)
MCHC RBC AUTO-ENTMCNC: 32 G/DL (ref 31.4–37.4)
MCV RBC AUTO: 91 FL (ref 82–98)
MONOCYTES # BLD AUTO: 1.65 THOUSAND/ÂΜL (ref 0.17–1.22)
MONOCYTES NFR BLD AUTO: 13 % (ref 4–12)
MUCOUS THREADS UR QL AUTO: ABNORMAL
NEUTROPHILS # BLD AUTO: 8.19 THOUSANDS/ÂΜL (ref 1.85–7.62)
NEUTS SEG NFR BLD AUTO: 67 % (ref 43–75)
NITRITE UR QL STRIP: NEGATIVE
NITRITE UR QL STRIP: NEGATIVE
NON-SQ EPI CELLS URNS QL MICRO: ABNORMAL /HPF
NRBC BLD AUTO-RTO: 0 /100 WBCS
PH UR STRIP.AUTO: 7.5 [PH]
PH UR STRIP.AUTO: 7.5 [PH] (ref 4.5–8)
PLATELET # BLD AUTO: 144 THOUSANDS/UL (ref 149–390)
PMV BLD AUTO: 10.2 FL (ref 8.9–12.7)
POTASSIUM SERPL-SCNC: 4.6 MMOL/L (ref 3.5–5.3)
PROT SERPL-MCNC: 6.9 G/DL (ref 6.4–8.4)
PROT UR STRIP-MCNC: NEGATIVE MG/DL
PROT UR STRIP-MCNC: NEGATIVE MG/DL
RBC # BLD AUTO: 5.07 MILLION/UL (ref 3.81–5.12)
RBC #/AREA URNS AUTO: ABNORMAL /HPF
SODIUM SERPL-SCNC: 136 MMOL/L (ref 135–147)
SP GR UR STRIP.AUTO: 1.01 (ref 1–1.03)
SP GR UR STRIP.AUTO: 1.02 (ref 1–1.03)
UROBILINOGEN UR QL STRIP.AUTO: 0.2 E.U./DL
UROBILINOGEN UR STRIP-ACNC: <2 MG/DL
VALPROATE SERPL-MCNC: 77 UG/ML (ref 50–100)
WBC # BLD AUTO: 12.47 THOUSAND/UL (ref 4.31–10.16)
WBC #/AREA URNS AUTO: ABNORMAL /HPF

## 2024-05-10 PROCEDURE — 70450 CT HEAD/BRAIN W/O DYE: CPT

## 2024-05-10 PROCEDURE — 93005 ELECTROCARDIOGRAM TRACING: CPT

## 2024-05-10 PROCEDURE — 99285 EMERGENCY DEPT VISIT HI MDM: CPT

## 2024-05-10 PROCEDURE — 80164 ASSAY DIPROPYLACETIC ACD TOT: CPT | Performed by: EMERGENCY MEDICINE

## 2024-05-10 PROCEDURE — 71045 X-RAY EXAM CHEST 1 VIEW: CPT

## 2024-05-10 PROCEDURE — 85025 COMPLETE CBC W/AUTO DIFF WBC: CPT

## 2024-05-10 PROCEDURE — 83605 ASSAY OF LACTIC ACID: CPT | Performed by: INTERNAL MEDICINE

## 2024-05-10 PROCEDURE — 84484 ASSAY OF TROPONIN QUANT: CPT | Performed by: EMERGENCY MEDICINE

## 2024-05-10 PROCEDURE — 81001 URINALYSIS AUTO W/SCOPE: CPT

## 2024-05-10 PROCEDURE — 74177 CT ABD & PELVIS W/CONTRAST: CPT

## 2024-05-10 PROCEDURE — 81003 URINALYSIS AUTO W/O SCOPE: CPT | Performed by: EMERGENCY MEDICINE

## 2024-05-10 PROCEDURE — 0241U HB NFCT DS VIR RESP RNA 4 TRGT: CPT | Performed by: INTERNAL MEDICINE

## 2024-05-10 PROCEDURE — 36415 COLL VENOUS BLD VENIPUNCTURE: CPT

## 2024-05-10 PROCEDURE — 84145 PROCALCITONIN (PCT): CPT | Performed by: INTERNAL MEDICINE

## 2024-05-10 PROCEDURE — 80053 COMPREHEN METABOLIC PANEL: CPT

## 2024-05-10 PROCEDURE — 71260 CT THORAX DX C+: CPT

## 2024-05-10 RX ORDER — LANOLIN ALCOHOL/MO/W.PET/CERES
3 CREAM (GRAM) TOPICAL
Status: DISCONTINUED | OUTPATIENT
Start: 2024-05-10 | End: 2024-05-16 | Stop reason: HOSPADM

## 2024-05-10 RX ORDER — DIVALPROEX SODIUM 250 MG/1
250 TABLET, DELAYED RELEASE ORAL 3 TIMES DAILY
Status: DISCONTINUED | OUTPATIENT
Start: 2024-05-10 | End: 2024-05-16 | Stop reason: HOSPADM

## 2024-05-10 RX ORDER — DONEPEZIL HYDROCHLORIDE 5 MG/1
5 TABLET, FILM COATED ORAL
Status: DISCONTINUED | OUTPATIENT
Start: 2024-05-10 | End: 2024-05-16 | Stop reason: HOSPADM

## 2024-05-10 RX ORDER — BUSPIRONE HYDROCHLORIDE 5 MG/1
7.5 TABLET ORAL 2 TIMES DAILY
Status: DISCONTINUED | OUTPATIENT
Start: 2024-05-10 | End: 2024-05-16 | Stop reason: HOSPADM

## 2024-05-10 RX ORDER — ACETAMINOPHEN 160 MG/5ML
1 SUSPENSION, ORAL (FINAL DOSE FORM) ORAL ONCE
Status: COMPLETED | OUTPATIENT
Start: 2024-05-10 | End: 2024-05-10

## 2024-05-10 RX ORDER — FLECAINIDE ACETATE 50 MG/1
50 TABLET ORAL EVERY 12 HOURS SCHEDULED
Status: DISCONTINUED | OUTPATIENT
Start: 2024-05-11 | End: 2024-05-16 | Stop reason: HOSPADM

## 2024-05-10 RX ORDER — CITALOPRAM HYDROBROMIDE 10 MG/1
10 TABLET ORAL DAILY
Status: DISCONTINUED | OUTPATIENT
Start: 2024-05-11 | End: 2024-05-16 | Stop reason: HOSPADM

## 2024-05-10 RX ORDER — ACETAMINOPHEN 325 MG/1
650 TABLET ORAL EVERY 4 HOURS PRN
Status: DISCONTINUED | OUTPATIENT
Start: 2024-05-10 | End: 2024-05-16 | Stop reason: HOSPADM

## 2024-05-10 RX ORDER — SODIUM CHLORIDE, SODIUM LACTATE, POTASSIUM CHLORIDE, CALCIUM CHLORIDE 600; 310; 30; 20 MG/100ML; MG/100ML; MG/100ML; MG/100ML
75 INJECTION, SOLUTION INTRAVENOUS CONTINUOUS
Status: DISCONTINUED | OUTPATIENT
Start: 2024-05-10 | End: 2024-05-14

## 2024-05-10 NOTE — ED ATTENDING ATTESTATION
5/10/2024  I, Francheska Barraza MD, saw and evaluated the patient. I have discussed the patient with the resident/non-physician practitioner and agree with the resident's/non-physician practitioner's findings, Plan of Care, and MDM as documented in the resident's/non-physician practitioner's note, except where noted. All available labs and Radiology studies were reviewed.  I was present for key portions of any procedure(s) performed by the resident/non-physician practitioner and I was immediately available to provide assistance.       At this point I agree with the current assessment done in the Emergency Department.  I have conducted an independent evaluation of this patient a history and physical is as follows:    OA: 84 y/o f with history of hypertension, hyperlipidemia as well as CKD A-fib and recent hospitalization for C. difficile who presents to the emergency department with altered mental status.  Per report patient is normally GCS 15 awake alert oriented and appropriate however today she is significantly more confused and has difficulty answering questions.  She has had previous presentations that are similar to this when she is acutely infected.  Patient herself has no complaints.  No report of nausea or vomiting.  No report of chest pain or shortness of breath.  Patient was reported to have a low-grade fever earlier today of 100.4.  Unknown sick contacts as patient is currently residing in a facility.  On exam patient is pleasant.  No acute distress.  Vital signs currently stable.  HEENT is normocephalic and atraumatic.  Mildly dry mucous membranes.  Neck is supple full range of motion.  Heart is regular rate without murmurs.  Lungs decreased with poor effort.  Abdomen is soft positive bowel sounds nontender.  +1 bilateral pitting edema.  Intact pulses.  Moving all extremities.  4-5 strength x 4.  Symmetric face and clear speech.  Slow to respond to however to certain questions.  Is oriented to person.   Assessment and plan 83-year-old female with increasing confusion.  Concern for potential infectious etiology as she has had similar presentations previously.  Evaluate with labs, chest x-ray, urinalysis, will also obtain CT head.  Monitor on telemetry.  Reevaluate and treat accordingly.    ED Course         Critical Care Time  Procedures

## 2024-05-11 PROBLEM — F03.90 DEMENTIA (HCC): Status: ACTIVE | Noted: 2024-05-11

## 2024-05-11 PROBLEM — Z86.19 HISTORY OF CLOSTRIDIUM DIFFICILE INFECTION: Status: ACTIVE | Noted: 2024-05-11

## 2024-05-11 PROBLEM — R50.9 FEVER, UNKNOWN ORIGIN: Status: ACTIVE | Noted: 2024-05-11

## 2024-05-11 LAB
ALBUMIN SERPL BCP-MCNC: 3.4 G/DL (ref 3.5–5)
ALP SERPL-CCNC: 41 U/L (ref 34–104)
ALT SERPL W P-5'-P-CCNC: 3 U/L (ref 7–52)
ANION GAP SERPL CALCULATED.3IONS-SCNC: 10 MMOL/L (ref 4–13)
APTT PPP: 27 SECONDS (ref 23–37)
AST SERPL W P-5'-P-CCNC: 10 U/L (ref 13–39)
ATRIAL RATE: 416 BPM
BASOPHILS # BLD MANUAL: 0.23 THOUSAND/UL (ref 0–0.1)
BASOPHILS NFR MAR MANUAL: 2 % (ref 0–1)
BILIRUB SERPL-MCNC: 0.54 MG/DL (ref 0.2–1)
BUN SERPL-MCNC: 10 MG/DL (ref 5–25)
BURR CELLS BLD QL SMEAR: PRESENT
CALCIUM ALBUM COR SERPL-MCNC: 8.8 MG/DL (ref 8.3–10.1)
CALCIUM SERPL-MCNC: 8.3 MG/DL (ref 8.4–10.2)
CHLORIDE SERPL-SCNC: 101 MMOL/L (ref 96–108)
CO2 SERPL-SCNC: 26 MMOL/L (ref 21–32)
CREAT SERPL-MCNC: 0.64 MG/DL (ref 0.6–1.3)
EOSINOPHIL # BLD MANUAL: 0 THOUSAND/UL (ref 0–0.4)
EOSINOPHIL NFR BLD MANUAL: 0 % (ref 0–6)
ERYTHROCYTE [DISTWIDTH] IN BLOOD BY AUTOMATED COUNT: 15.3 % (ref 11.6–15.1)
FLUAV RNA RESP QL NAA+PROBE: NEGATIVE
FLUBV RNA RESP QL NAA+PROBE: NEGATIVE
GFR SERPL CREATININE-BSD FRML MDRD: 82 ML/MIN/1.73SQ M
GLUCOSE SERPL-MCNC: 90 MG/DL (ref 65–140)
HCT VFR BLD AUTO: 42.6 % (ref 34.8–46.1)
HGB BLD-MCNC: 13.6 G/DL (ref 11.5–15.4)
INR PPP: 1.08 (ref 0.84–1.19)
LACTATE SERPL-SCNC: 1.8 MMOL/L (ref 0.5–2)
LYMPHOCYTES # BLD AUTO: 2.95 THOUSAND/UL (ref 0.6–4.47)
LYMPHOCYTES # BLD AUTO: 26 % (ref 14–44)
MAGNESIUM SERPL-MCNC: 2 MG/DL (ref 1.9–2.7)
MCH RBC QN AUTO: 29.1 PG (ref 26.8–34.3)
MCHC RBC AUTO-ENTMCNC: 31.9 G/DL (ref 31.4–37.4)
MCV RBC AUTO: 91 FL (ref 82–98)
MONOCYTES # BLD AUTO: 1.13 THOUSAND/UL (ref 0–1.22)
MONOCYTES NFR BLD: 10 % (ref 4–12)
NEUTROPHILS # BLD MANUAL: 7.02 THOUSAND/UL (ref 1.85–7.62)
NEUTS BAND NFR BLD MANUAL: 9 % (ref 0–8)
NEUTS SEG NFR BLD AUTO: 53 % (ref 43–75)
PLATELET # BLD AUTO: 129 THOUSANDS/UL (ref 149–390)
PLATELET BLD QL SMEAR: ABNORMAL
PMV BLD AUTO: 10.4 FL (ref 8.9–12.7)
POIKILOCYTOSIS BLD QL SMEAR: PRESENT
POTASSIUM SERPL-SCNC: 3.9 MMOL/L (ref 3.5–5.3)
PROCALCITONIN SERPL-MCNC: 0.06 NG/ML
PROT SERPL-MCNC: 6 G/DL (ref 6.4–8.4)
PROTHROMBIN TIME: 13.9 SECONDS (ref 11.6–14.5)
QRS AXIS: 37 DEGREES
QRSD INTERVAL: 68 MS
QT INTERVAL: 390 MS
QTC INTERVAL: 414 MS
RBC # BLD AUTO: 4.67 MILLION/UL (ref 3.81–5.12)
RBC MORPH BLD: PRESENT
RSV RNA RESP QL NAA+PROBE: NEGATIVE
SARS-COV-2 RNA RESP QL NAA+PROBE: NEGATIVE
SODIUM SERPL-SCNC: 137 MMOL/L (ref 135–147)
T WAVE AXIS: 49 DEGREES
VENTRICULAR RATE: 68 BPM
WBC # BLD AUTO: 11.33 THOUSAND/UL (ref 4.31–10.16)

## 2024-05-11 PROCEDURE — 85730 THROMBOPLASTIN TIME PARTIAL: CPT | Performed by: INTERNAL MEDICINE

## 2024-05-11 PROCEDURE — 83735 ASSAY OF MAGNESIUM: CPT | Performed by: INTERNAL MEDICINE

## 2024-05-11 PROCEDURE — 97167 OT EVAL HIGH COMPLEX 60 MIN: CPT

## 2024-05-11 PROCEDURE — 99223 1ST HOSP IP/OBS HIGH 75: CPT | Performed by: INTERNAL MEDICINE

## 2024-05-11 PROCEDURE — 80053 COMPREHEN METABOLIC PANEL: CPT | Performed by: INTERNAL MEDICINE

## 2024-05-11 PROCEDURE — 99024 POSTOP FOLLOW-UP VISIT: CPT | Performed by: STUDENT IN AN ORGANIZED HEALTH CARE EDUCATION/TRAINING PROGRAM

## 2024-05-11 PROCEDURE — 85610 PROTHROMBIN TIME: CPT | Performed by: INTERNAL MEDICINE

## 2024-05-11 PROCEDURE — 87505 NFCT AGENT DETECTION GI: CPT | Performed by: INTERNAL MEDICINE

## 2024-05-11 PROCEDURE — 87040 BLOOD CULTURE FOR BACTERIA: CPT | Performed by: INTERNAL MEDICINE

## 2024-05-11 PROCEDURE — 99285 EMERGENCY DEPT VISIT HI MDM: CPT | Performed by: EMERGENCY MEDICINE

## 2024-05-11 PROCEDURE — 87081 CULTURE SCREEN ONLY: CPT | Performed by: INTERNAL MEDICINE

## 2024-05-11 PROCEDURE — 93010 ELECTROCARDIOGRAM REPORT: CPT | Performed by: INTERNAL MEDICINE

## 2024-05-11 PROCEDURE — 87493 C DIFF AMPLIFIED PROBE: CPT | Performed by: INTERNAL MEDICINE

## 2024-05-11 PROCEDURE — 85027 COMPLETE CBC AUTOMATED: CPT | Performed by: INTERNAL MEDICINE

## 2024-05-11 PROCEDURE — 97163 PT EVAL HIGH COMPLEX 45 MIN: CPT

## 2024-05-11 PROCEDURE — 85007 BL SMEAR W/DIFF WBC COUNT: CPT | Performed by: INTERNAL MEDICINE

## 2024-05-11 RX ORDER — ACEBUTOLOL HYDROCHLORIDE 200 MG/1
200 CAPSULE ORAL DAILY
COMMUNITY

## 2024-05-11 RX ORDER — SILICONE ADHESIVE 1.5" X 3"
1 SHEET (EA) TOPICAL
COMMUNITY

## 2024-05-11 RX ORDER — VANCOMYCIN HYDROCHLORIDE 125 MG/1
125 CAPSULE ORAL EVERY 6 HOURS SCHEDULED
Status: DISCONTINUED | OUTPATIENT
Start: 2024-05-11 | End: 2024-05-16 | Stop reason: HOSPADM

## 2024-05-11 RX ORDER — METRONIDAZOLE 500 MG/100ML
500 INJECTION, SOLUTION INTRAVENOUS EVERY 8 HOURS
Status: DISCONTINUED | OUTPATIENT
Start: 2024-05-11 | End: 2024-05-13

## 2024-05-11 RX ADMIN — FLECAINIDE ACETATE 50 MG: 50 TABLET ORAL at 23:50

## 2024-05-11 RX ADMIN — Medication 3 MG: at 02:09

## 2024-05-11 RX ADMIN — SODIUM CHLORIDE, SODIUM LACTATE, POTASSIUM CHLORIDE, AND CALCIUM CHLORIDE 75 ML/HR: .6; .31; .03; .02 INJECTION, SOLUTION INTRAVENOUS at 19:47

## 2024-05-11 RX ADMIN — DIVALPROEX SODIUM 250 MG: 250 TABLET, DELAYED RELEASE ORAL at 02:09

## 2024-05-11 RX ADMIN — DIVALPROEX SODIUM 250 MG: 250 TABLET, DELAYED RELEASE ORAL at 10:03

## 2024-05-11 RX ADMIN — VANCOMYCIN HYDROCHLORIDE 125 MG: 125 CAPSULE ORAL at 05:28

## 2024-05-11 RX ADMIN — DONEPEZIL HYDROCHLORIDE 5 MG: 5 TABLET ORAL at 02:09

## 2024-05-11 RX ADMIN — SODIUM CHLORIDE, SODIUM LACTATE, POTASSIUM CHLORIDE, AND CALCIUM CHLORIDE 75 ML/HR: .6; .31; .03; .02 INJECTION, SOLUTION INTRAVENOUS at 01:56

## 2024-05-11 RX ADMIN — DONEPEZIL HYDROCHLORIDE 5 MG: 5 TABLET ORAL at 23:50

## 2024-05-11 RX ADMIN — ASPIRIN 81 MG: 81 TABLET, COATED ORAL at 10:04

## 2024-05-11 RX ADMIN — BUSPIRONE HYDROCHLORIDE 7.5 MG: 5 TABLET ORAL at 23:50

## 2024-05-11 RX ADMIN — VANCOMYCIN HYDROCHLORIDE 125 MG: 125 CAPSULE ORAL at 12:35

## 2024-05-11 RX ADMIN — BUSPIRONE HYDROCHLORIDE 7.5 MG: 5 TABLET ORAL at 02:09

## 2024-05-11 RX ADMIN — VANCOMYCIN HYDROCHLORIDE 125 MG: 125 CAPSULE ORAL at 16:44

## 2024-05-11 RX ADMIN — VANCOMYCIN HYDROCHLORIDE 125 MG: 125 CAPSULE ORAL at 23:51

## 2024-05-11 RX ADMIN — DIVALPROEX SODIUM 250 MG: 250 TABLET, DELAYED RELEASE ORAL at 23:50

## 2024-05-11 RX ADMIN — SODIUM CHLORIDE, SODIUM LACTATE, POTASSIUM CHLORIDE, AND CALCIUM CHLORIDE 500 ML: .6; .31; .03; .02 INJECTION, SOLUTION INTRAVENOUS at 03:55

## 2024-05-11 RX ADMIN — SODIUM CHLORIDE, SODIUM LACTATE, POTASSIUM CHLORIDE, AND CALCIUM CHLORIDE 75 ML/HR: .6; .31; .03; .02 INJECTION, SOLUTION INTRAVENOUS at 04:53

## 2024-05-11 RX ADMIN — CEFTRIAXONE 1000 MG: 1 INJECTION, POWDER, FOR SOLUTION INTRAMUSCULAR; INTRAVENOUS at 04:15

## 2024-05-11 RX ADMIN — IOHEXOL 100 ML: 350 INJECTION, SOLUTION INTRAVENOUS at 00:02

## 2024-05-11 RX ADMIN — DIVALPROEX SODIUM 250 MG: 250 TABLET, DELAYED RELEASE ORAL at 16:41

## 2024-05-11 RX ADMIN — METRONIDAZOLE 500 MG: 500 INJECTION, SOLUTION INTRAVENOUS at 23:51

## 2024-05-11 RX ADMIN — BUSPIRONE HYDROCHLORIDE 7.5 MG: 5 TABLET ORAL at 10:03

## 2024-05-11 RX ADMIN — FLECAINIDE ACETATE 50 MG: 50 TABLET ORAL at 12:35

## 2024-05-11 RX ADMIN — METRONIDAZOLE 500 MG: 500 INJECTION, SOLUTION INTRAVENOUS at 04:53

## 2024-05-11 RX ADMIN — CITALOPRAM HYDROBROMIDE 10 MG: 10 TABLET ORAL at 10:04

## 2024-05-11 RX ADMIN — Medication 3 MG: at 23:50

## 2024-05-11 RX ADMIN — METRONIDAZOLE 500 MG: 500 INJECTION, SOLUTION INTRAVENOUS at 12:36

## 2024-05-11 NOTE — QUICK NOTE
CT C/A/P:   1. No acute intrathoracic abnormalities noted with ancillary findings detailed above.  2. Extensive calcific atherosclerosis of the thoracoabdominal aorta and proximal branch vessels including coronary arteries.  2.6 x 2.4 cm infrarenal abdominal aortic aneurysm. Recommend follow-up imaging every 5 years per current guidelines.  3. Small hiatal hernia.  4. New abnormal findings of the appendix as described above concerning for acute uncomplicated appendicitis.  5. New mild wall thickening of the proximal ascending colon, mid to distal transverse colon, and portions of the descending colon noted consistent with colitis. Extensive abnormal wall thickening of the sigmoid colon appear worse compared to the prior study suggesting worsening sigmoid colitis. No pericolonic free air or abscess. A few likely reactive subcentimeter enhancing pericolonic mesenteric lymph nodes in the lower abdomen and pelvis are seen. Recommend follow-up colonoscopy when clinically appropriate to exclude neoplasm. No evidence for bowel obstruction.       Plan:  A) Start Ceftriaxone and Flagyl for appendicitis. Blood cultures ordered. Gen Surg consultation. NPO except for medications.   B) Order C Diff test/stool culture. Restart treatment dosing PO Vancomycin 125mg qid in setting of imaging and recent C diff positive last month

## 2024-05-11 NOTE — CONSULTS
Consultation - General Surgery   Ann Cross 83 y.o. female MRN: [id number]  Unit/Bed#: [facility] 733-01 Encounter: [id number]    Assessment/Plan     Assessment:  83yoF p/w AMS and fevers, CT imaging concerning for colitis and appendicitis    5/11 CT CAP: Distention of appendix 9 mm with wall thickening and minimal periappendiceal inflammatory stranding, mild wall thickening of proximal ascending colon, mid to distal transverse colon, and portions of descending colon consistent with colitis    Vital stable, afebrile  WBC 11.3 from 12.5  Creatinine 0.64  Stool studies pending    Plan:  -benign abdominal exam with only minimal infra umbilical tenderness, non tender RLQ, appendix findings on CT may be secondary to adjacent colitis, can continue with antibiotics Rocephin/Flagyl for now and manage nonoperatively, will likely see if patient is able to tolerate diet  -[id number]  -Also on oral Vanco in setting of recent C. Difficile  -DVT prophylaxis  -Treatment of colitis per primary team  -Care per primary team    History of Present Illness   HPI:  Ann Cross is a 83 y.o. female w HTN, A-fib no AC, HLD, VIRGILIO, dementia, anxiety, presenting to [facility] ED from her skilled nursing facility with altered mental status and fevers.  CT imaging concerning for colitis and possible appendicitis, general surgery consulted for further workup and management.    Patient has dementia at baseline and is a poor historian, she is only oriented to person and disoriented to place and time on my examination.  Unable to give much history, but does not think she has any abdominal pain, denies any nausea or emesis.  States she has been having bowel movements.    Inpatient consult to Acute Care Surgery  Consult performed by: Kenneth Long MD  Consult ordered by: Luis Kim DO          Review of Systems   Unable to perform ROS: Dementia       Historical Information   Past Medical History:   Diagnosis Date    Afib (HCC)     Arthritis      Hyperlipidemia     Hypertension     Osteopenia     Sleep apnea     Squamous cell skin cancer     LEF TEMPLE    Subdural hemorrhage (HCC)     Varicose veins of both lower extremities      Past Surgical History:   Procedure Laterality Date    CARDIAC ELECTROPHYSIOLOGY PROCEDURE N/A 10/12/2022    Procedure: Cardiac loop recorder implant;  Surgeon: Rashid Carlos MD;  [facility];  Service: Cardiology    CARDIAC ELECTROPHYSIOLOGY PROCEDURE N/A 4/21/2023    Procedure: Cardiac pacer implant;  Surgeon: Simba Chilel MD;  [facility]: [facility];  Service: Cardiology    CATARACT EXTRACTION      COLONOSCOPY      complete, for polyp removal    EYE SURGERY      HYSTERECTOMY      age 45    INCISION AND DRAINAGE OF WOUND Right 06/28/2016    Procedure: ARTHROSCOPY,EVACUATION OF HEMATOMA, OPEN EXPLORATION OF WOUND;  Surgeon: Adam Brice MD;  [facility]: [facility];  Service:     MOHS SURGERY Left 12/27/2022    left temple    SKIN BIOPSY      TONSILLECTOMY AND ADENOIDECTOMY      TOTAL KNEE ARTHROPLASTY Right     TOTAL KNEE ARTHROPLASTY Left     WISDOM TOOTH EXTRACTION       Social History   Social History     Substance and Sexual Activity   Alcohol Use Not Currently     Social History     Substance and Sexual Activity   Drug Use No     E-Cigarette/Vaping    E-Cigarette Use Never User      E-Cigarette/Vaping Substances    Nicotine No     THC No     CBD No     Flavoring No     Other No     Unknown No      Social History     Tobacco Use   Smoking Status Never   Smokeless Tobacco Never     Family History: non-contributory    Meds/Allergies   all current active meds have been reviewed  Allergies   Allergen Reactions    Atorvastatin Other (See Comments)     myalgia    Statins Other (See Comments)     Weakness in legs       Objective   First Vitals:   Blood Pressure: 154/65 (05/10/24 2015)  Pulse: 69 (05/10/24 1845)  Temperature: 99.2 °F (37.3 °C) (05/10/24 1845)  Temp Source: Oral (05/10/24 1845)  Respirations:  "16 (05/10/24 1845)  Height: 5' 4\" (162.6 cm) (05/11/24 0111)  Weight - Scale: 66.1 kg (145 lb 11.6 oz) (05/11/24 0111)  SpO2: 96 % (05/10/24 2015)    Current Vitals:   Blood Pressure: (!) 115/46 (05/11/24 0724)  Pulse: 67 (05/11/24 0724)  Temperature: 99.9 °F (37.7 °C) (05/11/24 0724)  Temp Source: Oral (05/11/24 0044)  Respirations: 16 (05/11/24 0724)  Height: 5' 4\" (162.6 cm) (05/11/24 0111)  Weight - Scale: 66.1 kg (145 lb 11.6 oz) (05/11/24 0111)  SpO2: 93 % (05/11/24 0724)      Intake/Output Summary (Last 24 hours) at 5/11/2024 1105  Last data filed at 5/11/2024 0452  Gross per 24 hour   Intake 647.5 ml   Output 450 ml   Net 197.5 ml       Invasive Devices       Peripheral Intravenous Line  Duration             Peripheral IV 05/10/24 Left Antecubital 1 day                    Physical Exam  Vitals reviewed.   Constitutional:       General: She is not in acute distress.     Appearance: She is not ill-appearing or toxic-appearing.   HENT:      Head: Normocephalic and atraumatic.      Nose: Nose normal.      Mouth/Throat:      Mouth: Mucous membranes are moist.   Eyes:      Extraocular Movements: Extraocular movements intact.   Cardiovascular:      Rate and Rhythm: Normal rate.      Pulses: Normal pulses.   Pulmonary:      Effort: Pulmonary effort is normal. No respiratory distress.   Abdominal:      General: There is no distension.      Palpations: Abdomen is soft.      Tenderness: There is abdominal tenderness. There is no guarding or rebound.      Comments: Soft, ND, very minimal tenderness in the suprapubic region, RLQ is non tender   Musculoskeletal:         General: Normal range of motion.      Cervical back: Normal range of motion.   Skin:     General: Skin is warm.      Capillary Refill: Capillary refill takes less than 2 seconds.      Coloration: Skin is not jaundiced or pale.   Neurological:      Mental Status: She is alert. She is disoriented.         Lab Results: I have personally reviewed pertinent lab " results.  , CBC:   Lab Results   Component Value Date    WBC 11.33 (H) 05/11/2024    HGB 13.6 05/11/2024    HCT 42.6 05/11/2024    MCV 91 05/11/2024     (L) 05/11/2024    RBC 4.67 05/11/2024    MCH 29.1 05/11/2024    MCHC 31.9 05/11/2024    RDW 15.3 (H) 05/11/2024    MPV 10.4 05/11/2024    NRBC 0 05/10/2024   , CMP:   Lab Results   Component Value Date    SODIUM 137 05/11/2024    K 3.9 05/11/2024     05/11/2024    CO2 26 05/11/2024    BUN 10 05/11/2024    CREATININE 0.64 05/11/2024    CALCIUM 8.3 (L) 05/11/2024    AST 10 (L) 05/11/2024    ALT 3 (L) 05/11/2024    ALKPHOS 41 05/11/2024    EGFR 82 05/11/2024   , Coagulation:   Lab Results   Component Value Date    INR 1.08 05/11/2024   , Urinalysis:   Lab Results   Component Value Date    COLORU Yellow 05/10/2024    CLARITYU Clear 05/10/2024    SPECGRAV 1.020 05/10/2024    PHUR 7.5 05/10/2024    PHUR 7.5 05/10/2024    LEUKOCYTESUR Negative 05/10/2024    NITRITE Negative 05/10/2024    GLUCOSEU Negative 05/10/2024    KETONESU Trace (A) 05/10/2024    BILIRUBINUR Negative 05/10/2024    BLOODU Trace (A) 05/10/2024     Imaging: I have personally reviewed pertinent reports.    EKG, Pathology, and Other Studies: I have personally reviewed pertinent reports.      Counseling / Coordination of Care

## 2024-05-11 NOTE — PLAN OF CARE
Problem: OCCUPATIONAL THERAPY ADULT  Goal: Performs self-care activities at highest level of function for planned discharge setting.  See evaluation for individualized goals.  Description: Treatment Interventions: ADL retraining, Functional transfer training, UE strengthening/ROM, Endurance training, Cognitive reorientation, Patient/family training, Equipment evaluation/education, Compensatory technique education, Continued evaluation, Energy conservation, Activityengagement          See flowsheet documentation for full assessment, interventions and recommendations.   Note: Limitation: Decreased ADL status, Decreased UE ROM, Decreased UE strength, Decreased Safe judgement during ADL, Decreased cognition, Decreased endurance, Decreased self-care trans, Decreased high-level ADLs  Prognosis: Fair  Assessment: Pt is a 82 y/o female seen for OT eval s/p adm to [facility] w/  altered mental status and fever. Pt is dx'd w/  acute metabolic encephalopathy. Pt  has a past medical history of Afib (HCC), Arthritis, Hyperlipidemia, Hypertension, Osteopenia, Sleep apnea, Squamous cell skin cancer, Subdural hemorrhage (HCC), and Varicose veins of both lower extremities. Pt with active OT orders and activity as tolerated orders. Pt lives with facility staff @ [facility]. Pt was I w/  ADLS and has assist for IADLS, does not drive, & required use of DME PTA including RW and cane. Pt is currently demonstrating the following occupational deficits: Mod A UB ADLS, Max-Total A LB ADLS, Mod A x2 bed mobility, transfers and functional mobility w/ B/L HHA used. These deficits that are impacting pt's baseline areas of occupation are a result of the following impairments: pain, endurance, activity tolerance, functional mobility, forward functional reach, balance, trunk control, functional standing tolerance, decreased I w/ ADLS/IADLS, strength, cognitive impairments, decreased safety awareness, and decreased insight into deficits. The  following Occupational Performance Areas to address include: eating, grooming, bathing/shower, toilet hygiene, dressing, health maintenance, functional mobility, and clothing management. Recommend  inpt rehab vs return to facility w/ increased assist, pending if facility can provide necessary level of support,  upon D/C. Pt to continue to benefit from acute immediate OT services to address the following goals 2-3x/week to  w/in 10-14 days:     Rehab Resource Intensity Level, OT: II (Moderate Resource Intensity)     Alicia Rizvi MS, OTR/L

## 2024-05-11 NOTE — ASSESSMENT & PLAN NOTE
Noted history of C. difficile during last hospitalization in April from 4/15 to 4/22 in which patient was on p.o. vancomycin every 6 hours for 10 days  No current reports of diarrhea but we are keeping this on the differential given patient's altered mental status as well as fever of unknown origin.  Should patient develop diarrhea will need C. difficile test as well as stool studies.

## 2024-05-11 NOTE — ASSESSMENT & PLAN NOTE
Previous hospitalization in April 2024 for encephalopathy in which patient as well as daughters worried about patient's progressively worsening memory issues over some time; from review of chart, there was suspected underlying dementia/cognitive decline in the setting of a subdural hematoma in December Continue PTA donepezil  Delirium protocol

## 2024-05-11 NOTE — ASSESSMENT & PLAN NOTE
Reported altered mental status at [facility] where patient resides as well as fever of 100.4  On exam, patient responds to questions although appears confused at times and on orientation questions was only able to say that she is at [facility]; did not know the year or the current president  With respect to patient's baseline mentation, it does appear that she has a history of dementia/cognitive decline in the setting of a subdural hematoma back in December as well as underlying psychiatric disease; patient was hospitalized from 4/15 to 4/22 last month for similar altered mental status in which she underwent full workup including MRI brain then.  Unclear if she had a fever then but she did have diarrhea then and abdominal imaging with colitis for which she was found to have C. Difficile; no current reports of diarrhea today  CT head negative for acute intracranial abnormality today  UA negative for infection  Chest x-ray without large effusion or infiltrate on my exam awaiting official read  WBC 12 K, lactic acid within normal limits, procalcitonin negative  Admit to medicine.  Given patient's fever of unknown origin, we will order CT chest/abdomen/pelvis with contrast to further evaluate.  Hold off on any empiric antimicrobials given no current source as well as normal lactic acid and negative procalcitonin as well as in the setting of patient with history of C. difficile.  We will additionally need to have high suspicion for checking for C. difficile/stool cultures should patient develop any diarrhea but no reports of this and patient without any diarrhea while in the ER.  We will additionally give gentle IV fluids overnight while patient with altered mental status and keep n.p.o.  Given patient's history, suspect the etiology of her altered mental status is likely multifactorial delirium in the setting of underlying dementia.  Delirium protocol.  Consider geriatrics consultation  PT/OT

## 2024-05-11 NOTE — OCCUPATIONAL THERAPY NOTE
Occupational Therapy Evaluation     Patient Name: Ann Cross  Today's Date: 5/11/2024  Problem List  Principal Problem:    Acute metabolic encephalopathy  Active Problems:     Mood disorder with hypomanic features    Paroxysmal atrial fibrillation (HCC)    Fever, unknown origin    History of Clostridium difficile infection    Dementia (HCC)    Past Medical History  Past Medical History:   Diagnosis Date    Afib (HCC)     Arthritis     Hyperlipidemia     Hypertension     Osteopenia     Sleep apnea     Squamous cell skin cancer     LEF TEMPLE    Subdural hemorrhage (HCC)     Varicose veins of both lower extremities      Past Surgical History  Past Surgical History:   Procedure Laterality Date    CARDIAC ELECTROPHYSIOLOGY PROCEDURE N/A 10/12/2022    Procedure: Cardiac loop recorder implant;  Surgeon: Rashid Carlos MD;  [facility];  Service: Cardiology    CARDIAC ELECTROPHYSIOLOGY PROCEDURE N/A 4/21/2023    Procedure: Cardiac pacer implant;  Surgeon: Simba Chilel MD;  [facility];  Service: Cardiology    CATARACT EXTRACTION      COLONOSCOPY      complete, for polyp removal    EYE SURGERY      HYSTERECTOMY      age 45    INCISION AND DRAINAGE OF WOUND Right 06/28/2016    Procedure: ARTHROSCOPY,EVACUATION OF HEMATOMA, OPEN EXPLORATION OF WOUND;  Surgeon: Adam Brice MD;  Location: [facility];  Service:     MOHS SURGERY Left 12/27/2022    left temple    SKIN BIOPSY      TONSILLECTOMY AND ADENOIDECTOMY      TOTAL KNEE ARTHROPLASTY Right     TOTAL KNEE ARTHROPLASTY Left     WISDOM TOOTH EXTRACTION           05/11/24 0918   OT Last Visit   OT Visit Date 05/11/24   Note Type   Note type Evaluation   Pain Assessment   Pain Assessment Tool FLACC   Pain Rating: FLACC (Rest) - Face 1   Pain Rating: FLACC (Rest) - Legs 0   Pain Rating: FLACC (Rest) - Activity 0   Pain Rating: FLACC (Rest) - Cry 0   Pain Rating: FLACC (Rest) - Consolability 0   Score: FLACC (Rest) 1   Pain Rating:  FLACC (Activity) - Face 1   Pain Rating: FLACC (Activity) - Legs 0   Pain Rating: FLACC (Activity) - Activity 0   Pain Rating: FLACC (Activity) - Cry 1   Pain Rating: FLACC (Activity) - Consolability 0   Score: FLACC (Activity) 2   Restrictions/Precautions   Weight Bearing Precautions Per Order No   Other Precautions Cognitive;Chair Alarm;Bed Alarm;Multiple lines;Telemetry;Fall Risk;Pain;Contact/isolation   Home Living   Type of Home Assisted living   Home Layout One level;Performs ADLs on one level;Able to live on main level with bedroom/bathroom   Bathroom Shower/Tub Walk-in shower   Bathroom Toilet Raised   Bathroom Equipment Grab bars in shower;Shower chair;Grab bars around toilet   Home Equipment Cane;Walker   Additional Comments Pt is poor historian; hx obtained from EMR. Resides at [facility]   Prior Function   Level of [place] Independent with ADLs;Independent with functional mobility;Needs assistance with IADLS   Lives With Facility staff   Receives Help From Other (Comment)  (facility staff)   IADLs Family/Friend/Other provides transportation;Family/Friend/Other provides meals;Family/Friend/Other provides medication management   Falls in the last 6 months 0   Vocational Retired   Comments per EMR, pt was I w/ ADLS, has assist for IADLS and was Mod I w/ transfers and functional mobility PTA   Lifestyle   Autonomy I w/ ADLS, assist IADLS, Mod I w/ transfers and functional mobility PTA   Reciprocal Relationships Pt lives w/ facility staff   Service to Others Retired   Intr[employer]at[employer] Sports   ADL   Eating Assistance 3  Moderate Assistance   Grooming Assistance 3  Moderate Assistance   UB Bathing Assistance 3  Moderate Assistance   LB Bathing Assistance 2  Maximal Assistance   UB Dressing Assistance 3  Moderate Assistance   LB Dressing Assistance 2  Maximal Assistance   LB Dressing Deficit Don/doff R sock;Don/doff L sock   Toileting Assistance  1  Total Assistance   Toileting Deficit  Perineal hygiene;Other (Comment)  (incontinent)   Functional Assistance 3  Moderate Assistance   Functional Deficit Steadying;Verbal cueing;Supervision/safety;Increased time to complete  (AX2)   Bed Mobility   Rolling R 3  Moderate assistance   Additional items Assist x 2;Increased time required;Verbal cues;LE management   Rolling L 3  Moderate assistance   Additional items Assist x 2;Increased time required;Verbal cues;LE management   Supine to Sit 3  Moderate assistance   Additional items Assist x 2;Increased time required;Verbal cues;LE management   Sit to Supine Unable to assess   Additional Comments pt rolled L/R in bed  and went from supine to sit w/ Mod A x2; sat EOB w/ Min A for sitting balance/trunk control; presents w/ slight R lateral & posterior lean. VC/TC for safety/proper technique   Transfers   Sit to Stand 3  Moderate assistance   Additional items Assist x 2;Increased time required;Verbal cues   Stand to Sit 3  Moderate assistance   Additional items Assist x 2;Increased time required;Verbal cues   Additional Comments w/ B/L HHA used   Functional Mobility   Functional Mobility 3  Moderate assistance   Additional Comments pt took few small steps from EOB to recliner w/ Mod  ax2; B/L HHA used. VC for safety and hand placement.   Additional items Hand hold assistance   Balance   Static Sitting Poor +   Dynamic Sitting Poor   Static Standing Poor   Dynamic Standing Poor -   Ambulatory Poor -   Activity Tolerance   Activity Tolerance Patient limited by fatigue;Patient limited by pain   Medical Staff Made Aware Ever LOVE   Nurse Made Aware yes   RUE Assessment   RUE Assessment   (grossly 4/5)   LUE Assessment   LUE Assessment   (grossly 4/5)   Hand Function   Gross Motor Coordination Functional   Fine Motor Coordination Functional   Cognition   Overall Cognitive Status Impaired   Arousal/Participation Responsive;Cooperative;Lethargic   Attention Attends with cues to redirect   Orientation Level Oriented  to person;Disoriented to time;Disoriented to place;Disoriented to situation   Memory Decreased recall of precautions;Decreased recall of recent events;Decreased short term memory   Following Commands Follows one step commands with increased time or repetition   Comments pt is pleasant and cooperative; only oriented to self; slow to respond.   Assessment   Limitation Decreased ADL status;Decreased UE ROM;Decreased UE strength;Decreased Safe judgement during ADL;Decreased cognition;Decreased endurance;Decreased self-care trans;Decreased high-level ADLs   Prognosis Fair   Assessment Pt is a 84 y/o female seen for OT eval s/p adm to [facility] w/  altered mental status and fever. Pt is dx'd w/  acute metabolic encephalopathy. Pt  has a past medical history of Afib (HCC), Arthritis, Hyperlipidemia, Hypertension, Osteopenia, Sleep apnea, Squamous cell skin cancer, Subdural hemorrhage (HCC), and Varicose veins of both lower extremities. Pt with active OT orders and activity as tolerated orders. Pt lives with facility staff @ [facility]. Pt was I w/  ADLS and has assist for IADLS, does not drive, & required use of DME PTA including RW and cane. Pt is currently demonstrating the following occupational deficits: Mod A UB ADLS, Max-Total A LB ADLS, Mod A x2 bed mobility, transfers and functional mobility w/ B/L HHA used. These deficits that are impacting pt's baseline areas of occupation are a result of the following impairments: pain, endurance, activity tolerance, functional mobility, forward functional reach, balance, trunk control, functional standing tolerance, decreased I w/ ADLS/IADLS, strength, cognitive impairments, decreased safety awareness, and decreased insight into deficits. The following Occupational Performance Areas to address include: eating, grooming, bathing/shower, toilet hygiene, dressing, health maintenance, functional mobility, and clothing management. Recommend  inpt rehab vs return to facility w/  increased assist, pending if facility can provide necessary level of support,  upon D/C. Pt to continue to benefit from acute immediate OT services to address the following goals 2-3x/week to  w/in 10-14 days:   Goals   Patient Goals unable to report 2/2 impaired cognition   LTG Time Frame 10-   Long Term Goal #1 see below listed goals   Plan   Treatment Interventions ADL retraining;Functional transfer training;UE strengthening/ROM;Endurance training;Cognitive reorientation;Patient/family training;Equipment evaluation/education;Compensatory technique education;Continued evaluation;Energy conservation;Activityengagement   Goal Expiration Date 24   OT Frequency 2-3x/wk   Discharge Recommendation   Rehab Resource Intensity Level, OT II (Moderate Resource Intensity)   Additional Comments  The patient's raw score on the AM-PAC Daily Activity Inpatient Short Form is 12. A raw score of less than 19 suggests the patient may benefit from discharge to post-acute rehabilitation services. Please refer to the recommendation of the Occupational Therapist for safe discharge planning.   Additional Comments 2 Pt seen as a co-session due to the patient's co-morbidities, clinically unstable presentation, and present impairments which are a regression from the patient's baseline   AM-[facility] Daily Activity Inpatient   Lower Body Dressing 2   Bathing 2   Toileting 1   Upper Body Dressing 2   Grooming 2   Eating 3   Daily Activity Raw Score 12   Daily Activity Standardized Score (Calc for Raw Score >=11) 30.6   AM-PAC Applied Cognition Inpatient   Following a Speech/Presentation 1   Understanding Ordinary Conversation 2   Taking Medications 2   Remembering Where Things Are Placed or Put Away 1   Remembering List of 4-5 Errands 1   Taking Care of Complicated Tasks 1   Applied Cognition Raw Score 8   Applied Cognition Standardized Score 19.32   End of Consult   Education Provided Yes   Patient Position at End of Consult Bedside  chair;Bed/Chair alarm activated;All needs within reach   Nurse Communication Nurse aware of consult     GOALS    1) Pt will complete rolling left/right in bed with S assist, as prerequisite for further engagement in ADLS   2) Pt will complete supine to sit transfer with S using B/L UEs to initiate bed mobility   3) Pt will tolerate sitting at EOB 20 minutes with S assist and stable vital signs, as prerequisite for further engagement in ADLS   4) Pt will complete grooming task with S assist and increased time to increase independence in functional tasks  5) Pt will increase B/L UE ROM 1/2 MMT to increase functional UB use during ADLS   6) Pt will complete UB ADLS with S and use of AD/DME as needed to increase independence in functional tasks  7) Pt will complete LB ADLS with Min A and use of AD/DME as needed to increase independence in functional tasks  8) Pt will complete sit to stand transfer / sit pivot transfer / stand pivot transfer with S assist on/off all ADL surfaces   9) Pt will follow 100% simple one step verbal commands and be A/Ox4 consistently t/o use of external environmental cues to increase awareness for functional tasks  10) Pt will demonstrate 100% carryover of E.C. techniques t/o fx'l I/ADL/ leisure tasks w/o cues s/p skilled education  11) Pt will improve functional mobility to S w/ use of AD/DME as needed to increase engagement in meaningful tasks    Alicia Rizvi MS, OTR/L

## 2024-05-11 NOTE — PHYSICAL THERAPY NOTE
Physical Therapy Evaluation     Patient's Name: Ann Cross    Admitting Diagnosis  Altered mental status [R41.82]  AMS (altered mental status) [R41.82]    Problem List  Patient Active Problem List   Diagnosis    Pain, joint, knee, right    Obstructive sleep apnea syndrome     Mood disorder with hypomanic features    Generalized anxiety disorder    Minimal cognitive impairment    Hyperlipemia    Hypertension    Anxiety state    Degeneration of lumbar intervertebral disc    History of total bilateral knee replacement    Metabolic syndrome X    Migraine    Osteoporosis    Paroxysmal atrial fibrillation (HCC)    Peripheral venous insufficiency    Tachy-laison syndrome (HCC)    Stage 3 chronic kidney disease, unspecified whether stage 3a or 3b CKD (HCC)    Acute metabolic encephalopathy    COVID-19    Diarrhea    Thrombocytopenia (HCC)    Sinus pause    Acute pain due to trauma    Fall    Encephalopathy    Traumatic subdural hemorrhage, subsequent encounter    Siderosis (HCC)    Clostridium difficile diarrhea    Fever, unknown origin    History of Clostridium difficile infection    Dementia (HCC)       Past Medical History  Past Medical History:   Diagnosis Date    Afib (HCC)     Arthritis     Hyperlipidemia     Hypertension     Osteopenia     Sleep apnea     Squamous cell skin cancer     LEF TEMPLE    Subdural hemorrhage (HCC)     Varicose veins of both lower extremities        Past Surgical History  Past Surgical History:   Procedure Laterality Date    CARDIAC ELECTROPHYSIOLOGY PROCEDURE N/A 10/12/2022    Procedure: Cardiac loop recorder implant;  Surgeon: Rashid Carlos MD;  Location: [facility];  Service: Cardiology    CARDIAC ELECTROPHYSIOLOGY PROCEDURE N/A 4/21/2023    Procedure: Cardiac pacer implant;  Surgeon: Simba Chilel MD;  Location: [facility];  Service: Cardiology    CATARACT EXTRACTION      COLONOSCOPY      complete, for polyp removal    EYE SURGERY      HYSTERECTOMY      age 45     INCISION AND DRAINAGE OF WOUND Right 06/28/2016    Procedure: ARTHROSCOPY,EVACUATION OF HEMATOMA, OPEN EXPLORATION OF WOUND;  Surgeon: Adam Brice MD;  Location: [facility];  Service:     MOHS SURGERY Left 12/27/2022    left temple    SKIN BIOPSY      TONSILLECTOMY AND ADENOIDECTOMY      TOTAL KNEE ARTHROPLASTY Right     TOTAL KNEE ARTHROPLASTY Left     WISDOM TOOTH EXTRACTION            05/11/24 0918   PT Last Visit   PT Visit Date 05/11/24   Note Type   Note type Evaluation   Pain Assessment   Pain Assessment Tool FLACC   Pain Rating: FLACC (Rest) - Face 1   Pain Rating: FLACC (Rest) - Legs 0   Pain Rating: FLACC (Rest) - Activity 0   Pain Rating: FLACC (Rest) - Cry 0   Pain Rating: FLACC (Rest) - Consolability 0   Score: FLACC (Rest) 1   Pain Rating: FLACC (Activity) - Face 1   Pain Rating: FLACC (Activity) - Legs 0   Pain Rating: FLACC (Activity) - Activity 0   Pain Rating: FLACC (Activity) - Cry 1   Pain Rating: FLACC (Activity) - Consolability 0   Score: FLACC (Activity) 2   Restrictions/Precautions   Weight Bearing Precautions Per Order No   Other Precautions Cognitive;Chair Alarm;Bed Alarm   Home Living   Type of Home Assisted living  ([facility])   Home Layout One level;Able to live on main level with bedroom/bathroom;Performs ADLs on one level   Bathroom Shower/Tub Walk-in shower   Bathroom Toilet Raised   Bathroom Equipment Grab bars in shower;Shower chair;Grab bars around toilet   Bathroom Accessibility Accessible   Home Equipment Walker;Cane   Additional Comments pt poor historian, above information gatherd via chart review   Prior Function   Level of [place] Independent with ADLs;Independent with functional mobility;Needs assistance with IADLS   Lives With Facility staff   Receives Help From   (facility staff)   IADLs Family/Friend/Other provides transportation;Family/Friend/Other provides meals;Family/Friend/Other provides medication management   Falls in the last 6 months 0    Vocational Retired   Comments pt poor historian, above information gathered via chart review   General   Family/Caregiver Present No   Cognition   Overall Cognitive Status Impaired   Arousal/Participation Responsive   Attention Attends with cues to redirect   Orientation Level Oriented to person;Disoriented to time;Disoriented to place;Disoriented to situation   Memory Decreased recall of precautions;Decreased recall of recent events;Decreased short term memory   Following Commands Follows one step commands with increased time or repetition   Subjective   Subjective pt pleasantly confused and cooperative throughout therapy session. pt received supine in bed   RLE Assessment   RLE Assessment X  (3+/5 grossly)   LLE Assessment   LLE Assessment X  (3+/5 grossly)   Bed Mobility   Rolling R 3  Moderate assistance   Additional items Assist x 2;Increased time required;Verbal cues;LE management   Rolling L 3  Moderate assistance   Additional items Assist x 2;Increased time required;LE management;Verbal cues   Supine to Sit 3  Moderate assistance   Additional items Assist x 2;Increased time required;Verbal cues;LE management   Sit to Supine Unable to assess   Transfers   Sit to Stand 3  Moderate assistance   Additional items Assist x 2;Increased time required;Verbal cues   Stand to Sit 3  Moderate assistance   Additional items Assist x 2;Increased time required;Verbal cues   Stand pivot 3  Moderate assistance   Additional items Assist x 2;Increased time required;Verbal cues   Additional Comments transfers w BL HHA   Ambulation/Elevation   Gait pattern Not tested   Balance   Static Sitting Poor +   Dynamic Sitting Poor   Static Standing Poor   Dynamic Standing Poor -   Endurance Deficit   Endurance Deficit Yes   Endurance Deficit Description generalized weakness, decreased exercise tolerance   Activity Tolerance   Activity Tolerance Patient limited by fatigue;Patient limited by pain   Medical Staff Made Aware THIERNO Vargas  co-eval due to medical complexity and multiple comorbidities   Nurse Made Aware RN cleared and updated   Assessment   Prognosis Fair   Problem List Decreased strength;Decreased range of motion;Decreased endurance;Impaired balance;Decreased mobility;Decreased cognition;Impaired judgement;Decreased safety awareness   Assessment PT orders received and acknowledged. Patient was seen today for high complexity PT evaluation. High complexity evaluation due to Ongoing medical management for primary dx, Increased reliance on more restrictive AD compared to baseline, Decreased activity tolerance compared to baseline, Fall risk, Increased assistance needed from caregiver at current time, Ongoing telemetry monitoring, Continuous pulse oximetry monitoring  Patient is a 83 y.o. female  who was admitted to [facility] on 5/10/2024  with Acute metabolic encephalopathy . Pt [facility] to [facility] with increased AMS from facility . Patient performed functional mobility as described above.  At baseline, pt resides with facility staff in [facility] and was requiring some assist prior to hospital admission. Currently, upon initial examination, pt  is requiring mod assist x2 for bed mobility skills;  mod assist x2 for functional transfers. Patient currently presents below baseline with limitations in gait, balance, and transfers. Patient will benefit from continued PT services while in hospital in order to address remaining limitations. The patients AM-PAC Basic Mobility Inpatient Short From Raw Score is 7 . Based on AM-PAC scoring and patient presentation, PT currently recommending Level II (Moderate Resource Intensity). Please also refer to the recommendation of the Physical Therapist for safe discharge planning.   Barriers to Discharge None   Goals   Patient Goals none stated   [facility] Expiration Date 05/25/24   Short Term Goal #1 Patient will be able to perform bed mobility tasks with  supervision   in order to improve overall functional  mobility and assist in safe d/c. STG 2 Patient will sit EOB for at least 25 minutes at  supervision   level in order to strengthen abdominal musculature and assist in future transfers and ambulation. STG 3 Patient will be able to perform functional transfer with  supervision   in order to improve overall functional mobility and assist in safe discharge. STG 4 Patient will be able to ambulate at least 50 feet with least restrictive device with  supervision   assist in order to improve overall functional mobility and assist in safe discharge. STG 5 Patient will improve sitting/standing static/dynamic balance 1/2 grade in order to improve functional mobility and assist in safe discharge. STG 6 Patient will improve LE strength by 1/2 grade in order to improve functional mobility and assist in safe discharge.   PT Treatment Day 0   Plan   Treatment/Interventions ADL retraining;Functional transfer training;LE strengthening/ROM;Elevations;Therapeutic exercise;Endurance training;Bed mobility;Gait training;Spoke to nursing;OT   PT Frequency 3-5x/wk   Discharge Recommendation   Rehab Resource Intensity Level, PT II (Moderate Resource Intensity)  (vs return with hhpt pending prior assist levels)   AM-PAC Basic Mobility Inpatient   Turning in Flat Bed Without Bedrails 2   Lying on Back to Sitting on Edge of Flat Bed Without Bedrails 1   Moving Bed to Chair 1   Standing Up From Chair Using Arms 1   Walk in Room 1   Climb 3-5 Stairs With Railing 1   Basic Mobility Inpatient Raw Score 7   [facility] Highest Level Of Mobility   -HLM Goal 2: Bed activities/Dependent transfer   -HLM Achieved 4: Move to chair/commode   Barthel Index   Feeding 5   Bathing 0   Grooming Score 0   Dressing Score 0   Bladder Score 0   Bowels Score 0   Toilet Use Score 0   Transfers (Bed/Chair) Score 5   Mobility (Level Surface) Score 0   Stairs Score 0   Barthel Index Score 10   End of Consult   Patient Position at End of Consult Bedside  chair;Bed/Chair alarm activated;All needs within reach         Ever Chandra, PT

## 2024-05-11 NOTE — ED NOTES
Patient transported to CT.     Puma Mcneill RN  05/10/24 2048       Puma Mcneill RN  05/10/24 2048

## 2024-05-11 NOTE — H&P
[facility]  H&P  Name: Ann Cross 83 y.o. female I MRN: [id number]  Unit/Bed#: [facility] 733-01 I Date of Admission: 5/10/2024   Date of Service: 5/11/2024 I Hospital Day: 1      Assessment/Plan   * Acute metabolic encephalopathy  Assessment & Plan  Reported altered mental status at [facility] where patient resides as well as fever of 100.4  On exam, patient responds to questions although appears confused at times and on orientation questions was only able to say that she is at [facility]; did not know the year or the current president  With respect to patient's baseline mentation, it does appear that she has a history of dementia/cognitive decline in the setting of a subdural hematoma back in December as well as underlying psychiatric disease; patient was hospitalized from 4/15 to 4/22 last month for similar altered mental status in which she underwent full workup including MRI brain then.  Unclear if she had a fever then but she did have diarrhea then and abdominal imaging with colitis for which she was found to have C. Difficile; no current reports of diarrhea today  CT head negative for acute intracranial abnormality today  UA negative for infection  Chest x-ray without large effusion or infiltrate on my exam awaiting official read  WBC 12 K, lactic acid within normal limits, procalcitonin negative  Admit to medicine.  Given patient's fever of unknown origin, we will order CT chest/abdomen/pelvis with contrast to further evaluate.  Hold off on any empiric antimicrobials given no current source as well as normal lactic acid and negative procalcitonin as well as in the setting of patient with history of C. difficile.  We will additionally need to have high suspicion for checking for C. difficile/stool cultures should patient develop any diarrhea but no reports of this and patient without any diarrhea while in the ER.  We will additionally give gentle IV fluids overnight  while patient with altered mental status and keep n.p.o.  Given patient's history, suspect the etiology of her altered mental status is likely multifactorial delirium in the setting of underlying dementia.  Delirium protocol.  Consider geriatrics consultation  PT/OT      Fever, unknown origin  Assessment & Plan  Temperature 100.4 at facility  UA negative for infection  Chest x-ray without obvious infiltrate per my read awaiting official read  No reports of diarrhea--> did previously have C. difficile diarrhea in April 2024 for which she was treated with complete course of p.o. vancomycin  CT chest/abdomen/pelvis with contrast to further evaluate  WBC 12 K, lactic acid within normal limits, procalcitonin negative    Dementia (HCC)  Assessment & Plan  Previous hospitalization in April 2024 for encephalopathy in which patient as well as daughters worried about patient's progressively worsening memory issues over some time; from review of chart, there was suspected underlying dementia/cognitive decline in the setting of a subdural hematoma in December  Continue PTA donepezil  Delirium protocol    History of Clostridium difficile infection  Assessment & Plan  Noted history of C. difficile during last hospitalization in April from 4/15 to 4/22 in which patient was on p.o. vancomycin every 6 hours for 10 days  No current reports of diarrhea but we are keeping this on the differential given patient's altered mental status as well as fever of unknown origin.  Should patient develop diarrhea will need C. difficile test as well as stool studies.    Paroxysmal atrial fibrillation (HCC)  Assessment & Plan  Continue PTA flecainide  Not on any PTA anticoagulation     Mood disorder with hypomanic features  Assessment & Plan  Continue PTA BuSpar, Celexa and Aricept                 VTE Prophylaxis:  sequential compression device and foot pump applied   Code Status: Level 1 - Full Code       Anticipated Length of Stay:  Patient will  be admitted on an Inpatient basis with an anticipated length of stay of  > 2 midnights.   Justification for Hospital Stay: Please see detailed plans noted above.    Chief Complaint:     Altered mental status  History of Present Illness:  Ann Cross is a 83 y.o. female who has past medical history significant for dementia, C. difficile colitis, paroxysmal atrial fibrillation, subdural hematoma, mood disorder with hypomanic features who presented to [facility] ER on the evening of 5/10 with altered mental status as well as fever of 100.4 from her skilled nursing facility at [facility].  Further details limited secondary to patient's underlying mental status.  Does appear that patient was hospitalized a month ago for similar altered mental status in which her etiology was thought to be multifactorial in the setting of likely underlying dementia.  Of note, she was found to be C. difficile positive during that hospitalization but then she reported diarrhea.  No reports of diarrhea today and patient without any episodes of diarrhea in the ER.      Review of Systems: Unable to be reliably completed given patient's underlying mental status  Past Medical and Surgical History:   Past Medical History:   Diagnosis Date    Afib (HCC)     Arthritis     Hyperlipidemia     Hypertension     Osteopenia     Sleep apnea     Squamous cell skin cancer     LEF TEMPLE    Subdural hemorrhage (HCC)     Varicose veins of both lower extremities      Past Surgical History:   Procedure Laterality Date    CARDIAC ELECTROPHYSIOLOGY PROCEDURE N/A 10/12/2022    Procedure: Cardiac loop recorder implant;  Surgeon: Rashid Carlos MD;  Location: [facility];  Service: Cardiology    CARDIAC ELECTROPHYSIOLOGY PROCEDURE N/A 4/21/2023    Procedure: Cardiac pacer implant;  Surgeon: iSmba Chilel MD;  Location: [facility];  Service: Cardiology    CATARACT EXTRACTION      COLONOSCOPY      complete, for polyp removal     EYE SURGERY      HYSTERECTOMY      age 45    INCISION AND DRAINAGE OF WOUND Right 06/28/2016    Procedure: ARTHROSCOPY,EVACUATION OF HEMATOMA, OPEN EXPLORATION OF WOUND;  Surgeon: Adam Brice MD;  Location: [facility];  Service:     MOHS SURGERY Left 12/27/2022    left temple    SKIN BIOPSY      TONSILLECTOMY AND ADENOIDECTOMY      TOTAL KNEE ARTHROPLASTY Right     TOTAL KNEE ARTHROPLASTY Left     WISDOM TOOTH EXTRACTION         Meds/Allergies:  Medications Prior to Admission   Medication Sig Dispense Refill Last Dose    acetaminophen (TYLENOL) 325 mg tablet Take 2 tablets (650 mg total) by mouth every 4 (four) hours as needed for mild pain 30 tablet 0     amLODIPine (NORVASC) 10 mg tablet Take 1 tablet (10 mg total) by mouth daily 30 tablet 0     aspirin (ECOTRIN LOW STRENGTH) 81 mg EC tablet Take 81 mg by mouth daily       busPIRone (BUSPAR) 7.5 mg tablet Take 7.5 mg by mouth 2 (two) times a day       Calcium Carbonate 1500 (600 Ca) MG TABS Take 1 tablet by mouth daily       citalopram (CeleXA) 10 mg tablet Take 10 mg by mouth daily       divalproex sodium (DEPAKOTE) 250 mg EC tablet Take 250 mg by mouth 3 (three) times a day       donepezil (ARICEPT) 5 mg tablet Take 1 tablet (5 mg total) by mouth daily at bedtime 30 tablet 1     ergocalciferol (VITAMIN D2) 50,000 units Take 50,000 Units by mouth every 30 (thirty) days       flecainide (TAMBOCOR) 50 mg tablet Take 1 tablet (50 mg total) by mouth every 12 (twelve) hours 60 tablet 0     fluticasone (FLONASE) 50 mcg/act nasal spray 1 spray into each nostril daily for 7 days 9.9 mL 0     Melatonin 3 MG CAPS Take 6 mg by mouth          Allergies:   Allergies   Allergen Reactions    Atorvastatin Other (See Comments)     myalgia    Statins Other (See Comments)     Weakness in legs       History:  Marital Status:      Substance Use History:   Social History     Substance and Sexual Activity   Alcohol Use Not Currently     Social History     Tobacco Use   Smoking  "Status Never   Smokeless Tobacco Never     Social History     Substance and Sexual Activity   Drug Use No       Family History:  Family History   Problem Relation Age of Onset    Diabetes Mother     Diabetes Father     Diabetes Brother     No Known Problems Sister     No Known Problems Daughter     No Known Problems Maternal Grandmother     No Known Problems Maternal Grandfather     No Known Problems Paternal Grandmother     No Known Problems Paternal Grandfather     No Known Problems Daughter     Brain cancer Son     No Known Problems Maternal Aunt        Physical Exam:     Vitals:   Blood Pressure: 126/66 (05/11/24 0044)  Pulse: 72 (05/11/24 0044)  Temperature: (!) 96.1 °F (35.6 °C) (05/11/24 0044)  Temp Source: Oral (05/11/24 0044)  Respirations: 18 (05/11/24 0044)  Height: 5' 4\" (162.6 cm) (05/11/24 0111)  Weight - Scale: 66.1 kg (145 lb 11.6 oz) (05/11/24 0111)  SpO2: 93 % (05/11/24 0044)    Constitutional:  Awake  Eyes:  EOMI, No scleral icterus   HENT:   oropharynx moist, external ears normal, external nose normal   Respiratory:  No respiratory distress, no wheezing   Cardiovascular:  Normal rate, no murmurs   GI:  Soft, nondistended, no guarding   :  No costovertebral angle tenderness   Musculoskeletal:  no tenderness, no deformities. Back- no tenderness  Integument:  no jaundice, no rash   Neurologic:  Awake, Oriented only to place, Confused, no obvious focal deficits        Lab Results: I have personally reviewed pertinent reports.   and I have personally reviewed pertinent films in PACS    Results from last 7 days   Lab Units 05/10/24  1922   WBC Thousand/uL 12.47*   HEMOGLOBIN g/dL 14.7   HEMATOCRIT % 45.9   PLATELETS Thousands/uL 144*   SEGS PCT % 67   LYMPHO PCT % 20   MONO PCT % 13*   EOS PCT % 0     Results from last 7 days   Lab Units 05/10/24  1922   POTASSIUM mmol/L 4.6   CHLORIDE mmol/L 99   CO2 mmol/L 26   BUN mg/dL 13   CREATININE mg/dL 0.79   CALCIUM mg/dL 9.2   ALK PHOS U/L 45   ALT U/L 7 "   AST U/L 14             Imaging: I have personally reviewed pertinent reports.   and I have personally reviewed pertinent films in PACS    CT head without contrast    Result Date: 5/10/2024  Narrative: CT BRAIN - WITHOUT CONTRAST INDICATION:   altered mental status. COMPARISON: 4/28/2024 and 4/16/2024. TECHNIQUE:  CT examination of the brain was performed.  Multiplanar 2D reformatted images were created from the source data. Radiation dose length product (DLP) for this visit:  833.78 mGy-cm .  This examination, like all CT scans performed in the [facility], was performed utilizing techniques to minimize radiation dose exposure, including the use of iterative  reconstruction and automated exposure control. IMAGE QUALITY:  Diagnostic. FINDINGS: PARENCHYMA: Decreased attenuation is noted in periventricular and subcortical white matter demonstrating an appearance that is statistically most likely to represent moderate microangiopathic change; this appearance is similar when compared to most recent prior examination. No CT signs of acute infarction.  No intracranial mass, mass effect or midline shift.  No acute parenchymal hemorrhage. VENTRICLES AND EXTRA-AXIAL SPACES:  Ventricles and extra-axial CSF spaces are prominent commensurate with the degree of volume loss.  No hydrocephalus.  No acute extra-axial hemorrhage. VISUALIZED ORBITS: Post bilateral ocular lens replacement. PARANASAL SINUSES: Normal visualized paranasal sinuses. CALVARIUM AND EXTRACRANIAL SOFT TISSUES:  Normal.     Impression: No acute intracranial CT abnormality.  Stable chronic microangiopathic changes within the brain. Workstation performed: [id number]     CT head wo contrast    Result Date: 4/28/2024  Narrative: CT BRAIN - WITHOUT CONTRAST INDICATION:   altered MS. COMPARISON: Head CT from April 15, 2024, MRI of the brain from April 16, 2024 TECHNIQUE:  CT examination of the brain was performed.  Multiplanar 2D reformatted images  were created from the source data. Radiation dose length product (DLP) for this visit:  821.7 mGy-cm .  This examination, like all CT scans performed in the [facility], was performed utilizing techniques to minimize radiation dose exposure, including the use of iterative reconstruction and automated exposure control. IMAGE QUALITY:  Diagnostic. FINDINGS: PARENCHYMA: Decreased attenuation is noted in periventricular and subcortical white matter demonstrating an appearance that is statistically most likely to represent moderate microangiopathic change. Intracranial carotid and vertebral artery atherosclerotic calcifications. No CT signs of acute infarction. Limited evaluation of the sujata due to artifact. Underlying chronic microangiopathic changes were seen on the prior MRI study. No intracranial mass, mass effect or midline shift.  No acute parenchymal hemorrhage. VENTRICLES AND EXTRA-AXIAL SPACES:  Normal for the patient's age. Enlarged sylvian fissures. VISUALIZED ORBITS: Prior bilateral lens surgery. PARANASAL SINUSES: Normal visualized paranasal sinuses. CALVARIUM AND EXTRACRANIAL SOFT TISSUES:  Normal.     Impression: No acute intracranial abnormality.  Stable chronic microangiopathic changes within the brain. Workstation performed: [id number]     MRI brain wo contrast    Result Date: 4/16/2024  Narrative: MRI BRAIN WITHOUT CONTRAST INDICATION: ams. COMPARISON:   None. TECHNIQUE:  Multiplanar, multisequence imaging of the brain was performed. IMAGE QUALITY:  Diagnostic. FINDINGS: BRAIN PARENCHYMA: Cerebral atrophy. There is no discrete mass, mass effect or midline shift. There is no intracranial hemorrhage.  There is no evidence of acute infarction and diffusion imaging is unremarkable. Nonspecific patchy and confluent foci of T2/FLAIR hyperintensities involving periventricular and subcortical white matter as well as sujata, most compatible with advanced microangiopathic change. VENTRICLES:  Normal  for the patient's age. SELLA AND PITUITARY GLAND:  Normal. ORBITS: Prior bilateral lens replacement. PARANASAL SINUSES: Minimal ethmoidal and VASCULATURE:  Evaluation of the major intracranial vasculature demonstrates appropriate flow voids. CALVARIUM AND SKULL BASE:  Normal. EXTRACRANIAL SOFT TISSUES:  Normal.     Impression: 1.  No acute infarct. 2.  Cerebral atrophy and chronic microangiopathic change. Workstation performed: [id number]     CT chest abdomen pelvis w contrast    Result Date: 4/15/2024  Narrative: CT CHEST, ABDOMEN AND PELVIS WITH IV CONTRAST INDICATION: AMS. COMPARISON: None. TECHNIQUE: CT examination of the chest, abdomen and pelvis was performed. Multiplanar 2D reformatted images were created from the source data. This examination, like all CT scans performed in the [facility], was performed utilizing techniques to minimize radiation dose exposure, including the use of iterative reconstruction and automated exposure control. Radiation dose length product (DLP) for this visit: 849.78 mGy-cm IV Contrast: 100 mL of iohexol (OMNIPAQUE) Enteric Contrast: Not administered. FINDINGS: CHEST LUNGS: There is bibasilar atelectasis. No tracheal or endobronchial lesion. PLEURA: Unremarkable. HEART/GREAT VESSELS: Heart is unremarkable for patient's age. No thoracic aortic aneurysm. MEDIASTINUM AND RAF: Unremarkable. CHEST WALL AND LOWER NECK: Unremarkable. ABDOMEN LIVER/BILIARY TREE: Unremarkable. GALLBLADDER: No calcified gallstones. No pericholecystic inflammatory change. SPLEEN: Unremarkable. PANCREAS: Unremarkable. ADRENAL GLANDS: Unremarkable. KIDNEYS/URETERS: There is a right-sided parapelvic cyst. No hydronephrosis. STOMACH AND BOWEL: There is segmental thickening of the sigmoid colon of uncertain etiology as there is no significant diverticulosis and no adjacent stranding. There is however mild mesenteric lymphadenopathy adjacent to the thickened colon. Largest lymph node measures 0.9  x 1.0 cm on series 2 image 190. APPENDIX: No findings to suggest appendicitis. ABDOMINOPELVIC CAVITY: There is trace free fluid in the pelvis. No pneumoperitoneum. VESSELS: Ectatic abdominal aorta measuring up to 2.7 cm. PELVIS REPRODUCTIVE ORGANS: Post hysterectomy. There is a 2 cm left ovarian cyst. URINARY BLADDER: Unremarkable. ABDOMINAL WALL/INGUINAL REGIONS: Unremarkable. BONES: No acute fracture or suspicious osseous lesion.     Impression: 1.  No acute abnormality in the chest. 2.  Segmental thickening of the sigmoid colon with adjacent mesenteric lymphadenopathy of uncertain etiology. Segmental inflammatory or infectious colitis is possible though there is no surrounding stranding. Malignancy is also possible though a discrete  measurable mass is not identified. Clinical correlation and follow-up colonoscopy recommended. 3.  Ectatic aorta measuring up to 2.7 cm. Recommendation is for follow-up in 5 years though considerations related to the patient's age and/or comorbidities may be used to alter this recommendation. The study was marked in EPIC for immediate notification. Workstation performed: [id number]     XR chest 2 views    Result Date: 4/15/2024  Narrative: XR CHEST PA & LATERAL INDICATION: hypoxia. COMPARISON: Same day CT FINDINGS: Left chest wall intracardiac device with intact lead(s). No abandoned lead(s). Left atrial appendage clip. Clear lungs. No pneumothorax or pleural effusion. Normal cardiomediastinal silhouette. Degenerative changes of the spine. Normal upper abdomen.     Impression: No acute cardiopulmonary disease. Workstation performed: [id number]     CT head without contrast    Result Date: 4/15/2024  Narrative: CT BRAIN - WITHOUT CONTRAST INDICATION:   Altered mental status. Recent left frontal lobe subarachnoid hemorrhage and parafalcine subdural hematomas which have resolved as of 3/4/2024 COMPARISON: Multiple CT head studies dating from 4/7/2024 to 12/22/2023 TECHNIQUE:  CT  examination of the brain was performed.  Multiplanar 2D reformatted images were created from the source data. Radiation dose length product (DLP) for this visit:  826.23 mGy-cm .  This examination, like all CT scans performed in the [facility], was performed utilizing techniques to minimize radiation dose exposure, including the use of iterative  reconstruction and automated exposure control. IMAGE QUALITY:  Diagnostic. FINDINGS: PARENCHYMA: Decreased attenuation is noted in periventricular and subcortical white matter demonstrating an appearance that is statistically most likely to represent moderate microangiopathic change; this appearance is similar when compared to most recent prior examination. No CT signs of acute infarction. No intracranial mass, mass effect or midline shift. No acute parenchymal hemorrhage. Unchanged hypodensity in the left sujata likely remote ischemic insult (series 2 image 12). This region is difficult to evaluate given the artifact which is present at this level. Bilateral internal carotid artery and distal vertebral artery atherosclerosis. VENTRICLES AND EXTRA-AXIAL SPACES:  Normal for the patient's age. Enlarged sylvian fissures suggesting temporal volume loss. VISUALIZED ORBITS: Bilateral lens replacements. PARANASAL SINUSES: Normal visualized paranasal sinuses. CALVARIUM AND EXTRACRANIAL SOFT TISSUES:  Normal.     Impression: No acute intracranial abnormality. Similar moderate chronic microangiopathic changes. Resident: Ameya Hurley I, the attending radiologist, have reviewed the images and agree with the final report above. Workstation performed: [id number]       Total time for visit, including counseling/coordination of care: 45 minutes. Greater than 50% of this total time spent on direct patient counseling and coorination of care.     Epic Records Reviewed as well as Records in Care Everywhere    ** Please Note: Dragon 360 Dictation voice to text software was  used in the creation of this document. **

## 2024-05-11 NOTE — PLAN OF CARE
Problem: Prexisting or High Potential for Compromised Skin Integrity  Goal: Skin integrity is maintained or improved  Description: INTERVENTIONS:  - Identify patients at risk for skin breakdown  - Assess and monitor skin integrity  - Assess and monitor nutrition and hydration status  - Monitor labs   - Assess for incontinence   - Turn and reposition patient  - Assist with mobility/ambulation  - Relieve pressure over bony prominences  - Avoid friction and shearing  - Provide appropriate hygiene as needed including keeping skin clean and dry  - Evaluate need for skin moisturizer/barrier cream  - Collaborate with interdisciplinary team   - Patient/family teaching  - Consider wound care consult   Outcome: Progressing     Problem: INFECTION - ADULT  Goal: Absence or prevention of progression during hospitalization  Description: INTERVENTIONS:  - Assess and monitor for signs and symptoms of infection  - Monitor lab/diagnostic results  - Monitor all insertion sites, i.e. indwelling lines, tubes, and drains  - Monitor endotracheal if appropriate and nasal secretions for changes in amount and color  - [place] appropriate cooling/warming therapies per order  - Administer medications as ordered  - Instruct and encourage patient and family to use good hand hygiene technique  - Identify and instruct in appropriate isolation precautions for identified infection/condition  Outcome: Progressing     Problem: DISCHARGE PLANNING  Goal: Discharge to home or other facility with appropriate resources  Description: INTERVENTIONS:  - Identify barriers to discharge w/patient and caregiver  - Arrange for needed discharge resources and transportation as appropriate  - Identify discharge learning needs (meds, wound care, etc.)  - Arrange for interpretive services to assist at discharge as needed  - Refer to Case Management Department for coordinating discharge planning if the patient needs post-hospital services based on physician/advanced  practitioner order or complex needs related to functional status, cognitive ability, or social support system  Outcome: Progressing     Problem: Knowledge Deficit  Goal: Patient/family/caregiver demonstrates understanding of disease process, treatment plan, medications, and discharge instructions  Description: Complete learning assessment and assess knowledge base.  Interventions:  - Provide teaching at level of understanding  - Provide teaching via preferred learning methods  Outcome: Progressing     Problem: NEUROSENSORY - ADULT  Goal: Achieves stable or improved neurological status  Description: INTERVENTIONS  - Monitor and report changes in neurological status  - Monitor vital signs such as temperature, blood pressure, glucose, and any other labs ordered   - Initiate measures to prevent increased intracranial pressure  - Monitor for seizure activity and implement precautions if appropriate      Outcome: Progressing     Problem: GENITOURINARY - ADULT  Goal: Absence of urinary retention  Description: INTERVENTIONS:  - Assess patient’s ability to void and empty bladder  - Monitor I/O  - Bladder scan as needed  - Discuss with physician/AP medications to alleviate retention as needed  - Discuss catheterization for long term situations as appropriate  Outcome: Progressing     Problem: METABOLIC, FLUID AND ELECTROLYTES - ADULT  Goal: Electrolytes maintained within normal limits  Description: INTERVENTIONS:  - Monitor labs and assess patient for signs and symptoms of electrolyte imbalances  - Administer electrolyte replacement as ordered  - Monitor response to electrolyte replacements, including repeat lab results as appropriate  - Instruct patient on fluid and nutrition as appropriate  Outcome: Progressing     Problem: Prexisting or High Potential for Compromised Skin Integrity  Goal: Skin integrity is maintained or improved  Description: INTERVENTIONS:  - Identify patients at risk for skin breakdown  - Assess and  monitor skin integrity  - Assess and monitor nutrition and hydration status  - Monitor labs   - Assess for incontinence   - Turn and reposition patient  - Assist with mobility/ambulation  - Relieve pressure over bony prominences  - Avoid friction and shearing  - Provide appropriate hygiene as needed including keeping skin clean and dry  - Evaluate need for skin moisturizer/barrier cream  - Collaborate with interdisciplinary team   - Patient/family teaching  - Consider wound care consult   Outcome: Progressing     Problem: SAFETY ADULT  Goal: Patient will remain free of falls  Description: INTERVENTIONS:  - Educate patient/family on patient safety including physical limitations  - Instruct patient to call for assistance with activity   - Consult OT/PT to assist with strengthening/mobility   - Keep Call bell within reach  - Keep bed low and locked with side rails adjusted as appropriate  - Keep care items and personal belongings within reach  - Initiate and maintain comfort rounds  - Make Fall Risk Sign visible to staff  - Offer Toileting every 2 Hours, in advance of need  - Initiate/Maintain bed alarm  - Obtain necessary fall risk management equipment:    - Apply yellow socks and bracelet for high fall risk patients  - Consider moving patient to room near nurses station  Outcome: Progressing

## 2024-05-11 NOTE — PLAN OF CARE
Problem: PHYSICAL THERAPY ADULT  Goal: Performs mobility at highest level of function for planned discharge setting.  See evaluation for individualized goals.  Description: Treatment/Interventions: ADL retraining, Functional transfer training, LE strengthening/ROM, Elevations, Therapeutic exercise, Endurance training, Bed mobility, Gait training, Spoke to nursing, OT          See flowsheet documentation for full assessment, interventions and recommendations.  Note: Prognosis: Fair  Problem List: Decreased strength, Decreased range of motion, Decreased endurance, Impaired balance, Decreased mobility, Decreased cognition, Impaired judgement, Decreased safety awareness  Assessment: PT orders received and acknowledged. Patient was seen today for high complexity PT evaluation. High complexity evaluation due to Ongoing medical management for primary dx, Increased reliance on more restrictive AD compared to baseline, Decreased activity tolerance compared to baseline, Fall risk, Increased assistance needed from caregiver at current time, Ongoing telemetry monitoring, Continuous pulse oximetry monitoring  Patient is a 83 y.o. female  who was [facility] to [facility] on 5/10/[facility]24  with Acute metabolic encephalopathy . Pt presents to [facility] with increased AMS from facility . Patient performed functional mobility as described above.  At baseline, pt resides with facility staff in [facility] and was requiring some assist prior to hospital admission. Currently, upon initial examination, pt  is requiring mod assist x2 for bed mobility skills;  mod assist x2 for functional transfers. Patient currently presents below baseline with limitations in gait, balance, and transfers. Patient will benefit from continued PT services while in hospital in order to address remaining limitations. The patients AM-PAC Basic Mobility Inpatient Short From Raw Score is 7 . Based on AM-PAC scoring and patient presentation, PT currently recommending  Level II (Moderate Resource Intensity). Please also refer to the recommendation of the Physical Therapist for safe discharge planning.  Barriers to Discharge: None     Rehab Resource Intensity Level, PT: II (Moderate Resource Intensity) (vs return with hhpt pending prior assist levels)    See flowsheet documentation for full assessment.

## 2024-05-11 NOTE — ASSESSMENT & PLAN NOTE
Temperature 100.4 at facility  UA negative for infection  Chest x-ray without obvious infiltrate per my read awaiting official read  No reports of diarrhea--> did previously have C. difficile diarrhea in April 2024 for which she was treated with complete course of p.o. vancomycin  CT chest/abdomen/pelvis with contrast to further evaluate  WBC 12 K, lactic acid within normal limits, procalcitonin negative

## 2024-05-11 NOTE — ED PROVIDER NOTES
History  Chief Complaint   Patient presents with    Altered Mental Status     Patient from [facility], presents to ER with acute onset of AMS. Patient normally GCS 15. Febrile at 100.4 per EMS given 1g of tylenol      HPI    Patient is an 83 year old female with PMHx A-fib, HLD, HTN, dementia, C.Diff colitis, Subdural hematoma, presenting to the ED from nursing facility for evaluation of altered mental status and fever TMAX 100.4. History taken from EMS as patient is unreliable source of information at this time. Per report, patient is normally alert and interactive, GCS 15. This afternoon, patient had a change in mentation; believes that it is 1940. Patient reportedly had fever at nursing facility, for which she was given Tylenol 1g PO. Patient offers no somatic complaints at this time and does not know why she is at the hospital. Specifically, patient denies chest pain, difficulty breathing, abdominal pain, urinary complaints. Patient does have history of C. Diff, but no report of recent diarrhea.    Prior to Admission Medications   Prescriptions Last Dose Informant Patient Reported? Taking?   Calcium Carbonate 1500 (600 Ca) MG TABS  Self Yes No   Sig: Take 1 tablet by mouth daily   Melatonin 3 MG CAPS  Self Yes No   Sig: Take 6 mg by mouth   acetaminophen (TYLENOL) 325 mg tablet   No No   Sig: Take 2 tablets (650 mg total) by mouth every 4 (four) hours as needed for mild pain   amLODIPine (NORVASC) 10 mg tablet  Self No No   Sig: Take 1 tablet (10 mg total) by mouth daily   aspirin (ECOTRIN LOW STRENGTH) 81 mg EC tablet  Outside Facility (Specify) Yes No   Sig: Take 81 mg by mouth daily   busPIRone (BUSPAR) 7.5 mg tablet  Outside Facility (Specify) Yes No   Sig: Take 7.5 mg by mouth 2 (two) times a day   citalopram (CeleXA) 10 mg tablet  Outside Facility (Specify) Yes No   Sig: Take 10 mg by mouth daily   divalproex sodium (DEPAKOTE) 250 mg EC tablet  Self Yes No   Sig: Take 250 mg by mouth 3 (three) times  a day   donepezil (ARICEPT) 5 mg tablet  Self No No   Sig: Take 1 tablet (5 mg total) by mouth daily at bedtime   ergocalciferol (VITAMIN D2) 50,000 units  Self Yes No   Sig: Take 50,000 Units by mouth every 30 (thirty) days   flecainide (TAMBOCOR) 50 mg tablet  Self No No   Sig: Take 1 tablet (50 mg total) by mouth every 12 (twelve) hours   fluticasone (FLONASE) 50 mcg/act nasal spray   No No   Si spray into each nostril daily for 7 days      Facility-Administered Medications: None       Past Medical History:   Diagnosis Date    Afib (HCC)     Arthritis     Hyperlipidemia     Hypertension     Osteopenia     Sleep apnea     Squamous cell skin cancer     LEF TEMPLE    Subdural hemorrhage (HCC)     Varicose veins of both lower extremities        Past Surgical History:   Procedure Laterality Date    CARDIAC ELECTROPHYSIOLOGY PROCEDURE N/A 10/12/2022    Procedure: Cardiac loop recorder implant;  Surgeon: Rashid Carlos MD;  [facility];  Service: Cardiology    CARDIAC ELECTROPHYSIOLOGY PROCEDURE N/A 2023    Procedure: Cardiac pacer implant;  Surgeon: Simba Chilel MD;  Location: [facility];  Service: Cardiology    CATARACT EXTRACTION      COLONOSCOPY      complete, for polyp removal    EYE SURGERY      HYSTERECTOMY      age 45    INCISION AND DRAINAGE OF WOUND Right 2016    Procedure: ARTHROSCOPY,EVACUATION OF HEMATOMA, OPEN EXPLORATION OF WOUND;  Surgeon: Adam Brice MD;  Location: [facility] OR;  Service:     MOHS SURGERY Left 2022    left temple    SKIN BIOPSY      TONSILLECTOMY AND ADENOIDECTOMY      TOTAL KNEE ARTHROPLASTY Right     TOTAL KNEE ARTHROPLASTY Left     WISDOM TOOTH EXTRACTION         Family History   Problem Relation Age of Onset    Diabetes Mother     Diabetes Father     Diabetes Brother     No Known Problems Sister     No Known Problems Daughter     No Known Problems Maternal Grandmother     No Known Problems Maternal Grandfather     No Known Problems  Paternal Grandmother     No Known Problems Paternal Grandfather     No Known Problems Daughter     Brain cancer Son     No Known Problems Maternal Aunt      I have reviewed and agree with the history as documented.    E-Cigarette/Vaping    E-Cigarette Use Never User      E-Cigarette/Vaping Substances    Nicotine No     THC No     CBD No     Flavoring No     Other No     Unknown No      Social History     Tobacco Use    Smoking status: Never    Smokeless tobacco: Never   Vaping Use    Vaping status: Never Used   Substance Use Topics    Alcohol use: Not Currently    Drug use: No        Review of Systems   Unable to perform ROS: Mental status change       Physical Exam  ED Triage Vitals   Temperature Pulse Respirations Blood Pressure SpO2   05/10/24 1845 05/10/24 1845 05/10/24 1845 05/10/24 2015 05/10/24 2015   99.2 °F (37.3 °C) 69 16 154/65 96 %      Temp Source Heart Rate Source Patient Position - Orthostatic VS BP Location FiO2 (%)   05/10/24 1845 05/10/24 1845 05/10/24 2015 05/10/24 2015 --   Oral Monitor Lying Left arm       Pain Score       05/11/24 0119       No Pain             Orthostatic Vital Signs  Vitals:    05/10/24 1845 05/10/24 2015 05/11/24 0002 05/11/24 0044   BP:  154/65 144/58 126/66   Pulse: 69 70 76 72   Patient Position - Orthostatic VS:  Lying Lying        Physical Exam  Vitals and nursing note reviewed.   Constitutional:       General: She is not in acute distress.     Appearance: She is well-developed and normal weight. She is not ill-appearing, toxic-appearing or diaphoretic.   HENT:      Head: Normocephalic and atraumatic.      Mouth/Throat:      Mouth: Mucous membranes are moist.   Eyes:      General: No visual field deficit.     Extraocular Movements: Extraocular movements intact.      Conjunctiva/sclera: Conjunctivae normal.      Pupils: Pupils are equal, round, and reactive to light.   Cardiovascular:      Rate and Rhythm: Normal rate and regular rhythm.      Heart sounds: Normal heart  sounds. No murmur heard.  Pulmonary:      Effort: Pulmonary effort is normal. No respiratory distress.      Breath sounds: Normal breath sounds. No wheezing, rhonchi or rales.   Chest:      Chest wall: No tenderness.   Abdominal:      General: There is no distension.      Palpations: Abdomen is soft.      Tenderness: There is no abdominal tenderness. There is no guarding.   Musculoskeletal:         General: No swelling or tenderness. Normal range of motion.      Cervical back: Normal range of motion and neck supple. No rigidity.   Lymphadenopathy:      Cervical: No cervical adenopathy.   Skin:     General: Skin is warm and dry.      Capillary Refill: Capillary refill takes less than 2 seconds.      Coloration: Skin is not pale.   Neurological:      Mental Status: She is disoriented.      Cranial Nerves: No cranial nerve deficit, dysarthria or facial asymmetry.      Comments: Patient A&O x1; follows commands well. No focal neurological deficits appreciated. Has occasional word finding difficulty.       Psychiatric:         Mood and Affect: Mood normal.         ED Medications  Medications   acetaminophen (TYLENOL) tablet 650 mg (has no administration in time range)   aspirin (ECOTRIN LOW STRENGTH) EC tablet 81 mg (has no administration in time range)   busPIRone (BUSPAR) tablet 7.5 mg (has no administration in time range)   citalopram (CeleXA) tablet 10 mg (has no administration in time range)   divalproex sodium (DEPAKOTE) DR tablet 250 mg (has no administration in time range)   donepezil (ARICEPT) tablet 5 mg (has no administration in time range)   flecainide (TAMBOCOR) tablet 50 mg (has no administration in time range)   melatonin tablet 3 mg (has no administration in time range)   lactated ringers infusion (75 mL/hr Intravenous New Bag 5/11/24 0156)   acetaminophen (FOR EMS ONLY) (TYLENOL) oral suspension 650 mg (0 mg Does not apply Given to EMS 5/10/24 7360)   iohexol (OMNIPAQUE) 350 MG/ML injection (SINGLE-DOSE)  100 mL (100 mL Intravenous Given 5/11/24 0002)       Diagnostic Studies  Results Reviewed       Procedure Component Value Units Date/Time    COVID/FLU/RSV [[id number]]  (Normal) Collected: 05/10/24 0897    Lab Status: Final result Specimen: Nares from Nose Updated: 05/11/24 0034     SARS-CoV-2 Negative     INFLUENZA A PCR Negative     INFLUENZA B PCR Negative     RSV PCR Negative    Narrative:      FOR PEDIATRIC PATIENTS - copy/paste COVID Guidelines URL to browser: [email or web address]    SARS-CoV-2 assay is a Nucleic Acid Amplification assay intended for the  qualitative detection of nucleic acid from SARS-CoV-2 in nasopharyngeal  swabs. Results are for the presumptive identification of SARS-CoV-2 RNA.    Positive results are indicative of infection with SARS-CoV-2, the virus  causing COVID-19, but do not rule out bacterial infection or co-infection  with other viruses. Laboratories within the [place] and its  territories are required to report all positive results to the appropriate  public health authorities. Negative results do not preclude SARS-CoV-2  infection and should not be used as the sole basis for treatment or other  patient management decisions. Negative results must be combined with  clinical observations, patient history, and epidemiological information.  This test has not been FDA cleared or approved.    This test has been authorized by FDA under an Emergency Use Authorization  (EUA). This test is only authorized for the duration of time the  declaration that circumstances exist justifying the authorization of the  emergency use of an in vitro diagnostic tests for detection of SARS-CoV-2  virus and/or diagnosis of COVID-19 infection under section 564(b)(1) of  the Act, 21 U.S.C. 360bbb-3(b)(1), unless the authorization is terminated  or revoked sooner. The test has been validated but independent review by FDA  and CLIA is pending.    Test  performed using [employer] GeneXpert: This RT-PCR assay targets N2,  a region unique to SARS-CoV-2. A conserved region in the E-gene was chosen  for pan-Sarbecovirus detection which includes SARS-CoV-2.    According to CMS-2020-01-R, this platform meets the definition of high-throughput technology.    Procalcitonin [[id number]]  (Normal) Collected: 05/10/24 2347    Lab Status: Final result Specimen: Blood from Arm, Left Updated: 05/11/24 0027     Procalcitonin 0.06 ng/ml     Lactic acid, plasma (w/reflex if result > 2.0) [[id number]]  (Normal) Collected: 05/10/24 2347    Lab Status: Final result Specimen: Blood from Arm, Left Updated: 05/11/24 0018     LACTIC ACID 1.8 mmol/L     Narrative:      Result may be elevated if tourniquet was used during collection.    Urine Microscopic [[id number]]  (Abnormal) Collected: 05/10/24 2149    Lab Status: Final result Specimen: Urine, Straight Cath Updated: 05/10/24 2332     RBC, UA None Seen /hpf      WBC, UA 1-2 /hpf      Epithelial Cells Occasional /hpf      Bacteria, UA None Seen /hpf      MUCUS THREADS Occasional    HS Troponin I 2hr [[id number]]  (Normal) Collected: 05/10/24 2140    Lab Status: Final result Specimen: Blood from Arm, Left Updated: 05/10/24 2213     hs TnI 2hr 5 ng/L      Delta 2hr hsTnI 0 ng/L     UA w Reflex to Microscopic w Reflex to Culture [[id number]]  (Abnormal) Collected: 05/10/24 2149    Lab Status: Final result Specimen: Urine, Straight Cath Updated: 05/10/24 2203     Color, UA Light Yellow     Clarity, UA Clear     Specific Gravity, UA 1.011     pH, UA 7.5     Leukocytes, UA Negative     Nitrite, UA Negative     Protein, UA Negative mg/dl      Glucose, UA Negative mg/dl      Ketones, UA Trace mg/dl      Urobilinogen, UA <2.0 mg/dl      Bilirubin, UA Negative     Occult Blood, UA Negative    Urine Macroscopic, POC [[id number]]  (Abnormal) Collected: 05/10/24 2152    Lab Status: Final result Specimen: Urine Updated: 05/10/24 2153     Color, UA Yellow      Clarity, UA Clear     pH, UA 7.5     Leukocytes, UA Negative     Nitrite, UA Negative     Protein, UA Negative mg/dl      Glucose, UA Negative mg/dl      Ketones, UA Trace mg/dl      Urobilinogen, UA 0.2 E.U./dl      Bilirubin, UA Negative     Occult Blood, UA Trace     Specific Gravity, UA 1.020    Narrative:      CLINITEK RESULT    HS Troponin 0hr (reflex protocol) [[id number]]  (Normal) Collected: 05/10/24 2014    Lab Status: Final result Specimen: Blood from Arm, Left Updated: 05/10/24 2056     hs TnI 0hr 5 ng/L     Valproic acid level, total [[id number]]  (Normal) Collected: 05/10/24 1922    Lab Status: Final result Specimen: Blood from Arm, Left Updated: 05/10/24 2019     Valproic Acid, Total 77 ug/mL     Comprehensive metabolic panel [[id number]] Collected: 05/10/24 1922    Lab Status: Final result Specimen: Blood from Arm, Left Updated: 05/10/24 1952     Sodium 136 mmol/L      Potassium 4.6 mmol/L      Chloride 99 mmol/L      CO2 26 mmol/L      ANION GAP 11 mmol/L      BUN 13 mg/dL      Creatinine 0.79 mg/dL      Glucose 115 mg/dL      Calcium 9.2 mg/dL      AST 14 U/L      ALT 7 U/L      Alkaline Phosphatase 45 U/L      Total Protein 6.9 g/dL      Albumin 4.0 g/dL      Total Bilirubin 0.44 mg/dL      eGFR 69 ml/min/1.73sq m     Narrative:      National Kidney Disease Foundation guidelines for Chronic Kidney Disease (CKD):     Stage 1 with normal or high GFR (GFR > 90 mL/min/1.73 square meters)    Stage 2 Mild CKD (GFR = 60-89 mL/min/1.73 square meters)    Stage 3A Moderate CKD (GFR = 45-59 mL/min/1.73 square meters)    Stage 3B Moderate CKD (GFR = 30-44 mL/min/1.73 square meters)    Stage 4 Severe CKD (GFR = 15-29 mL/min/1.73 square meters)    Stage 5 End Stage CKD (GFR <15 mL/min/1.73 square meters)  Note: GFR calculation is accurate only with a steady state creatinine    CBC and differential [[id number]]  (Abnormal) Collected: 05/10/24 1922    Lab Status: Final result Specimen: Blood from Arm, Left Updated:  05/10/24 1937     WBC 12.47 Thousand/uL      RBC 5.07 Million/uL      Hemoglobin 14.7 g/dL      Hematocrit 45.9 %      MCV 91 fL      MCH 29.0 pg      MCHC 32.0 g/dL      RDW 15.3 %      MPV 10.2 fL      Platelets 144 Thousands/uL      nRBC 0 /100 WBCs      Segmented % 67 %      Immature Grans % 0 %      Lymphocytes % 20 %      Monocytes % 13 %      Eosinophils Relative 0 %      Basophils Relative 0 %      Absolute Neutrophils 8.19 Thousands/µL      Absolute Immature Grans 0.04 Thousand/uL      Absolute Lymphocytes 2.53 Thousands/µL      Absolute Monocytes 1.65 Thousand/µL      Eosinophils Absolute 0.03 Thousand/µL      Basophils Absolute 0.03 Thousands/µL                    XR chest 1 view   ED Interpretation by Kayden Bell DO (05/10 2104)   NAD as interpreted by me        CT head without contrast   Final Result by Yousif Dhaliwal MD (05/10 2233)      No acute intracranial CT abnormality.  Stable chronic microangiopathic changes within the brain.                  Workstation performed: [id number]         CT chest abdomen pelvis w contrast    (Results Pending)         Procedures  Procedures      ED Course       Patient is an 83 year old female presenting to the ED for fever and altered mentation. History and clinical exam documented above. Concern for acute change in mentation, but less likely to be stroke because patient does not have any neurological deficits on exam and she is outside the window for thrombolytic administration. However, will obtain CT head to r/o bleed, other abnormality in comparison to most recent CT. Regarding her fever, patient is afebrile currently. She does not have any SIRS criteria. It is possible that her altered mentation is due to infectious cause. Will do workup to include CBC, CMP, UA. EKG and CXR ordered to r/o arrhythmia, and respiratory involvement.    EKG: AF 68 bpm, no acute ischemic changes as interpreted by me.    Per chart review, patient was hospitalized from 4/15-4/22  for altered mentation and had MRI brain. She did have diarrhea at the time and tested positive for C.Diff. Patient does not appear to have diarrhea now.    Diagnostics reviewed. She remains clinically stable, hemodynamically stable.  Neurologically her exam has not changed. Unclear etiology for altered mentation as well as report of fever as her UA is negative. Will admit the patient to [facility] for further evaluation.                      SBIRT 20yo+      Flowsheet Row Most Recent Value   Initial Alcohol Screen: US AUDIT-C     1. How often do you have a drink containing alcohol? 0 Filed at: 05/10/2024 1850   2. How many drinks containing alcohol do you have on a typical day you are drinking?  0 Filed at: 05/10/2024 1850   3b. FEMALE Any Age, or MALE 65+: How often do you have 4 or more drinks on one occassion? 0 Filed at: 05/10/2024 1850   Audit-C Score 0 Filed at: 05/10/2024 1850   SHANNA: How many times in the past year have you...    Used an illegal drug or used a prescription medication for non-medical reasons? Never Filed at: 05/10/2024 1850                  Medical Decision Making  Amount and/or Complexity of Data Reviewed  Labs: ordered.  Radiology: ordered and independent interpretation performed.    Risk  OTC drugs.  Decision regarding hospitalization.          Disposition  Final diagnoses:   Altered mental status     Time reflects when diagnosis was documented in both MDM as applicable and the Disposition within this note       Time User Action Codes Description Comment    5/10/2024 11:04 PM Kayden Bell Add [R41.82] Altered mental status           ED Disposition       ED Disposition   Admit    Condition   Stable    Date/Time   Fri May 10, 2024 7022    Comment   Admit to [facility]               Follow-up Information    None         Current Discharge Medication List        CONTINUE these medications which have NOT CHANGED    Details   acetaminophen (TYLENOL) 325 mg tablet Take 2 tablets (650 mg total) by mouth every  4 (four) hours as needed for mild pain  Qty: 30 tablet, Refills: 0    Associated Diagnoses: Subdural hemorrhage (HCC)      amLODIPine (NORVASC) 10 mg tablet Take 1 tablet (10 mg total) by mouth daily  Qty: 30 tablet, Refills: 0    Associated Diagnoses: Essential hypertension      aspirin (ECOTRIN LOW STRENGTH) 81 mg EC tablet Take 81 mg by mouth daily      busPIRone (BUSPAR) 7.5 mg tablet Take 7.5 mg by mouth 2 (two) times a day      Calcium Carbonate 1500 (600 Ca) MG TABS Take 1 tablet by mouth daily      citalopram (CeleXA) 10 mg tablet Take 10 mg by mouth daily      divalproex sodium (DEPAKOTE) 250 mg EC tablet Take 250 mg by mouth 3 (three) times a day      donepezil (ARICEPT) 5 mg tablet Take 1 tablet (5 mg total) by mouth daily at bedtime  Qty: 30 tablet, Refills: 1    Associated Diagnoses: Mood disorder (HCC)      ergocalciferol (VITAMIN D2) 50,000 units Take 50,000 Units by mouth every 30 (thirty) days      flecainide (TAMBOCOR) 50 mg tablet Take 1 tablet (50 mg total) by mouth every 12 (twelve) hours  Qty: 60 tablet, Refills: 0    Associated Diagnoses: Mood disorder (HCC); Paroxysmal atrial fibrillation (HCC)      fluticasone (FLONASE) 50 mcg/act nasal spray 1 spray into each nostril daily for 7 days  Qty: 9.9 mL, Refills: 0    Associated Diagnoses: COVID      Melatonin 3 MG CAPS Take 6 mg by mouth           No discharge procedures on file.    PDMP Review         Value Time User    PDMP Reviewed  Yes 12/31/2023 11:50 AM Rashel Pascual PA-C             ED Provider  Attending physically available and evaluated Ann Crsos. I managed the patient along with the ED Attending.    Electronically Signed by           Kayden Bell, DO  05/11/24 0204       Kayden Bell, DO  05/11/24 0206

## 2024-05-11 NOTE — PROGRESS NOTES
"Post admit check      83 years old patient presented with fever at SNF and altered mental status.  History of underlying dementia.  Workup concerning for acute uncomplicated appendicitis, worsening sigmoid colitis without evidence of bowel obstruction.  Currently afebrile, hemodynamically stable.    BP (!) 114/46   Pulse 69   Temp 99.5 °F (37.5 °C)   Resp 16   Ht 5' 4\" (1.626 m)   Wt 66.1 kg (145 lb 11.6 oz)   SpO2 94%   BMI 25.01 kg/m²     Abdomen is soft, minimal tenderness.    Normal heart and lung sounds.    Blood cultures pending.  White count 11.3.  Stool studies and C. difficile pending.    Seen by general surgery.  Appendix findings on CT may be secondary to adjacent colitis.  Continue to manage conservatively.  Continue ceftriaxone and Flagyl.  IV fluids.  Trial clear liquid diet, advance as tolerated.  Consider GI consult pending clinical course.    Updated patient's daughter at bedside.  "

## 2024-05-11 NOTE — PLAN OF CARE
Problem: Prexisting or High Potential for Compromised Skin Integrity  Goal: Skin integrity is maintained or improved  Description: INTERVENTIONS:  - Identify patients at risk for skin breakdown  - Assess and monitor skin integrity  - Assess and monitor nutrition and hydration status  - Monitor labs   - Assess for incontinence   - Turn and reposition patient  - Assist with mobility/ambulation  - Relieve pressure over bony prominences  - Avoid friction and shearing  - Provide appropriate hygiene as needed including keeping skin clean and dry  - Evaluate need for skin moisturizer/barrier cream  - Collaborate with interdisciplinary team   - Patient/family teaching  - Consider wound care consult   Outcome: Progressing     Problem: INFECTION - ADULT  Goal: Absence or prevention of progression during hospitalization  Description: INTERVENTIONS:  - Assess and monitor for signs and symptoms of infection  - Monitor lab/diagnostic results  - Monitor all insertion sites, i.e. indwelling lines, tubes, and drains  - Monitor endotracheal if appropriate and nasal secretions for changes in amount and color  - [place] appropriate cooling/warming therapies per order  - Administer medications as ordered  - Instruct and encourage patient and family to use good hand hygiene technique  - Identify and instruct in appropriate isolation precautions for identified infection/condition  Outcome: Progressing     Problem: SAFETY ADULT  Goal: Patient will remain free of falls  Description: INTERVENTIONS:  - Educate patient/family on patient safety including physical limitations  - Instruct patient to call for assistance with activity   - Consult OT/PT to assist with strengthening/mobility   - Keep Call bell within reach  - Keep bed low and locked with side rails adjusted as appropriate  - Keep care items and personal belongings within reach  - Initiate and maintain comfort rounds  - Make Fall Risk Sign visible to staff  - Offer Toileting every  4 Hours, in advance of need  - Initiate/Maintain 4alarm  - Obtain necessary fall risk management equipment: 4  - Apply yellow socks and bracelet for high fall risk patients  - Consider moving patient to room near nurses station  Outcome: Progressing     Problem: DISCHARGE PLANNING  Goal: Discharge to home or other facility with appropriate resources  Description: INTERVENTIONS:  - Identify barriers to discharge w/patient and caregiver  - Arrange for needed discharge resources and transportation as appropriate  - Identify discharge learning needs (meds, wound care, etc.)  - Arrange for interpretive services to assist at discharge as needed  - Refer to Case Management Department for coordinating discharge planning if the patient needs post-hospital services based on physician/advanced practitioner order or complex needs related to functional status, cognitive ability, or social support system  Outcome: Progressing     Problem: Knowledge Deficit  Goal: Patient/family/caregiver demonstrates understanding of disease process, treatment plan, medications, and discharge instructions  Description: Complete learning assessment and assess knowledge base.  Interventions:  - Provide teaching at level of understanding  - Provide teaching via preferred learning methods  Outcome: Progressing     Problem: NEUROSENSORY - ADULT  Goal: Achieves stable or improved neurological status  Description: INTERVENTIONS  - Monitor and report changes in neurological status  - Monitor vital signs such as temperature, blood pressure, glucose, and any other labs ordered   - Initiate measures to prevent increased intracranial pressure  - Monitor for seizure activity and implement precautions if appropriate      Outcome: Progressing     Problem: GENITOURINARY - ADULT  Goal: Absence of urinary retention  Description: INTERVENTIONS:  - Assess patient’s ability to void and empty bladder  - Monitor I/O  - Bladder scan as needed  - Discuss with  physician/AP medications to alleviate retention as needed  - Discuss catheterization for long term situations as appropriate  Outcome: Progressing     Problem: METABOLIC, FLUID AND ELECTROLYTES - ADULT  Goal: Electrolytes maintained within normal limits  Description: INTERVENTIONS:  - Monitor labs and assess patient for signs and symptoms of electrolyte imbalances  - Administer electrolyte replacement as ordered  - Monitor response to electrolyte replacements, including repeat lab results as appropriate  - Instruct patient on fluid and nutrition as appropriate  Outcome: Progressing

## 2024-05-12 PROBLEM — K52.9 COLITIS: Status: ACTIVE | Noted: 2024-05-12

## 2024-05-12 LAB
ANION GAP SERPL CALCULATED.3IONS-SCNC: 8 MMOL/L (ref 4–13)
BASOPHILS # BLD AUTO: 0.02 THOUSANDS/ÂΜL (ref 0–0.1)
BASOPHILS NFR BLD AUTO: 0 % (ref 0–1)
BUN SERPL-MCNC: 9 MG/DL (ref 5–25)
C COLI+JEJUNI TUF STL QL NAA+PROBE: NEGATIVE
C DIFF TOX A+B STL QL IA: POSITIVE
C DIFF TOX GENS STL QL NAA+PROBE: POSITIVE
CALCIUM SERPL-MCNC: 7.7 MG/DL (ref 8.4–10.2)
CHLORIDE SERPL-SCNC: 104 MMOL/L (ref 96–108)
CO2 SERPL-SCNC: 27 MMOL/L (ref 21–32)
CREAT SERPL-MCNC: 0.61 MG/DL (ref 0.6–1.3)
EC STX1+STX2 GENES STL QL NAA+PROBE: NEGATIVE
EOSINOPHIL # BLD AUTO: 0.14 THOUSAND/ÂΜL (ref 0–0.61)
EOSINOPHIL NFR BLD AUTO: 2 % (ref 0–6)
ERYTHROCYTE [DISTWIDTH] IN BLOOD BY AUTOMATED COUNT: 15.2 % (ref 11.6–15.1)
GFR SERPL CREATININE-BSD FRML MDRD: 84 ML/MIN/1.73SQ M
GLUCOSE SERPL-MCNC: 94 MG/DL (ref 65–140)
HCT VFR BLD AUTO: 39.3 % (ref 34.8–46.1)
HGB BLD-MCNC: 12.7 G/DL (ref 11.5–15.4)
IMM GRANULOCYTES # BLD AUTO: 0.01 THOUSAND/UL (ref 0–0.2)
IMM GRANULOCYTES NFR BLD AUTO: 0 % (ref 0–2)
LYMPHOCYTES # BLD AUTO: 2.54 THOUSANDS/ÂΜL (ref 0.6–4.47)
LYMPHOCYTES NFR BLD AUTO: 34 % (ref 14–44)
MCH RBC QN AUTO: 29 PG (ref 26.8–34.3)
MCHC RBC AUTO-ENTMCNC: 32.3 G/DL (ref 31.4–37.4)
MCV RBC AUTO: 90 FL (ref 82–98)
MONOCYTES # BLD AUTO: 1.42 THOUSAND/ÂΜL (ref 0.17–1.22)
MONOCYTES NFR BLD AUTO: 19 % (ref 4–12)
MRSA NOSE QL CULT: NORMAL
NEUTROPHILS # BLD AUTO: 3.3 THOUSANDS/ÂΜL (ref 1.85–7.62)
NEUTS SEG NFR BLD AUTO: 45 % (ref 43–75)
NRBC BLD AUTO-RTO: 0 /100 WBCS
PLATELET # BLD AUTO: 116 THOUSANDS/UL (ref 149–390)
PMV BLD AUTO: 9.8 FL (ref 8.9–12.7)
POTASSIUM SERPL-SCNC: 3.6 MMOL/L (ref 3.5–5.3)
RBC # BLD AUTO: 4.38 MILLION/UL (ref 3.81–5.12)
SALMONELLA SP SPAO STL QL NAA+PROBE: NEGATIVE
SHIGELLA SP+EIEC IPAH STL QL NAA+PROBE: NEGATIVE
SODIUM SERPL-SCNC: 139 MMOL/L (ref 135–147)
WBC # BLD AUTO: 7.43 THOUSAND/UL (ref 4.31–10.16)

## 2024-05-12 PROCEDURE — 99232 SBSQ HOSP IP/OBS MODERATE 35: CPT | Performed by: STUDENT IN AN ORGANIZED HEALTH CARE EDUCATION/TRAINING PROGRAM

## 2024-05-12 PROCEDURE — 80048 BASIC METABOLIC PNL TOTAL CA: CPT | Performed by: STUDENT IN AN ORGANIZED HEALTH CARE EDUCATION/TRAINING PROGRAM

## 2024-05-12 PROCEDURE — 99232 SBSQ HOSP IP/OBS MODERATE 35: CPT | Performed by: SURGERY

## 2024-05-12 PROCEDURE — 85025 COMPLETE CBC W/AUTO DIFF WBC: CPT | Performed by: STUDENT IN AN ORGANIZED HEALTH CARE EDUCATION/TRAINING PROGRAM

## 2024-05-12 RX ORDER — ENOXAPARIN SODIUM 100 MG/ML
40 INJECTION SUBCUTANEOUS
Status: DISCONTINUED | OUTPATIENT
Start: 2024-05-12 | End: 2024-05-16 | Stop reason: HOSPADM

## 2024-05-12 RX ADMIN — METRONIDAZOLE 500 MG: 500 INJECTION, SOLUTION INTRAVENOUS at 21:19

## 2024-05-12 RX ADMIN — DIVALPROEX SODIUM 250 MG: 250 TABLET, DELAYED RELEASE ORAL at 21:19

## 2024-05-12 RX ADMIN — Medication 3 MG: at 21:19

## 2024-05-12 RX ADMIN — ASPIRIN 81 MG: 81 TABLET, COATED ORAL at 09:17

## 2024-05-12 RX ADMIN — ENOXAPARIN SODIUM 40 MG: 40 INJECTION SUBCUTANEOUS at 12:15

## 2024-05-12 RX ADMIN — FLECAINIDE ACETATE 50 MG: 50 TABLET ORAL at 21:20

## 2024-05-12 RX ADMIN — FLECAINIDE ACETATE 50 MG: 50 TABLET ORAL at 09:18

## 2024-05-12 RX ADMIN — CEFTRIAXONE 1000 MG: 1 INJECTION, POWDER, FOR SOLUTION INTRAMUSCULAR; INTRAVENOUS at 04:19

## 2024-05-12 RX ADMIN — BUSPIRONE HYDROCHLORIDE 7.5 MG: 5 TABLET ORAL at 09:17

## 2024-05-12 RX ADMIN — DIVALPROEX SODIUM 250 MG: 250 TABLET, DELAYED RELEASE ORAL at 09:18

## 2024-05-12 RX ADMIN — VANCOMYCIN HYDROCHLORIDE 125 MG: 125 CAPSULE ORAL at 17:19

## 2024-05-12 RX ADMIN — DIVALPROEX SODIUM 250 MG: 250 TABLET, DELAYED RELEASE ORAL at 17:19

## 2024-05-12 RX ADMIN — BUSPIRONE HYDROCHLORIDE 7.5 MG: 5 TABLET ORAL at 21:19

## 2024-05-12 RX ADMIN — VANCOMYCIN HYDROCHLORIDE 125 MG: 125 CAPSULE ORAL at 12:15

## 2024-05-12 RX ADMIN — METRONIDAZOLE 500 MG: 500 INJECTION, SOLUTION INTRAVENOUS at 05:30

## 2024-05-12 RX ADMIN — METRONIDAZOLE 500 MG: 500 INJECTION, SOLUTION INTRAVENOUS at 12:16

## 2024-05-12 RX ADMIN — CITALOPRAM HYDROBROMIDE 10 MG: 10 TABLET ORAL at 09:18

## 2024-05-12 RX ADMIN — VANCOMYCIN HYDROCHLORIDE 125 MG: 125 CAPSULE ORAL at 05:30

## 2024-05-12 RX ADMIN — DONEPEZIL HYDROCHLORIDE 5 MG: 5 TABLET ORAL at 21:19

## 2024-05-12 NOTE — PLAN OF CARE
Problem: Prexisting or High Potential for Compromised Skin Integrity  Goal: Skin integrity is maintained or improved  Description: INTERVENTIONS:  - Identify patients at risk for skin breakdown  - Assess and monitor skin integrity  - Assess and monitor nutrition and hydration status  - Monitor labs   - Assess for incontinence   - Turn and reposition patient  - Assist with mobility/ambulation  - Relieve pressure over bony prominences  - Avoid friction and shearing  - Provide appropriate hygiene as needed including keeping skin clean and dry  - Evaluate need for skin moisturizer/barrier cream  - Collaborate with interdisciplinary team   - Patient/family teaching  - Consider wound care consult   Outcome: Progressing     Problem: INFECTION - ADULT  Goal: Absence or prevention of progression during hospitalization  Description: INTERVENTIONS:  - Assess and monitor for signs and symptoms of infection  - Monitor lab/diagnostic results  - Monitor all insertion sites, i.e. indwelling lines, tubes, and drains  - Monitor endotracheal if appropriate and nasal secretions for changes in amount and color  - [place] appropriate cooling/warming therapies per order  - Administer medications as ordered  - Instruct and encourage patient and family to use good hand hygiene technique  - Identify and instruct in appropriate isolation precautions for identified infection/condition  Outcome: Progressing     Problem: SAFETY ADULT  Goal: Patient will remain free of falls  Description: INTERVENTIONS:  - Educate patient/family on patient safety including physical limitations  - Instruct patient to call for assistance with activity   - Consult OT/PT to assist with strengthening/mobility   - Keep Call bell within reach  - Keep bed low and locked with side rails adjusted as appropriate  - Keep care items and personal belongings within reach  - Initiate and maintain comfort rounds  - Make Fall Risk Sign visible to staff  - Offer Toileting every  4 Hours, in advance of need  - Initiate/Maintain 4alarm  - Obtain necessary fall risk management equipment: 4  - Apply yellow socks and bracelet for high fall risk patients  - Consider moving patient to room near nurses station  Outcome: Progressing     Problem: DISCHARGE PLANNING  Goal: Discharge to home or other facility with appropriate resources  Description: INTERVENTIONS:  - Identify barriers to discharge w/patient and caregiver  - Arrange for needed discharge resources and transportation as appropriate  - Identify discharge learning needs (meds, wound care, etc.)  - Arrange for interpretive services to assist at discharge as needed  - Refer to Case Management Department for coordinating discharge planning if the patient needs post-hospital services based on physician/advanced practitioner order or complex needs related to functional status, cognitive ability, or social support system  Outcome: Progressing     Problem: Knowledge Deficit  Goal: Patient/family/caregiver demonstrates understanding of disease process, treatment plan, medications, and discharge instructions  Description: Complete learning assessment and assess knowledge base.  Interventions:  - Provide teaching at level of understanding  - Provide teaching via preferred learning methods  Outcome: Progressing     Problem: NEUROSENSORY - ADULT  Goal: Achieves stable or improved neurological status  Description: INTERVENTIONS  - Monitor and report changes in neurological status  - Monitor vital signs such as temperature, blood pressure, glucose, and any other labs ordered   - Initiate measures to prevent increased intracranial pressure  - Monitor for seizure activity and implement precautions if appropriate      Outcome: Progressing     Problem: GENITOURINARY - ADULT  Goal: Absence of urinary retention  Description: INTERVENTIONS:  - Assess patient’s ability to void and empty bladder  - Monitor I/O  - Bladder scan as needed  - Discuss with  physician/AP medications to alleviate retention as needed  - Discuss catheterization for long term situations as appropriate  Outcome: Progressing     Problem: METABOLIC, FLUID AND ELECTROLYTES - ADULT  Goal: Electrolytes maintained within normal limits  Description: INTERVENTIONS:  - Monitor labs and assess patient for signs and symptoms of electrolyte imbalances  - Administer electrolyte replacement as ordered  - Monitor response to electrolyte replacements, including repeat lab results as appropriate  - Instruct patient on fluid and nutrition as appropriate  Outcome: Progressing

## 2024-05-12 NOTE — UTILIZATION REVIEW
NOTIFICATION OF INPATIENT ADMISSION      AUTHORIZATION REQUEST   SERVICING FACILITY:   [facility]  Address: [address] [place] [address]  Tax ID: [id number]  NPI: [id number] ATTENDING PROVIDER:  Attending Name and NPI#: Asha Jacobs Md [[id number]]  Address: [address] [place] [address]  Phone: [phone]   ADMISSION INFORMATION:  Place of Service: Inpatient [facility]  Place of Service Code: 21  Inpatient Admission Date/Time: 5/10/24 11:23 PM  Discharge Date/Time: No discharge date for patient encounter.  Admitting Diagnosis Code/Description:  Altered mental status [R41.82]  AMS (altered mental status) [R41.82]     UTILIZATION REVIEW CONTACT:  Yumiko Mae Utilization   Network Utilization Review Department  Phone: [phone]  Fax: [phone]  Email: [email or web address]  Contact for approvals/pending authorizations, clinical reviews, and discharge.     PHYSICIAN ADVISORY SERVICES:  Medical Necessity Denial & Rmev-zq-Neaq Review  Phone: [phone]  Fax: [phone]  Email: [email or web address]     DISCHARGE SUPPORT TEAM:  For Patients Discharge Needs & Updates  Phone: [phone] opt. 2 Fax: [phone]  Email: [email or web address]

## 2024-05-12 NOTE — PROGRESS NOTES
"Progress Note - General Surgery   Ann Cross 83 y.o. female MRN: [id number]  Unit/Bed#: [facility] 733-01 Encounter: [id number]    Assessment:  83yoF p/w AMS and fevers, CT imaging concerning for colitis and appendicitis     Vital stable, afebrile  WBC 7.43 from 11.3  Creatinine 0.61    Plan:  -CLD, advance diet as tolerated  -rocephin/flagyl  -LR75  -Also on oral Vanco in setting of recent C. Difficile  -DVT prophylaxis  -Treatment of colitis per primary team  -Care per primary team    Subjective/Objective   Subjective:   Doing well, states she has \"soreness\" that is \"all over\" although she is not able to pinpoint any specific location, tolerating clears without n/v, having bowel movements, voiding, minimal OOB.    Objective:     Blood pressure 127/59, pulse 70, temperature 99.5 °F (37.5 °C), resp. rate 16, height 5' 4\" (1.626 m), weight 66.1 kg (145 lb 11.6 oz), SpO2 93%.,Body mass index is 25.01 kg/m².      Intake/Output Summary (Last 24 hours) at 5/12/2024 0713  Last data filed at 5/12/2024 0419  Gross per 24 hour   Intake 1640 ml   Output --   Net 1640 ml       Invasive Devices       Peripheral Intravenous Line  Duration             Peripheral IV 05/11/24 Dorsal (posterior);Left Forearm <1 day                    Physical Exam:   General: NAD  Skin: Warm, dry, anicteric  HEENT: Normocephalic, atraumatic  CV: RRR, no m/r/g  Pulm: CTA b/l, no inc WOB  Abd: Soft, ND, minimally tender, non specific  MSK: Symmetric, no edema, no tenderness, no deformity  Neuro: disoriented    Lab, Imaging and other studies:I have personally reviewed pertinent lab results.  , CBC:   Lab Results   Component Value Date    WBC 7.43 05/12/2024    HGB 12.7 05/12/2024    HCT 39.3 05/12/2024    MCV 90 05/12/2024     (L) 05/12/2024    RBC 4.38 05/12/2024    MCH 29.0 05/12/2024    MCHC 32.3 05/12/2024    RDW 15.2 (H) 05/12/2024    MPV 9.8 05/12/2024    NRBC 0 05/12/2024   , CMP:   Lab Results   Component Value Date    SODIUM 139 " 05/12/2024    K 3.6 05/12/2024     05/12/2024    CO2 27 05/12/2024    BUN 9 05/12/2024    CREATININE 0.61 05/12/2024    CALCIUM 7.7 (L) 05/12/2024    EGFR 84 05/12/2024     VTE Pharmacologic Prophylaxis: Sequential compression device (Venodyne)   VTE Mechanical Prophylaxis: sequential compression device

## 2024-05-12 NOTE — UTILIZATION REVIEW
Initial Clinical Review    Admission: Date/Time/Statement:   Admission Orders (From admission, onward)       Ordered        05/10/24 2323  INPATIENT ADMISSION  Once            05/10/24 2304  Place in Observation  Once,   Status:  Canceled                          Orders Placed This Encounter   Procedures    INPATIENT ADMISSION     Standing Status:   Standing     Number of Occurrences:   1     Order Specific Question:   Level of Care     Answer:   Med Surg [16]     Order Specific Question:   Estimated length of stay     Answer:   More than 2 Midnights     Order Specific Question:   Certification     Answer:   I certify that inpatient services are medically necessary for this patient for a duration of greater than two midnights. See H&P and MD Progress Notes for additional information about the patient's course of treatment.     ED Arrival Information       Expected   -    Arrival   5/10/2024 18:27    Acuity   Emergent              Means of arrival   Ambulance    Escorted by   [facility]    Service   Hospitalist    Admission type   Emergency              Arrival complaint   AMS             Chief Complaint   Patient presents with    Altered Mental Status     Patient from [facility], presents to ER with acute onset of AMS. Patient normally GCS 15. Febrile at 100.4 per EMS given 1g of tylenol        Initial Presentation: 83 y.o. female to ED from NH via EMS w/  past medical history significant for dementia, C. difficile colitis, paroxysmal atrial fibrillation, subdural hematoma, mood disorder with hypomanic features who presented to [facility] ER on the evening of 5/10 with altered mental status as well as fever of 100.4 from her skilled nursing facility at [facility]. Admitted 1 month ago and found to have cdiff . CT head neg . Ua neg for Infection   CXR wnl . Admitted IP status w/ acute metabolic encephalopathy . Plan for PT OT eval , geriatrics consult . Fever unknown origin . Wbc 12K  , pct neg . Dementia cont PTA donepezil . Afib cont PTA flecainide. Mood disorder buspar, celexa and aricept .     Anticipated Length of Stay/Certification Statement: Patient will be admitted on an Inpatient basis with an anticipated length of stay of  > 2 midnights.   Justification for Hospital Stay: Please see detailed plans noted above.     Date: 5/11   Day 2: fever at SNF and altered mental status.  History of underlying dementia. Workup concerning for acute uncomplicated appendicitis, worsening sigmoid colitis without evidence of bowel obstruction.  BC pending , wbc 11.3 . Stool studies pending . Cont conservative mngt , ceftriaxone and flagyl , IVF , trial clear liq diet . Consider GI consult .     5/11 Surgery Consult   CT imaging concerning for colitis and appendicitis . Plan benign abdominal exam with only minimal infra umbilical tenderness, non tender RLQ, appendix findings on CT may be secondary to adjacent colitis, can continue with antibiotics Rocephin/Flagyl for now and manage nonoperatively, will likely see if patient is able to tolerate diet . Cont IVF , po vanco , DVT ppx , tx colitis per primary team .     Date: 5/12  Day 3: Has surpassed a 2nd midnight with active treatments and services.  Pt reports feeling tired . Oriented to place and person and follows simple commands . Started on clear liq diet , tolerated well . Adv to low fiber low residual diet today . Cont IV abx. PT eval rec level II . Anticipate DC to rehab facility in 24- 48 hrs . Wbc improved to 7.43 from 11.33 . Cont IVF .     5/12 Surgery Note   AMS and fevers, CT imaging concerning for colitis and appendicitis . Wbc 7.43 from 11.3 . Plan CLD adv diet as otol . Rocephin /flagyl . IVF , po vanco . DVT ppx .   ED Triage Vitals   Temperature Pulse Respirations Blood Pressure SpO2   05/10/24 1845 05/10/24 1845 05/10/24 1845 05/10/24 2015 05/10/24 2015   99.2 °F (37.3 °C) 69 16 154/65 96 %      Temp Source Heart Rate Source Patient  Position - Orthostatic VS BP Location FiO2 (%)   05/10/24 1845 05/10/24 1845 05/10/24 2015 05/10/24 2015 --   Oral Monitor Lying Left arm       Pain Score       05/11/24 0119       No Pain          Wt Readings from Last 1 Encounters:   05/11/24 66.1 kg (145 lb 11.6 oz)     Additional Vital Signs:   5/12/24 07:27:35 98 °F (36.7 °C) 66 16 124/58 80 93 % -- --   05/11/24 22:53:52 -- 70 -- 127/59 82 93 % -- --   05/11/24 2050 -- -- -- -- -- -- None (Room air) --   05/11/24 15:21:41 99.5 °F (37.5 °C) 69 -- 114/46 Abnormal  69 94 % -- --   05/11/24 07:24:47 99.9 °F (37.7 °C) 67 16 115/46 Abnormal  69 93 % -- --   05/11/24 0140 -- -- -- -- -- -- None (Room air) --   05/11/24 00:44:01 96.1 °F (35.6 °C) Abnormal  72 18 126/66 86 93 % -- --   05/11/24 0002 -- 76 18 144/58 84 96 % None (Room air) Lying   05/10/24 2015 -- 70 18 154/65 93 96 % None (Room air) Lyi     Pertinent Labs/Diagnostic Test Results:   5/10 EKG NSR   CT chest abdomen pelvis w contrast   Final Result by Stevo Whipple MD (05/11 0312)   Addendum (preliminary) 1 of 1 by Stevo Whipple MD (05/11 0312)   ADDENDUM:      Findings discussed with Dr. Kim at 3:10 a.m.      Final      1.  No acute intrathoracic abnormalities noted with ancillary findings detailed above.   2.  Extensive calcific atherosclerosis of the thoracoabdominal aorta and proximal branch vessels including coronary arteries.  2.6 x 2.4 cm infrarenal abdominal aortic aneurysm. Recommend follow-up imaging every 5 years per current guidelines.   3.  Small hiatal hernia.   4.  New abnormal findings of the appendix as described above concerning for acute uncomplicated appendicitis.   5.  New mild wall thickening of the proximal ascending colon, mid to distal transverse colon, and portions of the descending colon noted consistent with colitis. Extensive abnormal wall thickening of the sigmoid colon appear worse compared to the prior    study suggesting worsening sigmoid colitis. No pericolonic free  air or abscess. A few likely reactive subcentimeter enhancing pericolonic mesenteric lymph nodes in the lower abdomen and pelvis are seen. Recommend follow-up colonoscopy when clinically    appropriate to exclude neoplasm. No evidence for bowel obstruction.      Workstation performed: [id number]         XR chest 1 view   ED Interpretation by Kayden Bell DO (05/10 2104)   NAD as interpreted by me        Final Result by Ivet Gustafson MD (05/11 0802)      No acute cardiopulmonary disease.            Workstation performed: [id number]         CT head without contrast   Final Result by Yousif Dhaliwal MD (05/10 2233)      No acute intracranial CT abnormality.  Stable chronic microangiopathic changes within the brain.                  Workstation performed: [id number]           Results from last 7 days   Lab Units 05/10/24  2347   SARS-COV-2  Negative     Results from last 7 days   Lab Units 05/12/24  0518 05/11/24  0444 05/10/24  1922   WBC Thousand/uL 7.43 11.33* 12.47*   HEMOGLOBIN g/dL 12.7 13.6 14.7   HEMATOCRIT % 39.3 42.6 45.9   PLATELETS Thousands/uL 116* 129* 144*   TOTAL NEUT ABS Thousands/µL 3.30  --  8.19*   BANDS PCT %  --  9*  --          Results from last 7 days   Lab Units 05/12/24  0518 05/11/24  0444 05/10/24  1922   SODIUM mmol/L 139 137 136   POTASSIUM mmol/L 3.6 3.9 4.6   CHLORIDE mmol/L 104 101 99   CO2 mmol/L 27 26 26   ANION GAP mmol/L 8 10 11   BUN mg/dL 9 10 13   CREATININE mg/dL 0.61 0.64 0.79   EGFR ml/min/1.73sq m 84 82 69   CALCIUM mg/dL 7.7* 8.3* 9.2   MAGNESIUM mg/dL  --  2.0  --      Results from last 7 days   Lab Units 05/11/24  0444 05/10/24  1922   AST U/L 10* 14   ALT U/L 3* 7   ALK PHOS U/L 41 45   TOTAL PROTEIN g/dL 6.0* 6.9   ALBUMIN g/dL 3.4* 4.0   TOTAL BILIRUBIN mg/dL 0.54 0.44         Results from last 7 days   Lab Units 05/12/24  0518 05/11/24  0444 05/10/24  1922   GLUCOSE RANDOM mg/dL 94 90 115       Results from last 7 days   Lab Units 05/10/24  6180  05/10/24  2014   HS TNI 0HR ng/L  --  5   HS TNI 2HR ng/L 5  --    HSTNI D2 ng/L 0  --          Results from last 7 days   Lab Units 05/11/24  0444   PROTIME seconds 13.9   INR  1.08   PTT seconds 27         Results from last 7 days   Lab Units 05/10/24  2347   PROCALCITONIN ng/ml 0.06     Results from last 7 days   Lab Units 05/10/24  2347   LACTIC ACID mmol/L 1.8         Results from last 7 days   Lab Units 05/10/24  2152 05/10/24  2149   CLARITY UA  Clear Clear   COLOR UA  Yellow Light Yellow   SPEC GRAV UA  1.020 1.011   PH UA  7.5 7.5   GLUCOSE UA mg/dl Negative Negative   KETONES UA mg/dl Trace* Trace*   BLOOD UA  Trace* Negative   PROTEIN UA mg/dl Negative Negative   NITRITE UA  Negative Negative   BILIRUBIN UA  Negative Negative   UROBILINOGEN UA E.U./dl 0.2  --    UROBILINOGEN UA (BE) mg/dl  --  <2.0   LEUKOCYTES UA  Negative Negative   WBC UA /hpf  --  1-2   RBC UA /hpf  --  None Seen   BACTERIA UA /hpf  --  None Seen   EPITHELIAL CELLS WET PREP /hpf  --  Occasional   MUCUS THREADS   --  Occasional*     Results from last 7 days   Lab Units 05/10/24  2347   INFLUENZA A PCR  Negative   INFLUENZA B PCR  Negative   RSV PCR  Negative       Results from last 7 days   Lab Units 05/11/24  0409   BLOOD CULTURE  No Growth at 24 hrs.  No Growth at 24 hrs.     ED Treatment:   Medication Administration from 05/10/2024 1827 to 05/11/2024 0039         Date/Time Order Dose Route Action     05/10/2024 1851 EDT acetaminophen (FOR EMS ONLY) (TYLENOL) oral suspension 650 mg 0 mg Does not apply Given to EMS          Past Medical History:   Diagnosis Date    Afib (HCC)     Arthritis     Hyperlipidemia     Hypertension     Osteopenia     Sleep apnea     Squamous cell skin cancer     LEF TEMPLE    Subdural hemorrhage (HCC)     Varicose veins of both lower extremities      Present on Admission:   Dementia (HCC)   Acute metabolic encephalopathy   Paroxysmal atrial fibrillation (HCC)    Mood disorder with hypomanic  features      Admitting Diagnosis: Altered mental status [R41.82]  AMS (altered mental status) [R41.82]  Age/Sex: 83 y.o. female  Admission Orders:  Scheduled Medications:  aspirin, 81 mg, Oral, Daily  busPIRone, 7.5 mg, Oral, BID  cefTRIAXone, 1,000 mg, Intravenous, Q24H  citalopram, 10 mg, Oral, Daily  divalproex sodium, 250 mg, Oral, TID  donepezil, 5 mg, Oral, HS  enoxaparin, 40 mg, Subcutaneous, Q24H LILI  flecainide, 50 mg, Oral, Q12H LILI  melatonin, 3 mg, Oral, HS  metroNIDAZOLE, 500 mg, Intravenous, Q8H  vancomycin oral (capsules or solution), 125 mg, Oral, Q6H LILI      Continuous IV Infusions:  lactated ringers, 75 mL/hr, Intravenous, Continuous      PRN Meds:  acetaminophen, 650 mg, Oral, Q4H PRN    Reg diet   PT OT eval   Tele   Cont pulse ox       IP CONSULT TO ACUTE CARE SURGERY    Network Utilization Review Department  ATTENTION: Please call with any questions or concerns to [phone] and carefully listen to the prompts so that you are directed to the right person. All voicemails are confidential.   For Discharge needs, contact Care Management DC Support Team at [phone] opt. 2  Send all requests for admission clinical reviews, approved or denied determinations and any other requests to dedicated fax number below belonging to the campus where the patient is receiving treatment. List of dedicated fax numbers for the Facilities:  FACILITY NAME UR FAX NUMBER   ADMISSION DENIALS (Administrative/Medical Necessity) [phone]   DISCHARGE SUPPORT TEAM (NETWORK) [phone]   PARENT CHILD HEALTH (Maternity/NICU/Pediatrics) [phone]   [facility] [phone]   [facility] [phone]   [facility] [phone]   [facility] [phone]   [facility] [phone]   [facility] [phone]   [facility] [phone]    [facility] [phone]   [facility] [phone]   [facility] [phone]   [facility] [phone]   [facility] [phone]

## 2024-05-12 NOTE — PROGRESS NOTES
[facility]  Progress Note  Name: Ann Cross I  MRN: [id number]  Unit/Bed#: [facility] 733-01 I Date of Admission: 5/10/2024   Date of Service: 5/12/2024 I Hospital Day: 2    Assessment/Plan   Colitis  Assessment & Plan  CT abdomen pelvis on admission remarkable for appendicitis and sigmoid colitis.  Patient evaluated by general surgery, given benign abdominal exam appendicitis on imaging is suspected secondary to colitis.  No surgical intervention at this time.  Continue IV antibiotics.  Patient started on clear liquid diet initially, has tolerated well, advanced to low fiber low residual diet today.    * Acute metabolic encephalopathy  Assessment & Plan  Reported altered mental status at [facility] where patient resides as well as fever of 100.4, likely in setting of colitis.  Given history of dementia, patient at baseline has cognitive decline.  CT head negative for acute intracranial abnormality.  UA negative for infection  Chest x-ray without large effusion or infiltrate.  CT chest/abdomen/pelvis remarkable for appendicitis and worsening sigmoid colitis.  Continue IV antibiotics.  Mental status improving, continue delirium precautions.  Evaluated by PT/OT, recommended level 2.      Dementia (HCC)  Assessment & Plan  Previous hospitalization in April 2024 for encephalopathy in which patient as well as daughters worried about patient's progressively worsening memory issues over some time; from review of chart, there was suspected underlying dementia/cognitive decline in the setting of a subdural hematoma in December  Continue PTA donepezil  Delirium protocol    History of Clostridium difficile infection  Assessment & Plan  Noted history of C. difficile during last hospitalization in April from 4/15 to 4/22 in which patient was on p.o. vancomycin every 6 hours for 10 days.  Patient is started on Flagyl and ceftriaxone for colitis, continue vancomycin  prophylaxis.    Paroxysmal atrial fibrillation (HCC)  Assessment & Plan  Continue PTA flecainide  Not on any PTA anticoagulation.     Mood disorder with hypomanic features  Assessment & Plan  Continue PTA BuSpar, Celexa and Aricept                 VTE Pharmacologic Prophylaxis: VTE Score: 3 Moderate Risk (Score 3-4) - Pharmacological DVT Prophylaxis Ordered: enoxaparin (Lovenox).    Mobility:   Basic Mobility Inpatient Raw Score: 7  JH-HLM Goal: 2: Bed activities/Dependent transfer  JH-HLM Achieved: 4: Move to chair/commode  JH-HLM Goal achieved. Continue to encourage appropriate mobility.    Patient Centered Rounds: I performed bedside rounds with nursing staff today.   Discussions with Specialists or Other Care Team Provider:     Education and Discussions with Family / Patient: Attempted to update  (daughter) via phone. Left voicemail.  Lotus Hilliard.    Total Time Spent on Date of Encounter in care of patient: 39 mins. This time was spent on one or more of the following: performing physical exam; counseling and coordination of care; obtaining or reviewing history; documenting in the medical record; reviewing/ordering tests, medications or procedures; communicating with other healthcare professionals and discussing with patient's family/caregivers.    Current Length of Stay: 2 day(s)  Current Patient Status: Inpatient   Certification Statement: The patient will continue to require additional inpatient hospital stay due to continues to be on IV antibiotics  Discharge Plan: Anticipate discharge in 24-48 hrs to rehab facility.    Code Status: Level 1 - Full Code    Subjective:   Patient seen at bedside, reports feeling tired.    Objective:     Vitals:   Temp (24hrs), Av.8 °F (37.1 °C), Min:98 °F (36.7 °C), Max:99.5 °F (37.5 °C)    Temp:  [98 °F (36.7 °C)-99.5 °F (37.5 °C)] 98 °F (36.7 °C)  HR:  [66-70] 66  Resp:  [16] 16  BP: (114-127)/(46-59) 124/58  SpO2:  [93 %-94 %] 93 %  Body mass index is 25.01  kg/m².     Input and Output Summary (last 24 hours):     Intake/Output Summary (Last 24 hours) at 5/12/2024 1031  Last data filed at 5/12/2024 0419  Gross per 24 hour   Intake 1640 ml   Output --   Net 1640 ml       Physical Exam:   Physical Exam  Constitutional:       Appearance: Normal appearance.   HENT:      Head: Normocephalic and atraumatic.      Mouth/Throat:      Mouth: Mucous membranes are dry.      Pharynx: Oropharynx is clear.   Cardiovascular:      Rate and Rhythm: Normal rate and regular rhythm.   Pulmonary:      Effort: Pulmonary effort is normal.      Breath sounds: Normal breath sounds.   Abdominal:      General: Bowel sounds are normal.      Palpations: Abdomen is soft.   Musculoskeletal:         General: Normal range of motion.   Skin:     General: Skin is warm and dry.   Neurological:      Comments: Oriented times place and person, follows simple commands.  Speech is slow but understandable.          Additional Data:     Labs:  Results from last 7 days   Lab Units 05/12/24  0518 05/11/24  0444   WBC Thousand/uL 7.43 11.33*   HEMOGLOBIN g/dL 12.7 13.6   HEMATOCRIT % 39.3 42.6   PLATELETS Thousands/uL 116* 129*   BANDS PCT %  --  9*   SEGS PCT % 45  --    LYMPHO PCT % 34 26   MONO PCT % 19* 10   EOS PCT % 2 0     Results from last 7 days   Lab Units 05/12/24  0518 05/11/24  0444   SODIUM mmol/L 139 137   POTASSIUM mmol/L 3.6 3.9   CHLORIDE mmol/L 104 101   CO2 mmol/L 27 26   BUN mg/dL 9 10   CREATININE mg/dL 0.61 0.64   ANION GAP mmol/L 8 10   CALCIUM mg/dL 7.7* 8.3*   ALBUMIN g/dL  --  3.4*   TOTAL BILIRUBIN mg/dL  --  0.54   ALK PHOS U/L  --  41   ALT U/L  --  3*   AST U/L  --  10*   GLUCOSE RANDOM mg/dL 94 90     Results from last 7 days   Lab Units 05/11/24  0444   INR  1.08             Results from last 7 days   Lab Units 05/10/24  2347   LACTIC ACID mmol/L 1.8   PROCALCITONIN ng/ml 0.06       Lines/Drains:  Invasive Devices       Peripheral Intravenous Line  Duration             Peripheral IV  05/11/24 Dorsal (posterior);Left Forearm <1 day                          Imaging: Reviewed radiology reports from this admission including: abdominal/pelvic CT    Recent Cultures (last 7 days):   Results from last 7 days   Lab Units 05/11/24 0409   BLOOD CULTURE  No Growth at 24 hrs.  No Growth at 24 hrs.       Last 24 Hours Medication List:   Current Facility-Administered Medications   Medication Dose Route Frequency Provider Last Rate    acetaminophen  650 mg Oral Q4H PRN Luis Kim DO      aspirin  81 mg Oral Daily Luis Kim DO      busPIRone  7.5 mg Oral BID Luis Kim DO      cefTRIAXone  1,000 mg Intravenous Q24H Luis Kim DO 1,000 mg (05/12/24 0419)    citalopram  10 mg Oral Daily Luis Kim DO      divalproex sodium  250 mg Oral TID Luis Kim DO      donepezil  5 mg Oral HS Luis Kim DO      enoxaparin  40 mg Subcutaneous Q24H [facility] Asha Jacobs MD      flecainide  50 mg Oral Q12H [facility] Luis Kim DO      lactated ringers  75 mL/hr Intravenous Continuous Luis Kim DO 75 mL/hr (05/11/24 1947)    melatonin  3 mg Oral HS Luis Kim DO      metroNIDAZOLE  500 mg Intravenous Q8H Luis Kim  mg (05/12/24 0530)    vancomycin oral (capsules or solution)  125 mg Oral Q6H [facility] Luis Kim DO          Today, Patient Was Seen By: Asha Jacobs MD    **Please Note: This note may have been constructed using a voice recognition system.**

## 2024-05-12 NOTE — ASSESSMENT & PLAN NOTE
Noted history of C. difficile during last hospitalization in April from 4/15 to 4/22 in which patient was on p.o. vancomycin every 6 hours for 10 days.  Patient is started on Flagyl and ceftriaxone for colitis, continue vancomycin prophylaxis.

## 2024-05-12 NOTE — ASSESSMENT & PLAN NOTE
Reported altered mental status at [facility] where patient resides as well as fever of 100.4, likely in setting of colitis.  Given history of dementia, patient at baseline has cognitive decline.  CT head negative for acute intracranial abnormality.  UA negative for infection  Chest x-ray without large effusion or infiltrate.  CT chest/abdomen/pelvis remarkable for appendicitis and worsening sigmoid colitis.  Continue IV antibiotics.  Mental status improving, continue delirium precautions.  Evaluated by PT/OT, recommended level 2.

## 2024-05-13 LAB
ANION GAP SERPL CALCULATED.3IONS-SCNC: 9 MMOL/L (ref 4–13)
BUN SERPL-MCNC: 6 MG/DL (ref 5–25)
CALCIUM SERPL-MCNC: 7.6 MG/DL (ref 8.4–10.2)
CHLORIDE SERPL-SCNC: 104 MMOL/L (ref 96–108)
CO2 SERPL-SCNC: 25 MMOL/L (ref 21–32)
CREAT SERPL-MCNC: 0.52 MG/DL (ref 0.6–1.3)
ERYTHROCYTE [DISTWIDTH] IN BLOOD BY AUTOMATED COUNT: 14.9 % (ref 11.6–15.1)
GFR SERPL CREATININE-BSD FRML MDRD: 88 ML/MIN/1.73SQ M
GLUCOSE SERPL-MCNC: 96 MG/DL (ref 65–140)
HCT VFR BLD AUTO: 37.9 % (ref 34.8–46.1)
HGB BLD-MCNC: 12.3 G/DL (ref 11.5–15.4)
MCH RBC QN AUTO: 28.7 PG (ref 26.8–34.3)
MCHC RBC AUTO-ENTMCNC: 32.5 G/DL (ref 31.4–37.4)
MCV RBC AUTO: 89 FL (ref 82–98)
PLATELET # BLD AUTO: 108 THOUSANDS/UL (ref 149–390)
PMV BLD AUTO: 10.4 FL (ref 8.9–12.7)
POTASSIUM SERPL-SCNC: 3.5 MMOL/L (ref 3.5–5.3)
RBC # BLD AUTO: 4.28 MILLION/UL (ref 3.81–5.12)
SODIUM SERPL-SCNC: 138 MMOL/L (ref 135–147)
WBC # BLD AUTO: 6.83 THOUSAND/UL (ref 4.31–10.16)

## 2024-05-13 PROCEDURE — 85027 COMPLETE CBC AUTOMATED: CPT | Performed by: STUDENT IN AN ORGANIZED HEALTH CARE EDUCATION/TRAINING PROGRAM

## 2024-05-13 PROCEDURE — 80048 BASIC METABOLIC PNL TOTAL CA: CPT | Performed by: STUDENT IN AN ORGANIZED HEALTH CARE EDUCATION/TRAINING PROGRAM

## 2024-05-13 PROCEDURE — 99232 SBSQ HOSP IP/OBS MODERATE 35: CPT | Performed by: SURGERY

## 2024-05-13 PROCEDURE — 99232 SBSQ HOSP IP/OBS MODERATE 35: CPT | Performed by: STUDENT IN AN ORGANIZED HEALTH CARE EDUCATION/TRAINING PROGRAM

## 2024-05-13 RX ADMIN — FLECAINIDE ACETATE 50 MG: 50 TABLET ORAL at 20:45

## 2024-05-13 RX ADMIN — VANCOMYCIN HYDROCHLORIDE 125 MG: 125 CAPSULE ORAL at 00:31

## 2024-05-13 RX ADMIN — DONEPEZIL HYDROCHLORIDE 5 MG: 5 TABLET ORAL at 20:44

## 2024-05-13 RX ADMIN — ASPIRIN 81 MG: 81 TABLET, COATED ORAL at 08:14

## 2024-05-13 RX ADMIN — CEFTRIAXONE 1000 MG: 1 INJECTION, POWDER, FOR SOLUTION INTRAMUSCULAR; INTRAVENOUS at 02:29

## 2024-05-13 RX ADMIN — Medication 3 MG: at 20:43

## 2024-05-13 RX ADMIN — DIVALPROEX SODIUM 250 MG: 250 TABLET, DELAYED RELEASE ORAL at 16:20

## 2024-05-13 RX ADMIN — FLECAINIDE ACETATE 50 MG: 50 TABLET ORAL at 08:15

## 2024-05-13 RX ADMIN — VANCOMYCIN HYDROCHLORIDE 125 MG: 125 CAPSULE ORAL at 16:20

## 2024-05-13 RX ADMIN — SODIUM CHLORIDE, SODIUM LACTATE, POTASSIUM CHLORIDE, AND CALCIUM CHLORIDE 75 ML/HR: .6; .31; .03; .02 INJECTION, SOLUTION INTRAVENOUS at 00:45

## 2024-05-13 RX ADMIN — ENOXAPARIN SODIUM 40 MG: 40 INJECTION SUBCUTANEOUS at 08:15

## 2024-05-13 RX ADMIN — METRONIDAZOLE 500 MG: 500 INJECTION, SOLUTION INTRAVENOUS at 04:33

## 2024-05-13 RX ADMIN — DIVALPROEX SODIUM 250 MG: 250 TABLET, DELAYED RELEASE ORAL at 08:14

## 2024-05-13 RX ADMIN — METRONIDAZOLE 500 MG: 500 INJECTION, SOLUTION INTRAVENOUS at 12:02

## 2024-05-13 RX ADMIN — DIVALPROEX SODIUM 250 MG: 250 TABLET, DELAYED RELEASE ORAL at 20:43

## 2024-05-13 RX ADMIN — VANCOMYCIN HYDROCHLORIDE 125 MG: 125 CAPSULE ORAL at 05:23

## 2024-05-13 RX ADMIN — CITALOPRAM HYDROBROMIDE 10 MG: 10 TABLET ORAL at 08:14

## 2024-05-13 RX ADMIN — VANCOMYCIN HYDROCHLORIDE 125 MG: 125 CAPSULE ORAL at 10:57

## 2024-05-13 RX ADMIN — BUSPIRONE HYDROCHLORIDE 7.5 MG: 5 TABLET ORAL at 08:14

## 2024-05-13 RX ADMIN — BUSPIRONE HYDROCHLORIDE 7.5 MG: 5 TABLET ORAL at 20:44

## 2024-05-13 NOTE — ASSESSMENT & PLAN NOTE
Noted history of C. difficile during last hospitalization in April from 4/15 to 4/22 in which patient was on p.o. vancomycin every 6 hours for 10 days.  Active infection currently, on oral vancomycin  Will need slow oral vancomycin taper at discharge and outpatient follow-up with GI

## 2024-05-13 NOTE — ASSESSMENT & PLAN NOTE
CT abdomen pelvis on admission remarkable for appendicitis and sigmoid colitis.  Patient evaluated by general surgery, given benign abdominal exam appendicitis on imaging is suspected secondary to colitis.  No surgical intervention at this time.  Patient started on ceftriaxone, Flagyl and vancomycin for prophylaxis initially.  C. difficile PCR and EIA is positive.   Ceftriaxone and Flagyl discontinued given low suspicion for appendicitis on 5/13  Given first recurrence, will start patient on vancomycin taper of 125 mg every 6 hours x 14 days followed by 125 mg every 12 hours x 7 days followed by 125 mg daily x 7 days followed by 125 mg 3 times weekly x 2 weeks.  Dr. Jacobs discussed case with GI who recommended outpatient follow-up with GI with slow taper above  Patient was initially started on clear liquid, tolerated well has been advanced to low fiber low residual diet.  Continues to have poor appetite requiring IVF  Will discontinue IVF today and monitor labs and vitals and ensure adequate oral intake

## 2024-05-13 NOTE — PROGRESS NOTES
"Progress Note - General Surgery   Ann Cross 83 y.o. female MRN: [id number]  Unit/Bed#: [facility] 733-01 Encounter: [id number]    Assessment:  83yoF p/w AMS and fevers, CT imaging concerning for colitis and appendicitis, cdiff+     Vital stable, afebrile  WBC 6.8 from 7.43  Creatinine 0.52  5/11 cdiff +    Plan:  -low res diet, would record % of meals consumed as patient not able to reliably relay this information given dementia and baseline mental status  -rocephin/flagyl, PO vanc  -LR75  -lovenox  -Care per primary team    Subjective/Objective   Subjective:   Disoriented but at baseline, denies pain, states she is eating without n/v, having loose BM, voiding, minimal OOB.    Objective:     Blood pressure 120/62, pulse 64, temperature 98.5 °F (36.9 °C), resp. rate 22, height 5' 4\" (1.626 m), weight 66.1 kg (145 lb 11.6 oz), SpO2 94%.,Body mass index is 25.01 kg/m².      Intake/Output Summary (Last 24 hours) at 5/13/2024 0806  Last data filed at 5/12/2024 2236  Gross per 24 hour   Intake 520 ml   Output --   Net 520 ml       Invasive Devices       Peripheral Intravenous Line  Duration             Peripheral IV 05/11/24 Dorsal (posterior);Left Forearm 1 day                    Physical Exam:   General: NAD  Skin: Warm, dry, anicteric  HEENT: Normocephalic, atraumatic  CV: RRR, no m/r/g  Pulm: CTA b/l, no inc WOB  Abd: Soft, ND, minimally TTP diffusely non specific  MSK: Symmetric, no edema, no tenderness, no deformity  Neuro: disoriented    Lab, Imaging and other studies:I have personally reviewed pertinent lab results.  , CBC:   Lab Results   Component Value Date    WBC 6.83 05/13/2024    HGB 12.3 05/13/2024    HCT 37.9 05/13/2024    MCV 89 05/13/2024     (L) 05/13/2024    RBC 4.28 05/13/2024    MCH 28.7 05/13/2024    MCHC 32.5 05/13/2024    RDW 14.9 05/13/2024    MPV 10.4 05/13/2024   , CMP:   Lab Results   Component Value Date    SODIUM 138 05/13/2024    K 3.5 05/13/2024     05/13/2024    CO2 25 " 05/13/2024    BUN 6 05/13/2024    CREATININE 0.52 (L) 05/13/2024    CALCIUM 7.6 (L) 05/13/2024    EGFR 88 05/13/2024     VTE Pharmacologic Prophylaxis: Enoxaparin (Lovenox)  VTE Mechanical Prophylaxis: sequential compression device

## 2024-05-13 NOTE — PROGRESS NOTES
[facility]  Progress Note  Name: Ann Cross I  MRN: [id number]  Unit/Bed#: [facility] 733-01 I Date of Admission: 5/10/2024   Date of Service: 5/13/2024 I Hospital Day: 3    Assessment/Plan   Colitis  Assessment & Plan  CT abdomen pelvis on admission remarkable for appendicitis and sigmoid colitis.  Patient evaluated by general surgery, given benign abdominal exam appendicitis on imaging is suspected secondary to colitis.  No surgical intervention at this time.  Patient started on ceftriaxone, Flagyl and vancomycin for prophylaxis initially.  C. difficile PCR and EIA is positive.   Will DC ceftriaxone and Flagyl given low suspicious for appendicitis.  Continue vancomycin 125 mg every 6 hours for 14 days, given recurrence of infection within 2 months patient will benefit from long taper.  GI consulted.  Patient was initially started on clear liquid, tolerated well has been advanced to low fiber low residual diet.    * Acute metabolic encephalopathy  Assessment & Plan  Reported altered mental status at [facility] where patient resides as well as fever of 100.4, likely in setting of colitis.  Given history of dementia, patient at baseline has cognitive decline.  CT head negative for acute intracranial abnormality.  UA negative for infection  Chest x-ray without large effusion or infiltrate.  CT chest/abdomen/pelvis remarkable for appendicitis and worsening sigmoid colitis.  Continue antibiotics.  Mental status improving, continue delirium precautions.  Evaluated by PT/OT, recommended level 2.      Dementia (HCC)  Assessment & Plan  Previous hospitalization in April 2024 for encephalopathy in which patient as well as daughters worried about patient's progressively worsening memory issues over some time; from review of chart, there was suspected underlying dementia/cognitive decline in the setting of a subdural hematoma in December  Continue PTA donepezil  Delirium  protocol    History of Clostridium difficile infection  Assessment & Plan  Noted history of C. difficile during last hospitalization in April from 4/15 to 4/22 in which patient was on p.o. vancomycin every 6 hours for 10 days.  Patient is started on Flagyl and ceftriaxone for colitis, continue vancomycin prophylaxis.    Paroxysmal atrial fibrillation (HCC)  Assessment & Plan  Continue PTA flecainide  Not on any PTA anticoagulation.     Mood disorder with hypomanic features  Assessment & Plan  Continue PTA BuSpar, Celexa and Aricept                 VTE Pharmacologic Prophylaxis: VTE Score: 3 Moderate Risk (Score 3-4) - Pharmacological DVT Prophylaxis Ordered: enoxaparin (Lovenox).    Mobility:   Basic Mobility Inpatient Raw Score: 7  -HL Goal: 2: Bed activities/Dependent transfer  JH-HLM Achieved: 3: Sit at edge of bed  JH-HLM Goal achieved. Continue to encourage appropriate mobility.    Patient Centered Rounds: I performed bedside rounds with nursing staff today.   Discussions with Specialists or Other Care Team Provider: GI, CM     Education and Discussions with Family / Patient: Updated  (daughter) via phone. Lotus Hilliard.    Total Time Spent on Date of Encounter in care of patient: 39 mins. This time was spent on one or more of the following: performing physical exam; counseling and coordination of care; obtaining or reviewing history; documenting in the medical record; reviewing/ordering tests, medications or procedures; communicating with other healthcare professionals and discussing with patient's family/caregivers.    Current Length of Stay: 3 day(s)  Current Patient Status: Inpatient   Certification Statement: The patient will continue to require additional inpatient hospital stay due to continues to be on IV antibiotics  Discharge Plan: Anticipate discharge in 24-48 hrs to rehab facility.    Code Status: Level 1 - Full Code    Subjective:   Patient seen at bedside, reports feeling tired.   Continues with poor appetite.  Had extensive discussion with patient's daughter Lotus over phone, apparently patient's appetite has been poor since her last hospitalization to the point where she has been down from 158 pounds to 145.  Denies any abdominal pain or diarrhea.    Objective:     Vitals:   Temp (24hrs), Av.9 °F (37.2 °C), Min:98.5 °F (36.9 °C), Max:99.2 °F (37.3 °C)    Temp:  [98.5 °F (36.9 °C)-99.2 °F (37.3 °C)] 99.2 °F (37.3 °C)  HR:  [64-70] 64  Resp:  [17-22] 22  BP: (120-135)/(62-68) 135/67  SpO2:  [93 %-96 %] 94 %  Body mass index is 25.01 kg/m².     Input and Output Summary (last 24 hours):     Intake/Output Summary (Last 24 hours) at 2024 1844  Last data filed at 2024 0801  Gross per 24 hour   Intake 720 ml   Output --   Net 720 ml       Physical Exam:   Physical Exam  Constitutional:       Appearance: Normal appearance.   HENT:      Head: Normocephalic and atraumatic.      Mouth/Throat:      Mouth: Mucous membranes are dry.      Pharynx: Oropharynx is clear.   Cardiovascular:      Rate and Rhythm: Normal rate and regular rhythm.   Pulmonary:      Effort: Pulmonary effort is normal.      Breath sounds: Normal breath sounds.   Abdominal:      General: Bowel sounds are normal.      Palpations: Abdomen is soft.   Musculoskeletal:         General: Normal range of motion.   Skin:     General: Skin is warm and dry.   Neurological:      Comments: Oriented times place and person, follows simple commands.  Speech is slow but understandable.          Additional Data:     Labs:  Results from last 7 days   Lab Units 24  0434 24  0518 24  0444   WBC Thousand/uL 6.83 7.43 11.33*   HEMOGLOBIN g/dL 12.3 12.7 13.6   HEMATOCRIT % 37.9 39.3 42.6   PLATELETS Thousands/uL 108* 116* 129*   BANDS PCT %  --   --  9*   SEGS PCT %  --  45  --    LYMPHO PCT %  --  34 26   MONO PCT %  --  19* 10   EOS PCT %  --  2 0     Results from last 7 days   Lab Units 24  0434 24  0518  05/11/24  0444   SODIUM mmol/L 138   < > 137   POTASSIUM mmol/L 3.5   < > 3.9   CHLORIDE mmol/L 104   < > 101   CO2 mmol/L 25   < > 26   BUN mg/dL 6   < > 10   CREATININE mg/dL 0.52*   < > 0.64   ANION GAP mmol/L 9   < > 10   CALCIUM mg/dL 7.6*   < > 8.3*   ALBUMIN g/dL  --   --  3.4*   TOTAL BILIRUBIN mg/dL  --   --  0.54   ALK PHOS U/L  --   --  41   ALT U/L  --   --  3*   AST U/L  --   --  10*   GLUCOSE RANDOM mg/dL 96   < > 90    < > = values in this interval not displayed.     Results from last 7 days   Lab Units 05/11/24  0444   INR  1.08             Results from last 7 days   Lab Units 05/10/24  2347   LACTIC ACID mmol/L 1.8   PROCALCITONIN ng/ml 0.06       Lines/Drains:  Invasive Devices       Peripheral Intravenous Line  Duration             Peripheral IV 05/11/24 Dorsal (posterior);Left Forearm 2 days                          Imaging: Reviewed radiology reports from this admission including: abdominal/pelvic CT    Recent Cultures (last 7 days):   Results from last 7 days   Lab Units 05/11/24  0519 05/11/24  0409   BLOOD CULTURE   --  No Growth at 48 hrs.  No Growth at 48 hrs.   C DIFF TOXIN B BY PCR  Positive*  --        Last 24 Hours Medication List:   Current Facility-Administered Medications   Medication Dose Route Frequency Provider Last Rate    acetaminophen  650 mg Oral Q4H PRN Luis Kim DO      aspirin  81 mg Oral Daily Luis Kim DO      busPIRone  7.5 mg Oral BID Luis Kim DO      citalopram  10 mg Oral Daily Luis Kim DO      divalproex sodium  250 mg Oral TID Luis Kim DO      donepezil  5 mg Oral HS Luis Kim DO      enoxaparin  40 mg Subcutaneous Q24H [facility] Asha Jacobs MD      flecainide  50 mg Oral Q12H [facility] Luis Kim DO      lactated ringers  75 mL/hr Intravenous Continuous Luis Kim DO 75 mL/hr (05/13/24 0045)    melatonin  3 mg Oral HS Luis Kim DO      vancomycin oral (capsules or solution)  125 mg Oral Q6H [facility] Luis Kim DO          Today,  Patient Was Seen By: Asha Jacobs MD    **Please Note: This note may have been constructed using a voice recognition system.**

## 2024-05-13 NOTE — PLAN OF CARE
Problem: Prexisting or High Potential for Compromised Skin Integrity  Goal: Skin integrity is maintained or improved  Description: INTERVENTIONS:  - Identify patients at risk for skin breakdown  - Assess and monitor skin integrity  - Assess and monitor nutrition and hydration status  - Monitor labs   - Assess for incontinence   - Turn and reposition patient  - Assist with mobility/ambulation  - Relieve pressure over bony prominences  - Avoid friction and shearing  - Provide appropriate hygiene as needed including keeping skin clean and dry  - Evaluate need for skin moisturizer/barrier cream  - Collaborate with interdisciplinary team   - Patient/family teaching  - Consider wound care consult   Outcome: Progressing     Problem: INFECTION - ADULT  Goal: Absence or prevention of progression during hospitalization  Description: INTERVENTIONS:  - Assess and monitor for signs and symptoms of infection  - Monitor lab/diagnostic results  - Monitor all insertion sites, i.e. indwelling lines, tubes, and drains  - Monitor endotracheal if appropriate and nasal secretions for changes in amount and color  - [place] appropriate cooling/warming therapies per order  - Administer medications as ordered  - Instruct and encourage patient and family to use good hand hygiene technique  - Identify and instruct in appropriate isolation precautions for identified infection/condition  Outcome: Progressing     Problem: Knowledge Deficit  Goal: Patient/family/caregiver demonstrates understanding of disease process, treatment plan, medications, and discharge instructions  Description: Complete learning assessment and assess knowledge base.  Interventions:  - Provide teaching at level of understanding  - Provide teaching via preferred learning methods  Outcome: Progressing

## 2024-05-13 NOTE — ASSESSMENT & PLAN NOTE
Reported altered mental status at [facility] where patient resides as well as fever of 100.4, likely in setting of colitis.  Given history of dementia, patient at baseline has cognitive decline.  CT head negative for acute intracranial abnormality.  UA negative for infection  Chest x-ray without large effusion or infiltrate.  CT chest/abdomen/pelvis remarkable for appendicitis and worsening sigmoid colitis.  Continue antibiotics.  Mental status improving, continue delirium precautions.  Evaluated by PT/OT, recommended level 2.

## 2024-05-14 LAB
ANION GAP SERPL CALCULATED.3IONS-SCNC: 9 MMOL/L (ref 4–13)
BUN SERPL-MCNC: 5 MG/DL (ref 5–25)
CALCIUM SERPL-MCNC: 8.2 MG/DL (ref 8.4–10.2)
CHLORIDE SERPL-SCNC: 106 MMOL/L (ref 96–108)
CO2 SERPL-SCNC: 25 MMOL/L (ref 21–32)
CREAT SERPL-MCNC: 0.59 MG/DL (ref 0.6–1.3)
ERYTHROCYTE [DISTWIDTH] IN BLOOD BY AUTOMATED COUNT: 14.7 % (ref 11.6–15.1)
GFR SERPL CREATININE-BSD FRML MDRD: 85 ML/MIN/1.73SQ M
GLUCOSE SERPL-MCNC: 94 MG/DL (ref 65–140)
HCT VFR BLD AUTO: 45.1 % (ref 34.8–46.1)
HGB BLD-MCNC: 14.5 G/DL (ref 11.5–15.4)
MCH RBC QN AUTO: 28.5 PG (ref 26.8–34.3)
MCHC RBC AUTO-ENTMCNC: 32.2 G/DL (ref 31.4–37.4)
MCV RBC AUTO: 89 FL (ref 82–98)
PLATELET # BLD AUTO: 116 THOUSANDS/UL (ref 149–390)
PMV BLD AUTO: 10.1 FL (ref 8.9–12.7)
POTASSIUM SERPL-SCNC: 3.5 MMOL/L (ref 3.5–5.3)
RBC # BLD AUTO: 5.08 MILLION/UL (ref 3.81–5.12)
SODIUM SERPL-SCNC: 140 MMOL/L (ref 135–147)
WBC # BLD AUTO: 5.91 THOUSAND/UL (ref 4.31–10.16)

## 2024-05-14 PROCEDURE — 99233 SBSQ HOSP IP/OBS HIGH 50: CPT | Performed by: STUDENT IN AN ORGANIZED HEALTH CARE EDUCATION/TRAINING PROGRAM

## 2024-05-14 PROCEDURE — 97530 THERAPEUTIC ACTIVITIES: CPT

## 2024-05-14 PROCEDURE — 80048 BASIC METABOLIC PNL TOTAL CA: CPT | Performed by: STUDENT IN AN ORGANIZED HEALTH CARE EDUCATION/TRAINING PROGRAM

## 2024-05-14 PROCEDURE — 97110 THERAPEUTIC EXERCISES: CPT

## 2024-05-14 PROCEDURE — 97535 SELF CARE MNGMENT TRAINING: CPT

## 2024-05-14 PROCEDURE — 97116 GAIT TRAINING THERAPY: CPT

## 2024-05-14 PROCEDURE — 85027 COMPLETE CBC AUTOMATED: CPT | Performed by: STUDENT IN AN ORGANIZED HEALTH CARE EDUCATION/TRAINING PROGRAM

## 2024-05-14 RX ADMIN — DIVALPROEX SODIUM 250 MG: 250 TABLET, DELAYED RELEASE ORAL at 21:20

## 2024-05-14 RX ADMIN — ENOXAPARIN SODIUM 40 MG: 40 INJECTION SUBCUTANEOUS at 07:52

## 2024-05-14 RX ADMIN — FLECAINIDE ACETATE 50 MG: 50 TABLET ORAL at 07:51

## 2024-05-14 RX ADMIN — Medication 3 MG: at 21:20

## 2024-05-14 RX ADMIN — DIVALPROEX SODIUM 250 MG: 250 TABLET, DELAYED RELEASE ORAL at 07:51

## 2024-05-14 RX ADMIN — FLECAINIDE ACETATE 50 MG: 50 TABLET ORAL at 21:20

## 2024-05-14 RX ADMIN — VANCOMYCIN HYDROCHLORIDE 125 MG: 125 CAPSULE ORAL at 11:28

## 2024-05-14 RX ADMIN — DONEPEZIL HYDROCHLORIDE 5 MG: 5 TABLET ORAL at 21:20

## 2024-05-14 RX ADMIN — CITALOPRAM HYDROBROMIDE 10 MG: 10 TABLET ORAL at 07:52

## 2024-05-14 RX ADMIN — VANCOMYCIN HYDROCHLORIDE 125 MG: 125 CAPSULE ORAL at 23:32

## 2024-05-14 RX ADMIN — VANCOMYCIN HYDROCHLORIDE 125 MG: 125 CAPSULE ORAL at 00:11

## 2024-05-14 RX ADMIN — VANCOMYCIN HYDROCHLORIDE 125 MG: 125 CAPSULE ORAL at 16:30

## 2024-05-14 RX ADMIN — ASPIRIN 81 MG: 81 TABLET, COATED ORAL at 07:52

## 2024-05-14 RX ADMIN — BUSPIRONE HYDROCHLORIDE 7.5 MG: 5 TABLET ORAL at 07:51

## 2024-05-14 RX ADMIN — VANCOMYCIN HYDROCHLORIDE 125 MG: 125 CAPSULE ORAL at 06:12

## 2024-05-14 RX ADMIN — DIVALPROEX SODIUM 250 MG: 250 TABLET, DELAYED RELEASE ORAL at 16:30

## 2024-05-14 RX ADMIN — BUSPIRONE HYDROCHLORIDE 7.5 MG: 5 TABLET ORAL at 21:20

## 2024-05-14 NOTE — PLAN OF CARE
Problem: PHYSICAL THERAPY ADULT  Goal: Performs mobility at highest level of function for planned discharge setting.  See evaluation for individualized goals.  Description: Treatment/Interventions: ADL retraining, Functional transfer training, LE strengthening/ROM, Elevations, Therapeutic exercise, Endurance training, Bed mobility, Gait training, Spoke to nursing, OT          See flowsheet documentation for full assessment, interventions and recommendations.  Outcome: Progressing  Note: Prognosis: Fair  Problem List: Decreased strength, Decreased endurance, Impaired balance, Decreased mobility, Decreased coordination, Decreased cognition, Impaired judgement, Decreased safety awareness  Assessment: Patient seated in bedside recliner, agreeable to participate in therapy. She was able to sit unsupported, but has some sway with dynamic movement. She ambulated into bathroom with min A, but following standing ~4 minutes she notes feeling lightheaded and requires min A x2 to assist with retro gait and to sit. She is slightly retropulsive with fatigue. Following a short rest, she was able to ambulate increase distances with min A. She does require cues for RW placement for safety. She would continue to benefit from skilled PT to maximize functional independence.  Barriers to Discharge: None     Rehab Resource Intensity Level, PT: II (Moderate Resource Intensity)    See flowsheet documentation for full assessment.

## 2024-05-14 NOTE — TREATMENT PLAN
GI consulted for c diff recurrence, per discussion no intervention needed at this time and given mild severity based on no leukocytosis, no diarrhea and afebrile. Pt will be discharged on vanco taper and follow up with GI in outpt settings.

## 2024-05-14 NOTE — RESTORATIVE TECHNICIAN NOTE
Restorative Technician Note      Patient Name: Ann Cross     Note Type: Mobility  Patient Position Upon Consult: Supine  Activity Performed: Ambulated; Dangled; Stood  Assistive Device: Roller walker  Education Provided: Yes  Patient Position at End of Consult: Bedside chair; All needs within reach; Bed/Chair alarm activated    Kay DIXON, Restorative Technician,

## 2024-05-14 NOTE — PHYSICAL THERAPY NOTE
Physical Therapy Progress Note        05/14/24 1434   PT Last Visit   PT Visit Date 05/14/24   Note Type   Note Type Treatment   Pain Assessment   Pain Assessment Tool 0-10   Pain Score No Pain   Hospital Pain Intervention(s) Ambulation/increased activity;Repositioned   Restrictions/Precautions   Weight Bearing Precautions Per Order No   Other Precautions Cognitive;Chair Alarm;Fall Risk   General   Chart Reviewed Yes   Response to Previous Treatment Patient with no complaints from previous session.   Family/Caregiver Present No   Cognition   Overall Cognitive Status Impaired   Arousal/Participation Alert;Cooperative   Comments Pt is pleasand and cooperative throughout session.   Bed Mobility   Additional items   (mod with feeling dizzy)   Transfers   Sit to Stand 4  Minimal assistance  (mod A times with dizziness)   Additional items Assist x 1;Increased time required   Stand to Sit 4  Minimal assistance   Additional items Assist x 1;Increased time required   Ambulation/Elevation   Gait pattern Excessively slow;Step to;Decreased foot clearance   Gait Assistance 4  Minimal assist   Additional items Assist x 1;Verbal cues   Assistive Device Rolling walker   Distance 5 feet x1, 80 feet x2 with chair follow   Balance   Static Sitting Fair   Dynamic Sitting Fair -   Static Standing Fair   Dynamic Standing Fair -   Endurance Deficit   Endurance Deficit Yes   Endurance Deficit Description weakness, lightheadedness   Activity Tolerance   Activity Tolerance Patient limited by fatigue  (improved tolerance overall)   Nurse Made Aware Appropriate to see per RN   Exercises   Hip Flexion Sitting;20 reps;AROM;Bilateral   Hip Abduction Sitting;20 reps;AROM;Bilateral   Knee AROM Long Arc Quad Sitting;20 reps;AROM;Bilateral   Assessment   Prognosis Fair   Problem List Decreased strength;Decreased endurance;Impaired balance;Decreased mobility;Decreased coordination;Decreased cognition;Impaired judgement;Decreased safety awareness    Assessment Patient seated in bedside recliner, agreeable to participate in therapy. She was able to sit unsupported, but has some sway with dynamic movement. She ambulated into bathroom with min A, but following standing ~4 minutes she notes feeling lightheaded and requires min A x2 to assist with retro gait and to sit. She is slightly retropulsive with fatigue. Following a short rest, she was able to ambulate increase distances with min A. She does require cues for RW placement for safety. She would continue to benefit from skilled PT to maximize functional independence.   Barriers to Discharge None   Goals   Patient Goals None stated   [facility] Expiration Date 05/25/24   PT Treatment Day 1   Plan   Treatment/Interventions Functional transfer training;LE strengthening/ROM;Therapeutic exercise;Endurance training;Gait training;Spoke to nursing   PT Frequency 3-5x/wk   Discharge Recommendation   Rehab Resource Intensity Level, PT II (Moderate Resource Intensity)   AM-PAC Basic Mobility Inpatient   Turning in Flat Bed Without Bedrails 3   Lying on Back to Sitting on Edge of Flat Bed Without Bedrails 3   Moving Bed to Chair 3   Standing Up From Chair Using Arms 3   Walk in Room 3   Climb 3-5 Stairs With Railing 3   Basic Mobility Inpatient Raw Score 18   Basic Mobility Standardized Score 41.05   [facility] Highest Level Of Mobility   JH-HLM Goal 6: Walk 10 steps or more   JH-HLM Achieved 7: Walk 25 feet or more       Maria Dolores Kirk PTA

## 2024-05-14 NOTE — RESTORATIVE TECHNICIAN NOTE
Restorative Technician Note      Patient Name: Ann Cross     Note Type: Mobility  Patient Position Upon Consult: Bedside chair  Activity Performed: Ambulated; Dangled; Stood  Assistive Device: Roller walker; Other (Comment) (Assist x1 with chair follow. Assisted PTA Maria Dolores)  Education Provided: Yes  Patient Position at End of Consult: Bedside chair; All needs within reach; Bed/Chair alarm activated      Kay DIXON, Restorative Technician,

## 2024-05-14 NOTE — PROGRESS NOTES
[facility]  Progress Note  Name: Ann Cross I  MRN: [id number]  Unit/Bed#: [facility] 733-01 I Date of Admission: 5/10/2024   Date of Service: 5/14/2024 I Hospital Day: 4    Assessment/Plan   * Acute metabolic encephalopathy  Assessment & Plan  Reported altered mental status at [facility] where patient resides as well as fever of 100.4, likely in setting of colitis.  Given history of dementia, patient at baseline has cognitive decline.  CT head negative for acute intracranial abnormality.  UA negative for infection  Chest x-ray without large effusion or infiltrate.  CT chest/abdomen/pelvis remarkable for appendicitis and worsening sigmoid colitis.  Continue antibiotics.  Mental status improving, continue delirium precautions.  Evaluated by PT/OT, recommended level 2.      Colitis  Assessment & Plan  CT abdomen pelvis on admission remarkable for appendicitis and sigmoid colitis.  Patient evaluated by general surgery, given benign abdominal exam appendicitis on imaging is suspected secondary to colitis.  No surgical intervention at this time.  Patient started on ceftriaxone, Flagyl and vancomycin for prophylaxis initially.  C. difficile PCR and EIA is positive.   Ceftriaxone and Flagyl discontinued given low suspicion for appendicitis on 5/13  Given first recurrence, will start patient on vancomycin taper of 125 mg every 6 hours x 14 days followed by 125 mg every 12 hours x 7 days followed by 125 mg daily x 7 days followed by 125 mg 3 times weekly x 2 weeks.  Dr. Jacobs discussed case with GI who recommended outpatient follow-up with GI with slow taper above  Patient was initially started on clear liquid, tolerated well has been advanced to low fiber low residual diet.  Continues to have poor appetite requiring IVF  Will discontinue IVF today and monitor labs and vitals and ensure adequate oral intake    Dementia (HCC)  Assessment & Plan  Previous hospitalization in April 2024  for encephalopathy in which patient as well as daughters worried about patient's progressively worsening memory issues over some time; from review of chart, there was suspected underlying dementia/cognitive decline in the setting of a subdural hematoma in December  Continue PTA donepezil  Delirium protocol    History of Clostridium difficile infection  Assessment & Plan  Noted history of C. difficile during last hospitalization in April from 4/15 to 4/22 in which patient was on p.o. vancomycin every 6 hours for 10 days.  Active infection currently, on oral vancomycin  Will need slow oral vancomycin taper at discharge and outpatient follow-up with GI    Paroxysmal atrial fibrillation (HCC)  Assessment & Plan  Continue PTA flecainide  Not on any PTA anticoagulation.     Mood disorder with hypomanic features  Assessment & Plan  Continue PTA BuSpar, Celexa and Aricept             VTE Pharmacologic Prophylaxis: VTE Score: 3 Moderate Risk (Score 3-4) - Pharmacological DVT Prophylaxis Ordered: enoxaparin (Lovenox).    Mobility:   Basic Mobility Inpatient Raw Score: 18  JH-HLM Goal: 6: Walk 10 steps or more  JH-HLM Achieved: 7: Walk 25 feet or more  JH-HLM Goal achieved. Continue to encourage appropriate mobility.    Patient Centered Rounds: I performed bedside rounds with nursing staff today.   Discussions with Specialists or Other Care Team Provider: GI, primary RN    Education and Discussions with Family / Patient: Updated  (daughter) via phone.    Total Time Spent on Date of Encounter in care of patient: 25 mins. This time was spent on one or more of the following: performing physical exam; counseling and coordination of care; obtaining or reviewing history; documenting in the medical record; reviewing/ordering tests, medications or procedures; communicating with other healthcare professionals and discussing with patient's family/caregivers.    Current Length of Stay: 4 day(s)  Current Patient Status:  "Inpatient   Certification Statement: The patient will continue to require additional inpatient hospital stay due to poor oral intake, C. difficile infection requiring close monitoring  Discharge Plan: Anticipate discharge tomorrow to prior assisted or independent living facility.    Code Status: Level 1 - Full Code    Subjective:   Patient assessed at bedside.  Reports feeling \"okay\".  Continues to have poor oral intake with decreased appetite.  Requiring IV fluids currently.  No abdominal pain,  nausea or vomiting.  Afebrile.    Objective:     Vitals:   Temp (24hrs), Av.3 °F (36.8 °C), Min:98 °F (36.7 °C), Max:98.5 °F (36.9 °C)    Temp:  [98 °F (36.7 °C)-98.5 °F (36.9 °C)] 98.4 °F (36.9 °C)  HR:  [60-73] 60  Resp:  [16-20] 20  BP: (120-176)/(60-79) 120/60  SpO2:  [93 %-97 %] 97 %  Body mass index is 25.01 kg/m².     Input and Output Summary (last 24 hours):     Intake/Output Summary (Last 24 hours) at 2024 1722  Last data filed at 2024 1501  Gross per 24 hour   Intake 860 ml   Output 500 ml   Net 360 ml       Physical Exam:   Physical Exam  Vitals reviewed.   Constitutional:       General: She is not in acute distress.     Appearance: Normal appearance. She is not ill-appearing.   HENT:      Head: Normocephalic and atraumatic.   Cardiovascular:      Rate and Rhythm: Normal rate and regular rhythm.      Heart sounds: Normal heart sounds.   Pulmonary:      Effort: Pulmonary effort is normal. No respiratory distress.      Breath sounds: No wheezing or rales.   Abdominal:      General: Bowel sounds are normal.      Tenderness: There is no abdominal tenderness.   Musculoskeletal:      Right lower leg: No edema.      Left lower leg: No edema.   Skin:     General: Skin is warm and dry.   Neurological:      Mental Status: She is alert. Mental status is at baseline.          Additional Data:     Labs:  Results from last 7 days   Lab Units 24  0632 24  0434 24  0518 24  0444   WBC " Thousand/uL 5.91   < > 7.43 11.33*   HEMOGLOBIN g/dL 14.5   < > 12.7 13.6   HEMATOCRIT % 45.1   < > 39.3 42.6   PLATELETS Thousands/uL 116*   < > 116* 129*   BANDS PCT %  --   --   --  9*   SEGS PCT %  --   --  45  --    LYMPHO PCT %  --   --  34 26   MONO PCT %  --   --  19* 10   EOS PCT %  --   --  2 0    < > = values in this interval not displayed.     Results from last 7 days   Lab Units 05/14/24  0632 05/12/24  0518 05/11/24  0444   SODIUM mmol/L 140   < > 137   POTASSIUM mmol/L 3.5   < > 3.9   CHLORIDE mmol/L 106   < > 101   CO2 mmol/L 25   < > 26   BUN mg/dL 5   < > 10   CREATININE mg/dL 0.59*   < > 0.64   ANION GAP mmol/L 9   < > 10   CALCIUM mg/dL 8.2*   < > 8.3*   ALBUMIN g/dL  --   --  3.4*   TOTAL BILIRUBIN mg/dL  --   --  0.54   ALK PHOS U/L  --   --  41   ALT U/L  --   --  3*   AST U/L  --   --  10*   GLUCOSE RANDOM mg/dL 94   < > 90    < > = values in this interval not displayed.     Results from last 7 days   Lab Units 05/11/24  0444   INR  1.08             Results from last 7 days   Lab Units 05/10/24  2347   LACTIC ACID mmol/L 1.8   PROCALCITONIN ng/ml 0.06       Lines/Drains:  Invasive Devices       Peripheral Intravenous Line  Duration             Peripheral IV 05/11/24 Dorsal (posterior);Left Forearm 2 days                          Imaging: Reviewed radiology reports from this admission including: chest CT scan and abdominal/pelvic CT    Recent Cultures (last 7 days):   Results from last 7 days   Lab Units 05/11/24  0519 05/11/24  0409   BLOOD CULTURE   --  No Growth at 72 hrs.  No Growth at 72 hrs.   C DIFF TOXIN B BY PCR  Positive*  --        Last 24 Hours Medication List:   Current Facility-Administered Medications   Medication Dose Route Frequency Provider Last Rate    acetaminophen  650 mg Oral Q4H PRN Luis Kim, DO      aspirin  81 mg Oral Daily Luis Kim, DO      busPIRone  7.5 mg Oral BID Luis Kim, DO      citalopram  10 mg Oral Daily Luis Kim, DO      divalproex  sodium  250 mg Oral TID Luis Kim DO      donepezil  5 mg Oral HS Luis Kim DO      enoxaparin  40 mg Subcutaneous Q24H LILI Asha Jacobs MD      flecainide  50 mg Oral Q12H [facility] Luis Kim DO      melatonin  3 mg Oral HS Luis Kim DO      vancomycin oral (capsules or solution)  125 mg Oral Q6H [facility] Luis Kim DO          Today, Patient Was Seen By: Bhavesh Cordero DO    **Please Note: This note may have been constructed using a voice recognition system.**

## 2024-05-14 NOTE — PLAN OF CARE
Problem: OCCUPATIONAL THERAPY ADULT  Goal: Performs self-care activities at highest level of function for planned discharge setting.  See evaluation for individualized goals.  Description: Treatment Interventions: ADL retraining, Functional transfer training, UE strengthening/ROM, Endurance training, Cognitive reorientation, Patient/family training, Equipment evaluation/education, Compensatory technique education, Continued evaluation, Energy conservation, Activityengagement          See flowsheet documentation for full assessment, interventions and recommendations.   Outcome: Progressing  Note: Limitation: Decreased ADL status, Decreased UE ROM, Decreased UE strength, Decreased Safe judgement during ADL, Decreased cognition, Decreased endurance, Decreased self-care trans, Decreased high-level ADLs  Prognosis: Fair  Assessment: Patient participated in Skilled OT session this date with interventions consisting of ADL re training with the use of correct body mechnaics, Energy Conservation techniques, safety awareness and fall prevention techniques,  therapeutic activities to: increase activity tolerance, increase dynamic sit/ stand balance during functional activity , and increase OOB/ sitting tolerance .Upon arrival patient was found seated OOB to Chair. Pt demonstrated the following tasks: MIN STS, MIN A x 2 fnxl mobility with RW. Pt performs grooming tasks with S, MOD A for LBD. Patient continues to be functioning below baseline level, occupational performance remains limited secondary to factors listed above and increased risk for falls and injury.   From OT standpoint, recommendation at time of d/c would be STR.  Patient to benefit from continued Occupational Therapy treatment while in the hospital to address deficits as defined above and maximize level of functional independence with ADLs and functional mobility. Pt was left after session with all current needs met. The patient's raw score on the AM-PAC Daily  Activity Inpatient Short Form is 15. A raw score of less than 19 suggests the patient may benefit from discharge to post-acute rehabilitation services. Please refer to the recommendation of the Occupational Therapist for safe discharge planning.     Rehab Resource Intensity Level, OT: II (Moderate Resource Intensity)

## 2024-05-14 NOTE — OCCUPATIONAL THERAPY NOTE
"  Occupational Therapy Progress Note     Patient Name: Ann Cross  Today's Date: 5/14/2024  Problem List  Principal Problem:    Acute metabolic encephalopathy  Active Problems:     Mood disorder with hypomanic features    Paroxysmal atrial fibrillation (HCC)    Fever, unknown origin    History of Clostridium difficile infection    Dementia (HCC)    Colitis            05/14/24 1404   OT Last Visit   OT Visit Date 05/14/24   Note Type   Note Type Treatment   Pain Assessment   Pain Assessment Tool 0-10   Pain Score No Pain   Restrictions/Precautions   Weight Bearing Precautions Per Order No   Other Precautions Cognitive;Chair Alarm;Bed Alarm;Fall Risk   Lifestyle   Autonomy I w/ ADLS, assist IADLS, Mod I w/ transfers and functional mobility PTA   Reciprocal Relationships Facility staff   Service to Others Retired   Intrinsic Gratification Sports   ADL   Grooming Assistance 5  Supervision/Setup   Grooming Deficit Brushing hair  (+ washing hair with shampoo cap)   Grooming Comments brushes hair standing at sink with RW, occasional MIN A to correct LOB   LB Dressing Assistance 3  Moderate Assistance   LB Dressing Deficit Don/doff R sock;Don/doff L sock;Thread RLE into underwear;Thread LLE into underwear;Pull up over hips   Bed Mobility   Supine to Sit Unable to assess   Sit to Supine Unable to assess   Additional Comments Pt seated OOB in chair upon arrival   Transfers   Sit to Stand 4  Minimal assistance   Additional items Assist x 1;Increased time required   Stand to Sit 4  Minimal assistance   Additional items Assist x 1;Increased time required   Additional Comments transfers with RW   Functional Mobility   Functional Mobility 4  Minimal assistance   Additional Comments Ax2 + chair follow   Additional items Rolling walker   Subjective   Subjective \"I feel great\"   Cognition   Overall Cognitive Status Impaired   Arousal/Participation Responsive;Cooperative   Attention Attends with cues to redirect   Orientation " Level Oriented to person;Oriented to place;Disoriented to time   Memory Decreased recall of precautions   Following Commands Follows one step commands with increased time or repetition   Comments Pt very pleasant and cooperative t/o session   Activity Tolerance   Activity Tolerance Patient tolerated treatment well   Medical Staff Made Aware ESTHER Whitten RN clearance for session   Assessment   Assessment Patient participated in Skilled OT session this date with interventions consisting of ADL re training with the use of correct body mechnaics, Energy Conservation techniques, safety awareness and fall prevention techniques,  therapeutic activities to: increase activity tolerance, increase dynamic sit/ stand balance during functional activity , and increase OOB/ sitting tolerance .Upon arrival patient was found seated OOB to Chair. Pt demonstrated the following tasks: MIN STS, MIN A x 2 fnxl mobility with RW. Pt performs grooming tasks with S, MOD A for LBD. Patient continues to be functioning below baseline level, occupational performance remains limited secondary to factors listed above and increased risk for falls and injury.   From OT standpoint, recommendation at time of d/c would be STR.  Patient to benefit from continued Occupational Therapy treatment while in the hospital to address deficits as defined above and maximize level of functional independence with ADLs and functional mobility. Pt was left after session with all current needs met. The patient's raw score on the AM-PAC Daily Activity Inpatient Short Form is 15. A raw score of less than 19 suggests the patient may benefit from discharge to post-acute rehabilitation services. Please refer to the recommendation of the Occupational Therapist for safe discharge planning.   Plan   Treatment Interventions ADL retraining;UE strengthening/ROM;Functional transfer training;Endurance training;Patient/family training;Equipment evaluation/education;Compensatory  technique education;Continued evaluation;Energy conservation;Activityengagement   Goal Expiration Date 05/25/24   OT Treatment Day 1   OT Frequency 2-3x/wk   Discharge Recommendation   Rehab Resource Intensity Level, OT II (Moderate Resource Intensity)   AM-PAC Daily Activity Inpatient   Lower Body Dressing 2   Bathing 2   Toileting 2   Upper Body Dressing 3   Grooming 3   Eating 3   Daily Activity Raw Score 15   Daily Activity Standardized Score (Calc for Raw Score >=11) 34.69   AM-PAC Applied Cognition Inpatient   Following a Speech/Presentation 2   Understanding Ordinary Conversation 3   Taking Medications 3   Remembering Where Things Are Placed or Put Away 3   Remembering List of 4-5 Errands 2   Taking Care of Complicated Tasks 1   Applied Cognition Raw Score 14   Applied Cognition Standardized Score 32.02         Samira Peña MS, OTR/L

## 2024-05-15 LAB
ANION GAP SERPL CALCULATED.3IONS-SCNC: 12 MMOL/L (ref 4–13)
ANISOCYTOSIS BLD QL SMEAR: PRESENT
BASOPHILS # BLD MANUAL: 0 THOUSAND/UL (ref 0–0.1)
BASOPHILS NFR MAR MANUAL: 0 % (ref 0–1)
BUN SERPL-MCNC: 7 MG/DL (ref 5–25)
CALCIUM SERPL-MCNC: 7.9 MG/DL (ref 8.4–10.2)
CHLORIDE SERPL-SCNC: 106 MMOL/L (ref 96–108)
CO2 SERPL-SCNC: 24 MMOL/L (ref 21–32)
CREAT SERPL-MCNC: 0.54 MG/DL (ref 0.6–1.3)
DIFFERENTIAL COMMENT: ABNORMAL
EOSINOPHIL # BLD MANUAL: 0.19 THOUSAND/UL (ref 0–0.4)
EOSINOPHIL NFR BLD MANUAL: 3 % (ref 0–6)
ERYTHROCYTE [DISTWIDTH] IN BLOOD BY AUTOMATED COUNT: 14.7 % (ref 11.6–15.1)
GFR SERPL CREATININE-BSD FRML MDRD: 87 ML/MIN/1.73SQ M
GLUCOSE SERPL-MCNC: 109 MG/DL (ref 65–140)
GLUCOSE SERPL-MCNC: 130 MG/DL (ref 65–140)
GLUCOSE SERPL-MCNC: 86 MG/DL (ref 65–140)
HCT VFR BLD AUTO: 43.5 % (ref 34.8–46.1)
HGB BLD-MCNC: 14.1 G/DL (ref 11.5–15.4)
LYMPHOCYTES # BLD AUTO: 14 % (ref 14–44)
LYMPHOCYTES # BLD AUTO: 3.26 THOUSAND/UL (ref 0.6–4.47)
MCH RBC QN AUTO: 28.6 PG (ref 26.8–34.3)
MCHC RBC AUTO-ENTMCNC: 32.4 G/DL (ref 31.4–37.4)
MCV RBC AUTO: 88 FL (ref 82–98)
METAMYELOCYTE ABSOLUTE CT: 0.06 THOUSAND/UL (ref 0–0.1)
METAMYELOCYTES NFR BLD MANUAL: 1 % (ref 0–1)
MONOCYTES # BLD AUTO: 0.51 THOUSAND/UL (ref 0–1.22)
MONOCYTES NFR BLD: 8 % (ref 4–12)
MYELOCYTE ABSOLUTE CT: 0.13 THOUSAND/UL (ref 0–0.1)
MYELOCYTES NFR BLD MANUAL: 2 % (ref 0–1)
NEUTROPHILS # BLD MANUAL: 2.24 THOUSAND/UL (ref 1.85–7.62)
NEUTS BAND NFR BLD MANUAL: 9 % (ref 0–8)
NEUTS SEG NFR BLD AUTO: 26 % (ref 43–75)
PATHOLOGY REVIEW: YES
PLATELET # BLD AUTO: 133 THOUSANDS/UL (ref 149–390)
PLATELET BLD QL SMEAR: ADEQUATE
PMV BLD AUTO: 10.3 FL (ref 8.9–12.7)
POTASSIUM SERPL-SCNC: 3.9 MMOL/L (ref 3.5–5.3)
RBC # BLD AUTO: 4.93 MILLION/UL (ref 3.81–5.12)
RBC MORPH BLD: PRESENT
SODIUM SERPL-SCNC: 142 MMOL/L (ref 135–147)
VARIANT LYMPHS # BLD AUTO: 37 %
WBC # BLD AUTO: 6.39 THOUSAND/UL (ref 4.31–10.16)

## 2024-05-15 PROCEDURE — 85007 BL SMEAR W/DIFF WBC COUNT: CPT | Performed by: STUDENT IN AN ORGANIZED HEALTH CARE EDUCATION/TRAINING PROGRAM

## 2024-05-15 PROCEDURE — 85027 COMPLETE CBC AUTOMATED: CPT | Performed by: STUDENT IN AN ORGANIZED HEALTH CARE EDUCATION/TRAINING PROGRAM

## 2024-05-15 PROCEDURE — 80048 BASIC METABOLIC PNL TOTAL CA: CPT | Performed by: STUDENT IN AN ORGANIZED HEALTH CARE EDUCATION/TRAINING PROGRAM

## 2024-05-15 PROCEDURE — 99232 SBSQ HOSP IP/OBS MODERATE 35: CPT | Performed by: STUDENT IN AN ORGANIZED HEALTH CARE EDUCATION/TRAINING PROGRAM

## 2024-05-15 PROCEDURE — 82948 REAGENT STRIP/BLOOD GLUCOSE: CPT

## 2024-05-15 PROCEDURE — 85060 BLOOD SMEAR INTERPRETATION: CPT | Performed by: STUDENT IN AN ORGANIZED HEALTH CARE EDUCATION/TRAINING PROGRAM

## 2024-05-15 RX ADMIN — FLECAINIDE ACETATE 50 MG: 50 TABLET ORAL at 09:35

## 2024-05-15 RX ADMIN — ENOXAPARIN SODIUM 40 MG: 40 INJECTION SUBCUTANEOUS at 08:14

## 2024-05-15 RX ADMIN — ASPIRIN 81 MG: 81 TABLET, COATED ORAL at 08:14

## 2024-05-15 RX ADMIN — DONEPEZIL HYDROCHLORIDE 5 MG: 5 TABLET ORAL at 21:07

## 2024-05-15 RX ADMIN — FLECAINIDE ACETATE 50 MG: 50 TABLET ORAL at 21:07

## 2024-05-15 RX ADMIN — Medication 3 MG: at 21:07

## 2024-05-15 RX ADMIN — DIVALPROEX SODIUM 250 MG: 250 TABLET, DELAYED RELEASE ORAL at 17:23

## 2024-05-15 RX ADMIN — DIVALPROEX SODIUM 250 MG: 250 TABLET, DELAYED RELEASE ORAL at 21:07

## 2024-05-15 RX ADMIN — CITALOPRAM HYDROBROMIDE 10 MG: 10 TABLET ORAL at 08:14

## 2024-05-15 RX ADMIN — VANCOMYCIN HYDROCHLORIDE 125 MG: 125 CAPSULE ORAL at 17:23

## 2024-05-15 RX ADMIN — VANCOMYCIN HYDROCHLORIDE 125 MG: 125 CAPSULE ORAL at 05:17

## 2024-05-15 RX ADMIN — VANCOMYCIN HYDROCHLORIDE 125 MG: 125 CAPSULE ORAL at 12:04

## 2024-05-15 RX ADMIN — DIVALPROEX SODIUM 250 MG: 250 TABLET, DELAYED RELEASE ORAL at 08:14

## 2024-05-15 RX ADMIN — BUSPIRONE HYDROCHLORIDE 7.5 MG: 5 TABLET ORAL at 21:07

## 2024-05-15 RX ADMIN — BUSPIRONE HYDROCHLORIDE 7.5 MG: 5 TABLET ORAL at 08:14

## 2024-05-15 NOTE — ASSESSMENT & PLAN NOTE
CT abdomen pelvis on admission remarkable for appendicitis and sigmoid colitis.  Patient evaluated by general surgery, given benign abdominal exam appendicitis on imaging is suspected secondary to colitis.  No surgical intervention at this time.  Patient started on ceftriaxone, Flagyl and vancomycin for prophylaxis initially.  C. difficile PCR and EIA is positive.   Ceftriaxone and Flagyl discontinued given low suspicion for appendicitis on 5/13  Unclear if true recurrence versus untreated previously however at this time GI recommends treating as first recurrence.  Given first recurrence, will start patient on vancomycin taper of 125 mg every 6 hours x 14 days followed by 125 mg every 12 hours x 7 days followed by 125 mg daily x 7 days followed by 125 mg 3 times weekly x 2 weeks.  Discussed with GI today again recommended taper as above.  Currently patient is scheduled for appointment on 7/6 with GI however will attempt to arrange a closer appointment within 1 month of discharge.  GI again recommends against inpatient consultation as no other intervention is recommended at this time other than above.  Patient was initially started on clear liquid, tolerated well has been advanced to low fiber low residual diet.

## 2024-05-15 NOTE — QUICK NOTE
Gastroenterology Quick Note    Patient previously seen in April for colitis symptoms, at that point had CDI, treated. Repeat CDI testing on 05/11/24 for which she was restarted on vanco. At this point it is unclear if this is recurrent infection or treatment ineffectiveness. Okay to give a prolonged taper. She is scheduled for a visit on 07/09 but we will try to bump it up earlier, ideally within a month of discharge.    Cesar Salvador, PGY-4

## 2024-05-15 NOTE — CASE MANAGEMENT
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     Encephalopathy 2023     Acute pain due to trauma 2023     Fall 2023     Sinus pause 2023     Thrombocytopenia ([facility]) 2023     Acute metabolic encephalopathy 2023     COVID-19 2023     Diarrhea 2023     Stage 3 chronic kidney disease, unspecified whether stage 3a or 3b CKD ([facility]) 2022     Tachy-alison syndrome ([facility]) 2022     Hyperlipemia 2022     Hypertension 2022     Generalized anxiety disorder 2021     Obstructive sleep apnea syndrome 2020     Degeneration of lumbar intervertebral disc 2017     History of total bilateral knee replacement 2017     Osteoporosis 05/10/2017     Minimal cognitive impairment 2016     Pain, joint, knee, right 2016      Mood disorder with hypomanic features 2016     Paroxysmal atrial fibrillation (HCC) 2014     Metabolic syndrome X 2007     Anxiety state 2007     Migraine 2007     Peripheral venous insufficiency 2007       LOS (days): 5  Geometric Mean LOS (GMLOS) (days): 5.4  Days to GMLOS:1     OBJECTIVE:  Risk of Unplanned Readmission Score: 25.66         Current admission status: Inpatient   Preferred Pharmacy:   [facility] [place] - [address]  [address] [address]  Phone:  [phone] Fax: [phone]    Primary Care Provider: Emelina Swann MD    Primary Insurance: [employer] [facility]  Secondary Insurance:     DISCHARGE DETAILS:    Discharge planning discussed with:: Lotus Fermin via phone  Freedom of Choice: Yes  Comments - Freedom of Choice: [facility] 1st floor -patient has [employer].  CM contacted family/caregiver?: Yes  Were Treatment Team discharge recommendations reviewed with patient/caregiver?: Yes  Did patient/caregiver verbalize understanding of patient care needs?: Yes  Were patient/caregiver advised of the risks associated with not following Treatment Team discharge recommendations?: Yes    Contacts  Patient Contacts: Lotus Hilliard (Daughter) [phone] (E) [phone]  Relationship to Patient:: Family  Contact Method: Phone  Phone Number: [phone] (h) [phone]  Reason/Outcome: Discharge Planning       Other Referral/Resources/Interventions Provided:  Interventions: Short Term Rehab  Additional Comments: [facility] 1st floor -patient has [employer]. [facility], however, not in network. CM sent a message to Inna Francisco at [facility] for further discussion with daughter and ltc goals. CM to follow with d/c planning.

## 2024-05-15 NOTE — ASSESSMENT & PLAN NOTE
Reported altered mental status at [facility] where patient resides as well as fever of 100.4, likely in setting of colitis.  Given history of dementia, patient at baseline has cognitive decline.  CT head negative for acute intracranial abnormality.  UA negative for infection  Chest x-ray without large effusion or infiltrate.  CT chest/abdomen/pelvis remarkable for appendicitis and worsening sigmoid colitis.  Continue antibiotics.  Mental status improving, continue delirium precautions.  Evaluated by PT/OT, recommended level 2.

## 2024-05-15 NOTE — PROGRESS NOTES
Patient:    MRN:  [id number]    [place] Request ID:  [id number]    Level of care reserved:  Skilled Nursing Facility    Partner Reserved:  [facility] [place] [address] [phone]    Clinical needs requested:    Geography searched:  10 miles around [address]    Start of Service:    Request sent:  1:49pm EDT on 4/22/2024 by Jahaira Gómez    Partner reserved:  4:45pm EDT on 5/15/2024 by Joanne Cooper (maggie)    Choice list shared:  4:45pm EDT on 5/15/2024 by Joanne Cooper (maggie)

## 2024-05-15 NOTE — PROGRESS NOTES
[facility]  Progress Note  Name: Ann Cross I  MRN: [id number]  Unit/Bed#: [facility]PHP 733-01 I Date of Admission: 5/10/2024   Date of Service: 5/15/2024 I Hospital Day: 5    Assessment & Plan   * Acute metabolic encephalopathy  Assessment & Plan  Reported altered mental status at [facility] where patient resides as well as fever of 100.4, likely in setting of colitis.  Given history of dementia, patient at baseline has cognitive decline.  CT head negative for acute intracranial abnormality.  UA negative for infection  Chest x-ray without large effusion or infiltrate.  CT chest/abdomen/pelvis remarkable for appendicitis and worsening sigmoid colitis.  Continue antibiotics.  Mental status improving, continue delirium precautions.  Evaluated by PT/OT, recommended level 2.    Colitis  Assessment & Plan  CT abdomen pelvis on admission remarkable for appendicitis and sigmoid colitis.  Patient evaluated by general surgery, given benign abdominal exam appendicitis on imaging is suspected secondary to colitis.  No surgical intervention at this time.  Patient started on ceftriaxone, Flagyl and vancomycin for prophylaxis initially.  C. difficile PCR and EIA is positive.   Ceftriaxone and Flagyl discontinued given low suspicion for appendicitis on 5/13  Unclear if true recurrence versus untreated previously however at this time GI recommends treating as first recurrence.  Given first recurrence, will start patient on vancomycin taper of 125 mg every 6 hours x 14 days followed by 125 mg every 12 hours x 7 days followed by 125 mg daily x 7 days followed by 125 mg 3 times weekly x 2 weeks.  Discussed with GI today again recommended taper as above.  Currently patient is scheduled for appointment on 7/6 with GI however will attempt to arrange a closer appointment within 1 month of discharge.  GI again recommends against inpatient consultation as no other intervention is recommended at this  time other than above.  Patient was initially started on clear liquid, tolerated well has been advanced to low fiber low residual diet.    Dementia (HCC)  Assessment & Plan  Previous hospitalization in April 2024 for encephalopathy in which patient as well as daughters worried about patient's progressively worsening memory issues over some time; from review of chart, there was suspected underlying dementia/cognitive decline in the setting of a subdural hematoma in December  Continue PTA donepezil  Delirium protocol    History of Clostridium difficile infection  Assessment & Plan  Noted history of C. difficile during last hospitalization in April from 4/15 to 4/22 in which patient was on p.o. vancomycin every 6 hours for 10 days.  Active infection currently, on oral vancomycin  Will need slow oral vancomycin taper at discharge and outpatient follow-up with GI    Paroxysmal atrial fibrillation (HCC)  Assessment & Plan  Continue PTA flecainide  Not on any PTA anticoagulation.     Mood disorder with hypomanic features  Assessment & Plan  Continue PTA BuSpar, Celexa and Aricept             VTE Pharmacologic Prophylaxis: VTE Score: 3 Moderate Risk (Score 3-4) - Pharmacological DVT Prophylaxis Ordered: enoxaparin (Lovenox).    Mobility:   Basic Mobility Inpatient Raw Score: 18  JH-HLM Goal: 6: Walk 10 steps or more  JH-HLM Achieved: 5: Stand (1 or more minutes)  JH-HLM Goal NOT achieved. Continue with multidisciplinary rounding and encourage appropriate mobility to improve upon JH-HLM goals.    Patient Centered Rounds: I performed bedside rounds with nursing staff today.   Discussions with Specialists or Other Care Team Provider: GI, primary RN    Education and Discussions with Family / Patient: Updated  (daughter) via phone.    Total Time Spent on Date of Encounter in care of patient: 25 mins. This time was spent on one or more of the following: performing physical exam; counseling and coordination of care;  obtaining or reviewing history; documenting in the medical record; reviewing/ordering tests, medications or procedures; communicating with other healthcare professionals and discussing with patient's family/caregivers.    Current Length of Stay: 5 day(s)  Current Patient Status: Inpatient   Certification Statement: The patient will continue to require additional inpatient hospital stay due to awaiting placement to rehab  Discharge Plan:  Medically stable, awaiting rehab placement    Code Status: Level 1 - Full Code    Subjective:   Assessed at bedside. No complaints. No abdominal pain, nausea or vomiting or diarrhea. Tolerating diet.     Objective:     Vitals:   Temp (24hrs), Av.3 °F (36.8 °C), Min:97.9 °F (36.6 °C), Max:98.5 °F (36.9 °C)    Temp:  [97.9 °F (36.6 °C)-98.5 °F (36.9 °C)] 97.9 °F (36.6 °C)  HR:  [60] 60  Resp:  [18-20] 18  BP: (120-142)/(60-72) 140/71  SpO2:  [91 %-97 %] 91 %  Body mass index is 24.82 kg/m².     Input and Output Summary (last 24 hours):     Intake/Output Summary (Last 24 hours) at 5/15/2024 1507  Last data filed at 5/15/2024 0700  Gross per 24 hour   Intake 240 ml   Output --   Net 240 ml       Physical Exam:   Physical Exam  Vitals reviewed.   Constitutional:       General: She is not in acute distress.     Appearance: Normal appearance. She is not ill-appearing.   HENT:      Head: Normocephalic and atraumatic.   Cardiovascular:      Rate and Rhythm: Normal rate and regular rhythm.      Heart sounds: Normal heart sounds.   Pulmonary:      Effort: Pulmonary effort is normal. No respiratory distress.      Breath sounds: No wheezing or rales.   Abdominal:      General: Bowel sounds are normal.      Palpations: Abdomen is soft.      Tenderness: There is no abdominal tenderness.   Musculoskeletal:      Right lower leg: No edema.      Left lower leg: No edema.   Skin:     General: Skin is warm and dry.   Neurological:      Mental Status: She is alert. Mental status is at baseline.           Additional Data:     Labs:  Results from last 7 days   Lab Units 05/15/24  0936 05/13/24  0434 05/12/24  0518   WBC Thousand/uL 6.39   < > 7.43   HEMOGLOBIN g/dL 14.1   < > 12.7   HEMATOCRIT % 43.5   < > 39.3   PLATELETS Thousands/uL 133*   < > 116*   BANDS PCT % 9*  --   --    SEGS PCT %  --   --  45   LYMPHO PCT % 14  --  34   MONO PCT % 8  --  19*   EOS PCT % 3  --  2    < > = values in this interval not displayed.     Results from last 7 days   Lab Units 05/15/24  0503 05/12/24  0518 05/11/24  0444   SODIUM mmol/L 142   < > 137   POTASSIUM mmol/L 3.9   < > 3.9   CHLORIDE mmol/L 106   < > 101   CO2 mmol/L 24   < > 26   BUN mg/dL 7   < > 10   CREATININE mg/dL 0.54*   < > 0.64   ANION GAP mmol/L 12   < > 10   CALCIUM mg/dL 7.9*   < > 8.3*   ALBUMIN g/dL  --   --  3.4*   TOTAL BILIRUBIN mg/dL  --   --  0.54   ALK PHOS U/L  --   --  41   ALT U/L  --   --  3*   AST U/L  --   --  10*   GLUCOSE RANDOM mg/dL 86   < > 90    < > = values in this interval not displayed.     Results from last 7 days   Lab Units 05/11/24  0444   INR  1.08             Results from last 7 days   Lab Units 05/10/24  2347   LACTIC ACID mmol/L 1.8   PROCALCITONIN ng/ml 0.06       Lines/Drains:  Invasive Devices       Peripheral Intravenous Line  Duration             Peripheral IV 05/11/24 Dorsal (posterior);Left Forearm 3 days                          Imaging: No pertinent imaging reviewed.    Recent Cultures (last 7 days):   Results from last 7 days   Lab Units 05/11/24  0519 05/11/24  0409   BLOOD CULTURE   --  No Growth After 4 Days.  No Growth After 4 Days.   C DIFF TOXIN B BY PCR  Positive*  --        Last 24 Hours Medication List:   Current Facility-Administered Medications   Medication Dose Route Frequency Provider Last Rate    acetaminophen  650 mg Oral Q4H PRN Luis Kim, DO      aspirin  81 mg Oral Daily Luis Kim, DO      busPIRone  7.5 mg Oral BID Luis Kim, DO      citalopram  10 mg Oral Daily Luis Kim, DO       divalproex sodium  250 mg Oral TID Luis Kim DO      donepezil  5 mg Oral HS Luis Kim DO      enoxaparin  40 mg Subcutaneous Q24H [facility] Asha Jacobs MD      flecainide  50 mg Oral Q12H [facility] Luis Kim DO      melatonin  3 mg Oral HS Luis Kim DO      vancomycin oral (capsules or solution)  125 mg Oral Q6H [facility] Luis Kim DO          Today, Patient Was Seen By: Bhavesh Cordero DO    **Please Note: This note may have been constructed using a voice recognition system.**

## 2024-05-15 NOTE — PLAN OF CARE
Problem: Prexisting or High Potential for Compromised Skin Integrity  Goal: Skin integrity is maintained or improved  Description: INTERVENTIONS:  - Identify patients at risk for skin breakdown  - Assess and monitor skin integrity  - Assess and monitor nutrition and hydration status  - Monitor labs   - Assess for incontinence   - Turn and reposition patient  - Assist with mobility/ambulation  - Relieve pressure over bony prominences  - Avoid friction and shearing  - Provide appropriate hygiene as needed including keeping skin clean and dry  - Evaluate need for skin moisturizer/barrier cream  - Collaborate with interdisciplinary team   - Patient/family teaching  - Consider wound care consult   Outcome: Progressing     Problem: INFECTION - ADULT  Goal: Absence or prevention of progression during hospitalization  Description: INTERVENTIONS:  - Assess and monitor for signs and symptoms of infection  - Monitor lab/diagnostic results  - Monitor all insertion sites, i.e. indwelling lines, tubes, and drains  - Monitor endotracheal if appropriate and nasal secretions for changes in amount and color  - [place] appropriate cooling/warming therapies per order  - Administer medications as ordered  - Instruct and encourage patient and family to use good hand hygiene technique  - Identify and instruct in appropriate isolation precautions for identified infection/condition  Outcome: Progressing     Problem: SAFETY ADULT  Goal: Patient will remain free of falls  Description: INTERVENTIONS:  - Educate patient/family on patient safety including physical limitations  - Instruct patient to call for assistance with activity   - Consult OT/PT to assist with strengthening/mobility   - Keep Call bell within reach  - Keep bed low and locked with side rails adjusted as appropriate  - Keep care items and personal belongings within reach  - Initiate and maintain comfort rounds  - Make Fall Risk Sign visible to staff  - Initiate/Maintain  fall alarm  - Apply yellow socks and bracelet for high fall risk patients  - Consider moving patient to room near nurses station  Outcome: Progressing     Problem: DISCHARGE PLANNING  Goal: Discharge to home or other facility with appropriate resources  Description: INTERVENTIONS:  - Identify barriers to discharge w/patient and caregiver  - Arrange for needed discharge resources and transportation as appropriate  - Identify discharge learning needs (meds, wound care, etc.)  - Arrange for interpretive services to assist at discharge as needed  - Refer to Case Management Department for coordinating discharge planning if the patient needs post-hospital services based on physician/advanced practitioner order or complex needs related to functional status, cognitive ability, or social support system  Outcome: Progressing     Problem: Knowledge Deficit  Goal: Patient/family/caregiver demonstrates understanding of disease process, treatment plan, medications, and discharge instructions  Description: Complete learning assessment and assess knowledge base.  Interventions:  - Provide teaching at level of understanding  - Provide teaching via preferred learning methods  Outcome: Progressing     Problem: NEUROSENSORY - ADULT  Goal: Achieves stable or improved neurological status  Description: INTERVENTIONS  - Monitor and report changes in neurological status  - Monitor vital signs such as temperature, blood pressure, glucose, and any other labs ordered   - Initiate measures to prevent increased intracranial pressure  - Monitor for seizure activity and implement precautions if appropriate      Outcome: Progressing     Problem: GENITOURINARY - ADULT  Goal: Absence of urinary retention  Description: INTERVENTIONS:  - Assess patient’s ability to void and empty bladder  - Monitor I/O  - Bladder scan as needed  - Discuss with physician/AP medications to alleviate retention as needed  - Discuss catheterization for long term situations  as appropriate  Outcome: Progressing     Problem: METABOLIC, FLUID AND ELECTROLYTES - ADULT  Goal: Electrolytes maintained within normal limits  Description: INTERVENTIONS:  - Monitor labs and assess patient for signs and symptoms of electrolyte imbalances  - Administer electrolyte replacement as ordered  - Monitor response to electrolyte replacements, including repeat lab results as appropriate  - Instruct patient on fluid and nutrition as appropriate  Outcome: Progressing

## 2024-05-15 NOTE — PLAN OF CARE
Problem: Prexisting or High Potential for Compromised Skin Integrity  Goal: Skin integrity is maintained or improved  Description: INTERVENTIONS:  - Identify patients at risk for skin breakdown  - Assess and monitor skin integrity  - Assess and monitor nutrition and hydration status  - Monitor labs   - Assess for incontinence   - Turn and reposition patient  - Assist with mobility/ambulation  - Relieve pressure over bony prominences  - Avoid friction and shearing  - Provide appropriate hygiene as needed including keeping skin clean and dry  - Evaluate need for skin moisturizer/barrier cream  - Collaborate with interdisciplinary team   - Patient/family teaching  - Consider wound care consult   Outcome: Progressing     Problem: INFECTION - ADULT  Goal: Absence or prevention of progression during hospitalization  Description: INTERVENTIONS:  - Assess and monitor for signs and symptoms of infection  - Monitor lab/diagnostic results  - Monitor all insertion sites, i.e. indwelling lines, tubes, and drains  - Monitor endotracheal if appropriate and nasal secretions for changes in amount and color  - [place] appropriate cooling/warming therapies per order  - Administer medications as ordered  - Instruct and encourage patient and family to use good hand hygiene technique  - Identify and instruct in appropriate isolation precautions for identified infection/condition  Outcome: Progressing     Problem: SAFETY ADULT  Goal: Patient will remain free of falls  Description: INTERVENTIONS:  - Educate patient/family on patient safety including physical limitations  - Instruct patient to call for assistance with activity   - Consult OT/PT to assist with strengthening/mobility   - Keep Call bell within reach  - Keep bed low and locked with side rails adjusted as appropriate  - Keep care items and personal belongings within reach  - Initiate and maintain comfort rounds  - Make Fall Risk Sign visible to staff  - Offer Toileting every  2 Hours, in advance of need  - Initiate/Maintain bed alarm  - Obtain necessary fall risk management equipment: non skid socks oob  - Apply yellow socks and bracelet for high fall risk patients  - Consider moving patient to room near nurses station  Outcome: Progressing

## 2024-05-16 VITALS
BODY MASS INDEX: 24.88 KG/M2 | HEIGHT: 64 IN | DIASTOLIC BLOOD PRESSURE: 81 MMHG | WEIGHT: 145.72 LBS | OXYGEN SATURATION: 95 % | TEMPERATURE: 98.3 F | SYSTOLIC BLOOD PRESSURE: 128 MMHG | RESPIRATION RATE: 18 BRPM | HEART RATE: 65 BPM

## 2024-05-16 LAB
ANION GAP SERPL CALCULATED.3IONS-SCNC: 7 MMOL/L (ref 4–13)
BACTERIA BLD CULT: NORMAL
BACTERIA BLD CULT: NORMAL
BUN SERPL-MCNC: 13 MG/DL (ref 5–25)
CALCIUM SERPL-MCNC: 8.4 MG/DL (ref 8.4–10.2)
CHLORIDE SERPL-SCNC: 103 MMOL/L (ref 96–108)
CO2 SERPL-SCNC: 28 MMOL/L (ref 21–32)
CREAT SERPL-MCNC: 0.7 MG/DL (ref 0.6–1.3)
ERYTHROCYTE [DISTWIDTH] IN BLOOD BY AUTOMATED COUNT: 14.8 % (ref 11.6–15.1)
GFR SERPL CREATININE-BSD FRML MDRD: 80 ML/MIN/1.73SQ M
GLUCOSE SERPL-MCNC: 93 MG/DL (ref 65–140)
HCT VFR BLD AUTO: 42.3 % (ref 34.8–46.1)
HGB BLD-MCNC: 13.9 G/DL (ref 11.5–15.4)
MCH RBC QN AUTO: 29.3 PG (ref 26.8–34.3)
MCHC RBC AUTO-ENTMCNC: 32.9 G/DL (ref 31.4–37.4)
MCV RBC AUTO: 89 FL (ref 82–98)
PLATELET # BLD AUTO: 146 THOUSANDS/UL (ref 149–390)
PMV BLD AUTO: 9.7 FL (ref 8.9–12.7)
POTASSIUM SERPL-SCNC: 3.5 MMOL/L (ref 3.5–5.3)
RBC # BLD AUTO: 4.74 MILLION/UL (ref 3.81–5.12)
SODIUM SERPL-SCNC: 138 MMOL/L (ref 135–147)
WBC # BLD AUTO: 7.58 THOUSAND/UL (ref 4.31–10.16)

## 2024-05-16 PROCEDURE — 99239 HOSP IP/OBS DSCHRG MGMT >30: CPT | Performed by: STUDENT IN AN ORGANIZED HEALTH CARE EDUCATION/TRAINING PROGRAM

## 2024-05-16 PROCEDURE — 85027 COMPLETE CBC AUTOMATED: CPT | Performed by: STUDENT IN AN ORGANIZED HEALTH CARE EDUCATION/TRAINING PROGRAM

## 2024-05-16 PROCEDURE — 97530 THERAPEUTIC ACTIVITIES: CPT

## 2024-05-16 PROCEDURE — 97110 THERAPEUTIC EXERCISES: CPT

## 2024-05-16 PROCEDURE — 97116 GAIT TRAINING THERAPY: CPT

## 2024-05-16 PROCEDURE — 80048 BASIC METABOLIC PNL TOTAL CA: CPT | Performed by: STUDENT IN AN ORGANIZED HEALTH CARE EDUCATION/TRAINING PROGRAM

## 2024-05-16 RX ORDER — VANCOMYCIN HYDROCHLORIDE 125 MG/1
CAPSULE ORAL
Qty: 63 CAPSULE | Refills: 0 | Status: SHIPPED | OUTPATIENT
Start: 2024-05-16 | End: 2024-06-22

## 2024-05-16 RX ADMIN — ENOXAPARIN SODIUM 40 MG: 40 INJECTION SUBCUTANEOUS at 08:13

## 2024-05-16 RX ADMIN — CITALOPRAM HYDROBROMIDE 10 MG: 10 TABLET ORAL at 08:12

## 2024-05-16 RX ADMIN — ASPIRIN 81 MG: 81 TABLET, COATED ORAL at 08:12

## 2024-05-16 RX ADMIN — FLECAINIDE ACETATE 50 MG: 50 TABLET ORAL at 08:13

## 2024-05-16 RX ADMIN — VANCOMYCIN HYDROCHLORIDE 125 MG: 125 CAPSULE ORAL at 05:03

## 2024-05-16 RX ADMIN — DIVALPROEX SODIUM 250 MG: 250 TABLET, DELAYED RELEASE ORAL at 08:12

## 2024-05-16 RX ADMIN — VANCOMYCIN HYDROCHLORIDE 125 MG: 125 CAPSULE ORAL at 00:26

## 2024-05-16 RX ADMIN — BUSPIRONE HYDROCHLORIDE 7.5 MG: 5 TABLET ORAL at 08:13

## 2024-05-16 RX ADMIN — VANCOMYCIN HYDROCHLORIDE 125 MG: 125 CAPSULE ORAL at 11:53

## 2024-05-16 NOTE — PLAN OF CARE
Problem: Prexisting or High Potential for Compromised Skin Integrity  Goal: Skin integrity is maintained or improved  Description: INTERVENTIONS:  - Identify patients at risk for skin breakdown  - Assess and monitor skin integrity  - Assess and monitor nutrition and hydration status  - Monitor labs   - Assess for incontinence   - Turn and reposition patient  - Assist with mobility/ambulation  - Relieve pressure over bony prominences  - Avoid friction and shearing  - Provide appropriate hygiene as needed including keeping skin clean and dry  - Evaluate need for skin moisturizer/barrier cream  - Collaborate with interdisciplinary team   - Patient/family teaching  - Consider wound care consult   Outcome: Progressing     Problem: INFECTION - ADULT  Goal: Absence or prevention of progression during hospitalization  Description: INTERVENTIONS:  - Assess and monitor for signs and symptoms of infection  - Monitor lab/diagnostic results  - Monitor all insertion sites, i.e. indwelling lines, tubes, and drains  - Monitor endotracheal if appropriate and nasal secretions for changes in amount and color  - [place] appropriate cooling/warming therapies per order  - Administer medications as ordered  - Instruct and encourage patient and family to use good hand hygiene technique  - Identify and instruct in appropriate isolation precautions for identified infection/condition  Outcome: Progressing     Problem: SAFETY ADULT  Goal: Patient will remain free of falls  Description: INTERVENTIONS:  - Educate patient/family on patient safety including physical limitations  - Instruct patient to call for assistance with activity   - Consult OT/PT to assist with strengthening/mobility   - Keep Call bell within reach  - Keep bed low and locked with side rails adjusted as appropriate  - Keep care items and personal belongings within reach  - Initiate and maintain comfort rounds  - Make Fall Risk Sign visible to staff  - Apply yellow socks  and bracelet for high fall risk patients  - Consider moving patient to room near nurses station  Outcome: Progressing     Problem: DISCHARGE PLANNING  Goal: Discharge to home or other facility with appropriate resources  Description: INTERVENTIONS:  - Identify barriers to discharge w/patient and caregiver  - Arrange for needed discharge resources and transportation as appropriate  - Identify discharge learning needs (meds, wound care, etc.)  - Arrange for interpretive services to assist at discharge as needed  - Refer to Case Management Department for coordinating discharge planning if the patient needs post-hospital services based on physician/advanced practitioner order or complex needs related to functional status, cognitive ability, or social support system  Outcome: Progressing     Problem: Knowledge Deficit  Goal: Patient/family/caregiver demonstrates understanding of disease process, treatment plan, medications, and discharge instructions  Description: Complete learning assessment and assess knowledge base.  Interventions:  - Provide teaching at level of understanding  - Provide teaching via preferred learning methods  Outcome: Progressing     Problem: NEUROSENSORY - ADULT  Goal: Achieves stable or improved neurological status  Description: INTERVENTIONS  - Monitor and report changes in neurological status  - Monitor vital signs such as temperature, blood pressure, glucose, and any other labs ordered   - Initiate measures to prevent increased intracranial pressure  - Monitor for seizure activity and implement precautions if appropriate      Outcome: Progressing     Problem: GENITOURINARY - ADULT  Goal: Absence of urinary retention  Description: INTERVENTIONS:  - Assess patient’s ability to void and empty bladder  - Monitor I/O  - Bladder scan as needed  - Discuss with physician/AP medications to alleviate retention as needed  - Discuss catheterization for long term situations as appropriate  Outcome:  Progressing     Problem: METABOLIC, FLUID AND ELECTROLYTES - ADULT  Goal: Electrolytes maintained within normal limits  Description: INTERVENTIONS:  - Monitor labs and assess patient for signs and symptoms of electrolyte imbalances  - Administer electrolyte replacement as ordered  - Monitor response to electrolyte replacements, including repeat lab results as appropriate  - Instruct patient on fluid and nutrition as appropriate  Outcome: Progressing

## 2024-05-16 NOTE — PHYSICAL THERAPY NOTE
Physical Therapy Treatment Note    Patient's Name: Ann Cross  : 24 1058   PT Last Visit   PT Visit Date 24   Note Type   Note Type Treatment   Pain Assessment   Pain Assessment Tool 0-10   Pain Score No Pain   Restrictions/Precautions   Weight Bearing Precautions Per Order No   Other Precautions Contact/isolation;Chair Alarm;Cognitive;Bed Alarm;Fall Risk  (CDiff)   General   Chart Reviewed Yes   Response to Previous Treatment Patient unable to report, no changes reported from family or staff   Family/Caregiver Present No   Subjective   Subjective Agreeable to mobilize.   Bed Mobility   Additional Comments Pt greeted in chair.   Transfers   Sit to Stand 5  Supervision   Additional items Armrests;Increased time required   Stand to Sit 5  Supervision   Additional items Armrests;Increased time required   Toilet transfer 3  Moderate assistance   Additional items Assist x 1;Increased time required;Standard toilet;Verbal cues  (grab bar)   Additional Comments RW   Ambulation/Elevation   Gait pattern Excessively slow;Short stride;Shuffling;Decreased heel strike;Decreased foot clearance;Improper Weight shift;Forward Flexion   Gait Assistance 4  Minimal assist   Additional items Assist x 1;Verbal cues;Tactile cues   Assistive Device Rolling walker   Distance 20' + 50' + 50' + 30'   Balance   Static Sitting Fair +   Dynamic Sitting Fair   Static Standing Fair -   Dynamic Standing Poor +   Ambulatory Poor +  (RW)   Endurance Deficit   Endurance Deficit Yes   Endurance Deficit Description weakness, fatigue   Activity Tolerance   Activity Tolerance Patient limited by fatigue  (+ impaired cognition)   Exercises   Hip Abduction Sitting;20 reps;AROM;Bilateral   Hip Adduction Sitting;20 reps;AROM;Bilateral   Knee AROM Long Arc Quad Sitting;20 reps;AROM;Bilateral   Marching Sitting;20 reps;AROM;Bilateral   Assessment   Prognosis Fair   Problem List Decreased strength;Decreased endurance;Impaired  balance;Decreased mobility;Decreased coordination;Decreased cognition;Impaired judgement;Decreased safety awareness   Assessment Pt seen for PT treatment session w/ interventions consisting of t/f training, gait training, + LE ther ex instruction. Pt demonstrated good progress this session w/ decreased assistance required for t/f and increased distance covered during gait training. Pt demonstrated excessively slow gait, forward trunk lean, and decreased foot clearance during swing. Cues for safety required. Pt pleased w/ progress. Continue to recommend rehab upon d/c.   Goals   Patient Goals to be as well as she was before this hospital admission   PT Treatment Day 2   Plan   Treatment/Interventions Functional transfer training;LE strengthening/ROM;Therapeutic exercise;Endurance training;Patient/family training;Equipment eval/education;Bed mobility;Gait training;Compensatory technique education;Spoke to MD   Progress Progressing toward goals   PT Frequency 3-5x/wk   Discharge Recommendation   Rehab Resource Intensity Level, PT II (Moderate Resource Intensity)   AM-PAC Basic Mobility Inpatient   Turning in Flat Bed Without Bedrails 3   Lying on Back to Sitting on Edge of Flat Bed Without Bedrails 3   Moving Bed to Chair 3   Standing Up From Chair Using Arms 3   Walk in Room 3   Climb 3-5 Stairs With Railing 2   Basic Mobility Inpatient Raw Score 17   Basic Mobility Standardized Score 39.67   [facility] Highest Level Of Mobility   -HL Goal 5: Stand one or more mins   -HLM Achieved 7: Walk 25 feet or more   Education   Education Provided Mobility training;Home exercise program;Assistive device   Patient Demonstrates acceptance/verbal understanding;Reinforcement needed   End of Consult   Patient Position at End of Consult Bedside chair;Bed/Chair alarm activated;All needs within reach  (on waffle cushion)       Alpa Rangel, PT, DPT

## 2024-05-16 NOTE — CASE MANAGEMENT
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     Encephalopathy 2023     Acute pain due to trauma 2023     Fall 2023     Sinus pause 2023     Thrombocytopenia ([facility]) 2023     Acute metabolic encephalopathy 2023     COVID-19 2023     Diarrhea 2023     Stage 3 chronic kidney disease, unspecified whether stage 3a or 3b CKD ([facility]) 2022     Tachy-alison syndrome ([facility]) 2022     Hyperlipemia 2022     Hypertension 2022     Generalized anxiety disorder 2021     Obstructive sleep apnea syndrome 2020     Degeneration of lumbar intervertebral disc 2017     History of total bilateral knee replacement 2017     Osteoporosis 05/10/2017     Minimal cognitive impairment 2016     Pain, joint, knee, right 2016      Mood disorder with hypomanic features 2016     Paroxysmal atrial fibrillation (HCC) 2014     Metabolic syndrome X 2007     Anxiety state 2007     Migraine 2007     Peripheral venous insufficiency 2007       LOS (days): 6  Geometric Mean LOS (GMLOS) (days): 5.4  Days to GMLOS:-0.2     OBJECTIVE:  Risk of Unplanned Readmission Score: 22.98         Current admission status: Inpatient   Preferred Pharmacy:   [facility] [place] - [address]  [address] [address]  Phone:  [phone] Fax: [phone]    Primary Care Provider: Emelina Swann MD    Primary Insurance: [employer] [facility]  Secondary Insurance:     DISCHARGE DETAILS:    Discharge planning discussed with:: via phone with daughter Lotus Hilliard  Freedom of Choice: Yes     CM contacted family/caregiver?: Yes  Were Treatment Team discharge recommendations reviewed with patient/caregiver?: Yes  Did patient/caregiver verbalize understanding of patient care needs?: Yes  Were patient/caregiver advised of the risks associated with not following Treatment Team discharge recommendations?: Yes         Requested Home Health Care         Is the patient interested in HHC at discharge?: No    DME Referral Provided  Referral made for DME?: No    Other Referral/Resources/Interventions Provided:  Interventions: Transportation, Short Term Rehab    Would you like to participate in our [facility] service program?  : No - Declined    Treatment Team Recommendation: Short Term Rehab  Discharge Destination Plan:: Short Term Rehab  Transport at Discharge : Wheelchair van     Number/Name of Dispatcher: REGIS [phone]  Transported by (Company and Unit #): [facility] [phone]  ETA of Transport (Date): 05/16/24  ETA of Transport (Time): 1500     Transfer Mode: Wheelchair  Accompanied by: Alone             Accepting Facility Name, City & State : [facility]  Receiving Facility/Agency Phone Number: [phone]  Facility/Agency Fax Number: [phone]

## 2024-05-16 NOTE — ASSESSMENT & PLAN NOTE
CT abdomen pelvis on admission remarkable for appendicitis and sigmoid colitis.  Patient evaluated by general surgery, given benign abdominal exam appendicitis on imaging is suspected secondary to colitis.  No surgical intervention at this time.  Patient started on ceftriaxone, Flagyl and vancomycin for prophylaxis initially.  C. difficile PCR and EIA is positive.   Ceftriaxone and Flagyl discontinued given low suspicion for appendicitis on 5/13  Unclear if true recurrence versus untreated previously however at this time GI recommends treating as first recurrence.  Given first recurrence, will start patient on vancomycin taper of 125 mg every 6 hours x 14 days (9 additional days at discharge) followed by 125 mg every 12 hours x 7 days followed by 125 mg daily x 7 days followed by 125 mg 3 times weekly x 2 weeks.  Discussed with GI who recommended taper as above.  Currently patient is scheduled for appointment on 7/6 with GI however will attempt to arrange a closer appointment within 1 month of discharge.  Family continue to request GI evaluation and patient however after discussion with GI, they recommend against inpatient consultation as no other intervention is recommended at this time other than above antibiotic regimen.  Escalate diet as tolerated

## 2024-05-16 NOTE — DISCHARGE SUMMARY
[facility]  Discharge- Ann Cross 1940, 83 y.o. female MRN: [id number]  Unit/Bed#: [facility] 733-01 Encounter: [id number]  Primary Care Provider: Emelina Swann MD   Date and time admitted to hospital: 5/10/2024  6:27 PM    * Acute metabolic encephalopathy  Assessment & Plan  Reported altered mental status at [facility] where patient resides as well as fever of 100.4, likely in setting of colitis.  Given history of dementia, patient at baseline has cognitive decline.  CT head negative for acute intracranial abnormality.  UA negative for infection  Chest x-ray without large effusion or infiltrate.  CT chest/abdomen/pelvis remarkable for appendicitis and worsening sigmoid colitis.  Continue antibiotics.  Mental status improving, continue delirium precautions.  Discharge to rehab    Colitis  Assessment & Plan  CT abdomen pelvis on admission remarkable for appendicitis and sigmoid colitis.  Patient evaluated by general surgery, given benign abdominal exam appendicitis on imaging is suspected secondary to colitis.  No surgical intervention at this time.  Patient started on ceftriaxone, Flagyl and vancomycin for prophylaxis initially.  C. difficile PCR and EIA is positive.   Ceftriaxone and Flagyl discontinued given low suspicion for appendicitis on 5/13  Unclear if true recurrence versus untreated previously however at this time GI recommends treating as first recurrence.  Given first recurrence, will start patient on vancomycin taper of 125 mg every 6 hours x 14 days (9 additional days at discharge) followed by 125 mg every 12 hours x 7 days followed by 125 mg daily x 7 days followed by 125 mg 3 times weekly x 2 weeks.  Discussed with GI who recommended taper as above.  Currently patient is scheduled for appointment on 7/6 with GI however will attempt to arrange a closer appointment within 1 month of discharge.  Family continue to request GI evaluation and patient  however after discussion with GI, they recommend against inpatient consultation as no other intervention is recommended at this time other than above antibiotic regimen.  Escalate diet as tolerated    Dementia (HCC)  Assessment & Plan  Previous hospitalization in April 2024 for encephalopathy in which patient as well as daughters worried about patient's progressively worsening memory issues over some time; from review of chart, there was suspected underlying dementia/cognitive decline in the setting of a subdural hematoma in December  Continue PTA donepezil  Delirium protocol    History of Clostridium difficile infection  Assessment & Plan  Noted history of C. difficile during last hospitalization in April from 4/15 to 4/22 in which patient was on p.o. vancomycin every 6 hours for 10 days.  Active infection currently, on oral vancomycin  Will need slow oral vancomycin taper at discharge and outpatient follow-up with GI    Paroxysmal atrial fibrillation (HCC)  Assessment & Plan  Continue PTA flecainide  Resume beta-blocker at discharge  Not on any PTA anticoagulation.    Hypertension  Assessment & Plan  BP stable off amlodipine, discontinue at discharge     Mood disorder with hypomanic features  Assessment & Plan  Continue PTA BuSpar, Celexa and Aricept        Medical Problems       Resolved Problems  Date Reviewed: 5/16/2024   None       Discharging Physician / Practitioner: Bhavesh Cordero DO  PCP: Emelina Swann MD  Admission Date:   Admission Orders (From admission, onward)       Ordered        05/10/24 2323  INPATIENT ADMISSION  Once            05/10/24 2304  Place in Observation  Once,   Status:  Canceled                          Discharge Date: 05/16/24    Consultations During Hospital Stay:  General surgery    Procedures Performed:   None     Significant Findings / Test Results:   CT head unremarkable, stable microangiopathic changes  Chest x-ray no acute cardiopulmonary disease  CT abdomen pelvis with  contrast extensive calcified atherosclerosis of the thoracoabdominal aorta and proximal branch vessel with 2.6 x 2.4 infrarenal abdominal aortic aneurysm for which 5-year follow-up was recommended, small hiatal hernia, findings of appendicitis and colitis with mild wall thickening of the proximal ascending colon, mid to distal transverse colon and portion of the descending colon with extensive abdominal wall thickening of the sigmoid colon.  C. difficile positive    Incidental Findings:   Infrarenal abdominal aortic aneurysm for repeat imaging is recommended in 5 years  Colitis for which consider colonoscopy outpatient with GI    Test Results Pending at Discharge (will require follow up):   None     Outpatient Tests Requested:  None    Complications:  none     Reason for Admission: Altered mental status    Hospital Course:   Ann Cross is a 83 y.o. female patient who originally presented to the hospital on 5/10/2024 due to fevers as well as altered mental status from SNF.  CT head unremarkable.  Abdomen pelvis concerning for appendicitis and colitis for which general surgery was consulted.  Patient was initially started on IV antibiotics along with oral vancomycin.  C. difficile testing resulted positive.  Patient has prior history of C. difficile that was treated in 4/2024 however patient is presenting now with recurrence on testing.  Unclear if this is a recurrent infection or treatment ineffectiveness per GI.  IV ceftriaxone and Flagyl were discontinued and patient was continued on oral vancomycin given findings of appendicitis likely related to colitis from C. difficile.  After curbside discussion with GI, they recommended oral vancomycin slow taper over several weeks as above with outpatient follow-up with GI for further discussion.  GI recommended against inpatient consultation as nothing additional is recommended aside from oral vancomycin taper.  Patient has follow-up with GI on 7/9/2024 however they  "will try to get patient an earlier appointment within a month of discharge.  Patient's symptoms improved and she was medically stable for discharge to rehab.      Please see above list of diagnoses and related plan for additional information.     Condition at Discharge: stable    Discharge Day Visit / Exam:   Subjective: Patient assessed at bedside.  No complaints.  Reports feeling good.  Vitals: Blood Pressure: 128/81 (05/16/24 0712)  Pulse: 65 (05/16/24 0712)  Temperature: 98.3 °F (36.8 °C) (05/16/24 0712)  Temp Source: Oral (05/15/24 3239)  Respirations: 18 (05/16/24 0712)  Height: 5' 4\" (162.6 cm) (05/11/24 0111)  Weight - Scale: 66.1 kg (145 lb 11.6 oz) (05/16/24 0534)  SpO2: 95 % (05/16/24 0712)  Exam:   Physical Exam  Vitals reviewed.   Constitutional:       General: She is not in acute distress.     Appearance: Normal appearance. She is not ill-appearing.   HENT:      Head: Normocephalic and atraumatic.   Cardiovascular:      Rate and Rhythm: Normal rate and regular rhythm.      Heart sounds: Normal heart sounds.   Pulmonary:      Effort: Pulmonary effort is normal. No respiratory distress.      Breath sounds: No wheezing or rales.   Abdominal:      General: Bowel sounds are normal.      Palpations: Abdomen is soft.      Tenderness: There is no abdominal tenderness.   Musculoskeletal:      Right lower leg: No edema.      Left lower leg: No edema.   Skin:     General: Skin is warm and dry.   Neurological:      Mental Status: She is alert. Mental status is at baseline.          Discussion with Family: Updated  (daughter) via phone.    Discharge instructions/Information to patient and family:   See after visit summary for information provided to patient and family.      Provisions for Follow-Up Care:  See after visit summary for information related to follow-up care and any pertinent home health orders.      Mobility at time of Discharge:   Basic Mobility Inpatient Raw Score: 17  -HLM Goal: 5: " Stand one or more mins  JH-HLM Achieved: 7: Walk 25 feet or more  HLM Goal achieved. Continue to encourage appropriate mobility.     Disposition:   Other Skilled Nursing Facility at [facility]    Planned Readmission: None     Discharge Statement:  I spent 35 minutes discharging the patient. This time was spent on the day of discharge. I had direct contact with the patient on the day of discharge. Greater than 50% of the total time was spent examining patient, answering all patient questions, arranging and discussing plan of care with patient as well as directly providing post-discharge instructions.  Additional time then spent on discharge activities.    Discharge Medications:  See after visit summary for reconciled discharge medications provided to patient and/or family.      **Please Note: This note may have been constructed using a voice recognition system**

## 2024-05-16 NOTE — PLAN OF CARE
Problem: Prexisting or High Potential for Compromised Skin Integrity  Goal: Skin integrity is maintained or improved  Description: INTERVENTIONS:  - Identify patients at risk for skin breakdown  - Assess and monitor skin integrity  - Assess and monitor nutrition and hydration status  - Monitor labs   - Assess for incontinence   - Turn and reposition patient  - Assist with mobility/ambulation  - Relieve pressure over bony prominences  - Avoid friction and shearing  - Provide appropriate hygiene as needed including keeping skin clean and dry  - Evaluate need for skin moisturizer/barrier cream  - Collaborate with interdisciplinary team   - Patient/family teaching  - Consider wound care consult   Outcome: Progressing     Problem: INFECTION - ADULT  Goal: Absence or prevention of progression during hospitalization  Description: INTERVENTIONS:  - Assess and monitor for signs and symptoms of infection  - Monitor lab/diagnostic results  - Monitor all insertion sites, i.e. indwelling lines, tubes, and drains  - Monitor endotracheal if appropriate and nasal secretions for changes in amount and color  - [place] appropriate cooling/warming therapies per order  - Administer medications as ordered  - Instruct and encourage patient and family to use good hand hygiene technique  - Identify and instruct in appropriate isolation precautions for identified infection/condition  Outcome: Progressing     Problem: SAFETY ADULT  Goal: Patient will remain free of falls  Description: INTERVENTIONS:  - Educate patient/family on patient safety including physical limitations  - Instruct patient to call for assistance with activity   - Consult OT/PT to assist with strengthening/mobility   - Keep Call bell within reach  - Keep bed low and locked with side rails adjusted as appropriate  - Keep care items and personal belongings within reach  - Initiate and maintain comfort rounds  - Make Fall Risk Sign visible to staff  - Offer Toileting every  2 Hours, in advance of need  - Initiate/Maintain bed alarm  - Obtain necessary fall risk management equipment: non skid socks  - Apply yellow socks and bracelet for high fall risk patients  - Consider moving patient to room near nurses station  Outcome: Progressing

## 2024-05-16 NOTE — PLAN OF CARE
Problem: PHYSICAL THERAPY ADULT  Goal: Performs mobility at highest level of function for planned discharge setting.  See evaluation for individualized goals.  Description: Treatment/Interventions: ADL retraining, Functional transfer training, LE strengthening/ROM, Elevations, Therapeutic exercise, Endurance training, Bed mobility, Gait training, Spoke to nursing, OT          See flowsheet documentation for full assessment, interventions and recommendations.  Outcome: Progressing  Note: Prognosis: Fair  Problem List: Decreased strength, Decreased endurance, Impaired balance, Decreased mobility, Decreased coordination, Decreased cognition, Impaired judgement, Decreased safety awareness  Assessment: Pt seen for PT treatment session w/ interventions consisting of t/f training, gait training, + LE ther ex instruction. Pt demonstrated good progress this session w/ decreased assistance required for t/f and increased distance covered during gait training. Pt demonstrated excessively slow gait, forward trunk lean, and decreased foot clearance during swing. Cues for safety required. Pt pleased w/ progress. Continue to recommend rehab upon d/c.  Barriers to Discharge: None     Rehab Resource Intensity Level, PT: II (Moderate Resource Intensity)    See flowsheet documentation for full assessment.

## 2024-05-16 NOTE — ASSESSMENT & PLAN NOTE
Reported altered mental status at [facility] where patient resides as well as fever of 100.4, likely in setting of colitis.  Given history of dementia, patient at baseline has cognitive decline.  CT head negative for acute intracranial abnormality.  UA negative for infection  Chest x-ray without large effusion or infiltrate.  CT chest/abdomen/pelvis remarkable for appendicitis and worsening sigmoid colitis.  Continue antibiotics.  Mental status improving, continue delirium precautions.  Discharge to rehab

## 2024-05-17 ENCOUNTER — TELEPHONE (OUTPATIENT)
Dept: GASTROENTEROLOGY | Facility: CLINIC | Age: 84
End: 2024-05-17

## 2024-05-17 PROBLEM — A04.72 CLOSTRIDIUM DIFFICILE DIARRHEA: Status: RESOLVED | Noted: 2024-04-17 | Resolved: 2024-05-17

## 2024-05-17 NOTE — TELEPHONE ENCOUNTER
Pt was recently discharged to Rehab.  Received a call from pt's daughter Lotus hoping to schedule a new patient visit sooner than the appt currently schedule July 9.  Family expressed disappointment they were unable to met with a GI while the pt was admitted.      Pt is currently in [facility],  Felicia Ortez, RN.      Pt has been scheduled with Dr. Means for May 23rd, daughter is aware, a call has been placed to [facility] to coordinate transportation.  Spoke with sherie Maguire msg for CARYN Duarte.

## 2024-05-17 NOTE — TELEPHONE ENCOUNTER
Appt is set, family is aware, daughter Lotus will met the patient at the office.  Pt will be transferred from [facility] by [facility].

## 2024-05-17 NOTE — TELEPHONE ENCOUNTER
Felicia return this call, transferred to meghan at the [facility] office so she can connect her with Yumiko

## 2024-05-20 NOTE — TELEPHONE ENCOUNTER
Debby calling from [facility] to verify Pt's appt on 05.23.24 and to ask if the appt on 07.09. 24 is going to be cxl'ed as we got her the sooner visit, or if she needs to come in for that too? She just needs to knwo if she should keep it on the schedule So transportation can be arranged.

## 2024-05-23 ENCOUNTER — OFFICE VISIT (OUTPATIENT)
Dept: GASTROENTEROLOGY | Facility: CLINIC | Age: 84
End: 2024-05-23
Payer: MEDICARE

## 2024-05-23 ENCOUNTER — TRANSCRIBE ORDERS (OUTPATIENT)
Dept: GASTROENTEROLOGY | Facility: CLINIC | Age: 84
End: 2024-05-23

## 2024-05-23 VITALS
HEIGHT: 64 IN | BODY MASS INDEX: 24.59 KG/M2 | SYSTOLIC BLOOD PRESSURE: 108 MMHG | WEIGHT: 144 LBS | TEMPERATURE: 98.3 F | DIASTOLIC BLOOD PRESSURE: 62 MMHG

## 2024-05-23 DIAGNOSIS — R19.7 DIARRHEA, UNSPECIFIED TYPE: Primary | ICD-10-CM

## 2024-05-23 DIAGNOSIS — A04.72 CLOSTRIDIUM DIFFICILE DIARRHEA: ICD-10-CM

## 2024-05-23 DIAGNOSIS — K52.9 COLITIS: ICD-10-CM

## 2024-05-23 PROCEDURE — 99214 OFFICE O/P EST MOD 30 MIN: CPT | Performed by: INTERNAL MEDICINE

## 2024-05-23 PROCEDURE — G2211 COMPLEX E/M VISIT ADD ON: HCPCS | Performed by: INTERNAL MEDICINE

## 2024-05-23 NOTE — PROGRESS NOTES
E-Consult unable to be completed.   Was able to review the office visit note and it seems like patient's daughter is requesting an in person evaluation with ID and that is not what an E consult is.  Please clarify if you want an in person consult then that would be a referral that needs to be sent to our office that would be reviewed.  Additionally it is uncertain to me why they wish to be evaluated by infectious disease.  If this is for discussion of vancomycin prophylaxis in the future we are happy to schedule a visit and discussed with them.  Otherwise if this is a referral for recurrent UTIs I would recommend that the patient be seen by their primary care provider first and if they feel an ID evaluation is needed then they can refer at that time.  I have included one of our office staff members and pod nurse on this message so that they are aware and can look out for the referral if placed.  Let me know if further questions.

## 2024-05-23 NOTE — PROGRESS NOTES
[facility] - Outpatient Follow-up Note  Ann Cross 83 y.o. female MRN: [id number]  Encounter: [id number]          ASSESSMENT AND PLAN:      1. Colitis  2.  Recurrent C. difficile  3.  Diarrhea    Patient's bowel movement frequency has improved.  She has had recurrent UTIs and had antibiotic in the past few months which are likely the cause of her C. difficile.  Currently on vancomycin with prolonged taper to finish in late June.  Encourage patient's daughter to continue with current plan for vancomycin.  She would likely need serial prophylaxis with any courses of antibiotics for treatment of infections.  Patient's daughter voiced request for ID consult.  I referred them to infectious disease today.  Continue with current management plan.  Continue to follow-up in the GI office as scheduled in July.    RTC as scheduled.    Tino Means MD  Gastroenterology  [facility]  Date: May 23, 2024    - Ambulatory referral to Gastroenterology  - AMB E-CONSULT TO INFECTIOUS DISEASE      ______________________________________________________________________    SUBJECTIVE: 83-year-old with dementia, dependency for multiple activities of daily living, colitis, recurrent C. difficile infection, atrial fibrillation not on anticoagulation presents for follow-up.    Patient was hospitalized last week after she developed another episode of C. difficile within 1 month based on my review of the labs today.  WBC count, hemoglobin and platelets were normal.  Renal function was normal.  Nonsevere episode of C. difficile.  Patient was started on vancomycin with plan of prolonged taper.  CT scan during hospitalization done on 5/11/2024 showed colon wall thickening in the ascending colon, transverse colon, descending colon and sigmoid colon.  No colon dilatation was seen.  Ultimately she was discharged from hospital.    She is accompanied by daughter Lotus to the office today.  Lotus is  POA as well.  Patient poor historian due to underlying dementia.  Lotus reports that patient's bowel movement frequency has improved.  No abdominal tenderness on my exam today.  Appetite is okay.  No blood in stools.      REVIEW OF SYSTEMS IS OTHERWISE NEGATIVE.      Historical Information   Past Medical History:   Diagnosis Date    Afib (HCC)     Arthritis     Hyperlipidemia     Hypertension     Osteopenia     Sleep apnea     Squamous cell skin cancer     LEF TEMPLE    Subdural hemorrhage (HCC)     Varicose veins of both lower extremities      Past Surgical History:   Procedure Laterality Date    CARDIAC ELECTROPHYSIOLOGY PROCEDURE N/A 10/12/2022    Procedure: Cardiac loop recorder implant;  Surgeon: Rashid Carlos MD;  [facility];  Service: Cardiology    CARDIAC ELECTROPHYSIOLOGY PROCEDURE N/A 4/21/2023    Procedure: Cardiac pacer implant;  Surgeon: Simba Chilel MD;  [facility]: [facility];  Service: Cardiology    CATARACT EXTRACTION      COLONOSCOPY      complete, for polyp removal    EYE SURGERY      HYSTERECTOMY      age 45    INCISION AND DRAINAGE OF WOUND Right 06/28/2016    Procedure: ARTHROSCOPY,EVACUATION OF HEMATOMA, OPEN EXPLORATION OF WOUND;  Surgeon: Adam Brice MD;  [facility];  Service:     MOHS SURGERY Left 12/27/2022    left temple    SKIN BIOPSY      TONSILLECTOMY AND ADENOIDECTOMY      TOTAL KNEE ARTHROPLASTY Right     TOTAL KNEE ARTHROPLASTY Left     WISDOM TOOTH EXTRACTION       Social History   Social History     Substance and Sexual Activity   Alcohol Use Not Currently     Social History     Substance and Sexual Activity   Drug Use No     Social History     Tobacco Use   Smoking Status Never   Smokeless Tobacco Never     Family History   Problem Relation Age of Onset    Diabetes Mother     Diabetes Father     Diabetes Brother     No Known Problems Sister     No Known Problems Daughter     No Known Problems Maternal Grandmother     No Known Problems  "Maternal Grandfather     No Known Problems Paternal Grandmother     No Known Problems Paternal Grandfather     No Known Problems Daughter     Brain cancer Son     No Known Problems Maternal Aunt        Meds/Allergies       Current Outpatient Medications:     acebutolol (SECTRAL) 200 mg capsule    acetaminophen (TYLENOL) 325 mg tablet    aspirin (ECOTRIN LOW STRENGTH) 81 mg EC tablet    busPIRone (BUSPAR) 7.5 mg tablet    Calcium Carbonate 1500 (600 Ca) MG TABS    citalopram (CeleXA) 10 mg tablet    divalproex sodium (DEPAKOTE) 250 mg EC tablet    ergocalciferol (VITAMIN D2) 50,000 units    Melatonin 3 MG CAPS    sodium chloride (MARITZA 128) 5 % hypertonic ophthalmic solution    vancomycin (VANCOCIN) 125 MG capsule    donepezil (ARICEPT) 5 mg tablet    flecainide (TAMBOCOR) 50 mg tablet    Allergies   Allergen Reactions    Atorvastatin Other (See Comments)     myalgia    Statins Other (See Comments)     Weakness in legs           Objective     Blood pressure 108/62, temperature 98.3 °F (36.8 °C), height 5' 4\" (1.626 m), weight 65.3 kg (144 lb). Body mass index is 24.72 kg/m².      PHYSICAL EXAM:      General Appearance:   Alert, cooperative, no distress   HEENT:   Normocephalic, atraumatic, anicteric.     Neck:  Supple, symmetrical, trachea midline   Lungs:   Clear to auscultation bilaterally; no rales, rhonchi or wheezing; respirations unlabored    Heart::   Regular rate and rhythm; no murmur, rub, or gallop.   Abdomen:   Soft, non-tender, non-distended; normal bowel sounds; no masses, no organomegaly    Genitalia:   Deferred    Rectal:   Deferred    Extremities:  No cyanosis, clubbing or edema    Pulses:  2+ and symmetric    Skin:  No jaundice, rashes, or lesions    Lymph nodes:  No palpable cervical lymphadenopathy        Lab Results:   No visits with results within 1 Day(s) from this visit.   Latest known visit with results is:   No results displayed because visit has over 200 results.            Radiology Results: "   XR chest 1 view    Result Date: 5/11/2024  Narrative: XR CHEST 1 VIEW INDICATION: altered mental status. COMPARISON: Chest CT 5/10/2024, CXR 4/15/2024. FINDINGS: Clear lungs. No pneumothorax or pleural effusion. Normal cardiomediastinal silhouette. Left subclavian pacemaker leads in right atrium and right ventricle. Mitral annulus calcification. Loop recorder in the left chest wall. Bones are unremarkable for age. Normal upper abdomen.     Impression: No acute cardiopulmonary disease. Workstation performed: [id number]     CT chest abdomen pelvis w contrast    Addendum Date: 5/11/2024 Addendum:   ADDENDUM: Findings discussed with Dr. Kim at 3:10 a.m.    Result Date: 5/11/2024  Narrative: CT CHEST, ABDOMEN AND PELVIS WITH IV CONTRAST INDICATION: Fever Unknown Origin.. COMPARISON: 4/15/2024. TECHNIQUE: CT examination of the chest, abdomen and pelvis was performed. Multiplanar 2D reformatted images were created from the source data. This examination, like all CT scans performed in the [facility], was performed utilizing techniques to minimize radiation dose exposure, including the use of iterative reconstruction and automated exposure control. Radiation dose length product (DLP) for this visit: 877.88 mGy-cm IV Contrast: 100 mL of iohexol (OMNIPAQUE) Enteric Contrast: Not administered. FINDINGS: CHEST LUNGS: Central airways are patent. No tracheal or endobronchial lesion. No lobar airspace consolidation/pneumonia. No suspicious pulmonary parenchymal mass. Mild bibasilar atelectasis. PLEURA: No pneumothorax or significant pleural effusions. Small calcified pleural plaques in the bilateral posterior lung bases.. HEART/GREAT VESSELS: Heart is unremarkable for patient's age. No pericardial effusion mitral valvular prosthesis noted. No thoracic aortic aneurysm or dissection. Scattered calcific atherosclerosis of the thoracic aorta, proximal thoracic great vessels, and coronary arteries noted. MEDIASTINUM  AND RAF: No mediastinal mass or lymphadenopathy by size criteria. Thyroid glands appear grossly unremarkable. Esophagus is normal in course and caliber. Small hiatal hernia. No significant prevertebral soft tissue swelling. CHEST WALL AND LOWER NECK: Left upper chest cardiac pacing device and loop recorder noted.. ABDOMEN LIVER/BILIARY TREE: Unremarkable. GALLBLADDER: No calcified gallstones. No pericholecystic inflammatory change. SPLEEN: Unremarkable. PANCREAS: Unremarkable. ADRENAL GLANDS: Unremarkable. KIDNEYS/URETERS: Kidneys are normal in size and position. No suspicious renal mass, nephrolithiasis, hydronephrosis, or perinephric fluid collections bilaterally. Small cyst in the lower pole of the right kidney. Too small to characterize hypodensity in the anterior mid to lower pole of the left kidney.. STOMACH AND BOWEL: Small hiatal hernia. Stomach is contracted limiting evaluation. Small and large bowel loops are normal in course and caliber without obstruction. Terminal ileum is grossly unremarkable. New abnormal distention of the appendix measuring  up to 9 mm with wall thickening and minimal periappendiceal hazy inflammatory stranding. Findings are concerning for early acute appendicitis. No periappendiceal free air or abscess. New mild wall thickening of the proximal ascending colon, mid to distal transverse colon, and portions of the descending colon noted consistent with colitis. Extensive abnormal wall thickening of the sigmoid colon appear worse compared to the prior study suggesting worsening sigmoid colitis. No pericolonic free air or  abscess. A few likely reactive subcentimeter enhancing pericolonic mesenteric lymph nodes in the lower abdomen and pelvis are seen. APPENDIX: Abnormal findings suspicious for early acute appendicitis as detailed above. ABDOMINOPELVIC CAVITY: No ascites or loculated fluid collection. No pneumoperitoneum. No lymphadenopathy. VESSELS: Extensive calcific atherosclerosis.  2.6 x 2.4 cm infrarenal abdominal aortic aneurysm. No aortic dissection or significant flow-limiting stenosis.. PELVIS REPRODUCTIVE ORGANS: Post hysterectomy. URINARY BLADDER: Mildly distended and grossly unremarkable. ABDOMINAL WALL/INGUINAL REGIONS: Unremarkable. BONES: No acute fracture or suspicious osseous lesion. Mild multilevel degenerative changes throughout the visualized thoracolumbar spine and bilateral hip joints.     Impression: 1.  No acute intrathoracic abnormalities noted with ancillary findings detailed above. 2.  Extensive calcific atherosclerosis of the thoracoabdominal aorta and proximal branch vessels including coronary arteries.  2.6 x 2.4 cm infrarenal abdominal aortic aneurysm. Recommend follow-up imaging every 5 years per current guidelines. 3.  Small hiatal hernia. 4.  New abnormal findings of the appendix as described above concerning for acute uncomplicated appendicitis. 5.  New mild wall thickening of the proximal ascending colon, mid to distal transverse colon, and portions of the descending colon noted consistent with colitis. Extensive abnormal wall thickening of the sigmoid colon appear worse compared to the prior study suggesting worsening sigmoid colitis. No pericolonic free air or abscess. A few likely reactive subcentimeter enhancing pericolonic mesenteric lymph nodes in the lower abdomen and pelvis are seen. Recommend follow-up colonoscopy when clinically appropriate to exclude neoplasm. No evidence for bowel obstruction. Workstation performed: [id number]     CT head without contrast    Result Date: 5/10/2024  Narrative: CT BRAIN - WITHOUT CONTRAST INDICATION:   altered mental status. COMPARISON: 4/28/2024 and 4/16/2024. TECHNIQUE:  CT examination of the brain was performed.  Multiplanar 2D reformatted images were created from the source data. Radiation dose length product (DLP) for this visit:  833.78 mGy-cm .  This examination, like all CT scans performed in the [facility], was performed utilizing techniques to minimize radiation dose exposure, including the use of iterative  reconstruction and automated exposure control. IMAGE QUALITY:  Diagnostic. FINDINGS: PARENCHYMA: Decreased attenuation is noted in periventricular and subcortical white matter demonstrating an appearance that is statistically most likely to represent moderate microangiopathic change; this appearance is similar when compared to most recent prior examination. No CT signs of acute infarction.  No intracranial mass, mass effect or midline shift.  No acute parenchymal hemorrhage. VENTRICLES AND EXTRA-AXIAL SPACES:  Ventricles and extra-axial CSF spaces are prominent commensurate with the degree of volume loss.  No hydrocephalus.  No acute extra-axial hemorrhage. VISUALIZED ORBITS: Post bilateral ocular lens replacement. PARANASAL SINUSES: Normal visualized paranasal sinuses. CALVARIUM AND EXTRACRANIAL SOFT TISSUES:  Normal.     Impression: No acute intracranial CT abnormality.  Stable chronic microangiopathic changes within the brain. Workstation performed: [id number]     CT head wo contrast    Result Date: 4/28/2024  Narrative: CT BRAIN - WITHOUT CONTRAST INDICATION:   altered MS. COMPARISON: Head CT from April 15, 2024, MRI of the brain from April 16, 2024 TECHNIQUE:  CT examination of the brain was performed.  Multiplanar 2D reformatted images were created from the source data. Radiation dose length product (DLP) for this visit:  821.7 mGy-cm .  This examination, like all CT scans performed in the [facility], was performed utilizing techniques to minimize radiation dose exposure, including the use of iterative reconstruction and automated exposure control. IMAGE QUALITY:  Diagnostic. FINDINGS: PARENCHYMA: Decreased attenuation is noted in periventricular and subcortical white matter demonstrating an appearance that is statistically most likely to represent moderate microangiopathic  change. Intracranial carotid and vertebral artery atherosclerotic calcifications. No CT signs of acute infarction. Limited evaluation of the sujata due to artifact. Underlying chronic microangiopathic changes were seen on the prior MRI study. No intracranial mass, mass effect or midline shift.  No acute parenchymal hemorrhage. VENTRICLES AND EXTRA-AXIAL SPACES:  Normal for the patient's age. Enlarged sylvian fissures. VISUALIZED ORBITS: Prior bilateral lens surgery. PARANASAL SINUSES: Normal visualized paranasal sinuses. CALVARIUM AND EXTRACRANIAL SOFT TISSUES:  Normal.     Impression: No acute intracranial abnormality.  Stable chronic microangiopathic changes within the brain. Workstation performed: [id number]

## 2024-05-24 ENCOUNTER — TELEPHONE (OUTPATIENT)
Age: 84
End: 2024-05-24

## 2024-05-24 ENCOUNTER — NURSE TRIAGE (OUTPATIENT)
Age: 84
End: 2024-05-24

## 2024-05-24 DIAGNOSIS — A04.72 CLOSTRIDIUM DIFFICILE COLITIS: Primary | ICD-10-CM

## 2024-05-24 NOTE — TELEPHONE ENCOUNTER
"Daughter Lotus HENNING called. Pt had appt GI yesterday and Lotus had questions regarding C-diff. Lotus may be moving pt to another facility and has concerns regarding management of recurrent C-diff. Reviewed at home precautions for active infection. She is aware of consult and will await call to schedule.  Answer Assessment - Initial Assessment Questions  1. REASON FOR CALL or QUESTION: \"What is your reason for calling today?\" or \"How can I best help you?\" or \"What question do you have that I can help answer?\"      Had appt GI yesterday and had questions regarding C-diff.    Protocols used: Information Only Call - No Triage-ADULT-[place]    "

## 2024-06-06 ENCOUNTER — CONSULT (OUTPATIENT)
Dept: INFECTIOUS DISEASES | Facility: CLINIC | Age: 84
End: 2024-06-06
Payer: MEDICARE

## 2024-06-06 VITALS
OXYGEN SATURATION: 94 % | DIASTOLIC BLOOD PRESSURE: 64 MMHG | TEMPERATURE: 97.8 F | HEART RATE: 68 BPM | SYSTOLIC BLOOD PRESSURE: 112 MMHG | RESPIRATION RATE: 18 BRPM

## 2024-06-06 DIAGNOSIS — N18.30 STAGE 3 CHRONIC KIDNEY DISEASE, UNSPECIFIED WHETHER STAGE 3A OR 3B CKD (HCC): ICD-10-CM

## 2024-06-06 DIAGNOSIS — F03.90 DEMENTIA, UNSPECIFIED DEMENTIA SEVERITY, UNSPECIFIED DEMENTIA TYPE, UNSPECIFIED WHETHER BEHAVIORAL, PSYCHOTIC, OR MOOD DISTURBANCE OR ANXIETY (HCC): ICD-10-CM

## 2024-06-06 DIAGNOSIS — R19.7 DIARRHEA OF PRESUMED INFECTIOUS ORIGIN: ICD-10-CM

## 2024-06-06 DIAGNOSIS — A04.72 CLOSTRIDIUM DIFFICILE COLITIS: Primary | ICD-10-CM

## 2024-06-06 PROCEDURE — 99204 OFFICE O/P NEW MOD 45 MIN: CPT | Performed by: INTERNAL MEDICINE

## 2024-06-06 NOTE — PROGRESS NOTES
Consultation - Infectious Disease   Ann Cross 83 y.o. female MRN: [id number]  Unit/Bed#:  Encounter: [id number]      IMPRESSION & RECOMMENDATIONS:   Impression/Recommendations:  1.  Recurrent C. difficile colitis.  First diagnosed on 4/16/2024 and completed 10-day course of oral vancomycin.  Patient suffered a relapse as evidenced by positive C. difficile PCR/EIA on 5/11/2024.  She remains on prolonged vancomycin taper for 6 weeks due to be completed on 6/23/2024.  Symptoms have improved and she has not had any reported active diarrhea recently.    -Complete oral vancomycin taper as prescribed, end date is 6/23/2024.  -Recent GI evaluation noted.  -No contraindication from ID standpoint for patient to move to another facility as she is no longer having active diarrhea.  -If patient receives systemic antibiotic in the future, she should also receive oral vancomycin prophylaxis 125 mg twice daily while on systemic antibiotic and for 72 hours after its completion.  -Follow-up with us on an as-needed basis if patient develops new symptoms concerning for C. difficile.    2.  Cognitive impairment/dementia.    3.  CKD 3.    Antibiotics:  Oral vancomycin taper    I discussed above plan in detail with the patient's daughter and answered all of her questions.  Patient will follow-up with us on an as-needed basis.        HISTORY OF PRESENT ILLNESS:  Reason for Consult: C. difficile colitis.    HPI: Ann Cross is a 83 y.o. female with CKD 3, SSS with pacemaker, cognitive impairment, previous hospitalization from 4/15/24-4/22/24 secondary to C. difficile colitis treated with 10-day course of oral vancomycin, subsequent hospitalization from 5/10/2024 to 5/16/2024 due to altered mentation and again found to have relapsing C. difficile colitis for which patient was given a prolonged vancomycin taper.  Patient still remains on taper and has about 2 more weeks to go.  She has not had any more diarrhea as per her daughter.   History from the patient is limited.  She denies abdominal pain or diarrhea.  Tolerating oral intake.  States she is generally feeling well.  She is currently residing at a SNF but her daughter would like to move her to another facility which requires clearance in regards to her C. difficile.  She has not been on other systemic antibiotics recently.      REVIEW OF SYSTEMS:  Limited due to dementia.    PAST MEDICAL HISTORY:  Past Medical History:   Diagnosis Date    Afib (HCC)     Arthritis     Hyperlipidemia     Hypertension     Osteopenia     Sleep apnea     Squamous cell skin cancer     LEF TEMPLE    Subdural hemorrhage (HCC)     Varicose veins of both lower extremities      Past Surgical History:   Procedure Laterality Date    CARDIAC ELECTROPHYSIOLOGY PROCEDURE N/A 10/12/2022    Procedure: Cardiac loop recorder implant;  Surgeon: Rashid Carlos MD;  [facility];  Service: Cardiology    CARDIAC ELECTROPHYSIOLOGY PROCEDURE N/A 4/21/2023    Procedure: Cardiac pacer implant;  Surgeon: Simba Chilel MD;  [facility];  Service: Cardiology    CATARACT EXTRACTION      COLONOSCOPY      complete, for polyp removal    EYE SURGERY      HYSTERECTOMY      age 45    INCISION AND DRAINAGE OF WOUND Right 06/28/2016    Procedure: ARTHROSCOPY,EVACUATION OF HEMATOMA, OPEN EXPLORATION OF WOUND;  Surgeon: Adam Brice MD;  [facility];  Service:     MOHS SURGERY Left 12/27/2022    left temple    SKIN BIOPSY      TONSILLECTOMY AND ADENOIDECTOMY      TOTAL KNEE ARTHROPLASTY Right     TOTAL KNEE ARTHROPLASTY Left     WISDOM TOOTH EXTRACTION         FAMILY HISTORY:  Non-contributory    SOCIAL HISTORY:  Social History     Substance and Sexual Activity   Alcohol Use Not Currently     Social History     Substance and Sexual Activity   Drug Use No     Social History     Tobacco Use   Smoking Status Never   Smokeless Tobacco Never       ALLERGIES:  Allergies   Allergen Reactions    Atorvastatin  Other (See Comments)     myalgia    Statins Other (See Comments)     Weakness in legs       MEDICATIONS:  All current active medications have been reviewed.    PHYSICAL EXAM:  Vitals:  [unfilled]  @TMAX(24)@Current:       Physical Exam:  General:  Well-nourished, well-developed, in no acute distress, sitting comfortably in chair  Eyes:  Conjunctive clear with no hemorrhages or effusions  Oropharynx:  No ulcers, no lesions  Neck:  Supple, trachea midline  Lungs:  Clear to auscultation bilaterally, no accessory muscle use  Cardiac:  Regular rate and rhythm, no murmurs  Abdomen:  Soft, non-tender, non-distended  Extremities: Symmetric edema  Skin:  No visible rashes or draining wounds  Neurological:  Moves all four extremities spontaneously    LABS, IMAGING, & OTHER STUDIES:  Lab Results:  I have personally reviewed pertinent labs.    Positive C. difficile PCR/EIA 4/16/2024 and 5/11/2024.    Imaging Studies:   I have personally reviewed pertinent imaging study reports and images in PACS.    CT chest/abdomen/pelvis from 4/15/2024 with segmental thickening of sigmoid colon with adjacent mesenteric lymphadenopathy.    EKG, Pathology, and Other Studies:   I have personally reviewed pertinent reports.

## 2024-06-10 PROBLEM — R50.9 FEVER, UNKNOWN ORIGIN: Status: RESOLVED | Noted: 2024-05-11 | Resolved: 2024-06-10

## 2024-07-24 ENCOUNTER — TELEPHONE (OUTPATIENT)
Age: 84
End: 2024-07-24

## 2024-07-24 NOTE — TELEPHONE ENCOUNTER
HFU/ [facility]/ ALTERED MENTAL STATUS     DC- HOME- 4/22/2024    Ann Cross will need follow up in in 3 months with general AP/Attending .  She will not require outpatient neurological testing.

## 2024-07-25 ENCOUNTER — TELEPHONE (OUTPATIENT)
Age: 84
End: 2024-07-25

## 2024-07-25 NOTE — TELEPHONE ENCOUNTER
Called patient and spoke with her regarding HFU. Patient declined to schedule. I did advise her that she can schedule an HFU up to 1 yr from her d/c date, anything after that would be a new patient appt. She verbalized understanding.    Not scheduling HFU at this time       Thank you,     Elizabeth

## 2024-07-25 NOTE — TELEPHONE ENCOUNTER
Darcy NP at pt's facility, calling. States pt currently has a UTI. They gave IM Rocephin 1 gram yesterday. She is wondering with her hx of recurrent cdiff if they should start prophylactic vanco and if they are ok to prescribe rocephin for her current UTI or if ID provider would recommend something different. She is faxing the urine culture to our office.     Darcy callback #: [phone]

## 2024-12-16 NOTE — NURSING NOTE
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, no over turn  1/17/2022 - ortho  7/11/2022 - ortho  3/1/2023-remains orthotropic  9/20/2023 - orthotropic  6/4/2024-orthotropic  12/16/2024 - remains orthotropic   Patient is present in the dayroom, out for meals  Patient is medication compliant and cooperative with care  Patient is pleasant on approach, social with select peers and staff  Patient states she is annoyed because she wants to leave, however joyful when stating she wants to leave  Patient counseled  Noted to have positive, bright attitude  Currently denying all s/s at this time  Able to make needs known  Daughter Jessica Armendariz called for an update  Given to family, informed patient stated she ' feels ready to move on' and move to new place

## (undated) DEVICE — SLITTER ADJUSTABLE

## (undated) DEVICE — AVITENE MICROFIBRILLAR COLLAGEN HEMOSTAT NON-WOVEN WEB: Brand: AVITENE NON-WOVEN WEB

## (undated) DEVICE — DGW .035 FC J3MM 150CM TEF: Brand: EMERALD

## (undated) DEVICE — CATH GUIDING FIXED SHAPE 43CM

## (undated) DEVICE — INTRO SHEATH PEEL AWAY 7FR